# Patient Record
Sex: FEMALE | Race: WHITE | Employment: OTHER | URBAN - NONMETROPOLITAN AREA
[De-identification: names, ages, dates, MRNs, and addresses within clinical notes are randomized per-mention and may not be internally consistent; named-entity substitution may affect disease eponyms.]

---

## 2017-01-04 ENCOUNTER — TELEPHONE (OUTPATIENT)
Dept: CARDIOLOGY | Age: 82
End: 2017-01-04

## 2017-01-19 ENCOUNTER — CARE COORDINATION (OUTPATIENT)
Dept: CARE COORDINATION | Facility: CLINIC | Age: 82
End: 2017-01-19

## 2017-02-02 DIAGNOSIS — F41.1 GENERALIZED ANXIETY DISORDER: ICD-10-CM

## 2017-02-03 RX ORDER — LORAZEPAM 0.5 MG/1
TABLET ORAL
Qty: 90 TABLET | Refills: 0 | Status: SHIPPED | OUTPATIENT
Start: 2017-02-03 | End: 2017-04-25

## 2017-03-10 ENCOUNTER — CARE COORDINATION (OUTPATIENT)
Dept: CARE COORDINATION | Facility: CLINIC | Age: 82
End: 2017-03-10

## 2017-04-06 ENCOUNTER — CARE COORDINATION (OUTPATIENT)
Dept: CARE COORDINATION | Age: 82
End: 2017-04-06

## 2017-04-21 ENCOUNTER — CARE COORDINATION (OUTPATIENT)
Dept: CARE COORDINATION | Age: 82
End: 2017-04-21

## 2017-04-25 DIAGNOSIS — F41.1 GENERALIZED ANXIETY DISORDER: ICD-10-CM

## 2017-04-25 RX ORDER — LORAZEPAM 0.5 MG/1
TABLET ORAL
Qty: 90 TABLET | Refills: 2 | Status: SHIPPED | OUTPATIENT
Start: 2017-04-25 | End: 2017-11-21 | Stop reason: SDUPTHER

## 2017-05-01 ENCOUNTER — OFFICE VISIT (OUTPATIENT)
Dept: CARDIOLOGY | Age: 82
End: 2017-05-01
Payer: MEDICARE

## 2017-05-01 VITALS
SYSTOLIC BLOOD PRESSURE: 120 MMHG | WEIGHT: 127 LBS | DIASTOLIC BLOOD PRESSURE: 60 MMHG | BODY MASS INDEX: 23.98 KG/M2 | HEART RATE: 77 BPM | HEIGHT: 61 IN

## 2017-05-01 DIAGNOSIS — I25.10 CORONARY ARTERY DISEASE INVOLVING NATIVE CORONARY ARTERY OF NATIVE HEART WITHOUT ANGINA PECTORIS: ICD-10-CM

## 2017-05-01 DIAGNOSIS — I35.0 AORTIC VALVE STENOSIS, UNSPECIFIED ETIOLOGY: ICD-10-CM

## 2017-05-01 DIAGNOSIS — I48.0 PAF (PAROXYSMAL ATRIAL FIBRILLATION) (HCC): ICD-10-CM

## 2017-05-01 DIAGNOSIS — I25.5 CARDIOMYOPATHY, ISCHEMIC: ICD-10-CM

## 2017-05-01 DIAGNOSIS — I10 ESSENTIAL HYPERTENSION: Primary | ICD-10-CM

## 2017-05-01 PROCEDURE — 1090F PRES/ABSN URINE INCON ASSESS: CPT | Performed by: INTERNAL MEDICINE

## 2017-05-01 PROCEDURE — G8420 CALC BMI NORM PARAMETERS: HCPCS | Performed by: INTERNAL MEDICINE

## 2017-05-01 PROCEDURE — 4040F PNEUMOC VAC/ADMIN/RCVD: CPT | Performed by: INTERNAL MEDICINE

## 2017-05-01 PROCEDURE — G8598 ASA/ANTIPLAT THER USED: HCPCS | Performed by: INTERNAL MEDICINE

## 2017-05-01 PROCEDURE — 99213 OFFICE O/P EST LOW 20 MIN: CPT | Performed by: INTERNAL MEDICINE

## 2017-05-01 PROCEDURE — 1123F ACP DISCUSS/DSCN MKR DOCD: CPT | Performed by: INTERNAL MEDICINE

## 2017-05-01 PROCEDURE — G8427 DOCREV CUR MEDS BY ELIG CLIN: HCPCS | Performed by: INTERNAL MEDICINE

## 2017-05-01 PROCEDURE — 1036F TOBACCO NON-USER: CPT | Performed by: INTERNAL MEDICINE

## 2017-05-01 PROCEDURE — 93000 ELECTROCARDIOGRAM COMPLETE: CPT | Performed by: INTERNAL MEDICINE

## 2017-05-01 PROCEDURE — G8400 PT W/DXA NO RESULTS DOC: HCPCS | Performed by: INTERNAL MEDICINE

## 2017-05-02 ENCOUNTER — OFFICE VISIT (OUTPATIENT)
Dept: FAMILY MEDICINE CLINIC | Age: 82
End: 2017-05-02
Payer: MEDICARE

## 2017-05-02 VITALS
HEIGHT: 61 IN | SYSTOLIC BLOOD PRESSURE: 136 MMHG | TEMPERATURE: 96.7 F | OXYGEN SATURATION: 98 % | DIASTOLIC BLOOD PRESSURE: 78 MMHG | HEART RATE: 96 BPM | WEIGHT: 131 LBS | RESPIRATION RATE: 16 BRPM | BODY MASS INDEX: 24.73 KG/M2

## 2017-05-02 DIAGNOSIS — R10.11 RUQ PAIN: ICD-10-CM

## 2017-05-02 DIAGNOSIS — E11.69 TYPE 2 DIABETES MELLITUS WITH OTHER SPECIFIED COMPLICATION, WITHOUT LONG-TERM CURRENT USE OF INSULIN (HCC): ICD-10-CM

## 2017-05-02 DIAGNOSIS — Z23 NEED FOR 23-POLYVALENT PNEUMOCOCCAL POLYSACCHARIDE VACCINE: ICD-10-CM

## 2017-05-02 DIAGNOSIS — I10 ESSENTIAL HYPERTENSION: Primary | ICD-10-CM

## 2017-05-02 PROCEDURE — G8427 DOCREV CUR MEDS BY ELIG CLIN: HCPCS | Performed by: FAMILY MEDICINE

## 2017-05-02 PROCEDURE — G0009 ADMIN PNEUMOCOCCAL VACCINE: HCPCS | Performed by: FAMILY MEDICINE

## 2017-05-02 PROCEDURE — 1090F PRES/ABSN URINE INCON ASSESS: CPT | Performed by: FAMILY MEDICINE

## 2017-05-02 PROCEDURE — 90732 PPSV23 VACC 2 YRS+ SUBQ/IM: CPT | Performed by: FAMILY MEDICINE

## 2017-05-02 PROCEDURE — G8400 PT W/DXA NO RESULTS DOC: HCPCS | Performed by: FAMILY MEDICINE

## 2017-05-02 PROCEDURE — 4040F PNEUMOC VAC/ADMIN/RCVD: CPT | Performed by: FAMILY MEDICINE

## 2017-05-02 PROCEDURE — 1036F TOBACCO NON-USER: CPT | Performed by: FAMILY MEDICINE

## 2017-05-02 PROCEDURE — 99214 OFFICE O/P EST MOD 30 MIN: CPT | Performed by: FAMILY MEDICINE

## 2017-05-02 PROCEDURE — 1123F ACP DISCUSS/DSCN MKR DOCD: CPT | Performed by: FAMILY MEDICINE

## 2017-05-02 PROCEDURE — G8420 CALC BMI NORM PARAMETERS: HCPCS | Performed by: FAMILY MEDICINE

## 2017-05-02 PROCEDURE — G8598 ASA/ANTIPLAT THER USED: HCPCS | Performed by: FAMILY MEDICINE

## 2017-05-02 ASSESSMENT — ENCOUNTER SYMPTOMS
CHEST TIGHTNESS: 0
COUGH: 0
SHORTNESS OF BREATH: 0
NAUSEA: 0
SORE THROAT: 0
DIARRHEA: 1
ABDOMINAL PAIN: 1
WHEEZING: 0

## 2017-05-30 ENCOUNTER — HOSPITAL ENCOUNTER (OUTPATIENT)
Age: 82
Setting detail: SPECIMEN
Discharge: HOME | End: 2017-05-30
Payer: MEDICARE

## 2017-05-30 ENCOUNTER — OFFICE VISIT (OUTPATIENT)
Dept: FAMILY MEDICINE CLINIC | Age: 82
End: 2017-05-30
Payer: MEDICARE

## 2017-05-30 VITALS
HEART RATE: 89 BPM | TEMPERATURE: 100.4 F | SYSTOLIC BLOOD PRESSURE: 130 MMHG | BODY MASS INDEX: 24.32 KG/M2 | DIASTOLIC BLOOD PRESSURE: 84 MMHG | HEIGHT: 61 IN | WEIGHT: 128.8 LBS | OXYGEN SATURATION: 96 %

## 2017-05-30 DIAGNOSIS — L03.114 CELLULITIS OF LEFT UPPER EXTREMITY: Primary | ICD-10-CM

## 2017-05-30 DIAGNOSIS — E55.9 VITAMIN D DEFICIENCY: ICD-10-CM

## 2017-05-30 DIAGNOSIS — G89.29 CHRONIC MIDLINE LOW BACK PAIN WITHOUT SCIATICA: ICD-10-CM

## 2017-05-30 DIAGNOSIS — M54.50 CHRONIC MIDLINE LOW BACK PAIN WITHOUT SCIATICA: ICD-10-CM

## 2017-05-30 DIAGNOSIS — R25.2 CRAMPS OF LEFT LOWER EXTREMITY: ICD-10-CM

## 2017-05-30 DIAGNOSIS — R10.11 RIGHT UPPER QUADRANT ABDOMINAL PAIN: ICD-10-CM

## 2017-05-30 PROCEDURE — G8400 PT W/DXA NO RESULTS DOC: HCPCS | Performed by: FAMILY MEDICINE

## 2017-05-30 PROCEDURE — 1123F ACP DISCUSS/DSCN MKR DOCD: CPT | Performed by: FAMILY MEDICINE

## 2017-05-30 PROCEDURE — 4040F PNEUMOC VAC/ADMIN/RCVD: CPT | Performed by: FAMILY MEDICINE

## 2017-05-30 PROCEDURE — 1036F TOBACCO NON-USER: CPT | Performed by: FAMILY MEDICINE

## 2017-05-30 PROCEDURE — 99214 OFFICE O/P EST MOD 30 MIN: CPT | Performed by: FAMILY MEDICINE

## 2017-05-30 PROCEDURE — 1090F PRES/ABSN URINE INCON ASSESS: CPT | Performed by: FAMILY MEDICINE

## 2017-05-30 PROCEDURE — G8420 CALC BMI NORM PARAMETERS: HCPCS | Performed by: FAMILY MEDICINE

## 2017-05-30 PROCEDURE — G8427 DOCREV CUR MEDS BY ELIG CLIN: HCPCS | Performed by: FAMILY MEDICINE

## 2017-05-30 PROCEDURE — G8598 ASA/ANTIPLAT THER USED: HCPCS | Performed by: FAMILY MEDICINE

## 2017-05-30 RX ORDER — ONDANSETRON 4 MG/1
4 TABLET, FILM COATED ORAL EVERY 8 HOURS PRN
Qty: 40 TABLET | Refills: 2 | Status: ON HOLD | OUTPATIENT
Start: 2017-05-30 | End: 2018-11-15 | Stop reason: HOSPADM

## 2017-05-30 RX ORDER — CHOLESTYRAMINE 4 G/9G
1 POWDER, FOR SUSPENSION ORAL 2 TIMES DAILY
Qty: 90 PACKET | Refills: 3 | Status: SHIPPED | OUTPATIENT
Start: 2017-05-30 | End: 2017-07-25

## 2017-05-30 RX ORDER — CEPHALEXIN 500 MG/1
500 CAPSULE ORAL 4 TIMES DAILY
Qty: 28 CAPSULE | Refills: 0 | Status: SHIPPED | OUTPATIENT
Start: 2017-05-30 | End: 2018-02-20 | Stop reason: SDUPTHER

## 2017-05-30 ASSESSMENT — ENCOUNTER SYMPTOMS
ABDOMINAL PAIN: 1
COUGH: 0
DIARRHEA: 1
CONSTIPATION: 0
WHEEZING: 0
SHORTNESS OF BREATH: 0
BACK PAIN: 1
CHEST TIGHTNESS: 0
VOMITING: 1
NAUSEA: 1

## 2017-05-31 LAB — VITAMIN D 25-HYDROXY: 44.8 NG/ML (ref 30–100)

## 2017-06-06 ENCOUNTER — TELEPHONE (OUTPATIENT)
Dept: FAMILY MEDICINE CLINIC | Age: 82
End: 2017-06-06

## 2017-06-06 DIAGNOSIS — R10.11 RIGHT UPPER QUADRANT ABDOMINAL PAIN: Primary | ICD-10-CM

## 2017-06-14 ENCOUNTER — CARE COORDINATION (OUTPATIENT)
Dept: CARE COORDINATION | Age: 82
End: 2017-06-14

## 2017-07-10 ENCOUNTER — CARE COORDINATION (OUTPATIENT)
Dept: CARE COORDINATION | Age: 82
End: 2017-07-10

## 2017-07-25 ENCOUNTER — INITIAL CONSULT (OUTPATIENT)
Dept: SURGERY | Age: 82
End: 2017-07-25
Payer: MEDICARE

## 2017-07-25 VITALS
DIASTOLIC BLOOD PRESSURE: 82 MMHG | HEIGHT: 61 IN | BODY MASS INDEX: 24.17 KG/M2 | SYSTOLIC BLOOD PRESSURE: 136 MMHG | WEIGHT: 128 LBS | HEART RATE: 80 BPM

## 2017-07-25 DIAGNOSIS — I25.5 CARDIOMYOPATHY, ISCHEMIC: ICD-10-CM

## 2017-07-25 DIAGNOSIS — R10.84 GENERALIZED ABDOMINAL PAIN: Primary | ICD-10-CM

## 2017-07-25 PROCEDURE — 1123F ACP DISCUSS/DSCN MKR DOCD: CPT | Performed by: SURGERY

## 2017-07-25 PROCEDURE — G8598 ASA/ANTIPLAT THER USED: HCPCS | Performed by: SURGERY

## 2017-07-25 PROCEDURE — 1090F PRES/ABSN URINE INCON ASSESS: CPT | Performed by: SURGERY

## 2017-07-25 PROCEDURE — G8428 CUR MEDS NOT DOCUMENT: HCPCS | Performed by: SURGERY

## 2017-07-25 PROCEDURE — 4040F PNEUMOC VAC/ADMIN/RCVD: CPT | Performed by: SURGERY

## 2017-07-25 PROCEDURE — G8400 PT W/DXA NO RESULTS DOC: HCPCS | Performed by: SURGERY

## 2017-07-25 PROCEDURE — 1036F TOBACCO NON-USER: CPT | Performed by: SURGERY

## 2017-07-25 PROCEDURE — 99214 OFFICE O/P EST MOD 30 MIN: CPT | Performed by: SURGERY

## 2017-07-25 PROCEDURE — G8420 CALC BMI NORM PARAMETERS: HCPCS | Performed by: SURGERY

## 2017-07-25 RX ORDER — LANOLIN ALCOHOL/MO/W.PET/CERES
3 CREAM (GRAM) TOPICAL NIGHTLY PRN
COMMUNITY
End: 2019-01-28

## 2017-07-25 RX ORDER — RIVAROXABAN 20 MG/1
TABLET, FILM COATED ORAL
Qty: 30 TABLET | Refills: 0 | Status: SHIPPED | OUTPATIENT
Start: 2017-07-25 | End: 2017-08-25 | Stop reason: SDUPTHER

## 2017-07-25 RX ORDER — FAMOTIDINE 20 MG/1
TABLET, FILM COATED ORAL
Qty: 60 TABLET | Refills: 0 | Status: SHIPPED | OUTPATIENT
Start: 2017-07-25 | End: 2017-10-27 | Stop reason: SDUPTHER

## 2017-07-26 ENCOUNTER — TELEPHONE (OUTPATIENT)
Dept: SURGERY | Age: 82
End: 2017-07-26

## 2017-07-26 RX ORDER — SOTALOL HYDROCHLORIDE 80 MG/1
TABLET ORAL
Qty: 90 TABLET | Refills: 3 | Status: SHIPPED | OUTPATIENT
Start: 2017-07-26 | End: 2017-12-18

## 2017-07-26 RX ORDER — POTASSIUM CHLORIDE 750 MG/1
10 TABLET, EXTENDED RELEASE ORAL DAILY
Qty: 90 TABLET | Refills: 3 | Status: SHIPPED | OUTPATIENT
Start: 2017-07-26 | End: 2017-12-18

## 2017-07-26 RX ORDER — FUROSEMIDE 20 MG/1
20 TABLET ORAL DAILY
Qty: 90 TABLET | Refills: 3 | Status: SHIPPED | OUTPATIENT
Start: 2017-07-26 | End: 2018-09-25 | Stop reason: SDUPTHER

## 2017-07-26 RX ORDER — CLOPIDOGREL BISULFATE 75 MG/1
75 TABLET ORAL DAILY
Qty: 90 TABLET | Refills: 3 | Status: SHIPPED | OUTPATIENT
Start: 2017-07-26 | End: 2018-08-15 | Stop reason: SDUPTHER

## 2017-08-14 ENCOUNTER — TELEPHONE (OUTPATIENT)
Dept: SURGERY | Age: 82
End: 2017-08-14

## 2017-08-25 RX ORDER — RIVAROXABAN 20 MG/1
TABLET, FILM COATED ORAL
Qty: 30 TABLET | Refills: 5 | Status: SHIPPED | OUTPATIENT
Start: 2017-08-25 | End: 2018-03-05 | Stop reason: SDUPTHER

## 2017-09-01 ENCOUNTER — CARE COORDINATION (OUTPATIENT)
Dept: CARE COORDINATION | Age: 82
End: 2017-09-01

## 2017-09-05 ENCOUNTER — HOSPITAL ENCOUNTER (OUTPATIENT)
Dept: NON INVASIVE DIAGNOSTICS | Age: 82
Discharge: HOME | End: 2017-09-05
Payer: MEDICARE

## 2017-09-05 ENCOUNTER — TELEPHONE (OUTPATIENT)
Dept: CARDIOLOGY | Age: 82
End: 2017-09-05

## 2017-09-05 PROCEDURE — 93306 TTE W/DOPPLER COMPLETE: CPT

## 2017-09-11 ENCOUNTER — HOSPITAL ENCOUNTER (OUTPATIENT)
Dept: LAB | Age: 82
Discharge: HOME | End: 2017-09-11
Payer: MEDICARE

## 2017-09-11 ENCOUNTER — OFFICE VISIT (OUTPATIENT)
Dept: CARDIOLOGY | Age: 82
End: 2017-09-11
Payer: MEDICARE

## 2017-09-11 VITALS
DIASTOLIC BLOOD PRESSURE: 70 MMHG | WEIGHT: 129.6 LBS | HEIGHT: 61 IN | HEART RATE: 88 BPM | BODY MASS INDEX: 24.47 KG/M2 | SYSTOLIC BLOOD PRESSURE: 114 MMHG

## 2017-09-11 DIAGNOSIS — I25.10 CORONARY ARTERY DISEASE INVOLVING NATIVE CORONARY ARTERY OF NATIVE HEART, ANGINA PRESENCE UNSPECIFIED: Primary | ICD-10-CM

## 2017-09-11 DIAGNOSIS — I25.10 CORONARY ARTERY DISEASE INVOLVING NATIVE CORONARY ARTERY OF NATIVE HEART, ANGINA PRESENCE UNSPECIFIED: ICD-10-CM

## 2017-09-11 DIAGNOSIS — I10 ESSENTIAL HYPERTENSION: ICD-10-CM

## 2017-09-11 DIAGNOSIS — I35.0 AORTIC VALVE STENOSIS, UNSPECIFIED ETIOLOGY: ICD-10-CM

## 2017-09-11 DIAGNOSIS — E78.5 HYPERLIPIDEMIA, UNSPECIFIED HYPERLIPIDEMIA TYPE: ICD-10-CM

## 2017-09-11 LAB
ANION GAP SERPL CALCULATED.3IONS-SCNC: 15 MMOL/L (ref 9–17)
BUN BLDV-MCNC: 26 MG/DL (ref 8–23)
BUN/CREAT BLD: 29 (ref 9–20)
CALCIUM SERPL-MCNC: 10 MG/DL (ref 8.6–10.4)
CHLORIDE BLD-SCNC: 98 MMOL/L (ref 98–107)
CO2: 27 MMOL/L (ref 20–31)
CREAT SERPL-MCNC: 0.91 MG/DL (ref 0.5–0.9)
GFR AFRICAN AMERICAN: >60 ML/MIN
GFR NON-AFRICAN AMERICAN: 59 ML/MIN
GFR SERPL CREATININE-BSD FRML MDRD: ABNORMAL ML/MIN/{1.73_M2}
GFR SERPL CREATININE-BSD FRML MDRD: ABNORMAL ML/MIN/{1.73_M2}
GLUCOSE BLD-MCNC: 185 MG/DL (ref 70–99)
HCT VFR BLD CALC: 45.2 % (ref 36–46)
HEMOGLOBIN: 14.5 G/DL (ref 12–16)
MCH RBC QN AUTO: 28 PG (ref 26–34)
MCHC RBC AUTO-ENTMCNC: 32.1 G/DL (ref 31–37)
MCV RBC AUTO: 87.1 FL (ref 80–100)
PDW BLD-RTO: 15.2 % (ref 11–14.5)
PLATELET # BLD: 209 K/UL (ref 140–450)
PMV BLD AUTO: 8.6 FL (ref 6–12)
POTASSIUM SERPL-SCNC: 4.7 MMOL/L (ref 3.7–5.3)
RBC # BLD: 5.19 M/UL (ref 4–5.2)
SODIUM BLD-SCNC: 140 MMOL/L (ref 135–144)
WBC # BLD: 9.2 K/UL (ref 3.5–11)

## 2017-09-11 PROCEDURE — 1123F ACP DISCUSS/DSCN MKR DOCD: CPT | Performed by: INTERNAL MEDICINE

## 2017-09-11 PROCEDURE — 80048 BASIC METABOLIC PNL TOTAL CA: CPT

## 2017-09-11 PROCEDURE — 99213 OFFICE O/P EST LOW 20 MIN: CPT | Performed by: INTERNAL MEDICINE

## 2017-09-11 PROCEDURE — G8427 DOCREV CUR MEDS BY ELIG CLIN: HCPCS | Performed by: INTERNAL MEDICINE

## 2017-09-11 PROCEDURE — 36415 COLL VENOUS BLD VENIPUNCTURE: CPT

## 2017-09-11 PROCEDURE — 4040F PNEUMOC VAC/ADMIN/RCVD: CPT | Performed by: INTERNAL MEDICINE

## 2017-09-11 PROCEDURE — 93000 ELECTROCARDIOGRAM COMPLETE: CPT | Performed by: INTERNAL MEDICINE

## 2017-09-11 PROCEDURE — G8598 ASA/ANTIPLAT THER USED: HCPCS | Performed by: INTERNAL MEDICINE

## 2017-09-11 PROCEDURE — 1036F TOBACCO NON-USER: CPT | Performed by: INTERNAL MEDICINE

## 2017-09-11 PROCEDURE — 1090F PRES/ABSN URINE INCON ASSESS: CPT | Performed by: INTERNAL MEDICINE

## 2017-09-11 PROCEDURE — G8400 PT W/DXA NO RESULTS DOC: HCPCS | Performed by: INTERNAL MEDICINE

## 2017-09-11 PROCEDURE — 85027 COMPLETE CBC AUTOMATED: CPT

## 2017-09-11 PROCEDURE — G8420 CALC BMI NORM PARAMETERS: HCPCS | Performed by: INTERNAL MEDICINE

## 2017-09-19 ENCOUNTER — HOSPITAL ENCOUNTER (INPATIENT)
Dept: CARDIAC CATH/INVASIVE PROCEDURES | Age: 82
LOS: 1 days | Discharge: HOME HEALTH | DRG: 287 | End: 2017-09-20
Attending: INTERNAL MEDICINE | Admitting: INTERNAL MEDICINE
Payer: MEDICARE

## 2017-09-19 LAB
GLUCOSE BLD-MCNC: 161 MG/DL (ref 65–105)
GLUCOSE BLD-MCNC: 187 MG/DL (ref 65–105)
GLUCOSE BLD-MCNC: 197 MG/DL (ref 65–105)
HCT VFR BLD CALC: 40.1 % (ref 36–46)
HEMOGLOBIN: 12.8 G/DL (ref 12–16)
MCH RBC QN AUTO: 27.5 PG (ref 26–34)
MCHC RBC AUTO-ENTMCNC: 32 G/DL (ref 31–37)
MCV RBC AUTO: 85.9 FL (ref 80–100)
PARTIAL THROMBOPLASTIN TIME: 47.1 SEC (ref 21.3–31.3)
PDW BLD-RTO: 16.1 % (ref 12.5–15.4)
PLATELET # BLD: 199 K/UL (ref 140–450)
PMV BLD AUTO: 8.4 FL (ref 6–12)
RBC # BLD: 4.67 M/UL (ref 4–5.2)
WBC # BLD: 9.4 K/UL (ref 3.5–11)

## 2017-09-19 PROCEDURE — 7100000011 HC PHASE II RECOVERY - ADDTL 15 MIN

## 2017-09-19 PROCEDURE — 6360000002 HC RX W HCPCS

## 2017-09-19 PROCEDURE — B2161ZZ FLUOROSCOPY OF RIGHT AND LEFT HEART USING LOW OSMOLAR CONTRAST: ICD-10-PCS | Performed by: INTERNAL MEDICINE

## 2017-09-19 PROCEDURE — C1725 CATH, TRANSLUMIN NON-LASER: HCPCS

## 2017-09-19 PROCEDURE — 82947 ASSAY GLUCOSE BLOOD QUANT: CPT

## 2017-09-19 PROCEDURE — B2111ZZ FLUOROSCOPY OF MULTIPLE CORONARY ARTERIES USING LOW OSMOLAR CONTRAST: ICD-10-PCS | Performed by: INTERNAL MEDICINE

## 2017-09-19 PROCEDURE — 93460 R&L HRT ART/VENTRICLE ANGIO: CPT

## 2017-09-19 PROCEDURE — 4A023N8 MEASUREMENT OF CARDIAC SAMPLING AND PRESSURE, BILATERAL, PERCUTANEOUS APPROACH: ICD-10-PCS | Performed by: INTERNAL MEDICINE

## 2017-09-19 PROCEDURE — C1751 CATH, INF, PER/CENT/MIDLINE: HCPCS

## 2017-09-19 PROCEDURE — 85027 COMPLETE CBC AUTOMATED: CPT

## 2017-09-19 PROCEDURE — 76937 US GUIDE VASCULAR ACCESS: CPT

## 2017-09-19 PROCEDURE — C1760 CLOSURE DEV, VASC: HCPCS

## 2017-09-19 PROCEDURE — 2580000003 HC RX 258: Performed by: INTERNAL MEDICINE

## 2017-09-19 PROCEDURE — C1894 INTRO/SHEATH, NON-LASER: HCPCS

## 2017-09-19 PROCEDURE — 6360000002 HC RX W HCPCS: Performed by: INTERNAL MEDICINE

## 2017-09-19 PROCEDURE — C1769 GUIDE WIRE: HCPCS

## 2017-09-19 PROCEDURE — 7100000010 HC PHASE II RECOVERY - FIRST 15 MIN

## 2017-09-19 PROCEDURE — 85730 THROMBOPLASTIN TIME PARTIAL: CPT

## 2017-09-19 PROCEDURE — 6370000000 HC RX 637 (ALT 250 FOR IP): Performed by: INTERNAL MEDICINE

## 2017-09-19 PROCEDURE — 99232 SBSQ HOSP IP/OBS MODERATE 35: CPT | Performed by: PHYSICIAN ASSISTANT

## 2017-09-19 PROCEDURE — 36415 COLL VENOUS BLD VENIPUNCTURE: CPT

## 2017-09-19 PROCEDURE — 2060000000 HC ICU INTERMEDIATE R&B

## 2017-09-19 RX ORDER — SODIUM CHLORIDE 0.9 % (FLUSH) 0.9 %
10 SYRINGE (ML) INJECTION PRN
Status: DISCONTINUED | OUTPATIENT
Start: 2017-09-19 | End: 2017-09-20 | Stop reason: HOSPADM

## 2017-09-19 RX ORDER — SODIUM CHLORIDE 9 MG/ML
INJECTION, SOLUTION INTRAVENOUS CONTINUOUS
Status: DISCONTINUED | OUTPATIENT
Start: 2017-09-19 | End: 2017-09-20 | Stop reason: HOSPADM

## 2017-09-19 RX ORDER — FUROSEMIDE 20 MG/1
20 TABLET ORAL DAILY
Status: DISCONTINUED | OUTPATIENT
Start: 2017-09-19 | End: 2017-09-20 | Stop reason: HOSPADM

## 2017-09-19 RX ORDER — FAMOTIDINE 20 MG/1
20 TABLET, FILM COATED ORAL 2 TIMES DAILY
Status: DISCONTINUED | OUTPATIENT
Start: 2017-09-19 | End: 2017-09-20 | Stop reason: HOSPADM

## 2017-09-19 RX ORDER — POTASSIUM CHLORIDE 20 MEQ/1
10 TABLET, EXTENDED RELEASE ORAL DAILY
Status: DISCONTINUED | OUTPATIENT
Start: 2017-09-19 | End: 2017-09-20 | Stop reason: HOSPADM

## 2017-09-19 RX ORDER — SOTALOL HYDROCHLORIDE 80 MG/1
80 TABLET ORAL 2 TIMES DAILY
Status: DISCONTINUED | OUTPATIENT
Start: 2017-09-19 | End: 2017-09-19

## 2017-09-19 RX ORDER — NICOTINE POLACRILEX 4 MG
15 LOZENGE BUCCAL PRN
Status: DISCONTINUED | OUTPATIENT
Start: 2017-09-19 | End: 2017-09-20 | Stop reason: HOSPADM

## 2017-09-19 RX ORDER — SOTALOL HYDROCHLORIDE 80 MG/1
40 TABLET ORAL 2 TIMES DAILY
Status: DISCONTINUED | OUTPATIENT
Start: 2017-09-19 | End: 2017-09-20 | Stop reason: HOSPADM

## 2017-09-19 RX ORDER — ONDANSETRON 4 MG/1
4 TABLET, FILM COATED ORAL EVERY 8 HOURS PRN
Status: DISCONTINUED | OUTPATIENT
Start: 2017-09-19 | End: 2017-09-20 | Stop reason: HOSPADM

## 2017-09-19 RX ORDER — ACETAMINOPHEN 325 MG/1
650 TABLET ORAL EVERY 4 HOURS PRN
Status: DISCONTINUED | OUTPATIENT
Start: 2017-09-19 | End: 2017-09-20 | Stop reason: HOSPADM

## 2017-09-19 RX ORDER — LORAZEPAM 0.5 MG/1
0.5 TABLET ORAL 3 TIMES DAILY PRN
Status: DISCONTINUED | OUTPATIENT
Start: 2017-09-19 | End: 2017-09-20 | Stop reason: HOSPADM

## 2017-09-19 RX ORDER — SODIUM CHLORIDE 0.9 % (FLUSH) 0.9 %
10 SYRINGE (ML) INJECTION EVERY 12 HOURS SCHEDULED
Status: DISCONTINUED | OUTPATIENT
Start: 2017-09-19 | End: 2017-09-20 | Stop reason: HOSPADM

## 2017-09-19 RX ORDER — DEXTROSE MONOHYDRATE 50 MG/ML
100 INJECTION, SOLUTION INTRAVENOUS PRN
Status: DISCONTINUED | OUTPATIENT
Start: 2017-09-19 | End: 2017-09-20 | Stop reason: HOSPADM

## 2017-09-19 RX ORDER — HEPARIN SODIUM 1000 [USP'U]/ML
60 INJECTION, SOLUTION INTRAVENOUS; SUBCUTANEOUS PRN
Status: DISCONTINUED | OUTPATIENT
Start: 2017-09-19 | End: 2017-09-20 | Stop reason: HOSPADM

## 2017-09-19 RX ORDER — UREA 10 %
3 LOTION (ML) TOPICAL NIGHTLY PRN
Status: DISCONTINUED | OUTPATIENT
Start: 2017-09-19 | End: 2017-09-20 | Stop reason: HOSPADM

## 2017-09-19 RX ORDER — ONDANSETRON 2 MG/ML
4 INJECTION INTRAMUSCULAR; INTRAVENOUS EVERY 6 HOURS PRN
Status: DISCONTINUED | OUTPATIENT
Start: 2017-09-19 | End: 2017-09-20 | Stop reason: HOSPADM

## 2017-09-19 RX ORDER — HEPARIN SODIUM 1000 [USP'U]/ML
30 INJECTION, SOLUTION INTRAVENOUS; SUBCUTANEOUS PRN
Status: DISCONTINUED | OUTPATIENT
Start: 2017-09-19 | End: 2017-09-20 | Stop reason: HOSPADM

## 2017-09-19 RX ORDER — DEXTROSE MONOHYDRATE 25 G/50ML
12.5 INJECTION, SOLUTION INTRAVENOUS PRN
Status: DISCONTINUED | OUTPATIENT
Start: 2017-09-19 | End: 2017-09-20 | Stop reason: HOSPADM

## 2017-09-19 RX ORDER — MORPHINE SULFATE 2 MG/ML
1 INJECTION, SOLUTION INTRAMUSCULAR; INTRAVENOUS ONCE
Status: COMPLETED | OUTPATIENT
Start: 2017-09-19 | End: 2017-09-19

## 2017-09-19 RX ORDER — MORPHINE SULFATE 2 MG/ML
1 INJECTION, SOLUTION INTRAMUSCULAR; INTRAVENOUS
Status: COMPLETED | OUTPATIENT
Start: 2017-09-19 | End: 2017-09-19

## 2017-09-19 RX ORDER — LISINOPRIL 2.5 MG/1
2.5 TABLET ORAL DAILY
Status: DISCONTINUED | OUTPATIENT
Start: 2017-09-19 | End: 2017-09-20 | Stop reason: HOSPADM

## 2017-09-19 RX ORDER — HEPARIN SODIUM 10000 [USP'U]/100ML
12 INJECTION, SOLUTION INTRAVENOUS CONTINUOUS
Status: DISCONTINUED | OUTPATIENT
Start: 2017-09-19 | End: 2017-09-20 | Stop reason: HOSPADM

## 2017-09-19 RX ORDER — HEPARIN SODIUM 1000 [USP'U]/ML
60 INJECTION, SOLUTION INTRAVENOUS; SUBCUTANEOUS ONCE
Status: COMPLETED | OUTPATIENT
Start: 2017-09-19 | End: 2017-09-19

## 2017-09-19 RX ADMIN — SODIUM CHLORIDE: 9 INJECTION, SOLUTION INTRAVENOUS at 10:45

## 2017-09-19 RX ADMIN — HEPARIN SODIUM 3400 UNITS: 1000 INJECTION, SOLUTION INTRAVENOUS; SUBCUTANEOUS at 16:55

## 2017-09-19 RX ADMIN — Medication 10 ML: at 20:51

## 2017-09-19 RX ADMIN — MORPHINE SULFATE 1 MG: 2 INJECTION, SOLUTION INTRAMUSCULAR; INTRAVENOUS at 13:17

## 2017-09-19 RX ADMIN — SOTALOL HYDROCHLORIDE 40 MG: 80 TABLET ORAL at 22:03

## 2017-09-19 RX ADMIN — LISINOPRIL 2.5 MG: 2.5 TABLET ORAL at 17:59

## 2017-09-19 RX ADMIN — INSULIN LISPRO 1 UNITS: 100 INJECTION, SOLUTION INTRAVENOUS; SUBCUTANEOUS at 20:51

## 2017-09-19 RX ADMIN — MORPHINE SULFATE 1 MG: 2 INJECTION, SOLUTION INTRAMUSCULAR; INTRAVENOUS at 13:38

## 2017-09-19 RX ADMIN — HEPARIN SODIUM 1700 UNITS: 1000 INJECTION, SOLUTION INTRAVENOUS; SUBCUTANEOUS at 23:51

## 2017-09-19 RX ADMIN — INSULIN LISPRO 2 UNITS: 100 INJECTION, SOLUTION INTRAVENOUS; SUBCUTANEOUS at 17:58

## 2017-09-19 RX ADMIN — HEPARIN SODIUM AND DEXTROSE 12 UNITS/KG/HR: 10000; 5 INJECTION INTRAVENOUS at 16:48

## 2017-09-19 ASSESSMENT — PAIN SCALES - GENERAL
PAINLEVEL_OUTOF10: 4
PAINLEVEL_OUTOF10: 4
PAINLEVEL_OUTOF10: 2

## 2017-09-19 ASSESSMENT — PAIN DESCRIPTION - LOCATION
LOCATION: CHEST

## 2017-09-19 ASSESSMENT — PAIN DESCRIPTION - DESCRIPTORS
DESCRIPTORS: ACHING

## 2017-09-19 ASSESSMENT — PAIN DESCRIPTION - DIRECTION
RADIATING_TOWARDS: BACK

## 2017-09-19 ASSESSMENT — PAIN DESCRIPTION - FREQUENCY
FREQUENCY: INTERMITTENT
FREQUENCY: CONTINUOUS

## 2017-09-19 ASSESSMENT — PAIN DESCRIPTION - PAIN TYPE
TYPE: ACUTE PAIN

## 2017-09-19 ASSESSMENT — PAIN DESCRIPTION - ORIENTATION
ORIENTATION: LEFT

## 2017-09-20 VITALS
HEART RATE: 69 BPM | OXYGEN SATURATION: 98 % | SYSTOLIC BLOOD PRESSURE: 99 MMHG | HEIGHT: 61 IN | TEMPERATURE: 98.3 F | DIASTOLIC BLOOD PRESSURE: 51 MMHG | BODY MASS INDEX: 24.17 KG/M2 | WEIGHT: 128 LBS | RESPIRATION RATE: 18 BRPM

## 2017-09-20 LAB
GLUCOSE BLD-MCNC: 150 MG/DL (ref 65–105)
GLUCOSE BLD-MCNC: 156 MG/DL (ref 65–105)
GLUCOSE BLD-MCNC: 186 MG/DL (ref 65–105)
PARTIAL THROMBOPLASTIN TIME: 77.6 SEC (ref 21.3–31.3)

## 2017-09-20 PROCEDURE — 85730 THROMBOPLASTIN TIME PARTIAL: CPT

## 2017-09-20 PROCEDURE — 82947 ASSAY GLUCOSE BLOOD QUANT: CPT

## 2017-09-20 PROCEDURE — 36415 COLL VENOUS BLD VENIPUNCTURE: CPT

## 2017-09-20 PROCEDURE — 6370000000 HC RX 637 (ALT 250 FOR IP): Performed by: NURSE PRACTITIONER

## 2017-09-20 PROCEDURE — 6370000000 HC RX 637 (ALT 250 FOR IP): Performed by: INTERNAL MEDICINE

## 2017-09-20 RX ORDER — NITROGLYCERIN 0.4 MG/1
0.4 TABLET SUBLINGUAL EVERY 5 MIN PRN
Qty: 25 TABLET | Refills: 3 | Status: SHIPPED | OUTPATIENT
Start: 2017-09-20

## 2017-09-20 RX ORDER — CLOPIDOGREL BISULFATE 75 MG/1
75 TABLET ORAL ONCE
Status: COMPLETED | OUTPATIENT
Start: 2017-09-20 | End: 2017-09-20

## 2017-09-20 RX ADMIN — RIVAROXABAN 20 MG: 20 TABLET, FILM COATED ORAL at 14:55

## 2017-09-20 RX ADMIN — FUROSEMIDE 20 MG: 20 TABLET ORAL at 08:35

## 2017-09-20 RX ADMIN — CLOPIDOGREL 75 MG: 75 TABLET, FILM COATED ORAL at 14:55

## 2017-09-20 RX ADMIN — INSULIN LISPRO 2 UNITS: 100 INJECTION, SOLUTION INTRAVENOUS; SUBCUTANEOUS at 14:34

## 2017-09-20 RX ADMIN — LISINOPRIL 2.5 MG: 2.5 TABLET ORAL at 08:34

## 2017-09-20 RX ADMIN — FAMOTIDINE 20 MG: 20 TABLET, FILM COATED ORAL at 08:34

## 2017-09-20 RX ADMIN — SOTALOL HYDROCHLORIDE 40 MG: 80 TABLET ORAL at 08:34

## 2017-09-20 RX ADMIN — POTASSIUM CHLORIDE 10 MEQ: 1500 TABLET, EXTENDED RELEASE ORAL at 08:34

## 2017-09-20 RX ADMIN — LORAZEPAM 0.5 MG: 0.5 TABLET ORAL at 00:07

## 2017-09-20 RX ADMIN — INSULIN LISPRO 2 UNITS: 100 INJECTION, SOLUTION INTRAVENOUS; SUBCUTANEOUS at 08:37

## 2017-09-22 ENCOUNTER — TELEPHONE (OUTPATIENT)
Dept: PHARMACY | Age: 82
End: 2017-09-22

## 2017-09-25 ENCOUNTER — HOSPITAL ENCOUNTER (OUTPATIENT)
Dept: CARDIAC CATH/INVASIVE PROCEDURES | Age: 82
Discharge: HOME | End: 2017-09-25
Payer: MEDICARE

## 2017-09-25 VITALS
OXYGEN SATURATION: 96 % | SYSTOLIC BLOOD PRESSURE: 108 MMHG | HEART RATE: 66 BPM | WEIGHT: 126 LBS | BODY MASS INDEX: 24.74 KG/M2 | RESPIRATION RATE: 19 BRPM | TEMPERATURE: 97.9 F | HEIGHT: 60 IN | DIASTOLIC BLOOD PRESSURE: 74 MMHG

## 2017-09-25 LAB
GLUCOSE BLD-MCNC: 201 MG/DL (ref 65–105)
LV EF: 38 %
LVEF MODALITY: NORMAL

## 2017-09-25 PROCEDURE — 7100000011 HC PHASE II RECOVERY - ADDTL 15 MIN

## 2017-09-25 PROCEDURE — 93325 DOPPLER ECHO COLOR FLOW MAPG: CPT

## 2017-09-25 PROCEDURE — 82947 ASSAY GLUCOSE BLOOD QUANT: CPT

## 2017-09-25 PROCEDURE — 7100000010 HC PHASE II RECOVERY - FIRST 15 MIN

## 2017-09-25 PROCEDURE — 93312 ECHO TRANSESOPHAGEAL: CPT

## 2017-09-25 RX ORDER — SODIUM CHLORIDE 0.9 % (FLUSH) 0.9 %
10 SYRINGE (ML) INJECTION PRN
Status: DISCONTINUED | OUTPATIENT
Start: 2017-09-25 | End: 2017-09-26 | Stop reason: HOSPADM

## 2017-09-25 RX ORDER — SODIUM CHLORIDE 0.9 % (FLUSH) 0.9 %
10 SYRINGE (ML) INJECTION EVERY 12 HOURS SCHEDULED
Status: DISCONTINUED | OUTPATIENT
Start: 2017-09-25 | End: 2017-09-26 | Stop reason: HOSPADM

## 2017-09-25 RX ORDER — SODIUM CHLORIDE 0.9 % (FLUSH) 0.9 %
10 SYRINGE (ML) INJECTION PRN
Status: CANCELLED | OUTPATIENT
Start: 2017-09-25

## 2017-09-25 RX ORDER — SODIUM CHLORIDE 9 MG/ML
INJECTION, SOLUTION INTRAVENOUS CONTINUOUS
Status: DISCONTINUED | OUTPATIENT
Start: 2017-09-25 | End: 2017-09-26 | Stop reason: HOSPADM

## 2017-09-25 RX ORDER — SODIUM CHLORIDE 0.9 % (FLUSH) 0.9 %
10 SYRINGE (ML) INJECTION EVERY 12 HOURS SCHEDULED
Status: CANCELLED | OUTPATIENT
Start: 2017-09-25

## 2017-09-25 RX ADMIN — SODIUM CHLORIDE: 9 INJECTION, SOLUTION INTRAVENOUS at 10:01

## 2017-09-25 ASSESSMENT — PAIN SCALES - GENERAL: PAINLEVEL_OUTOF10: 0

## 2017-09-27 DIAGNOSIS — R06.02 SOB (SHORTNESS OF BREATH) ON EXERTION: Primary | ICD-10-CM

## 2017-10-03 ENCOUNTER — TELEPHONE (OUTPATIENT)
Dept: CARDIOLOGY | Age: 82
End: 2017-10-03

## 2017-10-03 NOTE — TELEPHONE ENCOUNTER
Pt d/c'd from [facility], received fax to schedule pt for 2 week f/u. Per Dr Leilani Wilson ok to wait until previously scheduled appt on 11/6/17, due to pt having other appts scheduled in the meantime (CTS, testing, etc). Called pt and pt aware, she will contact us in the meantime with any needs.

## 2017-10-12 ENCOUNTER — CARE COORDINATION (OUTPATIENT)
Dept: CARE COORDINATION | Age: 82
End: 2017-10-12

## 2017-10-12 NOTE — CARE COORDINATION
Ambulatory Care Coordination Note  10/12/2017  CM Risk Score: 6  [place] Mortality Risk Score: 26.27    ACC: Molli Gilford, RN    Summary Note: Zarina Fontenot has been following in care coordination for Patient has Type II Diabetes- non insulin, CHF, A-Fib, Hx of cellulitis lt upper extremity, CAD, Hx of breast cancer, Lymphedema arm     Per chart review she follows with cardiology, optometrist.   She had EGD 9/1/2017 at [facility]- Dr. January Horan, Cardiac Cath STV 9/19/2017. She follows with cardiology. Lab Results   Component Value Date    LABA1C 7.2 (H) 04/25/2017    LABA1C 6.7 (H) 10/07/2016    LABA1C 6.6 (H) 08/15/2016     Lab Results   Component Value Date     04/25/2017     10/07/2016     08/15/2016       Unable to reach Patient by phone. Left message requesting return call. 10/18/2017 Spoke with PCP. Lipids can be added to labs 10/29/2017. Order only encounter pended to PCP. Unable to reach Patient by phone- left detailed message that labs due prior to 11/3/2017 with PCP are to be fasting- lipids were added. Requested return call.      Future Appointments  Date Time Provider Destinee Gaffney   10/18/2017 10:00 AM FRENCH PAULSON PFT Liane Neal PFT None   10/18/2017 11:00 AM KIM VASCULAR ULTRASOUND RM 1 FRENCH VASCLAB [facility] [place]   10/24/2017 9:40 AM Diaz Rivera MD SV CT Surg [facility]   10/24/2017 11:00 AM STV CT  STVZ CT SCAN [facility] Radiolog   10/24/2017 11:30 AM STV CT  STVZ CT SCAN [facility]V Radiolog   10/24/2017 12:00 PM STV CT  STVZ CT SCAN STV Radiolog   10/24/2017 1:00 PM STV HEART VALVE CLINIC RM 01 [facility]   11/3/2017 11:00 AM [facility]   11/6/2017 1:00 PM Christiana Babb MD [facility]

## 2017-10-18 ENCOUNTER — HOSPITAL ENCOUNTER (OUTPATIENT)
Dept: PULMONOLOGY | Age: 82
Discharge: HOME | End: 2017-10-18
Payer: MEDICARE

## 2017-10-18 ENCOUNTER — HOSPITAL ENCOUNTER (OUTPATIENT)
Dept: GENERAL RADIOLOGY | Age: 82
Discharge: HOME | End: 2017-10-18
Payer: MEDICARE

## 2017-10-18 ENCOUNTER — HOSPITAL ENCOUNTER (OUTPATIENT)
Dept: INTERVENTIONAL RADIOLOGY/VASCULAR | Age: 82
Discharge: HOME | End: 2017-10-18
Payer: MEDICARE

## 2017-10-18 DIAGNOSIS — R06.02 SHORTNESS OF BREATH: Primary | ICD-10-CM

## 2017-10-18 DIAGNOSIS — R06.00 DYSPNEA, UNSPECIFIED TYPE: ICD-10-CM

## 2017-10-18 DIAGNOSIS — R06.02 SHORTNESS OF BREATH: ICD-10-CM

## 2017-10-18 DIAGNOSIS — E78.5 HYPERLIPIDEMIA, UNSPECIFIED HYPERLIPIDEMIA TYPE: Primary | ICD-10-CM

## 2017-10-18 PROCEDURE — 6360000002 HC RX W HCPCS: Performed by: INTERNAL MEDICINE

## 2017-10-18 PROCEDURE — 94640 AIRWAY INHALATION TREATMENT: CPT

## 2017-10-18 PROCEDURE — 71020 XR CHEST STANDARD TWO VW: CPT

## 2017-10-18 PROCEDURE — 94729 DIFFUSING CAPACITY: CPT

## 2017-10-18 PROCEDURE — 94060 EVALUATION OF WHEEZING: CPT

## 2017-10-18 PROCEDURE — 93880 EXTRACRANIAL BILAT STUDY: CPT

## 2017-10-18 PROCEDURE — 94726 PLETHYSMOGRAPHY LUNG VOLUMES: CPT

## 2017-10-18 RX ORDER — ALBUTEROL SULFATE 2.5 MG/3ML
2.5 SOLUTION RESPIRATORY (INHALATION) ONCE
Status: COMPLETED | OUTPATIENT
Start: 2017-10-18 | End: 2017-10-18

## 2017-10-18 RX ADMIN — ALBUTEROL SULFATE 2.5 MG: 2.5 SOLUTION RESPIRATORY (INHALATION) at 10:34

## 2017-10-24 ENCOUNTER — INITIAL CONSULT (OUTPATIENT)
Dept: CARDIOTHORACIC SURGERY | Age: 82
End: 2017-10-24
Payer: MEDICARE

## 2017-10-24 ENCOUNTER — HOSPITAL ENCOUNTER (OUTPATIENT)
Dept: CARDIOLOGY CLINIC | Age: 82
Discharge: HOME | End: 2017-10-24
Payer: MEDICARE

## 2017-10-24 ENCOUNTER — HOSPITAL ENCOUNTER (OUTPATIENT)
Dept: CT IMAGING | Age: 82
Discharge: HOME | End: 2017-10-24
Payer: MEDICARE

## 2017-10-24 VITALS
OXYGEN SATURATION: 97 % | SYSTOLIC BLOOD PRESSURE: 124 MMHG | WEIGHT: 129 LBS | HEART RATE: 80 BPM | BODY MASS INDEX: 25.32 KG/M2 | TEMPERATURE: 97.4 F | HEIGHT: 60 IN | DIASTOLIC BLOOD PRESSURE: 77 MMHG | RESPIRATION RATE: 18 BRPM

## 2017-10-24 DIAGNOSIS — I35.0 AORTIC VALVE STENOSIS, ETIOLOGY OF CARDIAC VALVE DISEASE UNSPECIFIED: Primary | ICD-10-CM

## 2017-10-24 DIAGNOSIS — I35.0 AORTIC VALVE STENOSIS, ETIOLOGY OF CARDIAC VALVE DISEASE UNSPECIFIED: ICD-10-CM

## 2017-10-24 LAB
GFR NON-AFRICAN AMERICAN: 43 ML/MIN
GFR SERPL CREATININE-BSD FRML MDRD: 52 ML/MIN
GFR SERPL CREATININE-BSD FRML MDRD: ABNORMAL ML/MIN/{1.73_M2}
GLUCOSE BLD-MCNC: 211 MG/DL (ref 74–100)
POC CHLORIDE: 107 MMOL/L (ref 98–107)
POC CREATININE: 1.2 MG/DL (ref 0.51–1.19)
POC IONIZED CALCIUM: 1.2 MMOL/L (ref 1.15–1.33)
POC POTASSIUM: 3.9 MMOL/L (ref 3.5–4.5)
POC SODIUM: 140 MMOL/L (ref 138–146)

## 2017-10-24 PROCEDURE — 1123F ACP DISCUSS/DSCN MKR DOCD: CPT | Performed by: THORACIC SURGERY (CARDIOTHORACIC VASCULAR SURGERY)

## 2017-10-24 PROCEDURE — 82565 ASSAY OF CREATININE: CPT

## 2017-10-24 PROCEDURE — 71275 CT ANGIOGRAPHY CHEST: CPT

## 2017-10-24 PROCEDURE — G8400 PT W/DXA NO RESULTS DOC: HCPCS | Performed by: THORACIC SURGERY (CARDIOTHORACIC VASCULAR SURGERY)

## 2017-10-24 PROCEDURE — 6360000004 HC RX CONTRAST MEDICATION: Performed by: INTERNAL MEDICINE

## 2017-10-24 PROCEDURE — 2500000003 HC RX 250 WO HCPCS

## 2017-10-24 PROCEDURE — 99214 OFFICE O/P EST MOD 30 MIN: CPT | Performed by: THORACIC SURGERY (CARDIOTHORACIC VASCULAR SURGERY)

## 2017-10-24 PROCEDURE — 72191 CT ANGIOGRAPH PELV W/O&W/DYE: CPT

## 2017-10-24 PROCEDURE — 1036F TOBACCO NON-USER: CPT | Performed by: THORACIC SURGERY (CARDIOTHORACIC VASCULAR SURGERY)

## 2017-10-24 PROCEDURE — 82947 ASSAY GLUCOSE BLOOD QUANT: CPT

## 2017-10-24 PROCEDURE — 75572 CT HRT W/3D IMAGE: CPT

## 2017-10-24 PROCEDURE — 99214 OFFICE O/P EST MOD 30 MIN: CPT

## 2017-10-24 PROCEDURE — 84295 ASSAY OF SERUM SODIUM: CPT

## 2017-10-24 PROCEDURE — 82330 ASSAY OF CALCIUM: CPT

## 2017-10-24 PROCEDURE — G8598 ASA/ANTIPLAT THER USED: HCPCS | Performed by: THORACIC SURGERY (CARDIOTHORACIC VASCULAR SURGERY)

## 2017-10-24 PROCEDURE — 4040F PNEUMOC VAC/ADMIN/RCVD: CPT | Performed by: THORACIC SURGERY (CARDIOTHORACIC VASCULAR SURGERY)

## 2017-10-24 PROCEDURE — 82435 ASSAY OF BLOOD CHLORIDE: CPT

## 2017-10-24 PROCEDURE — 84132 ASSAY OF SERUM POTASSIUM: CPT

## 2017-10-24 PROCEDURE — 76937 US GUIDE VASCULAR ACCESS: CPT

## 2017-10-24 PROCEDURE — G8427 DOCREV CUR MEDS BY ELIG CLIN: HCPCS | Performed by: THORACIC SURGERY (CARDIOTHORACIC VASCULAR SURGERY)

## 2017-10-24 PROCEDURE — G8419 CALC BMI OUT NRM PARAM NOF/U: HCPCS | Performed by: THORACIC SURGERY (CARDIOTHORACIC VASCULAR SURGERY)

## 2017-10-24 PROCEDURE — G8484 FLU IMMUNIZE NO ADMIN: HCPCS | Performed by: THORACIC SURGERY (CARDIOTHORACIC VASCULAR SURGERY)

## 2017-10-24 PROCEDURE — 1090F PRES/ABSN URINE INCON ASSESS: CPT | Performed by: THORACIC SURGERY (CARDIOTHORACIC VASCULAR SURGERY)

## 2017-10-24 RX ADMIN — IOPAMIDOL 180 ML: 755 INJECTION, SOLUTION INTRAVENOUS at 12:09

## 2017-10-24 ASSESSMENT — PAIN SCALES - GENERAL: PAINLEVEL_OUTOF10: 0

## 2017-10-24 NOTE — PROGRESS NOTES
19 [facility] consult (second opinion)    Patient's Name/Date of Birth: Christiana Diaz / 1935 (59 y.o.)    Date: October 24, 2017     Chief Complaint:   Chief Complaint   Patient presents with    Consultation     TAVR       HPI: Christiana Diaz is a [de-identified] y.o. With chronic fatigue and SOB and reports marked decrease in her functional status over past few months. Her recent EF 35% with AS EDITH . 40cm2 demonstrates critical Aortic stenosis. Patient presents today for second opinion regarding options of TAVR verse SAVR    Past Medical History:   Diagnosis Date    Allergic rhinitis     With chronic cough.  Anxiety, generalized     Chronic with probable depression. She will take Ativan but refuses antidepressant.  Aortic stenosis     Atrial fibrillation (HCC)     Breast cancer ([facility] [address]     2 occurrences    CAD (coronary artery disease) November 2012    Minimal disease by catheterization, 11/12, with wedge pressure of the right heart. She is taking Lasix for this and being followed by Cardiology.  Diabetic neuropathy (HCC)     Hyperlipidemia     Hypertension     runs low    Lymphedema of arm     Left arm, due to lymph node dissection and mastectomy.  PVC's (premature ventricular contractions)     Chronic. Patient currently undergoing workup for arrhythmia.  S/P cardiac cath 09/06/2016    Type II or unspecified type diabetes mellitus without mention of complication, not stated as uncontrolled     Vitamin D deficiency      Past Surgical History:   Procedure Laterality Date    CARDIAC CATHETERIZATION  November 2012    Showed minimal CAD with increased wedge pressure of the right heart. Patient saw Cardiology and started on Lasix.  CARDIAC CATHETERIZATION  09/19/2017    right and left heart cath   EF 30% WITH PATENT LAD STENT    COLONOSCOPY  08/12/2009    diverticulosis being found.     COLONOSCOPY  9/8/14    grade 2-3 internal hemorrhoids - banded x 2, random biopsies taken to r/o - normal, repeat 5yrs due to hx of polyps- garber    CORONARY ANGIOPLASTY WITH STENT PLACEMENT  09/2016    PTCA / Drug Eluting Stent:, LAD and / or branches    FRACTURE SURGERY  08/10/99    Left elbow ORIF     HYSTERECTOMY  09/09/2002    With bladder repair for benign reasons.  MASTECTOMY Bilateral 1995 and 2000    With lymph node dissections in 1995 and 2000.  OTHER SURGICAL HISTORY Right 6/2015    Index and long trigger finger release     Current Outpatient Prescriptions   Medication Sig Dispense Refill    XARELTO 20 MG TABS tablet TAKE ONE TABLET BY MOUTH DAILY WITH BREAKFAST 30 tablet 5    sotalol (BETAPACE) 80 MG tablet Takes 1/2 tab twice daily 90 tablet 3    KLOR-CON M10 10 MEQ extended release tablet Take 1 tablet by mouth daily 90 tablet 3    clopidogrel (PLAVIX) 75 MG tablet Take 1 tablet by mouth daily 90 tablet 3    furosemide (LASIX) 20 MG tablet Take 1 tablet by mouth daily 90 tablet 3    famotidine (PEPCID) 20 MG tablet TAKE ONE TABLET BY MOUTH EVERY NIGHT AS NEEDED FOR REFLUX 60 tablet 0    metFORMIN (GLUCOPHAGE) 500 MG tablet Take 1 tablet by mouth daily (with breakfast) 90 tablet 3    LORazepam (ATIVAN) 0.5 MG tablet TAKE ONE TABLET BY MOUTH THREE TIMES A DAY AS NEEDED FOR ANXIETY 90 tablet 2    lisinopril (PRINIVIL;ZESTRIL) 2.5 MG tablet TAKE ONE TABLET BY MOUTH DAILY 90 tablet 3    glucose blood VI test strips (FREESTYLE TEST STRIPS) strip 1 each by In Vitro route daily As needed. 100 each 1    b complex vitamins capsule Take 1 capsule by mouth every other day Indications: overy other day       Vitamin D (CHOLECALCIFEROL) 1000 UNITS CAPS capsule Take 1,000 Units by mouth daily.  nitroGLYCERIN (NITROSTAT) 0.4 MG SL tablet Place 1 tablet under the tongue every 5 minutes as needed for Chest pain up to max of 3 total doses.  If no relief after 1 dose, call 911. 25 tablet 3    melatonin 3 MG TABS tablet Take 3 mg by mouth nightly as needed      ondansetron CREATININE, MG in the last 72 hours. Invalid input(s): CA  Accucheck Glucoses: No results for input(s): POCGLU in the last 72 hours. Cardiac Enzymes: No results for input(s): CKTOTAL, CKMB, CKMBINDEX, TROPONINI in the last 72 hours. PT/INR: No results for input(s): PROTIME, INR in the last 72 hours. APTT: No results for input(s): APTT in the last 72 hours.   Liver Profile:  Lab Results   Component Value Date    AST 20 05/30/2017    ALT 19 05/30/2017    BILITOT 0.40 05/30/2017    ALKPHOS 93 05/30/2017     Lab Results   Component Value Date    CHOL 297 04/06/2016    CHOL 220 01/06/2016    HDL 75 04/06/2016    TRIG 276 04/06/2016     TSH:   Lab Results   Component Value Date    TSH 3.82 04/06/2016     UA:   Lab Results   Component Value Date    COLORU NOT REPORTED 08/15/2016    PHUR 6.0 08/15/2016    WBCUA None 08/15/2016    RBCUA 0 TO 4 08/15/2016    MUCUS NOT REPORTED 08/15/2016    TRICHOMONAS NOT REPORTED 08/15/2016    YEAST NOT REPORTED 08/15/2016    BACTERIA TRACE 08/15/2016    SPECGRAV 1.005 08/15/2016    LEUKOCYTESUR NEGATIVE 08/15/2016    UROBILINOGEN Normal 08/15/2016    BILIRUBINUR NEGATIVE 08/15/2016    GLUCOSEU TRACE 08/15/2016    AMORPHOUS NOT REPORTED 08/15/2016                Neck: Supple, no JVD, no carotid bruits  Heart: S1 and S2 loud systolic murmur loudest at apex  Lungs: no rales, wheezes, or rhonchi  Abdomen: positive bowel sounds, soft, non-tender, non-distended, no bruits, no masses  Extremities: warm and dry, no varicosities, no edema  Neurologic: alert and oriented x 3, moves all extremities, grasps strong bilaterally    Assessment:  Patient Active Problem List   Diagnosis    Hyperlipidemia    Hypertension    Diabetic neuropathy (HCC)    Breast cancer (HCC)    Lymphedema of arm    Vitamin D deficiency    Hyperplastic colon polyp    PVC's (premature ventricular contractions)    Allergic rhinitis    Anxiety, generalized    CHF (congestive heart failure) (HCC)    DM (diabetes mellitus), type 2 ([facility] [address]    Coronary artery disease involving native coronary artery of native heart    Overweight (BMI 25.0-29. 9)    BMI 28.0-28.9,adult    Cellulitis of left upper extremity    Persistent atrial fibrillation ([facility] [address]       Plan:  1. Based on chest wall radiation and bilateral mastectomies SAVR would provide higher risk option when compared to TAVR  2. Based on patient size suspect root enlargement would be required  for SAVR , thus increasing operative risk verse TAVR  3.    TAVR for this patient would be a better option with much lower risk than SAVR    Electronically by Ari Red MD  on 10/24/2017 at 10:33 AM

## 2017-10-24 NOTE — PROGRESS NOTES
Pt.  Arrived to [facility] Clinic per wheelchair with  Hector at her side. VS et [employer]. Obtained and assessment completed. Pt. C/o mild dyspnea with exertion and states she gets fatigued easily. Attempted to start IV for CT Scan w/ contrast pt. Is scheduled to have later today. Unable to start IV, PICC Team called to place IV after pt. Has appt. With Dr. Tobin Prieto. Pt. Taken over to CV Surgeon office per wheelchair. Lab results obtained and Creat noted to be 1.2 and GFR 52. Notified BONILLA Meng NP with [facility] of results. Okay to continue with CT with Contrast ordered for today. Give pt. One hour of hydration, IV 0.9 NS at 100cc/hr after CT Scan completed. Check BMP in 3 days to assess kidney function. Order for lab work put in [facility].

## 2017-10-24 NOTE — PROGRESS NOTES
5 METER GAIT SPEED TESTS:     # 1 --11.21 seconds                                                           # 2 - 10.05 seconds                                                            # 3 - 10. 21 seconds

## 2017-10-25 VITALS
BODY MASS INDEX: 25.31 KG/M2 | OXYGEN SATURATION: 98 % | RESPIRATION RATE: 20 BRPM | DIASTOLIC BLOOD PRESSURE: 80 MMHG | SYSTOLIC BLOOD PRESSURE: 122 MMHG | WEIGHT: 129.6 LBS | HEART RATE: 69 BPM

## 2017-10-25 DIAGNOSIS — N28.9 RENAL INSUFFICIENCY: Primary | ICD-10-CM

## 2017-10-25 NOTE — PROGRESS NOTES
Date:  10/25/2017  Time:  9:24 AM    Valve Clinic at [facility]    Office: [phone] Fax: [phone]    Re:  Gladys Davis   Patient : 1935    Vital Signs: /64   Pulse 90   Resp 20   Wt 129 lb 9.6 oz (58.8 kg)   SpO2 98%   BMI 25.31 kg/m²     Recent Labs      10/24/17   1040   CREATININE  1.20*        Respiratory:    Assessment  Charting Type: Admission    Breath Sounds  Right Upper Lobe: Clear  Right Middle Lobe: Clear  Right Lower Lobe: Clear  Left Upper Lobe: Clear  Left Lower Lobe: Clear              Peripheral Vascular  RLE Edema: +1, Non-pitting  LLE Edema: +1, Non-pitting      Complaints: Pt. Gets fatigued easily    Diagnostic Testing:    [x]  R&L Heart Cardiac Catheterization  Date Completed: 2017   [x]  Echo      Date Completed: 2017   [x]  MSCT      Date Completed: 10/24/2017   [x]  PFT      Date Completed: 10/18/2017   [x]  Carotid Ultrasound    Date Completed: 10/18/2017    Functional/Physical Evaluation   [x]  FWWD40   Score: 36  Date Completed: 10/24/2017     [x]  MMSE   Score: 29/30  Date Completed: 10/24/2017      [x]  KRAMER ADL's  Score: 6/6  Date Completed: 10/24/2017    [x]  5 Meter Walk Test Score: Score: 11.21 seconds  Date Completed: 10/24/2017   [x]   Test   Score: 9 kg  Date Completed: 10/24/2017     Comment : Pt. Arrived to [address] [place] [address] per wheelchair with , Yancy Hinkle at her side. Pt. Is here today for Physical/Functional Testing and Evaluation. Wt. And VS obtained and assessment completed. Unable to place IV after one attempt, ordered IV to be placed by PICC Team under ultrasound. # 20 gauge cathelon placed in pt.'s right forearm under ultrasound without difficulty. Specimen drawn for lab work ordered today, then IV helplocked. Pt. Had second CV Surgeon appointment with Dr. Everett Pulido earlier today. Pt. Taken to CT for testing. CT Scan completed and pt. Back in Heart Valve Clinic.  IV fluid initiated at 100cc/hr as per previous order to run for one hour after CT Scan. Pt. Given packaged lunch and orange juice. Discussed with pt. And  required testing for TAVR Evaluation and significance of results. Also talked about next step in TAVR Evaluation is Patient Care Conference with Cardiologist and CV Surgeon to review all test results and consult appointments and decision made if pt. Is TAVR Candidate. Teaching initiated on TAVR Procedure,instructions for pt. Night before procedure,  hospital stay after TAVR Procedure, and follow up after discharge from the hospital. Appropriate questions asked by both pt. And  and answered. Will contact pt. At home for update next week. Pt. Encouraged to call clinic with any questions or concerns as they arise, confirmed that pt. Has contact phone number. Encouraged pt. To increase liquid intake over the next 2 days to assist with the excretion of the contrast dye from the body. Also instructed pt. To get lab work drawn on 10/27/17. Pt. States she will get blood drawn at [facility], Order [facility]. After pt. Received one hour of hydration, IV dc'd and dressing applied over site. Pt. Given written instructions to hold Metformin for 48 hours after receiving contrast dye, can restart on 10/26/17 evening dose. Pt. Verbalized understanding of all above orders. Pt. Taken to front entrance of hospital per wheelchair and assisted to car and discharged to home.       Electronically signed by Florence Estevez RN on 10/25/2017 at 9:24 AM

## 2017-10-26 ENCOUNTER — TELEPHONE (OUTPATIENT)
Dept: PHARMACY | Facility: CLINIC | Age: 82
End: 2017-10-26

## 2017-10-26 DIAGNOSIS — J90 PLEURAL EFFUSION, LEFT: Primary | ICD-10-CM

## 2017-10-27 ENCOUNTER — HOSPITAL ENCOUNTER (OUTPATIENT)
Dept: LAB | Age: 82
Setting detail: SPECIMEN
Discharge: HOME | End: 2017-10-27
Payer: MEDICARE

## 2017-10-27 ENCOUNTER — HOSPITAL ENCOUNTER (OUTPATIENT)
Dept: GENERAL RADIOLOGY | Age: 82
Discharge: HOME | End: 2017-10-27
Payer: MEDICARE

## 2017-10-27 DIAGNOSIS — I10 ESSENTIAL HYPERTENSION: ICD-10-CM

## 2017-10-27 DIAGNOSIS — N28.9 RENAL INSUFFICIENCY: ICD-10-CM

## 2017-10-27 DIAGNOSIS — E11.8 TYPE 2 DIABETES MELLITUS WITH COMPLICATION, WITHOUT LONG-TERM CURRENT USE OF INSULIN (HCC): ICD-10-CM

## 2017-10-27 DIAGNOSIS — E78.5 HYPERLIPIDEMIA, UNSPECIFIED HYPERLIPIDEMIA TYPE: ICD-10-CM

## 2017-10-27 DIAGNOSIS — J90 PLEURAL EFFUSION, LEFT: ICD-10-CM

## 2017-10-27 LAB
ALBUMIN SERPL-MCNC: 4.1 G/DL (ref 3.5–5.2)
ALBUMIN/GLOBULIN RATIO: 1.3 (ref 1–2.5)
ALP BLD-CCNC: 101 U/L (ref 35–104)
ALT SERPL-CCNC: 23 U/L (ref 5–33)
ANION GAP SERPL CALCULATED.3IONS-SCNC: 14 MMOL/L (ref 9–17)
ANION GAP SERPL CALCULATED.3IONS-SCNC: 14 MMOL/L (ref 9–17)
AST SERPL-CCNC: 27 U/L
BILIRUB SERPL-MCNC: 0.42 MG/DL (ref 0.3–1.2)
BUN BLDV-MCNC: 26 MG/DL (ref 8–23)
BUN BLDV-MCNC: 26 MG/DL (ref 8–23)
BUN/CREAT BLD: 30 (ref 9–20)
BUN/CREAT BLD: 30 (ref 9–20)
CALCIUM SERPL-MCNC: 9.5 MG/DL (ref 8.6–10.4)
CALCIUM SERPL-MCNC: 9.5 MG/DL (ref 8.6–10.4)
CHLORIDE BLD-SCNC: 100 MMOL/L (ref 98–107)
CHLORIDE BLD-SCNC: 100 MMOL/L (ref 98–107)
CHOLESTEROL/HDL RATIO: 3.3
CHOLESTEROL: 220 MG/DL
CO2: 26 MMOL/L (ref 20–31)
CO2: 26 MMOL/L (ref 20–31)
CREAT SERPL-MCNC: 0.86 MG/DL (ref 0.5–0.9)
CREAT SERPL-MCNC: 0.86 MG/DL (ref 0.5–0.9)
ESTIMATED AVERAGE GLUCOSE: 180 MG/DL
GFR AFRICAN AMERICAN: >60 ML/MIN
GFR AFRICAN AMERICAN: >60 ML/MIN
GFR NON-AFRICAN AMERICAN: >60 ML/MIN
GFR NON-AFRICAN AMERICAN: >60 ML/MIN
GFR SERPL CREATININE-BSD FRML MDRD: ABNORMAL ML/MIN/{1.73_M2}
GLUCOSE BLD-MCNC: 238 MG/DL (ref 70–99)
GLUCOSE BLD-MCNC: 238 MG/DL (ref 70–99)
HBA1C MFR BLD: 7.9 % (ref 4.8–5.9)
HDLC SERPL-MCNC: 67 MG/DL
LDL CHOLESTEROL: 119 MG/DL (ref 0–130)
POTASSIUM SERPL-SCNC: 4.6 MMOL/L (ref 3.7–5.3)
POTASSIUM SERPL-SCNC: 4.6 MMOL/L (ref 3.7–5.3)
SODIUM BLD-SCNC: 140 MMOL/L (ref 135–144)
SODIUM BLD-SCNC: 140 MMOL/L (ref 135–144)
TOTAL PROTEIN: 7.3 G/DL (ref 6.4–8.3)
TRIGL SERPL-MCNC: 169 MG/DL
VLDLC SERPL CALC-MCNC: ABNORMAL MG/DL (ref 1–30)

## 2017-10-27 PROCEDURE — 80053 COMPREHEN METABOLIC PANEL: CPT

## 2017-10-27 PROCEDURE — 71022 XR CHEST PA LATERAL W BOTH OBLIQUE PROJECTIONS: CPT

## 2017-10-27 PROCEDURE — 83036 HEMOGLOBIN GLYCOSYLATED A1C: CPT

## 2017-10-27 PROCEDURE — 36415 COLL VENOUS BLD VENIPUNCTURE: CPT

## 2017-10-27 PROCEDURE — 80048 BASIC METABOLIC PNL TOTAL CA: CPT

## 2017-10-27 PROCEDURE — 80061 LIPID PANEL: CPT

## 2017-11-02 ENCOUNTER — CARE COORDINATION (OUTPATIENT)
Dept: CARE COORDINATION | Age: 82
End: 2017-11-02

## 2017-11-03 ENCOUNTER — OFFICE VISIT (OUTPATIENT)
Dept: FAMILY MEDICINE CLINIC | Age: 82
End: 2017-11-03
Payer: MEDICARE

## 2017-11-03 VITALS
HEART RATE: 74 BPM | DIASTOLIC BLOOD PRESSURE: 78 MMHG | TEMPERATURE: 97.3 F | SYSTOLIC BLOOD PRESSURE: 118 MMHG | BODY MASS INDEX: 25.8 KG/M2 | WEIGHT: 131.4 LBS | HEIGHT: 60 IN | OXYGEN SATURATION: 97 %

## 2017-11-03 DIAGNOSIS — K31.84 DIABETIC GASTROPARESIS (HCC): ICD-10-CM

## 2017-11-03 DIAGNOSIS — E11.43 DIABETIC GASTROPARESIS (HCC): ICD-10-CM

## 2017-11-03 DIAGNOSIS — I10 ESSENTIAL HYPERTENSION: ICD-10-CM

## 2017-11-03 DIAGNOSIS — E11.69 TYPE 2 DIABETES MELLITUS WITH OTHER SPECIFIED COMPLICATION, WITHOUT LONG-TERM CURRENT USE OF INSULIN (HCC): Primary | ICD-10-CM

## 2017-11-03 PROCEDURE — G8427 DOCREV CUR MEDS BY ELIG CLIN: HCPCS | Performed by: FAMILY MEDICINE

## 2017-11-03 PROCEDURE — 1123F ACP DISCUSS/DSCN MKR DOCD: CPT | Performed by: FAMILY MEDICINE

## 2017-11-03 PROCEDURE — G8598 ASA/ANTIPLAT THER USED: HCPCS | Performed by: FAMILY MEDICINE

## 2017-11-03 PROCEDURE — 4040F PNEUMOC VAC/ADMIN/RCVD: CPT | Performed by: FAMILY MEDICINE

## 2017-11-03 PROCEDURE — G8419 CALC BMI OUT NRM PARAM NOF/U: HCPCS | Performed by: FAMILY MEDICINE

## 2017-11-03 PROCEDURE — 1036F TOBACCO NON-USER: CPT | Performed by: FAMILY MEDICINE

## 2017-11-03 PROCEDURE — G8484 FLU IMMUNIZE NO ADMIN: HCPCS | Performed by: FAMILY MEDICINE

## 2017-11-03 PROCEDURE — 99214 OFFICE O/P EST MOD 30 MIN: CPT | Performed by: FAMILY MEDICINE

## 2017-11-03 PROCEDURE — G8400 PT W/DXA NO RESULTS DOC: HCPCS | Performed by: FAMILY MEDICINE

## 2017-11-03 PROCEDURE — 1090F PRES/ABSN URINE INCON ASSESS: CPT | Performed by: FAMILY MEDICINE

## 2017-11-03 RX ORDER — METOCLOPRAMIDE 5 MG/1
5 TABLET ORAL 3 TIMES DAILY
Qty: 120 TABLET | Refills: 3 | Status: SHIPPED | OUTPATIENT
Start: 2017-11-03 | End: 2017-12-20

## 2017-11-03 RX ORDER — METFORMIN HYDROCHLORIDE 500 MG/1
500 TABLET, EXTENDED RELEASE ORAL
Qty: 90 TABLET | Refills: 3 | Status: SHIPPED | OUTPATIENT
Start: 2017-11-03 | End: 2018-01-29 | Stop reason: SDUPTHER

## 2017-11-03 ASSESSMENT — ENCOUNTER SYMPTOMS
NAUSEA: 1
WHEEZING: 0
SHORTNESS OF BREATH: 0
ABDOMINAL PAIN: 1
CONSTIPATION: 0
DIARRHEA: 1
VOMITING: 1
COUGH: 0
BACK PAIN: 1
CHEST TIGHTNESS: 0

## 2017-11-03 NOTE — PROGRESS NOTES
catheterization, 11/12, with wedge pressure of the right heart. She is taking Lasix for this and being followed by Cardiology.  Diabetic neuropathy (HCC)     Hyperlipidemia     Hypertension     runs low    Lymphedema of arm     Left arm, due to lymph node dissection and mastectomy.  PVC's (premature ventricular contractions)     Chronic. Patient currently undergoing workup for arrhythmia.     S/P cardiac cath 09/06/2016    Type II or unspecified type diabetes mellitus without mention of complication, not stated as uncontrolled     Vitamin D deficiency           Social History   Substance Use Topics    Smoking status: Never Smoker    Smokeless tobacco: Never Used      Comment: Merlyjillian Vikki RRT 10/18/16    Alcohol use No      Current Outpatient Prescriptions   Medication Sig Dispense Refill    metoclopramide (REGLAN) 5 MG tablet Take 1 tablet by mouth 3 times daily 120 tablet 3    metFORMIN (GLUCOPHAGE XR) 500 MG extended release tablet Take 1 tablet by mouth daily (with breakfast) 90 tablet 3    famotidine (PEPCID) 20 MG tablet TAKE ONE TABLET BY MOUTH EVERY EVENING 60 tablet 5    XARELTO 20 MG TABS tablet TAKE ONE TABLET BY MOUTH DAILY WITH BREAKFAST 30 tablet 5    sotalol (BETAPACE) 80 MG tablet Takes 1/2 tab twice daily 90 tablet 3    KLOR-CON M10 10 MEQ extended release tablet Take 1 tablet by mouth daily 90 tablet 3    clopidogrel (PLAVIX) 75 MG tablet Take 1 tablet by mouth daily 90 tablet 3    furosemide (LASIX) 20 MG tablet Take 1 tablet by mouth daily 90 tablet 3    melatonin 3 MG TABS tablet Take 3 mg by mouth nightly as needed      ondansetron (ZOFRAN) 4 MG tablet Take 1 tablet by mouth every 8 hours as needed for Nausea or Vomiting 40 tablet 2    LORazepam (ATIVAN) 0.5 MG tablet TAKE ONE TABLET BY MOUTH THREE TIMES A DAY AS NEEDED FOR ANXIETY 90 tablet 2    lisinopril (PRINIVIL;ZESTRIL) 2.5 MG tablet TAKE ONE TABLET BY MOUTH DAILY 90 tablet 3    glucose blood VI test strips cause of her chronic nausea and \"drawing\" feeling so I will try treating with reglan. HTN: stable; I will continue her current medications. Return in about 6 months (around 5/3/2018) for DM follow up, HTN follow up. Orders Placed This Encounter   Procedures    Hemoglobin A1C     Standing Status:   Future     Standing Expiration Date:   11/3/2018    Basic Metabolic Panel     Standing Status:   Future     Standing Expiration Date:   11/3/2018     DIABETES FOOT EXAM     Orders Placed This Encounter   Medications    metoclopramide (REGLAN) 5 MG tablet     Sig: Take 1 tablet by mouth 3 times daily     Dispense:  120 tablet     Refill:  3    metFORMIN (GLUCOPHAGE XR) 500 MG extended release tablet     Sig: Take 1 tablet by mouth daily (with breakfast)     Dispense:  90 tablet     Refill:  3       Patient given educational materials - see patient instructions. Discussed use, benefit, and side effects of prescribed medications. All patient questions answered. Pt voiced understanding. Reviewed health maintenance. Instructed to continue current medications, diet and exercise. Patient agreed with treatment plan. Follow up as directed.      Electronically signed by Indy Cano MD on 11/3/2017 at 8:32 PM

## 2017-11-03 NOTE — PROGRESS NOTES
Chronic Disease Visit Information    BP Readings from Last 3 Encounters:   10/24/17 124/77   10/24/17 122/80   09/25/17 108/74          Hemoglobin A1C (%)   Date Value   10/27/2017 7.9 (H)   04/25/2017 7.2 (H)   10/07/2016 6.7 (H)     Microalb/Crt. Ratio (mcg/mg creat)   Date Value   10/20/2015 CANNOT BE CALCULATED     LDL Cholesterol (mg/dL)   Date Value   10/27/2017 119     LDL Calculated (mg/dL)   Date Value   01/06/2016 124     HDL (mg/dL)   Date Value   10/27/2017 67     BUN (mg/dL)   Date Value   10/27/2017 26 (H)     CREATININE (mg/dL)   Date Value   10/27/2017 0.86     Glucose (mg/dL)   Date Value   10/27/2017 238 (H)            Have you changed or started any medications since your last visit including any over-the-counter medicines, vitamins, or herbal medicines? no   Are you having any side effects from any of your medications? -  yes - nausea- thinks the blood thinners is making sick to her stomach  Have you stopped taking any of your medications? Is so, why? -  no    Have you seen any other physician or provider since your last visit? Yes - Records Obtained  Have you had any other diagnostic tests since your last visit? Yes - Records Obtained  Have you been seen in the emergency room and/or had an admission to a hospital since we last saw you? No  Have you had your annual diabetic retinal (eye) exam? Yes - Records Obtained  Have you had your routine dental cleaning in the past 6 months? no    Have you activated your RegisterPatient account? If not, what are your barriers?  No:      Patient Care Team:  Lazarus Hernandez MD as PCP - General (Family Medicine)  Lazarus Hernandez MD as PCP - S Attributed Provider  Cyn Rodriguez MD as Consulting Physician (Internal Medicine Cardiovascular Disease)  Eneida Guerin RN as Care Coordinator  [facility] ([facility])  Lois Diallo MD (General Surgery)         Medical History Review  Past Medical, Family, and Social History reviewed and does contribute to the patient presenting condition    Health Maintenance   Topic Date Due    DTaP/Tdap/Td vaccine (1 - Tdap) 01/29/1954    Diabetic foot exam  05/04/2017    Flu vaccine (1) 09/01/2017    Diabetic hemoglobin A1C test  04/27/2018    Diabetic retinal exam  08/01/2018    Lipid screen  10/27/2018    Zostavax vaccine  Addressed    DEXA (modify frequency per FRAX score)  Addressed    Pneumococcal low/med risk  Completed

## 2017-11-08 ENCOUNTER — OFFICE VISIT (OUTPATIENT)
Dept: PULMONOLOGY | Age: 82
End: 2017-11-08
Payer: MEDICARE

## 2017-11-08 ENCOUNTER — HOSPITAL ENCOUNTER (OUTPATIENT)
Dept: GENERAL RADIOLOGY | Age: 82
Discharge: HOME | End: 2017-11-08
Payer: MEDICARE

## 2017-11-08 VITALS
OXYGEN SATURATION: 97 % | DIASTOLIC BLOOD PRESSURE: 64 MMHG | RESPIRATION RATE: 14 BRPM | WEIGHT: 129 LBS | TEMPERATURE: 97.7 F | HEIGHT: 60 IN | BODY MASS INDEX: 25.32 KG/M2 | HEART RATE: 90 BPM | SYSTOLIC BLOOD PRESSURE: 104 MMHG

## 2017-11-08 DIAGNOSIS — I25.10 CORONARY ARTERY DISEASE INVOLVING NATIVE CORONARY ARTERY OF NATIVE HEART WITHOUT ANGINA PECTORIS: ICD-10-CM

## 2017-11-08 DIAGNOSIS — I34.0 SEVERE MITRAL REGURGITATION: ICD-10-CM

## 2017-11-08 DIAGNOSIS — I50.9 PLEURAL EFFUSION DUE TO CONGESTIVE HEART FAILURE (HCC): ICD-10-CM

## 2017-11-08 DIAGNOSIS — J90 PLEURAL EFFUSION ON LEFT: Primary | ICD-10-CM

## 2017-11-08 DIAGNOSIS — I35.0 SEVERE AORTIC STENOSIS: ICD-10-CM

## 2017-11-08 DIAGNOSIS — I48.19 PERSISTENT ATRIAL FIBRILLATION (HCC): ICD-10-CM

## 2017-11-08 DIAGNOSIS — I50.32 CHRONIC DIASTOLIC CONGESTIVE HEART FAILURE (HCC): ICD-10-CM

## 2017-11-08 DIAGNOSIS — J90 PLEURAL EFFUSION ON LEFT: ICD-10-CM

## 2017-11-08 PROCEDURE — G8419 CALC BMI OUT NRM PARAM NOF/U: HCPCS | Performed by: INTERNAL MEDICINE

## 2017-11-08 PROCEDURE — 99205 OFFICE O/P NEW HI 60 MIN: CPT | Performed by: INTERNAL MEDICINE

## 2017-11-08 PROCEDURE — G8400 PT W/DXA NO RESULTS DOC: HCPCS | Performed by: INTERNAL MEDICINE

## 2017-11-08 PROCEDURE — 4040F PNEUMOC VAC/ADMIN/RCVD: CPT | Performed by: INTERNAL MEDICINE

## 2017-11-08 PROCEDURE — G8598 ASA/ANTIPLAT THER USED: HCPCS | Performed by: INTERNAL MEDICINE

## 2017-11-08 PROCEDURE — 1036F TOBACCO NON-USER: CPT | Performed by: INTERNAL MEDICINE

## 2017-11-08 PROCEDURE — G8484 FLU IMMUNIZE NO ADMIN: HCPCS | Performed by: INTERNAL MEDICINE

## 2017-11-08 PROCEDURE — G8427 DOCREV CUR MEDS BY ELIG CLIN: HCPCS | Performed by: INTERNAL MEDICINE

## 2017-11-08 PROCEDURE — 1123F ACP DISCUSS/DSCN MKR DOCD: CPT | Performed by: INTERNAL MEDICINE

## 2017-11-08 PROCEDURE — 1090F PRES/ABSN URINE INCON ASSESS: CPT | Performed by: INTERNAL MEDICINE

## 2017-11-08 PROCEDURE — 71020 XR CHEST STANDARD TWO VW: CPT

## 2017-11-08 NOTE — PROGRESS NOTES
REASON FOR THE CONSULTATION:  Pleural effusion and evaluation for TAVR   HISTORY OF PRESENT ILLNESS:    Cyrus Encarnacion is a [de-identified]y.o. year old female with history of severe aortic stenosis and mitral regurgitation with diastolic heart failure who has been referred to me because of left pleural effusion which was seen on CT of the chest during evaluation for TAVR . Patient is a has atrial fibrillation and is on anticoagulation and has PCI with Plavix and aspirin being used as antiplatelet agents. She does complain of grade 3 dyspnea. Does have chronic orthopnea which is better at this present time as she is feeling compensated from her congestive heart failure. She is denies any cough, any weight loss, any fever, chills or rigors. She has a history of bilateral mastectomy with 33 radiations to left chest in 1996, hands has been in remission. No history of DVT or PE. She is chronic lymphedema of the left arm. .  I reviewed her x-rays and CT scans in 2014, when she returned from [place]. He went into pulmonary edema and was diuresed at that time and 16. Her x-ray shows cardiomegaly but clear CP angles and no pulmonary edema. Thanks again got worse in October. She denies any chest pain, no wheezing. A lifelong nonsmoker. Immunization   Immunization History   Administered Date(s) Administered    Influenza, High Dose 10/17/2013, 12/15/2014, 10/23/2015, 10/14/2016    Pneumococcal 13-valent Conjugate ([id number]) 04/27/2015    Pneumococcal Polysaccharide ([id number]) 05/02/2017        Pneumococcal Vaccine     [x] Up to date    [] Indicated   [] Refused  [] Contraindicated       Influenza Vaccine   [] Up to date    [x] Indicated   [] Refused  [] Contraindicated     PAST MEDICAL HISTORY:       Diagnosis Date    Allergic rhinitis     With chronic cough.  Anxiety, generalized     Chronic with probable depression. She will take Ativan but refuses antidepressant.     Aortic stenosis     Atrial quarry or [employer] exposure. Travel history reveals [place]. There  no history of recreational or IV drug use. There  no hot tube exposure. Pets  no    Occupational history office work     TOBACCO:   reports that she is a non-smoker but has been exposed to tobacco smoke. She has never used smokeless tobacco.  ETOH:   reports that she does not drink alcohol. ALLERGIES:      Allergies   Allergen Reactions    Darvocet A500 [Propoxyphene N-Acetaminophen] Other (See Comments)     weakness    Demerol Hcl [Meperidine] Other (See Comments)     Weakness      Marinol [Dronabinol] Other (See Comments)     weakness    Morphine Sulfate [Morphine] Other (See Comments)     Weakness      Statins [Statins] Other (See Comments)     Cause muscle pain and she would prefer not to take them any longer.  Levaquin [Levofloxacin In D5w] Nausea Only and Anxiety         Home Meds:   Prior to Admission medications    Medication Sig Start Date End Date Taking? Authorizing Provider   metoclopramide (REGLAN) 5 MG tablet Take 1 tablet by mouth 3 times daily 11/3/17  Yes Blas Jhaveri MD   metFORMIN (GLUCOPHAGE XR) 500 MG extended release tablet Take 1 tablet by mouth daily (with breakfast) 11/3/17  Yes Blas Jhaveri MD   famotidine (PEPCID) 20 MG tablet TAKE ONE TABLET BY MOUTH EVERY EVENING 10/27/17  Yes Blas Jhaveri MD   nitroGLYCERIN (NITROSTAT) 0.4 MG SL tablet Place 1 tablet under the tongue every 5 minutes as needed for Chest pain up to max of 3 total doses.  If no relief after 1 dose, call 911. 9/20/17  Yes Radha Tyson CNP   XARELTO 20 MG TABS tablet TAKE ONE TABLET BY MOUTH DAILY WITH BREAKFAST 8/25/17  Yes Blas Jhaveri MD   sotalol (BETAPACE) 80 MG tablet Takes 1/2 tab twice daily 7/26/17  Yes Slava Padilla MD   KLOR-CON M10 10 MEQ extended release tablet Take 1 tablet by mouth daily 7/26/17  Yes Slava Padilla MD   clopidogrel (PLAVIX) 75 MG tablet Take 1 tablet by mouth daily 7/26/17  Yes left pleural effusion. I reviewed the chest x-ray done today which shows the pleural effusion is slightly better. Even though patient has history of breast cancer, post bilateral mastectomy and radiation to the left chest, but her pleural effusion is more consistent with her underlying congestive heart failure as when she is in pulmonary edema. The pleural effusion reoccurs and when she is compensated. The pleural effusion resolves    At present, there is no need to proceed with a thoracentesis and there is no objection to proceed with TAVR . This was discussed in detail with the patient and her . I showed them all the x-rays and CT scans. Requested Prescriptions      No prescriptions requested or ordered in this encounter       There are no discontinued medications. Perla Morgan received counseling on the following healthy behaviors: nutrition, exercise and medication adherence    Patient given educational materials : see patient instruction       Discussed use, benefit, and side effects of prescribed medications. Barriers to medication compliance addressed. All patient questions answered. Pt voiced understanding. I hope this updates you on my evaluation and clinical thinking. Thank you for allowing me to participate in his care. Sincerely,      Yue Lang MD       Please note that this chart was generated using voice recognition Dragon dictation software. Although every effort was made to ensure the accuracy of this automated transcription, some errors in transcription may have occurred.

## 2017-11-09 NOTE — PROGRESS NOTES
[facility] [facility]  [address] [place] [address], [facility] [address]   [address]  [place], [address] [place] [address]  Phone: [phone]  Fax: [phone]    Nico Dowd M.D. Lc Beck. Ly Maurice M.D. Dimple Mora M.D. Carley Blevins M.D. Samm Hunter, Nurse Practitioner              [facility]      Briseyda Benítez  1935 11/8/2017      Dear Dr. Muna Ross     Thank you for having me participate in the preoperative evaluation of your patient, Briseyda Benítez. Briseyda Benítez can proceed with TAVR , at present, no thoracentesis is needed. For . Left pleural effusion. If you have any questions, please feel free to call me.      Sincerely,          Adalid Rdz MD

## 2017-11-13 ENCOUNTER — CARE COORDINATION (OUTPATIENT)
Dept: CARE COORDINATION | Age: 82
End: 2017-11-13

## 2017-11-13 NOTE — CARE COORDINATION
Ambulatory Care Coordination Note  11/13/2017  CM Risk Score: 3  [place] Mortality Risk Score: 16.36    ACC: Reid Duncan, RN    Summary Note: Diane Lei has been following in care coordination for Patient has Type II Diabetes- non insulin, CHF, A-Fib, Hx of cellulitis lt upper extremity, CAD, Hx of breast cancer, Lymphedema arm     Per chart review she follows with cardiology, optometrist, and pulmonology. She had EGD 9/1/2017 at [facility]- Dr. Pennie Mendiola, Cardiac Cath STV 9/19/2017. Spoke with Diane Lei. She stated she is to have heart valve replaced (probably in December) . She is to hear from them as to the date. She reported that they would be going through the groin. Stated she may need a pacer. We discussed rehab. She stated she probably won't need it. She stated she has had home health before- [address] She stated she is not going to receive flu vaccine until after she has her valve replaced because she always gets a rash from the vaccine. She reported BS are up and down. She is testing 2-3 times a day. Spoke with spouse. He had trouble with glucometer and company sent a new one. Lab Results   Component Value Date    LABA1C 7.9 (H) 10/27/2017    LABA1C 7.2 (H) 04/25/2017    LABA1C 6.7 (H) 10/07/2016     Lab Results   Component Value Date     10/27/2017     04/25/2017     10/07/2016       She stated her daughter ( in [place]) has had neck surgery and this has been stressful for her. This writer will follow up end of November to see when surgery is scheduled.          Care Coordination Interventions    Program Enrollment:  Rising Risk  Referral from Primary Care Provider:  No  Suggested Interventions and Community Resources  Diabetes Education:  Completed  Zone Management Tools:  Completed         Goals Addressed             Most Recent     Self Monitoring (pt-stated)   On track (11/13/2017)             Self-Monitored Blood Glucose - I will check my blood sugar Fasting blood sugar and Other: and prn  Daily Weights - I will weight myself as directed - Daily and write down weights  I will notify my provider of any increase in weight by 3 or more pounds in 2 days OR 5 or more pounds in a week. None Recently Recorded    Barriers: none  Plan for overcoming my barriers: N/A  Confidence: 9/10  Anticipated Goal Completion Date: 12/30/2017            Prior to Admission medications    Medication Sig Start Date End Date Taking? Authorizing Provider   metoclopramide (REGLAN) 5 MG tablet Take 1 tablet by mouth 3 times daily 11/3/17   Amarilis Millard MD   metFORMIN (GLUCOPHAGE XR) 500 MG extended release tablet Take 1 tablet by mouth daily (with breakfast) 11/3/17   Amarilis Millard MD   famotidine (PEPCID) 20 MG tablet TAKE ONE TABLET BY MOUTH EVERY EVENING 10/27/17   Amarilis Millard MD   nitroGLYCERIN (NITROSTAT) 0.4 MG SL tablet Place 1 tablet under the tongue every 5 minutes as needed for Chest pain up to max of 3 total doses.  If no relief after 1 dose, call 911. 9/20/17   Humberto Márquez, CNP   XARELTO 20 MG TABS tablet TAKE ONE TABLET BY MOUTH DAILY WITH BREAKFAST 8/25/17   Amarilis Millard MD   sotalol (BETAPACE) 80 MG tablet Takes 1/2 tab twice daily 7/26/17   Lakeisha Tomlinson MD   KLOR-CON M10 10 MEQ extended release tablet Take 1 tablet by mouth daily 7/26/17   Lakeisha Tomlinson MD   clopidogrel (PLAVIX) 75 MG tablet Take 1 tablet by mouth daily 7/26/17   Lakeisha Tomlinson MD   furosemide (LASIX) 20 MG tablet Take 1 tablet by mouth daily 7/26/17   Lakeisha Tomlinson MD   melatonin 3 MG TABS tablet Take 3 mg by mouth nightly as needed    Historical Provider, MD   ondansetron (ZOFRAN) 4 MG tablet Take 1 tablet by mouth every 8 hours as needed for Nausea or Vomiting 5/30/17   Amarilis Millard MD   LORazepam (ATIVAN) 0.5 MG tablet TAKE ONE TABLET BY MOUTH THREE TIMES A DAY AS NEEDED FOR ANXIETY 4/25/17   Amarilis Millard MD   lisinopril (PRINIVIL;ZESTRIL) 2.5 MG tablet TAKE ONE TABLET

## 2017-11-21 DIAGNOSIS — F41.1 GENERALIZED ANXIETY DISORDER: ICD-10-CM

## 2017-11-21 RX ORDER — LORAZEPAM 0.5 MG/1
TABLET ORAL
Qty: 90 TABLET | Refills: 1 | Status: SHIPPED | OUTPATIENT
Start: 2017-11-21 | End: 2018-02-20 | Stop reason: SDUPTHER

## 2017-11-30 ENCOUNTER — OFFICE VISIT (OUTPATIENT)
Dept: PRIMARY CARE CLINIC | Age: 82
End: 2017-11-30
Payer: MEDICARE

## 2017-11-30 ENCOUNTER — HOSPITAL ENCOUNTER (OUTPATIENT)
Age: 82
Setting detail: SPECIMEN
Discharge: HOME | End: 2017-11-30
Payer: MEDICARE

## 2017-11-30 VITALS
OXYGEN SATURATION: 98 % | WEIGHT: 128.2 LBS | TEMPERATURE: 98.3 F | BODY MASS INDEX: 25.17 KG/M2 | HEART RATE: 74 BPM | HEIGHT: 60 IN | DIASTOLIC BLOOD PRESSURE: 62 MMHG | SYSTOLIC BLOOD PRESSURE: 110 MMHG

## 2017-11-30 DIAGNOSIS — M54.5 ACUTE LOW BACK PAIN, UNSPECIFIED BACK PAIN LATERALITY, WITH SCIATICA PRESENCE UNSPECIFIED: ICD-10-CM

## 2017-11-30 DIAGNOSIS — M54.5 ACUTE LOW BACK PAIN, UNSPECIFIED BACK PAIN LATERALITY, WITH SCIATICA PRESENCE UNSPECIFIED: Primary | ICD-10-CM

## 2017-11-30 LAB
-: ABNORMAL
AMORPHOUS: ABNORMAL
BACTERIA: ABNORMAL
BILIRUBIN URINE: NEGATIVE
CASTS UA: ABNORMAL /LPF (ref 0–2)
COLOR: ABNORMAL
COMMENT UA: ABNORMAL
CRYSTALS, UA: ABNORMAL /HPF
EPITHELIAL CELLS UA: ABNORMAL /HPF (ref 0–5)
GLUCOSE URINE: ABNORMAL
KETONES, URINE: NEGATIVE
LEUKOCYTE ESTERASE, URINE: NEGATIVE
MUCUS: ABNORMAL
NITRITE, URINE: NEGATIVE
OTHER OBSERVATIONS UA: ABNORMAL
PH UA: 6 (ref 5–6)
PROTEIN UA: ABNORMAL
RBC UA: ABNORMAL /HPF (ref 0–4)
RENAL EPITHELIAL, UA: ABNORMAL /HPF
SPECIFIC GRAVITY UA: 1.02 (ref 1.01–1.02)
TRICHOMONAS: ABNORMAL
TURBIDITY: ABNORMAL
URINE HGB: ABNORMAL
UROBILINOGEN, URINE: NORMAL
WBC UA: ABNORMAL /HPF (ref 0–4)
YEAST: ABNORMAL

## 2017-11-30 PROCEDURE — 81001 URINALYSIS AUTO W/SCOPE: CPT

## 2017-11-30 PROCEDURE — G8427 DOCREV CUR MEDS BY ELIG CLIN: HCPCS | Performed by: PHYSICIAN ASSISTANT

## 2017-11-30 PROCEDURE — 1036F TOBACCO NON-USER: CPT | Performed by: PHYSICIAN ASSISTANT

## 2017-11-30 PROCEDURE — 1090F PRES/ABSN URINE INCON ASSESS: CPT | Performed by: PHYSICIAN ASSISTANT

## 2017-11-30 PROCEDURE — G8484 FLU IMMUNIZE NO ADMIN: HCPCS | Performed by: PHYSICIAN ASSISTANT

## 2017-11-30 PROCEDURE — G8400 PT W/DXA NO RESULTS DOC: HCPCS | Performed by: PHYSICIAN ASSISTANT

## 2017-11-30 PROCEDURE — 1123F ACP DISCUSS/DSCN MKR DOCD: CPT | Performed by: PHYSICIAN ASSISTANT

## 2017-11-30 PROCEDURE — G8598 ASA/ANTIPLAT THER USED: HCPCS | Performed by: PHYSICIAN ASSISTANT

## 2017-11-30 PROCEDURE — G8419 CALC BMI OUT NRM PARAM NOF/U: HCPCS | Performed by: PHYSICIAN ASSISTANT

## 2017-11-30 PROCEDURE — 4040F PNEUMOC VAC/ADMIN/RCVD: CPT | Performed by: PHYSICIAN ASSISTANT

## 2017-11-30 PROCEDURE — 99213 OFFICE O/P EST LOW 20 MIN: CPT | Performed by: PHYSICIAN ASSISTANT

## 2017-11-30 ASSESSMENT — ENCOUNTER SYMPTOMS
BOWEL INCONTINENCE: 0
RESPIRATORY NEGATIVE: 1
BACK PAIN: 1

## 2017-11-30 NOTE — PROGRESS NOTES
Subjective:      Patient ID: Francesca Mancilla is a [de-identified] y.o. female. Back Pain   This is a new problem. The current episode started yesterday. The pain is present in the lumbar spine. Exacerbated by: walking. Pertinent negatives include no bladder incontinence, bowel incontinence or leg pain. She has tried heat (rice bag) for the symptoms. The treatment provided moderate relief. Review of Systems   HENT: Negative. Respiratory: Negative. Cardiovascular: Negative. Gastrointestinal: Negative for bowel incontinence. Genitourinary: Negative for bladder incontinence. Musculoskeletal: Positive for back pain. Objective:   Physical Exam   Constitutional: She is oriented to person, place, and time. She appears well-developed. HENT:   Head: Normocephalic. Cardiovascular: Normal rate and regular rhythm. Pulmonary/Chest: Effort normal and breath sounds normal.   Musculoskeletal:        Lumbar back: She exhibits tenderness and spasm. Back:    Neurological: She is alert and oriented to person, place, and time. Skin: Skin is warm and dry. Hospital Outpatient Visit on 11/30/2017   Component Date Value Ref Range Status    Color, UA 11/30/2017 NOT REPORTED  YEL Final    Turbidity UA 11/30/2017 NOT REPORTED  CLEAR Final    Glucose, Ur 11/30/2017 1+* NEG Final    Bilirubin Urine 11/30/2017 NEGATIVE  NEG Final    Ketones, Urine 11/30/2017 NEGATIVE  NEG Final    Specific Gravity, UA 11/30/2017 1.025  1.010 - 1.025 Final    Urine Hgb 11/30/2017 2+* NEG Final    pH, UA 11/30/2017 6.0  5.0 - 6.0 Final    Protein, UA 11/30/2017 1+* NEG Final    Urobilinogen, Urine 11/30/2017 Normal  NORM Final    Nitrite, Urine 11/30/2017 NEGATIVE  NEG Final    Leukocyte Esterase, Urine 11/30/2017 NEGATIVE  NEG Final    Comment: Performed at [facility] [address] [address] [phone]      Urinalysis Comments 11/30/2017 NOT REPORTED

## 2017-12-04 ENCOUNTER — PRE-PROCEDURE TELEPHONE (OUTPATIENT)
Dept: CARDIOLOGY CLINIC | Age: 82
End: 2017-12-04

## 2017-12-04 NOTE — TELEPHONE ENCOUNTER
Spoke with patient regarding instructions for upcoming TAVR Procedure scheduled for 12/07/17. Reviewed use of surgical wipes the night prior to procedure. Pt. To follow directions accompanying package. Instructed to have nothing to eat or drink after 1159 pm on 12/06/17, except specific medications in the morning on 12/07/17 with a sip of water. Pt. Instructed which medications to take day of procedure. Pt. Also instructed to hold Xarelto 20 mg until after the procedure. Instructed on arrival time for procedure on 12/07/17, 0600 at main entrance of [address] [facility] [place]. Pt. Verbalized understanding on all above instructions.

## 2017-12-07 ENCOUNTER — ANESTHESIA (OUTPATIENT)
Dept: CARDIAC CATH/INVASIVE PROCEDURES | Age: 82
End: 2017-12-07

## 2017-12-07 ENCOUNTER — HOSPITAL ENCOUNTER (INPATIENT)
Dept: CARDIAC CATH/INVASIVE PROCEDURES | Age: 82
LOS: 2 days | Discharge: HOME | DRG: 267 | End: 2017-12-09
Attending: INTERNAL MEDICINE | Admitting: INTERNAL MEDICINE
Payer: MEDICARE

## 2017-12-07 ENCOUNTER — ANESTHESIA EVENT (OUTPATIENT)
Dept: CARDIAC CATH/INVASIVE PROCEDURES | Age: 82
End: 2017-12-07

## 2017-12-07 VITALS — TEMPERATURE: 97.1 F | SYSTOLIC BLOOD PRESSURE: 118 MMHG | DIASTOLIC BLOOD PRESSURE: 55 MMHG | OXYGEN SATURATION: 99 %

## 2017-12-07 DIAGNOSIS — Z95.2 S/P TAVR (TRANSCATHETER AORTIC VALVE REPLACEMENT): ICD-10-CM

## 2017-12-07 LAB
% CKMB: 7.1 % (ref 0–3)
-: NORMAL
ACTIVATED CLOTTING TIME: 208 SEC (ref 79–149)
ACTIVATED CLOTTING TIME: 278 SEC (ref 79–149)
ALBUMIN SERPL-MCNC: 4.2 G/DL (ref 3.5–5.2)
AMORPHOUS: NORMAL
ANION GAP SERPL CALCULATED.3IONS-SCNC: 16 MMOL/L (ref 9–17)
ANION GAP SERPL CALCULATED.3IONS-SCNC: 17 MMOL/L (ref 9–17)
BACTERIA: NORMAL
BILIRUB SERPL-MCNC: 0.58 MG/DL (ref 0.3–1.2)
BILIRUBIN URINE: NEGATIVE
BNP INTERPRETATION: ABNORMAL
BUN BLDV-MCNC: 27 MG/DL (ref 8–23)
BUN/CREAT BLD: ABNORMAL (ref 9–20)
CALCIUM SERPL-MCNC: 8.9 MG/DL (ref 8.6–10.4)
CASTS UA: NORMAL /LPF (ref 0–8)
CHLORIDE BLD-SCNC: 100 MMOL/L (ref 98–107)
CHLORIDE BLD-SCNC: 101 MMOL/L (ref 98–107)
CK MB: 3.5 NG/ML
CKMB INTERPRETATION: ABNORMAL
CO2: 22 MMOL/L (ref 20–31)
CO2: 24 MMOL/L (ref 20–31)
COLOR: YELLOW
COMMENT UA: ABNORMAL
CREAT SERPL-MCNC: 1 MG/DL (ref 0.5–0.9)
CRYSTALS, UA: NORMAL /HPF
EKG ATRIAL RATE: 76 BPM
EKG P AXIS: 59 DEGREES
EKG P-R INTERVAL: 198 MS
EKG Q-T INTERVAL: 462 MS
EKG QRS DURATION: 134 MS
EKG QTC CALCULATION (BAZETT): 519 MS
EKG R AXIS: -20 DEGREES
EKG T AXIS: 115 DEGREES
EKG VENTRICULAR RATE: 76 BPM
EPITHELIAL CELLS UA: NORMAL /HPF (ref 0–5)
GFR AFRICAN AMERICAN: >60 ML/MIN
GFR NON-AFRICAN AMERICAN: 53 ML/MIN
GFR SERPL CREATININE-BSD FRML MDRD: ABNORMAL ML/MIN/{1.73_M2}
GFR SERPL CREATININE-BSD FRML MDRD: ABNORMAL ML/MIN/{1.73_M2}
GLUCOSE BLD-MCNC: 138 MG/DL (ref 65–105)
GLUCOSE BLD-MCNC: 146 MG/DL (ref 65–105)
GLUCOSE BLD-MCNC: 211 MG/DL (ref 70–99)
GLUCOSE URINE: ABNORMAL
HCT VFR BLD CALC: 42.4 % (ref 36.3–47.1)
HEMOGLOBIN: 13.4 G/DL (ref 11.9–15.1)
INR BLD: 1
KETONES, URINE: NEGATIVE
LEUKOCYTE ESTERASE, URINE: NEGATIVE
MAGNESIUM: 2.2 MG/DL (ref 1.6–2.6)
MCH RBC QN AUTO: 28.3 PG (ref 25.2–33.5)
MCHC RBC AUTO-ENTMCNC: 31.6 G/DL (ref 28.4–34.8)
MCV RBC AUTO: 89.6 FL (ref 82.6–102.9)
MUCUS: NORMAL
NITRITE, URINE: NEGATIVE
OTHER OBSERVATIONS UA: NORMAL
PARTIAL THROMBOPLASTIN TIME: 21.9 SEC (ref 21.3–31.3)
PDW BLD-RTO: 13.8 % (ref 11.8–14.4)
PH UA: 5.5 (ref 5–8)
PLATELET # BLD: 200 K/UL (ref 138–453)
PMV BLD AUTO: 10.1 FL (ref 8.1–13.5)
POTASSIUM SERPL-SCNC: 3.6 MMOL/L (ref 3.7–5.3)
POTASSIUM SERPL-SCNC: 3.9 MMOL/L (ref 3.7–5.3)
PRO-BNP: 6136 PG/ML
PROTEIN UA: ABNORMAL
PROTHROMBIN TIME: 10.6 SEC (ref 9.4–12.6)
RBC # BLD: 4.73 M/UL (ref 3.95–5.11)
RBC UA: NORMAL /HPF (ref 0–4)
RENAL EPITHELIAL, UA: NORMAL /HPF
SODIUM BLD-SCNC: 139 MMOL/L (ref 135–144)
SODIUM BLD-SCNC: 141 MMOL/L (ref 135–144)
SPECIFIC GRAVITY UA: 1.07 (ref 1–1.03)
TOTAL CK: 49 U/L (ref 26–192)
TRICHOMONAS: NORMAL
TROPONIN INTERP: ABNORMAL
TROPONIN INTERP: ABNORMAL
TROPONIN T: 0.19 NG/ML
TROPONIN T: 0.21 NG/ML
TURBIDITY: CLEAR
URINE HGB: ABNORMAL
UROBILINOGEN, URINE: NORMAL
WBC # BLD: 9.7 K/UL (ref 3.5–11.3)
WBC UA: NORMAL /HPF (ref 0–5)
YEAST: NORMAL

## 2017-12-07 PROCEDURE — 85610 PROTHROMBIN TIME: CPT

## 2017-12-07 PROCEDURE — 6360000002 HC RX W HCPCS: Performed by: INTERNAL MEDICINE

## 2017-12-07 PROCEDURE — 2780000006 HC MISC HEART VALVE

## 2017-12-07 PROCEDURE — 82550 ASSAY OF CK (CPK): CPT

## 2017-12-07 PROCEDURE — 86900 BLOOD TYPING SEROLOGIC ABO: CPT

## 2017-12-07 PROCEDURE — 6360000002 HC RX W HCPCS: Performed by: NURSE ANESTHETIST, CERTIFIED REGISTERED

## 2017-12-07 PROCEDURE — 86920 COMPATIBILITY TEST SPIN: CPT

## 2017-12-07 PROCEDURE — 82040 ASSAY OF SERUM ALBUMIN: CPT

## 2017-12-07 PROCEDURE — 7100000010 HC PHASE II RECOVERY - FIRST 15 MIN

## 2017-12-07 PROCEDURE — 83880 ASSAY OF NATRIURETIC PEPTIDE: CPT

## 2017-12-07 PROCEDURE — 81001 URINALYSIS AUTO W/SCOPE: CPT

## 2017-12-07 PROCEDURE — 2580000003 HC RX 258: Performed by: INTERNAL MEDICINE

## 2017-12-07 PROCEDURE — 85730 THROMBOPLASTIN TIME PARTIAL: CPT

## 2017-12-07 PROCEDURE — 6360000002 HC RX W HCPCS

## 2017-12-07 PROCEDURE — 76937 US GUIDE VASCULAR ACCESS: CPT

## 2017-12-07 PROCEDURE — 7100000011 HC PHASE II RECOVERY - ADDTL 15 MIN

## 2017-12-07 PROCEDURE — 82247 BILIRUBIN TOTAL: CPT

## 2017-12-07 PROCEDURE — 6370000000 HC RX 637 (ALT 250 FOR IP): Performed by: INTERNAL MEDICINE

## 2017-12-07 PROCEDURE — C1894 INTRO/SHEATH, NON-LASER: HCPCS

## 2017-12-07 PROCEDURE — 82947 ASSAY GLUCOSE BLOOD QUANT: CPT

## 2017-12-07 PROCEDURE — 2580000003 HC RX 258: Performed by: NURSE ANESTHETIST, CERTIFIED REGISTERED

## 2017-12-07 PROCEDURE — 87086 URINE CULTURE/COLONY COUNT: CPT

## 2017-12-07 PROCEDURE — 86901 BLOOD TYPING SEROLOGIC RH(D): CPT

## 2017-12-07 PROCEDURE — G0269 OCCLUSIVE DEVICE IN VEIN ART: HCPCS

## 2017-12-07 PROCEDURE — 85347 COAGULATION TIME ACTIVATED: CPT

## 2017-12-07 PROCEDURE — 82553 CREATINE MB FRACTION: CPT

## 2017-12-07 PROCEDURE — 2720000010 HC SURG SUPPLY STERILE

## 2017-12-07 PROCEDURE — 33361 REPLACE AORTIC VALVE PERQ: CPT | Performed by: INTERNAL MEDICINE

## 2017-12-07 PROCEDURE — 85027 COMPLETE CBC AUTOMATED: CPT

## 2017-12-07 PROCEDURE — 02RF3JZ REPLACEMENT OF AORTIC VALVE WITH SYNTHETIC SUBSTITUTE, PERCUTANEOUS APPROACH: ICD-10-PCS | Performed by: INTERNAL MEDICINE

## 2017-12-07 PROCEDURE — 84484 ASSAY OF TROPONIN QUANT: CPT

## 2017-12-07 PROCEDURE — 86850 RBC ANTIBODY SCREEN: CPT

## 2017-12-07 PROCEDURE — 36415 COLL VENOUS BLD VENIPUNCTURE: CPT

## 2017-12-07 PROCEDURE — C1760 CLOSURE DEV, VASC: HCPCS

## 2017-12-07 PROCEDURE — 3700000001 HC ADD 15 MINUTES (ANESTHESIA)

## 2017-12-07 PROCEDURE — C1756 CATH, PACING, TRANSESOPH: HCPCS

## 2017-12-07 PROCEDURE — C1725 CATH, TRANSLUMIN NON-LASER: HCPCS

## 2017-12-07 PROCEDURE — 93005 ELECTROCARDIOGRAM TRACING: CPT

## 2017-12-07 PROCEDURE — 80051 ELECTROLYTE PANEL: CPT

## 2017-12-07 PROCEDURE — 36245 INS CATH ABD/L-EXT ART 1ST: CPT

## 2017-12-07 PROCEDURE — 75710 ARTERY X-RAYS ARM/LEG: CPT

## 2017-12-07 PROCEDURE — 99999 PR OFFICE/OUTPT VISIT,PROCEDURE ONLY: CPT | Performed by: THORACIC SURGERY (CARDIOTHORACIC VASCULAR SURGERY)

## 2017-12-07 PROCEDURE — 2000000000 HC ICU R&B

## 2017-12-07 PROCEDURE — 83735 ASSAY OF MAGNESIUM: CPT

## 2017-12-07 PROCEDURE — 2500000003 HC RX 250 WO HCPCS: Performed by: NURSE ANESTHETIST, CERTIFIED REGISTERED

## 2017-12-07 PROCEDURE — 3700000000 HC ANESTHESIA ATTENDED CARE

## 2017-12-07 PROCEDURE — 80048 BASIC METABOLIC PNL TOTAL CA: CPT

## 2017-12-07 PROCEDURE — C1769 GUIDE WIRE: HCPCS

## 2017-12-07 RX ORDER — VANCOMYCIN HYDROCHLORIDE 1 G/200ML
1000 INJECTION, SOLUTION INTRAVENOUS EVERY 24 HOURS
Status: DISCONTINUED | OUTPATIENT
Start: 2017-12-08 | End: 2017-12-08

## 2017-12-07 RX ORDER — SOTALOL HYDROCHLORIDE 80 MG/1
80 TABLET ORAL DAILY
Status: DISCONTINUED | OUTPATIENT
Start: 2017-12-07 | End: 2017-12-08

## 2017-12-07 RX ORDER — SODIUM CHLORIDE, SODIUM LACTATE, POTASSIUM CHLORIDE, CALCIUM CHLORIDE 600; 310; 30; 20 MG/100ML; MG/100ML; MG/100ML; MG/100ML
INJECTION, SOLUTION INTRAVENOUS CONTINUOUS PRN
Status: DISCONTINUED | OUTPATIENT
Start: 2017-12-07 | End: 2017-12-07 | Stop reason: SDUPTHER

## 2017-12-07 RX ORDER — FENTANYL CITRATE 50 UG/ML
1 INJECTION, SOLUTION INTRAMUSCULAR; INTRAVENOUS
Status: DISCONTINUED | OUTPATIENT
Start: 2017-12-07 | End: 2017-12-09 | Stop reason: HOSPADM

## 2017-12-07 RX ORDER — SODIUM CHLORIDE 0.9 % (FLUSH) 0.9 %
10 SYRINGE (ML) INJECTION EVERY 12 HOURS SCHEDULED
Status: DISCONTINUED | OUTPATIENT
Start: 2017-12-07 | End: 2017-12-09 | Stop reason: HOSPADM

## 2017-12-07 RX ORDER — NITROGLYCERIN 0.4 MG/1
0.4 TABLET SUBLINGUAL EVERY 5 MIN PRN
Status: DISCONTINUED | OUTPATIENT
Start: 2017-12-07 | End: 2017-12-09 | Stop reason: HOSPADM

## 2017-12-07 RX ORDER — SODIUM CHLORIDE 9 MG/ML
INJECTION, SOLUTION INTRAVENOUS ONCE
Status: COMPLETED | OUTPATIENT
Start: 2017-12-07 | End: 2017-12-07

## 2017-12-07 RX ORDER — FENTANYL CITRATE 50 UG/ML
INJECTION, SOLUTION INTRAMUSCULAR; INTRAVENOUS PRN
Status: DISCONTINUED | OUTPATIENT
Start: 2017-12-07 | End: 2017-12-07 | Stop reason: SDUPTHER

## 2017-12-07 RX ORDER — SODIUM CHLORIDE 0.9 % (FLUSH) 0.9 %
10 SYRINGE (ML) INJECTION PRN
Status: DISCONTINUED | OUTPATIENT
Start: 2017-12-07 | End: 2017-12-09 | Stop reason: HOSPADM

## 2017-12-07 RX ORDER — SODIUM CHLORIDE 9 MG/ML
INJECTION, SOLUTION INTRAVENOUS CONTINUOUS PRN
Status: DISCONTINUED | OUTPATIENT
Start: 2017-12-07 | End: 2017-12-07 | Stop reason: SDUPTHER

## 2017-12-07 RX ORDER — FAMOTIDINE 20 MG/1
20 TABLET, FILM COATED ORAL EVERY EVENING
Status: DISCONTINUED | OUTPATIENT
Start: 2017-12-07 | End: 2017-12-09 | Stop reason: HOSPADM

## 2017-12-07 RX ORDER — LIDOCAINE HYDROCHLORIDE 10 MG/ML
INJECTION, SOLUTION EPIDURAL; INFILTRATION; INTRACAUDAL; PERINEURAL PRN
Status: DISCONTINUED | OUTPATIENT
Start: 2017-12-07 | End: 2017-12-07 | Stop reason: SDUPTHER

## 2017-12-07 RX ORDER — ONDANSETRON 2 MG/ML
4 INJECTION INTRAMUSCULAR; INTRAVENOUS EVERY 6 HOURS PRN
Status: DISCONTINUED | OUTPATIENT
Start: 2017-12-07 | End: 2017-12-09 | Stop reason: HOSPADM

## 2017-12-07 RX ORDER — PROPOFOL 10 MG/ML
INJECTION, EMULSION INTRAVENOUS CONTINUOUS PRN
Status: DISCONTINUED | OUTPATIENT
Start: 2017-12-07 | End: 2017-12-07 | Stop reason: SDUPTHER

## 2017-12-07 RX ORDER — ASPIRIN 81 MG/1
81 TABLET ORAL DAILY
Status: DISCONTINUED | OUTPATIENT
Start: 2017-12-08 | End: 2017-12-09 | Stop reason: HOSPADM

## 2017-12-07 RX ORDER — ACETAMINOPHEN 325 MG/1
650 TABLET ORAL EVERY 4 HOURS PRN
Status: DISCONTINUED | OUTPATIENT
Start: 2017-12-07 | End: 2017-12-09 | Stop reason: HOSPADM

## 2017-12-07 RX ORDER — HEPARIN SODIUM 1000 [USP'U]/ML
INJECTION, SOLUTION INTRAVENOUS; SUBCUTANEOUS PRN
Status: DISCONTINUED | OUTPATIENT
Start: 2017-12-07 | End: 2017-12-07 | Stop reason: SDUPTHER

## 2017-12-07 RX ORDER — CLOPIDOGREL BISULFATE 75 MG/1
75 TABLET ORAL DAILY
Status: DISCONTINUED | OUTPATIENT
Start: 2017-12-07 | End: 2017-12-09 | Stop reason: HOSPADM

## 2017-12-07 RX ORDER — ONDANSETRON 4 MG/1
4 TABLET, FILM COATED ORAL EVERY 8 HOURS PRN
Status: DISCONTINUED | OUTPATIENT
Start: 2017-12-07 | End: 2017-12-09 | Stop reason: HOSPADM

## 2017-12-07 RX ORDER — SODIUM CHLORIDE 9 MG/ML
INJECTION, SOLUTION INTRAVENOUS CONTINUOUS
Status: DISCONTINUED | OUTPATIENT
Start: 2017-12-07 | End: 2017-12-08

## 2017-12-07 RX ORDER — VANCOMYCIN HYDROCHLORIDE 1 G/200ML
1000 INJECTION, SOLUTION INTRAVENOUS EVERY 12 HOURS
Status: DISCONTINUED | OUTPATIENT
Start: 2017-12-07 | End: 2017-12-07

## 2017-12-07 RX ORDER — POTASSIUM CHLORIDE 20 MEQ/1
10 TABLET, EXTENDED RELEASE ORAL DAILY
Status: DISCONTINUED | OUTPATIENT
Start: 2017-12-07 | End: 2017-12-09 | Stop reason: HOSPADM

## 2017-12-07 RX ORDER — LISINOPRIL 2.5 MG/1
2.5 TABLET ORAL DAILY
Status: DISCONTINUED | OUTPATIENT
Start: 2017-12-07 | End: 2017-12-09 | Stop reason: HOSPADM

## 2017-12-07 RX ORDER — PROTAMINE SULFATE 10 MG/ML
INJECTION, SOLUTION INTRAVENOUS PRN
Status: DISCONTINUED | OUTPATIENT
Start: 2017-12-07 | End: 2017-12-07 | Stop reason: SDUPTHER

## 2017-12-07 RX ORDER — PROPOFOL 10 MG/ML
INJECTION, EMULSION INTRAVENOUS PRN
Status: DISCONTINUED | OUTPATIENT
Start: 2017-12-07 | End: 2017-12-07 | Stop reason: SDUPTHER

## 2017-12-07 RX ORDER — FUROSEMIDE 20 MG/1
20 TABLET ORAL DAILY
Status: DISCONTINUED | OUTPATIENT
Start: 2017-12-07 | End: 2017-12-09 | Stop reason: HOSPADM

## 2017-12-07 RX ORDER — METOCLOPRAMIDE 5 MG/1
5 TABLET ORAL 3 TIMES DAILY
Status: DISCONTINUED | OUTPATIENT
Start: 2017-12-07 | End: 2017-12-09 | Stop reason: HOSPADM

## 2017-12-07 RX ORDER — LORAZEPAM 0.5 MG/1
0.5 TABLET ORAL EVERY 4 HOURS PRN
Status: DISCONTINUED | OUTPATIENT
Start: 2017-12-07 | End: 2017-12-09 | Stop reason: HOSPADM

## 2017-12-07 RX ADMIN — SODIUM CHLORIDE, POTASSIUM CHLORIDE, SODIUM LACTATE AND CALCIUM CHLORIDE: 600; 310; 30; 20 INJECTION, SOLUTION INTRAVENOUS at 11:07

## 2017-12-07 RX ADMIN — VANCOMYCIN HYDROCHLORIDE 1 G: 1 INJECTION, SOLUTION INTRAVENOUS at 11:12

## 2017-12-07 RX ADMIN — PROPOFOL 10 MG: 10 INJECTION, EMULSION INTRAVENOUS at 12:09

## 2017-12-07 RX ADMIN — PHENYLEPHRINE HYDROCHLORIDE 100 MCG: 10 INJECTION INTRAVENOUS at 11:57

## 2017-12-07 RX ADMIN — FAMOTIDINE 20 MG: 20 TABLET, FILM COATED ORAL at 17:56

## 2017-12-07 RX ADMIN — PROPOFOL 20 MG: 10 INJECTION, EMULSION INTRAVENOUS at 10:55

## 2017-12-07 RX ADMIN — PHENYLEPHRINE HYDROCHLORIDE 100 MCG: 10 INJECTION INTRAVENOUS at 11:51

## 2017-12-07 RX ADMIN — ACETAMINOPHEN 650 MG: 325 TABLET ORAL at 14:15

## 2017-12-07 RX ADMIN — SODIUM CHLORIDE: 900 INJECTION INTRAVENOUS at 09:57

## 2017-12-07 RX ADMIN — ONDANSETRON 4 MG: 2 INJECTION, SOLUTION INTRAMUSCULAR; INTRAVENOUS at 09:39

## 2017-12-07 RX ADMIN — SODIUM CHLORIDE, POTASSIUM CHLORIDE, SODIUM LACTATE AND CALCIUM CHLORIDE: 600; 310; 30; 20 INJECTION, SOLUTION INTRAVENOUS at 12:13

## 2017-12-07 RX ADMIN — PROPOFOL 20 MG: 10 INJECTION, EMULSION INTRAVENOUS at 11:59

## 2017-12-07 RX ADMIN — PHENYLEPHRINE HYDROCHLORIDE 100 MCG: 10 INJECTION INTRAVENOUS at 11:55

## 2017-12-07 RX ADMIN — FUROSEMIDE 20 MG: 20 TABLET ORAL at 14:33

## 2017-12-07 RX ADMIN — Medication 10 ML: at 21:30

## 2017-12-07 RX ADMIN — CLOPIDOGREL 75 MG: 75 TABLET, FILM COATED ORAL at 21:31

## 2017-12-07 RX ADMIN — METOCLOPRAMIDE 5 MG: 5 TABLET ORAL at 21:31

## 2017-12-07 RX ADMIN — PROPOFOL 75 MCG/KG/MIN: 10 INJECTION, EMULSION INTRAVENOUS at 10:52

## 2017-12-07 RX ADMIN — PROTAMINE SULFATE 10 MG: 10 INJECTION, SOLUTION INTRAVENOUS at 12:23

## 2017-12-07 RX ADMIN — SODIUM CHLORIDE 75 ML/HR: 9 INJECTION, SOLUTION INTRAVENOUS at 14:34

## 2017-12-07 RX ADMIN — PROPOFOL 20 MG: 10 INJECTION, EMULSION INTRAVENOUS at 11:47

## 2017-12-07 RX ADMIN — POTASSIUM CHLORIDE 10 MEQ: 1500 TABLET, EXTENDED RELEASE ORAL at 14:33

## 2017-12-07 RX ADMIN — FENTANYL CITRATE 25 MCG: 50 INJECTION INTRAMUSCULAR; INTRAVENOUS at 11:46

## 2017-12-07 RX ADMIN — PHENYLEPHRINE HYDROCHLORIDE 100 MCG: 10 INJECTION INTRAVENOUS at 11:49

## 2017-12-07 RX ADMIN — METOCLOPRAMIDE 5 MG: 5 TABLET ORAL at 14:33

## 2017-12-07 RX ADMIN — PROTAMINE SULFATE 20 MG: 10 INJECTION, SOLUTION INTRAVENOUS at 12:08

## 2017-12-07 RX ADMIN — LIDOCAINE HYDROCHLORIDE 50 MG: 10 INJECTION, SOLUTION EPIDURAL; INFILTRATION; INTRACAUDAL; PERINEURAL at 10:52

## 2017-12-07 RX ADMIN — PROPOFOL 10 MG: 10 INJECTION, EMULSION INTRAVENOUS at 11:05

## 2017-12-07 RX ADMIN — SODIUM CHLORIDE: 9 INJECTION, SOLUTION INTRAVENOUS at 07:39

## 2017-12-07 RX ADMIN — HEPARIN SODIUM 10000 UNITS: 1000 INJECTION, SOLUTION INTRAVENOUS; SUBCUTANEOUS at 11:36

## 2017-12-07 ASSESSMENT — PULMONARY FUNCTION TESTS
PIF_VALUE: 0

## 2017-12-07 ASSESSMENT — PAIN SCALES - GENERAL
PAINLEVEL_OUTOF10: 0
PAINLEVEL_OUTOF10: 3

## 2017-12-07 NOTE — PROGRESS NOTES
Smoking Cessation - topics covered   []  Health Risks  []  Benefits of Quitting   []  Smoking Cessation  [x]  Patient has no history of tobacco use  []  Patient is former smoker. [x]  No need for tobacco cessation education. []  Booklet given  []  Patient verbalizes understanding. []  Patient denies need for tobacco cessation education.   Carson Calderon  12:59 PM

## 2017-12-07 NOTE — PLAN OF CARE
Problem: Cardiac Output - Decreased:  Goal: Cardiac output within specified parameters  Cardiac output within specified parameters   Outcome: Ongoing  Monitor vitals    Problem: Infection - Surgical Site:  Goal: Will show no infection signs and symptoms  Will show no infection signs and symptoms   Outcome: Ongoing  Monitor for elevated temperature and report. Monitor for s/s of infected punture sites    Problem: Pain - Acute:  Goal: Pain level will decrease  Pain level will decrease   Outcome: Ongoing  Assess pain with hourly rounding using scale of 0-10. Medicate pt per orders. Reassess pain to see if pt's pain is at a tolerable level. Use non medication to help relieve pain, ie repositioning, rest, distraction, ice    Problem: Venous Thromboembolism:  Goal: Will show no signs or symptoms of venous thromboembolism  Will show no signs or symptoms of venous thromboembolism   Outcome: Ongoing  Apply epc's.  Start anticoagulation when ok with md's

## 2017-12-07 NOTE — H&P
TAVR H&P      Today's Date: 12/7/2017  Patient Name: Leticia Herrera  Date of admission: 12/7/2017  6:25 AM  Patient's age: [de-identified] y.o., 1935  Admission Dx: No admission diagnoses are documented for this encounter. CHIEF COMPLAINT:  Severe aortic stenosis    History Obtained From:  patient, electronic medical record    HISTORY OF PRESENT ILLNESS:      The patient is a [de-identified] y.o.  female who presented to the hospital for TAVR. She has a history of severe aortic stenosis and has been having dyspnea, orthopnea. She has a history of left pleural effusion, which was confirmed by CT chest. Pulmonary evaluated patient for this and cleared her to proceed. Currently, she denies any complaints including chest pain, dizziness, headache, shortness of breath, orthopnea. Past Medical History:   has a past medical history of Allergic rhinitis; Anxiety, generalized; Aortic stenosis; Atrial fibrillation ([address] Breast cancer ([address] CAD (coronary artery disease); Diabetic neuropathy ([address] Hyperlipidemia; Hypertension; Lymphedema of arm; PVC's (premature ventricular contractions); S/P cardiac cath; Type II or unspecified type diabetes mellitus without mention of complication, not stated as uncontrolled; and Vitamin D deficiency. Past Surgical History:   has a past surgical history that includes Mastectomy (Bilateral, 1995 and 2000); Hysterectomy (09/09/2002); fracture surgery (08/10/99); Cardiac catheterization (November 2012); Colonoscopy (08/12/2009); Colonoscopy (9/8/14); other surgical history (Right, 6/2015); Coronary angioplasty with stent (09/2016); and Cardiac catheterization (09/19/2017). Home Medications:    Prior to Admission medications    Medication Sig Start Date End Date Taking?  Authorizing Provider   LORazepam (ATIVAN) 0.5 MG tablet TAKE ONE TABLET BY MOUTH THREE TIMES A DAY AS NEEDED FOR ANXIETY 11/21/17  Yes Kole Sher MD   famotidine (PEPCID) 20 MG tablet TAKE ONE TABLET BY MOUTH EVERY EVENING 10/27/17  Yes Shasha Adam MD   sotalol (BETAPACE) 80 MG tablet Takes 1/2 tab twice daily 7/26/17  Yes Nico Mayer MD   KLOR-CON M10 10 MEQ extended release tablet Take 1 tablet by mouth daily 7/26/17  Yes Nico Mayer MD   clopidogrel (PLAVIX) 75 MG tablet Take 1 tablet by mouth daily 7/26/17  Yes Nico Mayer MD   furosemide (LASIX) 20 MG tablet Take 1 tablet by mouth daily 7/26/17  Yes Nico Mayer MD   melatonin 3 MG TABS tablet Take 3 mg by mouth nightly as needed   Yes Historical Provider, MD   lisinopril (PRINIVIL;ZESTRIL) 2.5 MG tablet TAKE ONE TABLET BY MOUTH DAILY 2/3/17  Yes Shasha Adam MD   b complex vitamins capsule Take 1 capsule by mouth every other day Indications: overy other day    Yes Historical Provider, MD   Vitamin D (CHOLECALCIFEROL) 1000 UNITS CAPS capsule Take 1,000 Units by mouth daily. Yes Historical Provider, MD   metoclopramide (REGLAN) 5 MG tablet Take 1 tablet by mouth 3 times daily 11/3/17   Shasha Adam MD   metFORMIN (GLUCOPHAGE XR) 500 MG extended release tablet Take 1 tablet by mouth daily (with breakfast) 11/3/17   Shasha Adam MD   nitroGLYCERIN (NITROSTAT) 0.4 MG SL tablet Place 1 tablet under the tongue every 5 minutes as needed for Chest pain up to max of 3 total doses. If no relief after 1 dose, call 911. 9/20/17   Lara Chua CNP   XARELTO 20 MG TABS tablet TAKE ONE TABLET BY MOUTH DAILY WITH BREAKFAST 8/25/17   Shasha Adam MD   ondansetron [facility]) 4 MG tablet Take 1 tablet by mouth every 8 hours as needed for Nausea or Vomiting 5/30/17   Shasha Adam MD   glucose blood VI test strips (FREESTYLE TEST STRIPS) strip 1 each by In Vitro route daily As needed.  12/13/16   Shasha Adam MD      Current Facility-Administered Medications: vancomycin (VANCOCIN) 1000 mg in dextrose 5% 200 mL IVPB, 1,000 mg, Intravenous, Q12H  ondansetron (ZOFRAN) injection 4 mg, 4 mg, Intravenous, Q6H PRN    Allergies:  Darvocet a500 [propoxyphene n-acetaminophen]; Demerol hcl [meperidine]; Marinol [dronabinol]; Morphine sulfate [morphine]; Statins [statins]; and Levaquin [levofloxacin in d5w]    Social History:   reports that she is a non-smoker but has been exposed to tobacco smoke. She has never used smokeless tobacco. She reports that she does not drink alcohol or use drugs. Family History: family history includes Diabetes in her mother; Glaucoma in her mother; Stroke (age of onset: 64) in her father. No h/o sudden cardiac death. No for premature CAD    REVIEW OF SYSTEMS:    · Constitutional: there has been no unanticipated weight loss. There's been No change in energy level, No change in activity level. · Eyes: No visual changes or diplopia. No scleral icterus. · ENT: No Headaches, hearing loss or vertigo. No mouth sores or sore throat. · Cardiovascular: Severe aortic stenosis, diastolic CHF, A. fib  · Respiratory: No previous pulmonary problems  · Gastrointestinal: No abdominal pain, appetite loss, blood in stools. No change in bowel or bladder habits. · Genitourinary: No dysuria, trouble voiding, or hematuria. · Musculoskeletal:  No gait disturbance, No weakness or joint complaints. · Integumentary: No rash or pruritis. · Neurological: No headache, diplopia, change in muscle strength, numbness or tingling. No change in gait, balance, coordination, mood, affect, memory, mentation, behavior. · Psychiatric: No anxiety, or depression. · Endocrine: No temperature intolerance. No excessive thirst, fluid intake, or urination. No tremor. · Hematologic/Lymphatic: No abnormal bruising or bleeding, blood clots or swollen lymph nodes. · Allergic/Immunologic: No nasal congestion or hives.       PHYSICAL EXAM:      BP (!) 141/72   Pulse 85   Temp 98.8 °F (37.1 °C) (Oral)   Resp 18   Ht 5' (1.524 m)   Wt 128 lb (58.1 kg)   SpO2 96%   BMI 25.00 kg/m²    Constitutional and General Appearance: alert, cooperative, no distress and to to severe mitral regurgitation (vena contracta measuring 0.51cm.)      Cardiac Angiography:.  Date: 9/19/2017  Findings:  LMCA: Normal 0% stenosis. LAD: has patent mid LAD stent. The D1 is normal.    LCx: has mid 10% stenosis. The OM1 is normal.  The OM2 is normal.    RCA: is a small, non-dominant vessel and is normal.     Coronary Tree      Dominance: Left      CTA Scan:  Date: 10/24/2017  Findings:  1. Atrial septal aneurysm manifested by bulging of the fossa ovalis membrane   by approximately 10 mm toward the right.  This can be seen in isolation or   associated with anomaly such as patent foramen ovale or atrial septal defects. 2. Short-segment dissection involving the right common iliac artery, probably   attributable to prior instrumentation. 3. Retroesophageal aberrant right subclavian artery noted. 4. Left ventricular and left atrial dilation as well as left ventricular   hypertrophy.  Left anterior descending coronary artery calcification. 5. Moderate left-sided pleural effusion. 6. No specific, acute pathology in the abdomen or pelvis.  Moderate   degenerative change in the lumbar spine. 7. Precise, orthogonal measurements of the subclavian vessels not available   until 10/31 according to the radiology department.  Narrowest caliber of the   left subclavian by nonorthogonal measurement is approximately 4 mm, and   narrowest caliber of the right is approximately 4.5 mm. PFT:  Date: 10/19/2017   Findings: The flow volume loop is restrictive. Normal upper airway function. FEV1 is 64% predicted. FVC is 57% predicted. FEV1/FVC ratio is 74. [id number] is 38% predicted. Lung volumes are decreased, 62% predicted  TLC. Diffusion capacity is 74% predicted. Airway resistance is  increased. RV is 96% predicted. ERV is reduced.     FINAL IMPRESSION:  The study shows a combined obstructive and  restrictive defect. 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    Clinical correlation is advised. STS Score:     Procedure: AV Replacement   Risk of Mortality: 7.765%   Morbidity or Mortality: 30.073%   Long Length of Stay: 17.522%   Short Length of Stay: 12.31%   Permanent Stroke: 2.707%   Prolonged Ventilation: 22.248%   DSW Infection: 0.397%   Renal Failure: 7.62%   Reoperation: 12.907%         Labs:     CBC:   Recent Labs      12/07/17 0738   WBC  9.7   HGB  13.4   HCT  42.4   PLT  200     BMP:   Recent Labs      12/07/17 0738   NA  141   K  3.6*   CO2  24   BUN  27*   CREATININE  1.00*   LABGLOM  53*   GLUCOSE  211*     BNP: No results for input(s): BNP in the last 72 hours. PT/INR:   Recent Labs      12/07/17 0738   PROTIME  10.6   INR  1.0     APTT:  Recent Labs      12/07/17 0738   APTT  21.9     CARDIAC ENZYMES:  Recent Labs      12/07/17 0738   CKTOTAL  49   CKMB  3.5     FASTING LIPID PANEL:  Lab Results   Component Value Date    HDL 67 10/27/2017    LDLCALC 124 01/06/2016    TRIG 169 10/27/2017     LIVER PROFILE:  Recent Labs      12/07/17 0738   LABALBU  4.2       IMPRESSION:    1. Severe aortic stenosis  2. Hypertension  3. Diabetic Neuropathy  4. CKD-3  5. Chronic diastolic CHF    Patient Active Problem List   Diagnosis    Hyperlipidemia    Hypertension    [facility] [address]    Breast cancer ([facility]    Lymphedema of arm    Vitamin D deficiency    Hyperplastic colon polyp    PVC's (premature ventricular contractions)    Allergic rhinitis    Anxiety, generalized    CHF (congestive heart failure) ([facility]    DM (diabetes mellitus), type 2 ([facility]    Coronary artery disease involving native coronary artery of native heart    Overweight (BMI 25.0-29. 9)    BMI 28.0-28.9,adult    Cellulitis of left upper extremity    Persistent atrial fibrillation (HCC)       RECOMMENDATIONS:  1. Proceed with TAVR  2. Post-procedure orders to follow      Discussed with patient, family, spouse and Nurse.     Electronically signed on 12/07/17 at 9:35 AM by:    Artemio Avalos MD Fellow, [address]    I have reviewed the case with resident / fellow  I have examined the patient personally  Agree with treatment plan, correction innotes was made as appropriate, and discussed final arrangement based on  my evaluation and exam  Risk and benefit of procedure explained     Procedure was performed by me, with all aspect of the procedure being done using standard protocol.     Barbara Hidalgo MD  [facility]

## 2017-12-07 NOTE — OP NOTE
[facility]  TAVR Procedure Note                 Procedure Date:   12/7/2017  Patient name:  Margaret Kingsley  Date of admission:  12/7/2017  6:25 AM  MRN:   [id number]  YOB: 1935    Reason for Admission:  Critical aortic stenosis, symptomatic      PREOPERATIVE DIAGNOSIS:    1. Severe aortic stenosis. 2. Advance Age. 3. Frailty. 4. Many co-morbid factors      POSTOPERATIVE DIAGNOSIS:    1. Same      PROCEDURE PERFORMED:       1. Transcatheter aortic valve replacement using a 26 mm CoreValve via  left Femoral approach. Serial # [id number]  2. Bilateral common femoral artery access  3. Selective angiography left femoral and iliac arteries  4. Multiple aortic root injections  5. Temporary venous pacemaker placement via right femoral vein       Operators:    Interventional Cardiologist:  Tariq Wiley M.D.  CV Surgeon:     DIAZ Valencia M.D. Anesthesia: MAC Sedation. CHIEF COMPLAINT:  SOB     History Obtained From:  Patient and records    HISTORY OF PRESENT ILLNESS:      The patient Margaret Kingsley is a [de-identified]years old who had been thoroughly evaluated by the TAVR team. Known with severe symptomatic aortic stenosis and was deemed to be high risk by 2 surgeons. Patient was referred for transcatheter aortic valve replacement. Past Medical History:   has a past medical history of Allergic rhinitis; Anxiety, generalized; Aortic stenosis; Atrial fibrillation ([address] Breast cancer ([address] CAD (coronary artery disease); Diabetic neuropathy ([address] Hyperlipidemia; Hypertension; Lymphedema of arm; PVC's (premature ventricular contractions); S/P cardiac cath; Type II or unspecified type diabetes mellitus without mention of complication, not stated as uncontrolled; and Vitamin D deficiency. Past Surgical History:   has a past surgical history that includes Mastectomy (Bilateral, 1995 and 2000); Hysterectomy (09/09/2002); fracture surgery (08/10/99);  Cardiac catheterization (November nursing.     Electronically signed by Tariq Wiley MD on 12/7/2017 at 12:12 PM.  [facility]

## 2017-12-07 NOTE — PROGRESS NOTES
A fib with RVR noted on monitor. Heart rate while in a fib   Pt went back into sinus rhythm. Pt denies any c/o. BP stable.

## 2017-12-07 NOTE — ANESTHESIA PRE PROCEDURE
Department of Anesthesiology  Preprocedure Note       Name:  Bonifacio Araujo   Age:  [de-identified] y.o.  :  1935                                          MRN:  [id number]         Date:  2017      Surgeon:Dr. Jordan dEwards  Procedure:  TAVR    Medications prior to admission:   Prior to Admission medications    Medication Sig Start Date End Date Taking? Authorizing Provider   LORazepam (ATIVAN) 0.5 MG tablet TAKE ONE TABLET BY MOUTH THREE TIMES A DAY AS NEEDED FOR ANXIETY 17   Tl Wyatt MD   metoclopramide (REGLAN) 5 MG tablet Take 1 tablet by mouth 3 times daily 11/3/17   Tl Wyatt MD   metFORMIN (GLUCOPHAGE XR) 500 MG extended release tablet Take 1 tablet by mouth daily (with breakfast) 11/3/17   Tl Wyatt MD   famotidine (PEPCID) 20 MG tablet TAKE ONE TABLET BY MOUTH EVERY EVENING 10/27/17   Tl Wyatt MD   nitroGLYCERIN (NITROSTAT) 0.4 MG SL tablet Place 1 tablet under the tongue every 5 minutes as needed for Chest pain up to max of 3 total doses.  If no relief after 1 dose, call 911. 17   Amarilis Nash CNP   XARELTO 20 MG TABS tablet TAKE ONE TABLET BY MOUTH DAILY WITH BREAKFAST 17   Tl Wyatt MD   sotalol (BETAPACE) 80 MG tablet Takes 1/2 tab twice daily 17   Kayce Song MD   KLOR-CON M10 10 MEQ extended release tablet Take 1 tablet by mouth daily 17   Kayce Song MD   clopidogrel (PLAVIX) 75 MG tablet Take 1 tablet by mouth daily 17   Kayce Song MD   furosemide (LASIX) 20 MG tablet Take 1 tablet by mouth daily 17   Kayce Song MD   melatonin 3 MG TABS tablet Take 3 mg by mouth nightly as needed    Historical Provider, MD   ondansetron (ZOFRAN) 4 MG tablet Take 1 tablet by mouth every 8 hours as needed for Nausea or Vomiting 17   Tl Wyatt MD   lisinopril (PRINIVIL;ZESTRIL) 2.5 MG tablet TAKE ONE TABLET BY MOUTH DAILY 2/3/17   Tl Wyatt MD   glucose blood VI test strips (FREESTYLE TEST STRIPS) strip 1 each by In Vitro route daily As needed. 12/13/16   Jagjit Molina MD   b complex vitamins capsule Take 1 capsule by mouth every other day Indications: overy other day     Historical Provider, MD   Vitamin D (CHOLECALCIFEROL) 1000 UNITS CAPS capsule Take 1,000 Units by mouth daily. Historical Provider, MD       Current medications:    Current Outpatient Prescriptions   Medication Sig Dispense Refill    LORazepam (ATIVAN) 0.5 MG tablet TAKE ONE TABLET BY MOUTH THREE TIMES A DAY AS NEEDED FOR ANXIETY 90 tablet 1    metoclopramide (REGLAN) 5 MG tablet Take 1 tablet by mouth 3 times daily 120 tablet 3    metFORMIN (GLUCOPHAGE XR) 500 MG extended release tablet Take 1 tablet by mouth daily (with breakfast) 90 tablet 3    famotidine (PEPCID) 20 MG tablet TAKE ONE TABLET BY MOUTH EVERY EVENING 60 tablet 5    nitroGLYCERIN (NITROSTAT) 0.4 MG SL tablet Place 1 tablet under the tongue every 5 minutes as needed for Chest pain up to max of 3 total doses. If no relief after 1 dose, call 911. 25 tablet 3    XARELTO 20 MG TABS tablet TAKE ONE TABLET BY MOUTH DAILY WITH BREAKFAST 30 tablet 5    sotalol (BETAPACE) 80 MG tablet Takes 1/2 tab twice daily 90 tablet 3    KLOR-CON M10 10 MEQ extended release tablet Take 1 tablet by mouth daily 90 tablet 3    clopidogrel (PLAVIX) 75 MG tablet Take 1 tablet by mouth daily 90 tablet 3    furosemide (LASIX) 20 MG tablet Take 1 tablet by mouth daily 90 tablet 3    melatonin 3 MG TABS tablet Take 3 mg by mouth nightly as needed      ondansetron (ZOFRAN) 4 MG tablet Take 1 tablet by mouth every 8 hours as needed for Nausea or Vomiting 40 tablet 2    lisinopril (PRINIVIL;ZESTRIL) 2.5 MG tablet TAKE ONE TABLET BY MOUTH DAILY 90 tablet 3    glucose blood VI test strips (FREESTYLE TEST STRIPS) strip 1 each by In Vitro route daily As needed.  100 each 1    b complex vitamins capsule Take 1 capsule by mouth every other day Indications: overy other day       Vitamin D (CHOLECALCIFEROL) 1000 catheterization, 11/12, with wedge pressure of the right heart. She is taking Lasix for this and being followed by Cardiology.  Diabetic neuropathy (HCC)     Hyperlipidemia     Hypertension     runs low    Lymphedema of arm     Left arm, due to lymph node dissection and mastectomy.  PVC's (premature ventricular contractions)     Chronic. Patient currently undergoing workup for arrhythmia.  S/P cardiac cath 09/06/2016    Type II or unspecified type diabetes mellitus without mention of complication, not stated as uncontrolled     Vitamin D deficiency        Past Surgical History:        Procedure Laterality Date    CARDIAC CATHETERIZATION  November 2012    Showed minimal CAD with increased wedge pressure of the right heart. Patient saw Cardiology and started on Lasix.  CARDIAC CATHETERIZATION  09/19/2017    right and left heart cath   EF 30% WITH PATENT LAD STENT    COLONOSCOPY  08/12/2009    diverticulosis being found.  COLONOSCOPY  9/8/14    grade 2-3 internal hemorrhoids - banded x 2, random biopsies taken to r/o - normal, repeat 5yrs due to hx of polyps- garber    CORONARY ANGIOPLASTY WITH STENT PLACEMENT  09/2016    PTCA / Drug Eluting Stent:, LAD and / or branches    FRACTURE SURGERY  08/10/99    Left elbow ORIF     HYSTERECTOMY  09/09/2002    With bladder repair for benign reasons.  MASTECTOMY Bilateral 1995 and 2000    With lymph node dissections in 1995 and 2000.  OTHER SURGICAL HISTORY Right 6/2015    Index and long trigger finger release       Social History:    Social History   Substance Use Topics    Smoking status: Passive Smoke Exposure - Never Smoker    Smokeless tobacco: Never Used      Comment: Jeffrey Lezama RRT 10/18/16    Alcohol use No                                Counseling given: Not Answered      Vital Signs (Current): There were no vitals filed for this visit.                                            BP Readings from Last 3 Encounters:   11/30/17 110/62   11/08/17 reduced systolic function. Estimated LVEF 35-40%  3. Heavily calcified aortic valve with severe aortic stenosis   with EDITH of 0.4 cm2 by planimetry  4. Moderate mitral regurgitation      Cardiac cath 9/19/2017  Elevated left sided filling pressure.   Low cardiac output   Moderately elevated pulmonary artery pressure.   Critical aortic stenosis   Patent LAD stent       Anesthesia Evaluation  Patient summary reviewed and Nursing notes reviewed   history of anesthetic complications: PONV. Airway: Mallampati: III  TM distance: >3 FB   Neck ROM: full  Mouth opening: > = 3 FB Dental:    (+) upper dentures, lower dentures and edentulous      Pulmonary:Negative Pulmonary ROS and normal exam                               Cardiovascular:    (+) hypertension:, valvular problems/murmurs: AS, CAD:, dysrhythmias: atrial fibrillation, CHF:,                   Neuro/Psych:   (+) psychiatric history:            GI/Hepatic/Renal: Neg GI/Hepatic/Renal ROS            Endo/Other:    (+) Type II DM, , .                 Abdominal:           Vascular:                                      Anesthesia Plan      general and MAC     ASA 4     (ASA 4 for cardiomyopathy)  Induction: intravenous. arterial line  MIPS: Postoperative opioids intended. Anesthetic plan and risks discussed with patient. Plan discussed with CRNA.               Isaac Sepulveda MD   12/7/2017

## 2017-12-08 DIAGNOSIS — Z95.2 PRESENCE OF PROSTHETIC HEART VALVE: Primary | ICD-10-CM

## 2017-12-08 PROBLEM — R11.0 CHRONIC NAUSEA: Status: ACTIVE | Noted: 2017-12-08

## 2017-12-08 LAB
ABO/RH: NORMAL
ALBUMIN SERPL-MCNC: 3.4 G/DL (ref 3.5–5.2)
ALBUMIN/GLOBULIN RATIO: 1.3 (ref 1–2.5)
ALP BLD-CCNC: 69 U/L (ref 35–104)
ALT SERPL-CCNC: 13 U/L (ref 5–33)
ANION GAP SERPL CALCULATED.3IONS-SCNC: 15 MMOL/L (ref 9–17)
ANTIBODY SCREEN: NEGATIVE
ARM BAND NUMBER: NORMAL
AST SERPL-CCNC: 23 U/L
BILIRUB SERPL-MCNC: 0.62 MG/DL (ref 0.3–1.2)
BLD PROD TYP BPU: NORMAL
BLD PROD TYP BPU: NORMAL
BUN BLDV-MCNC: 17 MG/DL (ref 8–23)
BUN/CREAT BLD: ABNORMAL (ref 9–20)
CALCIUM SERPL-MCNC: 7.8 MG/DL (ref 8.6–10.4)
CHLORIDE BLD-SCNC: 105 MMOL/L (ref 98–107)
CO2: 23 MMOL/L (ref 20–31)
CREAT SERPL-MCNC: 0.77 MG/DL (ref 0.5–0.9)
CROSSMATCH RESULT: NORMAL
CROSSMATCH RESULT: NORMAL
CULTURE: NO GROWTH
CULTURE: NO GROWTH
CULTURE: NORMAL
CULTURE: NORMAL
DISPENSE STATUS BLOOD BANK: NORMAL
DISPENSE STATUS BLOOD BANK: NORMAL
EKG ATRIAL RATE: 77 BPM
EKG P AXIS: -6 DEGREES
EKG P-R INTERVAL: 200 MS
EKG Q-T INTERVAL: 456 MS
EKG QRS DURATION: 136 MS
EKG QTC CALCULATION (BAZETT): 516 MS
EKG R AXIS: 90 DEGREES
EKG T AXIS: -70 DEGREES
EKG VENTRICULAR RATE: 77 BPM
EXPIRATION DATE: NORMAL
GFR AFRICAN AMERICAN: >60 ML/MIN
GFR NON-AFRICAN AMERICAN: >60 ML/MIN
GFR SERPL CREATININE-BSD FRML MDRD: ABNORMAL ML/MIN/{1.73_M2}
GFR SERPL CREATININE-BSD FRML MDRD: ABNORMAL ML/MIN/{1.73_M2}
GLUCOSE BLD-MCNC: 138 MG/DL (ref 65–105)
GLUCOSE BLD-MCNC: 172 MG/DL (ref 65–105)
GLUCOSE BLD-MCNC: 179 MG/DL (ref 65–105)
GLUCOSE BLD-MCNC: 193 MG/DL (ref 70–99)
GLUCOSE BLD-MCNC: 203 MG/DL (ref 65–105)
GLUCOSE BLD-MCNC: 265 MG/DL (ref 65–105)
GLUCOSE BLD-MCNC: 272 MG/DL (ref 65–105)
GLUCOSE BLD-MCNC: 291 MG/DL (ref 65–105)
GLUCOSE BLD-MCNC: 334 MG/DL (ref 65–105)
HCT VFR BLD CALC: 39 % (ref 36.3–47.1)
HEMOGLOBIN: 11.8 G/DL (ref 11.9–15.1)
Lab: NORMAL
Lab: NORMAL
MCH RBC QN AUTO: 28.4 PG (ref 25.2–33.5)
MCHC RBC AUTO-ENTMCNC: 30.3 G/DL (ref 28.4–34.8)
MCV RBC AUTO: 94 FL (ref 82.6–102.9)
PDW BLD-RTO: 13.9 % (ref 11.8–14.4)
PLATELET # BLD: 147 K/UL (ref 138–453)
PMV BLD AUTO: 10.4 FL (ref 8.1–13.5)
POTASSIUM SERPL-SCNC: 3.7 MMOL/L (ref 3.7–5.3)
RBC # BLD: 4.15 M/UL (ref 3.95–5.11)
SODIUM BLD-SCNC: 143 MMOL/L (ref 135–144)
SPECIMEN DESCRIPTION: NORMAL
SPECIMEN DESCRIPTION: NORMAL
STATUS: NORMAL
STATUS: NORMAL
TOTAL PROTEIN: 6 G/DL (ref 6.4–8.3)
TRANSFUSION STATUS: NORMAL
TRANSFUSION STATUS: NORMAL
UNIT DIVISION: 0
UNIT DIVISION: 0
UNIT NUMBER: NORMAL
UNIT NUMBER: NORMAL
WBC # BLD: 8.1 K/UL (ref 3.5–11.3)

## 2017-12-08 PROCEDURE — 94762 N-INVAS EAR/PLS OXIMTRY CONT: CPT

## 2017-12-08 PROCEDURE — 36415 COLL VENOUS BLD VENIPUNCTURE: CPT

## 2017-12-08 PROCEDURE — 6370000000 HC RX 637 (ALT 250 FOR IP): Performed by: INTERNAL MEDICINE

## 2017-12-08 PROCEDURE — 99221 1ST HOSP IP/OBS SF/LOW 40: CPT | Performed by: FAMILY MEDICINE

## 2017-12-08 PROCEDURE — 80053 COMPREHEN METABOLIC PANEL: CPT

## 2017-12-08 PROCEDURE — 82947 ASSAY GLUCOSE BLOOD QUANT: CPT

## 2017-12-08 PROCEDURE — 85027 COMPLETE CBC AUTOMATED: CPT

## 2017-12-08 PROCEDURE — 6360000002 HC RX W HCPCS: Performed by: INTERNAL MEDICINE

## 2017-12-08 PROCEDURE — 2580000003 HC RX 258: Performed by: INTERNAL MEDICINE

## 2017-12-08 PROCEDURE — 6370000000 HC RX 637 (ALT 250 FOR IP): Performed by: FAMILY MEDICINE

## 2017-12-08 PROCEDURE — 99999 PR OFFICE/OUTPT VISIT,PROCEDURE ONLY: CPT | Performed by: NURSE PRACTITIONER

## 2017-12-08 PROCEDURE — 93005 ELECTROCARDIOGRAM TRACING: CPT

## 2017-12-08 PROCEDURE — 2060000000 HC ICU INTERMEDIATE R&B

## 2017-12-08 RX ORDER — SOTALOL HYDROCHLORIDE 80 MG/1
40 TABLET ORAL 2 TIMES DAILY
Status: DISCONTINUED | OUTPATIENT
Start: 2017-12-08 | End: 2017-12-09 | Stop reason: HOSPADM

## 2017-12-08 RX ORDER — VANCOMYCIN HYDROCHLORIDE 1 G/200ML
1000 INJECTION, SOLUTION INTRAVENOUS EVERY 24 HOURS
Status: COMPLETED | OUTPATIENT
Start: 2017-12-08 | End: 2017-12-08

## 2017-12-08 RX ORDER — DEXTROSE MONOHYDRATE 25 G/50ML
12.5 INJECTION, SOLUTION INTRAVENOUS PRN
Status: DISCONTINUED | OUTPATIENT
Start: 2017-12-08 | End: 2017-12-09 | Stop reason: HOSPADM

## 2017-12-08 RX ORDER — LISINOPRIL 2.5 MG/1
2.5 TABLET ORAL NIGHTLY
Status: DISCONTINUED | OUTPATIENT
Start: 2017-12-08 | End: 2017-12-09 | Stop reason: HOSPADM

## 2017-12-08 RX ORDER — DEXTROSE MONOHYDRATE 50 MG/ML
100 INJECTION, SOLUTION INTRAVENOUS PRN
Status: DISCONTINUED | OUTPATIENT
Start: 2017-12-08 | End: 2017-12-09 | Stop reason: HOSPADM

## 2017-12-08 RX ORDER — INSULIN GLARGINE 100 [IU]/ML
15 INJECTION, SOLUTION SUBCUTANEOUS NIGHTLY
Status: DISCONTINUED | OUTPATIENT
Start: 2017-12-08 | End: 2017-12-09 | Stop reason: HOSPADM

## 2017-12-08 RX ORDER — NICOTINE POLACRILEX 4 MG
15 LOZENGE BUCCAL PRN
Status: DISCONTINUED | OUTPATIENT
Start: 2017-12-08 | End: 2017-12-09 | Stop reason: HOSPADM

## 2017-12-08 RX ADMIN — INSULIN LISPRO 1 UNITS: 100 INJECTION, SOLUTION INTRAVENOUS; SUBCUTANEOUS at 20:58

## 2017-12-08 RX ADMIN — METOCLOPRAMIDE 5 MG: 5 TABLET ORAL at 21:00

## 2017-12-08 RX ADMIN — VANCOMYCIN HYDROCHLORIDE 1000 MG: 1 INJECTION, SOLUTION INTRAVENOUS at 11:17

## 2017-12-08 RX ADMIN — POTASSIUM CHLORIDE 10 MEQ: 1500 TABLET, EXTENDED RELEASE ORAL at 08:36

## 2017-12-08 RX ADMIN — LISINOPRIL 2.5 MG: 2.5 TABLET ORAL at 21:00

## 2017-12-08 RX ADMIN — Medication 10 ML: at 21:00

## 2017-12-08 RX ADMIN — INSULIN LISPRO 9 UNITS: 100 INJECTION, SOLUTION INTRAVENOUS; SUBCUTANEOUS at 12:06

## 2017-12-08 RX ADMIN — FUROSEMIDE 20 MG: 20 TABLET ORAL at 08:35

## 2017-12-08 RX ADMIN — ASPIRIN 81 MG: 81 TABLET, COATED ORAL at 08:34

## 2017-12-08 RX ADMIN — RIVAROXABAN 20 MG: 20 TABLET, FILM COATED ORAL at 17:22

## 2017-12-08 RX ADMIN — SOTALOL HYDROCHLORIDE 40 MG: 80 TABLET ORAL at 11:17

## 2017-12-08 RX ADMIN — Medication 10 ML: at 08:33

## 2017-12-08 RX ADMIN — SODIUM CHLORIDE: 9 INJECTION, SOLUTION INTRAVENOUS at 01:58

## 2017-12-08 RX ADMIN — FAMOTIDINE 20 MG: 20 TABLET, FILM COATED ORAL at 17:22

## 2017-12-08 RX ADMIN — SOTALOL HYDROCHLORIDE 40 MG: 80 TABLET ORAL at 22:15

## 2017-12-08 RX ADMIN — METOCLOPRAMIDE 5 MG: 5 TABLET ORAL at 08:34

## 2017-12-08 RX ADMIN — CLOPIDOGREL 75 MG: 75 TABLET, FILM COATED ORAL at 08:34

## 2017-12-08 RX ADMIN — METOCLOPRAMIDE 5 MG: 5 TABLET ORAL at 13:48

## 2017-12-08 RX ADMIN — INSULIN GLARGINE 15 UNITS: 100 INJECTION, SOLUTION SUBCUTANEOUS at 20:59

## 2017-12-08 RX ADMIN — SODIUM CHLORIDE 75 ML/HR: 9 INJECTION, SOLUTION INTRAVENOUS at 08:30

## 2017-12-08 ASSESSMENT — ENCOUNTER SYMPTOMS
SINUS PRESSURE: 0
DIARRHEA: 0
WHEEZING: 0
COUGH: 0
VOICE CHANGE: 0
SHORTNESS OF BREATH: 0
BLOOD IN STOOL: 0
ABDOMINAL PAIN: 0
NAUSEA: 1
SORE THROAT: 0
CONSTIPATION: 0
VOMITING: 0

## 2017-12-08 ASSESSMENT — PAIN SCALES - GENERAL: PAINLEVEL_OUTOF10: 0

## 2017-12-08 NOTE — PROGRESS NOTES
[facility]   Progress Note                   Date:   12/8/2017  Patient name: Selina Miles  Date of admission:  12/7/2017 12:45 PM  MRN:   [id number]  YOB: 1935  PCP: Germain Frost MD    Reason for Admission:  TAVR     Subjective: There were no acute events overnight, remained hemodynamically stable, denies chest pain, dyspnea, orthopnea or palpitations. She is in sinus rhythm with occasional VPCs overnight. Denies groin pain or swelling. Medications:   Scheduled Meds:   sotalol  40 mg Oral BID    lisinopril  2.5 mg Oral Nightly    vancomycin  1,000 mg Intravenous Q24H    clopidogrel  75 mg Oral Daily    famotidine  20 mg Oral QPM    furosemide  20 mg Oral Daily    potassium chloride  10 mEq Oral Daily    lisinopril  2.5 mg Oral Daily    metoclopramide  5 mg Oral TID    rivaroxaban  20 mg Oral Daily    sodium chloride flush  10 mL Intravenous 2 times per day    aspirin  81 mg Oral Daily       Continuous Infusions:   sodium chloride 75 mL/hr (12/08/17 0830)       CBC:   Recent Labs      12/07/17 0738 12/08/17 0540   WBC  9.7  8.1   HGB  13.4  11.8*   PLT  200  147     BMP:    Recent Labs      12/07/17 0738  12/07/17   1821  12/08/17 0540   NA  141  139  143   K  3.6*  3.9  3.7   CL  100  101  105   CO2  24  22  23   BUN  27*   --   17   CREATININE  1.00*   --   0.77   GLUCOSE  211*   --   193*     Hepatic:   Recent Labs      12/07/17 0738  12/08/17   0540   AST   --   23   ALT   --   13   BILITOT  0.58  0.62   ALKPHOS   --   69     INR:   Recent Labs      12/07/17 0738   INR  1.0       Objective:   Vitals: BP (!) 115/48   Pulse 72   Temp 98.2 °F (36.8 °C) (Oral)   Resp 14   Ht 5' (1.524 m)   Wt 129 lb 3 oz (58.6 kg)   SpO2 97%   BMI 25.23 kg/m²     General appearance: awake, alert, in no apparent respiratory distress. On 2 l nasal cannula.    HEENT: Head: Normocephalic, no lesions, without obvious abnormality  Neck: no JVD  Lungs: clear to auscultation bilaterally, no basilar rales, no wheezing   Heart: regular rate and rhythm, S1, S2 normal, no murmur  Abdomen: soft, non-tender; bowel sounds normal  Extremities: No LE edema  Neurologic: Mental status: Alert, oriented. Motor and sensory not done. CARDIAC TESTING:    ECHO:  9/5/2017  1.  Normal LV size with mild LVH.  The patient has ejection fraction of 25% up to 35% in some views.  She does have some wall motion abnormalities. 2.  Grade 2 diastolic dysfunction with elevated filling pressures. 3.  Global hypokinesis. 4.  Left atrial enlargement. 5.  Moderate mitral regurgitation. 6.  Moderate aortic stenosis with mean gradient of 25.  7.  Trace tricuspid regurgitation.        ESSENCE:  Date: 9/25/2017  The study was performed by the attending, the fellow, and the sonographer. The study was performed under conscious sedation. Normal left ventricular chamber dimension and function. Aortic valve is trileaflet, heavily calcified with restriction of motion. Aortic valve area by planimetry was 0.415cm2, suggesting severe aortic stenosis. No significant aortic insufficiency noted. Mitral valve is normal in structure. Moderate to to severe mitral regurgitation (vena contracta measuring 0.51cm.)        Cardiac Angiography:.  Date: 9/19/2017  LMCA: Normal 0% stenosis. LAD: has patent mid LAD stent. The D1 is normal.  LCx: has mid 10% stenosis. RCA: is a small, non-dominant vessel and is normal.      Assessment / Acute Cardiac Problems:   Severe aortic stenosis s/p TAVR with 26 mm CoreValve via  left Femoral approach POD # 1   DM (diabetes mellitus), type 2  Hyperlipidemia  Hypertension  Moderate to severe MR   Paroxysmal atrial fibrillation, currently in sinus rhythm   Diabetic neuropathy   Hx of Breast cancer s/p bilateral mastectomy   Lymphedema of left arm, chronic   PVC's (premature ventricular contractions)      Plan of Treatment:   1. D/c hernandez  2. OOB and ambulate   3.  Wean off O2

## 2017-12-08 NOTE — CARE COORDINATION
[address] Transitions Interview   2017    Patient: Day Baumann Patient : 1935   MRN: [id number]    Reason for Admission: TAVR    RARS: [facility] Risk Score: 23.5, CM 3     Spoke with: patient &  @ bedside. Current discharge plan is to return home with . Patient's daughter from [place] will be staying with her initially as well. Denies the need for home care. Receptive to Care Transition - CTC contact info given to patient. Readmission Risk  Patient Active Problem List   Diagnosis    Hyperlipidemia    Hypertension    Diabetic neuropathy [facility]    Breast cancer ([facility]    Lymphedema of arm    Vitamin D deficiency    Hyperplastic colon polyp    PVC's (premature ventricular contractions)    Allergic rhinitis    Anxiety, generalized    [facility]F [facility]congestive heart failure) ([facility]    DM (diabetes mellitus), type 2 ([facility]    Coronary artery disease involving native coronary artery of native heart    Overweight (BMI 25.0-29. 9)    BMI 28.0-28.9,adult    Cellulitis of left upper extremity    Persistent atrial fibrillation (HCC)    S/P TAVR (transcatheter aortic valve replacement) -17-Dr. vernon     Inpatient Assessment  Care Transitions Summary    Care Transitions Inpatient Review  Medication Review  Do you have all of your prescriptions and are they filled?:  Yes   Barriers to Medication Adherence:  None  Are you able to afford your medications?:  Yes  How often do you have difficulty taking your medications?:  I always take them as prescribed. Housing Review  Who do you live with?:  Partner/Spouse/SO  Are you an active caregiver in your home?:  No  Social Support  Durable Medical Equipment  Functional Review  Ability to seek help/take action for Emergent/Urgent situations i.e. fire, crime, inclement weather or health crisis. :  Independent  Ability handle personal hygiene needs (bathing/dressing/grooming):   Independent  Ability to manage medications:

## 2017-12-08 NOTE — CONSULTS
Consultation Note       Admit Date: 12/7/2017  Bed/Room No.  1006/1006-01  Admitting Physician : Glen Peralta MD  Code Status :Full Code  Hospital Day:  LOS: 1 day   Patient is currently admitted for  treatment of  S/P TAVR (transcatheter aortic valve replacement)  Reason for Consult:  Medical Management   Requesting Physician: MD Mariela Saldaña MD    HISTORY OF PRESENT ILLNESS:   The patient is a [de-identified] y.o. female who is admitted for  Severe aortic stenosis. She had TAVR on 12/7/17. Patient has h/o Type 2 DM on oral metformin at home. She checks her blood sugar 123 times per day and has been controlled. Patient also has history of essential HTN , hyperlipidemia, she had CAD and had drug-eluting  stent placed on sep 2016. Patient had bilateral breast ca s/p mastectomy with subsequent lymphedema left upper extremity. Patient complains of persistent nausea for last 9-10 months. She had detailed workup for nausea and did not respond to empiric PPI. Evaluation with ultrasound gallbladder and HIDA scan was negative. She also underwent upper GI and did not show any abnormality. Nausea is intermittent , worse with eating. Patient is otherwise comfortable and does not have any other symptom. Past Medical History:        Diagnosis Date    Allergic rhinitis     With chronic cough.  Anxiety, generalized     Chronic with probable depression. She will take Ativan but refuses antidepressant.  Aortic stenosis     Atrial fibrillation (HCC)     Breast cancer ([address]     2 occurrences    CAD (coronary artery disease) November 2012    Minimal disease by catheterization, 11/12, with wedge pressure of the right heart. She is taking Lasix for this and being followed by Cardiology.  Diabetic neuropathy (HCC)     Hyperlipidemia     Hypertension     runs low    Lymphedema of arm     Left arm, due to lymph node dissection and mastectomy.     PVC's (premature ventricular contractions) Chronic. Patient currently undergoing workup for arrhythmia.  S/P cardiac cath 09/06/2016    Type II or unspecified type diabetes mellitus without mention of complication, not stated as uncontrolled     Vitamin D deficiency        Past Surgical History:        Procedure Laterality Date    CARDIAC CATHETERIZATION  November 2012    Showed minimal CAD with increased wedge pressure of the right heart. Patient saw Cardiology and started on Lasix.  CARDIAC CATHETERIZATION  09/19/2017    right and left heart cath   EF 30% WITH PATENT LAD STENT    COLONOSCOPY  08/12/2009    diverticulosis being found.  COLONOSCOPY  9/8/14    grade 2-3 internal hemorrhoids - banded x 2, random biopsies taken to r/o - normal, repeat 5yrs due to hx of polyps- garber    CORONARY ANGIOPLASTY WITH STENT PLACEMENT  09/2016    PTCA / Drug Eluting Stent:, LAD and / or branches    FRACTURE SURGERY  08/10/99    Left elbow ORIF     HYSTERECTOMY  09/09/2002    With bladder repair for benign reasons.  MASTECTOMY Bilateral 1995 and 2000    With lymph node dissections in 1995 and 2000.  OTHER SURGICAL HISTORY Right 6/2015    Index and long trigger finger release       Medications Prior to Admission:    Prior to Admission medications    Medication Sig Start Date End Date Taking?  Authorizing Provider   LORazepam (ATIVAN) 0.5 MG tablet TAKE ONE TABLET BY MOUTH THREE TIMES A DAY AS NEEDED FOR ANXIETY 11/21/17  Yes Earl Carson MD   famotidine (PEPCID) 20 MG tablet TAKE ONE TABLET BY MOUTH EVERY EVENING 10/27/17  Yes Earl Carson MD   sotalol (BETAPACE) 80 MG tablet Takes 1/2 tab twice daily 7/26/17  Yes Dalton Burroughs MD   KLOR-CON M10 10 MEQ extended release tablet Take 1 tablet by mouth daily 7/26/17  Yes Dalton Burroughs MD   clopidogrel (PLAVIX) 75 MG tablet Take 1 tablet by mouth daily 7/26/17  Yes Dalton Burroughs MD   furosemide (LASIX) 20 MG tablet Take 1 tablet by mouth daily 7/26/17  Yes Dalton Burroughs MD   melatonin 3 MG patient and Staff  RN.      Ritesh Prasad MD  12/8/2017  Copy of note to Deonna Mireles MD and  Germain Frost MD    (Please note that portions of this note were completed with a voice recognition program. Efforts were made to edit the dictations but occasionally words are mis-transcribed.)

## 2017-12-08 NOTE — FLOWSHEET NOTE
Receptive; Acceptance; Coping; Hopeful;Encouraged;Comfort;Engaged in conversation;Expressed gratitude   Spiritual/Jew   Type Spiritual support

## 2017-12-09 VITALS
DIASTOLIC BLOOD PRESSURE: 41 MMHG | HEART RATE: 73 BPM | OXYGEN SATURATION: 97 % | BODY MASS INDEX: 25.49 KG/M2 | WEIGHT: 129.85 LBS | TEMPERATURE: 98.5 F | HEIGHT: 60 IN | SYSTOLIC BLOOD PRESSURE: 106 MMHG | RESPIRATION RATE: 16 BRPM

## 2017-12-09 LAB
ABSOLUTE EOS #: 0.1 K/UL (ref 0–0.44)
ABSOLUTE IMMATURE GRANULOCYTE: 0.15 K/UL (ref 0–0.3)
ABSOLUTE LYMPH #: 1.63 K/UL (ref 1.1–3.7)
ABSOLUTE MONO #: 1.14 K/UL (ref 0.1–1.2)
ANION GAP SERPL CALCULATED.3IONS-SCNC: 14 MMOL/L (ref 9–17)
BASOPHILS # BLD: 0 % (ref 0–2)
BASOPHILS ABSOLUTE: 0.04 K/UL (ref 0–0.2)
BUN BLDV-MCNC: 15 MG/DL (ref 8–23)
BUN/CREAT BLD: ABNORMAL (ref 9–20)
CALCIUM SERPL-MCNC: 8.2 MG/DL (ref 8.6–10.4)
CHLORIDE BLD-SCNC: 104 MMOL/L (ref 98–107)
CO2: 24 MMOL/L (ref 20–31)
CREAT SERPL-MCNC: 0.73 MG/DL (ref 0.5–0.9)
DIFFERENTIAL TYPE: ABNORMAL
EOSINOPHILS RELATIVE PERCENT: 1 % (ref 1–4)
GFR AFRICAN AMERICAN: >60 ML/MIN
GFR NON-AFRICAN AMERICAN: >60 ML/MIN
GFR SERPL CREATININE-BSD FRML MDRD: ABNORMAL ML/MIN/{1.73_M2}
GFR SERPL CREATININE-BSD FRML MDRD: ABNORMAL ML/MIN/{1.73_M2}
GLUCOSE BLD-MCNC: 166 MG/DL (ref 65–105)
GLUCOSE BLD-MCNC: 171 MG/DL (ref 65–105)
GLUCOSE BLD-MCNC: 252 MG/DL (ref 70–99)
HCT VFR BLD CALC: 37.9 % (ref 36.3–47.1)
HEMOGLOBIN: 12.6 G/DL (ref 11.9–15.1)
IMMATURE GRANULOCYTES: 2 %
LV EF: 20 %
LVEF MODALITY: NORMAL
LYMPHOCYTES # BLD: 16 % (ref 24–43)
MAGNESIUM: 2 MG/DL (ref 1.6–2.6)
MCH RBC QN AUTO: 29.2 PG (ref 25.2–33.5)
MCHC RBC AUTO-ENTMCNC: 33.2 G/DL (ref 28.4–34.8)
MCV RBC AUTO: 87.7 FL (ref 82.6–102.9)
MONOCYTES # BLD: 11 % (ref 3–12)
PDW BLD-RTO: 13.9 % (ref 11.8–14.4)
PLATELET # BLD: 162 K/UL (ref 138–453)
PLATELET ESTIMATE: ABNORMAL
PMV BLD AUTO: 10.2 FL (ref 8.1–13.5)
POTASSIUM SERPL-SCNC: 3.9 MMOL/L (ref 3.7–5.3)
RBC # BLD: 4.32 M/UL (ref 3.95–5.11)
RBC # BLD: ABNORMAL 10*6/UL
SEG NEUTROPHILS: 70 % (ref 36–65)
SEGMENTED NEUTROPHILS ABSOLUTE COUNT: 7.18 K/UL (ref 1.5–8.1)
SODIUM BLD-SCNC: 142 MMOL/L (ref 135–144)
WBC # BLD: 10.2 K/UL (ref 3.5–11.3)
WBC # BLD: ABNORMAL 10*3/UL

## 2017-12-09 PROCEDURE — 6370000000 HC RX 637 (ALT 250 FOR IP): Performed by: INTERNAL MEDICINE

## 2017-12-09 PROCEDURE — 83735 ASSAY OF MAGNESIUM: CPT

## 2017-12-09 PROCEDURE — 6370000000 HC RX 637 (ALT 250 FOR IP): Performed by: FAMILY MEDICINE

## 2017-12-09 PROCEDURE — 6360000002 HC RX W HCPCS: Performed by: INTERNAL MEDICINE

## 2017-12-09 PROCEDURE — 82947 ASSAY GLUCOSE BLOOD QUANT: CPT

## 2017-12-09 PROCEDURE — 2580000003 HC RX 258: Performed by: INTERNAL MEDICINE

## 2017-12-09 PROCEDURE — 93306 TTE W/DOPPLER COMPLETE: CPT

## 2017-12-09 PROCEDURE — 85025 COMPLETE CBC W/AUTO DIFF WBC: CPT

## 2017-12-09 PROCEDURE — 80048 BASIC METABOLIC PNL TOTAL CA: CPT

## 2017-12-09 PROCEDURE — 36415 COLL VENOUS BLD VENIPUNCTURE: CPT

## 2017-12-09 RX ORDER — ASPIRIN 81 MG/1
81 TABLET ORAL DAILY
Qty: 30 TABLET | Refills: 3 | Status: SHIPPED | OUTPATIENT
Start: 2017-12-10 | End: 2017-12-18

## 2017-12-09 RX ADMIN — LISINOPRIL 2.5 MG: 2.5 TABLET ORAL at 08:41

## 2017-12-09 RX ADMIN — ONDANSETRON HYDROCHLORIDE 4 MG: 4 TABLET, FILM COATED ORAL at 08:43

## 2017-12-09 RX ADMIN — ASPIRIN 81 MG: 81 TABLET, COATED ORAL at 08:44

## 2017-12-09 RX ADMIN — METOCLOPRAMIDE 5 MG: 5 TABLET ORAL at 12:14

## 2017-12-09 RX ADMIN — CLOPIDOGREL 75 MG: 75 TABLET, FILM COATED ORAL at 08:43

## 2017-12-09 RX ADMIN — SOTALOL HYDROCHLORIDE 40 MG: 80 TABLET ORAL at 08:42

## 2017-12-09 RX ADMIN — INSULIN LISPRO 1 UNITS: 100 INJECTION, SOLUTION INTRAVENOUS; SUBCUTANEOUS at 12:14

## 2017-12-09 RX ADMIN — FUROSEMIDE 20 MG: 20 TABLET ORAL at 08:41

## 2017-12-09 RX ADMIN — Medication 10 ML: at 08:51

## 2017-12-09 RX ADMIN — POTASSIUM CHLORIDE 10 MEQ: 1500 TABLET, EXTENDED RELEASE ORAL at 12:13

## 2017-12-09 RX ADMIN — METOCLOPRAMIDE 5 MG: 5 TABLET ORAL at 08:41

## 2017-12-09 RX ADMIN — INSULIN LISPRO 1 UNITS: 100 INJECTION, SOLUTION INTRAVENOUS; SUBCUTANEOUS at 08:44

## 2017-12-09 NOTE — DISCHARGE SUMMARY
[facility]  Discharge Note                 Name:  Selina Miles  YOB: 1935  Social Security Number:  [id number]  Medical Record Number:  [id number]    Date of Admission:  12/7/2017  Date of Discharge:  12/9/2017    Admitting physician: Deonna Mireles MD    Discharge Attending: Dr. Jayshree Adames   Primary Care Physician: Germain Frost MD  Consultants: None  Discharge to [address] [place] [address] LIST:  Patient Active Problem List   Diagnosis    Hyperlipidemia    Hypertension    Diabetic neuropathy ([facility]    Breast cancer ([facility]    Lymphedema of arm    Vitamin D deficiency    Hyperplastic colon polyp    PVC's (premature ventricular contractions)    Allergic rhinitis    Anxiety, generalized    [facility]    DM (diabetes mellitus), type 2 ([facility]    Coronary artery disease involving native coronary artery of native heart    Persistent atrial fibrillation ([facility]    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 Bandar Moran    Chronic nausea    Moderate mitral regurgitation         Procedures:  TAVR    HOSPITAL COURSE :   The patient was admitted for: TAVR  Medications changes recommendation: As per discharge list   Follow Up Plan: 4 weeks as outpatient       Discharge exam:   Vitals:    12/09/17 0703   BP: 115/64   Pulse: 82   Resp: 16   Temp: 98.7 °F (37.1 °C)   SpO2: 94%       Patient is feeling much better, denies any chest pain,k dyspnea, orthopnea or palpitations. Neuro: normal  Chest: Clear to ausculation. No wheezing. Cardiac: Cor RRR  Abdomen/groin: soft, non-tender, without masses or organomegaly  Lower extremity edema: none     Follow up with primary care provider 1 week  Follow up with cardiology 4 weeks  Follow up with other consultant physicians at their directions.     Discharge Medications:   Ivory Michelle   Home Medication Instructions [id number]    Printed on:12/09/17 1027   Medication

## 2017-12-09 NOTE — PROGRESS NOTES
[facility]  Progress Note    Surgeon: Dr. Solis Signs  Procedure: TAVR  POD#: 2  EF:       Subjective:  Ms. Claudio Hubbard feels well today with no acute complaints. Pain is controlled. She is up to chair and feeling much better today. Physical Exam  Vitals:  Vitals:    12/09/17 0703   BP: 115/64   Pulse: 82   Resp: 16   Temp: 98.7 °F (37.1 °C)   SpO2: 94%     I/O last 3 completed shifts: In: 1800 [P.O.:1800]  Out: 7706 [Urine:1302; Stool:1]  No intake/output data recorded. General: Alert and Oriented x3. Up in chair. No apparent distress. HEENT:  Normocephalic and atraumatic. PERRL. EOMI. Lips and oral mucosa moist and without lesions. Neck:  Supple. Trachea midline. Chest:  No abnormality. Equal and symmetric expansion with respiration. Lungs:  CTAB  Cardiac:   rate and rhythm without murmurs, rubs or gallops. Abdomen:  Soft, non-tender, non-distended  Extremities:  Chronic edema to LUE  Musculoskeletal: grossly normal  Neurologic:  Cranial nerves are grossly intact. Non-focal sensory deficits on exam.  Psychiatric: Mood and affect are appropriate.         Current Medications:    Current Facility-Administered Medications:     sotalol (BETAPACE) tablet 40 mg, 40 mg, Oral, BID, Mi Andino MD, 40 mg at 12/09/17 0842    lisinopril (PRINIVIL;ZESTRIL) tablet 2.5 mg, 2.5 mg, Oral, Nightly, Mi Andino MD, 2.5 mg at 12/08/17 2100    glucose (GLUTOSE) 40 % oral gel 15 g, 15 g, Oral, PRN, Zach Mcrae MD    dextrose 50 % solution 12.5 g, 12.5 g, Intravenous, PRN, Zach Mcrae MD    glucagon (rDNA) injection 1 mg, 1 mg, Intramuscular, PRN, Zach Mcrae MD    dextrose 5 % solution, 100 mL/hr, Intravenous, PRN, Zach Mcrae MD    insulin glargine (LANTUS) injection vial 15 Units, 15 Units, Subcutaneous, Nightly, Jem Bass MD, 15 Units at 12/08/17 2059    insulin lispro (HUMALOG) injection vial 0-6 Units, 0-6 Units, Subcutaneous, TID WC, 12/07/17   0738  12/08/17   0540  12/09/17   0900   WBC  9.7  8.1  10.2   HGB  13.4  11.8*  12.6   HCT  42.4  39.0  37.9   MCV  89.6  94.0  87.7   PLT  200  147  162     BMP:   Recent Labs      12/07/17   0738  12/07/17   1821  12/08/17   0540  12/09/17   0900   NA  141  139  143  142   K  3.6*  3.9  3.7  3.9   CL  100  101  105  104   CO2  24  22  23  24   BUN  27*   --   17  15   CREATININE  1.00*   --   0.77  0.73   MG   --   2.2   --   2.0     Accucheck Glucoses:   Recent Labs      12/08/17   1346  12/08/17   1502  12/08/17   1724  12/08/17   2022  12/09/17   0705   POCGLU  272*  179*  138*  203*  166*     Cardiac Enzymes:   Recent Labs      12/07/17   0738   CKTOTAL  49   CKMB  3.5     PTT/PT/INR:   Recent Labs      12/07/17   0738   PROTIME  10.6   INR  1.0     Recent Labs      12/07/17   0738   APTT  21.9         Assessment & Plan:   1. S/p TAVR, POD #2  - recovering well    Cardiothoracic surgery to sign off, please call with any questions or concerns       The above recommendations including medications and orders were discussed and agreed upon with Dr. Xiomara Chambers the attending on service for the cardiothoracic surgery group today.      Electronically signed by [employer] on 12/9/2017 at 9:57 AM

## 2017-12-10 ENCOUNTER — CARE COORDINATION (OUTPATIENT)
Dept: CASE MANAGEMENT | Age: 82
End: 2017-12-10

## 2017-12-10 NOTE — CARE COORDINATION
[facility] Transitions Initial Follow Up Call    Call within 2 business days of discharge: Yes    Patient: Cyrus Encarnacion Patient : 1935   MRN: [id number]  Reason for Admission: TAVR  Discharge Date: 17 RARS: [facility] Risk Score: 23.5     Spoke with: [id number] Lula Elver.: Amando Servin    Non-face-to-face services provided:  Scheduled appointment with Specialist-17 With Dr. Leilani Wilson @ 1:15  Obtained and reviewed discharge summary and/or continuity of care documents     Patient feeling well today. She states she feels tired and expressed that this was normal after TAVR. She denies pain, chest pains, cough or shortness of breath. Groin has dressing in place, she reports no drainage and expressed she could remove and keep open to air. Daughter is staying with her from [place], patient states they had to take daughter to ED yesterday d/t n/v and was told she had the flu. Discussed with patient steps to try to prevent mechelle the flu ie: hand hygiene. Denies needs or concerns. Needs to schedule f/u with PCP. Follows up with cardiology on .      Inpatient Assessment  Care Transitions 24 Hour Call    Schedule Follow Up Appointment with PCP:  Completed  Do you have any ongoing symptoms?:  No  Do you have a copy of your discharge instructions?:  Yes  Do you have all of your prescriptions and are they filled?:  Yes  Have you been contacted by a [id number] [employer] Pharmacist?:  No  Have you scheduled your follow up appointment?:  Yes (Comment: with cardiology, needs to schedule with PCP)  How are you going to get to your appointment?:  Car - family or friend to transport  Were you discharged with any Home Care or Post Acute Services:  No  Do you have support at home?:  Partner/Spouse/SO  Do you feel like you have everything you need to keep you well at home?:  Yes  Are you an active caregiver in your home?:  No  Care Transitions Interventions         Follow Up  Future Appointments  Date Time

## 2017-12-12 ENCOUNTER — TELEPHONE (OUTPATIENT)
Dept: FAMILY MEDICINE CLINIC | Age: 82
End: 2017-12-12

## 2017-12-12 NOTE — TELEPHONE ENCOUNTER
NEEDED FOR ANXIETY 90 tablet 1    metoclopramide (REGLAN) 5 MG tablet Take 1 tablet by mouth 3 times daily 120 tablet 3    metFORMIN (GLUCOPHAGE XR) 500 MG extended release tablet Take 1 tablet by mouth daily (with breakfast) 90 tablet 3    famotidine (PEPCID) 20 MG tablet TAKE ONE TABLET BY MOUTH EVERY EVENING 60 tablet 5    nitroGLYCERIN (NITROSTAT) 0.4 MG SL tablet Place 1 tablet under the tongue every 5 minutes as needed for Chest pain up to max of 3 total doses. If no relief after 1 dose, call 911. 25 tablet 3    XARELTO 20 MG TABS tablet TAKE ONE TABLET BY MOUTH DAILY WITH BREAKFAST 30 tablet 5    sotalol (BETAPACE) 80 MG tablet Takes 1/2 tab twice daily 90 tablet 3    KLOR-CON M10 10 MEQ extended release tablet Take 1 tablet by mouth daily 90 tablet 3    clopidogrel (PLAVIX) 75 MG tablet Take 1 tablet by mouth daily 90 tablet 3    furosemide (LASIX) 20 MG tablet Take 1 tablet by mouth daily 90 tablet 3    melatonin 3 MG TABS tablet Take 3 mg by mouth nightly as needed      ondansetron (ZOFRAN) 4 MG tablet Take 1 tablet by mouth every 8 hours as needed for Nausea or Vomiting 40 tablet 2    lisinopril (PRINIVIL;ZESTRIL) 2.5 MG tablet TAKE ONE TABLET BY MOUTH DAILY 90 tablet 3    glucose blood VI test strips (FREESTYLE TEST STRIPS) strip 1 each by In Vitro route daily As needed. 100 each 1    b complex vitamins capsule Take 1 capsule by mouth every other day Indications: overy other day       Vitamin D (CHOLECALCIFEROL) 1000 UNITS CAPS capsule Take 1,000 Units by mouth daily. No current facility-administered medications for this visit. Current Medications (record all meds as 'taken' or 'not taken' in home med activity)  No outpatient prescriptions have been marked as taking for the 12/12/17 encounter (Telephone) with Mariela Turcios MD.         Are there any questions about how the patient should take care of herself? No   Does the patient understand her diet regimen?   yes   Does the patient understand his or her activity level? yes   Does the patient understand how to care for her wound? yes   Does the patient understand how to monitor her weight? N/A   Does the patient understand what to do if she becomes short of breath? yes   Does the patient understand what to do if she has increased edema? yes    Does the patient understand how to monitor Blood sugar? N/A    Is the patient having any trouble with ADL's? No        Home care services initiated: no     Services provided: none- daughter came home from [place] to take care of her      Does that patient have equipment needs? No    [facility] Follow-Up  - Does patient have an appointment with her PCP? yes  - Does patient have an appointment with her specialist physicians?  Yes        Follow up appointment date: (7 days for more severe illness, 14 days for others)  Future Appointments  Date Time Provider Destinee Gaffney   12/18/2017 1:15 PM MD PANTERA Allison DPP   12/20/2017 10:20 AM Alex Cooks, MD [facility] DP   1/15/2018 10:00 AM SCHEDULE, [facility]ARDIOLOG[facility] ECHO [place]   1/22/2018 9:15 AM MD PANTERA Allison DPP   4/26/2018 9:15 AM SCHEDULE, Sam Grandemar [facility] LAB [facility] LAB [place]   5/2/2018 1:00 PM [place] [facility]   5/3/2018 11:00 AM [facility]       Other Interventions or Actions taken as result of  Assessment  -         Jaylon Huffman LPN

## 2017-12-15 ENCOUNTER — CARE COORDINATION (OUTPATIENT)
Dept: CASE MANAGEMENT | Age: 82
End: 2017-12-15

## 2017-12-18 ENCOUNTER — OFFICE VISIT (OUTPATIENT)
Dept: CARDIOLOGY | Age: 82
End: 2017-12-18
Payer: MEDICARE

## 2017-12-18 VITALS
HEIGHT: 60 IN | WEIGHT: 128.2 LBS | HEART RATE: 80 BPM | BODY MASS INDEX: 25.17 KG/M2 | DIASTOLIC BLOOD PRESSURE: 60 MMHG | SYSTOLIC BLOOD PRESSURE: 136 MMHG

## 2017-12-18 DIAGNOSIS — E78.5 HYPERLIPIDEMIA, UNSPECIFIED HYPERLIPIDEMIA TYPE: ICD-10-CM

## 2017-12-18 DIAGNOSIS — I49.3 PVC'S (PREMATURE VENTRICULAR CONTRACTIONS): ICD-10-CM

## 2017-12-18 DIAGNOSIS — I10 ESSENTIAL HYPERTENSION: ICD-10-CM

## 2017-12-18 DIAGNOSIS — E11.69 TYPE 2 DIABETES MELLITUS WITH OTHER SPECIFIED COMPLICATION, WITHOUT LONG-TERM CURRENT USE OF INSULIN (HCC): ICD-10-CM

## 2017-12-18 DIAGNOSIS — I48.19 PERSISTENT ATRIAL FIBRILLATION (HCC): ICD-10-CM

## 2017-12-18 DIAGNOSIS — I34.0 MODERATE MITRAL REGURGITATION: ICD-10-CM

## 2017-12-18 DIAGNOSIS — Z95.2 S/P TAVR (TRANSCATHETER AORTIC VALVE REPLACEMENT): Primary | ICD-10-CM

## 2017-12-18 DIAGNOSIS — I50.22 CHRONIC SYSTOLIC CONGESTIVE HEART FAILURE (HCC): ICD-10-CM

## 2017-12-18 DIAGNOSIS — I89.0 LYMPHEDEMA OF ARM: ICD-10-CM

## 2017-12-18 PROCEDURE — G8484 FLU IMMUNIZE NO ADMIN: HCPCS | Performed by: INTERNAL MEDICINE

## 2017-12-18 PROCEDURE — G8400 PT W/DXA NO RESULTS DOC: HCPCS | Performed by: INTERNAL MEDICINE

## 2017-12-18 PROCEDURE — 1123F ACP DISCUSS/DSCN MKR DOCD: CPT | Performed by: INTERNAL MEDICINE

## 2017-12-18 PROCEDURE — G8598 ASA/ANTIPLAT THER USED: HCPCS | Performed by: INTERNAL MEDICINE

## 2017-12-18 PROCEDURE — 1090F PRES/ABSN URINE INCON ASSESS: CPT | Performed by: INTERNAL MEDICINE

## 2017-12-18 PROCEDURE — 4040F PNEUMOC VAC/ADMIN/RCVD: CPT | Performed by: INTERNAL MEDICINE

## 2017-12-18 PROCEDURE — G8427 DOCREV CUR MEDS BY ELIG CLIN: HCPCS | Performed by: INTERNAL MEDICINE

## 2017-12-18 PROCEDURE — 1036F TOBACCO NON-USER: CPT | Performed by: INTERNAL MEDICINE

## 2017-12-18 PROCEDURE — 99213 OFFICE O/P EST LOW 20 MIN: CPT | Performed by: INTERNAL MEDICINE

## 2017-12-18 PROCEDURE — G8419 CALC BMI OUT NRM PARAM NOF/U: HCPCS | Performed by: INTERNAL MEDICINE

## 2017-12-18 PROCEDURE — 1111F DSCHRG MED/CURRENT MED MERGE: CPT | Performed by: INTERNAL MEDICINE

## 2017-12-18 RX ORDER — METOPROLOL SUCCINATE 100 MG/1
100 TABLET, EXTENDED RELEASE ORAL DAILY
Qty: 90 TABLET | Refills: 3 | Status: SHIPPED | OUTPATIENT
Start: 2017-12-18 | End: 2018-12-01 | Stop reason: SDUPTHER

## 2017-12-18 RX ORDER — SPIRONOLACTONE 25 MG/1
25 TABLET ORAL DAILY
Qty: 90 TABLET | Refills: 3 | Status: SHIPPED | OUTPATIENT
Start: 2017-12-18 | End: 2018-12-01 | Stop reason: SDUPTHER

## 2017-12-18 NOTE — PROGRESS NOTES
Cardiology Consultation  [facility]    Patient is here for follow up:    HPI and Chief Complaint:  [place]  Is here for follow up. S/p TAVR for severe AS. Noted to have significant LV dysfunction on Echo prior to D/C. Sunithaenly no cp, no sob, no le edema, no orthopnea,no pnd. Has hemorrhoids and had 2-3 episodes of bleeding while constipated, currently not constipated. Past Medical:  Past Medical History:   Diagnosis Date    Allergic rhinitis     With chronic cough.  Anxiety, generalized     Chronic with probable depression. She will take Ativan but refuses antidepressant.  Aortic stenosis     Atrial fibrillation (HCC)     Breast cancer ([facility]     2 occurrences    CAD (coronary artery disease) November 2012    Minimal disease by catheterization, 11/12, with wedge pressure of the right heart. She is taking Lasix for this and being followed by Cardiology.  Diabetic neuropathy (HCC)     Hyperlipidemia     Hypertension     runs low    Lymphedema of arm     Left arm, due to lymph node dissection and mastectomy.  PVC's (premature ventricular contractions)     Chronic. Patient currently undergoing workup for arrhythmia.  S/P cardiac cath 09/06/2016    Type II or unspecified type diabetes mellitus without mention of complication, not stated as uncontrolled     Vitamin D deficiency        Past Surgical:  Past Surgical History:   Procedure Laterality Date    CARDIAC CATHETERIZATION  November 2012    Showed minimal CAD with increased wedge pressure of the right heart. Patient saw Cardiology and started on Lasix.  CARDIAC CATHETERIZATION  09/19/2017    right and left heart cath   EF 30% WITH PATENT LAD STENT    COLONOSCOPY  08/12/2009    diverticulosis being found.     COLONOSCOPY  9/8/14    grade 2-3 internal hemorrhoids - banded x 2, random biopsies taken to r/o - normal, repeat 5yrs due to hx of polyps- garber    CORONARY ANGIOPLASTY WITH STENT PLACEMENT  09/2016 cooperative with exam  HEENT: Head: Normocephalic, no lesions, without obvious abnormality. Neck: no carotid bruit, no JVD  Lungs: clear to auscultation bilaterally  Heart: regular rate and rhythm, S1, S2 normal, 2/6 sys Murmur RUSB. Abdomen: soft, non-tender; bowel sounds normal; no masses,  no organomegaly  Extremities: extremities normal, atraumatic, no cyanosis or edema  Neurologic: Mental status: Alert, oriented, thought content appropriate    Labs:  Lab Results   Component Value Date    CHOL 220 (H) 10/27/2017    CHOL 297 (H) 04/06/2016    CHOL 220 01/06/2016     Lab Results   Component Value Date    TRIG 169 (H) 10/27/2017    TRIG 276 (H) 04/06/2016    TRIG 133 01/06/2016     Lab Results   Component Value Date    HDL 67 10/27/2017    HDL 75 04/06/2016    HDL 55 01/06/2016     Lab Results   Component Value Date    LDLCHOLESTEROL 119 10/27/2017    LDLCHOLESTEROL 167 (H) 04/06/2016    LDLCHOLESTEROL 170 (H) 10/20/2015    LDLCALC 124 01/06/2016     Lab Results   Component Value Date    VLDL NOT REPORTED 10/27/2017    VLDL 55 (H) 04/06/2016    VLDL 35 (H) 10/20/2015     Lab Results   Component Value Date    CHOLHDLRATIO 3.3 10/27/2017    CHOLHDLRATIO 4.0 04/06/2016    CHOLHDLRATIO 3.4 10/20/2015       Lab Results   Component Value Date     12/09/2017    K 3.9 12/09/2017     12/09/2017    CO2 24 12/09/2017    BUN 15 12/09/2017    CREATININE 0.73 12/09/2017    GLUCOSE 252 (H) 12/09/2017    CALCIUM 8.2 (L) 12/09/2017    PROT 6.0 (L) 12/08/2017    LABALBU 3.4 (L) 12/08/2017    BILITOT 0.62 12/08/2017    ALKPHOS 69 12/08/2017    AST 23 12/08/2017    ALT 13 12/08/2017    LABGLOM >60 12/09/2017    GFRAA >60 12/09/2017       EKG: Normal Sinus Rhythm with no ischemic changes.   Results for orders placed or performed during the hospital encounter of 12/07/17   EKG 12 Lead   Result Value Ref Range    Ventricular Rate 77 BPM    Atrial Rate 77 BPM    P-R Interval 200 ms    QRS Duration 136 ms    Q-T Interval 456

## 2017-12-20 ENCOUNTER — CARE COORDINATION (OUTPATIENT)
Dept: CARE COORDINATION | Age: 82
End: 2017-12-20

## 2017-12-20 ENCOUNTER — TELEPHONE (OUTPATIENT)
Dept: FAMILY MEDICINE CLINIC | Age: 82
End: 2017-12-20

## 2017-12-20 ENCOUNTER — OFFICE VISIT (OUTPATIENT)
Dept: FAMILY MEDICINE CLINIC | Age: 82
End: 2017-12-20
Payer: MEDICARE

## 2017-12-20 VITALS
HEART RATE: 79 BPM | BODY MASS INDEX: 24.81 KG/M2 | SYSTOLIC BLOOD PRESSURE: 92 MMHG | OXYGEN SATURATION: 98 % | WEIGHT: 126.4 LBS | TEMPERATURE: 98 F | HEIGHT: 60 IN | DIASTOLIC BLOOD PRESSURE: 62 MMHG

## 2017-12-20 DIAGNOSIS — I50.22 CHRONIC SYSTOLIC CONGESTIVE HEART FAILURE (HCC): Primary | ICD-10-CM

## 2017-12-20 DIAGNOSIS — Z95.2 S/P TAVR (TRANSCATHETER AORTIC VALVE REPLACEMENT): Primary | ICD-10-CM

## 2017-12-20 DIAGNOSIS — R11.0 CHRONIC NAUSEA: ICD-10-CM

## 2017-12-20 PROCEDURE — 1111F DSCHRG MED/CURRENT MED MERGE: CPT | Performed by: FAMILY MEDICINE

## 2017-12-20 PROCEDURE — 99495 TRANSJ CARE MGMT MOD F2F 14D: CPT | Performed by: FAMILY MEDICINE

## 2017-12-20 ASSESSMENT — ENCOUNTER SYMPTOMS
CHEST TIGHTNESS: 0
WHEEZING: 0
CONSTIPATION: 0
COLOR CHANGE: 0
BLOOD IN STOOL: 0
COUGH: 0
ABDOMINAL PAIN: 0
SHORTNESS OF BREATH: 1
NAUSEA: 0

## 2017-12-20 NOTE — PROGRESS NOTES
Post-Discharge Transitional Care Management Services      [address]   YOB: 1935    Date of Office Visit:  12/20/2017  Date of Hospital Admission: 12/7/17  Date of Hospital Discharge: 12/9/17  [facility] Risk Score [risk of hospital readmission >=10  medium risk (chance of readmission ~ 12%) >14  high risk (chance of readmission ~18%)]:Risk Score: 23.5    Care management risk score Rising risk (score 2-5) and Complex Care (Scores >=6): 7       Patient Active Problem List   Diagnosis    Hyperlipidemia    Hypertension    [facility]    [facility]    Lymphedema of arm    Vitamin D deficiency    Hyperplastic colon polyp    PVC's (premature ventricular contractions)    Allergic rhinitis    Anxiety, generalized    CHF (congestive heart failure) ([facility]    DM (diabetes mellitus), type 2 ([facility]    Coronary artery disease involving native coronary artery of native heart    Persistent atrial fibrillation ([facility]    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 Kayode Cordero    Chronic nausea    Moderate mitral regurgitation       Allergies   Allergen Reactions    Darvocet A500 [Propoxyphene N-Acetaminophen] Other (See Comments)     weakness    Demerol Hcl [Meperidine] Other (See Comments)     Weakness      Marinol [Dronabinol] Other (See Comments)     weakness    Morphine Sulfate [Morphine] Other (See Comments)     Weakness      Statins [Statins] Other (See Comments)     Cause muscle pain and she would prefer not to take them any longer.     Levaquin [Levofloxacin In D5w] Nausea Only and Anxiety       Medications listed as ordered at the time of discharge from hospital   [place] [address] Medication Instructions ALIZA:    Printed on:12/20/17 1123   Medication Information                      b complex vitamins capsule  Take 1 capsule by mouth every other day Indications: overy other day              clopidogrel (PLAVIX) 75 MG tablet  Take 1 tablet by mouth daily (with breakfast) 90 tablet 3    famotidine (PEPCID) 20 MG tablet TAKE ONE TABLET BY MOUTH EVERY EVENING 60 tablet 5    XARELTO 20 MG TABS tablet TAKE ONE TABLET BY MOUTH DAILY WITH BREAKFAST 30 tablet 5    clopidogrel (PLAVIX) 75 MG tablet Take 1 tablet by mouth daily 90 tablet 3    furosemide (LASIX) 20 MG tablet Take 1 tablet by mouth daily 90 tablet 3    ondansetron (ZOFRAN) 4 MG tablet Take 1 tablet by mouth every 8 hours as needed for Nausea or Vomiting 40 tablet 2    lisinopril (PRINIVIL;ZESTRIL) 2.5 MG tablet TAKE ONE TABLET BY MOUTH DAILY 90 tablet 3    glucose blood VI test strips (FREESTYLE TEST STRIPS) strip 1 each by In Vitro route daily As needed. 100 each 1    b complex vitamins capsule Take 1 capsule by mouth every other day Indications: overy other day       Vitamin D (CHOLECALCIFEROL) 1000 UNITS CAPS capsule Take 1,000 Units by mouth daily. Medications patient taking as of now reconciled against medications ordered at time of hospital discharge    Vitals:    12/20/17 1020   BP: 92/62   Pulse: 79   Temp: 98 °F (36.7 °C)   TempSrc: Tympanic   SpO2: 98%   Weight: 126 lb 6.4 oz (57.3 kg)   Height: 5' (1.524 m)     Body mass index is 24.69 kg/m². Wt Readings from Last 3 Encounters:   12/20/17 126 lb 6.4 oz (57.3 kg)   12/18/17 128 lb 3.2 oz (58.2 kg)   12/09/17 129 lb 13.6 oz (58.9 kg)     BP Readings from Last 3 Encounters:   12/20/17 92/62   12/18/17 136/60   12/09/17 (!) 106/41        Inpatient course: Discharge summary reviewed- see chart. Chief Complaint   Patient presents with    Follow-Up from [facility] 12-9-17 dx TAVR seen cardiology Monday- was started on Metoprolol and was concerned about her low bp- she forgot to ask the cardiologist       HPI   Here today for a hospital follow up. She was in the hospital for a TAVR. She was discharged on 12/9/17 and she has been doing well since she has been home.  She saw cardiology on 12/18 and they were happy with her progress. She is a little discouraged because her heart function has decreased since the procedure. She was constipated shortly after her surgery. When she finally had a BM there was a lot of blood in the stool. She had one other bowel movement with some blood in it but since she has stopped the aspirin that has resolved. She does have a history of hemorrhoids. She is taking an ativan with her meals now. She has discovered that it seems to help with her nausea and the \"drawing feeling\". She is not taking the reglan and she is very happy with how she is doing. Review of Systems   Constitutional: Positive for fatigue. Negative for activity change, appetite change, chills and fever. Eyes: Negative for visual disturbance. Respiratory: Positive for shortness of breath. Negative for cough, chest tightness and wheezing. Cardiovascular: Negative for chest pain, palpitations and leg swelling. Gastrointestinal: Negative for abdominal pain, blood in stool (resolved when she stopped the aspirin.), constipation (resolved) and nausea. Genitourinary: Negative for difficulty urinating. Skin: Negative for color change and rash. Neurological: Negative for dizziness, syncope, weakness and light-headedness. Psychiatric/Behavioral: Negative for decreased concentration, dysphoric mood and sleep disturbance. The patient is not nervous/anxious (improving since she is taking her ativan TID.). Non face to face  following discharge, date last encounter closed (first attempt may have been earlier): 12/12/2017 10:44 AM 12/12/2017 10:44 AM    Call initiated 2 business days of discharge: Yes     Interval history/Current status: improving      Physical Exam   Constitutional: She is oriented to person, place, and time. She appears well-developed and well-nourished. No distress. Eyes: Conjunctivae and EOM are normal. Pupils are equal, round, and reactive to light. Neck: Normal range of motion.

## 2017-12-20 NOTE — CARE COORDINATION
Ace Forbes DO   LORazepam (ATIVAN) 0.5 MG tablet TAKE ONE TABLET BY MOUTH THREE TIMES A DAY AS NEEDED FOR ANXIETY 11/21/17   Mariela Turcios MD   metFORMIN (GLUCOPHAGE XR) 500 MG extended release tablet Take 1 tablet by mouth daily (with breakfast) 11/3/17   Mariela Turcios MD   famotidine (PEPCID) 20 MG tablet TAKE ONE TABLET BY MOUTH EVERY EVENING 10/27/17   Mariela Turcios MD   nitroGLYCERIN (NITROSTAT) 0.4 MG SL tablet Place 1 tablet under the tongue every 5 minutes as needed for Chest pain up to max of 3 total doses. If no relief after 1 dose, call 911. 9/20/17   Cat Short CNP   XARELTO 20 MG TABS tablet TAKE ONE TABLET BY MOUTH DAILY WITH BREAKFAST 8/25/17   Mariela Turcios MD   clopidogrel (PLAVIX) 75 MG tablet Take 1 tablet by mouth daily 7/26/17   Sophia Gray MD   furosemide (LASIX) 20 MG tablet Take 1 tablet by mouth daily 7/26/17   Sophia Gray MD   melatonin 3 MG TABS tablet Take 3 mg by mouth nightly as needed    Historical Provider, MD   ondansetron (ZOFRAN) 4 MG tablet Take 1 tablet by mouth every 8 hours as needed for Nausea or Vomiting 5/30/17   Mariela Turcios MD   lisinopril (PRINIVIL;ZESTRIL) 2.5 MG tablet TAKE ONE TABLET BY MOUTH DAILY 2/3/17   Mariela Turcios MD   glucose blood VI test strips (FREESTYLE TEST STRIPS) strip 1 each by In Vitro route daily As needed. 12/13/16   Mariela Turcios MD   b complex vitamins capsule Take 1 capsule by mouth every other day Indications: overy other day     Historical Provider, MD   Vitamin D (CHOLECALCIFEROL) 1000 UNITS CAPS capsule Take 1,000 Units by mouth daily.     Historical Provider, MD       Future Appointments  Date Time Provider Destinee Gaffney   1/15/2018 10:00 AM SCHEDULE, ECHO 238 [facility] ECHO [place]   1/22/2018 9:15 AM MD Anmol Horner [facility]   4/26/2018 9:15 AM SCHEDULE, Sam Grandemar 112 LAB 8049 [facility] LAB [place]   5/2/2018 1:00 PM Ursula Azevedo MD [facility]   5/3/2018 11:00 AM [facility]

## 2017-12-21 ENCOUNTER — CARE COORDINATION (OUTPATIENT)
Dept: CASE MANAGEMENT | Age: 82
End: 2017-12-21

## 2017-12-21 NOTE — CARE COORDINATION
Kaushik 45 Transitions Follow Up Call    2017    Patient: Selina Miles  Patient : 1935   MRN: [id number]  Reason for Admission: There are no discharge diagnoses documented for the most recent discharge. Discharge Date: 17 RARS: Risk Score: 23.5       Spoke with: Jazlyn Alexander    Patient states she is feeling well today. She denies any chest pains, shortness of breath, cough. She feels a little fatigued but otherwise is doing well. She was seen by cardiology on Monday and was told that her heart was a little weaker since surgery so medication changes made, ASA stopped and Metoprolol and Aldactone added. She is to have a repeat echo on 1/15. She was seen by her PCP yesterday, no new changes. Denies any needs or concerns. Denies any further bleeding with bm. Care Transitions Subsequent and Final Call    Subsequent and Final Calls  Care Transitions Interventions  Other Interventions:             Follow Up  Future Appointments  Date Time Provider Destinee Gaffney   1/15/2018 10:00 AM SCHEDULE, ECHO 238 [facility] ECHO [place]   2018 9:15 AM MD Kathryn Hart [facility]   2018 9:15 AM SCHEDULE, Sam Grandemar 112 LAB 8049 [facility] LAB [place]   2018 1:00 PM [facility]   5/3/2018 11:00 AM Germain Frost MD [facility]       Edy Hurley RN

## 2017-12-27 ENCOUNTER — CARE COORDINATION (OUTPATIENT)
Dept: CASE MANAGEMENT | Age: 82
End: 2017-12-27

## 2017-12-27 NOTE — CARE COORDINATION
Paresh 45 Transitions Final Call    2017    Patient: Ranjana Cain  Patient : 1935   MRN: [id number]  Reason for Admission: S/P TAVR  Discharge Date: 17 RARS: Risk Score: 23.5       Spoke with: Joseph Megha    Patient feeling well today. She denies having any issues with chest pains, shortness of breath, swelling or other cardiac issues. She weighs her self daily and is averaging around 124 lbs. She has had her f/u appointments and is not having any issues. Repeat echo to be done 1/15, she was reminded of appointment and to f/u with cardiology on . Denies any ongoing needs. Patient follows with [facility], Samuel Tafoya. Will route note to [facility] that care transitions is complete. Care Transitions Subsequent and Final Call    Subsequent and Final Calls  Do you have any ongoing symptoms?:  No  Have your medications changed?:  No  Do you have any questions related to your medications?:  No  Do you currently have any active services?:  No  Do you have any needs or concerns that I can assist you with?:  No  Identified Barriers:  Lack of Education, Lack of Support  Care Transitions Interventions  Other Interventions:             Follow Up  Future Appointments  Date Time Provider Destinee Gaffney   1/15/2018 10:00 AM SCHEDULE, ECHO 238 [facility] ECHO [place]   2018 9:15 AM MD Sue Carrion [facility]   2018 9:15 AM SCHEDULE, Sam Katz [address] 112 LAB [facility] LAB [place]   2018 1:00 PM MD ADAMS RodriguezM [facility]   5/3/2018 11:00 AM MD COSME GonzalesAM [facility]       Ruperto Munoz, NOELLE

## 2017-12-28 ENCOUNTER — CARE COORDINATION (OUTPATIENT)
Dept: CARE COORDINATION | Age: 82
End: 2017-12-28

## 2017-12-28 NOTE — CARE COORDINATION
release tablet Take 1 tablet by mouth daily 12/18/17   Ace Forbes DO   spironolactone (ALDACTONE) 25 MG tablet Take 1 tablet by mouth daily 12/18/17   Ace Forbes DO   LORazepam (ATIVAN) 0.5 MG tablet TAKE ONE TABLET BY MOUTH THREE TIMES A DAY AS NEEDED FOR ANXIETY 11/21/17   Kadie Meier MD   metFORMIN (GLUCOPHAGE XR) 500 MG extended release tablet Take 1 tablet by mouth daily (with breakfast) 11/3/17   Kadie Meier MD   famotidine (PEPCID) 20 MG tablet TAKE ONE TABLET BY MOUTH EVERY EVENING 10/27/17   Kadie Meier MD   nitroGLYCERIN (NITROSTAT) 0.4 MG SL tablet Place 1 tablet under the tongue every 5 minutes as needed for Chest pain up to max of 3 total doses. If no relief after 1 dose, call 911. 9/20/17   Bryant Araya CNP   XARELTO 20 MG TABS tablet TAKE ONE TABLET BY MOUTH DAILY WITH BREAKFAST 8/25/17   Kadie Meier MD   clopidogrel (PLAVIX) 75 MG tablet Take 1 tablet by mouth daily 7/26/17   Erick Lewis MD   furosemide (LASIX) 20 MG tablet Take 1 tablet by mouth daily 7/26/17   Erick Lewis MD   melatonin 3 MG TABS tablet Take 3 mg by mouth nightly as needed    Historical Provider, MD   ondansetron (ZOFRAN) 4 MG tablet Take 1 tablet by mouth every 8 hours as needed for Nausea or Vomiting 5/30/17   Kadie Meier MD   lisinopril (PRINIVIL;ZESTRIL) 2.5 MG tablet TAKE ONE TABLET BY MOUTH DAILY 2/3/17   Kadie Meier MD   glucose blood VI test strips (FREESTYLE TEST STRIPS) strip 1 each by In Vitro route daily As needed. 12/13/16   Kadie Meier MD   b complex vitamins capsule Take 1 capsule by mouth every other day Indications: overy other day     Historical Provider, MD   Vitamin D (CHOLECALCIFEROL) 1000 UNITS CAPS capsule Take 1,000 Units by mouth daily.     Historical Provider, MD       Future Appointments  Date Time Provider Destinee Gaffney   1/15/2018 10:00 AM SCHEDULE, ECHO 238 [facility] ECHO [place]   1/22/2018 9:15 AM Amirah Herron MD [facility]   4/26/2018 9:15

## 2018-01-15 ENCOUNTER — HOSPITAL ENCOUNTER (OUTPATIENT)
Dept: NON INVASIVE DIAGNOSTICS | Age: 83
Discharge: HOME | End: 2018-01-15
Payer: MEDICARE

## 2018-01-15 DIAGNOSIS — Z95.2 PRESENCE OF PROSTHETIC HEART VALVE: ICD-10-CM

## 2018-01-15 DIAGNOSIS — E11.69 TYPE 2 DIABETES MELLITUS WITH OTHER SPECIFIED COMPLICATION, WITHOUT LONG-TERM CURRENT USE OF INSULIN (HCC): ICD-10-CM

## 2018-01-15 PROCEDURE — 93306 TTE W/DOPPLER COMPLETE: CPT

## 2018-01-15 RX ORDER — BLOOD SUGAR DIAGNOSTIC
STRIP MISCELLANEOUS
Qty: 100 STRIP | Refills: 3 | Status: SHIPPED | OUTPATIENT
Start: 2018-01-15 | End: 2018-11-05 | Stop reason: SDUPTHER

## 2018-01-16 ENCOUNTER — TELEPHONE (OUTPATIENT)
Dept: FAMILY MEDICINE CLINIC | Age: 83
End: 2018-01-16

## 2018-01-16 NOTE — PROCEDURES
Ethan [address]                   [address] [place] [id number]                                  ECHOCARDIOGRAM    PATIENT NAME: Nathan Parker                 :        1935  MED REC NO:   [id number]                             ROOM:  ACCOUNT NO:   [de-identified]                           ADMIT DATE: 01/15/2018  PROVIDER:     Gustavo Pack    DATE OF PROCEDURE:  01/15/2018    ORDERING PROVIDER:  Priyanka Stoll MD    PRIMARY CARE PROVIDER:   John Pride MD    CLINICAL DIAGNOSIS:  TAVR     M-MODE/SECTOR MEASUREMENTS:   (*Measurement made in subxiphoid view)    1. LVEDD (3.7-5.6cm<3.2cm/m2)     4.6  2. LVESD (2.2-4.0cm)    4.1  3. MAS (24-42%)  4. MESS (<9cm)  5. LVIVS thickness (.6-1.2cm)     1/3  6. LVPW thickness (.5-1.0cm) 1.2  7. Estimated mitral valve are by Planimetry  8. Blood pressure  9. LA (1.9-4.0cm<2.2cm/m2)   4.6  10.  RVIDD (.7-2.6cm<1.4cm/m2)  11.  RVW (.3-.9cm)  12.  Pulmonary artery  13. LVOT diameter  14. Ao Root (2.0-3.7<2.2cm/m2)    3.1  15. Heart rate  16. LV Mass - Diastolic (912ME)  17. LV Mass - Systolic (069AE)  18. Estimated Ejection Fraction (Graham's) 40%-45%  19. IVC Collapse   yes  20. Diameter (0.9-1.5 cm)  21. LA Volume Index  22. LA Volume  23. RVSP 30    SUMMARY:  1. Left ventricular dimensions are within normal diameter, LV ejection  fraction 40 to 45% range. 2.  Grade 1 diastolic dysfunction. 3.  Mild to moderate left ventricular hypertrophy. 4.  Left atrial enlargement noted. 5.  Normal right ventricular size and systolic function. 6.  Mitral annular calcification with mild mitral regurgitation. 7.  Status post TAVR, mean gradient of 2 mmHg. No aortic valve  regurgitation noted. 8.  Trivial tricuspid regurgitation, right ventricular systolic pressure 30  mmHg. 9.  No pericardial effusion.         HUBERT CHAVEZ    D: 01/15/2018 14:27:47       T: 01/15/2018 15:32:54     [email or web address]  Job#: [id number]

## 2018-01-22 ENCOUNTER — OFFICE VISIT (OUTPATIENT)
Dept: CARDIOLOGY | Age: 83
End: 2018-01-22
Payer: MEDICARE

## 2018-01-22 VITALS
DIASTOLIC BLOOD PRESSURE: 70 MMHG | HEIGHT: 60 IN | SYSTOLIC BLOOD PRESSURE: 130 MMHG | BODY MASS INDEX: 25.01 KG/M2 | HEART RATE: 80 BPM | WEIGHT: 127.4 LBS

## 2018-01-22 DIAGNOSIS — I10 ESSENTIAL HYPERTENSION: ICD-10-CM

## 2018-01-22 DIAGNOSIS — I25.10 CORONARY ARTERY DISEASE INVOLVING NATIVE CORONARY ARTERY OF NATIVE HEART WITHOUT ANGINA PECTORIS: ICD-10-CM

## 2018-01-22 DIAGNOSIS — I48.0 PAROXYSMAL ATRIAL FIBRILLATION (HCC): Primary | ICD-10-CM

## 2018-01-22 DIAGNOSIS — Z95.2 S/P TAVR (TRANSCATHETER AORTIC VALVE REPLACEMENT): ICD-10-CM

## 2018-01-22 PROCEDURE — 99213 OFFICE O/P EST LOW 20 MIN: CPT | Performed by: INTERNAL MEDICINE

## 2018-01-22 PROCEDURE — 93000 ELECTROCARDIOGRAM COMPLETE: CPT | Performed by: INTERNAL MEDICINE

## 2018-01-22 NOTE — PROGRESS NOTES
[address]  is here for follow up. She was having hemorrhoidal bleeding which stopped after discontinuation of ASA. She is on plavix and xarelto. She denies any chest pain and dyspnea. She reports more energy. Past Medical History:   Diagnosis Date    Allergic rhinitis     With chronic cough.  Anxiety, generalized     Chronic with probable depression. She will take Ativan but refuses antidepressant.  Aortic stenosis     Atrial fibrillation (HCC)     Breast cancer ([address]     2 occurrences    CAD (coronary artery disease) November 2012    Minimal disease by catheterization, 11/12, with wedge pressure of the right heart. She is taking Lasix for this and being followed by Cardiology.  Diabetic neuropathy (HCC)     Hyperlipidemia     Hypertension     runs low    Lymphedema of arm     Left arm, due to lymph node dissection and mastectomy.  PVC's (premature ventricular contractions)     Chronic. Patient currently undergoing workup for arrhythmia.  S/P cardiac cath 09/06/2016    Type II or unspecified type diabetes mellitus without mention of complication, not stated as uncontrolled     Vitamin D deficiency        Past Surgical History:   Procedure Laterality Date    CARDIAC CATHETERIZATION  November 2012    Showed minimal CAD with increased wedge pressure of the right heart. Patient saw Cardiology and started on Lasix.  CARDIAC CATHETERIZATION  09/19/2017    right and left heart cath   EF 30% WITH PATENT LAD STENT    COLONOSCOPY  08/12/2009    diverticulosis being found.  COLONOSCOPY  9/8/14    grade 2-3 internal hemorrhoids - banded x 2, random biopsies taken to r/o - normal, repeat 5yrs due to hx of polyps- garber    CORONARY ANGIOPLASTY WITH STENT PLACEMENT  09/2016    PTCA / Drug Eluting Stent:, LAD and / or branches    FRACTURE SURGERY  08/10/99    Left elbow ORIF     HYSTERECTOMY  09/09/2002    With bladder repair for benign reasons.    

## 2018-01-25 ENCOUNTER — CARE COORDINATION (OUTPATIENT)
Dept: CARE COORDINATION | Age: 83
End: 2018-01-25

## 2018-01-25 NOTE — LETTER
[address] [facility]  [address], [address]    Celestine Olivas RN        January 25, 2018    [address] [place] [address]      Dear Earl Prieto: This is Ozzie Bradley, the care coordinator at the clinic. I have been unable to reach you by phone. I hope you are doing well. Please call me at [phone]. Be well.      Sincerely,        Celestine Olivas RN

## 2018-01-29 ENCOUNTER — OFFICE VISIT (OUTPATIENT)
Dept: FAMILY MEDICINE CLINIC | Age: 83
End: 2018-01-29
Payer: MEDICARE

## 2018-01-29 ENCOUNTER — HOSPITAL ENCOUNTER (OUTPATIENT)
Dept: GENERAL RADIOLOGY | Age: 83
Discharge: HOME | End: 2018-01-29
Payer: MEDICARE

## 2018-01-29 VITALS
OXYGEN SATURATION: 98 % | SYSTOLIC BLOOD PRESSURE: 96 MMHG | DIASTOLIC BLOOD PRESSURE: 62 MMHG | BODY MASS INDEX: 25.17 KG/M2 | WEIGHT: 128.2 LBS | HEART RATE: 74 BPM | HEIGHT: 60 IN | TEMPERATURE: 98.8 F
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
## 2018-01-29 DIAGNOSIS — S63.502A WRIST SPRAIN, LEFT, INITIAL ENCOUNTER: Primary | ICD-10-CM

## 2018-01-29 DIAGNOSIS — J01.90 ACUTE BACTERIAL SINUSITIS: ICD-10-CM

## 2018-01-29 DIAGNOSIS — M25.532 ACUTE PAIN OF LEFT WRIST: ICD-10-CM

## 2018-01-29 DIAGNOSIS — B96.89 ACUTE BACTERIAL SINUSITIS: ICD-10-CM

## 2018-01-29 PROCEDURE — 4040F PNEUMOC VAC/ADMIN/RCVD: CPT | Performed by: FAMILY MEDICINE

## 2018-01-29 PROCEDURE — G8484 FLU IMMUNIZE NO ADMIN: HCPCS | Performed by: FAMILY MEDICINE

## 2018-01-29 PROCEDURE — G8598 ASA/ANTIPLAT THER USED: HCPCS | Performed by: FAMILY MEDICINE

## 2018-01-29 PROCEDURE — 1036F TOBACCO NON-USER: CPT | Performed by: FAMILY MEDICINE

## 2018-01-29 PROCEDURE — 1123F ACP DISCUSS/DSCN MKR DOCD: CPT | Performed by: FAMILY MEDICINE

## 2018-01-29 PROCEDURE — G8427 DOCREV CUR MEDS BY ELIG CLIN: HCPCS | Performed by: FAMILY MEDICINE

## 2018-01-29 PROCEDURE — G8419 CALC BMI OUT NRM PARAM NOF/U: HCPCS | Performed by: FAMILY MEDICINE

## 2018-01-29 PROCEDURE — 99214 OFFICE O/P EST MOD 30 MIN: CPT | Performed by: FAMILY MEDICINE

## 2018-01-29 PROCEDURE — 73110 X-RAY EXAM OF WRIST: CPT

## 2018-01-29 PROCEDURE — G8400 PT W/DXA NO RESULTS DOC: HCPCS | Performed by: FAMILY MEDICINE

## 2018-01-29 PROCEDURE — 1090F PRES/ABSN URINE INCON ASSESS: CPT | Performed by: FAMILY MEDICINE

## 2018-01-29 RX ORDER — METFORMIN HYDROCHLORIDE 500 MG/1
500 TABLET, EXTENDED RELEASE ORAL 2 TIMES DAILY
Qty: 180 TABLET | Refills: 3 | Status: SHIPPED | OUTPATIENT
Start: 2018-01-29 | End: 2018-07-29 | Stop reason: SINTOL

## 2018-01-29 RX ORDER — AMOXICILLIN 500 MG/1
500 CAPSULE ORAL 2 TIMES DAILY
Qty: 20 CAPSULE | Refills: 0 | Status: SHIPPED | OUTPATIENT
Start: 2018-01-29 | End: 2018-03-29 | Stop reason: ALTCHOICE

## 2018-01-29 ASSESSMENT — ENCOUNTER SYMPTOMS
SHORTNESS OF BREATH: 0
WHEEZING: 0
CHEST TIGHTNESS: 0
NAUSEA: 1
DIARRHEA: 0
TROUBLE SWALLOWING: 0
SORE THROAT: 0
CHOKING: 0
SINUS PRESSURE: 0
CONSTIPATION: 0
RHINORRHEA: 1
COUGH: 1

## 2018-01-29 NOTE — PROGRESS NOTES
day.     Past Medical History:   Diagnosis Date    Allergic rhinitis     With chronic cough.  Anxiety, generalized     Chronic with probable depression. She will take Ativan but refuses antidepressant.  Aortic stenosis     Atrial fibrillation (HCC)     Breast cancer ([address]     2 occurrences    CAD (coronary artery disease) November 2012    Minimal disease by catheterization, 11/12, with wedge pressure of the right heart. She is taking Lasix for this and being followed by Cardiology.  Diabetic neuropathy (HCC)     Hyperlipidemia     Hypertension     runs low    Lymphedema of arm     Left arm, due to lymph node dissection and mastectomy.  PVC's (premature ventricular contractions)     Chronic. Patient currently undergoing workup for arrhythmia.     S/P cardiac cath 09/06/2016    Type II or unspecified type diabetes mellitus without mention of complication, not stated as uncontrolled     Vitamin D deficiency           Social History   Substance Use Topics    Smoking status: Passive Smoke Exposure - Never Smoker    Smokeless tobacco: Never Used      Comment: Octavio Cai RRT 10/18/16    Alcohol use No      Current Outpatient Prescriptions   Medication Sig Dispense Refill    metFORMIN (GLUCOPHAGE XR) 500 MG extended release tablet Take 1 tablet by mouth 2 times daily 180 tablet 3    amoxicillin (AMOXIL) 500 MG capsule Take 1 capsule by mouth 2 times daily 20 capsule 0    metoprolol succinate (TOPROL XL) 100 MG extended release tablet Take 1 tablet by mouth daily 90 tablet 3    spironolactone (ALDACTONE) 25 MG tablet Take 1 tablet by mouth daily 90 tablet 3    LORazepam (ATIVAN) 0.5 MG tablet TAKE ONE TABLET BY MOUTH THREE TIMES A DAY AS NEEDED FOR ANXIETY 90 tablet 1    famotidine (PEPCID) 20 MG tablet TAKE ONE TABLET BY MOUTH EVERY EVENING 60 tablet 5    XARELTO 20 MG TABS tablet TAKE ONE TABLET BY MOUTH DAILY WITH BREAKFAST 30 tablet 5    clopidogrel (PLAVIX) 75 MG tablet Take 1 tablet by pain, palpitations and leg swelling. Gastrointestinal: Positive for nausea. Negative for constipation and diarrhea. Musculoskeletal: Positive for arthralgias (left wrist). Skin: Negative for rash. Allergic/Immunologic: Negative for environmental allergies. Neurological: Positive for headaches (just a tiny bit). Negative for tingling and numbness. Objective:     Physical Exam   Constitutional: She is oriented to person, place, and time. She appears well-developed and well-nourished. No distress. HENT:   Right Ear: Tympanic membrane, external ear and ear canal normal.   Left Ear: Tympanic membrane, external ear and ear canal normal.   Nose: Mucosal edema present. Right sinus exhibits no maxillary sinus tenderness and no frontal sinus tenderness. Left sinus exhibits no maxillary sinus tenderness and no frontal sinus tenderness. Mouth/Throat: Oropharynx is clear and moist. No oropharyngeal exudate. Eyes: Conjunctivae and EOM are normal. Pupils are equal, round, and reactive to light. Neck: Normal range of motion. Neck supple. Cardiovascular: Normal rate, regular rhythm, normal heart sounds and intact distal pulses. No murmur heard. Pulmonary/Chest: Effort normal and breath sounds normal. No respiratory distress. She has no wheezes. She has no rales. Musculoskeletal:        Left wrist: She exhibits decreased range of motion, tenderness and bony tenderness. She exhibits no swelling, no effusion, no crepitus and no deformity. Lymphadenopathy:     She has no cervical adenopathy. Neurological: She is alert and oriented to person, place, and time. Skin: Skin is warm and dry. No rash noted. Psychiatric: She has a normal mood and affect. Her behavior is normal. Judgment normal.   Nursing note and vitals reviewed. BP 96/62   Pulse 74   Temp 98.8 °F (37.1 °C) (Tympanic)   Ht 5' (1.524 m)   Wt 128 lb 3.2 oz (58.2 kg)   SpO2 98%   BMI 25.04 kg/m²     Assessment:      1.  Wrist sprain, left, initial encounter     2. Fall at home, initial encounter     3. Acute pain of left wrist  XR WRIST LEFT (MIN 3 VIEWS)   4. Acute bacterial sinusitis               Plan:       Wrist sprain: new; I was concerned about a fracture based on the way she fell but her xray was normal so she was advised to use tylenol, ice and do her range of motion exercises. She was offered a wrist brace but she did not feel like it helped with her discomfort. Xr Wrist Left (min 3 Views)    Result Date: 1/29/2018  EXAMINATION: THREE VIEWS OF THE LEFT WRIST 1/29/2018 10:27 am COMPARISON: Left hand radiographs April 24, 2014 HISTORY: ORDERING SYSTEM PROVIDED HISTORY: Acute pain of left wrist TECHNOLOGIST PROVIDED HISTORY: Reason for exam:->wrist pain after a fall Ordering Physician Provided Reason for Exam: C/o medial wrist pain after fall 1 day ago Acuity: Acute Type of Exam: Initial FINDINGS: Diffuse soft tissue swelling and subcutaneous edema are seen. No acute fracture. Moderate narrowing of the 1st carpometacarpal joint with subchondral sclerosis and osteophytes. Degenerative changes without acute fracture. Sinusitis: New; I will treat with amoxicillin. she was also advised to use flonase, nasal saline and mucinex for her congestion. Return if symptoms worsen or fail to improve.     Orders Placed This Encounter   Procedures    XR WRIST LEFT (MIN 3 VIEWS)     Standing Status:   Future     Number of Occurrences:   1     Standing Expiration Date:   1/29/2019     Order Specific Question:   Reason for exam:     Answer:   wrist pain after a fall     Orders Placed This Encounter   Medications    metFORMIN (GLUCOPHAGE XR) 500 MG extended release tablet     Sig: Take 1 tablet by mouth 2 times daily     Dispense:  180 tablet     Refill:  3    amoxicillin (AMOXIL) 500 MG capsule     Sig: Take 1 capsule by mouth 2 times daily     Dispense:  20 capsule     Refill:  0       Patient given educational materials - see patient

## 2018-02-05 RX ORDER — LISINOPRIL 2.5 MG/1
TABLET ORAL
Qty: 90 TABLET | Refills: 1 | Status: SHIPPED | OUTPATIENT
Start: 2018-02-05 | End: 2018-05-03 | Stop reason: SDUPTHER

## 2018-02-05 RX ORDER — CEPHALEXIN 500 MG/1
CAPSULE ORAL
Qty: 28 CAPSULE | Refills: 0 | OUTPATIENT
Start: 2018-02-05

## 2018-02-09 ENCOUNTER — CARE COORDINATION (OUTPATIENT)
Dept: CARE COORDINATION | Age: 83
End: 2018-02-09

## 2018-02-09 NOTE — CARE COORDINATION
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 She follows with cardiology, pulmonology, and optometrist.    Per chart review:    Lab Results   Component Value Date    LABA1C 7.9 (H) 10/27/2017    LABA1C 7.2 (H) 04/25/2017    LABA1C 6.7 (H) 10/07/2016     Lab Results   Component Value Date     10/27/2017     04/25/2017     10/07/2016       Called and spoke with Vivi. She was on the other phone with son. This writer will return call. Spoke with Vivi. She reported that she had taken her shoes off and then when got up stumbled over them and fell. She saw PCP and wrist was not broken. She stated that her weight is stable- 126#. No swelling feet and ankles, no change in cough. She reported BS fasting running around 127. She tested today ac supper- 125. She is ambulating without assistance. She is walking in house 15 minutes daily in home. Educated on sign, symptoms of flu, treatment- early intervention, prevention-to avoid crowds, hand hygiene, vaccination- Flu- declined, reviewed Clinic and Urgent Care Hours. Instructed to call ACC or office if symptoms arise. Vivi stated she does not have BS readings available at this time. She stated she will call or drop readings off in the mail. Continue education, support, and assessment. Diabetes Assessment    Medic Alert ID:  No  Meal Planning:  Avoidance of concentrated sweets   How often do you test your blood sugar?:  Daily (Comment: could test 3-4 times a day)   Do you have barriers with adherence to non-pharmacologic self-management interventions?  (Nutrition/Exercise/Self-Monitoring):  Yes   Have you ever had to go to capsule by mouth 2 times daily 1/29/18   Anna Huff MD   FREESTYLE TEST STRIPS strip TEST ONCE DAILY AS NEEDED 1/15/18   Anna Huff MD   Handicap Placard MISC by Does not apply route Exp 1/1/2023 12/20/17   Anna Huff MD   metoprolol succinate (TOPROL XL) 100 MG extended release tablet Take 1 tablet by mouth daily 12/18/17   Ace Forbes,    spironolactone (ALDACTONE) 25 MG tablet Take 1 tablet by mouth daily 12/18/17   Ace Fobres DO   LORazepam (ATIVAN) 0.5 MG tablet TAKE ONE TABLET BY MOUTH THREE TIMES A DAY AS NEEDED FOR ANXIETY 11/21/17   Anna Huff MD   famotidine (PEPCID) 20 MG tablet TAKE ONE TABLET BY MOUTH EVERY EVENING 10/27/17   Anna Huff MD   nitroGLYCERIN (NITROSTAT) 0.4 MG SL tablet Place 1 tablet under the tongue every 5 minutes as needed for Chest pain up to max of 3 total doses. If no relief after 1 dose, call 911. 9/20/17   Brittney Alexander CNP   XARELTO 20 MG TABS tablet TAKE ONE TABLET BY MOUTH DAILY WITH BREAKFAST 8/25/17   Anna Huff MD   clopidogrel (PLAVIX) 75 MG tablet Take 1 tablet by mouth daily 7/26/17   Staurt Cruz MD   furosemide (LASIX) 20 MG tablet Take 1 tablet by mouth daily 7/26/17   Stuart Cruz MD   melatonin 3 MG TABS tablet Take 3 mg by mouth nightly as needed    Historical Provider, MD   ondansetron (ZOFRAN) 4 MG tablet Take 1 tablet by mouth every 8 hours as needed for Nausea or Vomiting 5/30/17   Anna Huff MD   b complex vitamins capsule Take 1 capsule by mouth every other day Indications: overy other day     Historical Provider, MD   Vitamin D (CHOLECALCIFEROL) 1000 UNITS CAPS capsule Take 1,000 Units by mouth daily.     Historical Provider, MD       Future Appointments  Date Time Provider Destinee Gaffney   4/26/2018 9:15 AM SCHEDULE, Sam [address] [address] LAB [facility] [place]   5/2/2018 1:00 PM MD ALANNA Bains [facility]   5/3/2018 11:00 AM MD ROME Vuong [facility]   7/23/2018 11:15 AM Gideon Aquino MD [facility]

## 2018-02-20 DIAGNOSIS — F41.1 GENERALIZED ANXIETY DISORDER: ICD-10-CM

## 2018-02-20 RX ORDER — CEPHALEXIN 500 MG/1
500 CAPSULE ORAL 4 TIMES DAILY
Qty: 28 CAPSULE | Refills: 3 | Status: SHIPPED | OUTPATIENT
Start: 2018-02-20 | End: 2018-09-14 | Stop reason: SDUPTHER

## 2018-02-20 RX ORDER — LORAZEPAM 0.5 MG/1
TABLET ORAL
Qty: 90 TABLET | Refills: 2 | Status: SHIPPED | OUTPATIENT
Start: 2018-02-20 | End: 2018-05-03 | Stop reason: SDUPTHER

## 2018-03-05 RX ORDER — RIVAROXABAN 20 MG/1
TABLET, FILM COATED ORAL
Qty: 30 TABLET | Refills: 4 | Status: SHIPPED | OUTPATIENT
Start: 2018-03-05 | End: 2018-08-07 | Stop reason: SDUPTHER

## 2018-03-09 ENCOUNTER — CARE COORDINATION (OUTPATIENT)
Dept: CARE COORDINATION | Age: 83
End: 2018-03-09

## 2018-03-09 NOTE — LETTER
[address] [facility]  [address] [facility] [address], [address]    Celestine Olivas RN        March 12, 2018    [address] [place]  [address]      Dear Earl Prieto:    I have been unable to reach you by phone. I did receive your BS log 2/12-19/2018 by mail. Please call me at [phone]. Be well.      Sincerely,        Celestine Olivas RN

## 2018-03-29 ENCOUNTER — CARE COORDINATION (OUTPATIENT)
Dept: CARE COORDINATION | Age: 83
End: 2018-03-29

## 2018-03-29 NOTE — CARE COORDINATION
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      She follows with cardiology, pulmonology, and optometrist.    Lab Results   Component Value Date    LABA1C 7.9 (H) 10/27/2017    LABA1C 7.2 (H) 04/25/2017    LABA1C 6.7 (H) 10/07/2016     Lab Results   Component Value Date     10/27/2017     04/25/2017     10/07/2016       Spoke with Liz Lopez. She reported she her fasting BS running 118-138. Denied hypoglycemia. She stated she received a new glucometer and cannot use it- ERROR 4 code appears. She received it through the mail. This writer offered to assist with meter. Liz Lopez stated she will call next week and schedule a time to come in with meter. She has her old meter which she is using. She denied any increase in cough, swelling, or SOB. She is using Boost to supplement her diet. Plan of Care : This writer will await call from Patient next week to schedule for appointment for assistance with glucometer. Continue education, support, and assessments. General Assessment    Do you have any symptoms that are causing concern?:  No         Diabetes Assessment    Medic Alert ID:  No  Meal Planning:  Avoidance of concentrated sweets   How often do you test your blood sugar?:  Daily (Comment: could test 3-4 times a day)   Do you have barriers with adherence to non-pharmacologic self-management interventions?  (Nutrition/Exercise/Self-Monitoring):  Yes   Have you ever had to go to the ED for symptoms of low blood sugar?:  No       No patient-reported symptoms       and   Congestive Heart Failure Assessment    Are you currently restricting fluids?:  No Restriction  Do you understand a low

## 2018-04-18 ENCOUNTER — CARE COORDINATION (OUTPATIENT)
Dept: CARE COORDINATION | Age: 83
End: 2018-04-18

## 2018-04-20 ENCOUNTER — CARE COORDINATION (OUTPATIENT)
Dept: CARE COORDINATION | Age: 83
End: 2018-04-20

## 2018-04-23 ENCOUNTER — CARE COORDINATION (OUTPATIENT)
Dept: CARE COORDINATION | Age: 83
End: 2018-04-23

## 2018-04-23 ENCOUNTER — HOSPITAL ENCOUNTER (OUTPATIENT)
Dept: LAB | Age: 83
Setting detail: SPECIMEN
Discharge: HOME | End: 2018-04-23
Payer: MEDICARE

## 2018-04-23 DIAGNOSIS — E11.69 TYPE 2 DIABETES MELLITUS WITH OTHER SPECIFIED COMPLICATION, WITHOUT LONG-TERM CURRENT USE OF INSULIN (HCC): ICD-10-CM

## 2018-04-23 LAB
ANION GAP SERPL CALCULATED.3IONS-SCNC: 14 MMOL/L (ref 9–17)
BUN BLDV-MCNC: 32 MG/DL (ref 8–23)
BUN/CREAT BLD: 29 (ref 9–20)
CALCIUM SERPL-MCNC: 10 MG/DL (ref 8.6–10.4)
CHLORIDE BLD-SCNC: 96 MMOL/L (ref 98–107)
CO2: 27 MMOL/L (ref 20–31)
CREAT SERPL-MCNC: 1.12 MG/DL (ref 0.5–0.9)
ESTIMATED AVERAGE GLUCOSE: 151 MG/DL
GFR AFRICAN AMERICAN: 57 ML/MIN
GFR NON-AFRICAN AMERICAN: 47 ML/MIN
GFR SERPL CREATININE-BSD FRML MDRD: ABNORMAL ML/MIN/{1.73_M2}
GFR SERPL CREATININE-BSD FRML MDRD: ABNORMAL ML/MIN/{1.73_M2}
GLUCOSE BLD-MCNC: 162 MG/DL (ref 70–99)
HBA1C MFR BLD: 6.9 % (ref 4.8–5.9)
POTASSIUM SERPL-SCNC: 4.1 MMOL/L (ref 3.7–5.3)
SODIUM BLD-SCNC: 137 MMOL/L (ref 135–144)

## 2018-04-23 PROCEDURE — 83036 HEMOGLOBIN GLYCOSYLATED A1C: CPT

## 2018-04-23 PROCEDURE — 36415 COLL VENOUS BLD VENIPUNCTURE: CPT

## 2018-04-23 PROCEDURE — 80048 BASIC METABOLIC PNL TOTAL CA: CPT

## 2018-05-01 DIAGNOSIS — J90 PLEURAL EFFUSION, LEFT: Primary | ICD-10-CM

## 2018-05-02 ENCOUNTER — HOSPITAL ENCOUNTER (OUTPATIENT)
Dept: GENERAL RADIOLOGY | Age: 83
Discharge: HOME | End: 2018-05-04
Payer: MEDICARE

## 2018-05-02 ENCOUNTER — OFFICE VISIT (OUTPATIENT)
Dept: PULMONOLOGY | Age: 83
End: 2018-05-02
Payer: MEDICARE

## 2018-05-02 VITALS
SYSTOLIC BLOOD PRESSURE: 120 MMHG | RESPIRATION RATE: 16 BRPM | OXYGEN SATURATION: 96 % | WEIGHT: 131.8 LBS | BODY MASS INDEX: 25.87 KG/M2 | DIASTOLIC BLOOD PRESSURE: 64 MMHG | HEART RATE: 85 BPM | HEIGHT: 60 IN

## 2018-05-02 DIAGNOSIS — Z95.2 S/P TAVR (TRANSCATHETER AORTIC VALVE REPLACEMENT): ICD-10-CM

## 2018-05-02 DIAGNOSIS — I48.19 PERSISTENT ATRIAL FIBRILLATION (HCC): ICD-10-CM

## 2018-05-02 DIAGNOSIS — I50.9 PLEURAL EFFUSION DUE TO CONGESTIVE HEART FAILURE (HCC): Primary | ICD-10-CM

## 2018-05-02 DIAGNOSIS — I50.9 PLEURAL EFFUSION DUE TO CONGESTIVE HEART FAILURE (HCC): ICD-10-CM

## 2018-05-02 PROCEDURE — 1036F TOBACCO NON-USER: CPT | Performed by: INTERNAL MEDICINE

## 2018-05-02 PROCEDURE — G8419 CALC BMI OUT NRM PARAM NOF/U: HCPCS | Performed by: INTERNAL MEDICINE

## 2018-05-02 PROCEDURE — 1123F ACP DISCUSS/DSCN MKR DOCD: CPT | Performed by: INTERNAL MEDICINE

## 2018-05-02 PROCEDURE — 4040F PNEUMOC VAC/ADMIN/RCVD: CPT | Performed by: INTERNAL MEDICINE

## 2018-05-02 PROCEDURE — G8427 DOCREV CUR MEDS BY ELIG CLIN: HCPCS | Performed by: INTERNAL MEDICINE

## 2018-05-02 PROCEDURE — G8598 ASA/ANTIPLAT THER USED: HCPCS | Performed by: INTERNAL MEDICINE

## 2018-05-02 PROCEDURE — 99214 OFFICE O/P EST MOD 30 MIN: CPT | Performed by: INTERNAL MEDICINE

## 2018-05-02 PROCEDURE — 1090F PRES/ABSN URINE INCON ASSESS: CPT | Performed by: INTERNAL MEDICINE

## 2018-05-02 PROCEDURE — 71046 X-RAY EXAM CHEST 2 VIEWS: CPT

## 2018-05-02 PROCEDURE — G8400 PT W/DXA NO RESULTS DOC: HCPCS | Performed by: INTERNAL MEDICINE

## 2018-05-03 ENCOUNTER — OFFICE VISIT (OUTPATIENT)
Dept: FAMILY MEDICINE CLINIC | Age: 83
End: 2018-05-03
Payer: MEDICARE

## 2018-05-03 VITALS
DIASTOLIC BLOOD PRESSURE: 62 MMHG | TEMPERATURE: 98.4 F | OXYGEN SATURATION: 98 % | HEIGHT: 60 IN | BODY MASS INDEX: 25.91 KG/M2 | SYSTOLIC BLOOD PRESSURE: 112 MMHG | WEIGHT: 132 LBS | HEART RATE: 82 BPM

## 2018-05-03 DIAGNOSIS — R25.2 LEG CRAMPS: ICD-10-CM

## 2018-05-03 DIAGNOSIS — R79.89 ELEVATED SERUM CREATININE: ICD-10-CM

## 2018-05-03 DIAGNOSIS — E11.69 TYPE 2 DIABETES MELLITUS WITH OTHER SPECIFIED COMPLICATION, WITHOUT LONG-TERM CURRENT USE OF INSULIN (HCC): ICD-10-CM

## 2018-05-03 DIAGNOSIS — Z91.81 AT HIGH RISK FOR FALLS: ICD-10-CM

## 2018-05-03 DIAGNOSIS — I10 ESSENTIAL HYPERTENSION: ICD-10-CM

## 2018-05-03 DIAGNOSIS — J34.89 SINUS DRAINAGE: Primary | ICD-10-CM

## 2018-05-03 DIAGNOSIS — F41.1 GENERALIZED ANXIETY DISORDER: ICD-10-CM

## 2018-05-03 PROCEDURE — 1036F TOBACCO NON-USER: CPT | Performed by: FAMILY MEDICINE

## 2018-05-03 PROCEDURE — G8598 ASA/ANTIPLAT THER USED: HCPCS | Performed by: FAMILY MEDICINE

## 2018-05-03 PROCEDURE — G8427 DOCREV CUR MEDS BY ELIG CLIN: HCPCS | Performed by: FAMILY MEDICINE

## 2018-05-03 PROCEDURE — 4040F PNEUMOC VAC/ADMIN/RCVD: CPT | Performed by: FAMILY MEDICINE

## 2018-05-03 PROCEDURE — G8419 CALC BMI OUT NRM PARAM NOF/U: HCPCS | Performed by: FAMILY MEDICINE

## 2018-05-03 PROCEDURE — 1123F ACP DISCUSS/DSCN MKR DOCD: CPT | Performed by: FAMILY MEDICINE

## 2018-05-03 PROCEDURE — 99214 OFFICE O/P EST MOD 30 MIN: CPT | Performed by: FAMILY MEDICINE

## 2018-05-03 PROCEDURE — G8400 PT W/DXA NO RESULTS DOC: HCPCS | Performed by: FAMILY MEDICINE

## 2018-05-03 PROCEDURE — 1090F PRES/ABSN URINE INCON ASSESS: CPT | Performed by: FAMILY MEDICINE

## 2018-05-03 RX ORDER — LISINOPRIL 2.5 MG/1
TABLET ORAL
Qty: 90 TABLET | Refills: 3 | Status: SHIPPED | OUTPATIENT
Start: 2018-05-03 | End: 2019-01-01 | Stop reason: SDUPTHER

## 2018-05-03 RX ORDER — LORAZEPAM 0.5 MG/1
TABLET ORAL
Qty: 90 TABLET | Refills: 2 | Status: SHIPPED | OUTPATIENT
Start: 2018-05-03 | End: 2018-10-09 | Stop reason: SDUPTHER

## 2018-05-03 RX ORDER — FLUTICASONE PROPIONATE 50 MCG
1 SPRAY, SUSPENSION (ML) NASAL 2 TIMES DAILY
Qty: 1 BOTTLE | Refills: 3 | Status: SHIPPED | OUTPATIENT
Start: 2018-05-03 | End: 2018-11-05

## 2018-05-03 ASSESSMENT — ENCOUNTER SYMPTOMS
CONSTIPATION: 0
SINUS PRESSURE: 0
CHOKING: 0
RHINORRHEA: 0
SORE THROAT: 0
SHORTNESS OF BREATH: 0
TROUBLE SWALLOWING: 0
NAUSEA: 1
COUGH: 0
CHEST TIGHTNESS: 0
DIARRHEA: 0
WHEEZING: 0

## 2018-05-22 ENCOUNTER — CARE COORDINATION (OUTPATIENT)
Dept: FAMILY MEDICINE CLINIC | Age: 83
End: 2018-05-22

## 2018-06-29 ENCOUNTER — CARE COORDINATION (OUTPATIENT)
Dept: FAMILY MEDICINE CLINIC | Age: 83
End: 2018-06-29

## 2018-06-29 NOTE — PATIENT INSTRUCTIONS
Patient Education        Advance Care Planning: Care Instructions  Your Care Instructions    It can be hard to live with an illness that cannot be cured. But if your health is getting worse, you may want to make decisions about end-of-life care. Planning for the end of your life does not mean that you are giving up. It is a way to make sure that your wishes are met. Clearly stating your wishes can make it easier for your loved ones. Making plans while you are still able may also ease your mind and make your final days less stressful and more meaningful. Follow-up care is a key part of your treatment and safety. Be sure to make and go to all appointments, and call your doctor if you are having problems. It's also a good idea to know your test results and keep a list of the medicines you take. What can you do to plan for the end of life? · You can bring these issues up with your doctor. You do not need to wait until your doctor starts the conversation. You might start with \"I would not be willing to live with . Mary Paz \" When you complete this sentence it helps your doctor understand your wishes. · Talk openly and honestly with your doctor. This is the best way to understand the decisions you will need to make as your health changes. Know that you can always change your mind. · Ask your doctor about commonly used life-support measures. These include tube feedings, breathing machines, and fluids given through a vein (IV). Understanding these treatments will help you decide whether you want them. · You may choose to have these life-supporting treatments for a limited time. This allows a trial period to see whether they will help you. You may also decide that you want your doctor to take only certain measures to keep you alive. It is important to spell out these conditions so that your doctor and family understand them. · Talk to your doctor about how long you are likely to live.  He or she may be able to give you an papers. Where can you learn more? Go to [email or web address] and sign in to your [employer] account. Enter P184 in the Qello box to learn more about \"Advance Care Planning: Care Instructions. \"     If you do not have an account, please click on the \"Sign Up Now\" link. Current as of: October 6, 2017  Content Version: 11.6  © 3752-1028 [employer]. Care instructions adapted under license by [facility] ([facility]). If you have questions about a medical condition or this instruction, always ask your healthcare professional. [address] any warranty or liability for your use of this information.

## 2018-06-29 NOTE — CARE COORDINATION
Congestive Heart Failure Assessment    Are you currently restricting fluids?:  No Restriction  Do you understand a low sodium diet?:  Yes  Do you understand how to read food labels?:  Yes  How many restaurant meals do you eat per week?:  1-2  Do you salt your food before tasting it?:  No     No patient-reported symptoms      Symptoms:      Symptom course:  stable  Weight trend:  stable  Salt intake watch compared to last visit:  stable         Care Coordination Interventions    Program Enrollment:  Complex Care  Referral from Primary Care Provider:  No  Suggested Interventions and Community Resources  Diabetes Education:  Completed  Zone Management Tools:  Completed         Goals Addressed             Most Recent     Patient Stated (pt-stated)   On track (6/29/2018)             Will log blood sugar and bring long to appt    Barriers: none  Plan for overcoming my barriers: N/A  Confidence: 10/10  Anticipated Goal Completion Date: will bring log at next appt       COMPLETED: Self Monitoring (pt-stated)   On track (5/22/2018)             Self-Monitored Blood Glucose - I will check my blood sugar Fasting blood sugar and Other: and prn  Daily Weights - I will weight myself as directed - Daily and write down weights  I will notify my provider of any increase in weight by 3 or more pounds in 2 days OR 5 or more pounds in a week. None Recently Recorded    Barriers: none  Plan for overcoming my barriers: N/A  Confidence: 9/10  Anticipated Goal Completion Date: 06/1/2018            Prior to Admission medications    Medication Sig Start Date End Date Taking?  Authorizing Provider   lisinopril (PRINIVIL;ZESTRIL) 2.5 MG tablet TAKE 1 TABLET BY MOUTH DAILY 5/3/18   Erin Plata MD   LORazepam (ATIVAN) 0.5 MG tablet TAKE ONE TABLET BY MOUTH THREE TIMES A DAY AS NEEDED FOR ANXIETY. 5/3/18 7/31/18  Erin Plata MD   fluticasone [facility]) 50 MCG/ACT nasal spray 1 spray by Nasal route 2 times daily 5/3/18   Erin Plata MD   XARELTO 20 MG TABS tablet TAKE ONE TABLET BY MOUTH DAILY WITH BREAKFAST 3/5/18   Darien Nicole MD   metFORMIN (GLUCOPHAGE XR) 500 MG extended release tablet Take 1 tablet by mouth 2 times daily 1/29/18   MD VICTOR HUGO Finch TEST STRIPS strip TEST ONCE DAILY AS NEEDED 1/15/18   MD Cintia Finch MISC by Does not apply route Exp 1/1/2023 12/20/17   Darien Nicole MD   metoprolol succinate (TOPROL XL) 100 MG extended release tablet Take 1 tablet by mouth daily 12/18/17   Ace Forbes DO   spironolactone (ALDACTONE) 25 MG tablet Take 1 tablet by mouth daily 12/18/17   Ace Forbes DO   famotidine (PEPCID) 20 MG tablet TAKE ONE TABLET BY MOUTH EVERY EVENING 10/27/17   Darien Nicole MD   nitroGLYCERIN (NITROSTAT) 0.4 MG SL tablet Place 1 tablet under the tongue every 5 minutes as needed for Chest pain up to max of 3 total doses. If no relief after 1 dose, call 911. 9/20/17   DIYA Nolasco - CNP   clopidogrel (PLAVIX) 75 MG tablet Take 1 tablet by mouth daily 7/26/17   Gal Ledesma MD   furosemide (LASIX) 20 MG tablet Take 1 tablet by mouth daily 7/26/17   Gal Ledesma MD   melatonin 3 MG TABS tablet Take 3 mg by mouth nightly as needed    Historical Provider, MD   ondansetron (ZOFRAN) 4 MG tablet Take 1 tablet by mouth every 8 hours as needed for Nausea or Vomiting 5/30/17   Darien Nicole MD   b complex vitamins capsule Take 1 capsule by mouth every other day Indications: overy other day     Historical Provider, MD   Vitamin D (CHOLECALCIFEROL) 1000 UNITS CAPS capsule Take 1,000 Units by mouth daily.     Historical Provider, MD       Future Appointments  Date Time Provider Destinee Gaffney   7/17/2018 2:30 PM Felecia Hardin MD [facility]   7/23/2018 11:15 AM MD PANTERA Garcia [facility]   8/3/2018 11:00 AM SCHEDULE, [place] [address] LAB [address] [facility]note 3[facility] [place]   11/5/2018 11:00 AM MD ROME [facility]   5/1/2019 1:30 PM MD ROB [facility]

## 2018-07-17 ENCOUNTER — INITIAL CONSULT (OUTPATIENT)
Dept: SURGERY | Age: 83
End: 2018-07-17
Payer: MEDICARE

## 2018-07-17 VITALS
DIASTOLIC BLOOD PRESSURE: 68 MMHG | BODY MASS INDEX: 25.72 KG/M2 | HEIGHT: 60 IN | WEIGHT: 131 LBS | HEART RATE: 88 BPM | SYSTOLIC BLOOD PRESSURE: 100 MMHG

## 2018-07-17 DIAGNOSIS — Z86.010 HISTORY OF COLON POLYPS: Primary | ICD-10-CM

## 2018-07-17 DIAGNOSIS — K64.2 GRADE III HEMORRHOIDS: ICD-10-CM

## 2018-07-17 PROCEDURE — 99214 OFFICE O/P EST MOD 30 MIN: CPT | Performed by: SURGERY

## 2018-07-17 PROCEDURE — G8598 ASA/ANTIPLAT THER USED: HCPCS | Performed by: SURGERY

## 2018-07-17 PROCEDURE — 1036F TOBACCO NON-USER: CPT | Performed by: SURGERY

## 2018-07-17 PROCEDURE — G8400 PT W/DXA NO RESULTS DOC: HCPCS | Performed by: SURGERY

## 2018-07-17 PROCEDURE — 1090F PRES/ABSN URINE INCON ASSESS: CPT | Performed by: SURGERY

## 2018-07-17 PROCEDURE — G8419 CALC BMI OUT NRM PARAM NOF/U: HCPCS | Performed by: SURGERY

## 2018-07-17 PROCEDURE — 1101F PT FALLS ASSESS-DOCD LE1/YR: CPT | Performed by: SURGERY

## 2018-07-17 PROCEDURE — 1123F ACP DISCUSS/DSCN MKR DOCD: CPT | Performed by: SURGERY

## 2018-07-17 PROCEDURE — G8427 DOCREV CUR MEDS BY ELIG CLIN: HCPCS | Performed by: SURGERY

## 2018-07-17 PROCEDURE — 4040F PNEUMOC VAC/ADMIN/RCVD: CPT | Performed by: SURGERY

## 2018-07-17 RX ORDER — POLYETHYLENE GLYCOL 3350, SODIUM CHLORIDE, SODIUM BICARBONATE, POTASSIUM CHLORIDE 420; 11.2; 5.72; 1.48 G/4L; G/4L; G/4L; G/4L
POWDER, FOR SOLUTION ORAL
Qty: 1 BOTTLE | Refills: 0 | Status: SHIPPED | OUTPATIENT
Start: 2018-07-17 | End: 2018-11-05 | Stop reason: ALTCHOICE

## 2018-07-17 NOTE — PROGRESS NOTES
Jetty Goldmann is a 82 y.o. female          CC:    . Hemorrhoids (Feels a lot of pressure when she walks. No bleeding or pain.)      HISTORY OF PRESENT ILLNESS:    Patient is an 82-year-old female who complains of hemorrhoids that come out. They get irritated and bleed a little bit. She has to manually reduce them. They will then stay until she strains again.           Review of Systems:    General:  Fever: Negative  Weight Change:Negative  Night Sweats: Negative    Eye:  Blurry Vision:positive for if blood sugar abnormal  Double Vision: Negative    Ent:  Headaches: Positive  Sore throat: Negative    Allergy/Immunology:  Hives: Negative  Latex allergy: Negative    Hematology/Lymphatic:  Bleeding Problems: positive for on Plavix and Xarelto  Blood Clots: Negative  Swollen Lymph Nodes: Negative    Lungs:  Cough: Negative  SOB: Positive  Wheezing:Negative    Cardiovascular:  Chest Pain: Negative  Palpitations:Negative    GI:   Decreased Appetite: Negative  Heartburn: Positive  Dysphagia: Negative  Nausea/Vomiting: Negative  Abdominal Pain: Negative  Change in Bowels:Negative  Constipation: Negative  Diarrhea: Negative  Rectal Bleeding: Negative    :   Dysuria: Negative  Increase Urinary Frequency/Urgency: Negative    Neuro:  Seizures: Negative  Confusion: Negative        PAST MEDICAL HISTORY:        Family History   Problem Relation Age of Onset    Diabetes Mother     Glaucoma Mother     Stroke Father 64     Social History     Social History    Marital status:      Spouse name: N/A    Number of children: N/A    Years of education: N/A     Occupational History    retired /      Social History Main Topics    Smoking status: Passive Smoke Exposure - Never Smoker    Smokeless tobacco: Never Used      Comment: Nael Contreras RRT 10/18/16    Alcohol use No    Drug use: No    Sexual activity: Yes     Partners: Male     Other Topics Concern    Not on file     Social History Narrative  No narrative on file     Past Surgical History:   Procedure Laterality Date    AORTIC VALVE REPLACEMENT  12/07/2017    TAVR    CARDIAC CATHETERIZATION  November 2012    Showed minimal CAD with increased wedge pressure of the right heart. Patient saw Cardiology and started on Lasix.  CARDIAC CATHETERIZATION  09/19/2017    right and left heart cath   EF 30% WITH PATENT LAD STENT    COLONOSCOPY  08/12/2009    diverticulosis being found.  COLONOSCOPY  9/8/14    grade 2-3 internal hemorrhoids - banded x 2, random biopsies taken to r/o - normal, repeat 5yrs due to hx of polyps- garber    CORONARY ANGIOPLASTY WITH STENT PLACEMENT  09/2016    PTCA / Drug Eluting Stent:, LAD and / or branches    FRACTURE SURGERY  08/10/99    Left elbow ORIF     HYSTERECTOMY  09/09/2002    With bladder repair for benign reasons.  MASTECTOMY Bilateral 1995 and 2000    With lymph node dissections in 1995 and 2000.  OTHER SURGICAL HISTORY Right 6/2015    Index and long trigger finger release     Past Medical History:   Diagnosis Date    Allergic rhinitis     With chronic cough.  Anxiety, generalized     Chronic with probable depression. She will take Ativan but refuses antidepressant.  Aortic stenosis     Atrial fibrillation (HCC)     Breast cancer ([facility] [address]     2 occurrences    CAD (coronary artery disease) November 2012    Minimal disease by catheterization, 11/12, with wedge pressure of the right heart. She is taking Lasix for this and being followed by Cardiology.  Diabetic neuropathy (HCC)     Hyperlipidemia     Hypertension     runs low    Lymphedema of arm     Left arm, due to lymph node dissection and mastectomy.  PVC's (premature ventricular contractions)     Chronic. Patient currently undergoing workup for arrhythmia.     S/P cardiac cath 09/06/2016    Type II or unspecified type diabetes mellitus without mention of complication, not stated as uncontrolled     Vitamin D deficiency      Current Outpatient Prescriptions on File Prior to Visit   Medication Sig Dispense Refill    lisinopril (PRINIVIL;ZESTRIL) 2.5 MG tablet TAKE 1 TABLET BY MOUTH DAILY 90 tablet 3    LORazepam (ATIVAN) 0.5 MG tablet TAKE ONE TABLET BY MOUTH THREE TIMES A DAY AS NEEDED FOR ANXIETY. 90 tablet 2    XARELTO 20 MG TABS tablet TAKE ONE TABLET BY MOUTH DAILY WITH BREAKFAST 30 tablet 4    metFORMIN (GLUCOPHAGE XR) 500 MG extended release tablet Take 1 tablet by mouth 2 times daily 180 tablet 3    FREESTYLE TEST STRIPS strip TEST ONCE DAILY AS NEEDED 100 strip 3    Handicap Placard MISC by Does not apply route Exp 1/1/2023 1 each 0    metoprolol succinate (TOPROL XL) 100 MG extended release tablet Take 1 tablet by mouth daily 90 tablet 3    spironolactone (ALDACTONE) 25 MG tablet Take 1 tablet by mouth daily 90 tablet 3    famotidine (PEPCID) 20 MG tablet TAKE ONE TABLET BY MOUTH EVERY EVENING 60 tablet 5    nitroGLYCERIN (NITROSTAT) 0.4 MG SL tablet Place 1 tablet under the tongue every 5 minutes as needed for Chest pain up to max of 3 total doses. If no relief after 1 dose, call 911. 25 tablet 3    clopidogrel (PLAVIX) 75 MG tablet Take 1 tablet by mouth daily 90 tablet 3    furosemide (LASIX) 20 MG tablet Take 1 tablet by mouth daily 90 tablet 3    ondansetron (ZOFRAN) 4 MG tablet Take 1 tablet by mouth every 8 hours as needed for Nausea or Vomiting 40 tablet 2    b complex vitamins capsule Take 1 capsule by mouth every other day Indications: overy other day       Vitamin D (CHOLECALCIFEROL) 1000 UNITS CAPS capsule Take 1,000 Units by mouth daily.  fluticasone (FLONASE) 50 MCG/ACT nasal spray 1 spray by Nasal route 2 times daily 1 Bottle 3    melatonin 3 MG TABS tablet Take 3 mg by mouth nightly as needed       No current facility-administered medications on file prior to visit.       Allergies as of 07/17/2018 - Review Complete 07/17/2018   Allergen Reaction Noted    Darvocet a500 [propoxyphene n-acetaminophen]

## 2018-07-18 ENCOUNTER — TELEPHONE (OUTPATIENT)
Dept: SURGERY | Age: 83
End: 2018-07-18

## 2018-07-23 ENCOUNTER — OFFICE VISIT (OUTPATIENT)
Dept: CARDIOLOGY | Age: 83
End: 2018-07-23
Payer: MEDICARE

## 2018-07-23 ENCOUNTER — HOSPITAL ENCOUNTER (OUTPATIENT)
Dept: LAB | Age: 83
Setting detail: SPECIMEN
Discharge: HOME | End: 2018-07-23
Payer: MEDICARE

## 2018-07-23 VITALS
HEIGHT: 60 IN | DIASTOLIC BLOOD PRESSURE: 57 MMHG | SYSTOLIC BLOOD PRESSURE: 130 MMHG | BODY MASS INDEX: 26.11 KG/M2 | WEIGHT: 133 LBS | HEART RATE: 81 BPM

## 2018-07-23 DIAGNOSIS — Z95.2 S/P TAVR (TRANSCATHETER AORTIC VALVE REPLACEMENT): ICD-10-CM

## 2018-07-23 DIAGNOSIS — R79.89 ELEVATED SERUM CREATININE: ICD-10-CM

## 2018-07-23 DIAGNOSIS — I10 ESSENTIAL HYPERTENSION: Primary | ICD-10-CM

## 2018-07-23 DIAGNOSIS — I25.10 CORONARY ARTERY DISEASE INVOLVING NATIVE CORONARY ARTERY OF NATIVE HEART WITHOUT ANGINA PECTORIS: ICD-10-CM

## 2018-07-23 LAB
ANION GAP SERPL CALCULATED.3IONS-SCNC: 17 MMOL/L (ref 9–17)
BUN BLDV-MCNC: 41 MG/DL (ref 8–23)
BUN/CREAT BLD: 31 (ref 9–20)
CALCIUM SERPL-MCNC: 10.1 MG/DL (ref 8.6–10.4)
CHLORIDE BLD-SCNC: 96 MMOL/L (ref 98–107)
CO2: 22 MMOL/L (ref 20–31)
CREAT SERPL-MCNC: 1.31 MG/DL (ref 0.5–0.9)
GFR AFRICAN AMERICAN: 47 ML/MIN
GFR NON-AFRICAN AMERICAN: 39 ML/MIN
GFR SERPL CREATININE-BSD FRML MDRD: ABNORMAL ML/MIN/{1.73_M2}
GFR SERPL CREATININE-BSD FRML MDRD: ABNORMAL ML/MIN/{1.73_M2}
GLUCOSE BLD-MCNC: 154 MG/DL (ref 70–99)
POTASSIUM SERPL-SCNC: 4.4 MMOL/L (ref 3.7–5.3)
SODIUM BLD-SCNC: 135 MMOL/L (ref 135–144)

## 2018-07-23 PROCEDURE — 36415 COLL VENOUS BLD VENIPUNCTURE: CPT

## 2018-07-23 PROCEDURE — 99213 OFFICE O/P EST LOW 20 MIN: CPT | Performed by: INTERNAL MEDICINE

## 2018-07-23 PROCEDURE — 80048 BASIC METABOLIC PNL TOTAL CA: CPT

## 2018-07-23 PROCEDURE — 93000 ELECTROCARDIOGRAM COMPLETE: CPT | Performed by: INTERNAL MEDICINE

## 2018-07-25 ENCOUNTER — TELEPHONE (OUTPATIENT)
Dept: CARDIOLOGY | Age: 83
End: 2018-07-25

## 2018-07-25 NOTE — TELEPHONE ENCOUNTER
Jackeline received from Dr Angelito Chu. Review signed cardiac clearance form. Surgeon would like patient to be off anti coag 1 month post banding. (plavix and Xarelto) Would like clarification that she can remain off. See scanned clearance on 7/23/18 attached to office visit. Surgery is pending. Dr Anaya See office aware Dr Igor Stockton back in office 8/6/18.

## 2018-07-30 DIAGNOSIS — R79.89 ELEVATED SERUM CREATININE: Primary | ICD-10-CM

## 2018-08-15 RX ORDER — CLOPIDOGREL BISULFATE 75 MG/1
TABLET ORAL
Qty: 90 TABLET | Refills: 2 | Status: ON HOLD | OUTPATIENT
Start: 2018-08-15 | End: 2019-01-01 | Stop reason: HOSPADM

## 2018-08-17 ENCOUNTER — CARE COORDINATION (OUTPATIENT)
Dept: CARE COORDINATION | Age: 83
End: 2018-08-17

## 2018-08-17 NOTE — CARE COORDINATION
Attempted to reach patient for surveillance, patient reports she has another call on the other line and cannot talk, agreed will try back next week

## 2018-08-22 NOTE — TELEPHONE ENCOUNTER
Surgery date pending clarification on Plavix and Xarelto being held one month after procedure.  Please advise

## 2018-08-30 ENCOUNTER — HOSPITAL ENCOUNTER (OUTPATIENT)
Dept: LAB | Age: 83
Setting detail: SPECIMEN
Discharge: HOME | End: 2018-08-30
Payer: MEDICARE

## 2018-08-30 DIAGNOSIS — R79.89 ELEVATED SERUM CREATININE: ICD-10-CM

## 2018-08-30 LAB
ANION GAP SERPL CALCULATED.3IONS-SCNC: 15 MMOL/L (ref 9–17)
BUN BLDV-MCNC: 26 MG/DL (ref 8–23)
BUN/CREAT BLD: 22 (ref 9–20)
CALCIUM SERPL-MCNC: 10 MG/DL (ref 8.6–10.4)
CHLORIDE BLD-SCNC: 95 MMOL/L (ref 98–107)
CO2: 28 MMOL/L (ref 20–31)
CREAT SERPL-MCNC: 1.16 MG/DL (ref 0.5–0.9)
GFR AFRICAN AMERICAN: 54 ML/MIN
GFR NON-AFRICAN AMERICAN: 45 ML/MIN
GFR SERPL CREATININE-BSD FRML MDRD: ABNORMAL ML/MIN/{1.73_M2}
GFR SERPL CREATININE-BSD FRML MDRD: ABNORMAL ML/MIN/{1.73_M2}
GLUCOSE BLD-MCNC: 166 MG/DL (ref 70–99)
POTASSIUM SERPL-SCNC: 4.3 MMOL/L (ref 3.7–5.3)
SODIUM BLD-SCNC: 138 MMOL/L (ref 135–144)

## 2018-08-30 PROCEDURE — 80048 BASIC METABOLIC PNL TOTAL CA: CPT

## 2018-08-30 PROCEDURE — 36415 COLL VENOUS BLD VENIPUNCTURE: CPT

## 2018-09-07 ENCOUNTER — CARE COORDINATION (OUTPATIENT)
Dept: CARE COORDINATION | Age: 83
End: 2018-09-07

## 2018-09-07 NOTE — CARE COORDINATION
transportation:  Needs Assistance  Ability to do shopping:  Needs Assistance  Ability to manage finances: Independent  Is patient able to live independently?:  Yes     Current Housing:  Private Residence        Per the Fall Risk Screening, did the patient have 2 or more falls or 1 fall with injury in the past year?:  No     Frequent urination at night?:  No  Do you use rails/bars?:  No  Do you have a non-slip tub mat?:  Yes     Are you experiencing loss of meaning?:  No  Are you experiencing loss of hope and peace?:  No     Thinking about your patient's physical health needs, are there any symptoms or problems (risk indicators) you are unsure about that require further investigation?:  No identified areas of uncertainly or problems already being investigated   Are the patients physical health problems impacting on their mental well-being?:  No identified areas of concern   Are there any problems with your patients lifestyle behaviors (alcohol, drugs, diet, exercise) that are impacting on physical or mental well-being?:  No identified areas of concern   Do you have any other concerns about your patients mental well-being?  How would you rate their severity and impact on the patient?:  No identified areas of concern   How would you rate their home environment in terms of safety and stability (including domestic violence, insecure housing, neighbor harassment)?:  Consistently safe, supportive, stable, no identified problems   How do daily activities impact on the patient's well-being? (include current or anticipated unemployment, work, caregiving, access to transportation or other):  No identified problems or perceived positive benefits   How would you rate their social network (family, work, friends)?:  Good participation with social networks   How would you rate their financial resources (including ability to afford all required medical care)?:  Financially secure, resources adequate, no identified problems   How wells does the patient now understand their health and well-being (symptoms, signs or risk factors) and what they need to do to manage their health?:  Reasonable to good understanding and already engages in managing health or is willing to undertake better management   How well do you think your patient can engage in healthcare discussions? (Barriers include language, deafness, aphasia, alcohol or drug problems, learning difficulties, concentration):  Clear and open communication, no identified barriers   Do other services need to be involved to help this patient?:  Other care/services not required at this time   Suggested Interventions and Community Resources  Diabetes Education:  Completed    Zone Management Tools:  Completed                Prior to Admission medications    Medication Sig Start Date End Date Taking? Authorizing Provider   clopidogrel (PLAVIX) 75 MG tablet TAKE ONE TABLET BY MOUTH DAILY 8/15/18  Yes Prince Nuñez MD   XARELTO 20 MG TABS tablet TAKE ONE TABLET BY MOUTH DAILY WITH BREAKFAST 8/7/18  Yes Ellyn Murillo MD   lisinopril (PRINIVIL;ZESTRIL) 2.5 MG tablet TAKE 1 TABLET BY MOUTH DAILY 5/3/18  Yes Ellyn Murillo MD   fluticasone Jahaira Door) 50 MCG/ACT nasal spray 1 spray by Nasal route 2 times daily 5/3/18  Yes Ellyn Murillo MD   FREESTYLE TEST STRIPS strip TEST ONCE DAILY AS NEEDED 1/15/18  Yes Ellyn Murillo MD   metoprolol succinate (TOPROL XL) 100 MG extended release tablet Take 1 tablet by mouth daily 12/18/17  Yes Ace Forbes DO   spironolactone (ALDACTONE) 25 MG tablet Take 1 tablet by mouth daily 12/18/17  Yes Ace Forbes DO   famotidine (PEPCID) 20 MG tablet TAKE ONE TABLET BY MOUTH EVERY EVENING 10/27/17  Yes Ellyn Murillo MD   nitroGLYCERIN (NITROSTAT) 0.4 MG SL tablet Place 1 tablet under the tongue every 5 minutes as needed for Chest pain up to max of 3 total doses.  If no relief after 1 dose, call 911. 9/20/17  Yes DIYA Carrillo CNP   furosemide (LASIX) 20 MG tablet Take 1 tablet by mouth daily 7/26/17  Yes Gal Ledesma MD   melatonin 3 MG TABS tablet Take 3 mg by mouth nightly as needed   Yes Historical Provider, MD   ondansetron (ZOFRAN) 4 MG tablet Take 1 tablet by mouth every 8 hours as needed for Nausea or Vomiting 5/30/17  Yes Darien Nicole MD   b complex vitamins capsule Take 1 capsule by mouth every other day Indications: overy other day    Yes Historical Provider, MD   Vitamin D (CHOLECALCIFEROL) 1000 UNITS CAPS capsule Take 1,000 Units by mouth daily.    Yes Historical Provider, MD   polyethylene glycol-electrolytes (NULYTELY) 420 g solution Day prior to colonoscopy, start drinking at 6 pm 8 ounces every 10 minutes 7/17/18   Felecia Hardin MD   bisacodyl (DULCOLAX) 5 MG EC tablet Take 2 tabs at noon day prior to colonoscopy 7/17/18   Felecia Hardin MD   LORazepam (ATIVAN) 0.5 MG tablet TAKE ONE TABLET BY MOUTH THREE TIMES A DAY AS NEEDED FOR ANXIETY. 5/3/18 7/31/18  MD Cintia Finch MISC by Does not apply route Exp 1/1/2023 12/20/17   Darien Nicole MD       Future Appointments  Date Time Provider Destinee Gaffney   11/5/2018 11:00 AM MD ROME Finch [facility]   4/8/2019 11:00 AM MD PANTERA Garcia [facility]   5/1/2019 1:30 PM [facility]

## 2018-09-14 ENCOUNTER — OFFICE VISIT (OUTPATIENT)
Dept: PRIMARY CARE CLINIC | Age: 83
End: 2018-09-14
Payer: MEDICARE

## 2018-09-14 VITALS
HEART RATE: 90 BPM | WEIGHT: 135 LBS | OXYGEN SATURATION: 96 % | TEMPERATURE: 99.3 F | SYSTOLIC BLOOD PRESSURE: 134 MMHG | HEIGHT: 60 IN | BODY MASS INDEX: 26.5 KG/M2 | DIASTOLIC BLOOD PRESSURE: 60 MMHG

## 2018-09-14 DIAGNOSIS — L03.114 CELLULITIS OF LEFT UPPER EXTREMITY: Primary | ICD-10-CM

## 2018-09-14 PROCEDURE — 99213 OFFICE O/P EST LOW 20 MIN: CPT | Performed by: PHYSICIAN ASSISTANT

## 2018-09-14 PROCEDURE — 1101F PT FALLS ASSESS-DOCD LE1/YR: CPT | Performed by: PHYSICIAN ASSISTANT

## 2018-09-14 PROCEDURE — 1090F PRES/ABSN URINE INCON ASSESS: CPT | Performed by: PHYSICIAN ASSISTANT

## 2018-09-14 PROCEDURE — G8419 CALC BMI OUT NRM PARAM NOF/U: HCPCS | Performed by: PHYSICIAN ASSISTANT

## 2018-09-14 PROCEDURE — G8400 PT W/DXA NO RESULTS DOC: HCPCS | Performed by: PHYSICIAN ASSISTANT

## 2018-09-14 PROCEDURE — 4040F PNEUMOC VAC/ADMIN/RCVD: CPT | Performed by: PHYSICIAN ASSISTANT

## 2018-09-14 PROCEDURE — G8427 DOCREV CUR MEDS BY ELIG CLIN: HCPCS | Performed by: PHYSICIAN ASSISTANT

## 2018-09-14 PROCEDURE — 1123F ACP DISCUSS/DSCN MKR DOCD: CPT | Performed by: PHYSICIAN ASSISTANT

## 2018-09-14 PROCEDURE — 1036F TOBACCO NON-USER: CPT | Performed by: PHYSICIAN ASSISTANT

## 2018-09-14 PROCEDURE — 96372 THER/PROPH/DIAG INJ SC/IM: CPT | Performed by: PHYSICIAN ASSISTANT

## 2018-09-14 PROCEDURE — G8598 ASA/ANTIPLAT THER USED: HCPCS | Performed by: PHYSICIAN ASSISTANT

## 2018-09-14 RX ORDER — CEPHALEXIN 500 MG/1
500 CAPSULE ORAL 4 TIMES DAILY
Qty: 28 CAPSULE | Refills: 0 | Status: SHIPPED | OUTPATIENT
Start: 2018-09-14 | End: 2018-09-21

## 2018-09-14 RX ORDER — ACETAMINOPHEN 325 MG/1
650 TABLET ORAL EVERY 6 HOURS PRN
Status: ON HOLD | COMMUNITY
End: 2018-11-15

## 2018-09-14 RX ORDER — CEFTRIAXONE SODIUM 250 MG/1
1000 INJECTION, POWDER, FOR SOLUTION INTRAMUSCULAR; INTRAVENOUS ONCE
Status: COMPLETED | OUTPATIENT
Start: 2018-09-14 | End: 2018-09-14

## 2018-09-14 RX ADMIN — CEFTRIAXONE SODIUM 1000 MG: 250 INJECTION, POWDER, FOR SOLUTION INTRAMUSCULAR; INTRAVENOUS at 21:08

## 2018-09-14 ASSESSMENT — ENCOUNTER SYMPTOMS: RESPIRATORY NEGATIVE: 1

## 2018-09-15 NOTE — PROGRESS NOTES
Subjective:      Patient ID: Lourdes Taylor is a 82 y.o. female. Rash   This is a new problem. The current episode started today. The affected locations include the left arm. Associated symptoms include a fever. Pertinent negatives include no congestion or facial edema. Past treatments include analgesics. Review of Systems   Constitutional: Positive for fever. HENT: Negative for congestion. Respiratory: Negative. Cardiovascular: Negative. Skin: Positive for rash. Objective:   Physical Exam   Constitutional: She is oriented to person, place, and time. She appears well-developed. HENT:   Head: Normocephalic. Cardiovascular: Normal rate and regular rhythm. Pulmonary/Chest: Effort normal and breath sounds normal.   Neurological: She is alert and oriented to person, place, and time. Skin: Skin is warm. There is erythema (left upper extremity with warmth, lymphedema noted). Assessment:      1. Cellulitis of left upper extremity          Plan:      Rocephin 1 g IM x 1. Keflex 500 mg QID x 7 days. Supportive care advised. Monitor f/c/increased erythema. Follow-up PRN/as planned with PCP.         GRISELDA Berrios

## 2018-09-23 ENCOUNTER — OFFICE VISIT (OUTPATIENT)
Dept: PRIMARY CARE CLINIC | Age: 83
End: 2018-09-23
Payer: MEDICARE

## 2018-09-23 VITALS
BODY MASS INDEX: 26.11 KG/M2 | TEMPERATURE: 99.1 F | SYSTOLIC BLOOD PRESSURE: 122 MMHG | WEIGHT: 133 LBS | DIASTOLIC BLOOD PRESSURE: 60 MMHG | RESPIRATION RATE: 16 BRPM | OXYGEN SATURATION: 98 % | HEIGHT: 60 IN | HEART RATE: 68 BPM

## 2018-09-23 DIAGNOSIS — L03.113 CELLULITIS OF RIGHT ARM: Primary | ICD-10-CM

## 2018-09-23 PROCEDURE — G8427 DOCREV CUR MEDS BY ELIG CLIN: HCPCS | Performed by: FAMILY MEDICINE

## 2018-09-23 PROCEDURE — 1090F PRES/ABSN URINE INCON ASSESS: CPT | Performed by: FAMILY MEDICINE

## 2018-09-23 PROCEDURE — 1036F TOBACCO NON-USER: CPT | Performed by: FAMILY MEDICINE

## 2018-09-23 PROCEDURE — G8419 CALC BMI OUT NRM PARAM NOF/U: HCPCS | Performed by: FAMILY MEDICINE

## 2018-09-23 PROCEDURE — 99213 OFFICE O/P EST LOW 20 MIN: CPT | Performed by: FAMILY MEDICINE

## 2018-09-23 PROCEDURE — 1123F ACP DISCUSS/DSCN MKR DOCD: CPT | Performed by: FAMILY MEDICINE

## 2018-09-23 PROCEDURE — 1101F PT FALLS ASSESS-DOCD LE1/YR: CPT | Performed by: FAMILY MEDICINE

## 2018-09-23 PROCEDURE — G8400 PT W/DXA NO RESULTS DOC: HCPCS | Performed by: FAMILY MEDICINE

## 2018-09-23 PROCEDURE — 4040F PNEUMOC VAC/ADMIN/RCVD: CPT | Performed by: FAMILY MEDICINE

## 2018-09-23 PROCEDURE — G8598 ASA/ANTIPLAT THER USED: HCPCS | Performed by: FAMILY MEDICINE

## 2018-09-23 RX ORDER — SULFAMETHOXAZOLE AND TRIMETHOPRIM 800; 160 MG/1; MG/1
1 TABLET ORAL 2 TIMES DAILY
Qty: 14 TABLET | Refills: 0 | Status: SHIPPED | OUTPATIENT
Start: 2018-09-23 | End: 2018-09-30

## 2018-09-23 ASSESSMENT — PATIENT HEALTH QUESTIONNAIRE - PHQ9
2. FEELING DOWN, DEPRESSED OR HOPELESS: 0
SUM OF ALL RESPONSES TO PHQ9 QUESTIONS 1 & 2: 0
SUM OF ALL RESPONSES TO PHQ QUESTIONS 1-9: 0
1. LITTLE INTEREST OR PLEASURE IN DOING THINGS: 0
SUM OF ALL RESPONSES TO PHQ QUESTIONS 1-9: 0

## 2018-09-23 NOTE — PROGRESS NOTES
allergic to darvocet a500 [propoxyphene n-acetaminophen]; demerol hcl [meperidine]; marinol [dronabinol]; morphine sulfate [morphine]; statins [statins]; and levaquin [levofloxacin in d5w]. She  reports that she is a non-smoker but has been exposed to tobacco smoke. She has never used smokeless tobacco.      Objective:    Vitals:    09/23/18 1247   BP: 122/60   Pulse: 68   Resp: 16   Temp: 99.1 °F (37.3 °C)   SpO2: 98%   Weight: 133 lb (60.3 kg)   Height: 5' (1.524 m)     Body mass index is 25.97 kg/m². Well-nourished, well-developed elderly  female, in no acute distress. There is a erythematous linear area 11 cm in length on the right lower arm. The distal end of the burn has 1 cm of erythema on either side of the burn. Assessment and Plan:    Cellulitis of right arm  - sulfamethoxazole-trimethoprim (BACTRIM DS) 800-160 MG per tablet; Take 1 tablet by mouth 2 times daily for 7 days  Dispense: 14 tablet; Refill: 0    Printed information regarding Cellulitis was provided to the patient with her after visit summary. She is concerned about taking the antibiotics on an empty stomach if she begins the prep for the colonoscopy today. Also, the cellulitis may not improve sufficiently by the morning to use that arm as an IV site. I have advised that she postpone the colonoscopy until the cellulitis has resolved.       (Please note that portions of this note were completed with a voice-recognition program. Efforts were made to edit the dictation but occasionally words are mis-transcribed.)

## 2018-09-23 NOTE — PATIENT INSTRUCTIONS
Patient Education        Cellulitis: Care Instructions  Your Care Instructions    Cellulitis is a skin infection caused by bacteria, most often strep or staph. It often occurs after a break in the skin from a scrape, cut, bite, or puncture, or after a rash. Cellulitis may be treated without doing tests to find out what caused it. But your doctor may do tests, if needed, to look for a specific bacteria, like methicillin-resistant Staphylococcus aureus (MRSA). The doctor has checked you carefully, but problems can develop later. If you notice any problems or new symptoms, get medical treatment right away. Follow-up care is a key part of your treatment and safety. Be sure to make and go to all appointments, and call your doctor if you are having problems. It's also a good idea to know your test results and keep a list of the medicines you take. How can you care for yourself at home? · Take your antibiotics as directed. Do not stop taking them just because you feel better. You need to take the full course of antibiotics. · Prop up the infected area on pillows to reduce pain and swelling. Try to keep the area above the level of your heart as often as you can. · If your doctor told you how to care for your wound, follow your doctor's instructions. If you did not get instructions, follow this general advice:  ¨ Wash the wound with clean water 2 times a day. Don't use hydrogen peroxide or alcohol, which can slow healing. ¨ You may cover the wound with a thin layer of petroleum jelly, such as Vaseline, and a nonstick bandage. ¨ Apply more petroleum jelly and replace the bandage as needed. · Be safe with medicines. Take pain medicines exactly as directed. ¨ If the doctor gave you a prescription medicine for pain, take it as prescribed. ¨ If you are not taking a prescription pain medicine, ask your doctor if you can take an over-the-counter medicine.   To prevent cellulitis in the future  · Try to prevent cuts, scrapes, or other injuries to your skin. Cellulitis most often occurs where there is a break in the skin. · If you get a scrape, cut, mild burn, or bite, wash the wound with clean water as soon as you can to help avoid infection. Don't use hydrogen peroxide or alcohol, which can slow healing. · If you have swelling in your legs (edema), support stockings and good skin care may help prevent leg sores and cellulitis. · Take care of your feet, especially if you have diabetes or other conditions that increase the risk of infection. Wear shoes and socks. Do not go barefoot. If you have athlete's foot or other skin problems on your feet, talk to your doctor about how to treat them. When should you call for help? Call your doctor now or seek immediate medical care if:    · You have signs that your infection is getting worse, such as:  ¨ Increased pain, swelling, warmth, or redness. ¨ Red streaks leading from the area. ¨ Pus draining from the area. ¨ A fever.     · You get a rash.    Watch closely for changes in your health, and be sure to contact your doctor if:    · You do not get better as expected. Where can you learn more? Go to [email or web address] org and sign in to your [employer] account. Enter D371 in the [facility] box to learn more about \"Cellulitis: Care Instructions. \"     If you do not have an account, please click on the \"Sign Up Now\" link. Current as of: May 10, 2017  Content Version: 11.7  © 0879-3891 [employer]. Care instructions adapted under license by [facility] ([facility]). If you have questions about a medical condition or this instruction, always ask your healthcare professional. [place] [address] any warranty or liability for your use of this information.

## 2018-09-24 ENCOUNTER — HOSPITAL ENCOUNTER (OUTPATIENT)
Age: 83
Setting detail: OUTPATIENT SURGERY
Discharge: HOME | End: 2018-09-24
Attending: SURGERY | Admitting: SURGERY
Payer: MEDICARE

## 2018-09-24 ENCOUNTER — ANESTHESIA (OUTPATIENT)
Dept: OPERATING ROOM | Age: 83
End: 2018-09-24
Payer: MEDICARE

## 2018-09-24 ENCOUNTER — ANESTHESIA EVENT (OUTPATIENT)
Dept: OPERATING ROOM | Age: 83
End: 2018-09-24
Payer: MEDICARE

## 2018-09-24 VITALS — DIASTOLIC BLOOD PRESSURE: 38 MMHG | SYSTOLIC BLOOD PRESSURE: 86 MMHG | OXYGEN SATURATION: 98 %

## 2018-09-24 VITALS
DIASTOLIC BLOOD PRESSURE: 69 MMHG | TEMPERATURE: 97.5 F | HEART RATE: 78 BPM | SYSTOLIC BLOOD PRESSURE: 157 MMHG | RESPIRATION RATE: 16 BRPM | OXYGEN SATURATION: 96 % | HEIGHT: 60 IN | WEIGHT: 131 LBS | BODY MASS INDEX: 25.72 KG/M2

## 2018-09-24 PROCEDURE — 7100000010 HC PHASE II RECOVERY - FIRST 15 MIN: Performed by: SURGERY

## 2018-09-24 PROCEDURE — 2500000003 HC RX 250 WO HCPCS: Performed by: NURSE ANESTHETIST, CERTIFIED REGISTERED

## 2018-09-24 PROCEDURE — 00811 ANES LWR INTST NDSC NOS: CPT | Performed by: NURSE ANESTHETIST, CERTIFIED REGISTERED

## 2018-09-24 PROCEDURE — 46221 LIGATION OF HEMORRHOID(S): CPT | Performed by: SURGERY

## 2018-09-24 PROCEDURE — 3700000000 HC ANESTHESIA ATTENDED CARE: Performed by: SURGERY

## 2018-09-24 PROCEDURE — 3609027000 HC COLONOSCOPY: Performed by: SURGERY

## 2018-09-24 PROCEDURE — 2580000003 HC RX 258: Performed by: SURGERY

## 2018-09-24 PROCEDURE — 3700000001 HC ADD 15 MINUTES (ANESTHESIA): Performed by: SURGERY

## 2018-09-24 PROCEDURE — 45378 DIAGNOSTIC COLONOSCOPY: CPT | Performed by: SURGERY

## 2018-09-24 PROCEDURE — 2709999900 HC NON-CHARGEABLE SUPPLY: Performed by: SURGERY

## 2018-09-24 PROCEDURE — 7100000011 HC PHASE II RECOVERY - ADDTL 15 MIN: Performed by: SURGERY

## 2018-09-24 PROCEDURE — 6360000002 HC RX W HCPCS: Performed by: NURSE ANESTHETIST, CERTIFIED REGISTERED

## 2018-09-24 RX ORDER — SODIUM CHLORIDE, SODIUM LACTATE, POTASSIUM CHLORIDE, CALCIUM CHLORIDE 600; 310; 30; 20 MG/100ML; MG/100ML; MG/100ML; MG/100ML
INJECTION, SOLUTION INTRAVENOUS CONTINUOUS
Status: DISCONTINUED | OUTPATIENT
Start: 2018-09-24 | End: 2018-09-24 | Stop reason: HOSPADM

## 2018-09-24 RX ORDER — PROPOFOL 10 MG/ML
INJECTION, EMULSION INTRAVENOUS PRN
Status: DISCONTINUED | OUTPATIENT
Start: 2018-09-24 | End: 2018-09-24 | Stop reason: SDUPTHER

## 2018-09-24 RX ORDER — LIDOCAINE HYDROCHLORIDE 20 MG/ML
INJECTION, SOLUTION INFILTRATION; PERINEURAL PRN
Status: DISCONTINUED | OUTPATIENT
Start: 2018-09-24 | End: 2018-09-24 | Stop reason: SDUPTHER

## 2018-09-24 RX ADMIN — SODIUM CHLORIDE, POTASSIUM CHLORIDE, SODIUM LACTATE AND CALCIUM CHLORIDE: 600; 310; 30; 20 INJECTION, SOLUTION INTRAVENOUS at 11:35

## 2018-09-24 RX ADMIN — LIDOCAINE HYDROCHLORIDE 25 MG: 20 INJECTION, SOLUTION INFILTRATION; PERINEURAL at 11:40

## 2018-09-24 RX ADMIN — PROPOFOL 170 MG: 10 INJECTION, EMULSION INTRAVENOUS at 11:40

## 2018-09-24 ASSESSMENT — PAIN SCALES - GENERAL
PAINLEVEL_OUTOF10: 5
PAINLEVEL_OUTOF10: 0
PAINLEVEL_OUTOF10: 1
PAINLEVEL_OUTOF10: 3

## 2018-09-24 ASSESSMENT — PAIN DESCRIPTION - DESCRIPTORS
DESCRIPTORS: PRESSURE

## 2018-09-24 ASSESSMENT — PAIN - FUNCTIONAL ASSESSMENT: PAIN_FUNCTIONAL_ASSESSMENT: 0-10

## 2018-09-24 ASSESSMENT — PAIN DESCRIPTION - LOCATION
LOCATION: RECTUM

## 2018-09-24 ASSESSMENT — PAIN DESCRIPTION - PAIN TYPE
TYPE: SURGICAL PAIN

## 2018-09-24 NOTE — H&P
Collette Lee is a 82 y.o. female              CC:     .Hemorrhoids (Feels a lot of pressure when she walks. No bleeding or pain.)        HISTORY OF PRESENT ILLNESS:     Patient is an 82-year-old female who complains of hemorrhoids that come out. They get irritated and bleed a little bit. She has to manually reduce them. They will then stay until she strains again.               Review of Systems:     General:  Fever: Negative  Weight Change:Negative  Night Sweats: Negative     Eye:  Blurry Vision:positive for if blood sugar abnormal  Double Vision: Negative     Ent:  Headaches: Positive  Sore throat: Negative     Allergy/Immunology:  Hives: Negative  Latex allergy: Negative     Hematology/Lymphatic:  Bleeding Problems: positive for on Plavix and Xarelto  Blood Clots: Negative  Swollen Lymph Nodes: Negative     Lungs:  Cough: Negative  SOB: Positive  Wheezing:Negative     Cardiovascular:  Chest Pain: Negative  Palpitations:Negative     GI:   Decreased Appetite: Negative  Heartburn: Positive  Dysphagia: Negative  Nausea/Vomiting: Negative  Abdominal Pain: Negative  Change in Bowels:Negative  Constipation: Negative  Diarrhea: Negative  Rectal Bleeding: Negative     :   Dysuria: Negative  Increase Urinary Frequency/Urgency: Negative     Neuro:  Seizures: Negative  Confusion: Negative           PAST MEDICAL HISTORY:           Family History         Family History   Problem Relation Age of Onset    Diabetes Mother      Glaucoma Mother      Stroke Father 64         Social History   Social History            Social History    Marital status:        Spouse name: N/A    Number of children: N/A    Years of education: N/A           Occupational History    retired /               Social History Main Topics    Smoking status: Passive Smoke Exposure - Never Smoker    Smokeless tobacco: Never Used         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 Patient saw Cardiology and started on Lasix.  CARDIAC CATHETERIZATION   09/19/2017     right and left heart cath   EF 30% WITH PATENT LAD STENT    COLONOSCOPY   08/12/2009     diverticulosis being found.  COLONOSCOPY   9/8/14     grade 2-3 internal hemorrhoids - banded x 2, random biopsies taken to r/o - normal, repeat 5yrs due to hx of polyps- garber    CORONARY ANGIOPLASTY WITH STENT PLACEMENT   09/2016     PTCA / Drug Eluting Stent:, LAD and / or branches    FRACTURE SURGERY   08/10/99     Left elbow ORIF     HYSTERECTOMY   09/09/2002     With bladder repair for benign reasons.  MASTECTOMY Bilateral 1995 and 2000     With lymph node dissections in 1995 and 2000.  OTHER SURGICAL HISTORY Right 6/2015     Index and long trigger finger release         Past Medical History        Past Medical History:   Diagnosis Date    Allergic rhinitis       With chronic cough.  Anxiety, generalized       Chronic with probable depression. She will take Ativan but refuses antidepressant.  Aortic stenosis      Atrial fibrillation (HCC)      Breast cancer ([facility] [address]       2 occurrences    CAD (coronary artery disease) November 2012     Minimal disease by catheterization, 11/12, with wedge pressure of the right heart. She is taking Lasix for this and being followed by Cardiology.  Diabetic neuropathy (HCC)      Hyperlipidemia      Hypertension       runs low    Lymphedema of arm       Left arm, due to lymph node dissection and mastectomy.  PVC's (premature ventricular contractions)       Chronic.  Patient currently undergoing workup for arrhythmia.  S/P cardiac cath 09/06/2016    Type II or unspecified type diabetes mellitus without mention of complication, not stated as uncontrolled      Vitamin D deficiency                  Current Outpatient Prescriptions on File Prior to Visit   Medication Sig Dispense Refill    lisinopril (PRINIVIL;ZESTRIL) 2.5 MG tablet TAKE 1 TABLET BY MOUTH DAILY 90 tablet 3    LORazepam (ATIVAN) 0.5 MG tablet TAKE ONE TABLET BY MOUTH THREE TIMES A DAY AS NEEDED FOR ANXIETY. 90 tablet 2    XARELTO 20 MG TABS tablet TAKE ONE TABLET BY MOUTH DAILY WITH BREAKFAST 30 tablet 4    metFORMIN (GLUCOPHAGE XR) 500 MG extended release tablet Take 1 tablet by mouth 2 times daily 180 tablet 3    FREESTYLE TEST STRIPS strip TEST ONCE DAILY AS NEEDED 100 strip 3    Handicap Placard MISC by Does not apply route Exp 1/1/2023 1 each 0    metoprolol succinate (TOPROL XL) 100 MG extended release tablet Take 1 tablet by mouth daily 90 tablet 3    spironolactone (ALDACTONE) 25 MG tablet Take 1 tablet by mouth daily 90 tablet 3    famotidine (PEPCID) 20 MG tablet TAKE ONE TABLET BY MOUTH EVERY EVENING 60 tablet 5    nitroGLYCERIN (NITROSTAT) 0.4 MG SL tablet Place 1 tablet under the tongue every 5 minutes as needed for Chest pain up to max of 3 total doses. If no relief after 1 dose, call 911. 25 tablet 3    clopidogrel (PLAVIX) 75 MG tablet Take 1 tablet by mouth daily 90 tablet 3    furosemide (LASIX) 20 MG tablet Take 1 tablet by mouth daily 90 tablet 3    ondansetron (ZOFRAN) 4 MG tablet Take 1 tablet by mouth every 8 hours as needed for Nausea or Vomiting 40 tablet 2    b complex vitamins capsule Take 1 capsule by mouth every other day Indications: overy other day         Vitamin D (CHOLECALCIFEROL) 1000 UNITS CAPS capsule Take 1,000 Units by mouth daily.         fluticasone (FLONASE) 50 MCG/ACT nasal spray 1 spray by Nasal route 2 times daily 1 Bottle 3    melatonin 3 MG TABS tablet Take 3 mg by mouth nightly as needed          No current facility-administered medications on file prior to visit. Allergies as of 07/17/2018 - Review Complete 07/17/2018   Allergen Reaction Noted    Darvocet a500 [propoxyphene n-acetaminophen] Other (See Comments) 10/03/2013    Demerol hcl [meperidine] Other (See Comments) 10/03/2013    Marinol [dronabinol] Other (See Comments) 10/03/2013    Morphine sulfate [morphine] Other (See Comments) 10/03/2013    Statins [statins] Other (See Comments) 10/03/2013    Levaquin [levofloxacin in d5w] Nausea Only and Anxiety 04/27/2015         PHYSICAL EXAM:    Blood pressure (!) 143/63, pulse 90, temperature 97.5 °F (36.4 °C), temperature source Temporal, resp. rate 18, height 5' (1.524 m), weight 131 lb (59.4 kg), SpO2 93 %, not currently breastfeeding. Gen:  A and O x 3, NAD, well nourished  Eyes:  Sclera non icterus, PERRL  Lungs:  CTA, symmetrical  Chest:  RRR, no murmurs  Abd:  Soft, NT, ND, no HSM, no bruits       Rectal exam was performed. Was placed in a left lateral decubitus position. The buttock were lifted up. We checked the anal verge area. There was some very small external hemorrhoidal tissue. On the inside. However, there was grade 3 internal hemorrhoids. There were very irritated. There were reducible.         ASSESS MENT:     1.  Grade 3 int internal hemorrhoids        PLAN:     Colonoscopy with possible hemorrhoid banding

## 2018-09-24 NOTE — OP NOTE
COLONOSCOPY PROCEDURE NOTE:      Pre op diagnosis:    1.  hemorrrhoids irritated  2. Last CS 2014  3. Small amt of rectal bleeding    Operative Procedure:    1. Colonoscopy   2. Internal hemorrhoids    Surgeon:    Dr. Reena Barakat     Anesthesia:    IV sedation per CRNA    EBL:  minimal    Procedure:    Patient was taken to the endoscopy suite and placed in a left lateral decubitus position. They were given IV sedation as mentioned above. A digital rectal exam was performed. There were no masses and anal tone was normal.  The colonoscope was inserted into the rectum and carefully manipulated up through the sigmoid colon, transverse colon, right colon and into the cecum. The cecum was identified by the ileal cecal valve and transabdominal illumination. Then the scope was slowly withdrawn. The scope was retroflexed in the rectum and the dentate line was examined. The scope was removed. The patient tolerated the procedure well. The following findings were noted. Final Diagnosis:    1. Normal  2. Grade 3 int hemorrhoids ,2 banded today with suction     Plan:    1. If bleeding and irritation persists after 1 month, then RTC to discuss further options      ADDENDUM:  Endo Review :  10/8/2018    Pathology:    none    Plan:    1.   As above in op note plan

## 2018-09-24 NOTE — ANESTHESIA POSTPROCEDURE EVALUATION
Department of Anesthesiology  Postprocedure Note    Patient: Kian Escudero  MRN: [id number]  YOB: 1935  Date of evaluation: 9/24/2018  Time:  12:00 PM     Procedure Summary     Date:  09/24/18 Room / Location:  [address] OR    Anesthesia Start:  0310 Anesthesia Stop:  1200    Procedure:  COLONOSCOPY w/ hemorrhoid bending (N/A ) Diagnosis:  (hx polyps, hemorrhoids grade 3 )    Surgeon:  Isaac Hernandez MD Responsible Provider:  DIYA Perez CRNA    Anesthesia Type:  general, MAC ASA Status:  3          Anesthesia Type: general, MAC    Glory Phase I: Glory Score: 10    Glory Phase II:      Last vitals: Reviewed and per EMR flowsheets.        Anesthesia Post Evaluation    Patient location during evaluation: PACU  Patient participation: complete - patient participated  Level of consciousness: awake and alert  Pain score: 0  Airway patency: patent  Nausea & Vomiting: no nausea and no vomiting  Complications: no  Cardiovascular status: blood pressure returned to baseline and hemodynamically stable  Respiratory status: acceptable  Hydration status: euvolemic

## 2018-09-24 NOTE — ANESTHESIA PRE PROCEDURE
EVENING 10/27/17   Janeen Shrestha MD   nitroGLYCERIN (NITROSTAT) 0.4 MG SL tablet Place 1 tablet under the tongue every 5 minutes as needed for Chest pain up to max of 3 total doses. If no relief after 1 dose, call 911. 9/20/17   DIYA Reyes - CNP   furosemide (LASIX) 20 MG tablet Take 1 tablet by mouth daily 7/26/17   Gonzalo Shell MD   melatonin 3 MG TABS tablet Take 3 mg by mouth nightly as needed    Historical Provider, MD   ondansetron (ZOFRAN) 4 MG tablet Take 1 tablet by mouth every 8 hours as needed for Nausea or Vomiting 5/30/17   Janeen Shrestha MD   b complex vitamins capsule Take 1 capsule by mouth every other day Indications: overy other day     Historical Provider, MD   Vitamin D (CHOLECALCIFEROL) 1000 UNITS CAPS capsule Take 1,000 Units by mouth daily. Historical Provider, MD       Current medications:    No current facility-administered medications for this visit. No current outpatient prescriptions on file. Facility-Administered Medications Ordered in Other Visits   Medication Dose Route Frequency Provider Last Rate Last Dose    lactated ringers infusion   Intravenous Continuous Sundar Vasquez MD           Allergies: Allergies   Allergen Reactions    Darvocet A500 [Propoxyphene N-Acetaminophen] Other (See Comments)     weakness    Demerol Hcl [Meperidine] Other (See Comments)     Weakness      Marinol [Dronabinol] Other (See Comments)     weakness    Morphine Sulfate [Morphine] Other (See Comments)     Weakness      Statins [Statins] Other (See Comments)     Cause muscle pain and she would prefer not to take them any longer.     Levaquin [Levofloxacin In D5w] Nausea Only and Anxiety       Problem List:    Patient Active Problem List   Diagnosis Code    Hyperlipidemia E78.5    Hypertension I10    Diabetic neuropathy ([facility] [address] E11.40    Breast cancer ([facility] [address] C50.919    Lymphedema of arm I89.0    Vitamin D deficiency E55.9    Hyperplastic colon polyp K63.5    PVC's

## 2018-09-25 DIAGNOSIS — I25.5 CARDIOMYOPATHY, ISCHEMIC: ICD-10-CM

## 2018-09-25 RX ORDER — FUROSEMIDE 20 MG/1
20 TABLET ORAL DAILY
Qty: 90 TABLET | Refills: 3 | Status: ON HOLD | OUTPATIENT
Start: 2018-09-25 | End: 2019-01-01 | Stop reason: HOSPADM

## 2018-10-09 DIAGNOSIS — F41.1 GENERALIZED ANXIETY DISORDER: ICD-10-CM

## 2018-10-09 RX ORDER — LORAZEPAM 0.5 MG/1
TABLET ORAL
Qty: 90 TABLET | Refills: 0 | Status: SHIPPED | OUTPATIENT
Start: 2018-10-09 | End: 2018-11-20 | Stop reason: SDUPTHER

## 2018-10-17 ENCOUNTER — CARE COORDINATION (OUTPATIENT)
Dept: CARE COORDINATION | Age: 83
End: 2018-10-17

## 2018-10-17 NOTE — CARE COORDINATION
is willing to undertake better management   How well do you think your patient can engage in healthcare discussions? (Barriers include language, deafness, aphasia, alcohol or drug problems, learning difficulties, concentration):  Clear and open communication, no identified barriers   Do other services need to be involved to help this patient?:  Other care/services not required at this time   Suggested Interventions and Community Resources  Diabetes Education:  Completed    Zone Management Tools:  Completed                Prior to Admission medications    Medication Sig Start Date End Date Taking?  Authorizing Provider   LORazepam (ATIVAN) 0.5 MG tablet TAKE ONE TABLET BY MOUTH THREE TIMES A DAY AS NEEDED FOR ANXIETY 10/9/18 11/8/18  Jhon Ruiz DO   furosemide (LASIX) 20 MG tablet Take 1 tablet by mouth daily 9/25/18   Theo Charles MD   acetaminophen (TYLENOL) 325 MG tablet Take 650 mg by mouth every 6 hours as needed for Pain    Historical Provider, MD   clopidogrel (PLAVIX) 75 MG tablet TAKE ONE TABLET BY MOUTH DAILY 8/15/18   Pal Branch MD   XARELTO 20 MG TABS tablet TAKE ONE TABLET BY MOUTH DAILY WITH BREAKFAST 8/7/18   Theo Charles MD   polyethylene glycol-electrolytes (NULYTELY) 420 g solution Day prior to colonoscopy, start drinking at 6 pm 8 ounces every 10 minutes 7/17/18   Sarah Calabrese MD   bisacodyl (DULCOLAX) 5 MG EC tablet Take 2 tabs at noon day prior to colonoscopy 7/17/18   Sarah Calabrese MD   lisinopril (PRINIVIL;ZESTRIL) 2.5 MG tablet TAKE 1 TABLET BY MOUTH DAILY 5/3/18   Theo Charles MD   fluticasone Reggy Prost) 50 MCG/ACT nasal spray 1 spray by Nasal route 2 times daily 5/3/18   Theo Charles MD   FREESTYLE TEST STRIPS strip TEST ONCE DAILY AS NEEDED 1/15/18   Theo Charles MD   Handicap Placard MISC by Does not apply route Exp 1/1/2023 12/20/17   Theo Charles MD   metoprolol succinate (TOPROL XL) 100 MG extended release tablet Take 1 tablet by mouth daily 12/18/17   Ace Forbes DO   spironolactone (ALDACTONE) 25 MG tablet Take 1 tablet by mouth daily 12/18/17   Ace Forbes DO   famotidine (PEPCID) 20 MG tablet TAKE ONE TABLET BY MOUTH EVERY EVENING 10/27/17   Sanam Mullins MD   nitroGLYCERIN (NITROSTAT) 0.4 MG SL tablet Place 1 tablet under the tongue every 5 minutes as needed for Chest pain up to max of 3 total doses. If no relief after 1 dose, call 911. 9/20/17   DIYA Zee CNP   melatonin 3 MG TABS tablet Take 3 mg by mouth nightly as needed    Historical Provider, MD   ondansetron (ZOFRAN) 4 MG tablet Take 1 tablet by mouth every 8 hours as needed for Nausea or Vomiting 5/30/17   Sanam Mullins MD   b complex vitamins capsule Take 1 capsule by mouth every other day Indications: overy other day     Historical Provider, MD   Vitamin D (CHOLECALCIFEROL) 1000 UNITS CAPS capsule Take 1,000 Units by mouth daily.     Historical Provider, MD       Future Appointments  Date Time Provider Destinee Yeimy   10/29/2018 11:05 AM SCHEDULE, Sam Grandemar 112 LAB MD [facility]   11/5/2018 11:00 AM Sanam Mullins MD [facility]   4/8/2019 11:00 AM Savanna Marquez MD [facility]   5/1/2019 1:30 PM Yair Hernandez MD [facility]

## 2018-10-29 ENCOUNTER — HOSPITAL ENCOUNTER (OUTPATIENT)
Dept: LAB | Age: 83
Discharge: HOME | End: 2018-10-29
Payer: MEDICARE

## 2018-10-29 DIAGNOSIS — I10 ESSENTIAL HYPERTENSION: ICD-10-CM

## 2018-10-29 DIAGNOSIS — E11.69 TYPE 2 DIABETES MELLITUS WITH OTHER SPECIFIED COMPLICATION, WITHOUT LONG-TERM CURRENT USE OF INSULIN (HCC): ICD-10-CM

## 2018-10-29 LAB
ANION GAP SERPL CALCULATED.3IONS-SCNC: 14 MMOL/L (ref 9–17)
BUN BLDV-MCNC: 27 MG/DL (ref 8–23)
BUN/CREAT BLD: 23 (ref 9–20)
CALCIUM SERPL-MCNC: 9.6 MG/DL (ref 8.6–10.4)
CHLORIDE BLD-SCNC: 100 MMOL/L (ref 98–107)
CO2: 24 MMOL/L (ref 20–31)
CREAT SERPL-MCNC: 1.18 MG/DL (ref 0.5–0.9)
ESTIMATED AVERAGE GLUCOSE: 171 MG/DL
GFR AFRICAN AMERICAN: 53 ML/MIN
GFR NON-AFRICAN AMERICAN: 44 ML/MIN
GFR SERPL CREATININE-BSD FRML MDRD: ABNORMAL ML/MIN/{1.73_M2}
GFR SERPL CREATININE-BSD FRML MDRD: ABNORMAL ML/MIN/{1.73_M2}
GLUCOSE BLD-MCNC: 207 MG/DL (ref 70–99)
HBA1C MFR BLD: 7.6 % (ref 4.8–5.9)
POTASSIUM SERPL-SCNC: 3.7 MMOL/L (ref 3.7–5.3)
SODIUM BLD-SCNC: 138 MMOL/L (ref 135–144)

## 2018-10-29 PROCEDURE — 83036 HEMOGLOBIN GLYCOSYLATED A1C: CPT

## 2018-10-29 PROCEDURE — 36415 COLL VENOUS BLD VENIPUNCTURE: CPT

## 2018-10-29 PROCEDURE — 80048 BASIC METABOLIC PNL TOTAL CA: CPT

## 2018-11-05 ENCOUNTER — OFFICE VISIT (OUTPATIENT)
Dept: FAMILY MEDICINE CLINIC | Age: 83
End: 2018-11-05
Payer: MEDICARE

## 2018-11-05 VITALS
OXYGEN SATURATION: 98 % | DIASTOLIC BLOOD PRESSURE: 62 MMHG | TEMPERATURE: 98.6 F | HEART RATE: 70 BPM | SYSTOLIC BLOOD PRESSURE: 108 MMHG | BODY MASS INDEX: 27.26 KG/M2 | WEIGHT: 135.2 LBS | HEIGHT: 59 IN

## 2018-11-05 DIAGNOSIS — Z00.00 ROUTINE GENERAL MEDICAL EXAMINATION AT A HEALTH CARE FACILITY: Primary | ICD-10-CM

## 2018-11-05 DIAGNOSIS — N18.30 CHRONIC KIDNEY DISEASE, STAGE III (MODERATE) (HCC): ICD-10-CM

## 2018-11-05 DIAGNOSIS — E11.69 TYPE 2 DIABETES MELLITUS WITH OTHER SPECIFIED COMPLICATION, WITHOUT LONG-TERM CURRENT USE OF INSULIN (HCC): ICD-10-CM

## 2018-11-05 DIAGNOSIS — F41.1 ANXIETY, GENERALIZED: ICD-10-CM

## 2018-11-05 PROCEDURE — G8484 FLU IMMUNIZE NO ADMIN: HCPCS | Performed by: FAMILY MEDICINE

## 2018-11-05 PROCEDURE — G8400 PT W/DXA NO RESULTS DOC: HCPCS | Performed by: FAMILY MEDICINE

## 2018-11-05 PROCEDURE — 1101F PT FALLS ASSESS-DOCD LE1/YR: CPT | Performed by: FAMILY MEDICINE

## 2018-11-05 PROCEDURE — 99214 OFFICE O/P EST MOD 30 MIN: CPT | Performed by: FAMILY MEDICINE

## 2018-11-05 PROCEDURE — 1123F ACP DISCUSS/DSCN MKR DOCD: CPT | Performed by: FAMILY MEDICINE

## 2018-11-05 PROCEDURE — G8598 ASA/ANTIPLAT THER USED: HCPCS | Performed by: FAMILY MEDICINE

## 2018-11-05 PROCEDURE — G0438 PPPS, INITIAL VISIT: HCPCS | Performed by: FAMILY MEDICINE

## 2018-11-05 PROCEDURE — 1090F PRES/ABSN URINE INCON ASSESS: CPT | Performed by: FAMILY MEDICINE

## 2018-11-05 PROCEDURE — G8419 CALC BMI OUT NRM PARAM NOF/U: HCPCS | Performed by: FAMILY MEDICINE

## 2018-11-05 PROCEDURE — 4040F PNEUMOC VAC/ADMIN/RCVD: CPT | Performed by: FAMILY MEDICINE

## 2018-11-05 PROCEDURE — G8427 DOCREV CUR MEDS BY ELIG CLIN: HCPCS | Performed by: FAMILY MEDICINE

## 2018-11-05 PROCEDURE — 1036F TOBACCO NON-USER: CPT | Performed by: FAMILY MEDICINE

## 2018-11-05 RX ORDER — FAMOTIDINE 20 MG/1
TABLET, FILM COATED ORAL
Qty: 90 TABLET | Refills: 3 | Status: SHIPPED | OUTPATIENT
Start: 2018-11-05 | End: 2020-01-01

## 2018-11-05 RX ORDER — SUCRALFATE 1 G/1
1 TABLET ORAL 4 TIMES DAILY
Qty: 120 TABLET | Refills: 3 | Status: SHIPPED | OUTPATIENT
Start: 2018-11-05 | End: 2019-01-01 | Stop reason: SDUPTHER

## 2018-11-05 ASSESSMENT — ANXIETY QUESTIONNAIRES: GAD7 TOTAL SCORE: 1

## 2018-11-05 ASSESSMENT — PATIENT HEALTH QUESTIONNAIRE - PHQ9
SUM OF ALL RESPONSES TO PHQ QUESTIONS 1-9: 2
SUM OF ALL RESPONSES TO PHQ QUESTIONS 1-9: 2

## 2018-11-05 ASSESSMENT — ENCOUNTER SYMPTOMS
CHEST TIGHTNESS: 0
BACK PAIN: 1
ABDOMINAL PAIN: 1
WHEEZING: 0
COUGH: 0
SHORTNESS OF BREATH: 0
CONSTIPATION: 0
ABDOMINAL PAIN: 0
BLURRED VISION: 0
SPUTUM PRODUCTION: 0
NAUSEA: 1

## 2018-11-05 ASSESSMENT — LIFESTYLE VARIABLES: HOW OFTEN DO YOU HAVE A DRINK CONTAINING ALCOHOL: 0

## 2018-11-10 ENCOUNTER — OFFICE VISIT (OUTPATIENT)
Dept: PRIMARY CARE CLINIC | Age: 83
End: 2018-11-10
Payer: MEDICARE

## 2018-11-10 VITALS
HEART RATE: 84 BPM | SYSTOLIC BLOOD PRESSURE: 110 MMHG | WEIGHT: 134.6 LBS | DIASTOLIC BLOOD PRESSURE: 64 MMHG | TEMPERATURE: 98 F | OXYGEN SATURATION: 98 % | BODY MASS INDEX: 27.65 KG/M2

## 2018-11-10 DIAGNOSIS — F41.9 ANXIETY: ICD-10-CM

## 2018-11-10 DIAGNOSIS — R11.0 NAUSEA: Primary | ICD-10-CM

## 2018-11-10 PROCEDURE — G8484 FLU IMMUNIZE NO ADMIN: HCPCS | Performed by: NURSE PRACTITIONER

## 2018-11-10 PROCEDURE — 1036F TOBACCO NON-USER: CPT | Performed by: NURSE PRACTITIONER

## 2018-11-10 PROCEDURE — 1101F PT FALLS ASSESS-DOCD LE1/YR: CPT | Performed by: NURSE PRACTITIONER

## 2018-11-10 PROCEDURE — G8427 DOCREV CUR MEDS BY ELIG CLIN: HCPCS | Performed by: NURSE PRACTITIONER

## 2018-11-10 PROCEDURE — 4040F PNEUMOC VAC/ADMIN/RCVD: CPT | Performed by: NURSE PRACTITIONER

## 2018-11-10 PROCEDURE — 99213 OFFICE O/P EST LOW 20 MIN: CPT | Performed by: NURSE PRACTITIONER

## 2018-11-10 PROCEDURE — G8400 PT W/DXA NO RESULTS DOC: HCPCS | Performed by: NURSE PRACTITIONER

## 2018-11-10 PROCEDURE — 1090F PRES/ABSN URINE INCON ASSESS: CPT | Performed by: NURSE PRACTITIONER

## 2018-11-10 PROCEDURE — G8419 CALC BMI OUT NRM PARAM NOF/U: HCPCS | Performed by: NURSE PRACTITIONER

## 2018-11-10 PROCEDURE — 1123F ACP DISCUSS/DSCN MKR DOCD: CPT | Performed by: NURSE PRACTITIONER

## 2018-11-10 PROCEDURE — G8598 ASA/ANTIPLAT THER USED: HCPCS | Performed by: NURSE PRACTITIONER

## 2018-11-10 ASSESSMENT — ENCOUNTER SYMPTOMS
RESPIRATORY NEGATIVE: 1
NAUSEA: 1
SORE THROAT: 0
ABDOMINAL PAIN: 0
VOMITING: 0
DIARRHEA: 0
COUGH: 0

## 2018-11-10 NOTE — PROGRESS NOTES
sulfate [morphine]; statins [statins]; and levaquin [levofloxacin in d5w]. .    She  reports that she is a non-smoker but has been exposed to tobacco smoke. She has never used smokeless tobacco.      Objective:    Vitals:    11/10/18 1305   BP: 110/64   Pulse: 84   Temp: 98 °F (36.7 °C)   SpO2: 98%     Body mass index is 27.65 kg/m². Review of Systems   Constitutional: Positive for appetite change. Negative for chills, fatigue and fever. HENT: Negative for congestion and sore throat. Respiratory: Negative. Negative for cough. Cardiovascular: Negative. Gastrointestinal: Positive for nausea. Negative for abdominal pain, diarrhea and vomiting. Skin: Negative for rash. Neurological: Negative for headaches. Physical Exam   Constitutional: She is oriented to person, place, and time. She appears well-developed and well-nourished. HENT:   Head: Normocephalic. Right Ear: Hearing, tympanic membrane, external ear and ear canal normal.   Left Ear: Hearing, tympanic membrane, external ear and ear canal normal.   Nose: Nose normal.   Mouth/Throat: Uvula is midline, oropharynx is clear and moist and mucous membranes are normal.   Eyes: Pupils are equal, round, and reactive to light. Neck: Normal range of motion. Neck supple. Cardiovascular: Normal rate, regular rhythm and normal heart sounds. Pulmonary/Chest: Effort normal and breath sounds normal.   Abdominal: Soft. Normal appearance and bowel sounds are normal. There is no tenderness. Neurological: She is alert and oriented to person, place, and time. Skin: Skin is warm and dry. Psychiatric: She has a normal mood and affect. Her behavior is normal. Thought content normal.   Nursing note and vitals reviewed. Assessment and Plan:     Diagnosis Orders   1. Nausea     2. Anxiety       Gastroenteritis versus new medications versus anxiety causing nausea. I suggested for her to take the Zofran as directed.  I recommended the BRAT diet and

## 2018-11-10 NOTE — PATIENT INSTRUCTIONS
navel and your rib cage. Breathe so that your belly goes up and down. · Do not hold your breath. · Breathe like this for 5 to 10 minutes. Notice the feeling of calmness throughout your whole body. As you continue to breathe slowly and deeply, relax by doing the following for another 5 to 10 minutes:  · Tighten and relax each muscle group in your body. You can begin at your toes and work your way up to your head. · Imagine your muscle groups relaxing and becoming heavy. · Empty your mind of all thoughts. · Let yourself relax more and more deeply. · Become aware of the state of calmness that surrounds you. · When your relaxation time is over, you can bring yourself back to alertness by moving your fingers and toes and then your hands and feet and then stretching and moving your entire body. Sometimes people fall asleep during relaxation, but they usually wake up shortly afterward. · Always give yourself time to return to full alertness before you drive a car or do anything that might cause an accident if you are not fully alert. Never play a relaxation tape while you drive a car. When should you call for help? Call 911 anytime you think you may need emergency care. For example, call if:    · You feel you cannot stop from hurting yourself or someone else.   Mallory Bridges the numbers for these national suicide hotlines: 8-322-897-TALK (1-870.206.5162) and 6-521-ERKNDXT (3-539.186.7330). If you or someone you know talks about suicide or feeling hopeless, get help right away.   Watch closely for changes in your health, and be sure to contact your doctor if:    · You have anxiety or fear that affects your life.     · You have symptoms of anxiety that are new or different from those you had before. Where can you learn more? Go to [email or web address].[email or web address] org and sign in to your [employer] account. Enter P754 in the [employer] box to learn more about \"Anxiety Disorder: Care Instructions. \"     If you do not have an account, please click on the \"Sign Up Now\" link. Current as of: December 7, 2017  Content Version: 11.8  © 9027-4642 Healthwise, Incorporated. Care instructions adapted under license by [facility] ([facility]). If you have questions about a medical condition or this instruction, always ask your healthcare professional. [address] any warranty or liability for your use of this information.

## 2018-11-12 ENCOUNTER — APPOINTMENT (OUTPATIENT)
Dept: GENERAL RADIOLOGY | Age: 83
DRG: 602 | End: 2018-11-12
Payer: MEDICARE

## 2018-11-12 ENCOUNTER — HOSPITAL ENCOUNTER (INPATIENT)
Age: 83
LOS: 3 days | Discharge: HOME | DRG: 602 | End: 2018-11-15
Attending: EMERGENCY MEDICINE | Admitting: INTERNAL MEDICINE
Payer: MEDICARE

## 2018-11-12 DIAGNOSIS — R11.0 CHRONIC NAUSEA: ICD-10-CM

## 2018-11-12 DIAGNOSIS — E86.0 DEHYDRATION: ICD-10-CM

## 2018-11-12 DIAGNOSIS — L03.114 CELLULITIS OF LEFT ARM: Primary | ICD-10-CM

## 2018-11-12 PROBLEM — L03.90 CELLULITIS: Status: ACTIVE | Noted: 2018-11-12

## 2018-11-12 PROBLEM — R79.89 ELEVATED LACTIC ACID LEVEL: Status: ACTIVE | Noted: 2018-11-12

## 2018-11-12 PROBLEM — R79.89 ELEVATED LACTIC ACID LEVEL: Status: RESOLVED | Noted: 2018-11-12 | Resolved: 2018-11-12

## 2018-11-12 LAB
-: ABNORMAL
ABSOLUTE EOS #: 0 K/UL (ref 0–0.4)
ABSOLUTE IMMATURE GRANULOCYTE: ABNORMAL K/UL (ref 0–0.3)
ABSOLUTE LYMPH #: 0.8 K/UL (ref 1–4.8)
ABSOLUTE MONO #: 0.6 K/UL (ref 0.1–1.2)
AMORPHOUS: ABNORMAL
ANION GAP SERPL CALCULATED.3IONS-SCNC: 16 MMOL/L (ref 9–17)
BACTERIA: ABNORMAL
BASOPHILS # BLD: 0 % (ref 0–1)
BASOPHILS ABSOLUTE: 0 K/UL (ref 0–0.2)
BILIRUBIN URINE: NEGATIVE
BUN BLDV-MCNC: 22 MG/DL (ref 8–23)
BUN/CREAT BLD: 18 (ref 9–20)
C-PEPTIDE: 8.2 NG/ML (ref 1.1–4.4)
CALCIUM SERPL-MCNC: 9.9 MG/DL (ref 8.6–10.4)
CASTS UA: ABNORMAL /LPF (ref 0–2)
CHLORIDE BLD-SCNC: 94 MMOL/L (ref 98–107)
CO2: 22 MMOL/L (ref 20–31)
COLOR: ABNORMAL
COMMENT UA: ABNORMAL
CREAT SERPL-MCNC: 1.22 MG/DL (ref 0.5–0.9)
CRYSTALS, UA: ABNORMAL /HPF
DIFFERENTIAL TYPE: ABNORMAL
EOSINOPHILS RELATIVE PERCENT: 0 % (ref 1–7)
EPITHELIAL CELLS UA: ABNORMAL /HPF (ref 0–5)
GFR AFRICAN AMERICAN: 51 ML/MIN
GFR NON-AFRICAN AMERICAN: 42 ML/MIN
GFR SERPL CREATININE-BSD FRML MDRD: ABNORMAL ML/MIN/{1.73_M2}
GFR SERPL CREATININE-BSD FRML MDRD: ABNORMAL ML/MIN/{1.73_M2}
GLUCOSE BLD-MCNC: 103 MG/DL (ref 65–105)
GLUCOSE BLD-MCNC: 163 MG/DL (ref 65–105)
GLUCOSE BLD-MCNC: 184 MG/DL (ref 65–105)
GLUCOSE BLD-MCNC: 243 MG/DL (ref 70–99)
GLUCOSE URINE: ABNORMAL
HCT VFR BLD CALC: 42.8 % (ref 36–46)
HEMOGLOBIN: 13.9 G/DL (ref 12–16)
IMMATURE GRANULOCYTES: ABNORMAL %
KETONES, URINE: NEGATIVE
LACTIC ACID: 1.7 MMOL/L (ref 0.5–2.2)
LACTIC ACID: 2.4 MMOL/L (ref 0.5–2.2)
LEUKOCYTE ESTERASE, URINE: ABNORMAL
LYMPHOCYTES # BLD: 9 % (ref 16–46)
MCH RBC QN AUTO: 29.1 PG (ref 26–34)
MCHC RBC AUTO-ENTMCNC: 32.5 G/DL (ref 31–37)
MCV RBC AUTO: 89.7 FL (ref 80–100)
MONOCYTES # BLD: 7 % (ref 4–11)
MUCUS: ABNORMAL
NITRITE, URINE: NEGATIVE
NRBC AUTOMATED: ABNORMAL PER 100 WBC
OTHER OBSERVATIONS UA: ABNORMAL
PDW BLD-RTO: 13.8 % (ref 11–14.5)
PH UA: 6 (ref 5–6)
PLATELET # BLD: 193 K/UL (ref 140–450)
PLATELET ESTIMATE: ABNORMAL
PMV BLD AUTO: 8.2 FL (ref 6–12)
POTASSIUM SERPL-SCNC: 4 MMOL/L (ref 3.7–5.3)
PROTEIN UA: NEGATIVE
RBC # BLD: 4.77 M/UL (ref 4–5.2)
RBC # BLD: ABNORMAL 10*6/UL
RBC UA: ABNORMAL /HPF (ref 0–4)
RENAL EPITHELIAL, UA: ABNORMAL /HPF
SEG NEUTROPHILS: 84 % (ref 43–77)
SEGMENTED NEUTROPHILS ABSOLUTE COUNT: 7.6 K/UL (ref 1.8–7.7)
SODIUM BLD-SCNC: 132 MMOL/L (ref 135–144)
SPECIFIC GRAVITY UA: 1 (ref 1.01–1.02)
TRICHOMONAS: ABNORMAL
TROPONIN INTERP: NORMAL
TROPONIN T: <0.03 NG/ML
TURBIDITY: ABNORMAL
URINE HGB: ABNORMAL
UROBILINOGEN, URINE: NORMAL
WBC # BLD: 9 K/UL (ref 3.5–11)
WBC # BLD: ABNORMAL 10*3/UL
WBC UA: ABNORMAL /HPF (ref 0–4)
YEAST: ABNORMAL

## 2018-11-12 PROCEDURE — 81001 URINALYSIS AUTO W/SCOPE: CPT

## 2018-11-12 PROCEDURE — 82947 ASSAY GLUCOSE BLOOD QUANT: CPT

## 2018-11-12 PROCEDURE — 83605 ASSAY OF LACTIC ACID: CPT

## 2018-11-12 PROCEDURE — 94761 N-INVAS EAR/PLS OXIMETRY MLT: CPT

## 2018-11-12 PROCEDURE — G8988 SELF CARE GOAL STATUS: HCPCS | Performed by: OCCUPATIONAL THERAPIST

## 2018-11-12 PROCEDURE — G8989 SELF CARE D/C STATUS: HCPCS | Performed by: OCCUPATIONAL THERAPIST

## 2018-11-12 PROCEDURE — 84484 ASSAY OF TROPONIN QUANT: CPT

## 2018-11-12 PROCEDURE — G8987 SELF CARE CURRENT STATUS: HCPCS | Performed by: OCCUPATIONAL THERAPIST

## 2018-11-12 PROCEDURE — 71046 X-RAY EXAM CHEST 2 VIEWS: CPT

## 2018-11-12 PROCEDURE — 2580000003 HC RX 258: Performed by: NURSE PRACTITIONER

## 2018-11-12 PROCEDURE — 85025 COMPLETE CBC W/AUTO DIFF WBC: CPT

## 2018-11-12 PROCEDURE — 87040 BLOOD CULTURE FOR BACTERIA: CPT

## 2018-11-12 PROCEDURE — 36415 COLL VENOUS BLD VENIPUNCTURE: CPT

## 2018-11-12 PROCEDURE — 2580000003 HC RX 258: Performed by: EMERGENCY MEDICINE

## 2018-11-12 PROCEDURE — 6370000000 HC RX 637 (ALT 250 FOR IP): Performed by: INTERNAL MEDICINE

## 2018-11-12 PROCEDURE — 97165 OT EVAL LOW COMPLEX 30 MIN: CPT | Performed by: OCCUPATIONAL THERAPIST

## 2018-11-12 PROCEDURE — 99284 EMERGENCY DEPT VISIT MOD MDM: CPT

## 2018-11-12 PROCEDURE — 93005 ELECTROCARDIOGRAM TRACING: CPT

## 2018-11-12 PROCEDURE — 80048 BASIC METABOLIC PNL TOTAL CA: CPT

## 2018-11-12 PROCEDURE — 96365 THER/PROPH/DIAG IV INF INIT: CPT

## 2018-11-12 PROCEDURE — 2580000003 HC RX 258: Performed by: INTERNAL MEDICINE

## 2018-11-12 PROCEDURE — 6370000000 HC RX 637 (ALT 250 FOR IP): Performed by: EMERGENCY MEDICINE

## 2018-11-12 PROCEDURE — 84681 ASSAY OF C-PEPTIDE: CPT

## 2018-11-12 PROCEDURE — 6360000002 HC RX W HCPCS: Performed by: EMERGENCY MEDICINE

## 2018-11-12 PROCEDURE — 6360000002 HC RX W HCPCS: Performed by: INTERNAL MEDICINE

## 2018-11-12 PROCEDURE — 2060000000 HC ICU INTERMEDIATE R&B

## 2018-11-12 PROCEDURE — 96375 TX/PRO/DX INJ NEW DRUG ADDON: CPT

## 2018-11-12 PROCEDURE — 99223 1ST HOSP IP/OBS HIGH 75: CPT | Performed by: INTERNAL MEDICINE

## 2018-11-12 RX ORDER — SUCRALFATE 1 G/1
1 TABLET ORAL 4 TIMES DAILY
Status: DISCONTINUED | OUTPATIENT
Start: 2018-11-12 | End: 2018-11-15 | Stop reason: HOSPADM

## 2018-11-12 RX ORDER — DEXTROSE MONOHYDRATE 25 G/50ML
12.5 INJECTION, SOLUTION INTRAVENOUS PRN
Status: DISCONTINUED | OUTPATIENT
Start: 2018-11-12 | End: 2018-11-15 | Stop reason: HOSPADM

## 2018-11-12 RX ORDER — SODIUM CHLORIDE 0.9 % (FLUSH) 0.9 %
10 SYRINGE (ML) INJECTION PRN
Status: DISCONTINUED | OUTPATIENT
Start: 2018-11-12 | End: 2018-11-15 | Stop reason: HOSPADM

## 2018-11-12 RX ORDER — METOPROLOL SUCCINATE 50 MG/1
100 TABLET, EXTENDED RELEASE ORAL DAILY
Status: DISCONTINUED | OUTPATIENT
Start: 2018-11-12 | End: 2018-11-15 | Stop reason: HOSPADM

## 2018-11-12 RX ORDER — LISINOPRIL 2.5 MG/1
2.5 TABLET ORAL DAILY
Status: DISCONTINUED | OUTPATIENT
Start: 2018-11-12 | End: 2018-11-15 | Stop reason: HOSPADM

## 2018-11-12 RX ORDER — CLOPIDOGREL BISULFATE 75 MG/1
75 TABLET ORAL DAILY
Status: DISCONTINUED | OUTPATIENT
Start: 2018-11-12 | End: 2018-11-15 | Stop reason: HOSPADM

## 2018-11-12 RX ORDER — ACETAMINOPHEN 500 MG
1000 TABLET ORAL ONCE
Status: COMPLETED | OUTPATIENT
Start: 2018-11-12 | End: 2018-11-12

## 2018-11-12 RX ORDER — SPIRONOLACTONE 25 MG/1
25 TABLET ORAL DAILY
Status: DISCONTINUED | OUTPATIENT
Start: 2018-11-12 | End: 2018-11-12

## 2018-11-12 RX ORDER — INSULIN GLARGINE 100 [IU]/ML
5 INJECTION, SOLUTION SUBCUTANEOUS ONCE
Status: COMPLETED | OUTPATIENT
Start: 2018-11-12 | End: 2018-11-12

## 2018-11-12 RX ORDER — VITAMIN B COMPLEX
1 CAPSULE ORAL EVERY OTHER DAY
Status: DISCONTINUED | OUTPATIENT
Start: 2018-11-12 | End: 2018-11-15 | Stop reason: HOSPADM

## 2018-11-12 RX ORDER — PANTOPRAZOLE SODIUM 40 MG/1
40 TABLET, DELAYED RELEASE ORAL
Status: DISCONTINUED | OUTPATIENT
Start: 2018-11-12 | End: 2018-11-15 | Stop reason: HOSPADM

## 2018-11-12 RX ORDER — FAMOTIDINE 20 MG/1
20 TABLET, FILM COATED ORAL EVERY EVENING
Status: DISCONTINUED | OUTPATIENT
Start: 2018-11-12 | End: 2018-11-15 | Stop reason: HOSPADM

## 2018-11-12 RX ORDER — SODIUM CHLORIDE 0.9 % (FLUSH) 0.9 %
10 SYRINGE (ML) INJECTION EVERY 12 HOURS SCHEDULED
Status: DISCONTINUED | OUTPATIENT
Start: 2018-11-12 | End: 2018-11-15 | Stop reason: HOSPADM

## 2018-11-12 RX ORDER — LORAZEPAM 0.5 MG/1
0.5 TABLET ORAL 3 TIMES DAILY PRN
Status: DISCONTINUED | OUTPATIENT
Start: 2018-11-12 | End: 2018-11-15 | Stop reason: HOSPADM

## 2018-11-12 RX ORDER — 0.9 % SODIUM CHLORIDE 0.9 %
1000 INTRAVENOUS SOLUTION INTRAVENOUS ONCE
Status: COMPLETED | OUTPATIENT
Start: 2018-11-12 | End: 2018-11-12

## 2018-11-12 RX ORDER — NICOTINE POLACRILEX 4 MG
15 LOZENGE BUCCAL PRN
Status: DISCONTINUED | OUTPATIENT
Start: 2018-11-12 | End: 2018-11-15 | Stop reason: HOSPADM

## 2018-11-12 RX ORDER — SODIUM CHLORIDE 9 MG/ML
INJECTION, SOLUTION INTRAVENOUS CONTINUOUS
Status: DISCONTINUED | OUTPATIENT
Start: 2018-11-12 | End: 2018-11-13

## 2018-11-12 RX ORDER — DEXTROSE MONOHYDRATE 50 MG/ML
100 INJECTION, SOLUTION INTRAVENOUS PRN
Status: DISCONTINUED | OUTPATIENT
Start: 2018-11-12 | End: 2018-11-15 | Stop reason: HOSPADM

## 2018-11-12 RX ORDER — ONDANSETRON 2 MG/ML
4 INJECTION INTRAMUSCULAR; INTRAVENOUS ONCE
Status: COMPLETED | OUTPATIENT
Start: 2018-11-12 | End: 2018-11-12

## 2018-11-12 RX ORDER — ONDANSETRON 2 MG/ML
8 INJECTION INTRAMUSCULAR; INTRAVENOUS EVERY 4 HOURS PRN
Status: DISCONTINUED | OUTPATIENT
Start: 2018-11-12 | End: 2018-11-15 | Stop reason: HOSPADM

## 2018-11-12 RX ORDER — LANOLIN ALCOHOL/MO/W.PET/CERES
3 CREAM (GRAM) TOPICAL NIGHTLY PRN
Status: DISCONTINUED | OUTPATIENT
Start: 2018-11-12 | End: 2018-11-14

## 2018-11-12 RX ADMIN — SUCRALFATE 1 G: 1 TABLET ORAL at 20:39

## 2018-11-12 RX ADMIN — SODIUM CHLORIDE: 9 INJECTION, SOLUTION INTRAVENOUS at 13:17

## 2018-11-12 RX ADMIN — ACETAMINOPHEN 1000 MG: 500 TABLET ORAL at 10:55

## 2018-11-12 RX ADMIN — ONDANSETRON 4 MG: 2 INJECTION INTRAMUSCULAR; INTRAVENOUS at 10:31

## 2018-11-12 RX ADMIN — VANCOMYCIN HYDROCHLORIDE 1000 MG: 1 INJECTION, POWDER, LYOPHILIZED, FOR SOLUTION INTRAVENOUS at 11:41

## 2018-11-12 RX ADMIN — INSULIN GLARGINE 5 UNITS: 100 INJECTION, SOLUTION SUBCUTANEOUS at 13:15

## 2018-11-12 RX ADMIN — SUCRALFATE 1 G: 1 TABLET ORAL at 16:43

## 2018-11-12 RX ADMIN — SODIUM CHLORIDE 1000 ML: 9 INJECTION, SOLUTION INTRAVENOUS at 19:31

## 2018-11-12 RX ADMIN — SODIUM CHLORIDE: 9 INJECTION, SOLUTION INTRAVENOUS at 22:55

## 2018-11-12 RX ADMIN — INSULIN LISPRO 1 UNITS: 100 INJECTION, SOLUTION INTRAVENOUS; SUBCUTANEOUS at 21:09

## 2018-11-12 RX ADMIN — PANTOPRAZOLE SODIUM 40 MG: 40 TABLET, DELAYED RELEASE ORAL at 15:03

## 2018-11-12 RX ADMIN — PIPERACILLIN SODIUM,TAZOBACTAM SODIUM 3.38 G: 3; .375 INJECTION, POWDER, FOR SOLUTION INTRAVENOUS at 11:04

## 2018-11-12 RX ADMIN — SODIUM CHLORIDE 1000 ML: 9 INJECTION, SOLUTION INTRAVENOUS at 11:00

## 2018-11-12 RX ADMIN — FAMOTIDINE 20 MG: 20 TABLET ORAL at 16:43

## 2018-11-12 RX ADMIN — INSULIN LISPRO 1 UNITS: 100 INJECTION, SOLUTION INTRAVENOUS; SUBCUTANEOUS at 13:14

## 2018-11-12 RX ADMIN — PIPERACILLIN SODIUM,TAZOBACTAM SODIUM 3.38 G: 3; .375 INJECTION, POWDER, FOR SOLUTION INTRAVENOUS at 20:39

## 2018-11-12 RX ADMIN — ONDANSETRON 8 MG: 2 INJECTION INTRAMUSCULAR; INTRAVENOUS at 14:01

## 2018-11-12 RX ADMIN — PIPERACILLIN SODIUM,TAZOBACTAM SODIUM 3.38 G: 3; .375 INJECTION, POWDER, FOR SOLUTION INTRAVENOUS at 15:03

## 2018-11-12 RX ADMIN — SODIUM CHLORIDE 1000 ML: 9 INJECTION, SOLUTION INTRAVENOUS at 12:04

## 2018-11-12 ASSESSMENT — PAIN DESCRIPTION - FREQUENCY: FREQUENCY: INTERMITTENT

## 2018-11-12 ASSESSMENT — PAIN SCALES - GENERAL
PAINLEVEL_OUTOF10: 0
PAINLEVEL_OUTOF10: 4
PAINLEVEL_OUTOF10: 4
PAINLEVEL_OUTOF10: 0
PAINLEVEL_OUTOF10: 4

## 2018-11-12 ASSESSMENT — PAIN DESCRIPTION - LOCATION: LOCATION: ABDOMEN

## 2018-11-12 ASSESSMENT — PAIN DESCRIPTION - ONSET: ONSET: ON-GOING

## 2018-11-12 ASSESSMENT — PAIN DESCRIPTION - PAIN TYPE: TYPE: ACUTE PAIN

## 2018-11-12 NOTE — H&P
and No Localizing Signs/Symptoms        LABS:    CBC with Differential:    Lab Results   Component Value Date    WBC 9.0 11/12/2018    RBC 4.77 11/12/2018    HGB 13.9 11/12/2018    HCT 42.8 11/12/2018     11/12/2018    MCV 89.7 11/12/2018    MCH 29.1 11/12/2018    MCHC 32.5 11/12/2018    RDW 13.8 11/12/2018    LYMPHOPCT 9 11/12/2018    MONOPCT 7 11/12/2018    BASOPCT 0 11/12/2018    MONOSABS 0.60 11/12/2018    LYMPHSABS 0.80 11/12/2018    EOSABS 0.00 11/12/2018    BASOSABS 0.00 11/12/2018    DIFFTYPE NOT REPORTED 11/12/2018     BMP:    Lab Results   Component Value Date     11/12/2018    K 4.0 11/12/2018    CL 94 11/12/2018    CO2 22 11/12/2018    BUN 22 11/12/2018    LABALBU 3.4 12/08/2017    CREATININE 1.22 11/12/2018    CALCIUM 9.9 11/12/2018    GFRAA 51 11/12/2018    LABGLOM 42 11/12/2018    GLUCOSE 243 11/12/2018       ASSESSMENT:      Patient Active Problem List   Diagnosis    Hyperlipidemia    Hypertension    Diabetic neuropathy ([address]    Breast cancer ([address]    Lymphedema of arm    Vitamin D deficiency    Hyperplastic colon polyp    PVC's (premature ventricular contractions)    Allergic rhinitis    Anxiety, generalized    CHF (congestive heart failure) ([address]    DM (diabetes mellitus), type 2 ([address]    Coronary artery disease involving native coronary artery of native heart    Cellulitis of arm, left    Persistent atrial fibrillation (HCC)    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 Guero Hernandez    Chronic nausea    Moderate mitral regurgitation    Leg cramps    Sinus drainage    Dehydration    Elevated lactic acid level    Cellulitis of left arm    Cellulitis     NADEEN BOYKIN dc FP   83  WF  [JOSHUA Mathis]      FULL CODE    XARELTO--PLAVIX---aspirin                                                                                     Anti-infectives:  Vancomycin IV, Zosyn IV                                    Left arm

## 2018-11-12 NOTE — ED PROVIDER NOTES
uncontrolled; and Vitamin D deficiency. SURGICAL HISTORY      has a past surgical history that includes Mastectomy (Bilateral, 1995 and 2000); Hysterectomy (09/09/2002); fracture surgery (08/10/99); Cardiac catheterization (November 2012); Colonoscopy (08/12/2009); Colonoscopy (9/8/14); other surgical history (Right, 6/2015); Coronary angioplasty with stent (09/2016); Cardiac catheterization (09/19/2017); Aortic valve replacement (12/07/2017); and pr colonoscopy flx dx w/collj spec when pfrmd (N/A, 9/24/2018). CURRENT MEDICATIONS       Previous Medications    ACETAMINOPHEN (TYLENOL) 325 MG TABLET    Take 650 mg by mouth every 6 hours as needed for Pain    B COMPLEX VITAMINS CAPSULE    Take 1 capsule by mouth every other day Indications: overy other day     BLOOD GLUCOSE TEST STRIPS (FREESTYLE TEST STRIPS) STRIP    TEST ONCE DAILY AS NEEDED    CLOPIDOGREL (PLAVIX) 75 MG TABLET    TAKE ONE TABLET BY MOUTH DAILY    FAMOTIDINE (PEPCID) 20 MG TABLET    TAKE ONE TABLET BY MOUTH EVERY EVENING    FUROSEMIDE (LASIX) 20 MG TABLET    Take 1 tablet by mouth daily    HANDICAP PLACARD MISC    by Does not apply route Exp 1/1/2023    LISINOPRIL (PRINIVIL;ZESTRIL) 2.5 MG TABLET    TAKE 1 TABLET BY MOUTH DAILY    LORAZEPAM (ATIVAN) 0.5 MG TABLET    TAKE ONE TABLET BY MOUTH THREE TIMES A DAY AS NEEDED FOR ANXIETY    MELATONIN 3 MG TABS TABLET    Take 3 mg by mouth nightly as needed    METOPROLOL SUCCINATE (TOPROL XL) 100 MG EXTENDED RELEASE TABLET    Take 1 tablet by mouth daily    NITROGLYCERIN (NITROSTAT) 0.4 MG SL TABLET    Place 1 tablet under the tongue every 5 minutes as needed for Chest pain up to max of 3 total doses. If no relief after 1 dose, call 911.     ONDANSETRON (ZOFRAN) 4 MG TABLET    Take 1 tablet by mouth every 8 hours as needed for Nausea or Vomiting    SERTRALINE (ZOLOFT) 50 MG TABLET    Take 1 tablet by mouth daily    SPIRONOLACTONE (ALDACTONE) 25 MG TABLET    Take 1 tablet by mouth daily    SUCRALFATE mmol/L    Potassium 4.0 3.7 - 5.3 mmol/L    Chloride 94 (L) 98 - 107 mmol/L    CO2 22 20 - 31 mmol/L    Anion Gap 16 9 - 17 mmol/L    GFR Non-African American 42 (L) >60 mL/min    GFR  51 (L) >60 mL/min    GFR Comment          GFR Staging NOT REPORTED    Urinalysis Reflex to Culture   Result Value Ref Range    Color, UA NOT REPORTED YEL    Turbidity UA NOT REPORTED CLEAR    Glucose, Ur 1+ (A) NEG    Bilirubin Urine NEGATIVE NEG    Ketones, Urine NEGATIVE NEG    Specific Gravity, UA 1.005 (L) 1.010 - 1.025    Urine Hgb TRACE (A) NEG    pH, UA 6.0 5.0 - 6.0    Protein, UA NEGATIVE NEG    Urobilinogen, Urine Normal NORM    Nitrite, Urine NEGATIVE NEG    Leukocyte Esterase, Urine TRACE (A) NEG    Urinalysis Comments NOT REPORTED    Troponin   Result Value Ref Range    Troponin T <0.03 <0.03 ng/mL    Troponin Interp         Microscopic Urinalysis   Result Value Ref Range    -          WBC, UA 0 TO 4 0 - 4 /HPF    RBC, UA 0 TO 4 0 - 4 /HPF    Casts UA NOT REPORTED 0 - 2 /LPF    Crystals UA NOT REPORTED NONE /HPF    Epithelial Cells UA 0 TO 4 0 - 5 /HPF    Renal Epithelial, Urine NOT REPORTED 0 /HPF    Bacteria, UA TRACE (A) NONE    Mucus, UA NOT REPORTED NONE    Trichomonas, UA NOT REPORTED NONE    Amorphous, UA NOT REPORTED NONE    Other Observations UA NOT REPORTED NREQ    Yeast, UA NOT REPORTED NONE       ABNORMAL LABS:  Labs Reviewed   LACTIC ACID - Abnormal; Notable for the following:        Result Value    Lactic Acid 2.4 (*)     All other components within normal limits   CBC WITH AUTO DIFFERENTIAL - Abnormal; Notable for the following:     Seg Neutrophils 84 (*)     Lymphocytes 9 (*)     Eosinophils % 0 (*)     Absolute Lymph # 0.80 (*)     All other components within normal limits   BASIC METABOLIC PANEL - Abnormal; Notable for the following:     Glucose 243 (*)     CREATININE 1.22 (*)     Sodium 132 (*)     Chloride 94 (*)     GFR Non- 42 (*)     GFR  51 (*)

## 2018-11-12 NOTE — PROGRESS NOTES
of Home: House  Home Layout: One level  Home Access: Stairs to enter with rails  Entrance Stairs - Number of Steps: 2  Bathroom Shower/Tub: Walk-in shower  Bathroom Toilet: Standard  Bathroom Equipment: Built-in shower seat, Grab bars in shower  Bathroom Accessibility: Accessible  ADL Assistance: Independent  Homemaking Assistance: Independent  Homemaking Responsibilities: Yes  Meal Prep Responsibility: Primary  Laundry Responsibility: Primary  Cleaning Responsibility: Primary  Shopping Responsibility: Secondary  Active : Yes (occasionally drives)  Mode of Transportation: SUV  Occupation: Retired       Objective   Vision: Within Functional Limits  Hearing: Within functional limits    Orientation  Overall Orientation Status: Within Normal Limits     Standing Balance  Sit to stand: Independent  Stand to sit: Independent  ADL  LE Dressing: Independent  Toileting: Independent        Bed mobility  Supine to Sit: Independent  Sit to Supine: Independent  Transfers  Sit to stand: Independent  Stand to sit: Independent     Cognition  Overall Cognitive Status: WNL    LUE AROM (degrees)  LUE AROM : WFL  Left Hand AROM (degrees)  Left Hand AROM: WNL  RUE AROM (degrees)  RUE AROM : WNL  Right Hand AROM (degrees)  Right Hand AROM: WNL  LUE Strength  Gross LUE Strength: WFL  RUE Strength  Gross RUE Strength: WNL    Assessment   Performance deficits / Impairments: Decreased high-level IADLs  Decision Making: Low Complexity  No Skilled OT: Independent with ADL's  REQUIRES OT FOLLOW UP: No  Safety Devices  Safety Devices in place: Yes  Type of devices: Call light within reach; Left in bed         Plan   Plan  Times per week: acute care OT services not required    G-Code  OT G-codes  Functional Limitation: Self care  Self Care Current Status (): At least 1 percent but less than 20 percent impaired, limited or restricted  Self Care Goal Status ():  At least 1 percent but less than 20 percent impaired, limited or

## 2018-11-13 ENCOUNTER — APPOINTMENT (OUTPATIENT)
Dept: GENERAL RADIOLOGY | Age: 83
DRG: 602 | End: 2018-11-13
Payer: MEDICARE

## 2018-11-13 LAB
-: ABNORMAL
ABSOLUTE EOS #: 0.1 K/UL (ref 0–0.4)
ABSOLUTE IMMATURE GRANULOCYTE: ABNORMAL K/UL (ref 0–0.3)
ABSOLUTE LYMPH #: 1.3 K/UL (ref 1–4.8)
ABSOLUTE MONO #: 0.7 K/UL (ref 0.1–1.2)
AMORPHOUS: ABNORMAL
ANION GAP SERPL CALCULATED.3IONS-SCNC: 11 MMOL/L (ref 9–17)
ANION GAP SERPL CALCULATED.3IONS-SCNC: 15 MMOL/L (ref 9–17)
BACTERIA: ABNORMAL
BASOPHILS # BLD: 1 % (ref 0–1)
BASOPHILS ABSOLUTE: 0 K/UL (ref 0–0.2)
BILIRUBIN URINE: NEGATIVE
BNP INTERPRETATION: ABNORMAL
BUN BLDV-MCNC: 16 MG/DL (ref 8–23)
BUN BLDV-MCNC: 18 MG/DL (ref 8–23)
BUN/CREAT BLD: 14 (ref 9–20)
BUN/CREAT BLD: 16 (ref 9–20)
CALCIUM SERPL-MCNC: 8.3 MG/DL (ref 8.6–10.4)
CALCIUM SERPL-MCNC: 8.5 MG/DL (ref 8.6–10.4)
CASTS UA: ABNORMAL /LPF (ref 0–2)
CHLORIDE BLD-SCNC: 105 MMOL/L (ref 98–107)
CHLORIDE BLD-SCNC: 109 MMOL/L (ref 98–107)
CHOLESTEROL/HDL RATIO: 3
CHOLESTEROL: 155 MG/DL
CO2: 18 MMOL/L (ref 20–31)
CO2: 21 MMOL/L (ref 20–31)
COLOR: ABNORMAL
COMMENT UA: ABNORMAL
CREAT SERPL-MCNC: 1.13 MG/DL (ref 0.5–0.9)
CREAT SERPL-MCNC: 1.14 MG/DL (ref 0.5–0.9)
CRYSTALS, UA: ABNORMAL /HPF
DIFFERENTIAL TYPE: ABNORMAL
EKG ATRIAL RATE: 89 BPM
EKG ATRIAL RATE: 89 BPM
EKG ATRIAL RATE: 94 BPM
EKG P AXIS: 64 DEGREES
EKG P AXIS: 73 DEGREES
EKG P AXIS: 73 DEGREES
EKG P-R INTERVAL: 204 MS
EKG P-R INTERVAL: 226 MS
EKG P-R INTERVAL: 226 MS
EKG Q-T INTERVAL: 406 MS
EKG Q-T INTERVAL: 418 MS
EKG Q-T INTERVAL: 418 MS
EKG QRS DURATION: 138 MS
EKG QRS DURATION: 138 MS
EKG QRS DURATION: 140 MS
EKG QTC CALCULATION (BAZETT): 507 MS
EKG QTC CALCULATION (BAZETT): 508 MS
EKG QTC CALCULATION (BAZETT): 508 MS
EKG R AXIS: -13 DEGREES
EKG R AXIS: -13 DEGREES
EKG R AXIS: -23 DEGREES
EKG T AXIS: 117 DEGREES
EKG T AXIS: 118 DEGREES
EKG T AXIS: 118 DEGREES
EKG VENTRICULAR RATE: 89 BPM
EKG VENTRICULAR RATE: 89 BPM
EKG VENTRICULAR RATE: 94 BPM
EOSINOPHILS RELATIVE PERCENT: 1 % (ref 1–7)
EPITHELIAL CELLS UA: ABNORMAL /HPF (ref 0–5)
GFR AFRICAN AMERICAN: 55 ML/MIN
GFR AFRICAN AMERICAN: 56 ML/MIN
GFR NON-AFRICAN AMERICAN: 46 ML/MIN
GFR NON-AFRICAN AMERICAN: 46 ML/MIN
GFR SERPL CREATININE-BSD FRML MDRD: ABNORMAL ML/MIN/{1.73_M2}
GLUCOSE BLD-MCNC: 119 MG/DL (ref 65–105)
GLUCOSE BLD-MCNC: 125 MG/DL (ref 65–105)
GLUCOSE BLD-MCNC: 140 MG/DL (ref 70–99)
GLUCOSE BLD-MCNC: 218 MG/DL (ref 65–105)
GLUCOSE BLD-MCNC: 222 MG/DL (ref 65–105)
GLUCOSE BLD-MCNC: 257 MG/DL (ref 65–105)
GLUCOSE BLD-MCNC: 290 MG/DL (ref 70–99)
GLUCOSE URINE: ABNORMAL
HCT VFR BLD CALC: 33.7 % (ref 36–46)
HDLC SERPL-MCNC: 52 MG/DL
HEMOGLOBIN: 10.8 G/DL (ref 12–16)
IMMATURE GRANULOCYTES: ABNORMAL %
KETONES, URINE: NEGATIVE
LDL CHOLESTEROL: 84 MG/DL (ref 0–130)
LEUKOCYTE ESTERASE, URINE: NEGATIVE
LYMPHOCYTES # BLD: 24 % (ref 16–46)
MAGNESIUM: 2 MG/DL (ref 1.6–2.6)
MCH RBC QN AUTO: 28.6 PG (ref 26–34)
MCHC RBC AUTO-ENTMCNC: 32.1 G/DL (ref 31–37)
MCV RBC AUTO: 89.1 FL (ref 80–100)
MONOCYTES # BLD: 12 % (ref 4–11)
MUCUS: ABNORMAL
NITRITE, URINE: NEGATIVE
NRBC AUTOMATED: ABNORMAL PER 100 WBC
OTHER OBSERVATIONS UA: ABNORMAL
PDW BLD-RTO: 13.9 % (ref 11–14.5)
PH UA: 6 (ref 5–6)
PLATELET # BLD: 151 K/UL (ref 140–450)
PLATELET ESTIMATE: ABNORMAL
PMV BLD AUTO: 8.2 FL (ref 6–12)
POTASSIUM SERPL-SCNC: 3.6 MMOL/L (ref 3.7–5.3)
POTASSIUM SERPL-SCNC: 4.2 MMOL/L (ref 3.7–5.3)
PRO-BNP: 4286 PG/ML
PROTEIN UA: NEGATIVE
RBC # BLD: 3.78 M/UL (ref 4–5.2)
RBC # BLD: ABNORMAL 10*6/UL
RBC UA: ABNORMAL /HPF (ref 0–4)
RENAL EPITHELIAL, UA: ABNORMAL /HPF
SEG NEUTROPHILS: 62 % (ref 43–77)
SEGMENTED NEUTROPHILS ABSOLUTE COUNT: 3.5 K/UL (ref 1.8–7.7)
SODIUM BLD-SCNC: 138 MMOL/L (ref 135–144)
SODIUM BLD-SCNC: 141 MMOL/L (ref 135–144)
SPECIFIC GRAVITY UA: 1.02 (ref 1.01–1.02)
TRICHOMONAS: ABNORMAL
TRIGL SERPL-MCNC: 93 MG/DL
TROPONIN INTERP: NORMAL
TROPONIN T: <0.03 NG/ML
TURBIDITY: ABNORMAL
URINE HGB: ABNORMAL
UROBILINOGEN, URINE: NORMAL
VLDLC SERPL CALC-MCNC: NORMAL MG/DL (ref 1–30)
WBC # BLD: 5.6 K/UL (ref 3.5–11)
WBC # BLD: ABNORMAL 10*3/UL
WBC UA: ABNORMAL /HPF (ref 0–4)
YEAST: ABNORMAL

## 2018-11-13 PROCEDURE — 6370000000 HC RX 637 (ALT 250 FOR IP): Performed by: INTERNAL MEDICINE

## 2018-11-13 PROCEDURE — 83880 ASSAY OF NATRIURETIC PEPTIDE: CPT

## 2018-11-13 PROCEDURE — 94761 N-INVAS EAR/PLS OXIMETRY MLT: CPT

## 2018-11-13 PROCEDURE — 71045 X-RAY EXAM CHEST 1 VIEW: CPT

## 2018-11-13 PROCEDURE — 6370000000 HC RX 637 (ALT 250 FOR IP): Performed by: NURSE PRACTITIONER

## 2018-11-13 PROCEDURE — 51702 INSERT TEMP BLADDER CATH: CPT

## 2018-11-13 PROCEDURE — 82947 ASSAY GLUCOSE BLOOD QUANT: CPT

## 2018-11-13 PROCEDURE — 6360000002 HC RX W HCPCS: Performed by: NURSE PRACTITIONER

## 2018-11-13 PROCEDURE — 2580000003 HC RX 258: Performed by: INTERNAL MEDICINE

## 2018-11-13 PROCEDURE — 2700000000 HC OXYGEN THERAPY PER DAY

## 2018-11-13 PROCEDURE — 81001 URINALYSIS AUTO W/SCOPE: CPT

## 2018-11-13 PROCEDURE — 6360000002 HC RX W HCPCS: Performed by: INTERNAL MEDICINE

## 2018-11-13 PROCEDURE — 36415 COLL VENOUS BLD VENIPUNCTURE: CPT

## 2018-11-13 PROCEDURE — 85025 COMPLETE CBC W/AUTO DIFF WBC: CPT

## 2018-11-13 PROCEDURE — 99232 SBSQ HOSP IP/OBS MODERATE 35: CPT | Performed by: INTERNAL MEDICINE

## 2018-11-13 PROCEDURE — 84484 ASSAY OF TROPONIN QUANT: CPT

## 2018-11-13 PROCEDURE — 6360000002 HC RX W HCPCS

## 2018-11-13 PROCEDURE — 80048 BASIC METABOLIC PNL TOTAL CA: CPT

## 2018-11-13 PROCEDURE — 80061 LIPID PANEL: CPT

## 2018-11-13 PROCEDURE — 94660 CPAP INITIATION&MGMT: CPT

## 2018-11-13 PROCEDURE — 83735 ASSAY OF MAGNESIUM: CPT

## 2018-11-13 PROCEDURE — 2060000000 HC ICU INTERMEDIATE R&B

## 2018-11-13 PROCEDURE — 93005 ELECTROCARDIOGRAM TRACING: CPT

## 2018-11-13 RX ORDER — ACETAMINOPHEN 325 MG/1
650 TABLET ORAL EVERY 4 HOURS PRN
Status: DISCONTINUED | OUTPATIENT
Start: 2018-11-13 | End: 2018-11-15 | Stop reason: HOSPADM

## 2018-11-13 RX ORDER — POTASSIUM BICARBONATE 25 MEQ/1
25 TABLET, EFFERVESCENT ORAL ONCE
Status: COMPLETED | OUTPATIENT
Start: 2018-11-13 | End: 2018-11-13

## 2018-11-13 RX ORDER — FUROSEMIDE 10 MG/ML
20 INJECTION INTRAMUSCULAR; INTRAVENOUS ONCE
Status: COMPLETED | OUTPATIENT
Start: 2018-11-14 | End: 2018-11-14

## 2018-11-13 RX ORDER — LEVALBUTEROL 1.25 MG/.5ML
1.25 SOLUTION, CONCENTRATE RESPIRATORY (INHALATION) EVERY 4 HOURS PRN
Status: DISCONTINUED | OUTPATIENT
Start: 2018-11-13 | End: 2018-11-15 | Stop reason: HOSPADM

## 2018-11-13 RX ORDER — GLIMEPIRIDE 1 MG/1
1 TABLET ORAL
Status: DISCONTINUED | OUTPATIENT
Start: 2018-11-13 | End: 2018-11-15 | Stop reason: HOSPADM

## 2018-11-13 RX ORDER — FUROSEMIDE 20 MG/1
20 TABLET ORAL DAILY
Status: DISCONTINUED | OUTPATIENT
Start: 2018-11-14 | End: 2018-11-15 | Stop reason: HOSPADM

## 2018-11-13 RX ORDER — FUROSEMIDE 10 MG/ML
20 INJECTION INTRAMUSCULAR; INTRAVENOUS ONCE
Status: COMPLETED | OUTPATIENT
Start: 2018-11-13 | End: 2018-11-13

## 2018-11-13 RX ORDER — SODIUM CHLORIDE FOR INHALATION 0.9 %
3 VIAL, NEBULIZER (ML) INHALATION EVERY 4 HOURS PRN
Status: DISCONTINUED | OUTPATIENT
Start: 2018-11-13 | End: 2018-11-15 | Stop reason: HOSPADM

## 2018-11-13 RX ORDER — SPIRONOLACTONE 25 MG/1
25 TABLET ORAL DAILY
Status: DISCONTINUED | OUTPATIENT
Start: 2018-11-14 | End: 2018-11-15 | Stop reason: HOSPADM

## 2018-11-13 RX ORDER — FUROSEMIDE 10 MG/ML
INJECTION INTRAMUSCULAR; INTRAVENOUS
Status: COMPLETED
Start: 2018-11-13 | End: 2018-11-13

## 2018-11-13 RX ADMIN — SODIUM CHLORIDE: 9 INJECTION, SOLUTION INTRAVENOUS at 06:02

## 2018-11-13 RX ADMIN — ONDANSETRON 8 MG: 2 INJECTION INTRAMUSCULAR; INTRAVENOUS at 14:09

## 2018-11-13 RX ADMIN — INSULIN LISPRO 2 UNITS: 100 INJECTION, SOLUTION INTRAVENOUS; SUBCUTANEOUS at 21:02

## 2018-11-13 RX ADMIN — SUCRALFATE 1 G: 1 TABLET ORAL at 09:02

## 2018-11-13 RX ADMIN — ACETAMINOPHEN 650 MG: 325 TABLET ORAL at 16:11

## 2018-11-13 RX ADMIN — INSULIN LISPRO 2 UNITS: 100 INJECTION, SOLUTION INTRAVENOUS; SUBCUTANEOUS at 17:08

## 2018-11-13 RX ADMIN — FUROSEMIDE 20 MG: 10 INJECTION, SOLUTION INTRAMUSCULAR; INTRAVENOUS at 20:11

## 2018-11-13 RX ADMIN — LISINOPRIL 2.5 MG: 2.5 TABLET ORAL at 08:58

## 2018-11-13 RX ADMIN — SODIUM CHLORIDE: 9 INJECTION, SOLUTION INTRAVENOUS at 14:10

## 2018-11-13 RX ADMIN — SERTRALINE HYDROCHLORIDE 50 MG: 50 TABLET ORAL at 09:03

## 2018-11-13 RX ADMIN — PANTOPRAZOLE SODIUM 40 MG: 40 TABLET, DELAYED RELEASE ORAL at 05:59

## 2018-11-13 RX ADMIN — PROCHLORPERAZINE EDISYLATE 10 MG: 5 INJECTION INTRAMUSCULAR; INTRAVENOUS at 20:04

## 2018-11-13 RX ADMIN — SUCRALFATE 1 G: 1 TABLET ORAL at 12:27

## 2018-11-13 RX ADMIN — CLOPIDOGREL BISULFATE 75 MG: 75 TABLET ORAL at 09:03

## 2018-11-13 RX ADMIN — RIVAROXABAN 20 MG: 10 TABLET, FILM COATED ORAL at 09:02

## 2018-11-13 RX ADMIN — PIPERACILLIN SODIUM,TAZOBACTAM SODIUM 3.38 G: 3; .375 INJECTION, POWDER, FOR SOLUTION INTRAVENOUS at 20:54

## 2018-11-13 RX ADMIN — PIPERACILLIN SODIUM,TAZOBACTAM SODIUM 3.38 G: 3; .375 INJECTION, POWDER, FOR SOLUTION INTRAVENOUS at 12:27

## 2018-11-13 RX ADMIN — SUCRALFATE 1 G: 1 TABLET ORAL at 16:14

## 2018-11-13 RX ADMIN — VITAMIN D, TAB 1000IU (100/BT) 1000 UNITS: 25 TAB at 09:03

## 2018-11-13 RX ADMIN — FUROSEMIDE 20 MG: 10 INJECTION INTRAMUSCULAR; INTRAVENOUS at 20:11

## 2018-11-13 RX ADMIN — LORAZEPAM 0.5 MG: 0.5 TABLET ORAL at 17:04

## 2018-11-13 RX ADMIN — POTASSIUM BICARBONATE 25 MEQ: 25 TABLET, EFFERVESCENT ORAL at 22:02

## 2018-11-13 RX ADMIN — METOPROLOL SUCCINATE 100 MG: 50 TABLET, EXTENDED RELEASE ORAL at 09:02

## 2018-11-13 RX ADMIN — GLIMEPIRIDE 1 MG: 1 TABLET ORAL at 16:15

## 2018-11-13 RX ADMIN — FAMOTIDINE 20 MG: 20 TABLET ORAL at 17:04

## 2018-11-13 RX ADMIN — INSULIN LISPRO 1 UNITS: 100 INJECTION, SOLUTION INTRAVENOUS; SUBCUTANEOUS at 09:03

## 2018-11-13 RX ADMIN — PIPERACILLIN SODIUM,TAZOBACTAM SODIUM 3.38 G: 3; .375 INJECTION, POWDER, FOR SOLUTION INTRAVENOUS at 04:39

## 2018-11-13 RX ADMIN — ONDANSETRON 8 MG: 2 INJECTION INTRAMUSCULAR; INTRAVENOUS at 18:55

## 2018-11-13 RX ADMIN — Medication 10 ML: at 20:04

## 2018-11-13 ASSESSMENT — PAIN DESCRIPTION - DESCRIPTORS: DESCRIPTORS: HEADACHE

## 2018-11-13 ASSESSMENT — PAIN SCALES - GENERAL
PAINLEVEL_OUTOF10: 0
PAINLEVEL_OUTOF10: 3
PAINLEVEL_OUTOF10: 0
PAINLEVEL_OUTOF10: 2

## 2018-11-13 ASSESSMENT — PAIN DESCRIPTION - PAIN TYPE: TYPE: ACUTE PAIN

## 2018-11-13 ASSESSMENT — PAIN DESCRIPTION - LOCATION: LOCATION: HEAD

## 2018-11-13 NOTE — PROGRESS NOTES
chest--normal cardiac catheterization--1999, left               arm--cellulitis--lymphadenitis--2011, left arm               lymphedema--recurrent episodes, acute respiratory              failure--due to CHF and possible pneumonia---2014--              required  CPAP, chest pain--11.30.2014, pneumonia               bilaterally--fever--leukocytosis---11.30.2014, Vitamin D              deficiency, PVCs, allergic rhinitis, hypokalemia, sinus               drainage    PSH:    see above, bilateral mastectomy--LND--1995--2000,               vaginal hysterectomy--anterior repair--SPT--              bladder repair--no malignancy--2002,  left elbow--ORIF--              1999,  colonoscopy---2009--diverticulosis, colonoscopy---              2014--internal hemorrhoids--banded--normal random              biopsies, right index trigger finger release---2015    Allergies:         NKDA   Intolerances:   Marinol--dronabinol, weakness, Darvocet--propoxyphene--weakness                           morphine sulfate, Levaquin--nausea---anxiety, statins--muscle                          pain, Demerol---weakness           Plan:  1. Cellulitis---Vancomycin--Zosyn  2. Lymphedema---OT evaluation  3. DM2--reviewed medications and prior treatment ---> Amaryl 1 mg PO daily  4. Dehydration----IVF--will not restart diuretics until--11.14.2018  5.   See orders    Electronically signed by Akua Stoddard on 11/13/2018 at 11:09 AM    Hospitalist

## 2018-11-13 NOTE — PLAN OF CARE
Problem: SAFETY  Goal: Free from accidental physical injury  Outcome: Ongoing    Goal: Free from intentional harm  Outcome: Ongoing      Problem: DAILY CARE  Goal: Daily care needs are met  Outcome: Ongoing      Problem: PAIN  Goal: Patient's pain/discomfort is manageable  Outcome: Ongoing      Problem: SKIN INTEGRITY  Goal: Skin integrity is maintained or improved  Outcome: Ongoing      Problem: KNOWLEDGE DEFICIT  Goal: Patient/S.O. demonstrates understanding of disease process, treatment plan, medications, and discharge instructions.   Outcome: Ongoing      Problem: DISCHARGE BARRIERS  Goal: Patient's continuum of care needs are met  Outcome: Ongoing      Problem: Fluid Volume - Imbalance:  Goal: Absence of imbalanced fluid volume signs and symptoms  Absence of imbalanced fluid volume signs and symptoms   Outcome: Ongoing      Problem: Falls - Risk of:  Goal: Will remain free from falls  Will remain free from falls   Outcome: Ongoing    Goal: Absence of physical injury  Absence of physical injury   Outcome: Ongoing

## 2018-11-13 NOTE — PROGRESS NOTES
Lymphadenopathy  Chest/Lungs: Clear to Auscultation without Rales, Rhonchi, or Wheezes and Respirations even and unlabored  Cardiac: Regular Rate and Rhythm and Pedal Pulses Palpable Bilaterally  GI/Abdomen: Bowel Sounds Present and Soft, Non-tender, without Guarding or Rebound Tenderness  : Not examined  Extremities/Musculoskeletal: left arm with edema and redness (outlined with skin marker)  Skin: No Cyanosis and Skin warm and dry  Neuro: Alert and Oriented and No Localizing Signs/Symptoms  Psychiatric: Normal mood and affect      Assessment:    Principal Problem:    Cellulitis of left arm  Active Problems:    Dehydration    Cellulitis  Resolved Problems:    Elevated lactic acid level          Plan:  1. Cellulitis--IV antibiotics  2. Dehydration--home diuretics held, IV fluids  3. Hyponatremia--IV NS, as above, recheck in AM  4. Nausea--PPI started yesterday  5. CKD--monitor renal function and fluid balance   6.  See orders    Electronically signed by Esme KEANE, NP-C, FNP-BC on 11/13/2018 at 12:42 AM    Hospitalist

## 2018-11-14 ENCOUNTER — APPOINTMENT (OUTPATIENT)
Dept: GENERAL RADIOLOGY | Age: 83
DRG: 602 | End: 2018-11-14
Payer: MEDICARE

## 2018-11-14 LAB
ABSOLUTE EOS #: 0 K/UL (ref 0–0.4)
ABSOLUTE IMMATURE GRANULOCYTE: ABNORMAL K/UL (ref 0–0.3)
ABSOLUTE LYMPH #: 1.5 K/UL (ref 1–4.8)
ABSOLUTE MONO #: 1 K/UL (ref 0.1–1.2)
ANION GAP SERPL CALCULATED.3IONS-SCNC: 13 MMOL/L (ref 9–17)
BASOPHILS # BLD: 1 % (ref 0–1)
BASOPHILS ABSOLUTE: 0.1 K/UL (ref 0–0.2)
BUN BLDV-MCNC: 15 MG/DL (ref 8–23)
BUN/CREAT BLD: 12 (ref 9–20)
CALCIUM SERPL-MCNC: 9 MG/DL (ref 8.6–10.4)
CHLORIDE BLD-SCNC: 103 MMOL/L (ref 98–107)
CO2: 24 MMOL/L (ref 20–31)
CREAT SERPL-MCNC: 1.23 MG/DL (ref 0.5–0.9)
DIFFERENTIAL TYPE: ABNORMAL
DIRECT EXAM: NORMAL
EOSINOPHILS RELATIVE PERCENT: 0 % (ref 1–7)
GFR AFRICAN AMERICAN: 51 ML/MIN
GFR NON-AFRICAN AMERICAN: 42 ML/MIN
GFR SERPL CREATININE-BSD FRML MDRD: ABNORMAL ML/MIN/{1.73_M2}
GFR SERPL CREATININE-BSD FRML MDRD: ABNORMAL ML/MIN/{1.73_M2}
GLUCOSE BLD-MCNC: 139 MG/DL (ref 65–105)
GLUCOSE BLD-MCNC: 147 MG/DL (ref 65–105)
GLUCOSE BLD-MCNC: 151 MG/DL (ref 65–105)
GLUCOSE BLD-MCNC: 160 MG/DL (ref 70–99)
GLUCOSE BLD-MCNC: 178 MG/DL (ref 65–105)
HCT VFR BLD CALC: 39.7 % (ref 36–46)
HEMOGLOBIN: 12.8 G/DL (ref 12–16)
IMMATURE GRANULOCYTES: ABNORMAL %
LV EF: 38 %
LVEF MODALITY: NORMAL
LYMPHOCYTES # BLD: 12 % (ref 16–46)
Lab: NORMAL
MCH RBC QN AUTO: 28.9 PG (ref 26–34)
MCHC RBC AUTO-ENTMCNC: 32.3 G/DL (ref 31–37)
MCV RBC AUTO: 89.7 FL (ref 80–100)
MONOCYTES # BLD: 8 % (ref 4–11)
NRBC AUTOMATED: ABNORMAL PER 100 WBC
PDW BLD-RTO: 14 % (ref 11–14.5)
PLATELET # BLD: 173 K/UL (ref 140–450)
PLATELET ESTIMATE: ABNORMAL
PMV BLD AUTO: 8.1 FL (ref 6–12)
POTASSIUM SERPL-SCNC: 3.5 MMOL/L (ref 3.7–5.3)
RBC # BLD: 4.42 M/UL (ref 4–5.2)
RBC # BLD: ABNORMAL 10*6/UL
SEG NEUTROPHILS: 79 % (ref 43–77)
SEGMENTED NEUTROPHILS ABSOLUTE COUNT: 9.5 K/UL (ref 1.8–7.7)
SODIUM BLD-SCNC: 140 MMOL/L (ref 135–144)
SPECIMEN DESCRIPTION: NORMAL
STATUS: NORMAL
TROPONIN INTERP: ABNORMAL
TROPONIN T: 0.03 NG/ML
TROPONIN T: 0.04 NG/ML
TROPONIN T: 0.04 NG/ML
WBC # BLD: 12.1 K/UL (ref 3.5–11)
WBC # BLD: ABNORMAL 10*3/UL

## 2018-11-14 PROCEDURE — 99222 1ST HOSP IP/OBS MODERATE 55: CPT | Performed by: INTERNAL MEDICINE

## 2018-11-14 PROCEDURE — 93306 TTE W/DOPPLER COMPLETE: CPT

## 2018-11-14 PROCEDURE — 6360000002 HC RX W HCPCS: Performed by: INTERNAL MEDICINE

## 2018-11-14 PROCEDURE — 99232 SBSQ HOSP IP/OBS MODERATE 35: CPT | Performed by: INTERNAL MEDICINE

## 2018-11-14 PROCEDURE — 94761 N-INVAS EAR/PLS OXIMETRY MLT: CPT

## 2018-11-14 PROCEDURE — 6370000000 HC RX 637 (ALT 250 FOR IP): Performed by: INTERNAL MEDICINE

## 2018-11-14 PROCEDURE — 36415 COLL VENOUS BLD VENIPUNCTURE: CPT

## 2018-11-14 PROCEDURE — 2700000000 HC OXYGEN THERAPY PER DAY

## 2018-11-14 PROCEDURE — 6360000002 HC RX W HCPCS: Performed by: NURSE PRACTITIONER

## 2018-11-14 PROCEDURE — 6370000000 HC RX 637 (ALT 250 FOR IP): Performed by: NURSE PRACTITIONER

## 2018-11-14 PROCEDURE — 2060000000 HC ICU INTERMEDIATE R&B

## 2018-11-14 PROCEDURE — 85025 COMPLETE CBC W/AUTO DIFF WBC: CPT

## 2018-11-14 PROCEDURE — 71045 X-RAY EXAM CHEST 1 VIEW: CPT

## 2018-11-14 PROCEDURE — 87493 C DIFF AMPLIFIED PROBE: CPT

## 2018-11-14 PROCEDURE — 93005 ELECTROCARDIOGRAM TRACING: CPT

## 2018-11-14 PROCEDURE — 84484 ASSAY OF TROPONIN QUANT: CPT

## 2018-11-14 PROCEDURE — 82947 ASSAY GLUCOSE BLOOD QUANT: CPT

## 2018-11-14 PROCEDURE — 80048 BASIC METABOLIC PNL TOTAL CA: CPT

## 2018-11-14 PROCEDURE — 2580000003 HC RX 258: Performed by: INTERNAL MEDICINE

## 2018-11-14 RX ORDER — SACCHAROMYCES BOULARDII 250 MG
250 CAPSULE ORAL 2 TIMES DAILY
Status: DISCONTINUED | OUTPATIENT
Start: 2018-11-14 | End: 2018-11-15 | Stop reason: HOSPADM

## 2018-11-14 RX ORDER — CEPHALEXIN 250 MG/1
500 CAPSULE ORAL EVERY 6 HOURS SCHEDULED
Status: DISCONTINUED | OUTPATIENT
Start: 2018-11-14 | End: 2018-11-15 | Stop reason: HOSPADM

## 2018-11-14 RX ORDER — FUROSEMIDE 10 MG/ML
20 INJECTION INTRAMUSCULAR; INTRAVENOUS ONCE
Status: COMPLETED | OUTPATIENT
Start: 2018-11-14 | End: 2018-11-14

## 2018-11-14 RX ORDER — LACTOBACILLUS RHAMNOSUS GG 10B CELL
1 CAPSULE ORAL 3 TIMES DAILY
Status: DISCONTINUED | OUTPATIENT
Start: 2018-11-14 | End: 2018-11-15 | Stop reason: HOSPADM

## 2018-11-14 RX ORDER — FUROSEMIDE 10 MG/ML
INJECTION INTRAMUSCULAR; INTRAVENOUS
Status: DISPENSED
Start: 2018-11-14 | End: 2018-11-14

## 2018-11-14 RX ORDER — POTASSIUM CHLORIDE 20MEQ/15ML
40 LIQUID (ML) ORAL ONCE
Status: COMPLETED | OUTPATIENT
Start: 2018-11-14 | End: 2018-11-14

## 2018-11-14 RX ADMIN — SUCRALFATE 1 G: 1 TABLET ORAL at 12:34

## 2018-11-14 RX ADMIN — FUROSEMIDE 20 MG: 20 TABLET ORAL at 08:34

## 2018-11-14 RX ADMIN — SUCRALFATE 1 G: 1 TABLET ORAL at 17:38

## 2018-11-14 RX ADMIN — FUROSEMIDE 20 MG: 10 INJECTION, SOLUTION INTRAMUSCULAR; INTRAVENOUS at 10:05

## 2018-11-14 RX ADMIN — VANCOMYCIN HYDROCHLORIDE 1000 MG: 1 INJECTION, POWDER, LYOPHILIZED, FOR SOLUTION INTRAVENOUS at 00:42

## 2018-11-14 RX ADMIN — SPIRONOLACTONE 25 MG: 25 TABLET, FILM COATED ORAL at 08:35

## 2018-11-14 RX ADMIN — INSULIN LISPRO 1 UNITS: 100 INJECTION, SOLUTION INTRAVENOUS; SUBCUTANEOUS at 21:44

## 2018-11-14 RX ADMIN — Medication 10 ML: at 14:24

## 2018-11-14 RX ADMIN — SERTRALINE HYDROCHLORIDE 50 MG: 50 TABLET ORAL at 08:34

## 2018-11-14 RX ADMIN — ONDANSETRON 8 MG: 2 INJECTION INTRAMUSCULAR; INTRAVENOUS at 02:08

## 2018-11-14 RX ADMIN — INSULIN LISPRO 1 UNITS: 100 INJECTION, SOLUTION INTRAVENOUS; SUBCUTANEOUS at 17:38

## 2018-11-14 RX ADMIN — INSULIN LISPRO 1 UNITS: 100 INJECTION, SOLUTION INTRAVENOUS; SUBCUTANEOUS at 12:33

## 2018-11-14 RX ADMIN — SUCRALFATE 1 G: 1 TABLET ORAL at 08:34

## 2018-11-14 RX ADMIN — VITAMIN D, TAB 1000IU (100/BT) 1000 UNITS: 25 TAB at 08:33

## 2018-11-14 RX ADMIN — METOPROLOL SUCCINATE 100 MG: 50 TABLET, EXTENDED RELEASE ORAL at 08:39

## 2018-11-14 RX ADMIN — LISINOPRIL 2.5 MG: 2.5 TABLET ORAL at 08:35

## 2018-11-14 RX ADMIN — Medication 10 ML: at 10:07

## 2018-11-14 RX ADMIN — POTASSIUM CHLORIDE 40 MEQ: 40 SOLUTION ORAL at 10:05

## 2018-11-14 RX ADMIN — Medication 250 MG: at 21:43

## 2018-11-14 RX ADMIN — PIPERACILLIN SODIUM,TAZOBACTAM SODIUM 3.38 G: 3; .375 INJECTION, POWDER, FOR SOLUTION INTRAVENOUS at 04:36

## 2018-11-14 RX ADMIN — Medication 1 CAPSULE: at 10:05

## 2018-11-14 RX ADMIN — CEPHALEXIN 500 MG: 250 CAPSULE ORAL at 12:33

## 2018-11-14 RX ADMIN — PROCHLORPERAZINE EDISYLATE 10 MG: 5 INJECTION INTRAMUSCULAR; INTRAVENOUS at 14:19

## 2018-11-14 RX ADMIN — Medication 1 CAPSULE: at 13:50

## 2018-11-14 RX ADMIN — CEPHALEXIN 500 MG: 250 CAPSULE ORAL at 17:38

## 2018-11-14 RX ADMIN — PANTOPRAZOLE SODIUM 40 MG: 40 TABLET, DELAYED RELEASE ORAL at 05:33

## 2018-11-14 RX ADMIN — CLOPIDOGREL BISULFATE 75 MG: 75 TABLET ORAL at 08:39

## 2018-11-14 RX ADMIN — Medication 250 MG: at 10:06

## 2018-11-14 RX ADMIN — Medication 10 ML: at 07:56

## 2018-11-14 RX ADMIN — Medication 10 ML: at 04:36

## 2018-11-14 RX ADMIN — FAMOTIDINE 20 MG: 20 TABLET ORAL at 17:38

## 2018-11-14 RX ADMIN — LORAZEPAM 0.5 MG: 0.5 TABLET ORAL at 07:56

## 2018-11-14 RX ADMIN — SUCRALFATE 1 G: 1 TABLET ORAL at 21:43

## 2018-11-14 RX ADMIN — PROCHLORPERAZINE EDISYLATE 10 MG: 5 INJECTION INTRAMUSCULAR; INTRAVENOUS at 04:35

## 2018-11-14 RX ADMIN — RIVAROXABAN 20 MG: 10 TABLET, FILM COATED ORAL at 08:35

## 2018-11-14 RX ADMIN — Medication 1 CAPSULE: at 21:43

## 2018-11-14 RX ADMIN — FUROSEMIDE 20 MG: 10 INJECTION, SOLUTION INTRAMUSCULAR; INTRAVENOUS at 02:06

## 2018-11-14 RX ADMIN — Medication 10 ML: at 21:00

## 2018-11-14 RX ADMIN — GLIMEPIRIDE 1 MG: 1 TABLET ORAL at 08:33

## 2018-11-14 RX ADMIN — ONDANSETRON 8 MG: 2 INJECTION INTRAMUSCULAR; INTRAVENOUS at 07:56

## 2018-11-14 RX ADMIN — LORAZEPAM 0.5 MG: 0.5 TABLET ORAL at 21:43

## 2018-11-14 ASSESSMENT — PAIN SCALES - GENERAL
PAINLEVEL_OUTOF10: 0

## 2018-11-14 NOTE — PROGRESS NOTES
Hospitalist Progress Note    Patient:  Gabo Enrique     YOB: 1935    MRN: [id number]   Admit date: 11/12/2018     Acct: [de-identified]     PCP: Tali Helm MD    CC--Interval History: Rounded on patient around 1940--patient was in acute respiratory distress with hypoxia (oxygen saturation 82% on 2L NC, RR 24-26, 's-120's, with use of accessory muscles)--with c/o frequent non-productive cough, denies chest pain/pressure or dyspnea--stat chest xray was ordered and reviewed, showing CHF changes. IV fluids were discontinued, IV lasix ordered, RT to bedside to evaluate, labs ordered and reviewed--patient was placed on venti mask and then onto bipap. After an hour on bipap, patient is breathing much better and is more comfortable--oxygen saturation stable 92-94%.          CHF exacerbation (likely acute on chronic systolic CHF) vs. flash pulmonary edema      Hypokalemia      Left arm cellulitis--IV Vanco and Zosyn--much improved      Hyponatremia--resolved      Nausea--persists          All other ROS negative except noted in HPI    Diet:  DIET CARDIAC; Carb Control: 4 carb choices (60 gms)/meal; Daily Fluid Restriction: 1800 ml    Medications:  Scheduled Meds:   [START ON 11/14/2018] spironolactone  25 mg Oral Daily    [START ON 11/14/2018] furosemide  20 mg Oral Daily    glimepiride  1 mg Oral Daily with breakfast    [START ON 11/14/2018] furosemide  20 mg Intravenous Once    potassium bicarbonate  25 mEq Oral Once    piperacillin-tazobactam  3.375 g Intravenous Q8H    sodium chloride flush  10 mL Intravenous 2 times per day    insulin lispro  0-6 Units Subcutaneous TID     insulin lispro  0-3 Units Subcutaneous Nightly    vancomycin (VANCOCIN) intermittent dosing (placeholder)   Other RX Placeholder    [START ON 11/14/2018] vancomycin  1,000 mg Intravenous Q36H    b complex vitamins  1 capsule Oral Every Other Day    clopidogrel  75 mg Oral Daily    famotidine  20 mg Oral QPM   

## 2018-11-14 NOTE — PROGRESS NOTES
Pharmacy Vancomycin Consult    Vancomycin Day: 3  Current Dosin mg q 36h    Temp max:  98.2    Recent Labs      18   2050  18   0755   CREATININE  1.13*  1.23*     CrCl cannot be calculated (Unknown ideal weight. ). Recent Labs      18   0612  18   0755   WBC  5.6  12.1*       Culture Date       Source                   Results  18               Blood                     NGTD    Level ordered for: 11/15/18    Assessment/Plan:  SCr up a bit today. Afebrile. U/O is good. Will continue same dose as level is due tomorrow.   Lucian Hawk  2018  10:35 AM

## 2018-11-14 NOTE — PROGRESS NOTES
glucose, dextrose, glucagon (rDNA), dextrose    Objective:  Labs:  CBC with Differential:    Lab Results   Component Value Date    WBC 12.1 11/14/2018    RBC 4.42 11/14/2018    HGB 12.8 11/14/2018    HCT 39.7 11/14/2018     11/14/2018    MCV 89.7 11/14/2018    MCH 28.9 11/14/2018    MCHC 32.3 11/14/2018    RDW 14.0 11/14/2018    LYMPHOPCT 12 11/14/2018    MONOPCT 8 11/14/2018    BASOPCT 1 11/14/2018    MONOSABS 1.00 11/14/2018    LYMPHSABS 1.50 11/14/2018    EOSABS 0.00 11/14/2018    BASOSABS 0.10 11/14/2018    DIFFTYPE NOT REPORTED 11/14/2018     BMP:    Lab Results   Component Value Date     11/14/2018    K 3.5 11/14/2018     11/14/2018    CO2 24 11/14/2018    BUN 15 11/14/2018    LABALBU 3.4 12/08/2017    CREATININE 1.23 11/14/2018    CALCIUM 9.0 11/14/2018    GFRAA 51 11/14/2018    LABGLOM 42 11/14/2018    GLUCOSE 160 11/14/2018           Physical Exam:  Vitals: /60   Pulse 87   Temp 98.2 °F (36.8 °C) (Tympanic)   Resp 16   Ht 4' 8\" (1.422 m)   Wt 142 lb 1.6 oz (64.5 kg)   SpO2 94%   BMI 31.86 kg/m²   24 hour intake/output:  Intake/Output Summary (Last 24 hours) at 11/14/18 1233  Last data filed at 11/14/18 1219   Gross per 24 hour   Intake             2270 ml   Output             4000 ml   Net            -1730 ml     Last 3 weights: Wt Readings from Last 3 Encounters:   11/14/18 142 lb 1.6 oz (64.5 kg)   11/10/18 134 lb 9.6 oz (61.1 kg)   11/05/18 135 lb 3.2 oz (61.3 kg)     HEENT: Normocephalic and Atraumatic  Neck: Supple, No Masses, Tenderness, Nodularity and No Lymphadenopathy  Chest/Lungs: Clear to Auscultation without Rales, Rhonchi, or Wheezes  Cardiac: Regular Rate and Rhythm  GI/Abdomen:  Bowel Sounds Present, Soft, Non-tender, without Guarding or Rebound Tenderness, No Masses and No Tenderness--except very mild left-sided tenderness to deep palpation  : hernandez---clear urine   EXT/Skin: No Edema, No Cyanosis and No Clubbing  Neuro: Alert and Oriented and No Localizing Signs/Symptoms      Assessment:    Principal Problem:    Cellulitis of left arm  Active Problems:    Chronic nausea    Dehydration    Cellulitis  Resolved Problems:    Elevated lactic acid level    NADEEN BOYKIN [facility]   81    JOSHUA Chung]      FULL CODE    XARELTO--PLAVIX---aspirin  TAVR--12.7.2017                                                                                   Anti-infectives:  Vancomycin IV, Zosyn IV                                    Left arm cellulitis----1.12.2018  Left arm lymphadenitis--recurrent--acute--on--                 chronic lymphedema  Elevated lactic acid level---renal ----11.12.2018  Dehydration----11.12.2018   Sepsis ruled out---11.12.2018  Acute nausea--on--chronic nausea---11.12.2018--gastroparesis? Acute respiratory failure--hypoxia---11.13.2018---due to CHF  CHF--acute-on-chronic combined systolic-diastolic--flash pulmonary edema-----11.13.2018          CXR---11.12.2018---no acute---[-]         2D ECHO---1.5.2016--moderate LVH [id number]                   anteroseptal region--hypokinetic--mild-moderate                  LV dysfunction--NRVSF--mild increased RA size--                  thickened MV leaflets--HAI--moderate aortic                  stenosis---RVSP 34 mm Hg--Grade 1 DD---                  LVEF ~ 40-45%          MI ruled out--12. 1.2014           spiral CTA--11.30.2014--no PE     ASCVD           Cardiac catheterization---9.19.2017--patent LAD stent--LVEF ~ 30%           Cardiac catheterization---9. 6.2016--normal LM--                           95% mid-LAD--normal D1--ERON LAD stent--                           moderate LV systolic dysfunction--                           LVEF ~ 30-35%   CHF---chronic systolic  Aortic stenosis---moderate----III/VI MECHE radiation to carotids          TAVR---12.7.2017         DUS--CV---4.2016---no hemodynamically significant stenosis  MR---mild   Intermittent atrial fibrillation  LBBB

## 2018-11-15 ENCOUNTER — APPOINTMENT (OUTPATIENT)
Dept: GENERAL RADIOLOGY | Age: 83
DRG: 602 | End: 2018-11-15
Payer: MEDICARE

## 2018-11-15 VITALS
BODY MASS INDEX: 30.03 KG/M2 | HEART RATE: 93 BPM | HEIGHT: 56 IN | OXYGEN SATURATION: 92 % | TEMPERATURE: 98.1 F | SYSTOLIC BLOOD PRESSURE: 112 MMHG | WEIGHT: 133.5 LBS | RESPIRATION RATE: 16 BRPM | DIASTOLIC BLOOD PRESSURE: 54 MMHG

## 2018-11-15 LAB
ABSOLUTE EOS #: 0.1 K/UL (ref 0–0.4)
ABSOLUTE IMMATURE GRANULOCYTE: NORMAL K/UL (ref 0–0.3)
ABSOLUTE LYMPH #: 1.7 K/UL (ref 1–4.8)
ABSOLUTE MONO #: 0.8 K/UL (ref 0.1–1.2)
ANION GAP SERPL CALCULATED.3IONS-SCNC: 14 MMOL/L (ref 9–17)
BASOPHILS # BLD: 0 % (ref 0–1)
BASOPHILS ABSOLUTE: 0 K/UL (ref 0–0.2)
BUN BLDV-MCNC: 11 MG/DL (ref 8–23)
BUN/CREAT BLD: 10 (ref 9–20)
CALCIUM SERPL-MCNC: 9 MG/DL (ref 8.6–10.4)
CHLORIDE BLD-SCNC: 101 MMOL/L (ref 98–107)
CO2: 24 MMOL/L (ref 20–31)
CREAT SERPL-MCNC: 1.12 MG/DL (ref 0.5–0.9)
DIFFERENTIAL TYPE: NORMAL
EOSINOPHILS RELATIVE PERCENT: 1 % (ref 1–7)
GFR AFRICAN AMERICAN: 57 ML/MIN
GFR NON-AFRICAN AMERICAN: 47 ML/MIN
GFR SERPL CREATININE-BSD FRML MDRD: ABNORMAL ML/MIN/{1.73_M2}
GFR SERPL CREATININE-BSD FRML MDRD: ABNORMAL ML/MIN/{1.73_M2}
GLUCOSE BLD-MCNC: 115 MG/DL (ref 70–99)
GLUCOSE BLD-MCNC: 182 MG/DL (ref 65–105)
HCT VFR BLD CALC: 37.6 % (ref 36–46)
HEMOGLOBIN: 12.1 G/DL (ref 12–16)
IMMATURE GRANULOCYTES: NORMAL %
LYMPHOCYTES # BLD: 19 % (ref 16–46)
MCH RBC QN AUTO: 29.2 PG (ref 26–34)
MCHC RBC AUTO-ENTMCNC: 32.3 G/DL (ref 31–37)
MCV RBC AUTO: 90.2 FL (ref 80–100)
MONOCYTES # BLD: 10 % (ref 4–11)
NRBC AUTOMATED: NORMAL PER 100 WBC
PDW BLD-RTO: 13.9 % (ref 11–14.5)
PLATELET # BLD: 173 K/UL (ref 140–450)
PLATELET ESTIMATE: NORMAL
PMV BLD AUTO: 8.2 FL (ref 6–12)
POTASSIUM SERPL-SCNC: 3.6 MMOL/L (ref 3.7–5.3)
RBC # BLD: 4.16 M/UL (ref 4–5.2)
RBC # BLD: NORMAL 10*6/UL
SEG NEUTROPHILS: 70 % (ref 43–77)
SEGMENTED NEUTROPHILS ABSOLUTE COUNT: 6.1 K/UL (ref 1.8–7.7)
SODIUM BLD-SCNC: 139 MMOL/L (ref 135–144)
WBC # BLD: 8.7 K/UL (ref 3.5–11)
WBC # BLD: NORMAL 10*3/UL

## 2018-11-15 PROCEDURE — 85025 COMPLETE CBC W/AUTO DIFF WBC: CPT

## 2018-11-15 PROCEDURE — 71045 X-RAY EXAM CHEST 1 VIEW: CPT

## 2018-11-15 PROCEDURE — 2580000003 HC RX 258: Performed by: INTERNAL MEDICINE

## 2018-11-15 PROCEDURE — 82947 ASSAY GLUCOSE BLOOD QUANT: CPT

## 2018-11-15 PROCEDURE — 6370000000 HC RX 637 (ALT 250 FOR IP): Performed by: NURSE PRACTITIONER

## 2018-11-15 PROCEDURE — 6360000002 HC RX W HCPCS: Performed by: NURSE PRACTITIONER

## 2018-11-15 PROCEDURE — 99238 HOSP IP/OBS DSCHRG MGMT 30/<: CPT | Performed by: INTERNAL MEDICINE

## 2018-11-15 PROCEDURE — 36415 COLL VENOUS BLD VENIPUNCTURE: CPT

## 2018-11-15 PROCEDURE — 94760 N-INVAS EAR/PLS OXIMETRY 1: CPT

## 2018-11-15 PROCEDURE — 80048 BASIC METABOLIC PNL TOTAL CA: CPT

## 2018-11-15 PROCEDURE — 6370000000 HC RX 637 (ALT 250 FOR IP): Performed by: INTERNAL MEDICINE

## 2018-11-15 RX ORDER — GLIMEPIRIDE 1 MG/1
1 TABLET ORAL
Qty: 30 TABLET | Refills: 0 | Status: SHIPPED | OUTPATIENT
Start: 2018-11-16 | End: 2018-12-07 | Stop reason: SDUPTHER

## 2018-11-15 RX ORDER — CEPHALEXIN 500 MG/1
500 CAPSULE ORAL 4 TIMES DAILY
Qty: 20 CAPSULE | Refills: 0 | Status: SHIPPED | OUTPATIENT
Start: 2018-11-15 | End: 2018-11-15

## 2018-11-15 RX ORDER — SACCHAROMYCES BOULARDII 250 MG
250 CAPSULE ORAL 2 TIMES DAILY
Qty: 60 CAPSULE | Refills: 0 | COMMUNITY
Start: 2018-11-15 | End: 2018-12-15

## 2018-11-15 RX ORDER — LACTOBACILLUS RHAMNOSUS GG 10B CELL
1 CAPSULE ORAL 3 TIMES DAILY
Qty: 90 CAPSULE | Refills: 0 | COMMUNITY
Start: 2018-11-15 | End: 2019-01-01

## 2018-11-15 RX ORDER — CEPHALEXIN 500 MG/1
500 CAPSULE ORAL 4 TIMES DAILY
Qty: 20 CAPSULE | Refills: 0 | Status: SHIPPED | OUTPATIENT
Start: 2018-11-15 | End: 2018-11-20

## 2018-11-15 RX ORDER — OMEPRAZOLE 40 MG/1
40 CAPSULE, DELAYED RELEASE ORAL DAILY
Qty: 30 CAPSULE | Refills: 3 | COMMUNITY
Start: 2018-11-15 | End: 2019-01-01

## 2018-11-15 RX ORDER — PROCHLORPERAZINE MALEATE 10 MG
10 TABLET ORAL EVERY 6 HOURS PRN
Qty: 20 TABLET | Refills: 0 | Status: SHIPPED | OUTPATIENT
Start: 2018-11-15 | End: 2018-11-15

## 2018-11-15 RX ORDER — ACETAMINOPHEN 325 MG/1
650 TABLET ORAL EVERY 4 HOURS PRN
Qty: 120 TABLET | Refills: 0 | COMMUNITY
Start: 2018-11-15

## 2018-11-15 RX ORDER — PROCHLORPERAZINE MALEATE 10 MG
10 TABLET ORAL EVERY 6 HOURS PRN
Qty: 20 TABLET | Refills: 0 | Status: ON HOLD | OUTPATIENT
Start: 2018-11-15 | End: 2019-01-01 | Stop reason: HOSPADM

## 2018-11-15 RX ORDER — GLIMEPIRIDE 1 MG/1
1 TABLET ORAL
Qty: 30 TABLET | Refills: 0 | Status: SHIPPED | OUTPATIENT
Start: 2018-11-16 | End: 2018-11-15

## 2018-11-15 RX ADMIN — GLIMEPIRIDE 1 MG: 1 TABLET ORAL at 10:47

## 2018-11-15 RX ADMIN — PANTOPRAZOLE SODIUM 40 MG: 40 TABLET, DELAYED RELEASE ORAL at 06:41

## 2018-11-15 RX ADMIN — CEPHALEXIN 500 MG: 250 CAPSULE ORAL at 12:11

## 2018-11-15 RX ADMIN — SPIRONOLACTONE 25 MG: 25 TABLET, FILM COATED ORAL at 10:47

## 2018-11-15 RX ADMIN — CEPHALEXIN 500 MG: 250 CAPSULE ORAL at 06:41

## 2018-11-15 RX ADMIN — VITAMIN D, TAB 1000IU (100/BT) 1000 UNITS: 25 TAB at 10:47

## 2018-11-15 RX ADMIN — CEPHALEXIN 500 MG: 250 CAPSULE ORAL at 00:46

## 2018-11-15 RX ADMIN — LISINOPRIL 2.5 MG: 2.5 TABLET ORAL at 10:44

## 2018-11-15 RX ADMIN — SUCRALFATE 1 G: 1 TABLET ORAL at 10:47

## 2018-11-15 RX ADMIN — PROCHLORPERAZINE EDISYLATE 10 MG: 5 INJECTION INTRAMUSCULAR; INTRAVENOUS at 08:22

## 2018-11-15 RX ADMIN — Medication 10 ML: at 08:22

## 2018-11-15 RX ADMIN — FUROSEMIDE 20 MG: 20 TABLET ORAL at 10:48

## 2018-11-15 RX ADMIN — LORAZEPAM 0.5 MG: 0.5 TABLET ORAL at 08:22

## 2018-11-15 RX ADMIN — METOPROLOL SUCCINATE 100 MG: 50 TABLET, EXTENDED RELEASE ORAL at 10:47

## 2018-11-15 RX ADMIN — Medication 1 CAPSULE: at 10:43

## 2018-11-15 RX ADMIN — RIVAROXABAN 20 MG: 10 TABLET, FILM COATED ORAL at 10:43

## 2018-11-15 RX ADMIN — INSULIN LISPRO 1 UNITS: 100 INJECTION, SOLUTION INTRAVENOUS; SUBCUTANEOUS at 12:11

## 2018-11-15 RX ADMIN — CLOPIDOGREL BISULFATE 75 MG: 75 TABLET ORAL at 10:48

## 2018-11-15 RX ADMIN — Medication 250 MG: at 10:47

## 2018-11-15 ASSESSMENT — PAIN SCALES - GENERAL
PAINLEVEL_OUTOF10: 0

## 2018-11-15 NOTE — PROGRESS NOTES
CTA--11.30.2014--no PE     ASCVD           Cardiac catheterization---9.19.2017--patent LAD stent--LVEF ~ 30%           Cardiac catheterization---9. 6.2016--normal LM--                           95% mid-LAD--normal D1--ERON LAD stent--                           moderate LV systolic dysfunction--                           LVEF ~ 30-35%   CHF---chronic systolic  Aortic stenosis---moderate----III/VI MECHE radiation to carotids          TAVR---12.7.2017         DUS--CV---4.2016---no hemodynamically significant stenosis  MR---mild   Intermittent atrial fibrillation  LBBB        EKG---11.13.2018--SR--89--1st degree AVB--LBBB--[old]        EKG---11.12.2018--SR--94--frequent PVCs---LBBB---[old]  Diabetes Mellitus Type 2         Diabetic neuropathy   Hypertension  Hyperlipidemia   CKD--Stage 3   Bilateral breast carcinoma--sp MRM--LND x 2        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                 Left radical mastectomy--1995                 Right mastectomy--LND--2000   Anxiety   Hyponatremia   PMH:   left elbow fracture, uterine prolapse--cystocele,               chest--normal cardiac catheterization--1999, left               arm--cellulitis--lymphadenitis--2011, left arm               lymphedema--recurrent episodes, acute respiratory              failure--due to CHF and possible pneumonia---2014--              required  CPAP, chest pain--11.30.2014, pneumonia               bilaterally--fever--leukocytosis---11.30.2014, Vitamin D              deficiency, PVCs, allergic rhinitis, hypokalemia, sinus               drainage, mild esophagitis---2017    PSH:    see above, bilateral mastectomy--LND--1995--2000,               vaginal hysterectomy--anterior repair--SPT--              bladder repair--no malignancy--2002,  left elbow--ORIF--              1999,  colonoscopy---2009--diverticulosis, colonoscopy---               2014--internal hemorrhoids--banded--normal random

## 2018-11-15 NOTE — PLAN OF CARE
Problem: SAFETY  Goal: Free from accidental physical injury  Outcome: Ongoing    Goal: Free from intentional harm  Outcome: Ongoing      Problem: DAILY CARE  Goal: Daily care needs are met  Outcome: Ongoing      Problem: PAIN  Goal: Patient's pain/discomfort is manageable  Outcome: Ongoing      Problem: SKIN INTEGRITY  Goal: Skin integrity is maintained or improved  Outcome: Ongoing      Problem: KNOWLEDGE DEFICIT  Goal: Patient/S.O. demonstrates understanding of disease process, treatment plan, medications, and discharge instructions. Outcome: Ongoing      Problem: DISCHARGE BARRIERS  Goal: Patient's continuum of care needs are met  Outcome: Ongoing      Problem: Fluid Volume - Imbalance:  Goal: Absence of imbalanced fluid volume signs and symptoms  Absence of imbalanced fluid volume signs and symptoms   Outcome: Ongoing      Problem: Falls - Risk of:  Goal: Will remain free from falls  Will remain free from falls   Outcome: Ongoing    Goal: Absence of physical injury  Absence of physical injury   Outcome: Ongoing      Problem:  Body Temperature - Imbalanced:  Goal: Ability to maintain a body temperature in the normal range will improve  Ability to maintain a body temperature in the normal range will improve   Outcome: Ongoing      Problem: OXYGENATION/RESPIRATORY FUNCTION  Goal: Patient will maintain patent airway  Outcome: Ongoing    Goal: Patient will achieve/maintain normal respiratory rate/effort  Respiratory rate and effort will be within normal limits for the patient   Outcome: Ongoing      Problem: HEMODYNAMIC STATUS  Goal: Patient has stable vital signs and fluid balance  Outcome: Ongoing      Problem: FLUID AND ELECTROLYTE IMBALANCE  Goal: Fluid and electrolyte balance are achieved/maintained  Outcome: Ongoing      Problem: ACTIVITY INTOLERANCE/IMPAIRED MOBILITY  Goal: Mobility/activity is maintained at optimum level for patient  Outcome: Ongoing

## 2018-11-16 LAB
EKG ATRIAL RATE: 87 BPM
EKG P AXIS: 80 DEGREES
EKG P-R INTERVAL: 216 MS
EKG Q-T INTERVAL: 442 MS
EKG QRS DURATION: 134 MS
EKG QTC CALCULATION (BAZETT): 531 MS
EKG R AXIS: -13 DEGREES
EKG T AXIS: 121 DEGREES
EKG VENTRICULAR RATE: 87 BPM

## 2018-11-16 NOTE — DISCHARGE SUMMARY
09/19/2017,  patent LAD stent, LVEF approximately 30%. Cardiac catheterization,  09/06/2016, normal LM, 95% mid LAD, normal D1, ERON LAD stent. 10.  Moderate LV systolic dysfunction, LVEF 30% to 35%. 11.  Aortic stenosis, moderate, grade 3/6 systolic ejection murmur  radiation to carotids, TAVR 12/07/2017. 12. Doppler ultrasound carotid vertebral 2016, no hemodynamically  significant stenosis. 13.  Intermittent atrial fibrillation. 14.  Left bundle-branch block. 15.  EKG, 11/13/2018, sinus rhythm, rate 89, first-degree AV block, left  bundle-branch block old. EKG, 11/12/2018, sinus rhythm, rate 94,  frequent PVCs, left bundle-branch block old. 16.  Diabetes mellitus, type 2 with diabetic peripheral neuropathy and  likely diabetic gastroparesis. 17.  Hypertension. 18.  Hyperlipidemia. 19.  Chronic kidney disease, stage III. 20.  Anxiety. 21.  Hyponatremia, corrected. The patient has had bilateral breast carcinoma status post modified  radical mastectomy, lymph node dissection twice with a medullary  infiltrating lobular breast carcinoma left and right, former 1995,  latter 2002, has left radical mastectomy in 1995 and right mastectomy  with lymph node dissection in 2000, hence the underlying lymphedema left arm. Other medical problems set forth in the progress note of 11/15/2018,  incorporated for reference herein. HISTORY OF PRESENT ILLNESS AND HOSPITAL COURSE:  The patient is an  74-year-old white female, patient of [facility]. The  patient presented with complaints of some nausea. The patient was found  to have left arm cellulitis, was treated with vancomycin and Zosyn for  the same and subsequently converted to oral antibiotics at the time of  discharge. This turned out to be more of secondary issue for this  patient.   The patient having acute nausea on chronic nausea, seen by  General Surgery, at this time the patient is to ultimately undergo an  outpatient gastric orders are specifically discontinued. The patient has been advised of the financial relationship and ownership  interests between [address] [place] [address] physicians and  employees of [facility] [place] operated [facility] and [address] [place] [address].         Debo Valverde    D: 11/15/2018 17:27:28       T: 11/16/2018 9:35:11     [id number]  Job#: [id number]     Doc#: [id number]    CC:

## 2018-11-18 LAB
CULTURE: NORMAL
Lab: NORMAL
SPECIMEN DESCRIPTION: NORMAL
STATUS: NORMAL

## 2018-11-20 ENCOUNTER — OFFICE VISIT (OUTPATIENT)
Dept: FAMILY MEDICINE CLINIC | Age: 83
End: 2018-11-20
Payer: MEDICARE

## 2018-11-20 VITALS
OXYGEN SATURATION: 96 % | BODY MASS INDEX: 30.37 KG/M2 | TEMPERATURE: 98.7 F | HEART RATE: 71 BPM | HEIGHT: 56 IN | WEIGHT: 135 LBS | DIASTOLIC BLOOD PRESSURE: 64 MMHG | SYSTOLIC BLOOD PRESSURE: 110 MMHG

## 2018-11-20 DIAGNOSIS — F41.1 GENERALIZED ANXIETY DISORDER: ICD-10-CM

## 2018-11-20 DIAGNOSIS — L03.114 CELLULITIS OF LEFT ARM: Primary | ICD-10-CM

## 2018-11-20 PROCEDURE — G8400 PT W/DXA NO RESULTS DOC: HCPCS | Performed by: FAMILY MEDICINE

## 2018-11-20 PROCEDURE — 1101F PT FALLS ASSESS-DOCD LE1/YR: CPT | Performed by: FAMILY MEDICINE

## 2018-11-20 PROCEDURE — G8484 FLU IMMUNIZE NO ADMIN: HCPCS | Performed by: FAMILY MEDICINE

## 2018-11-20 PROCEDURE — 4040F PNEUMOC VAC/ADMIN/RCVD: CPT | Performed by: FAMILY MEDICINE

## 2018-11-20 PROCEDURE — G8598 ASA/ANTIPLAT THER USED: HCPCS | Performed by: FAMILY MEDICINE

## 2018-11-20 PROCEDURE — 99214 OFFICE O/P EST MOD 30 MIN: CPT | Performed by: FAMILY MEDICINE

## 2018-11-20 PROCEDURE — 1111F DSCHRG MED/CURRENT MED MERGE: CPT | Performed by: FAMILY MEDICINE

## 2018-11-20 PROCEDURE — 1123F ACP DISCUSS/DSCN MKR DOCD: CPT | Performed by: FAMILY MEDICINE

## 2018-11-20 PROCEDURE — 1036F TOBACCO NON-USER: CPT | Performed by: FAMILY MEDICINE

## 2018-11-20 PROCEDURE — G8427 DOCREV CUR MEDS BY ELIG CLIN: HCPCS | Performed by: FAMILY MEDICINE

## 2018-11-20 PROCEDURE — G8417 CALC BMI ABV UP PARAM F/U: HCPCS | Performed by: FAMILY MEDICINE

## 2018-11-20 PROCEDURE — 1090F PRES/ABSN URINE INCON ASSESS: CPT | Performed by: FAMILY MEDICINE

## 2018-11-20 RX ORDER — LORAZEPAM 0.5 MG/1
0.5 TABLET ORAL EVERY 8 HOURS PRN
Qty: 90 TABLET | Refills: 2 | Status: ON HOLD | OUTPATIENT
Start: 2018-11-20 | End: 2019-01-01 | Stop reason: SDUPTHER

## 2018-11-20 ASSESSMENT — ENCOUNTER SYMPTOMS
DIARRHEA: 0
ABDOMINAL PAIN: 1
CHEST TIGHTNESS: 0
CONSTIPATION: 0
WHEEZING: 0
SHORTNESS OF BREATH: 0
COUGH: 0
NAUSEA: 1
COLOR CHANGE: 0

## 2018-11-20 NOTE — PROGRESS NOTES
Post-Discharge Transitional Care Management Services or Hospital Follow Up      Ney Fortune   YOB: 1935    Date of Office Visit:  11/20/2018  Date of Hospital Admission: 11/12/18  Date of Hospital Discharge: 11/15/18  Readmission Risk Score(high >=14%. Medium >=10%):Readmission Risk Score: 17    Care management risk score Rising risk (score 2-5) and Complex Care (Scores >=6): 3     Non face to face  following discharge, date last encounter closed (first attempt may have been earlier): *No documented post hospital discharge outreach found in the last 14 days     Call initiated 2 business days of discharge: *No response recorded in the last 14 days     Patient Active Problem List   Diagnosis    Hyperlipidemia    Hypertension    Diabetic neuropathy [facility] [address]    [facility]    Lymphedema of arm    Vitamin D deficiency    Hyperplastic colon polyp    PVC's (premature ventricular contractions)    Allergic rhinitis    Anxiety, generalized    CHF (congestive heart failure) ([facility] [address]    DM (diabetes mellitus), type 2 ([facility] [address]    Coronary artery disease involving native coronary artery of native heart    Cellulitis of arm, left    Persistent atrial fibrillation (HCC)    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 Gonsalo Mahoney    Chronic nausea    Moderate mitral regurgitation    Leg cramps    Sinus drainage    Dehydration    Cellulitis of left arm    Cellulitis       Allergies   Allergen Reactions    Darvocet A500 [Propoxyphene N-Acetaminophen] Other (See Comments)     weakness    Demerol Hcl [Meperidine] Other (See Comments)     Weakness      Marinol [Dronabinol] Other (See Comments)     weakness    Morphine Sulfate [Morphine] Other (See Comments)     Weakness      Statins [Statins] Other (See Comments)     Cause muscle pain and she would prefer not to take them any longer.     Levaquin [Levofloxacin In D5w] Nausea Only and Anxiety       Medications listed as ordered at the time of discharge from hospital   [place], [address] Medication Instructions ALIZA:    Printed on:11/20/18 8909   Medication Information                      acetaminophen (TYLENOL) 325 MG tablet  Take 2 tablets by mouth every 4 hours as needed for Pain             b complex vitamins capsule  Take 1 capsule by mouth every other day Indications: overy other day              blood glucose test strips (FREESTYLE TEST STRIPS) strip  TEST ONCE DAILY AS NEEDED             cephALEXin (KEFLEX) 500 MG capsule  Take 1 capsule by mouth 4 times daily for 5 days             clopidogrel (PLAVIX) 75 MG tablet  TAKE ONE TABLET BY MOUTH DAILY             famotidine (PEPCID) 20 MG tablet  TAKE ONE TABLET BY MOUTH EVERY EVENING             furosemide (LASIX) 20 MG tablet  Take 1 tablet by mouth daily             glimepiride (AMARYL) 1 MG tablet  Take 1 tablet by mouth daily (with breakfast)             Handicap Placard MISC  by Does not apply route Exp 1/1/2023             lactobacillus (CULTURELLE) CAPS capsule  Take 1 capsule by mouth 3 times daily             lisinopril (PRINIVIL;ZESTRIL) 2.5 MG tablet  TAKE 1 TABLET BY MOUTH DAILY             LORazepam (ATIVAN) 0.5 MG tablet  Take 1 tablet by mouth every 8 hours as needed for Anxiety for up to 30 days. .             melatonin 3 MG TABS tablet  Take 3 mg by mouth nightly as needed             metoprolol succinate (TOPROL XL) 100 MG extended release tablet  Take 1 tablet by mouth daily             nitroGLYCERIN (NITROSTAT) 0.4 MG SL tablet  Place 1 tablet under the tongue every 5 minutes as needed for Chest pain up to max of 3 total doses. If no relief after 1 dose, call 911.              omeprazole (PRILOSEC) 40 MG delayed release capsule  Take 1 capsule by mouth daily             prochlorperazine (COMPAZINE) 10 MG tablet  Take 1 tablet by mouth every 6 hours as needed (nausea)             saccharomyces boulardii (FLORASTOR) 250 MG capsule  Take 1 capsule by help with her sob, nausea, and general nervousness. She is having a gastric emptying study tomorrow to try to figure out why she is so nauseated. - sertraline (ZOLOFT) 50 MG tablet; Take 1 tablet by mouth daily  Dispense: 30 tablet; Refill: 1  - OK DISCHARGE MEDS RECONCILED W/ CURRENT OUTPATIENT MED LIST  - LORazepam (ATIVAN) 0.5 MG tablet; Take 1 tablet by mouth every 8 hours as needed for Anxiety for up to 30 days. .  Dispense: 90 tablet;  Refill: 2        Medical Decision Making: moderate complexity      Kaley Leone MD  11/20/2018  5:49 PM

## 2018-11-21 ENCOUNTER — HOSPITAL ENCOUNTER (OUTPATIENT)
Dept: NUCLEAR MEDICINE | Age: 83
Discharge: HOME | End: 2018-11-23
Payer: MEDICARE

## 2018-11-21 DIAGNOSIS — R11.0 CHRONIC NAUSEA: ICD-10-CM

## 2018-11-21 PROCEDURE — A9541 TC99M SULFUR COLLOID: HCPCS | Performed by: INTERNAL MEDICINE

## 2018-11-21 PROCEDURE — 78264 GASTRIC EMPTYING IMG STUDY: CPT

## 2018-11-21 PROCEDURE — 3430000000 HC RX DIAGNOSTIC RADIOPHARMACEUTICAL: Performed by: INTERNAL MEDICINE

## 2018-11-21 RX ADMIN — Medication 1 MILLICURIE: at 08:39

## 2018-11-23 ENCOUNTER — CARE COORDINATION (OUTPATIENT)
Dept: CARE COORDINATION | Age: 83
End: 2018-11-23

## 2018-11-27 ENCOUNTER — OFFICE VISIT (OUTPATIENT)
Dept: SURGERY | Age: 83
End: 2018-11-27
Payer: MEDICARE

## 2018-11-27 VITALS
TEMPERATURE: 98 F | WEIGHT: 136 LBS | BODY MASS INDEX: 30.59 KG/M2 | DIASTOLIC BLOOD PRESSURE: 86 MMHG | HEART RATE: 64 BPM | HEIGHT: 56 IN | SYSTOLIC BLOOD PRESSURE: 138 MMHG

## 2018-11-27 DIAGNOSIS — Z87.898 HISTORY OF NAUSEA: Primary | ICD-10-CM

## 2018-11-27 PROCEDURE — 1123F ACP DISCUSS/DSCN MKR DOCD: CPT | Performed by: SURGERY

## 2018-11-27 PROCEDURE — 99213 OFFICE O/P EST LOW 20 MIN: CPT | Performed by: SURGERY

## 2018-11-27 PROCEDURE — G8400 PT W/DXA NO RESULTS DOC: HCPCS | Performed by: SURGERY

## 2018-11-27 PROCEDURE — G8427 DOCREV CUR MEDS BY ELIG CLIN: HCPCS | Performed by: SURGERY

## 2018-11-27 PROCEDURE — 1111F DSCHRG MED/CURRENT MED MERGE: CPT | Performed by: SURGERY

## 2018-11-27 PROCEDURE — G8598 ASA/ANTIPLAT THER USED: HCPCS | Performed by: SURGERY

## 2018-11-27 PROCEDURE — G8417 CALC BMI ABV UP PARAM F/U: HCPCS | Performed by: SURGERY

## 2018-11-27 PROCEDURE — 1036F TOBACCO NON-USER: CPT | Performed by: SURGERY

## 2018-11-27 PROCEDURE — 4040F PNEUMOC VAC/ADMIN/RCVD: CPT | Performed by: SURGERY

## 2018-11-27 PROCEDURE — 1090F PRES/ABSN URINE INCON ASSESS: CPT | Performed by: SURGERY

## 2018-11-27 PROCEDURE — 1101F PT FALLS ASSESS-DOCD LE1/YR: CPT | Performed by: SURGERY

## 2018-11-27 PROCEDURE — G8484 FLU IMMUNIZE NO ADMIN: HCPCS | Performed by: SURGERY

## 2018-12-03 RX ORDER — SPIRONOLACTONE 25 MG/1
TABLET ORAL
Qty: 90 TABLET | Refills: 2 | Status: SHIPPED | OUTPATIENT
Start: 2018-12-03 | End: 2019-01-01 | Stop reason: ALTCHOICE

## 2018-12-03 RX ORDER — METOPROLOL SUCCINATE 100 MG/1
TABLET, EXTENDED RELEASE ORAL
Qty: 90 TABLET | Refills: 2 | Status: ON HOLD | OUTPATIENT
Start: 2018-12-03 | End: 2019-03-26 | Stop reason: SDUPTHER

## 2018-12-07 ENCOUNTER — TELEPHONE (OUTPATIENT)
Dept: FAMILY MEDICINE CLINIC | Age: 83
End: 2018-12-07

## 2018-12-07 RX ORDER — GLIMEPIRIDE 1 MG/1
1 TABLET ORAL
Qty: 30 TABLET | Refills: 0 | Status: SHIPPED | OUTPATIENT
Start: 2018-12-07 | End: 2019-01-07 | Stop reason: SDUPTHER

## 2018-12-12 ENCOUNTER — CARE COORDINATION (OUTPATIENT)
Dept: CARE COORDINATION | Age: 83
End: 2018-12-12

## 2018-12-12 PROBLEM — E86.0 DEHYDRATION: Status: RESOLVED | Noted: 2018-11-12 | Resolved: 2018-12-12

## 2019-01-01 ENCOUNTER — HOSPITAL ENCOUNTER (OUTPATIENT)
Dept: NUCLEAR MEDICINE | Age: 84
Discharge: HOME | End: 2019-11-15
Payer: COMMERCIAL

## 2019-01-01 ENCOUNTER — CARE COORDINATION (OUTPATIENT)
Dept: CASE MANAGEMENT | Age: 84
End: 2019-01-01

## 2019-01-01 ENCOUNTER — TELEPHONE (OUTPATIENT)
Dept: PRIMARY CARE CLINIC | Age: 84
End: 2019-01-01
Payer: MEDICARE

## 2019-01-01 ENCOUNTER — ANTI-COAG VISIT (OUTPATIENT)
Dept: INTERNAL MEDICINE | Age: 84
End: 2019-01-01
Payer: MEDICARE

## 2019-01-01 ENCOUNTER — CARE COORDINATION (OUTPATIENT)
Dept: CARE COORDINATION | Age: 84
End: 2019-01-01

## 2019-01-01 ENCOUNTER — APPOINTMENT (OUTPATIENT)
Dept: CT IMAGING | Age: 84
DRG: 260 | End: 2019-01-01
Attending: INTERNAL MEDICINE
Payer: MEDICARE

## 2019-01-01 ENCOUNTER — OFFICE VISIT (OUTPATIENT)
Dept: DERMATOLOGY | Age: 84
End: 2019-01-01
Payer: MEDICARE

## 2019-01-01 ENCOUNTER — OFFICE VISIT (OUTPATIENT)
Dept: CARDIOLOGY | Age: 84
End: 2019-01-01
Payer: MEDICARE

## 2019-01-01 ENCOUNTER — OFFICE VISIT (OUTPATIENT)
Dept: PRIMARY CARE CLINIC | Age: 84
End: 2019-01-01
Payer: MEDICARE

## 2019-01-01 ENCOUNTER — OFFICE VISIT (OUTPATIENT)
Dept: FAMILY MEDICINE CLINIC | Age: 84
End: 2019-01-01
Payer: MEDICARE

## 2019-01-01 ENCOUNTER — HOSPITAL ENCOUNTER (OUTPATIENT)
Dept: NON INVASIVE DIAGNOSTICS | Age: 84
Discharge: HOME | End: 2019-06-18
Payer: MEDICARE

## 2019-01-01 ENCOUNTER — TELEPHONE (OUTPATIENT)
Dept: INTERNAL MEDICINE | Age: 84
End: 2019-01-01

## 2019-01-01 ENCOUNTER — APPOINTMENT (OUTPATIENT)
Dept: GENERAL RADIOLOGY | Age: 84
End: 2019-01-01
Attending: INTERNAL MEDICINE
Payer: MEDICARE

## 2019-01-01 ENCOUNTER — APPOINTMENT (OUTPATIENT)
Dept: CT IMAGING | Age: 84
DRG: 291 | End: 2019-01-01
Payer: MEDICARE

## 2019-01-01 ENCOUNTER — TELEPHONE (OUTPATIENT)
Dept: FAMILY MEDICINE CLINIC | Age: 84
End: 2019-01-01

## 2019-01-01 ENCOUNTER — APPOINTMENT (OUTPATIENT)
Dept: GENERAL RADIOLOGY | Age: 84
DRG: 291 | End: 2019-01-01
Payer: MEDICARE

## 2019-01-01 ENCOUNTER — APPOINTMENT (OUTPATIENT)
Dept: CARDIAC CATH/INVASIVE PROCEDURES | Age: 84
DRG: 309 | End: 2019-01-01
Attending: INTERNAL MEDICINE
Payer: MEDICARE

## 2019-01-01 ENCOUNTER — APPOINTMENT (OUTPATIENT)
Dept: GENERAL RADIOLOGY | Age: 84
DRG: 260 | End: 2019-01-01
Attending: INTERNAL MEDICINE
Payer: MEDICARE

## 2019-01-01 ENCOUNTER — OFFICE VISIT (OUTPATIENT)
Dept: INFECTIOUS DISEASES | Age: 84
End: 2019-01-01
Payer: MEDICARE

## 2019-01-01 ENCOUNTER — HOSPITAL ENCOUNTER (OUTPATIENT)
Dept: LAB | Age: 84
Discharge: HOME | End: 2019-12-02
Payer: MEDICARE

## 2019-01-01 ENCOUNTER — OUTSIDE SERVICES (OUTPATIENT)
Dept: INTERNAL MEDICINE | Age: 84
End: 2019-01-01
Payer: MEDICARE

## 2019-01-01 ENCOUNTER — NURSE ONLY (OUTPATIENT)
Dept: CARDIOLOGY | Age: 84
End: 2019-01-01

## 2019-01-01 ENCOUNTER — HOSPITAL ENCOUNTER (INPATIENT)
Age: 84
LOS: 5 days | Discharge: HOME | DRG: 309 | End: 2019-06-13
Attending: INTERNAL MEDICINE | Admitting: INTERNAL MEDICINE
Payer: MEDICARE

## 2019-01-01 ENCOUNTER — ANTI-COAG VISIT (OUTPATIENT)
Dept: INTERNAL MEDICINE | Age: 84
End: 2019-01-01

## 2019-01-01 ENCOUNTER — HOSPITAL ENCOUNTER (INPATIENT)
Age: 84
LOS: 8 days | Discharge: NURSING, REHAB OR LONG-TERM CARE | DRG: 260 | End: 2019-10-17
Attending: INTERNAL MEDICINE | Admitting: INTERNAL MEDICINE
Payer: MEDICARE

## 2019-01-01 ENCOUNTER — HOSPITAL ENCOUNTER (INPATIENT)
Age: 84
LOS: 2 days | Discharge: HOME | DRG: 291 | End: 2019-08-20
Attending: EMERGENCY MEDICINE | Admitting: INTERNAL MEDICINE
Payer: MEDICARE

## 2019-01-01 ENCOUNTER — HOSPITAL ENCOUNTER (EMERGENCY)
Age: 84
Discharge: ANOTHER HOSPITAL | End: 2019-06-08
Attending: EMERGENCY MEDICINE
Payer: MEDICARE

## 2019-01-01 ENCOUNTER — HOSPITAL ENCOUNTER (OUTPATIENT)
Dept: GENERAL RADIOLOGY | Age: 84
Discharge: HOME | DRG: 291 | End: 2019-08-17
Payer: MEDICARE

## 2019-01-01 ENCOUNTER — TELEPHONE (OUTPATIENT)
Dept: DERMATOLOGY | Age: 84
End: 2019-01-01

## 2019-01-01 ENCOUNTER — HOSPITAL ENCOUNTER (OUTPATIENT)
Age: 84
Setting detail: SPECIMEN
Discharge: HOME | DRG: 315 | End: 2019-10-08
Payer: MEDICARE

## 2019-01-01 ENCOUNTER — TELEPHONE (OUTPATIENT)
Dept: PHARMACY | Facility: CLINIC | Age: 84
End: 2019-01-01

## 2019-01-01 ENCOUNTER — HOSPITAL ENCOUNTER (INPATIENT)
Age: 84
LOS: 1 days | Discharge: ANOTHER HOSPITAL | DRG: 315 | End: 2019-10-09
Attending: EMERGENCY MEDICINE | Admitting: FAMILY MEDICINE
Payer: MEDICARE

## 2019-01-01 ENCOUNTER — HOSPITAL ENCOUNTER (OUTPATIENT)
Age: 84
Setting detail: SPECIMEN
Discharge: HOME | End: 2019-12-30
Payer: MEDICARE

## 2019-01-01 ENCOUNTER — TELEPHONE (OUTPATIENT)
Dept: CARDIOLOGY | Age: 84
End: 2019-01-01

## 2019-01-01 ENCOUNTER — APPOINTMENT (OUTPATIENT)
Dept: GENERAL RADIOLOGY | Age: 84
DRG: 315 | End: 2019-01-01
Payer: MEDICARE

## 2019-01-01 ENCOUNTER — HOSPITAL ENCOUNTER (OUTPATIENT)
Dept: LAB | Age: 84
Discharge: HOME | End: 2019-04-29
Payer: MEDICARE

## 2019-01-01 ENCOUNTER — HOSPITAL ENCOUNTER (OUTPATIENT)
Dept: NUCLEAR MEDICINE | Age: 84
Discharge: HOME | End: 2019-11-13
Payer: MEDICARE

## 2019-01-01 ENCOUNTER — OFFICE VISIT (OUTPATIENT)
Dept: PULMONOLOGY | Age: 84
End: 2019-01-01
Payer: MEDICARE

## 2019-01-01 ENCOUNTER — APPOINTMENT (OUTPATIENT)
Dept: GENERAL RADIOLOGY | Age: 84
End: 2019-01-01
Payer: MEDICARE

## 2019-01-01 ENCOUNTER — HOSPITAL ENCOUNTER (OUTPATIENT)
Age: 84
Setting detail: SPECIMEN
Discharge: HOME | End: 2019-07-26
Payer: MEDICARE

## 2019-01-01 ENCOUNTER — APPOINTMENT (OUTPATIENT)
Dept: CARDIAC CATH/INVASIVE PROCEDURES | Age: 84
DRG: 260 | End: 2019-01-01
Attending: INTERNAL MEDICINE
Payer: MEDICARE

## 2019-01-01 ENCOUNTER — HOSPITAL ENCOUNTER (OUTPATIENT)
Dept: CARDIAC CATH/INVASIVE PROCEDURES | Age: 84
Discharge: HOME | End: 2019-09-24
Attending: INTERNAL MEDICINE | Admitting: INTERNAL MEDICINE
Payer: MEDICARE

## 2019-01-01 VITALS
RESPIRATION RATE: 20 BRPM | WEIGHT: 135 LBS | HEART RATE: 83 BPM | BODY MASS INDEX: 26.5 KG/M2 | OXYGEN SATURATION: 98 % | SYSTOLIC BLOOD PRESSURE: 136 MMHG | HEIGHT: 60 IN | DIASTOLIC BLOOD PRESSURE: 65 MMHG | TEMPERATURE: 99 F

## 2019-01-01 VITALS
SYSTOLIC BLOOD PRESSURE: 126 MMHG | BODY MASS INDEX: 27.58 KG/M2 | OXYGEN SATURATION: 98 % | RESPIRATION RATE: 20 BRPM | DIASTOLIC BLOOD PRESSURE: 57 MMHG | TEMPERATURE: 98.3 F | WEIGHT: 141.2 LBS | HEART RATE: 60 BPM

## 2019-01-01 VITALS
BODY MASS INDEX: 28.43 KG/M2 | SYSTOLIC BLOOD PRESSURE: 132 MMHG | HEART RATE: 62 BPM | HEIGHT: 59 IN | WEIGHT: 141 LBS | OXYGEN SATURATION: 96 % | DIASTOLIC BLOOD PRESSURE: 64 MMHG

## 2019-01-01 VITALS
OXYGEN SATURATION: 99 % | RESPIRATION RATE: 18 BRPM | HEIGHT: 60 IN | WEIGHT: 124.7 LBS | HEART RATE: 86 BPM | BODY MASS INDEX: 24.48 KG/M2 | TEMPERATURE: 97.6 F | DIASTOLIC BLOOD PRESSURE: 44 MMHG | SYSTOLIC BLOOD PRESSURE: 123 MMHG

## 2019-01-01 VITALS
TEMPERATURE: 97.9 F | DIASTOLIC BLOOD PRESSURE: 70 MMHG | OXYGEN SATURATION: 98 % | HEART RATE: 62 BPM | BODY MASS INDEX: 27.64 KG/M2 | HEIGHT: 60 IN | WEIGHT: 140.8 LBS | SYSTOLIC BLOOD PRESSURE: 130 MMHG

## 2019-01-01 VITALS
SYSTOLIC BLOOD PRESSURE: 122 MMHG | HEART RATE: 75 BPM | DIASTOLIC BLOOD PRESSURE: 78 MMHG | OXYGEN SATURATION: 97 % | WEIGHT: 139.2 LBS | BODY MASS INDEX: 27.33 KG/M2 | HEIGHT: 60 IN

## 2019-01-01 VITALS
SYSTOLIC BLOOD PRESSURE: 126 MMHG | BODY MASS INDEX: 27.52 KG/M2 | HEIGHT: 60 IN | DIASTOLIC BLOOD PRESSURE: 68 MMHG | OXYGEN SATURATION: 97 % | HEART RATE: 72 BPM | WEIGHT: 140.2 LBS

## 2019-01-01 VITALS
HEART RATE: 68 BPM | WEIGHT: 138 LBS | HEIGHT: 60 IN | BODY MASS INDEX: 27.09 KG/M2 | SYSTOLIC BLOOD PRESSURE: 122 MMHG | DIASTOLIC BLOOD PRESSURE: 62 MMHG

## 2019-01-01 VITALS
OXYGEN SATURATION: 97 % | SYSTOLIC BLOOD PRESSURE: 102 MMHG | WEIGHT: 128.6 LBS | DIASTOLIC BLOOD PRESSURE: 62 MMHG | BODY MASS INDEX: 25.25 KG/M2 | HEIGHT: 60 IN | HEART RATE: 67 BPM

## 2019-01-01 VITALS
SYSTOLIC BLOOD PRESSURE: 150 MMHG | WEIGHT: 131.6 LBS | BODY MASS INDEX: 25.84 KG/M2 | DIASTOLIC BLOOD PRESSURE: 75 MMHG | HEIGHT: 60 IN | HEART RATE: 67 BPM

## 2019-01-01 VITALS
HEIGHT: 60 IN | OXYGEN SATURATION: 95 % | WEIGHT: 137 LBS | DIASTOLIC BLOOD PRESSURE: 70 MMHG | SYSTOLIC BLOOD PRESSURE: 110 MMHG | HEART RATE: 65 BPM | BODY MASS INDEX: 26.9 KG/M2

## 2019-01-01 VITALS
DIASTOLIC BLOOD PRESSURE: 46 MMHG | RESPIRATION RATE: 18 BRPM | HEIGHT: 60 IN | SYSTOLIC BLOOD PRESSURE: 123 MMHG | BODY MASS INDEX: 27.58 KG/M2 | WEIGHT: 140.5 LBS | OXYGEN SATURATION: 94 % | TEMPERATURE: 98.6 F | HEART RATE: 68 BPM

## 2019-01-01 VITALS
RESPIRATION RATE: 20 BRPM | DIASTOLIC BLOOD PRESSURE: 93 MMHG | OXYGEN SATURATION: 98 % | BODY MASS INDEX: 25.76 KG/M2 | HEART RATE: 61 BPM | HEIGHT: 60 IN | SYSTOLIC BLOOD PRESSURE: 128 MMHG | TEMPERATURE: 97.9 F | WEIGHT: 131.2 LBS

## 2019-01-01 VITALS — SYSTOLIC BLOOD PRESSURE: 136 MMHG | DIASTOLIC BLOOD PRESSURE: 78 MMHG | OXYGEN SATURATION: 97 % | HEART RATE: 71 BPM

## 2019-01-01 VITALS
BODY MASS INDEX: 27.9 KG/M2 | HEART RATE: 87 BPM | HEIGHT: 60 IN | TEMPERATURE: 99.9 F | DIASTOLIC BLOOD PRESSURE: 50 MMHG | OXYGEN SATURATION: 97 % | SYSTOLIC BLOOD PRESSURE: 100 MMHG | RESPIRATION RATE: 18 BRPM | WEIGHT: 142.1 LBS

## 2019-01-01 VITALS
TEMPERATURE: 101.2 F | HEART RATE: 73 BPM | SYSTOLIC BLOOD PRESSURE: 110 MMHG | OXYGEN SATURATION: 93 % | WEIGHT: 136 LBS | DIASTOLIC BLOOD PRESSURE: 80 MMHG | BODY MASS INDEX: 26.56 KG/M2

## 2019-01-01 VITALS
BODY MASS INDEX: 26.31 KG/M2 | WEIGHT: 134 LBS | HEART RATE: 59 BPM | SYSTOLIC BLOOD PRESSURE: 116 MMHG | HEIGHT: 60 IN | DIASTOLIC BLOOD PRESSURE: 60 MMHG

## 2019-01-01 VITALS
HEART RATE: 70 BPM | BODY MASS INDEX: 25.91 KG/M2 | DIASTOLIC BLOOD PRESSURE: 73 MMHG | SYSTOLIC BLOOD PRESSURE: 115 MMHG | WEIGHT: 132 LBS | HEIGHT: 60 IN

## 2019-01-01 VITALS
DIASTOLIC BLOOD PRESSURE: 80 MMHG | SYSTOLIC BLOOD PRESSURE: 128 MMHG | WEIGHT: 141 LBS | BODY MASS INDEX: 27.68 KG/M2 | HEIGHT: 60 IN | HEART RATE: 64 BPM

## 2019-01-01 VITALS
WEIGHT: 131 LBS | DIASTOLIC BLOOD PRESSURE: 50 MMHG | SYSTOLIC BLOOD PRESSURE: 118 MMHG | BODY MASS INDEX: 25.72 KG/M2 | RESPIRATION RATE: 16 BRPM | HEART RATE: 62 BPM | HEIGHT: 60 IN

## 2019-01-01 DIAGNOSIS — E78.5 HYPERLIPIDEMIA, UNSPECIFIED HYPERLIPIDEMIA TYPE: ICD-10-CM

## 2019-01-01 DIAGNOSIS — Z51.81 MONITORING FOR LONG-TERM ANTICOAGULANT USE: ICD-10-CM

## 2019-01-01 DIAGNOSIS — N30.01 ACUTE CYSTITIS WITH HEMATURIA: ICD-10-CM

## 2019-01-01 DIAGNOSIS — Z95.810 S/P ICD (INTERNAL CARDIAC DEFIBRILLATOR) PROCEDURE: Primary | ICD-10-CM

## 2019-01-01 DIAGNOSIS — B95.61 MSSA BACTEREMIA: Primary | ICD-10-CM

## 2019-01-01 DIAGNOSIS — I10 ESSENTIAL HYPERTENSION: ICD-10-CM

## 2019-01-01 DIAGNOSIS — I48.19 PERSISTENT ATRIAL FIBRILLATION (HCC): Primary | ICD-10-CM

## 2019-01-01 DIAGNOSIS — Z95.2 S/P TAVR (TRANSCATHETER AORTIC VALVE REPLACEMENT): ICD-10-CM

## 2019-01-01 DIAGNOSIS — M54.50 LOW BACK PAIN, UNSPECIFIED BACK PAIN LATERALITY, UNSPECIFIED CHRONICITY, UNSPECIFIED WHETHER SCIATICA PRESENT: Primary | ICD-10-CM

## 2019-01-01 DIAGNOSIS — F41.1 GENERALIZED ANXIETY DISORDER: ICD-10-CM

## 2019-01-01 DIAGNOSIS — R11.0 NAUSEA: ICD-10-CM

## 2019-01-01 DIAGNOSIS — I50.42 CHRONIC COMBINED SYSTOLIC AND DIASTOLIC HEART FAILURE (HCC): ICD-10-CM

## 2019-01-01 DIAGNOSIS — I50.22 CHRONIC SYSTOLIC CONGESTIVE HEART FAILURE (HCC): ICD-10-CM

## 2019-01-01 DIAGNOSIS — Z95.0 PACEMAKER: ICD-10-CM

## 2019-01-01 DIAGNOSIS — M54.41 ACUTE RIGHT-SIDED LOW BACK PAIN WITH RIGHT-SIDED SCIATICA: Primary | ICD-10-CM

## 2019-01-01 DIAGNOSIS — I25.10 CORONARY ARTERY DISEASE INVOLVING NATIVE CORONARY ARTERY OF NATIVE HEART WITHOUT ANGINA PECTORIS: ICD-10-CM

## 2019-01-01 DIAGNOSIS — M54.50 LOW BACK PAIN, UNSPECIFIED BACK PAIN LATERALITY, UNSPECIFIED CHRONICITY, UNSPECIFIED WHETHER SCIATICA PRESENT: ICD-10-CM

## 2019-01-01 DIAGNOSIS — I42.2 CARDIOMYOPATHIC MITOCHONDRIAL DNA DEPLETION SYNDROME TYPE 10 (HCC): ICD-10-CM

## 2019-01-01 DIAGNOSIS — R78.81 MSSA BACTEREMIA: Primary | ICD-10-CM

## 2019-01-01 DIAGNOSIS — E78.2 MIXED HYPERLIPIDEMIA: ICD-10-CM

## 2019-01-01 DIAGNOSIS — Z95.2 S/P TAVR (TRANSCATHETER AORTIC VALVE REPLACEMENT): Primary | ICD-10-CM

## 2019-01-01 DIAGNOSIS — E11.69 TYPE 2 DIABETES MELLITUS WITH OTHER SPECIFIED COMPLICATION, WITHOUT LONG-TERM CURRENT USE OF INSULIN (HCC): ICD-10-CM

## 2019-01-01 DIAGNOSIS — I50.20 SYSTOLIC CONGESTIVE HEART FAILURE, UNSPECIFIED HF CHRONICITY (HCC): Primary | ICD-10-CM

## 2019-01-01 DIAGNOSIS — R09.82 POST-NASAL DRIP: ICD-10-CM

## 2019-01-01 DIAGNOSIS — E66.3 OVERWEIGHT: ICD-10-CM

## 2019-01-01 DIAGNOSIS — Z79.01 MONITORING FOR LONG-TERM ANTICOAGULANT USE: ICD-10-CM

## 2019-01-01 DIAGNOSIS — I48.19 PERSISTENT ATRIAL FIBRILLATION (HCC): ICD-10-CM

## 2019-01-01 DIAGNOSIS — I48.91 ATRIAL FIBRILLATION, UNSPECIFIED TYPE (HCC): Primary | ICD-10-CM

## 2019-01-01 DIAGNOSIS — L30.9 DERMATITIS: Primary | ICD-10-CM

## 2019-01-01 DIAGNOSIS — L82.1 SEBORRHEIC KERATOSIS: ICD-10-CM

## 2019-01-01 DIAGNOSIS — D48.5 NEOPLASM OF UNCERTAIN BEHAVIOR OF SKIN: Primary | ICD-10-CM

## 2019-01-01 DIAGNOSIS — I42.8 OTHER CARDIOMYOPATHY (HCC): ICD-10-CM

## 2019-01-01 DIAGNOSIS — I10 ESSENTIAL HYPERTENSION: Primary | ICD-10-CM

## 2019-01-01 DIAGNOSIS — L57.0 ACTINIC KERATOSIS: ICD-10-CM

## 2019-01-01 DIAGNOSIS — I42.8 OTHER CARDIOMYOPATHY (HCC): Primary | ICD-10-CM

## 2019-01-01 DIAGNOSIS — R74.02 ELEVATED SERUM LACTATE DEHYDROGENASE: ICD-10-CM

## 2019-01-01 DIAGNOSIS — Z79.4 TYPE 2 DIABETES MELLITUS WITH OTHER SPECIFIED COMPLICATION, WITH LONG-TERM CURRENT USE OF INSULIN (HCC): ICD-10-CM

## 2019-01-01 DIAGNOSIS — E78.00 PURE HYPERCHOLESTEROLEMIA: ICD-10-CM

## 2019-01-01 DIAGNOSIS — Q12.0 CARDIOMYOPATHIC MITOCHONDRIAL DNA DEPLETION SYNDROME TYPE 10 (HCC): ICD-10-CM

## 2019-01-01 DIAGNOSIS — N18.30 CHRONIC KIDNEY DISEASE, STAGE 3 (HCC): ICD-10-CM

## 2019-01-01 DIAGNOSIS — E11.22 TYPE 2 DIABETES MELLITUS WITH CHRONIC KIDNEY DISEASE, WITHOUT LONG-TERM CURRENT USE OF INSULIN, UNSPECIFIED CKD STAGE (HCC): Primary | ICD-10-CM

## 2019-01-01 DIAGNOSIS — E86.0 DEHYDRATION: Primary | ICD-10-CM

## 2019-01-01 DIAGNOSIS — E11.00 TYPE 2 DIABETES MELLITUS WITH HYPEROSMOLARITY WITHOUT COMA, WITHOUT LONG-TERM CURRENT USE OF INSULIN (HCC): ICD-10-CM

## 2019-01-01 DIAGNOSIS — Q87.89 CARDIOMYOPATHIC MITOCHONDRIAL DNA DEPLETION SYNDROME TYPE 10 (HCC): ICD-10-CM

## 2019-01-01 DIAGNOSIS — J30.1 NON-SEASONAL ALLERGIC RHINITIS DUE TO POLLEN: ICD-10-CM

## 2019-01-01 DIAGNOSIS — R53.1 GENERAL WEAKNESS: ICD-10-CM

## 2019-01-01 DIAGNOSIS — I25.5 ISCHEMIC CARDIOMYOPATHY: ICD-10-CM

## 2019-01-01 DIAGNOSIS — N18.30 CHRONIC KIDNEY DISEASE, STAGE III (MODERATE) (HCC): ICD-10-CM

## 2019-01-01 DIAGNOSIS — I48.91 ATRIAL FIBRILLATION WITH RVR (HCC): Primary | ICD-10-CM

## 2019-01-01 DIAGNOSIS — N30.00 ACUTE CYSTITIS WITHOUT HEMATURIA: ICD-10-CM

## 2019-01-01 DIAGNOSIS — A41.01 MSSA (METHICILLIN SUSCEPTIBLE STAPHYLOCOCCUS AUREUS) SEPTICEMIA (HCC): Primary | ICD-10-CM

## 2019-01-01 DIAGNOSIS — R00.2 PALPITATIONS: ICD-10-CM

## 2019-01-01 DIAGNOSIS — T82.7XXD: ICD-10-CM

## 2019-01-01 DIAGNOSIS — E78.5 DYSLIPIDEMIA: ICD-10-CM

## 2019-01-01 DIAGNOSIS — I13.0: ICD-10-CM

## 2019-01-01 DIAGNOSIS — Z95.810 S/P ICD (INTERNAL CARDIAC DEFIBRILLATOR) PROCEDURE: ICD-10-CM

## 2019-01-01 DIAGNOSIS — I48.0 PAF (PAROXYSMAL ATRIAL FIBRILLATION) (HCC): ICD-10-CM

## 2019-01-01 DIAGNOSIS — F41.1 ANXIETY, GENERALIZED: ICD-10-CM

## 2019-01-01 DIAGNOSIS — T82.7XXD PACEMAKER INFECTION, SUBSEQUENT ENCOUNTER: ICD-10-CM

## 2019-01-01 DIAGNOSIS — Z71.89 ENCOUNTER FOR MEDICATION REVIEW AND COUNSELING: Primary | ICD-10-CM

## 2019-01-01 DIAGNOSIS — R53.1 WEAKNESS: ICD-10-CM

## 2019-01-01 DIAGNOSIS — G71.3 CARDIOMYOPATHIC MITOCHONDRIAL DNA DEPLETION SYNDROME TYPE 10 (HCC): ICD-10-CM

## 2019-01-01 DIAGNOSIS — Z51.81 ENCOUNTER FOR THERAPEUTIC DRUG MONITORING: ICD-10-CM

## 2019-01-01 DIAGNOSIS — I48.91 ATRIAL FIBRILLATION, UNSPECIFIED TYPE (HCC): ICD-10-CM

## 2019-01-01 DIAGNOSIS — I50.22 CHRONIC SYSTOLIC CONGESTIVE HEART FAILURE (HCC): Primary | ICD-10-CM

## 2019-01-01 DIAGNOSIS — J15.211: ICD-10-CM

## 2019-01-01 DIAGNOSIS — M54.50 ACUTE MIDLINE LOW BACK PAIN WITHOUT SCIATICA: ICD-10-CM

## 2019-01-01 DIAGNOSIS — E11.42 DIABETIC POLYNEUROPATHY ASSOCIATED WITH TYPE 2 DIABETES MELLITUS (HCC): ICD-10-CM

## 2019-01-01 DIAGNOSIS — R50.9 FEVER AND CHILLS: Primary | ICD-10-CM

## 2019-01-01 DIAGNOSIS — E11.69 TYPE 2 DIABETES MELLITUS WITH OTHER SPECIFIED COMPLICATION, WITH LONG-TERM CURRENT USE OF INSULIN (HCC): ICD-10-CM

## 2019-01-01 DIAGNOSIS — I50.23 ACUTE ON CHRONIC SYSTOLIC CONGESTIVE HEART FAILURE (HCC): Primary | ICD-10-CM

## 2019-01-01 DIAGNOSIS — R79.89 CREATININE ELEVATION: ICD-10-CM

## 2019-01-01 DIAGNOSIS — R50.9 FEVER AND CHILLS: ICD-10-CM

## 2019-01-01 DIAGNOSIS — R11.0 CHRONIC NAUSEA: ICD-10-CM

## 2019-01-01 DIAGNOSIS — L30.9 DERMATITIS: ICD-10-CM

## 2019-01-01 DIAGNOSIS — R79.89 CREATININE ELEVATION: Primary | ICD-10-CM

## 2019-01-01 DIAGNOSIS — I50.22 CHRONIC SYSTOLIC (CONGESTIVE) HEART FAILURE (HCC): ICD-10-CM

## 2019-01-01 DIAGNOSIS — I50.22 CHRONIC SYSTOLIC HEART FAILURE (HCC): ICD-10-CM

## 2019-01-01 DIAGNOSIS — I48.91 ATRIAL FIBRILLATION WITH RAPID VENTRICULAR RESPONSE (HCC): Primary | ICD-10-CM

## 2019-01-01 DIAGNOSIS — M54.41 ACUTE RIGHT-SIDED LOW BACK PAIN WITH RIGHT-SIDED SCIATICA: ICD-10-CM

## 2019-01-01 LAB
-: ABNORMAL
-: ABNORMAL
-: NORMAL
ABO/RH: NORMAL
ABSOLUTE EOS #: 0 K/UL (ref 0–0.4)
ABSOLUTE EOS #: 0 K/UL (ref 0–0.4)
ABSOLUTE EOS #: 0.1 K/UL (ref 0–0.4)
ABSOLUTE EOS #: 0.1 K/UL (ref 0–0.4)
ABSOLUTE IMMATURE GRANULOCYTE: ABNORMAL K/UL (ref 0–0.3)
ABSOLUTE LYMPH #: 0.8 K/UL (ref 1–4.8)
ABSOLUTE LYMPH #: 1.5 K/UL (ref 1–4.8)
ABSOLUTE LYMPH #: 1.5 K/UL (ref 1–4.8)
ABSOLUTE LYMPH #: 2.7 K/UL (ref 1–4.8)
ABSOLUTE MONO #: 0.7 K/UL (ref 0.1–1.2)
ABSOLUTE MONO #: 0.8 K/UL (ref 0.1–1.2)
ABSOLUTE MONO #: 0.9 K/UL (ref 0.1–1.2)
ABSOLUTE MONO #: 0.9 K/UL (ref 0.1–1.2)
ALBUMIN SERPL-MCNC: 2.7 G/DL (ref 3.5–5.2)
ALBUMIN SERPL-MCNC: 2.9 G/DL (ref 3.5–5.2)
ALBUMIN SERPL-MCNC: 3 G/DL (ref 3.5–5.2)
ALBUMIN SERPL-MCNC: 3.1 G/DL (ref 3.5–5.2)
ALBUMIN SERPL-MCNC: 3.2 G/DL (ref 3.5–5.2)
ALBUMIN SERPL-MCNC: 3.2 G/DL (ref 3.5–5.2)
ALBUMIN SERPL-MCNC: 3.3 G/DL (ref 3.5–5.2)
ALBUMIN SERPL-MCNC: 3.8 G/DL (ref 3.5–5.2)
ALBUMIN SERPL-MCNC: 3.8 G/DL (ref 3.5–5.2)
ALBUMIN SERPL-MCNC: 4 G/DL (ref 3.5–5.2)
ALBUMIN/GLOBULIN RATIO: 0.8 (ref 1–2.5)
ALBUMIN/GLOBULIN RATIO: 0.8 (ref 1–2.5)
ALBUMIN/GLOBULIN RATIO: 0.9 (ref 1–2.5)
ALBUMIN/GLOBULIN RATIO: 1 (ref 1–2.5)
ALBUMIN/GLOBULIN RATIO: 1.3 (ref 1–2.5)
ALBUMIN/GLOBULIN RATIO: 1.3 (ref 1–2.5)
ALBUMIN/GLOBULIN RATIO: 1.4 (ref 1–2.5)
ALLEN TEST: ABNORMAL
ALP BLD-CCNC: 101 U/L (ref 35–104)
ALP BLD-CCNC: 106 U/L (ref 35–104)
ALP BLD-CCNC: 110 U/L (ref 35–104)
ALP BLD-CCNC: 115 U/L (ref 35–104)
ALP BLD-CCNC: 129 U/L (ref 35–104)
ALP BLD-CCNC: 158 U/L (ref 35–104)
ALP BLD-CCNC: 72 U/L (ref 35–104)
ALP BLD-CCNC: 75 U/L (ref 35–104)
ALP BLD-CCNC: 95 U/L (ref 35–104)
ALP BLD-CCNC: 99 U/L (ref 35–104)
ALT SERPL-CCNC: 102 U/L (ref 5–33)
ALT SERPL-CCNC: 158 U/L (ref 5–33)
ALT SERPL-CCNC: 20 U/L (ref 5–33)
ALT SERPL-CCNC: 26 U/L (ref 5–33)
ALT SERPL-CCNC: 289 U/L (ref 5–33)
ALT SERPL-CCNC: 29 U/L (ref 5–33)
ALT SERPL-CCNC: 30 U/L (ref 5–33)
ALT SERPL-CCNC: 35 U/L (ref 5–33)
ALT SERPL-CCNC: 395 U/L (ref 5–33)
ALT SERPL-CCNC: 45 U/L (ref 5–33)
AMORPHOUS: ABNORMAL
AMORPHOUS: ABNORMAL
AMORPHOUS: NORMAL
AMYLASE: 44 U/L (ref 28–100)
AMYLASE: 48 U/L (ref 28–100)
ANION GAP SERPL CALCULATED.3IONS-SCNC: 11 MMOL/L (ref 9–17)
ANION GAP SERPL CALCULATED.3IONS-SCNC: 11 MMOL/L (ref 9–17)
ANION GAP SERPL CALCULATED.3IONS-SCNC: 12 MMOL/L (ref 9–17)
ANION GAP SERPL CALCULATED.3IONS-SCNC: 13 MMOL/L (ref 9–17)
ANION GAP SERPL CALCULATED.3IONS-SCNC: 14 MMOL/L (ref 9–17)
ANION GAP SERPL CALCULATED.3IONS-SCNC: 15 MMOL/L (ref 9–17)
ANION GAP SERPL CALCULATED.3IONS-SCNC: 16 MMOL/L (ref 9–17)
ANION GAP SERPL CALCULATED.3IONS-SCNC: 17 MMOL/L (ref 9–17)
ANION GAP SERPL CALCULATED.3IONS-SCNC: 17 MMOL/L (ref 9–17)
ANION GAP SERPL CALCULATED.3IONS-SCNC: 19 MMOL/L (ref 9–17)
ANION GAP: 16 MMOL/L (ref 7–16)
ANTIBODY SCREEN: NEGATIVE
ARM BAND NUMBER: NORMAL
AST SERPL-CCNC: 105 U/L
AST SERPL-CCNC: 222 U/L
AST SERPL-CCNC: 26 U/L
AST SERPL-CCNC: 33 U/L
AST SERPL-CCNC: 39 U/L
AST SERPL-CCNC: 40 U/L
AST SERPL-CCNC: 415 U/L
AST SERPL-CCNC: 46 U/L
AST SERPL-CCNC: 76 U/L
AST SERPL-CCNC: 93 U/L
BACTERIA: ABNORMAL
BACTERIA: ABNORMAL
BACTERIA: NORMAL
BASOPHILS # BLD: 0 % (ref 0–1)
BASOPHILS # BLD: 0 % (ref 0–1)
BASOPHILS # BLD: 1 % (ref 0–1)
BASOPHILS # BLD: 1 % (ref 0–1)
BASOPHILS ABSOLUTE: 0 K/UL (ref 0–0.2)
BASOPHILS ABSOLUTE: 0.1 K/UL (ref 0–0.2)
BILIRUB SERPL-MCNC: 0.33 MG/DL (ref 0.3–1.2)
BILIRUB SERPL-MCNC: 0.33 MG/DL (ref 0.3–1.2)
BILIRUB SERPL-MCNC: 0.37 MG/DL (ref 0.3–1.2)
BILIRUB SERPL-MCNC: 0.45 MG/DL (ref 0.3–1.2)
BILIRUB SERPL-MCNC: 0.46 MG/DL (ref 0.3–1.2)
BILIRUB SERPL-MCNC: 0.57 MG/DL (ref 0.3–1.2)
BILIRUB SERPL-MCNC: 0.83 MG/DL (ref 0.3–1.2)
BILIRUB SERPL-MCNC: 1 MG/DL (ref 0.3–1.2)
BILIRUB SERPL-MCNC: 1.1 MG/DL (ref 0.3–1.2)
BILIRUB SERPL-MCNC: 1.14 MG/DL (ref 0.3–1.2)
BILIRUBIN DIRECT: 0.18 MG/DL
BILIRUBIN DIRECT: 0.2 MG/DL
BILIRUBIN DIRECT: 0.2 MG/DL
BILIRUBIN DIRECT: 0.57 MG/DL
BILIRUBIN DIRECT: 0.61 MG/DL
BILIRUBIN DIRECT: 0.69 MG/DL
BILIRUBIN DIRECT: 0.73 MG/DL
BILIRUBIN URINE: NEGATIVE
BILIRUBIN, INDIRECT: 0.25 MG/DL (ref 0–1)
BILIRUBIN, INDIRECT: 0.26 MG/DL (ref 0–1)
BILIRUBIN, INDIRECT: 0.28 MG/DL (ref 0–1)
BILIRUBIN, INDIRECT: 0.37 MG/DL (ref 0–1)
BILIRUBIN, INDIRECT: 0.39 MG/DL (ref 0–1)
BILIRUBIN, INDIRECT: 0.41 MG/DL (ref 0–1)
BILIRUBIN, INDIRECT: 0.41 MG/DL (ref 0–1)
BLOOD BANK SPECIMEN: NORMAL
BNP INTERPRETATION: ABNORMAL
BUN BLDV-MCNC: 17 MG/DL (ref 8–23)
BUN BLDV-MCNC: 17 MG/DL (ref 8–23)
BUN BLDV-MCNC: 19 MG/DL (ref 8–23)
BUN BLDV-MCNC: 19 MG/DL (ref 8–23)
BUN BLDV-MCNC: 20 MG/DL (ref 8–23)
BUN BLDV-MCNC: 22 MG/DL (ref 8–23)
BUN BLDV-MCNC: 22 MG/DL (ref 8–23)
BUN BLDV-MCNC: 24 MG/DL (ref 8–23)
BUN BLDV-MCNC: 27 MG/DL (ref 8–23)
BUN BLDV-MCNC: 27 MG/DL (ref 8–23)
BUN BLDV-MCNC: 28 MG/DL (ref 8–23)
BUN BLDV-MCNC: 28 MG/DL (ref 8–23)
BUN BLDV-MCNC: 29 MG/DL (ref 8–23)
BUN BLDV-MCNC: 29 MG/DL (ref 8–23)
BUN BLDV-MCNC: 31 MG/DL (ref 8–23)
BUN BLDV-MCNC: 31 MG/DL (ref 8–23)
BUN BLDV-MCNC: 32 MG/DL (ref 8–23)
BUN BLDV-MCNC: 33 MG/DL (ref 8–23)
BUN BLDV-MCNC: 33 MG/DL (ref 8–23)
BUN BLDV-MCNC: 38 MG/DL (ref 8–23)
BUN BLDV-MCNC: 41 MG/DL (ref 8–23)
BUN BLDV-MCNC: 49 MG/DL (ref 8–23)
BUN/CREAT BLD: 18 (ref 9–20)
BUN/CREAT BLD: 20 (ref 9–20)
BUN/CREAT BLD: 20 (ref 9–20)
BUN/CREAT BLD: 22 (ref 9–20)
BUN/CREAT BLD: 23 (ref 9–20)
BUN/CREAT BLD: 23 (ref 9–20)
BUN/CREAT BLD: 25 (ref 9–20)
BUN/CREAT BLD: 32 (ref 9–20)
BUN/CREAT BLD: ABNORMAL (ref 9–20)
CALCIUM IONIZED: 1.15 MMOL/L (ref 1.13–1.33)
CALCIUM IONIZED: 1.18 MMOL/L (ref 1.13–1.33)
CALCIUM SERPL-MCNC: 10.1 MG/DL (ref 8.6–10.4)
CALCIUM SERPL-MCNC: 7.8 MG/DL (ref 8.6–10.4)
CALCIUM SERPL-MCNC: 7.9 MG/DL (ref 8.6–10.4)
CALCIUM SERPL-MCNC: 7.9 MG/DL (ref 8.6–10.4)
CALCIUM SERPL-MCNC: 8.3 MG/DL (ref 8.6–10.4)
CALCIUM SERPL-MCNC: 8.4 MG/DL (ref 8.6–10.4)
CALCIUM SERPL-MCNC: 8.4 MG/DL (ref 8.6–10.4)
CALCIUM SERPL-MCNC: 8.6 MG/DL (ref 8.6–10.4)
CALCIUM SERPL-MCNC: 8.6 MG/DL (ref 8.6–10.4)
CALCIUM SERPL-MCNC: 8.7 MG/DL (ref 8.6–10.4)
CALCIUM SERPL-MCNC: 8.7 MG/DL (ref 8.6–10.4)
CALCIUM SERPL-MCNC: 8.8 MG/DL (ref 8.6–10.4)
CALCIUM SERPL-MCNC: 8.9 MG/DL (ref 8.6–10.4)
CALCIUM SERPL-MCNC: 9 MG/DL (ref 8.6–10.4)
CALCIUM SERPL-MCNC: 9 MG/DL (ref 8.6–10.4)
CALCIUM SERPL-MCNC: 9.2 MG/DL (ref 8.6–10.4)
CALCIUM SERPL-MCNC: 9.6 MG/DL (ref 8.6–10.4)
CALCIUM SERPL-MCNC: 9.7 MG/DL (ref 8.6–10.4)
CASTS UA: ABNORMAL /LPF (ref 0–2)
CASTS UA: NORMAL /LPF (ref 0–2)
CHLORIDE BLD-SCNC: 101 MMOL/L (ref 98–107)
CHLORIDE BLD-SCNC: 102 MMOL/L (ref 98–107)
CHLORIDE BLD-SCNC: 102 MMOL/L (ref 98–107)
CHLORIDE BLD-SCNC: 103 MMOL/L (ref 98–107)
CHLORIDE BLD-SCNC: 104 MMOL/L (ref 98–107)
CHLORIDE BLD-SCNC: 105 MMOL/L (ref 98–107)
CHLORIDE BLD-SCNC: 105 MMOL/L (ref 98–107)
CHLORIDE BLD-SCNC: 106 MMOL/L (ref 98–107)
CHLORIDE BLD-SCNC: 107 MMOL/L (ref 98–107)
CHLORIDE BLD-SCNC: 112 MMOL/L (ref 98–107)
CHLORIDE BLD-SCNC: 93 MMOL/L (ref 98–107)
CHLORIDE BLD-SCNC: 94 MMOL/L (ref 98–107)
CHLORIDE BLD-SCNC: 94 MMOL/L (ref 98–107)
CHLORIDE BLD-SCNC: 95 MMOL/L (ref 98–107)
CHLORIDE BLD-SCNC: 97 MMOL/L (ref 98–107)
CHLORIDE BLD-SCNC: 98 MMOL/L (ref 98–107)
CHOLESTEROL/HDL RATIO: 2.5
CHOLESTEROL/HDL RATIO: 3
CHOLESTEROL: 147 MG/DL
CHOLESTEROL: 197 MG/DL
CO2: 14 MMOL/L (ref 20–31)
CO2: 17 MMOL/L (ref 20–31)
CO2: 19 MMOL/L (ref 20–31)
CO2: 20 MMOL/L (ref 20–31)
CO2: 21 MMOL/L (ref 20–31)
CO2: 22 MMOL/L (ref 20–31)
CO2: 23 MMOL/L (ref 20–31)
CO2: 24 MMOL/L (ref 20–31)
CO2: 25 MMOL/L (ref 20–31)
CO2: 26 MMOL/L (ref 20–31)
CO2: 27 MMOL/L (ref 20–31)
CO2: 28 MMOL/L (ref 20–31)
CO2: 28 MMOL/L (ref 20–31)
CO2: 30 MMOL/L (ref 20–31)
CO2: 31 MMOL/L (ref 20–31)
COLOR: ABNORMAL
COMMENT UA: ABNORMAL
CREAT SERPL-MCNC: 1.08 MG/DL (ref 0.5–0.9)
CREAT SERPL-MCNC: 1.12 MG/DL (ref 0.5–0.9)
CREAT SERPL-MCNC: 1.13 MG/DL (ref 0.5–0.9)
CREAT SERPL-MCNC: 1.14 MG/DL (ref 0.5–0.9)
CREAT SERPL-MCNC: 1.17 MG/DL (ref 0.5–0.9)
CREAT SERPL-MCNC: 1.17 MG/DL (ref 0.5–0.9)
CREAT SERPL-MCNC: 1.25 MG/DL (ref 0.5–0.9)
CREAT SERPL-MCNC: 1.27 MG/DL (ref 0.5–0.9)
CREAT SERPL-MCNC: 1.28 MG/DL (ref 0.5–0.9)
CREAT SERPL-MCNC: 1.32 MG/DL (ref 0.5–0.9)
CREAT SERPL-MCNC: 1.35 MG/DL (ref 0.5–0.9)
CREAT SERPL-MCNC: 1.37 MG/DL (ref 0.5–0.9)
CREAT SERPL-MCNC: 1.37 MG/DL (ref 0.5–0.9)
CREAT SERPL-MCNC: 1.41 MG/DL (ref 0.5–0.9)
CREAT SERPL-MCNC: 1.43 MG/DL (ref 0.5–0.9)
CREAT SERPL-MCNC: 1.46 MG/DL (ref 0.5–0.9)
CREAT SERPL-MCNC: 1.51 MG/DL (ref 0.5–0.9)
CREAT SERPL-MCNC: 1.55 MG/DL (ref 0.5–0.9)
CREAT SERPL-MCNC: 1.62 MG/DL (ref 0.5–0.9)
CREAT SERPL-MCNC: 1.64 MG/DL (ref 0.5–0.9)
CREAT SERPL-MCNC: 1.87 MG/DL (ref 0.5–0.9)
CREAT SERPL-MCNC: 2.22 MG/DL (ref 0.5–0.9)
CRYSTALS, UA: ABNORMAL /HPF
CRYSTALS, UA: ABNORMAL /HPF
CRYSTALS, UA: NORMAL /HPF
CULTURE: ABNORMAL
CULTURE: NORMAL
D DIMER: 235 NG/ML
DERMATOLOGY PATHOLOGY REPORT: NORMAL
DIFFERENTIAL TYPE: ABNORMAL
DIRECT EXAM: ABNORMAL
DIRECT EXAM: ABNORMAL
DIRECT EXAM: NORMAL
DIRECT EXAM: NORMAL
EKG ATRIAL RATE: 107 BPM
EKG ATRIAL RATE: 153 BPM
EKG ATRIAL RATE: 159 BPM
EKG ATRIAL RATE: 59 BPM
EKG ATRIAL RATE: 62 BPM
EKG ATRIAL RATE: 70 BPM
EKG ATRIAL RATE: 74 BPM
EKG ATRIAL RATE: 78 BPM
EKG ATRIAL RATE: 81 BPM
EKG ATRIAL RATE: 83 BPM
EKG ATRIAL RATE: 88 BPM
EKG P AXIS: 56 DEGREES
EKG P AXIS: 56 DEGREES
EKG P AXIS: 59 DEGREES
EKG P AXIS: 59 DEGREES
EKG P AXIS: 65 DEGREES
EKG P AXIS: 66 DEGREES
EKG P AXIS: 68 DEGREES
EKG P AXIS: 77 DEGREES
EKG P-R INTERVAL: 194 MS
EKG P-R INTERVAL: 198 MS
EKG P-R INTERVAL: 214 MS
EKG P-R INTERVAL: 224 MS
EKG P-R INTERVAL: 238 MS
EKG P-R INTERVAL: 244 MS
EKG P-R INTERVAL: 246 MS
EKG P-R INTERVAL: 246 MS
EKG Q-T INTERVAL: 318 MS
EKG Q-T INTERVAL: 374 MS
EKG Q-T INTERVAL: 418 MS
EKG Q-T INTERVAL: 440 MS
EKG Q-T INTERVAL: 444 MS
EKG Q-T INTERVAL: 462 MS
EKG Q-T INTERVAL: 470 MS
EKG Q-T INTERVAL: 472 MS
EKG Q-T INTERVAL: 480 MS
EKG Q-T INTERVAL: 492 MS
EKG Q-T INTERVAL: 524 MS
EKG QRS DURATION: 126 MS
EKG QRS DURATION: 132 MS
EKG QRS DURATION: 140 MS
EKG QRS DURATION: 154 MS
EKG QRS DURATION: 156 MS
EKG QRS DURATION: 160 MS
EKG QRS DURATION: 162 MS
EKG QRS DURATION: 162 MS
EKG QRS DURATION: 168 MS
EKG QTC CALCULATION (BAZETT): 487 MS
EKG QTC CALCULATION (BAZETT): 489 MS
EKG QTC CALCULATION (BAZETT): 517 MS
EKG QTC CALCULATION (BAZETT): 518 MS
EKG QTC CALCULATION (BAZETT): 521 MS
EKG QTC CALCULATION (BAZETT): 531 MS
EKG QTC CALCULATION (BAZETT): 536 MS
EKG QTC CALCULATION (BAZETT): 537 MS
EKG QTC CALCULATION (BAZETT): 538 MS
EKG QTC CALCULATION (BAZETT): 558 MS
EKG QTC CALCULATION (BAZETT): 575 MS
EKG R AXIS: -11 DEGREES
EKG R AXIS: -11 DEGREES
EKG R AXIS: -18 DEGREES
EKG R AXIS: -24 DEGREES
EKG R AXIS: -24 DEGREES
EKG R AXIS: -28 DEGREES
EKG R AXIS: -30 DEGREES
EKG R AXIS: -33 DEGREES
EKG R AXIS: -41 DEGREES
EKG R AXIS: -49 DEGREES
EKG R AXIS: -5 DEGREES
EKG T AXIS: 104 DEGREES
EKG T AXIS: 113 DEGREES
EKG T AXIS: 116 DEGREES
EKG T AXIS: 125 DEGREES
EKG T AXIS: 132 DEGREES
EKG T AXIS: 133 DEGREES
EKG T AXIS: 135 DEGREES
EKG T AXIS: 152 DEGREES
EKG T AXIS: 158 DEGREES
EKG T AXIS: 76 DEGREES
EKG T AXIS: 93 DEGREES
EKG VENTRICULAR RATE: 107 BPM
EKG VENTRICULAR RATE: 142 BPM
EKG VENTRICULAR RATE: 142 BPM
EKG VENTRICULAR RATE: 59 BPM
EKG VENTRICULAR RATE: 62 BPM
EKG VENTRICULAR RATE: 70 BPM
EKG VENTRICULAR RATE: 74 BPM
EKG VENTRICULAR RATE: 78 BPM
EKG VENTRICULAR RATE: 81 BPM
EKG VENTRICULAR RATE: 83 BPM
EKG VENTRICULAR RATE: 88 BPM
EOSINOPHILS RELATIVE PERCENT: 0 % (ref 1–7)
EOSINOPHILS RELATIVE PERCENT: 0 % (ref 1–7)
EOSINOPHILS RELATIVE PERCENT: 1 % (ref 1–7)
EOSINOPHILS RELATIVE PERCENT: 1 % (ref 1–7)
EPITHELIAL CELLS UA: ABNORMAL /HPF (ref 0–5)
EPITHELIAL CELLS UA: ABNORMAL /HPF (ref 0–5)
EPITHELIAL CELLS UA: NORMAL /HPF (ref 0–5)
ESTIMATED AVERAGE GLUCOSE: 148 MG/DL
ESTIMATED AVERAGE GLUCOSE: 174 MG/DL
ESTIMATED AVERAGE GLUCOSE: 226 MG/DL
EXPIRATION DATE: NORMAL
FIO2: 6
GFR AFRICAN AMERICAN: 26 ML/MIN
GFR AFRICAN AMERICAN: 31 ML/MIN
GFR AFRICAN AMERICAN: 36 ML/MIN
GFR AFRICAN AMERICAN: 37 ML/MIN
GFR AFRICAN AMERICAN: 39 ML/MIN
GFR AFRICAN AMERICAN: 40 ML/MIN
GFR AFRICAN AMERICAN: 42 ML/MIN
GFR AFRICAN AMERICAN: 43 ML/MIN
GFR AFRICAN AMERICAN: 43 ML/MIN
GFR AFRICAN AMERICAN: 45 ML/MIN
GFR AFRICAN AMERICAN: 45 ML/MIN
GFR AFRICAN AMERICAN: 46 ML/MIN
GFR AFRICAN AMERICAN: 47 ML/MIN
GFR AFRICAN AMERICAN: 48 ML/MIN
GFR AFRICAN AMERICAN: 49 ML/MIN
GFR AFRICAN AMERICAN: 50 ML/MIN
GFR AFRICAN AMERICAN: 54 ML/MIN
GFR AFRICAN AMERICAN: 54 ML/MIN
GFR AFRICAN AMERICAN: 55 ML/MIN
GFR AFRICAN AMERICAN: 56 ML/MIN
GFR AFRICAN AMERICAN: 56 ML/MIN
GFR AFRICAN AMERICAN: 59 ML/MIN
GFR NON-AFRICAN AMERICAN: 21 ML/MIN
GFR NON-AFRICAN AMERICAN: 26 ML/MIN
GFR NON-AFRICAN AMERICAN: 30 ML/MIN
GFR NON-AFRICAN AMERICAN: 30 ML/MIN
GFR NON-AFRICAN AMERICAN: 32 ML/MIN
GFR NON-AFRICAN AMERICAN: 33 ML/MIN
GFR NON-AFRICAN AMERICAN: 33 ML/MIN
GFR NON-AFRICAN AMERICAN: 34 ML/MIN
GFR NON-AFRICAN AMERICAN: 35 ML/MIN
GFR NON-AFRICAN AMERICAN: 35 ML/MIN
GFR NON-AFRICAN AMERICAN: 36 ML/MIN
GFR NON-AFRICAN AMERICAN: 37 ML/MIN
GFR NON-AFRICAN AMERICAN: 37 ML/MIN
GFR NON-AFRICAN AMERICAN: 38 ML/MIN
GFR NON-AFRICAN AMERICAN: 39 ML/MIN
GFR NON-AFRICAN AMERICAN: 40 ML/MIN
GFR NON-AFRICAN AMERICAN: 40 ML/MIN
GFR NON-AFRICAN AMERICAN: 41 ML/MIN
GFR NON-AFRICAN AMERICAN: 44 ML/MIN
GFR NON-AFRICAN AMERICAN: 44 ML/MIN
GFR NON-AFRICAN AMERICAN: 46 ML/MIN
GFR NON-AFRICAN AMERICAN: 46 ML/MIN
GFR NON-AFRICAN AMERICAN: 47 ML/MIN
GFR NON-AFRICAN AMERICAN: 49 ML/MIN
GFR SERPL CREATININE-BSD FRML MDRD: 40 ML/MIN
GFR SERPL CREATININE-BSD FRML MDRD: 43 ML/MIN
GFR SERPL CREATININE-BSD FRML MDRD: ABNORMAL ML/MIN/{1.73_M2}
GLOBULIN: 2.8 G/DL (ref 1.5–3.8)
GLOBULIN: 3.1 G/DL (ref 1.5–3.8)
GLOBULIN: ABNORMAL G/DL (ref 1.5–3.8)
GLUCOSE BLD-MCNC: 100 MG/DL (ref 65–105)
GLUCOSE BLD-MCNC: 102 MG/DL (ref 70–99)
GLUCOSE BLD-MCNC: 119 MG/DL (ref 65–105)
GLUCOSE BLD-MCNC: 121 MG/DL (ref 70–99)
GLUCOSE BLD-MCNC: 123 MG/DL (ref 65–105)
GLUCOSE BLD-MCNC: 126 MG/DL (ref 65–105)
GLUCOSE BLD-MCNC: 127 MG/DL (ref 70–99)
GLUCOSE BLD-MCNC: 129 MG/DL (ref 65–105)
GLUCOSE BLD-MCNC: 135 MG/DL (ref 65–105)
GLUCOSE BLD-MCNC: 137 MG/DL (ref 70–99)
GLUCOSE BLD-MCNC: 138 MG/DL (ref 65–105)
GLUCOSE BLD-MCNC: 140 MG/DL (ref 65–105)
GLUCOSE BLD-MCNC: 144 MG/DL (ref 65–105)
GLUCOSE BLD-MCNC: 144 MG/DL (ref 65–105)
GLUCOSE BLD-MCNC: 145 MG/DL (ref 65–105)
GLUCOSE BLD-MCNC: 147 MG/DL (ref 65–105)
GLUCOSE BLD-MCNC: 148 MG/DL (ref 65–105)
GLUCOSE BLD-MCNC: 157 MG/DL (ref 65–105)
GLUCOSE BLD-MCNC: 158 MG/DL (ref 65–105)
GLUCOSE BLD-MCNC: 163 MG/DL (ref 65–105)
GLUCOSE BLD-MCNC: 163 MG/DL (ref 65–105)
GLUCOSE BLD-MCNC: 164 MG/DL (ref 65–105)
GLUCOSE BLD-MCNC: 165 MG/DL (ref 65–105)
GLUCOSE BLD-MCNC: 168 MG/DL (ref 65–105)
GLUCOSE BLD-MCNC: 168 MG/DL (ref 70–99)
GLUCOSE BLD-MCNC: 179 MG/DL (ref 65–105)
GLUCOSE BLD-MCNC: 180 MG/DL (ref 65–105)
GLUCOSE BLD-MCNC: 181 MG/DL (ref 65–105)
GLUCOSE BLD-MCNC: 181 MG/DL (ref 70–99)
GLUCOSE BLD-MCNC: 184 MG/DL (ref 65–105)
GLUCOSE BLD-MCNC: 184 MG/DL (ref 65–105)
GLUCOSE BLD-MCNC: 185 MG/DL (ref 65–105)
GLUCOSE BLD-MCNC: 185 MG/DL (ref 65–105)
GLUCOSE BLD-MCNC: 185 MG/DL (ref 70–99)
GLUCOSE BLD-MCNC: 186 MG/DL (ref 65–105)
GLUCOSE BLD-MCNC: 188 MG/DL (ref 65–105)
GLUCOSE BLD-MCNC: 189 MG/DL (ref 65–105)
GLUCOSE BLD-MCNC: 190 MG/DL (ref 65–105)
GLUCOSE BLD-MCNC: 190 MG/DL (ref 65–105)
GLUCOSE BLD-MCNC: 190 MG/DL (ref 70–99)
GLUCOSE BLD-MCNC: 195 MG/DL (ref 65–105)
GLUCOSE BLD-MCNC: 195 MG/DL (ref 65–105)
GLUCOSE BLD-MCNC: 196 MG/DL (ref 65–105)
GLUCOSE BLD-MCNC: 198 MG/DL (ref 65–105)
GLUCOSE BLD-MCNC: 201 MG/DL (ref 74–100)
GLUCOSE BLD-MCNC: 202 MG/DL (ref 65–105)
GLUCOSE BLD-MCNC: 203 MG/DL (ref 70–99)
GLUCOSE BLD-MCNC: 204 MG/DL (ref 65–105)
GLUCOSE BLD-MCNC: 204 MG/DL (ref 65–105)
GLUCOSE BLD-MCNC: 205 MG/DL (ref 65–105)
GLUCOSE BLD-MCNC: 207 MG/DL (ref 65–105)
GLUCOSE BLD-MCNC: 209 MG/DL (ref 65–105)
GLUCOSE BLD-MCNC: 213 MG/DL (ref 70–99)
GLUCOSE BLD-MCNC: 217 MG/DL (ref 65–105)
GLUCOSE BLD-MCNC: 223 MG/DL (ref 65–105)
GLUCOSE BLD-MCNC: 224 MG/DL (ref 65–105)
GLUCOSE BLD-MCNC: 226 MG/DL (ref 70–99)
GLUCOSE BLD-MCNC: 228 MG/DL (ref 65–105)
GLUCOSE BLD-MCNC: 235 MG/DL (ref 65–105)
GLUCOSE BLD-MCNC: 239 MG/DL (ref 65–105)
GLUCOSE BLD-MCNC: 243 MG/DL (ref 65–105)
GLUCOSE BLD-MCNC: 244 MG/DL (ref 65–105)
GLUCOSE BLD-MCNC: 246 MG/DL (ref 65–105)
GLUCOSE BLD-MCNC: 253 MG/DL (ref 70–99)
GLUCOSE BLD-MCNC: 254 MG/DL (ref 65–105)
GLUCOSE BLD-MCNC: 254 MG/DL (ref 65–105)
GLUCOSE BLD-MCNC: 263 MG/DL (ref 70–99)
GLUCOSE BLD-MCNC: 264 MG/DL (ref 70–99)
GLUCOSE BLD-MCNC: 268 MG/DL (ref 65–105)
GLUCOSE BLD-MCNC: 276 MG/DL (ref 65–105)
GLUCOSE BLD-MCNC: 283 MG/DL (ref 70–99)
GLUCOSE BLD-MCNC: 299 MG/DL (ref 70–99)
GLUCOSE BLD-MCNC: 313 MG/DL (ref 65–105)
GLUCOSE BLD-MCNC: 357 MG/DL (ref 70–99)
GLUCOSE BLD-MCNC: 365 MG/DL (ref 65–105)
GLUCOSE BLD-MCNC: 388 MG/DL (ref 74–100)
GLUCOSE BLD-MCNC: 75 MG/DL (ref 65–105)
GLUCOSE BLD-MCNC: 77 MG/DL (ref 65–105)
GLUCOSE BLD-MCNC: 82 MG/DL (ref 65–105)
GLUCOSE BLD-MCNC: 94 MG/DL (ref 70–99)
GLUCOSE BLD-MCNC: 97 MG/DL (ref 70–99)
GLUCOSE URINE: ABNORMAL
GLUCOSE URINE: ABNORMAL
GLUCOSE URINE: NEGATIVE
HBA1C MFR BLD: 6.8 % (ref 4.8–5.9)
HBA1C MFR BLD: 7.7 % (ref 4–6)
HBA1C MFR BLD: 9.5 % (ref 4.8–5.9)
HCT VFR BLD CALC: 28.6 % (ref 36.3–47.1)
HCT VFR BLD CALC: 29.7 % (ref 36–46)
HCT VFR BLD CALC: 30.4 % (ref 36–46)
HCT VFR BLD CALC: 31 % (ref 36.3–47.1)
HCT VFR BLD CALC: 32.2 % (ref 36–46)
HCT VFR BLD CALC: 34.6 % (ref 36–46)
HCT VFR BLD CALC: 34.8 % (ref 36–46)
HCT VFR BLD CALC: 35 % (ref 36.3–47.1)
HCT VFR BLD CALC: 35.3 % (ref 36–46)
HCT VFR BLD CALC: 35.6 % (ref 36.3–47.1)
HCT VFR BLD CALC: 37 % (ref 36.3–47.1)
HCT VFR BLD CALC: 37.8 % (ref 36.3–47.1)
HCT VFR BLD CALC: 39.9 % (ref 36.3–47.1)
HCT VFR BLD CALC: 43.3 % (ref 36–46)
HCT VFR BLD CALC: 44.8 % (ref 36.3–47.1)
HDLC SERPL-MCNC: 49 MG/DL
HDLC SERPL-MCNC: 80 MG/DL
HEMOGLOBIN: 10.4 G/DL (ref 12–16)
HEMOGLOBIN: 10.5 G/DL (ref 11.9–15.1)
HEMOGLOBIN: 10.5 G/DL (ref 11.9–15.1)
HEMOGLOBIN: 10.7 G/DL (ref 11.9–15.1)
HEMOGLOBIN: 10.9 G/DL (ref 12–16)
HEMOGLOBIN: 11.2 G/DL (ref 12–16)
HEMOGLOBIN: 11.2 G/DL (ref 12–16)
HEMOGLOBIN: 11.4 G/DL (ref 11.9–15.1)
HEMOGLOBIN: 12.1 G/DL (ref 11.9–15.1)
HEMOGLOBIN: 13.5 G/DL (ref 11.9–15.1)
HEMOGLOBIN: 14.4 G/DL (ref 12–16)
HEMOGLOBIN: 8.5 G/DL (ref 11.9–15.1)
HEMOGLOBIN: 9.4 G/DL (ref 11.9–15.1)
HEMOGLOBIN: 9.4 G/DL (ref 12–16)
HEMOGLOBIN: 9.6 G/DL (ref 12–16)
IMMATURE GRANULOCYTES: ABNORMAL %
INFLUENZA A ANTIBODY: NORMAL
INFLUENZA B ANTIBODY: NORMAL
INR BLD: 1
INR BLD: 1.1
INR BLD: 1.1
INR BLD: 1.2
INR BLD: 1.3
INR BLD: 1.3
INR BLD: 1.6
INR BLD: 1.7
INR BLD: 1.9
INR BLD: 1.9
INR BLD: 2.9
INR BLD: 2.9
INR BLD: 3.5
INR BLD: 8
INTERVENTION: NORMAL
KETONES, URINE: NEGATIVE
LACTIC ACID, SEPSIS WHOLE BLOOD: 0.9 MMOL/L (ref 0.5–1.9)
LACTIC ACID, SEPSIS WHOLE BLOOD: NORMAL MMOL/L (ref 0.5–1.9)
LACTIC ACID, SEPSIS WHOLE BLOOD: NORMAL MMOL/L (ref 0.5–1.9)
LACTIC ACID, SEPSIS: 1.6 MMOL/L (ref 0.5–1.9)
LACTIC ACID, SEPSIS: 1.6 MMOL/L (ref 0.5–1.9)
LACTIC ACID, SEPSIS: NORMAL MMOL/L (ref 0.5–1.9)
LACTIC ACID: 2.2 MMOL/L (ref 0.5–2.2)
LACTIC ACID: 2.8 MMOL/L (ref 0.5–2.2)
LDL CHOLESTEROL: 77 MG/DL (ref 0–130)
LDL CHOLESTEROL: 90 MG/DL (ref 0–130)
LEUKOCYTE ESTERASE, URINE: ABNORMAL
LEUKOCYTE ESTERASE, URINE: ABNORMAL
LEUKOCYTE ESTERASE, URINE: NEGATIVE
LIPASE: 35 U/L (ref 13–60)
LIPASE: 45 U/L (ref 13–60)
LIPASE: 45 U/L (ref 13–60)
LV EF: 25 %
LV EF: 25 %
LV EF: 30 %
LV EF: 30 %
LV EF: 48 %
LVEF MODALITY: NORMAL
LYMPHOCYTES # BLD: 18 % (ref 16–46)
LYMPHOCYTES # BLD: 21 % (ref 16–46)
LYMPHOCYTES # BLD: 25 % (ref 16–46)
LYMPHOCYTES # BLD: 6 % (ref 16–46)
Lab: ABNORMAL
Lab: NORMAL
MAGNESIUM: 1.9 MG/DL (ref 1.6–2.6)
MAGNESIUM: 2 MG/DL (ref 1.6–2.6)
MAGNESIUM: 2.1 MG/DL (ref 1.6–2.6)
MAGNESIUM: 2.2 MG/DL (ref 1.6–2.6)
MAGNESIUM: 2.2 MG/DL (ref 1.6–2.6)
MAGNESIUM: 2.4 MG/DL (ref 1.6–2.6)
MAGNESIUM: 2.5 MG/DL (ref 1.6–2.6)
MCH RBC QN AUTO: 25.1 PG (ref 25.2–33.5)
MCH RBC QN AUTO: 25.4 PG (ref 25.2–33.5)
MCH RBC QN AUTO: 25.5 PG (ref 25.2–33.5)
MCH RBC QN AUTO: 25.6 PG (ref 25.2–33.5)
MCH RBC QN AUTO: 25.6 PG (ref 25.2–33.5)
MCH RBC QN AUTO: 25.7 PG (ref 26–34)
MCH RBC QN AUTO: 25.8 PG (ref 26–34)
MCH RBC QN AUTO: 25.9 PG (ref 25.2–33.5)
MCH RBC QN AUTO: 26.9 PG (ref 26–34)
MCH RBC QN AUTO: 27.3 PG (ref 25.2–33.5)
MCH RBC QN AUTO: 27.4 PG (ref 26–34)
MCH RBC QN AUTO: 27.6 PG (ref 26–34)
MCH RBC QN AUTO: 28.7 PG (ref 26–34)
MCHC RBC AUTO-ENTMCNC: 28.9 G/DL (ref 28.4–34.8)
MCHC RBC AUTO-ENTMCNC: 29.5 G/DL (ref 28.4–34.8)
MCHC RBC AUTO-ENTMCNC: 29.7 G/DL (ref 28.4–34.8)
MCHC RBC AUTO-ENTMCNC: 30 G/DL (ref 28.4–34.8)
MCHC RBC AUTO-ENTMCNC: 30.1 G/DL (ref 28.4–34.8)
MCHC RBC AUTO-ENTMCNC: 30.2 G/DL (ref 28.4–34.8)
MCHC RBC AUTO-ENTMCNC: 30.3 G/DL (ref 28.4–34.8)
MCHC RBC AUTO-ENTMCNC: 31.5 G/DL (ref 31–37)
MCHC RBC AUTO-ENTMCNC: 31.6 G/DL (ref 31–37)
MCHC RBC AUTO-ENTMCNC: 31.9 G/DL (ref 31–37)
MCHC RBC AUTO-ENTMCNC: 32.1 G/DL (ref 31–37)
MCHC RBC AUTO-ENTMCNC: 32.4 G/DL (ref 31–37)
MCHC RBC AUTO-ENTMCNC: 33.3 G/DL (ref 31–37)
MCV RBC AUTO: 80.2 FL (ref 80–100)
MCV RBC AUTO: 81.5 FL (ref 80–100)
MCV RBC AUTO: 83.1 FL (ref 82.6–102.9)
MCV RBC AUTO: 84.4 FL (ref 80–100)
MCV RBC AUTO: 84.4 FL (ref 82.6–102.9)
MCV RBC AUTO: 85.2 FL (ref 82.6–102.9)
MCV RBC AUTO: 85.5 FL (ref 80–100)
MCV RBC AUTO: 86.3 FL (ref 80–100)
MCV RBC AUTO: 86.4 FL (ref 80–100)
MCV RBC AUTO: 86.8 FL (ref 82.6–102.9)
MCV RBC AUTO: 87.2 FL (ref 82.6–102.9)
MCV RBC AUTO: 87.9 FL (ref 82.6–102.9)
MCV RBC AUTO: 90.7 FL (ref 82.6–102.9)
MODE: ABNORMAL
MONOCYTES # BLD: 10 % (ref 4–11)
MONOCYTES # BLD: 10 % (ref 4–11)
MONOCYTES # BLD: 6 % (ref 4–11)
MONOCYTES # BLD: 9 % (ref 4–11)
MRSA, DNA, NASAL: NORMAL
MUCUS: ABNORMAL
MUCUS: ABNORMAL
MUCUS: NORMAL
MYOGLOBIN: 103 NG/ML (ref 25–58)
MYOGLOBIN: 86 NG/ML (ref 25–58)
NEGATIVE BASE EXCESS, ART: 12 (ref 0–2)
NITRITE, URINE: NEGATIVE
NRBC AUTOMATED: 0 PER 100 WBC
NRBC AUTOMATED: 0.2 PER 100 WBC
NRBC AUTOMATED: ABNORMAL PER 100 WBC
O2 DEVICE/FLOW/%: ABNORMAL
OTHER OBSERVATIONS UA: ABNORMAL
OTHER OBSERVATIONS UA: ABNORMAL
OTHER OBSERVATIONS UA: NORMAL
PARTIAL THROMBOPLASTIN TIME: 21.6 SEC (ref 20.5–30.5)
PARTIAL THROMBOPLASTIN TIME: 23.1 SEC (ref 20.5–30.5)
PARTIAL THROMBOPLASTIN TIME: 26.1 SEC (ref 20.5–30.5)
PARTIAL THROMBOPLASTIN TIME: 29.9 SEC (ref 27–35)
PARTIAL THROMBOPLASTIN TIME: 33 SEC (ref 20.5–30.5)
PARTIAL THROMBOPLASTIN TIME: 34.6 SEC (ref 20.5–30.5)
PARTIAL THROMBOPLASTIN TIME: 39.4 SEC (ref 20.5–30.5)
PARTIAL THROMBOPLASTIN TIME: 40.5 SEC (ref 20.5–30.5)
PARTIAL THROMBOPLASTIN TIME: 51.5 SEC (ref 20.5–30.5)
PARTIAL THROMBOPLASTIN TIME: 52.6 SEC (ref 20.5–30.5)
PARTIAL THROMBOPLASTIN TIME: 62.2 SEC (ref 20.5–30.5)
PARTIAL THROMBOPLASTIN TIME: 71.7 SEC (ref 20.5–30.5)
PARTIAL THROMBOPLASTIN TIME: 77.3 SEC (ref 20.5–30.5)
PARTIAL THROMBOPLASTIN TIME: >120 SEC (ref 20.5–30.5)
PATIENT TEMP: ABNORMAL
PDW BLD-RTO: 14.6 % (ref 11.8–14.4)
PDW BLD-RTO: 15.1 % (ref 11–14.5)
PDW BLD-RTO: 16 % (ref 11.8–14.4)
PDW BLD-RTO: 16.9 % (ref 11–14.5)
PDW BLD-RTO: 17 % (ref 11.8–14.4)
PDW BLD-RTO: 17 % (ref 11–14.5)
PDW BLD-RTO: 17 % (ref 11–14.5)
PDW BLD-RTO: 17.1 % (ref 11.8–14.4)
PDW BLD-RTO: 17.1 % (ref 11.8–14.4)
PDW BLD-RTO: 17.2 % (ref 11.8–14.4)
PDW BLD-RTO: 17.4 % (ref 11.8–14.4)
PDW BLD-RTO: 17.7 % (ref 11–14.5)
PDW BLD-RTO: 17.8 % (ref 11–14.5)
PH UA: 5.5 (ref 5–6)
PH UA: 5.5 (ref 5–6)
PH UA: 6.5 (ref 5–6)
PHOSPHORUS: 2.2 MG/DL (ref 2.6–4.5)
PHOSPHORUS: 4.2 MG/DL (ref 2.6–4.5)
PLATELET # BLD: 149 K/UL (ref 140–450)
PLATELET # BLD: 158 K/UL (ref 138–453)
PLATELET # BLD: 167 K/UL (ref 138–453)
PLATELET # BLD: 182 K/UL (ref 138–453)
PLATELET # BLD: 195 K/UL (ref 140–450)
PLATELET # BLD: 197 K/UL (ref 140–450)
PLATELET # BLD: 215 K/UL (ref 138–453)
PLATELET # BLD: 215 K/UL (ref 140–450)
PLATELET # BLD: 220 K/UL (ref 140–450)
PLATELET # BLD: 222 K/UL (ref 138–453)
PLATELET # BLD: 228 K/UL (ref 140–450)
PLATELET # BLD: 275 K/UL (ref 138–453)
PLATELET # BLD: 329 K/UL (ref 138–453)
PLATELET # BLD: 332 K/UL (ref 138–453)
PLATELET # BLD: 363 K/UL (ref 138–453)
PLATELET # BLD: 386 K/UL (ref 138–453)
PLATELET ESTIMATE: ABNORMAL
PMV BLD AUTO: 10.3 FL (ref 8.1–13.5)
PMV BLD AUTO: 10.3 FL (ref 8.1–13.5)
PMV BLD AUTO: 10.5 FL (ref 8.1–13.5)
PMV BLD AUTO: 10.8 FL (ref 8.1–13.5)
PMV BLD AUTO: 7.3 FL (ref 6–12)
PMV BLD AUTO: 7.6 FL (ref 6–12)
PMV BLD AUTO: 7.7 FL (ref 6–12)
PMV BLD AUTO: 7.9 FL (ref 6–12)
PMV BLD AUTO: 7.9 FL (ref 6–12)
PMV BLD AUTO: 8 FL (ref 6–12)
PMV BLD AUTO: 9.4 FL (ref 8.1–13.5)
PMV BLD AUTO: 9.7 FL (ref 8.1–13.5)
PMV BLD AUTO: 9.9 FL (ref 8.1–13.5)
POC CHLORIDE: 106 MMOL/L (ref 98–107)
POC CHLORIDE: 109 MMOL/L (ref 98–107)
POC CREATININE: 1.43 MG/DL (ref 0.51–1.19)
POC CREATININE: 1.52 MG/DL (ref 0.51–1.19)
POC HCO3: 13 MMOL/L (ref 21–28)
POC HEMATOCRIT: 36 % (ref 36–46)
POC HEMATOCRIT: 41 % (ref 36–46)
POC HEMOGLOBIN: 12.4 G/DL (ref 12–16)
POC HEMOGLOBIN: 14 G/DL (ref 12–16)
POC IONIZED CALCIUM: 1.23 MMOL/L (ref 1.15–1.33)
POC LACTIC ACID: 3.84 MMOL/L (ref 0.56–1.39)
POC O2 SATURATION: 93 % (ref 94–98)
POC PCO2 TEMP: ABNORMAL MM HG
POC PCO2: 26.5 MM HG (ref 35–48)
POC PH TEMP: ABNORMAL
POC PH: 7.3 (ref 7.35–7.45)
POC PO2 TEMP: ABNORMAL MM HG
POC PO2: 73.2 MM HG (ref 83–108)
POC POTASSIUM: 3.4 MMOL/L (ref 3.5–4.5)
POC POTASSIUM: 4.2 MMOL/L (ref 3.5–4.5)
POC SODIUM: 138 MMOL/L (ref 138–146)
POC SODIUM: 143 MMOL/L (ref 138–146)
POSITIVE BASE EXCESS, ART: ABNORMAL (ref 0–3)
POTASSIUM SERPL-SCNC: 2.7 MMOL/L (ref 3.7–5.3)
POTASSIUM SERPL-SCNC: 2.9 MMOL/L (ref 3.7–5.3)
POTASSIUM SERPL-SCNC: 3 MMOL/L (ref 3.7–5.3)
POTASSIUM SERPL-SCNC: 3.1 MMOL/L (ref 3.7–5.3)
POTASSIUM SERPL-SCNC: 3.1 MMOL/L (ref 3.7–5.3)
POTASSIUM SERPL-SCNC: 3.2 MMOL/L (ref 3.7–5.3)
POTASSIUM SERPL-SCNC: 3.3 MMOL/L (ref 3.7–5.3)
POTASSIUM SERPL-SCNC: 3.4 MMOL/L (ref 3.7–5.3)
POTASSIUM SERPL-SCNC: 3.4 MMOL/L (ref 3.7–5.3)
POTASSIUM SERPL-SCNC: 3.5 MMOL/L (ref 3.7–5.3)
POTASSIUM SERPL-SCNC: 3.5 MMOL/L (ref 3.7–5.3)
POTASSIUM SERPL-SCNC: 3.6 MMOL/L (ref 3.7–5.3)
POTASSIUM SERPL-SCNC: 3.7 MMOL/L (ref 3.7–5.3)
POTASSIUM SERPL-SCNC: 3.8 MMOL/L (ref 3.7–5.3)
POTASSIUM SERPL-SCNC: 4 MMOL/L (ref 3.7–5.3)
POTASSIUM SERPL-SCNC: 4.1 MMOL/L (ref 3.7–5.3)
POTASSIUM SERPL-SCNC: 4.2 MMOL/L (ref 3.7–5.3)
POTASSIUM SERPL-SCNC: 4.3 MMOL/L (ref 3.7–5.3)
POTASSIUM SERPL-SCNC: 4.5 MMOL/L (ref 3.7–5.3)
POTASSIUM SERPL-SCNC: 4.5 MMOL/L (ref 3.7–5.3)
POTASSIUM SERPL-SCNC: 4.6 MMOL/L (ref 3.7–5.3)
POTASSIUM SERPL-SCNC: 5.3 MMOL/L (ref 3.7–5.3)
PRO-BNP: 4562 PG/ML
PRO-BNP: 6609 PG/ML
PRO-BNP: 6918 PG/ML
PRO-BNP: 8291 PG/ML
PROTEIN UA: ABNORMAL
PROTEIN UA: NEGATIVE
PROTEIN UA: NEGATIVE
PROTHROMBIN TIME: 10.7 SEC (ref 9–12)
PROTHROMBIN TIME: 11.9 SEC (ref 9.4–11.3)
PROTHROMBIN TIME: 12 SEC (ref 9–12)
PROTHROMBIN TIME: 12.5 SEC (ref 9–12)
PROTHROMBIN TIME: 12.8 SEC (ref 9.4–11.3)
PROTHROMBIN TIME: 13 SEC (ref 9.4–11.3)
PROTHROMBIN TIME: 19.5 SEC (ref 9.4–11.3)
RBC # BLD: 3.28 M/UL (ref 3.95–5.11)
RBC # BLD: 3.64 M/UL (ref 4–5.2)
RBC # BLD: 3.81 M/UL (ref 4–5.2)
RBC # BLD: 4.05 M/UL (ref 4–5.2)
RBC # BLD: 4.08 M/UL (ref 4–5.2)
RBC # BLD: 4.1 M/UL (ref 3.95–5.11)
RBC # BLD: 4.11 M/UL (ref 3.95–5.11)
RBC # BLD: 4.21 M/UL (ref 3.95–5.11)
RBC # BLD: 4.34 M/UL (ref 4–5.2)
RBC # BLD: 4.55 M/UL (ref 3.95–5.11)
RBC # BLD: 4.73 M/UL (ref 3.95–5.11)
RBC # BLD: 4.94 M/UL (ref 3.95–5.11)
RBC # BLD: 5.02 M/UL (ref 4–5.2)
RBC # BLD: ABNORMAL 10*6/UL
RBC UA: ABNORMAL /HPF (ref 0–4)
RBC UA: ABNORMAL /HPF (ref 0–4)
RBC UA: NORMAL /HPF (ref 0–4)
RENAL EPITHELIAL, UA: ABNORMAL /HPF
RENAL EPITHELIAL, UA: ABNORMAL /HPF
RENAL EPITHELIAL, UA: NORMAL /HPF
SAMPLE SITE: ABNORMAL
SEG NEUTROPHILS: 65 % (ref 43–77)
SEG NEUTROPHILS: 68 % (ref 43–77)
SEG NEUTROPHILS: 70 % (ref 43–77)
SEG NEUTROPHILS: 88 % (ref 43–77)
SEGMENTED NEUTROPHILS ABSOLUTE COUNT: 11.2 K/UL (ref 1.8–7.7)
SEGMENTED NEUTROPHILS ABSOLUTE COUNT: 5.1 K/UL (ref 1.8–7.7)
SEGMENTED NEUTROPHILS ABSOLUTE COUNT: 6.1 K/UL (ref 1.8–7.7)
SEGMENTED NEUTROPHILS ABSOLUTE COUNT: 6.9 K/UL (ref 1.8–7.7)
SODIUM BLD-SCNC: 128 MMOL/L (ref 135–144)
SODIUM BLD-SCNC: 131 MMOL/L (ref 135–144)
SODIUM BLD-SCNC: 133 MMOL/L (ref 135–144)
SODIUM BLD-SCNC: 135 MMOL/L (ref 135–144)
SODIUM BLD-SCNC: 135 MMOL/L (ref 135–144)
SODIUM BLD-SCNC: 136 MMOL/L (ref 135–144)
SODIUM BLD-SCNC: 137 MMOL/L (ref 135–144)
SODIUM BLD-SCNC: 137 MMOL/L (ref 135–144)
SODIUM BLD-SCNC: 138 MMOL/L (ref 135–144)
SODIUM BLD-SCNC: 138 MMOL/L (ref 135–144)
SODIUM BLD-SCNC: 139 MMOL/L (ref 135–144)
SODIUM BLD-SCNC: 140 MMOL/L (ref 135–144)
SODIUM BLD-SCNC: 141 MMOL/L (ref 135–144)
SODIUM BLD-SCNC: 142 MMOL/L (ref 135–144)
SODIUM BLD-SCNC: 144 MMOL/L (ref 135–144)
SODIUM BLD-SCNC: 144 MMOL/L (ref 135–144)
SODIUM BLD-SCNC: 147 MMOL/L (ref 135–144)
SPECIFIC GRAVITY UA: 1.01 (ref 1.01–1.02)
SPECIMEN DESCRIPTION: ABNORMAL
SPECIMEN DESCRIPTION: NORMAL
TCO2 (CALC), ART: 14 MMOL/L (ref 22–29)
TOTAL PROTEIN: 5.9 G/DL (ref 6.4–8.3)
TOTAL PROTEIN: 6.3 G/DL (ref 6.4–8.3)
TOTAL PROTEIN: 6.6 G/DL (ref 6.4–8.3)
TOTAL PROTEIN: 6.7 G/DL (ref 6.4–8.3)
TOTAL PROTEIN: 6.7 G/DL (ref 6.4–8.3)
TOTAL PROTEIN: 7.1 G/DL (ref 6.4–8.3)
TRICHOMONAS: ABNORMAL
TRICHOMONAS: ABNORMAL
TRICHOMONAS: NORMAL
TRIGL SERPL-MCNC: 103 MG/DL
TRIGL SERPL-MCNC: 135 MG/DL
TROPONIN INTERP: ABNORMAL
TROPONIN T: ABNORMAL NG/ML
TROPONIN, HIGH SENSITIVITY: 110 NG/L (ref 0–14)
TROPONIN, HIGH SENSITIVITY: 111 NG/L (ref 0–14)
TROPONIN, HIGH SENSITIVITY: 116 NG/L (ref 0–14)
TROPONIN, HIGH SENSITIVITY: 137 NG/L (ref 0–14)
TROPONIN, HIGH SENSITIVITY: 55 NG/L (ref 0–14)
TROPONIN, HIGH SENSITIVITY: 57 NG/L (ref 0–14)
TROPONIN, HIGH SENSITIVITY: 59 NG/L (ref 0–14)
TROPONIN, HIGH SENSITIVITY: 62 NG/L (ref 0–14)
TROPONIN, HIGH SENSITIVITY: 66 NG/L (ref 0–14)
TROPONIN, HIGH SENSITIVITY: 83 NG/L (ref 0–14)
TSH SERPL DL<=0.05 MIU/L-ACNC: 4.05 MIU/L (ref 0.3–5)
TSH SERPL DL<=0.05 MIU/L-ACNC: 4.3 MIU/L (ref 0.3–5)
TURBIDITY: ABNORMAL
URINE HGB: ABNORMAL
URINE HGB: ABNORMAL
URINE HGB: NEGATIVE
UROBILINOGEN, URINE: NORMAL
VLDLC SERPL CALC-MCNC: NORMAL MG/DL (ref 1–30)
VLDLC SERPL CALC-MCNC: NORMAL MG/DL (ref 1–30)
WBC # BLD: 10.1 K/UL (ref 3.5–11.3)
WBC # BLD: 10.6 K/UL (ref 3.5–11)
WBC # BLD: 10.6 K/UL (ref 3.5–11.3)
WBC # BLD: 11.9 K/UL (ref 3.5–11.3)
WBC # BLD: 12.7 K/UL (ref 3.5–11)
WBC # BLD: 6.7 K/UL (ref 3.5–11.3)
WBC # BLD: 7 K/UL (ref 3.5–11)
WBC # BLD: 7.4 K/UL (ref 3.5–11)
WBC # BLD: 8.6 K/UL (ref 3.5–11)
WBC # BLD: 8.6 K/UL (ref 3.5–11.3)
WBC # BLD: 9 K/UL (ref 3.5–11.3)
WBC # BLD: 9.5 K/UL (ref 3.5–11.3)
WBC # BLD: 9.9 K/UL (ref 3.5–11)
WBC # BLD: ABNORMAL 10*3/UL
WBC UA: ABNORMAL /HPF (ref 0–4)
WBC UA: ABNORMAL /HPF (ref 0–4)
WBC UA: NORMAL /HPF (ref 0–4)
YEAST: ABNORMAL
YEAST: ABNORMAL
YEAST: NORMAL

## 2019-01-01 PROCEDURE — 6360000002 HC RX W HCPCS: Performed by: EMERGENCY MEDICINE

## 2019-01-01 PROCEDURE — 93010 ELECTROCARDIOGRAM REPORT: CPT | Performed by: INTERNAL MEDICINE

## 2019-01-01 PROCEDURE — G8427 DOCREV CUR MEDS BY ELIG CLIN: HCPCS | Performed by: FAMILY MEDICINE

## 2019-01-01 PROCEDURE — G8417 CALC BMI ABV UP PARAM F/U: HCPCS | Performed by: INTERNAL MEDICINE

## 2019-01-01 PROCEDURE — 6370000000 HC RX 637 (ALT 250 FOR IP): Performed by: STUDENT IN AN ORGANIZED HEALTH CARE EDUCATION/TRAINING PROGRAM

## 2019-01-01 PROCEDURE — 83690 ASSAY OF LIPASE: CPT

## 2019-01-01 PROCEDURE — 96365 THER/PROPH/DIAG IV INF INIT: CPT

## 2019-01-01 PROCEDURE — 2060000000 HC ICU INTERMEDIATE R&B

## 2019-01-01 PROCEDURE — 85049 AUTOMATED PLATELET COUNT: CPT

## 2019-01-01 PROCEDURE — 6370000000 HC RX 637 (ALT 250 FOR IP): Performed by: INTERNAL MEDICINE

## 2019-01-01 PROCEDURE — 2700000000 HC OXYGEN THERAPY PER DAY

## 2019-01-01 PROCEDURE — 84132 ASSAY OF SERUM POTASSIUM: CPT

## 2019-01-01 PROCEDURE — 3430000000 HC RX DIAGNOSTIC RADIOPHARMACEUTICAL: Performed by: INTERNAL MEDICINE

## 2019-01-01 PROCEDURE — 82803 BLOOD GASES ANY COMBINATION: CPT

## 2019-01-01 PROCEDURE — 80048 BASIC METABOLIC PNL TOTAL CA: CPT

## 2019-01-01 PROCEDURE — 80076 HEPATIC FUNCTION PANEL: CPT

## 2019-01-01 PROCEDURE — G8598 ASA/ANTIPLAT THER USED: HCPCS | Performed by: INTERNAL MEDICINE

## 2019-01-01 PROCEDURE — 85014 HEMATOCRIT: CPT

## 2019-01-01 PROCEDURE — 82947 ASSAY GLUCOSE BLOOD QUANT: CPT

## 2019-01-01 PROCEDURE — 6370000000 HC RX 637 (ALT 250 FOR IP): Performed by: NURSE PRACTITIONER

## 2019-01-01 PROCEDURE — C1769 GUIDE WIRE: HCPCS

## 2019-01-01 PROCEDURE — 99232 SBSQ HOSP IP/OBS MODERATE 35: CPT | Performed by: FAMILY MEDICINE

## 2019-01-01 PROCEDURE — 36415 COLL VENOUS BLD VENIPUNCTURE: CPT

## 2019-01-01 PROCEDURE — 94640 AIRWAY INHALATION TREATMENT: CPT

## 2019-01-01 PROCEDURE — 93793 ANTICOAG MGMT PT WARFARIN: CPT | Performed by: INTERNAL MEDICINE

## 2019-01-01 PROCEDURE — 93000 ELECTROCARDIOGRAM COMPLETE: CPT | Performed by: INTERNAL MEDICINE

## 2019-01-01 PROCEDURE — 74176 CT ABD & PELVIS W/O CONTRAST: CPT

## 2019-01-01 PROCEDURE — 2500000003 HC RX 250 WO HCPCS: Performed by: INTERNAL MEDICINE

## 2019-01-01 PROCEDURE — 99285 EMERGENCY DEPT VISIT HI MDM: CPT

## 2019-01-01 PROCEDURE — 4040F PNEUMOC VAC/ADMIN/RCVD: CPT | Performed by: NURSE PRACTITIONER

## 2019-01-01 PROCEDURE — 97165 OT EVAL LOW COMPLEX 30 MIN: CPT | Performed by: OCCUPATIONAL THERAPIST

## 2019-01-01 PROCEDURE — 2580000003 HC RX 258: Performed by: INTERNAL MEDICINE

## 2019-01-01 PROCEDURE — 99232 SBSQ HOSP IP/OBS MODERATE 35: CPT | Performed by: INTERNAL MEDICINE

## 2019-01-01 PROCEDURE — 6360000002 HC RX W HCPCS

## 2019-01-01 PROCEDURE — APPSS45 APP SPLIT SHARED TIME 31-45 MINUTES: Performed by: NURSE PRACTITIONER

## 2019-01-01 PROCEDURE — G8417 CALC BMI ABV UP PARAM F/U: HCPCS | Performed by: DERMATOLOGY

## 2019-01-01 PROCEDURE — 83874 ASSAY OF MYOGLOBIN: CPT

## 2019-01-01 PROCEDURE — 6370000000 HC RX 637 (ALT 250 FOR IP): Performed by: FAMILY MEDICINE

## 2019-01-01 PROCEDURE — 4040F PNEUMOC VAC/ADMIN/RCVD: CPT | Performed by: INTERNAL MEDICINE

## 2019-01-01 PROCEDURE — 1036F TOBACCO NON-USER: CPT | Performed by: INTERNAL MEDICINE

## 2019-01-01 PROCEDURE — 1090F PRES/ABSN URINE INCON ASSESS: CPT | Performed by: INTERNAL MEDICINE

## 2019-01-01 PROCEDURE — A9560 TC99M LABELED RBC: HCPCS | Performed by: INTERNAL MEDICINE

## 2019-01-01 PROCEDURE — 94664 DEMO&/EVAL PT USE INHALER: CPT

## 2019-01-01 PROCEDURE — 1090F PRES/ABSN URINE INCON ASSESS: CPT | Performed by: FAMILY MEDICINE

## 2019-01-01 PROCEDURE — 11102 TANGNTL BX SKIN SINGLE LES: CPT | Performed by: DERMATOLOGY

## 2019-01-01 PROCEDURE — 2580000003 HC RX 258

## 2019-01-01 PROCEDURE — 4040F PNEUMOC VAC/ADMIN/RCVD: CPT | Performed by: FAMILY MEDICINE

## 2019-01-01 PROCEDURE — 99214 OFFICE O/P EST MOD 30 MIN: CPT | Performed by: INTERNAL MEDICINE

## 2019-01-01 PROCEDURE — 6360000002 HC RX W HCPCS: Performed by: NURSE PRACTITIONER

## 2019-01-01 PROCEDURE — 80053 COMPREHEN METABOLIC PANEL: CPT

## 2019-01-01 PROCEDURE — 93005 ELECTROCARDIOGRAM TRACING: CPT | Performed by: EMERGENCY MEDICINE

## 2019-01-01 PROCEDURE — 87075 CULTR BACTERIA EXCEPT BLOOD: CPT

## 2019-01-01 PROCEDURE — 97161 PT EVAL LOW COMPLEX 20 MIN: CPT | Performed by: PHYSICAL THERAPIST

## 2019-01-01 PROCEDURE — G8484 FLU IMMUNIZE NO ADMIN: HCPCS | Performed by: INTERNAL MEDICINE

## 2019-01-01 PROCEDURE — 2580000003 HC RX 258: Performed by: NURSE PRACTITIONER

## 2019-01-01 PROCEDURE — 2580000003 HC RX 258: Performed by: STUDENT IN AN ORGANIZED HEALTH CARE EDUCATION/TRAINING PROGRAM

## 2019-01-01 PROCEDURE — 84520 ASSAY OF UREA NITROGEN: CPT

## 2019-01-01 PROCEDURE — 6360000002 HC RX W HCPCS: Performed by: STUDENT IN AN ORGANIZED HEALTH CARE EDUCATION/TRAINING PROGRAM

## 2019-01-01 PROCEDURE — 85027 COMPLETE CBC AUTOMATED: CPT

## 2019-01-01 PROCEDURE — 6360000002 HC RX W HCPCS: Performed by: INTERNAL MEDICINE

## 2019-01-01 PROCEDURE — 1036F TOBACCO NON-USER: CPT | Performed by: NURSE PRACTITIONER

## 2019-01-01 PROCEDURE — C1777 LEAD, AICD, ENDO SINGLE COIL: HCPCS

## 2019-01-01 PROCEDURE — G8400 PT W/DXA NO RESULTS DOC: HCPCS | Performed by: INTERNAL MEDICINE

## 2019-01-01 PROCEDURE — 2709999900 HC NON-CHARGEABLE SUPPLY

## 2019-01-01 PROCEDURE — G0180 MD CERTIFICATION HHA PATIENT: HCPCS | Performed by: FAMILY MEDICINE

## 2019-01-01 PROCEDURE — 87205 SMEAR GRAM STAIN: CPT

## 2019-01-01 PROCEDURE — 2000000000 HC ICU R&B

## 2019-01-01 PROCEDURE — 99222 1ST HOSP IP/OBS MODERATE 55: CPT | Performed by: INTERNAL MEDICINE

## 2019-01-01 PROCEDURE — 83735 ASSAY OF MAGNESIUM: CPT

## 2019-01-01 PROCEDURE — G8427 DOCREV CUR MEDS BY ELIG CLIN: HCPCS | Performed by: INTERNAL MEDICINE

## 2019-01-01 PROCEDURE — 97110 THERAPEUTIC EXERCISES: CPT

## 2019-01-01 PROCEDURE — 2580000003 HC RX 258: Performed by: FAMILY MEDICINE

## 2019-01-01 PROCEDURE — B246ZZ4 ULTRASONOGRAPHY OF RIGHT AND LEFT HEART, TRANSESOPHAGEAL: ICD-10-PCS | Performed by: INTERNAL MEDICINE

## 2019-01-01 PROCEDURE — 87147 CULTURE TYPE IMMUNOLOGIC: CPT

## 2019-01-01 PROCEDURE — 2500000003 HC RX 250 WO HCPCS: Performed by: FAMILY MEDICINE

## 2019-01-01 PROCEDURE — 99238 HOSP IP/OBS DSCHRG MGMT 30/<: CPT | Performed by: FAMILY MEDICINE

## 2019-01-01 PROCEDURE — 2500000003 HC RX 250 WO HCPCS

## 2019-01-01 PROCEDURE — 2500000003 HC RX 250 WO HCPCS: Performed by: EMERGENCY MEDICINE

## 2019-01-01 PROCEDURE — 93005 ELECTROCARDIOGRAM TRACING: CPT | Performed by: INTERNAL MEDICINE

## 2019-01-01 PROCEDURE — 1111F DSCHRG MED/CURRENT MED MERGE: CPT | Performed by: INTERNAL MEDICINE

## 2019-01-01 PROCEDURE — 0JPT0PZ REMOVAL OF CARDIAC RHYTHM RELATED DEVICE FROM TRUNK SUBCUTANEOUS TISSUE AND FASCIA, OPEN APPROACH: ICD-10-PCS | Performed by: INTERNAL MEDICINE

## 2019-01-01 PROCEDURE — 85730 THROMBOPLASTIN TIME PARTIAL: CPT

## 2019-01-01 PROCEDURE — G8417 CALC BMI ABV UP PARAM F/U: HCPCS | Performed by: NURSE PRACTITIONER

## 2019-01-01 PROCEDURE — 71045 X-RAY EXAM CHEST 1 VIEW: CPT

## 2019-01-01 PROCEDURE — 76937 US GUIDE VASCULAR ACCESS: CPT

## 2019-01-01 PROCEDURE — 97116 GAIT TRAINING THERAPY: CPT

## 2019-01-01 PROCEDURE — 17003 DESTRUCT PREMALG LES 2-14: CPT | Performed by: DERMATOLOGY

## 2019-01-01 PROCEDURE — G8400 PT W/DXA NO RESULTS DOC: HCPCS | Performed by: FAMILY MEDICINE

## 2019-01-01 PROCEDURE — 82150 ASSAY OF AMYLASE: CPT

## 2019-01-01 PROCEDURE — 84484 ASSAY OF TROPONIN QUANT: CPT

## 2019-01-01 PROCEDURE — 99233 SBSQ HOSP IP/OBS HIGH 50: CPT | Performed by: INTERNAL MEDICINE

## 2019-01-01 PROCEDURE — 1111F DSCHRG MED/CURRENT MED MERGE: CPT | Performed by: FAMILY MEDICINE

## 2019-01-01 PROCEDURE — 84100 ASSAY OF PHOSPHORUS: CPT

## 2019-01-01 PROCEDURE — 99213 OFFICE O/P EST LOW 20 MIN: CPT | Performed by: FAMILY MEDICINE

## 2019-01-01 PROCEDURE — C1722 AICD, SINGLE CHAMBER: HCPCS

## 2019-01-01 PROCEDURE — 99223 1ST HOSP IP/OBS HIGH 75: CPT | Performed by: INTERNAL MEDICINE

## 2019-01-01 PROCEDURE — 86403 PARTICLE AGGLUT ANTBDY SCRN: CPT

## 2019-01-01 PROCEDURE — 82565 ASSAY OF CREATININE: CPT

## 2019-01-01 PROCEDURE — G8427 DOCREV CUR MEDS BY ELIG CLIN: HCPCS | Performed by: NURSE PRACTITIONER

## 2019-01-01 PROCEDURE — C1894 INTRO/SHEATH, NON-LASER: HCPCS

## 2019-01-01 PROCEDURE — 82330 ASSAY OF CALCIUM: CPT

## 2019-01-01 PROCEDURE — 87184 SC STD DISK METHOD PER PLATE: CPT

## 2019-01-01 PROCEDURE — 1123F ACP DISCUSS/DSCN MKR DOCD: CPT | Performed by: FAMILY MEDICINE

## 2019-01-01 PROCEDURE — G8400 PT W/DXA NO RESULTS DOC: HCPCS | Performed by: DERMATOLOGY

## 2019-01-01 PROCEDURE — 93270 REMOTE 30 DAY ECG REV/REPORT: CPT

## 2019-01-01 PROCEDURE — 83605 ASSAY OF LACTIC ACID: CPT

## 2019-01-01 PROCEDURE — 1200000000 HC SEMI PRIVATE

## 2019-01-01 PROCEDURE — 97530 THERAPEUTIC ACTIVITIES: CPT

## 2019-01-01 PROCEDURE — 96374 THER/PROPH/DIAG INJ IV PUSH: CPT

## 2019-01-01 PROCEDURE — 87070 CULTURE OTHR SPECIMN AEROBIC: CPT

## 2019-01-01 PROCEDURE — 72052 X-RAY EXAM NECK SPINE 6/>VWS: CPT

## 2019-01-01 PROCEDURE — G8598 ASA/ANTIPLAT THER USED: HCPCS | Performed by: NURSE PRACTITIONER

## 2019-01-01 PROCEDURE — 87040 BLOOD CULTURE FOR BACTERIA: CPT

## 2019-01-01 PROCEDURE — 6360000002 HC RX W HCPCS: Performed by: FAMILY MEDICINE

## 2019-01-01 PROCEDURE — APPNB180 APP NON BILLABLE TIME > 60 MINS: Performed by: NURSE PRACTITIONER

## 2019-01-01 PROCEDURE — 2580000003 HC RX 258: Performed by: EMERGENCY MEDICINE

## 2019-01-01 PROCEDURE — 84443 ASSAY THYROID STIM HORMONE: CPT

## 2019-01-01 PROCEDURE — 93005 ELECTROCARDIOGRAM TRACING: CPT | Performed by: FAMILY MEDICINE

## 2019-01-01 PROCEDURE — 1123F ACP DISCUSS/DSCN MKR DOCD: CPT | Performed by: NURSE PRACTITIONER

## 2019-01-01 PROCEDURE — 82435 ASSAY OF BLOOD CHLORIDE: CPT

## 2019-01-01 PROCEDURE — 1123F ACP DISCUSS/DSCN MKR DOCD: CPT | Performed by: INTERNAL MEDICINE

## 2019-01-01 PROCEDURE — 99223 1ST HOSP IP/OBS HIGH 75: CPT | Performed by: NURSE PRACTITIONER

## 2019-01-01 PROCEDURE — 87804 INFLUENZA ASSAY W/OPTIC: CPT | Performed by: FAMILY MEDICINE

## 2019-01-01 PROCEDURE — 93306 TTE W/DOPPLER COMPLETE: CPT

## 2019-01-01 PROCEDURE — 85025 COMPLETE CBC W/AUTO DIFF WBC: CPT

## 2019-01-01 PROCEDURE — 99232 SBSQ HOSP IP/OBS MODERATE 35: CPT | Performed by: PHYSICIAN ASSISTANT

## 2019-01-01 PROCEDURE — 71260 CT THORAX DX C+: CPT

## 2019-01-01 PROCEDURE — G8417 CALC BMI ABV UP PARAM F/U: HCPCS | Performed by: FAMILY MEDICINE

## 2019-01-01 PROCEDURE — 84295 ASSAY OF SERUM SODIUM: CPT

## 2019-01-01 PROCEDURE — G8598 ASA/ANTIPLAT THER USED: HCPCS | Performed by: FAMILY MEDICINE

## 2019-01-01 PROCEDURE — 97535 SELF CARE MNGMENT TRAINING: CPT

## 2019-01-01 PROCEDURE — 83880 ASSAY OF NATRIURETIC PEPTIDE: CPT

## 2019-01-01 PROCEDURE — 1123F ACP DISCUSS/DSCN MKR DOCD: CPT | Performed by: DERMATOLOGY

## 2019-01-01 PROCEDURE — 86850 RBC ANTIBODY SCREEN: CPT

## 2019-01-01 PROCEDURE — 86901 BLOOD TYPING SEROLOGIC RH(D): CPT

## 2019-01-01 PROCEDURE — 4040F PNEUMOC VAC/ADMIN/RCVD: CPT | Performed by: DERMATOLOGY

## 2019-01-01 PROCEDURE — 85610 PROTHROMBIN TIME: CPT

## 2019-01-01 PROCEDURE — 99231 SBSQ HOSP IP/OBS SF/LOW 25: CPT | Performed by: FAMILY MEDICINE

## 2019-01-01 PROCEDURE — 86900 BLOOD TYPING SEROLOGIC ABO: CPT

## 2019-01-01 PROCEDURE — 99213 OFFICE O/P EST LOW 20 MIN: CPT | Performed by: INTERNAL MEDICINE

## 2019-01-01 PROCEDURE — 2720000010 HC SURG SUPPLY STERILE

## 2019-01-01 PROCEDURE — 94761 N-INVAS EAR/PLS OXIMETRY MLT: CPT

## 2019-01-01 PROCEDURE — 93005 ELECTROCARDIOGRAM TRACING: CPT | Performed by: NURSE PRACTITIONER

## 2019-01-01 PROCEDURE — 81001 URINALYSIS AUTO W/SCOPE: CPT

## 2019-01-01 PROCEDURE — 2500000003 HC RX 250 WO HCPCS: Performed by: STUDENT IN AN ORGANIZED HEALTH CARE EDUCATION/TRAINING PROGRAM

## 2019-01-01 PROCEDURE — 1090F PRES/ABSN URINE INCON ASSESS: CPT | Performed by: DERMATOLOGY

## 2019-01-01 PROCEDURE — 85018 HEMOGLOBIN: CPT

## 2019-01-01 PROCEDURE — 33241 REMOVE PULSE GENERATOR: CPT | Performed by: INTERNAL MEDICINE

## 2019-01-01 PROCEDURE — 96361 HYDRATE IV INFUSION ADD-ON: CPT

## 2019-01-01 PROCEDURE — 99495 TRANSJ CARE MGMT MOD F2F 14D: CPT | Performed by: FAMILY MEDICINE

## 2019-01-01 PROCEDURE — 6370000000 HC RX 637 (ALT 250 FOR IP): Performed by: EMERGENCY MEDICINE

## 2019-01-01 PROCEDURE — 6360000004 HC RX CONTRAST MEDICATION: Performed by: EMERGENCY MEDICINE

## 2019-01-01 PROCEDURE — 94660 CPAP INITIATION&MGMT: CPT

## 2019-01-01 PROCEDURE — G8427 DOCREV CUR MEDS BY ELIG CLIN: HCPCS | Performed by: DERMATOLOGY

## 2019-01-01 PROCEDURE — 83036 HEMOGLOBIN GLYCOSYLATED A1C: CPT

## 2019-01-01 PROCEDURE — 99291 CRITICAL CARE FIRST HOUR: CPT | Performed by: INTERNAL MEDICINE

## 2019-01-01 PROCEDURE — 97161 PT EVAL LOW COMPLEX 20 MIN: CPT

## 2019-01-01 PROCEDURE — 94760 N-INVAS EAR/PLS OXIMETRY 1: CPT

## 2019-01-01 PROCEDURE — 6360000004 HC RX CONTRAST MEDICATION: Performed by: STUDENT IN AN ORGANIZED HEALTH CARE EDUCATION/TRAINING PROGRAM

## 2019-01-01 PROCEDURE — 93325 DOPPLER ECHO COLOR FLOW MAPG: CPT

## 2019-01-01 PROCEDURE — 87186 SC STD MICRODIL/AGAR DIL: CPT

## 2019-01-01 PROCEDURE — 2500000003 HC RX 250 WO HCPCS: Performed by: NURSE PRACTITIONER

## 2019-01-01 PROCEDURE — 93271 ECG/MONITORING AND ANALYSIS: CPT

## 2019-01-01 PROCEDURE — 94150 VITAL CAPACITY TEST: CPT

## 2019-01-01 PROCEDURE — 71046 X-RAY EXAM CHEST 2 VIEWS: CPT

## 2019-01-01 PROCEDURE — 93454 CORONARY ARTERY ANGIO S&I: CPT | Performed by: INTERNAL MEDICINE

## 2019-01-01 PROCEDURE — 72100 X-RAY EXAM L-S SPINE 2/3 VWS: CPT

## 2019-01-01 PROCEDURE — 99233 SBSQ HOSP IP/OBS HIGH 50: CPT | Performed by: FAMILY MEDICINE

## 2019-01-01 PROCEDURE — 97166 OT EVAL MOD COMPLEX 45 MIN: CPT

## 2019-01-01 PROCEDURE — 99239 HOSP IP/OBS DSCHRG MGMT >30: CPT | Performed by: PHYSICIAN ASSISTANT

## 2019-01-01 PROCEDURE — 96375 TX/PRO/DX INJ NEW DRUG ADDON: CPT

## 2019-01-01 PROCEDURE — 6370000000 HC RX 637 (ALT 250 FOR IP)

## 2019-01-01 PROCEDURE — 87150 DNA/RNA AMPLIFIED PROBE: CPT

## 2019-01-01 PROCEDURE — 1090F PRES/ABSN URINE INCON ASSESS: CPT | Performed by: NURSE PRACTITIONER

## 2019-01-01 PROCEDURE — 80061 LIPID PANEL: CPT

## 2019-01-01 PROCEDURE — 1036F TOBACCO NON-USER: CPT | Performed by: DERMATOLOGY

## 2019-01-01 PROCEDURE — 87086 URINE CULTURE/COLONY COUNT: CPT

## 2019-01-01 PROCEDURE — 99305 1ST NF CARE MODERATE MDM 35: CPT | Performed by: INTERNAL MEDICINE

## 2019-01-01 PROCEDURE — 1036F TOBACCO NON-USER: CPT | Performed by: FAMILY MEDICINE

## 2019-01-01 PROCEDURE — 33244 REMOVE ELCTRD TRANSVENOUSLY: CPT | Performed by: INTERNAL MEDICINE

## 2019-01-01 PROCEDURE — G8598 ASA/ANTIPLAT THER USED: HCPCS | Performed by: DERMATOLOGY

## 2019-01-01 PROCEDURE — G8484 FLU IMMUNIZE NO ADMIN: HCPCS | Performed by: FAMILY MEDICINE

## 2019-01-01 PROCEDURE — 02PA3MZ REMOVAL OF CARDIAC LEAD FROM HEART, PERCUTANEOUS APPROACH: ICD-10-PCS | Performed by: INTERNAL MEDICINE

## 2019-01-01 PROCEDURE — 85379 FIBRIN DEGRADATION QUANT: CPT

## 2019-01-01 PROCEDURE — 87641 MR-STAPH DNA AMP PROBE: CPT

## 2019-01-01 PROCEDURE — 96376 TX/PRO/DX INJ SAME DRUG ADON: CPT

## 2019-01-01 PROCEDURE — 93312 ECHO TRANSESOPHAGEAL: CPT

## 2019-01-01 PROCEDURE — 99202 OFFICE O/P NEW SF 15 MIN: CPT | Performed by: DERMATOLOGY

## 2019-01-01 PROCEDURE — 99214 OFFICE O/P EST MOD 30 MIN: CPT | Performed by: NURSE PRACTITIONER

## 2019-01-01 PROCEDURE — G8400 PT W/DXA NO RESULTS DOC: HCPCS | Performed by: NURSE PRACTITIONER

## 2019-01-01 PROCEDURE — 99238 HOSP IP/OBS DSCHRG MGMT 30/<: CPT | Performed by: INTERNAL MEDICINE

## 2019-01-01 PROCEDURE — 78472 GATED HEART PLANAR SINGLE: CPT

## 2019-01-01 PROCEDURE — 33249 INSJ/RPLCMT DEFIB W/LEAD(S): CPT | Performed by: INTERNAL MEDICINE

## 2019-01-01 PROCEDURE — C1751 CATH, INF, PER/CENT/MIDLINE: HCPCS

## 2019-01-01 PROCEDURE — 17000 DESTRUCT PREMALG LESION: CPT | Performed by: DERMATOLOGY

## 2019-01-01 PROCEDURE — 99214 OFFICE O/P EST MOD 30 MIN: CPT | Performed by: FAMILY MEDICINE

## 2019-01-01 RX ORDER — BUMETANIDE 1 MG/1
1 TABLET ORAL 2 TIMES DAILY
Qty: 30 TABLET | Refills: 0 | Status: SHIPPED | OUTPATIENT
Start: 2019-01-01 | End: 2019-01-01 | Stop reason: SDUPTHER

## 2019-01-01 RX ORDER — ONDANSETRON 4 MG/1
4 TABLET, ORALLY DISINTEGRATING ORAL EVERY 6 HOURS PRN
Qty: 60 TABLET | Refills: 1 | Status: SHIPPED | OUTPATIENT
Start: 2019-01-01 | End: 2020-01-01 | Stop reason: SDUPTHER

## 2019-01-01 RX ORDER — NICOTINE 21 MG/24HR
1 PATCH, TRANSDERMAL 24 HOURS TRANSDERMAL DAILY PRN
Status: DISCONTINUED | OUTPATIENT
Start: 2019-01-01 | End: 2019-01-01 | Stop reason: HOSPADM

## 2019-01-01 RX ORDER — NICOTINE POLACRILEX 4 MG
15 LOZENGE BUCCAL PRN
Status: DISCONTINUED | OUTPATIENT
Start: 2019-01-01 | End: 2019-01-01 | Stop reason: HOSPADM

## 2019-01-01 RX ORDER — AMIODARONE HYDROCHLORIDE 200 MG/1
200 TABLET ORAL DAILY
Status: DISCONTINUED | OUTPATIENT
Start: 2019-01-01 | End: 2019-01-01

## 2019-01-01 RX ORDER — NITROGLYCERIN 0.4 MG/1
0.4 TABLET SUBLINGUAL EVERY 5 MIN PRN
Status: DISCONTINUED | OUTPATIENT
Start: 2019-01-01 | End: 2019-01-01 | Stop reason: HOSPADM

## 2019-01-01 RX ORDER — LORAZEPAM 0.5 MG/1
TABLET ORAL
Qty: 90 TABLET | Refills: 0 | Status: ON HOLD | OUTPATIENT
Start: 2019-01-01 | End: 2019-01-01 | Stop reason: SDUPTHER

## 2019-01-01 RX ORDER — SPIRONOLACTONE 25 MG/1
25 TABLET ORAL DAILY
Status: DISCONTINUED | OUTPATIENT
Start: 2019-01-01 | End: 2019-01-01 | Stop reason: HOSPADM

## 2019-01-01 RX ORDER — VITAMIN B COMPLEX
1 CAPSULE ORAL EVERY OTHER DAY
Status: DISCONTINUED | OUTPATIENT
Start: 2019-01-01 | End: 2019-01-01

## 2019-01-01 RX ORDER — MIDODRINE HYDROCHLORIDE 5 MG/1
5 TABLET ORAL ONCE
Status: COMPLETED | OUTPATIENT
Start: 2019-01-01 | End: 2019-01-01

## 2019-01-01 RX ORDER — LISINOPRIL 2.5 MG/1
2.5 TABLET ORAL DAILY
Qty: 30 TABLET | Refills: 0 | Status: ON HOLD
Start: 2019-01-01 | End: 2020-01-01 | Stop reason: HOSPADM

## 2019-01-01 RX ORDER — GLIMEPIRIDE 2 MG/1
2 TABLET ORAL 2 TIMES DAILY
Qty: 60 TABLET | Refills: 0 | Status: SHIPPED | OUTPATIENT
Start: 2019-01-01 | End: 2020-01-01

## 2019-01-01 RX ORDER — DEXTROSE MONOHYDRATE 25 G/50ML
12.5 INJECTION, SOLUTION INTRAVENOUS PRN
Status: DISCONTINUED | OUTPATIENT
Start: 2019-01-01 | End: 2019-01-01 | Stop reason: HOSPADM

## 2019-01-01 RX ORDER — ASPIRIN 81 MG/1
81 TABLET, CHEWABLE ORAL DAILY
Status: DISCONTINUED | OUTPATIENT
Start: 2019-01-01 | End: 2019-01-01 | Stop reason: HOSPADM

## 2019-01-01 RX ORDER — ALBUTEROL SULFATE 2.5 MG/3ML
2.5 SOLUTION RESPIRATORY (INHALATION) EVERY 6 HOURS PRN
Status: DISCONTINUED | OUTPATIENT
Start: 2019-01-01 | End: 2019-01-01

## 2019-01-01 RX ORDER — AMIODARONE HYDROCHLORIDE 200 MG/1
200 TABLET ORAL DAILY
Status: DISCONTINUED | OUTPATIENT
Start: 2019-01-01 | End: 2019-01-01 | Stop reason: HOSPADM

## 2019-01-01 RX ORDER — POTASSIUM CHLORIDE 20 MEQ/1
20 TABLET, EXTENDED RELEASE ORAL 2 TIMES DAILY WITH MEALS
Qty: 60 TABLET | Refills: 0 | Status: SHIPPED | OUTPATIENT
Start: 2019-01-01 | End: 2019-01-01

## 2019-01-01 RX ORDER — PREDNISONE 20 MG/1
20 TABLET ORAL 2 TIMES DAILY
Qty: 10 TABLET | Refills: 0 | Status: SHIPPED | OUTPATIENT
Start: 2019-01-01 | End: 2019-01-01

## 2019-01-01 RX ORDER — AMIODARONE HYDROCHLORIDE 200 MG/1
200 TABLET ORAL DAILY
Qty: 30 TABLET | Refills: 3 | Status: SHIPPED | OUTPATIENT
Start: 2019-01-01 | End: 2019-01-01

## 2019-01-01 RX ORDER — AMIODARONE HYDROCHLORIDE 200 MG/1
200 TABLET ORAL 2 TIMES DAILY
Status: DISCONTINUED | OUTPATIENT
Start: 2019-01-01 | End: 2019-01-01

## 2019-01-01 RX ORDER — LORAZEPAM 0.5 MG/1
0.5 TABLET ORAL EVERY 8 HOURS PRN
Qty: 30 TABLET | Refills: 0
Start: 2019-01-01 | End: 2020-01-01

## 2019-01-01 RX ORDER — LORAZEPAM 0.5 MG/1
0.5 TABLET ORAL EVERY 4 HOURS PRN
Status: DISCONTINUED | OUTPATIENT
Start: 2019-01-01 | End: 2019-01-01 | Stop reason: HOSPADM

## 2019-01-01 RX ORDER — SODIUM CHLORIDE 0.9 % (FLUSH) 0.9 %
10 SYRINGE (ML) INJECTION PRN
Status: DISCONTINUED | OUTPATIENT
Start: 2019-01-01 | End: 2019-01-01 | Stop reason: SDUPTHER

## 2019-01-01 RX ORDER — 0.9 % SODIUM CHLORIDE 0.9 %
500 INTRAVENOUS SOLUTION INTRAVENOUS ONCE
Status: COMPLETED | OUTPATIENT
Start: 2019-01-01 | End: 2019-01-01

## 2019-01-01 RX ORDER — CEPHALEXIN 500 MG/1
500 CAPSULE ORAL 3 TIMES DAILY
Qty: 21 CAPSULE | Refills: 0 | Status: SHIPPED | OUTPATIENT
Start: 2019-01-01 | End: 2020-01-01

## 2019-01-01 RX ORDER — WARFARIN SODIUM 5 MG/1
5 TABLET ORAL
Status: COMPLETED | OUTPATIENT
Start: 2019-01-01 | End: 2019-01-01

## 2019-01-01 RX ORDER — HEPARIN SODIUM 5000 [USP'U]/ML
5000 INJECTION, SOLUTION INTRAVENOUS; SUBCUTANEOUS EVERY 8 HOURS SCHEDULED
Qty: 30 ML | Refills: 0
Start: 2019-01-01 | End: 2019-01-01

## 2019-01-01 RX ORDER — LORATADINE 10 MG/1
10 TABLET ORAL DAILY
Qty: 30 TABLET | Refills: 2 | COMMUNITY
Start: 2019-01-01 | End: 2019-01-01 | Stop reason: DRUGHIGH

## 2019-01-01 RX ORDER — LIDOCAINE HYDROCHLORIDE AND EPINEPHRINE 10; 10 MG/ML; UG/ML
0.5 INJECTION, SOLUTION INFILTRATION; PERINEURAL ONCE
Status: COMPLETED | OUTPATIENT
Start: 2019-01-01 | End: 2019-01-01

## 2019-01-01 RX ORDER — ONDANSETRON 2 MG/ML
4 INJECTION INTRAMUSCULAR; INTRAVENOUS EVERY 6 HOURS PRN
Status: DISCONTINUED | OUTPATIENT
Start: 2019-01-01 | End: 2019-01-01 | Stop reason: HOSPADM

## 2019-01-01 RX ORDER — METOPROLOL TARTRATE 50 MG/1
50 TABLET, FILM COATED ORAL 2 TIMES DAILY
Status: DISCONTINUED | OUTPATIENT
Start: 2019-01-01 | End: 2019-01-01 | Stop reason: HOSPADM

## 2019-01-01 RX ORDER — SODIUM CHLORIDE 0.9 % (FLUSH) 0.9 %
10 SYRINGE (ML) INJECTION EVERY 12 HOURS SCHEDULED
Status: DISCONTINUED | OUTPATIENT
Start: 2019-01-01 | End: 2019-01-01 | Stop reason: SDUPTHER

## 2019-01-01 RX ORDER — SUCRALFATE 1 G/1
1 TABLET ORAL 4 TIMES DAILY
Status: DISCONTINUED | OUTPATIENT
Start: 2019-01-01 | End: 2019-01-01 | Stop reason: HOSPADM

## 2019-01-01 RX ORDER — CHOLECALCIFEROL (VITAMIN D3) 10 MCG
1 TABLET ORAL EVERY OTHER DAY
Status: DISCONTINUED | OUTPATIENT
Start: 2019-01-01 | End: 2019-01-01 | Stop reason: HOSPADM

## 2019-01-01 RX ORDER — ONDANSETRON 2 MG/ML
4 INJECTION INTRAMUSCULAR; INTRAVENOUS ONCE
Status: COMPLETED | OUTPATIENT
Start: 2019-01-01 | End: 2019-01-01

## 2019-01-01 RX ORDER — FAMOTIDINE 20 MG/1
20 TABLET, FILM COATED ORAL EVERY EVENING
Status: DISCONTINUED | OUTPATIENT
Start: 2019-01-01 | End: 2019-01-01 | Stop reason: HOSPADM

## 2019-01-01 RX ORDER — ALBUTEROL SULFATE 2.5 MG/3ML
2.5 SOLUTION RESPIRATORY (INHALATION)
Status: DISCONTINUED | OUTPATIENT
Start: 2019-01-01 | End: 2019-01-01

## 2019-01-01 RX ORDER — GLIMEPIRIDE 1 MG/1
TABLET ORAL
Qty: 90 TABLET | Refills: 4 | Status: ON HOLD | OUTPATIENT
Start: 2019-01-01 | End: 2019-01-01 | Stop reason: HOSPADM

## 2019-01-01 RX ORDER — METRONIDAZOLE 7.5 MG/G
GEL TOPICAL 2 TIMES DAILY
Status: DISCONTINUED | OUTPATIENT
Start: 2019-01-01 | End: 2019-01-01 | Stop reason: HOSPADM

## 2019-01-01 RX ORDER — AMIODARONE HYDROCHLORIDE 200 MG/1
200 TABLET ORAL DAILY
Qty: 30 TABLET | Refills: 0 | Status: SHIPPED | OUTPATIENT
Start: 2019-01-01 | End: 2020-01-01

## 2019-01-01 RX ORDER — SODIUM CHLORIDE 9 MG/ML
INJECTION, SOLUTION INTRAVENOUS CONTINUOUS
Status: DISCONTINUED | OUTPATIENT
Start: 2019-01-01 | End: 2019-01-01

## 2019-01-01 RX ORDER — BUMETANIDE 0.25 MG/ML
2 INJECTION, SOLUTION INTRAMUSCULAR; INTRAVENOUS ONCE
Status: COMPLETED | OUTPATIENT
Start: 2019-01-01 | End: 2019-01-01

## 2019-01-01 RX ORDER — ASPIRIN 81 MG/1
81 TABLET, CHEWABLE ORAL DAILY
Status: DISCONTINUED | OUTPATIENT
Start: 2019-01-01 | End: 2019-01-01

## 2019-01-01 RX ORDER — LISINOPRIL 5 MG/1
5 TABLET ORAL DAILY
Qty: 30 TABLET | Refills: 0 | Status: SHIPPED | OUTPATIENT
Start: 2019-01-01 | End: 2019-01-01 | Stop reason: SDUPTHER

## 2019-01-01 RX ORDER — POTASSIUM CHLORIDE 20 MEQ/1
40 TABLET, EXTENDED RELEASE ORAL PRN
Status: DISCONTINUED | OUTPATIENT
Start: 2019-01-01 | End: 2019-01-01

## 2019-01-01 RX ORDER — VANCOMYCIN HYDROCHLORIDE 1 G/200ML
1000 INJECTION, SOLUTION INTRAVENOUS ONCE
Status: COMPLETED | OUTPATIENT
Start: 2019-01-01 | End: 2019-01-01

## 2019-01-01 RX ORDER — DEXTROSE MONOHYDRATE 50 MG/ML
100 INJECTION, SOLUTION INTRAVENOUS PRN
Status: DISCONTINUED | OUTPATIENT
Start: 2019-01-01 | End: 2019-01-01 | Stop reason: HOSPADM

## 2019-01-01 RX ORDER — AMIODARONE HYDROCHLORIDE 200 MG/1
200 TABLET ORAL 2 TIMES DAILY
Qty: 26 TABLET | Refills: 0 | Status: SHIPPED | OUTPATIENT
Start: 2019-01-01 | End: 2019-01-01 | Stop reason: HOSPADM

## 2019-01-01 RX ORDER — SUCRALFATE 1 G/1
1 TABLET ORAL 4 TIMES DAILY
Qty: 120 TABLET | Refills: 3 | Status: SHIPPED | OUTPATIENT
Start: 2019-01-01 | End: 2019-01-01 | Stop reason: SDUPTHER

## 2019-01-01 RX ORDER — GLIMEPIRIDE 1 MG/1
1 TABLET ORAL
Status: DISCONTINUED | OUTPATIENT
Start: 2019-01-01 | End: 2019-01-01

## 2019-01-01 RX ORDER — 0.9 % SODIUM CHLORIDE 0.9 %
250 INTRAVENOUS SOLUTION INTRAVENOUS ONCE
Status: DISCONTINUED | OUTPATIENT
Start: 2019-01-01 | End: 2019-01-01 | Stop reason: HOSPADM

## 2019-01-01 RX ORDER — SODIUM CHLORIDE 9 MG/ML
INJECTION, SOLUTION INTRAVENOUS CONTINUOUS
Status: DISCONTINUED | OUTPATIENT
Start: 2019-01-01 | End: 2019-01-01 | Stop reason: HOSPADM

## 2019-01-01 RX ORDER — LORAZEPAM 0.5 MG/1
0.5 TABLET ORAL EVERY 6 HOURS PRN
Status: DISCONTINUED | OUTPATIENT
Start: 2019-01-01 | End: 2019-01-01 | Stop reason: HOSPADM

## 2019-01-01 RX ORDER — SUCRALFATE 1 G/1
1 TABLET ORAL 4 TIMES DAILY
Qty: 120 TABLET | Refills: 0 | Status: SHIPPED | OUTPATIENT
Start: 2019-01-01

## 2019-01-01 RX ORDER — DIAPER,BRIEF,INFANT-TODD,DISP
EACH MISCELLANEOUS 2 TIMES DAILY
Status: DISCONTINUED | OUTPATIENT
Start: 2019-01-01 | End: 2019-01-01 | Stop reason: HOSPADM

## 2019-01-01 RX ORDER — ONDANSETRON 4 MG/1
4 TABLET, ORALLY DISINTEGRATING ORAL EVERY 6 HOURS PRN
Status: DISCONTINUED | OUTPATIENT
Start: 2019-01-01 | End: 2019-01-01 | Stop reason: HOSPADM

## 2019-01-01 RX ORDER — WARFARIN SODIUM 2.5 MG/1
5 TABLET ORAL DAILY
Qty: 30 TABLET | Refills: 0
Start: 2019-01-01 | End: 2019-01-01 | Stop reason: SDUPTHER

## 2019-01-01 RX ORDER — HEPARIN SODIUM 1000 [USP'U]/ML
4000 INJECTION, SOLUTION INTRAVENOUS; SUBCUTANEOUS PRN
Status: DISCONTINUED | OUTPATIENT
Start: 2019-01-01 | End: 2019-01-01

## 2019-01-01 RX ORDER — SODIUM CHLORIDE 0.9 % (FLUSH) 0.9 %
10 SYRINGE (ML) INJECTION EVERY 12 HOURS SCHEDULED
Status: DISCONTINUED | OUTPATIENT
Start: 2019-01-01 | End: 2019-01-01 | Stop reason: HOSPADM

## 2019-01-01 RX ORDER — ONDANSETRON 4 MG/1
4 TABLET, ORALLY DISINTEGRATING ORAL ONCE
Status: COMPLETED | OUTPATIENT
Start: 2019-01-01 | End: 2019-01-01

## 2019-01-01 RX ORDER — BUMETANIDE 1 MG/1
1 TABLET ORAL 2 TIMES DAILY
Status: DISCONTINUED | OUTPATIENT
Start: 2019-01-01 | End: 2019-01-01 | Stop reason: HOSPADM

## 2019-01-01 RX ORDER — AMIODARONE HYDROCHLORIDE 400 MG/1
400 TABLET ORAL 2 TIMES DAILY
Qty: 6 TABLET | Refills: 0 | Status: SHIPPED | OUTPATIENT
Start: 2019-01-01 | End: 2019-01-01 | Stop reason: ALTCHOICE

## 2019-01-01 RX ORDER — AMIODARONE HYDROCHLORIDE 200 MG/1
200 TABLET ORAL DAILY
Qty: 30 TABLET | Refills: 0
Start: 2019-01-01 | End: 2019-01-01 | Stop reason: SDUPTHER

## 2019-01-01 RX ORDER — METOPROLOL SUCCINATE 25 MG/1
25 TABLET, EXTENDED RELEASE ORAL DAILY
Status: DISCONTINUED | OUTPATIENT
Start: 2019-01-01 | End: 2019-01-01

## 2019-01-01 RX ORDER — METOPROLOL SUCCINATE 50 MG/1
50 TABLET, EXTENDED RELEASE ORAL DAILY
Status: DISCONTINUED | OUTPATIENT
Start: 2019-01-01 | End: 2019-01-01

## 2019-01-01 RX ORDER — 0.9 % SODIUM CHLORIDE 0.9 %
1000 INTRAVENOUS SOLUTION INTRAVENOUS ONCE
Status: DISCONTINUED | OUTPATIENT
Start: 2019-01-01 | End: 2019-01-01

## 2019-01-01 RX ORDER — BUMETANIDE 1 MG/1
1 TABLET ORAL 2 TIMES DAILY
Qty: 60 TABLET | Refills: 0 | Status: SHIPPED | OUTPATIENT
Start: 2019-01-01 | End: 2020-01-01 | Stop reason: SDUPTHER

## 2019-01-01 RX ORDER — PROCHLORPERAZINE MALEATE 10 MG
10 TABLET ORAL EVERY 6 HOURS PRN
Status: DISCONTINUED | OUTPATIENT
Start: 2019-01-01 | End: 2019-01-01 | Stop reason: HOSPADM

## 2019-01-01 RX ORDER — FAMOTIDINE 20 MG/1
10 TABLET, FILM COATED ORAL EVERY EVENING
Status: DISCONTINUED | OUTPATIENT
Start: 2019-01-01 | End: 2019-01-01 | Stop reason: HOSPADM

## 2019-01-01 RX ORDER — HEPARIN SODIUM 10000 [USP'U]/100ML
12 INJECTION, SOLUTION INTRAVENOUS CONTINUOUS
Status: DISCONTINUED | OUTPATIENT
Start: 2019-01-01 | End: 2019-01-01

## 2019-01-01 RX ORDER — ACETAMINOPHEN 325 MG/1
650 TABLET ORAL EVERY 4 HOURS PRN
Status: DISCONTINUED | OUTPATIENT
Start: 2019-01-01 | End: 2019-01-01 | Stop reason: HOSPADM

## 2019-01-01 RX ORDER — AMIODARONE HYDROCHLORIDE 200 MG/1
200 TABLET ORAL 2 TIMES DAILY
Qty: 28 TABLET | Refills: 0 | Status: SHIPPED | OUTPATIENT
Start: 2019-01-01 | End: 2019-01-01

## 2019-01-01 RX ORDER — VANCOMYCIN HYDROCHLORIDE 1 G/200ML
1000 INJECTION, SOLUTION INTRAVENOUS
Status: DISCONTINUED | OUTPATIENT
Start: 2019-01-01 | End: 2019-01-01

## 2019-01-01 RX ORDER — CLINDAMYCIN PHOSPHATE 10 UG/ML
LOTION TOPICAL
Qty: 60 G | Refills: 2 | Status: SHIPPED | OUTPATIENT
Start: 2019-01-01 | End: 2019-01-01

## 2019-01-01 RX ORDER — SODIUM CHLORIDE 0.9 % (FLUSH) 0.9 %
10 SYRINGE (ML) INJECTION PRN
Status: DISCONTINUED | OUTPATIENT
Start: 2019-01-01 | End: 2019-01-01 | Stop reason: HOSPADM

## 2019-01-01 RX ORDER — LISINOPRIL 5 MG/1
5 TABLET ORAL DAILY
Status: DISCONTINUED | OUTPATIENT
Start: 2019-01-01 | End: 2019-01-01

## 2019-01-01 RX ORDER — FUROSEMIDE 10 MG/ML
40 INJECTION INTRAMUSCULAR; INTRAVENOUS ONCE
Status: COMPLETED | OUTPATIENT
Start: 2019-01-01 | End: 2019-01-01

## 2019-01-01 RX ORDER — VITAMIN B COMPLEX
1 CAPSULE ORAL EVERY OTHER DAY
Status: DISCONTINUED | OUTPATIENT
Start: 2019-01-01 | End: 2019-01-01 | Stop reason: HOSPADM

## 2019-01-01 RX ORDER — GLIMEPIRIDE 2 MG/1
2 TABLET ORAL
Qty: 30 TABLET | Refills: 2 | Status: SHIPPED | OUTPATIENT
Start: 2019-01-01 | End: 2019-01-01 | Stop reason: SDUPTHER

## 2019-01-01 RX ORDER — GLIMEPIRIDE 2 MG/1
2 TABLET ORAL
Qty: 30 TABLET | Refills: 0 | Status: ON HOLD | OUTPATIENT
Start: 2019-01-01 | End: 2019-01-01 | Stop reason: SDUPTHER

## 2019-01-01 RX ORDER — LORAZEPAM 0.5 MG/1
0.5 TABLET ORAL EVERY 8 HOURS PRN
Qty: 30 TABLET | Refills: 0 | Status: SHIPPED | OUTPATIENT
Start: 2019-01-01 | End: 2019-01-01 | Stop reason: SDUPTHER

## 2019-01-01 RX ORDER — AMIODARONE HYDROCHLORIDE 200 MG/1
200 TABLET ORAL 2 TIMES DAILY
Qty: 30 TABLET | Refills: 3 | Status: ON HOLD | OUTPATIENT
Start: 2019-01-01 | End: 2019-01-01 | Stop reason: SDUPTHER

## 2019-01-01 RX ORDER — ACETAMINOPHEN 650 MG
TABLET, EXTENDED RELEASE ORAL PRN
Status: DISCONTINUED | OUTPATIENT
Start: 2019-01-01 | End: 2019-01-01 | Stop reason: HOSPADM

## 2019-01-01 RX ORDER — HEPARIN SODIUM 5000 [USP'U]/ML
5000 INJECTION, SOLUTION INTRAVENOUS; SUBCUTANEOUS EVERY 8 HOURS SCHEDULED
Status: DISCONTINUED | OUTPATIENT
Start: 2019-01-01 | End: 2019-01-01

## 2019-01-01 RX ORDER — WARFARIN SODIUM 2.5 MG/1
2.5 TABLET ORAL
Status: DISCONTINUED | OUTPATIENT
Start: 2019-01-01 | End: 2019-01-01 | Stop reason: HOSPADM

## 2019-01-01 RX ORDER — MULTIVITAMIN WITH IRON
100 TABLET ORAL EVERY OTHER DAY
COMMUNITY
End: 2020-01-01

## 2019-01-01 RX ORDER — SPIRONOLACTONE 25 MG/1
25 TABLET ORAL DAILY
Qty: 30 TABLET | Refills: 0 | Status: SHIPPED | OUTPATIENT
Start: 2019-01-01 | End: 2019-01-01 | Stop reason: SDUPTHER

## 2019-01-01 RX ORDER — LISINOPRIL 2.5 MG/1
2.5 TABLET ORAL DAILY
Status: DISCONTINUED | OUTPATIENT
Start: 2019-01-01 | End: 2019-01-01

## 2019-01-01 RX ORDER — LISINOPRIL 2.5 MG/1
2.5 TABLET ORAL DAILY
COMMUNITY
End: 2019-01-01 | Stop reason: SDUPTHER

## 2019-01-01 RX ORDER — METOPROLOL SUCCINATE 50 MG/1
50 TABLET, EXTENDED RELEASE ORAL DAILY
Qty: 30 TABLET | Refills: 3 | Status: SHIPPED | OUTPATIENT
Start: 2019-01-01 | End: 2019-01-01 | Stop reason: HOSPADM

## 2019-01-01 RX ORDER — FUROSEMIDE 20 MG/1
20 TABLET ORAL DAILY
Status: DISCONTINUED | OUTPATIENT
Start: 2019-01-01 | End: 2019-01-01 | Stop reason: HOSPADM

## 2019-01-01 RX ORDER — POTASSIUM CHLORIDE 20 MEQ/1
40 TABLET, EXTENDED RELEASE ORAL ONCE
Status: DISCONTINUED | OUTPATIENT
Start: 2019-01-01 | End: 2019-01-01

## 2019-01-01 RX ORDER — GLIMEPIRIDE 2 MG/1
2 TABLET ORAL
Qty: 30 TABLET | Refills: 5 | Status: SHIPPED | OUTPATIENT
Start: 2019-01-01 | End: 2019-01-01 | Stop reason: SDUPTHER

## 2019-01-01 RX ORDER — VANCOMYCIN HYDROCHLORIDE 1 G/200ML
15 INJECTION, SOLUTION INTRAVENOUS EVERY 12 HOURS
Status: DISCONTINUED | OUTPATIENT
Start: 2019-01-01 | End: 2019-01-01

## 2019-01-01 RX ORDER — AMIODARONE HYDROCHLORIDE 200 MG/1
400 TABLET ORAL 2 TIMES DAILY
Status: DISCONTINUED | OUTPATIENT
Start: 2019-01-01 | End: 2019-01-01 | Stop reason: HOSPADM

## 2019-01-01 RX ORDER — ASPIRIN 81 MG/1
81 TABLET ORAL DAILY
Status: DISCONTINUED | OUTPATIENT
Start: 2019-01-01 | End: 2019-01-01 | Stop reason: HOSPADM

## 2019-01-01 RX ORDER — AMIODARONE HYDROCHLORIDE 200 MG/1
200 TABLET ORAL DAILY
Qty: 30 TABLET | Refills: 3 | Status: SHIPPED | OUTPATIENT
Start: 2019-01-01 | End: 2019-01-01 | Stop reason: HOSPADM

## 2019-01-01 RX ORDER — AMIODARONE HYDROCHLORIDE 200 MG/1
200 TABLET ORAL 2 TIMES DAILY
Status: DISCONTINUED | OUTPATIENT
Start: 2019-01-01 | End: 2019-01-01 | Stop reason: HOSPADM

## 2019-01-01 RX ORDER — POTASSIUM CHLORIDE 20 MEQ/1
40 TABLET, EXTENDED RELEASE ORAL ONCE
Status: COMPLETED | OUTPATIENT
Start: 2019-01-01 | End: 2019-01-01

## 2019-01-01 RX ORDER — LISINOPRIL 5 MG/1
5 TABLET ORAL DAILY
Status: DISCONTINUED | OUTPATIENT
Start: 2019-01-01 | End: 2019-01-01 | Stop reason: HOSPADM

## 2019-01-01 RX ORDER — LORAZEPAM 0.5 MG/1
0.5 TABLET ORAL EVERY 8 HOURS PRN
Status: DISCONTINUED | OUTPATIENT
Start: 2019-01-01 | End: 2019-01-01 | Stop reason: HOSPADM

## 2019-01-01 RX ORDER — POTASSIUM CHLORIDE 20 MEQ/1
20 TABLET, EXTENDED RELEASE ORAL 2 TIMES DAILY WITH MEALS
Qty: 60 TABLET | Refills: 1
Start: 2019-01-01 | End: 2019-01-01 | Stop reason: ALTCHOICE

## 2019-01-01 RX ORDER — LISINOPRIL 2.5 MG/1
TABLET ORAL
Qty: 90 TABLET | Refills: 3 | Status: ON HOLD | OUTPATIENT
Start: 2019-01-01 | End: 2019-01-01 | Stop reason: HOSPADM

## 2019-01-01 RX ORDER — AMIODARONE HYDROCHLORIDE 200 MG/1
200 TABLET ORAL 2 TIMES DAILY
Qty: 30 TABLET | Refills: 3 | Status: SHIPPED | OUTPATIENT
Start: 2019-01-01 | End: 2019-01-01

## 2019-01-01 RX ORDER — POTASSIUM CHLORIDE 7.45 MG/ML
10 INJECTION INTRAVENOUS PRN
Status: DISCONTINUED | OUTPATIENT
Start: 2019-01-01 | End: 2019-01-01

## 2019-01-01 RX ORDER — NICOTINE 21 MG/24HR
1 PATCH, TRANSDERMAL 24 HOURS TRANSDERMAL DAILY PRN
Status: DISCONTINUED | OUTPATIENT
Start: 2019-01-01 | End: 2019-01-01

## 2019-01-01 RX ORDER — WARFARIN SODIUM 2 MG/1
2 TABLET ORAL DAILY
Qty: 30 TABLET | Refills: 0 | Status: SHIPPED | OUTPATIENT
Start: 2019-01-01 | End: 2019-01-01 | Stop reason: ALTCHOICE

## 2019-01-01 RX ORDER — PROCHLORPERAZINE MALEATE 10 MG
10 TABLET ORAL EVERY 6 HOURS PRN
COMMUNITY
End: 2019-01-01

## 2019-01-01 RX ORDER — BUMETANIDE 0.25 MG/ML
INJECTION, SOLUTION INTRAMUSCULAR; INTRAVENOUS
Status: COMPLETED
Start: 2019-01-01 | End: 2019-01-01

## 2019-01-01 RX ORDER — TRIAMCINOLONE ACETONIDE 1 MG/G
CREAM TOPICAL 2 TIMES DAILY
Status: DISCONTINUED | OUTPATIENT
Start: 2019-01-01 | End: 2019-01-01 | Stop reason: HOSPADM

## 2019-01-01 RX ORDER — 0.9 % SODIUM CHLORIDE 0.9 %
250 INTRAVENOUS SOLUTION INTRAVENOUS ONCE
Status: COMPLETED | OUTPATIENT
Start: 2019-01-01 | End: 2019-01-01

## 2019-01-01 RX ORDER — MAGNESIUM SULFATE 1 G/100ML
1 INJECTION INTRAVENOUS ONCE
Status: COMPLETED | OUTPATIENT
Start: 2019-01-01 | End: 2019-01-01

## 2019-01-01 RX ORDER — FUROSEMIDE 10 MG/ML
40 INJECTION INTRAMUSCULAR; INTRAVENOUS 2 TIMES DAILY
Status: DISCONTINUED | OUTPATIENT
Start: 2019-01-01 | End: 2019-01-01

## 2019-01-01 RX ORDER — GLIMEPIRIDE 2 MG/1
2 TABLET ORAL
Status: DISCONTINUED | OUTPATIENT
Start: 2019-01-01 | End: 2019-01-01 | Stop reason: HOSPADM

## 2019-01-01 RX ORDER — CETIRIZINE HYDROCHLORIDE 5 MG/1
10 TABLET ORAL DAILY
Status: DISCONTINUED | OUTPATIENT
Start: 2019-01-01 | End: 2019-01-01 | Stop reason: HOSPADM

## 2019-01-01 RX ORDER — CETIRIZINE HYDROCHLORIDE 5 MG/1
5 TABLET ORAL DAILY
Status: DISCONTINUED | OUTPATIENT
Start: 2019-01-01 | End: 2019-01-01 | Stop reason: HOSPADM

## 2019-01-01 RX ORDER — CETIRIZINE HYDROCHLORIDE 10 MG/1
5 TABLET ORAL DAILY
Status: DISCONTINUED | OUTPATIENT
Start: 2019-01-01 | End: 2019-01-01 | Stop reason: HOSPADM

## 2019-01-01 RX ORDER — METOPROLOL SUCCINATE 25 MG/1
25 TABLET, EXTENDED RELEASE ORAL DAILY
Qty: 30 TABLET | Refills: 3 | Status: SHIPPED | OUTPATIENT
Start: 2019-01-01 | End: 2019-01-01

## 2019-01-01 RX ORDER — WARFARIN SODIUM 2 MG/1
2 TABLET ORAL
COMMUNITY
End: 2019-01-01 | Stop reason: ALTCHOICE

## 2019-01-01 RX ORDER — METOPROLOL SUCCINATE 25 MG/1
50 TABLET, EXTENDED RELEASE ORAL DAILY
Qty: 30 TABLET | Refills: 3 | Status: SHIPPED | OUTPATIENT
Start: 2019-01-01 | End: 2019-01-01 | Stop reason: HOSPADM

## 2019-01-01 RX ORDER — HEPARIN SODIUM 5000 [USP'U]/ML
5000 INJECTION, SOLUTION INTRAVENOUS; SUBCUTANEOUS EVERY 8 HOURS SCHEDULED
Status: DISCONTINUED | OUTPATIENT
Start: 2019-01-01 | End: 2019-01-01 | Stop reason: HOSPADM

## 2019-01-01 RX ORDER — SPIRONOLACTONE 25 MG/1
25 TABLET ORAL DAILY
Qty: 30 TABLET | Refills: 0 | Status: ON HOLD | OUTPATIENT
Start: 2019-01-01 | End: 2020-01-01 | Stop reason: HOSPADM

## 2019-01-01 RX ORDER — GLIMEPIRIDE 1 MG/1
1 TABLET ORAL ONCE
Status: COMPLETED | OUTPATIENT
Start: 2019-01-01 | End: 2019-01-01

## 2019-01-01 RX ORDER — LISINOPRIL 5 MG/1
5 TABLET ORAL DAILY
Qty: 30 TABLET | Refills: 3 | Status: SHIPPED | OUTPATIENT
Start: 2019-01-01 | End: 2019-01-01 | Stop reason: ALTCHOICE

## 2019-01-01 RX ORDER — POTASSIUM CHLORIDE 7.45 MG/ML
10 INJECTION INTRAVENOUS PRN
Status: DISCONTINUED | OUTPATIENT
Start: 2019-01-01 | End: 2019-01-01 | Stop reason: HOSPADM

## 2019-01-01 RX ORDER — POTASSIUM CHLORIDE 20 MEQ/1
40 TABLET, EXTENDED RELEASE ORAL EVERY 6 HOURS
Status: DISCONTINUED | OUTPATIENT
Start: 2019-01-01 | End: 2019-01-01

## 2019-01-01 RX ORDER — SODIUM CHLORIDE FOR INHALATION 0.9 %
3 VIAL, NEBULIZER (ML) INHALATION
Status: DISCONTINUED | OUTPATIENT
Start: 2019-01-01 | End: 2019-01-01 | Stop reason: HOSPADM

## 2019-01-01 RX ORDER — HEPARIN SODIUM 1000 [USP'U]/ML
2000 INJECTION, SOLUTION INTRAVENOUS; SUBCUTANEOUS PRN
Status: DISCONTINUED | OUTPATIENT
Start: 2019-01-01 | End: 2019-01-01

## 2019-01-01 RX ORDER — METRONIDAZOLE 7.5 MG/G
GEL TOPICAL
Qty: 45 G | Refills: 1 | Status: SHIPPED | OUTPATIENT
Start: 2019-01-01 | End: 2019-01-01

## 2019-01-01 RX ORDER — AMIODARONE HYDROCHLORIDE 200 MG/1
200 TABLET ORAL DAILY
Qty: 30 TABLET | Refills: 1 | Status: SHIPPED | OUTPATIENT
Start: 2019-01-01 | End: 2019-01-01 | Stop reason: SDUPTHER

## 2019-01-01 RX ORDER — AMIODARONE HYDROCHLORIDE 400 MG/1
400 TABLET ORAL 2 TIMES DAILY
Qty: 30 TABLET | Refills: 3 | Status: SHIPPED | OUTPATIENT
Start: 2019-01-01 | End: 2019-01-01

## 2019-01-01 RX ORDER — ASPIRIN 81 MG/1
81 TABLET, CHEWABLE ORAL DAILY
Qty: 30 TABLET | Refills: 3 | Status: ON HOLD | OUTPATIENT
Start: 2019-01-01 | End: 2020-01-01 | Stop reason: HOSPADM

## 2019-01-01 RX ORDER — METOPROLOL TARTRATE 5 MG/5ML
5 INJECTION INTRAVENOUS EVERY 6 HOURS PRN
Status: DISCONTINUED | OUTPATIENT
Start: 2019-01-01 | End: 2019-01-01 | Stop reason: HOSPADM

## 2019-01-01 RX ORDER — INSULIN GLARGINE 100 [IU]/ML
15 INJECTION, SOLUTION SUBCUTANEOUS NIGHTLY
Status: DISCONTINUED | OUTPATIENT
Start: 2019-01-01 | End: 2019-01-01 | Stop reason: HOSPADM

## 2019-01-01 RX ORDER — FUROSEMIDE 20 MG/1
20 TABLET ORAL DAILY
Status: DISCONTINUED | OUTPATIENT
Start: 2019-01-01 | End: 2019-01-01

## 2019-01-01 RX ORDER — GLIMEPIRIDE 1 MG/1
TABLET ORAL
Qty: 90 TABLET | Refills: 4 | OUTPATIENT
Start: 2019-01-01

## 2019-01-01 RX ORDER — PROMETHAZINE HYDROCHLORIDE 25 MG/ML
12.5 INJECTION, SOLUTION INTRAMUSCULAR; INTRAVENOUS EVERY 6 HOURS PRN
Status: DISCONTINUED | OUTPATIENT
Start: 2019-01-01 | End: 2019-01-01 | Stop reason: HOSPADM

## 2019-01-01 RX ORDER — METOPROLOL TARTRATE 50 MG/1
50 TABLET, FILM COATED ORAL 2 TIMES DAILY
Qty: 60 TABLET | Refills: 3 | Status: SHIPPED | OUTPATIENT
Start: 2019-01-01 | End: 2019-01-01 | Stop reason: SDUPTHER

## 2019-01-01 RX ORDER — MAGNESIUM SULFATE 1 G/100ML
1 INJECTION INTRAVENOUS
Status: COMPLETED | OUTPATIENT
Start: 2019-01-01 | End: 2019-01-01

## 2019-01-01 RX ORDER — HEPARIN SODIUM 1000 [USP'U]/ML
4000 INJECTION, SOLUTION INTRAVENOUS; SUBCUTANEOUS ONCE
Status: COMPLETED | OUTPATIENT
Start: 2019-01-01 | End: 2019-01-01

## 2019-01-01 RX ORDER — FLUTICASONE PROPIONATE 50 MCG
1 SPRAY, SUSPENSION (ML) NASAL 2 TIMES DAILY
Qty: 2 BOTTLE | Refills: 1 | Status: SHIPPED | OUTPATIENT
Start: 2019-01-01 | End: 2019-01-01

## 2019-01-01 RX ORDER — DIPHENHYDRAMINE HYDROCHLORIDE 50 MG/ML
12.5 INJECTION INTRAMUSCULAR; INTRAVENOUS EVERY 6 HOURS PRN
Status: DISCONTINUED | OUTPATIENT
Start: 2019-01-01 | End: 2019-01-01 | Stop reason: HOSPADM

## 2019-01-01 RX ORDER — POTASSIUM CHLORIDE 20 MEQ/1
40 TABLET, EXTENDED RELEASE ORAL EVERY 4 HOURS
Status: DISCONTINUED | OUTPATIENT
Start: 2019-01-01 | End: 2019-01-01

## 2019-01-01 RX ORDER — LORATADINE 10 MG/1
10 TABLET ORAL DAILY PRN
Qty: 30 TABLET | Refills: 0 | Status: ON HOLD | OUTPATIENT
Start: 2019-01-01 | End: 2020-01-01 | Stop reason: HOSPADM

## 2019-01-01 RX ORDER — FOLIC ACID/VIT B COMPLEX AND C 5 MG
1 TABLET ORAL EVERY OTHER DAY
Status: DISCONTINUED | OUTPATIENT
Start: 2019-01-01 | End: 2019-01-01 | Stop reason: HOSPADM

## 2019-01-01 RX ORDER — GLIMEPIRIDE 2 MG/1
1 TABLET ORAL
Status: DISCONTINUED | OUTPATIENT
Start: 2019-01-01 | End: 2019-01-01 | Stop reason: HOSPADM

## 2019-01-01 RX ORDER — METOPROLOL TARTRATE 50 MG/1
50 TABLET, FILM COATED ORAL 2 TIMES DAILY
Qty: 60 TABLET | Refills: 0 | Status: SHIPPED | OUTPATIENT
Start: 2019-01-01 | End: 2019-01-01

## 2019-01-01 RX ORDER — POTASSIUM CHLORIDE 20 MEQ/1
20 TABLET, EXTENDED RELEASE ORAL 2 TIMES DAILY WITH MEALS
Status: DISCONTINUED | OUTPATIENT
Start: 2019-01-01 | End: 2019-01-01

## 2019-01-01 RX ORDER — METOPROLOL TARTRATE 5 MG/5ML
5 INJECTION INTRAVENOUS ONCE
Status: COMPLETED | OUTPATIENT
Start: 2019-01-01 | End: 2019-01-01

## 2019-01-01 RX ORDER — WARFARIN SODIUM 3 MG/1
3 TABLET ORAL DAILY
Qty: 30 TABLET | Refills: 0 | Status: SHIPPED | OUTPATIENT
Start: 2019-01-01 | End: 2019-01-01 | Stop reason: ALTCHOICE

## 2019-01-01 RX ORDER — ALBUTEROL SULFATE 2.5 MG/3ML
2.5 SOLUTION RESPIRATORY (INHALATION)
Status: DISCONTINUED | OUTPATIENT
Start: 2019-01-01 | End: 2019-01-01 | Stop reason: HOSPADM

## 2019-01-01 RX ORDER — VITAMIN C
1 TAB ORAL EVERY OTHER DAY
Status: DISCONTINUED | OUTPATIENT
Start: 2019-01-01 | End: 2019-01-01 | Stop reason: HOSPADM

## 2019-01-01 RX ORDER — BUMETANIDE 0.25 MG/ML
1 INJECTION, SOLUTION INTRAMUSCULAR; INTRAVENOUS 2 TIMES DAILY
Status: DISCONTINUED | OUTPATIENT
Start: 2019-01-01 | End: 2019-01-01

## 2019-01-01 RX ORDER — LORATADINE 10 MG/1
10 TABLET ORAL DAILY PRN
COMMUNITY
End: 2019-01-01 | Stop reason: SDUPTHER

## 2019-01-01 RX ORDER — AMOXICILLIN AND CLAVULANATE POTASSIUM 500; 125 MG/1; MG/1
1 TABLET, FILM COATED ORAL EVERY 8 HOURS SCHEDULED
Status: DISCONTINUED | OUTPATIENT
Start: 2019-01-01 | End: 2019-01-01

## 2019-01-01 RX ORDER — CLINDAMYCIN PHOSPHATE 600 MG/50ML
600 INJECTION INTRAVENOUS EVERY 8 HOURS
Status: DISCONTINUED | OUTPATIENT
Start: 2019-01-01 | End: 2019-01-01 | Stop reason: HOSPADM

## 2019-01-01 RX ORDER — DIAPER,BRIEF,INFANT-TODD,DISP
EACH MISCELLANEOUS
Qty: 1 TUBE | Refills: 0
Start: 2019-01-01 | End: 2019-01-01

## 2019-01-01 RX ORDER — METOPROLOL TARTRATE 50 MG/1
50 TABLET, FILM COATED ORAL 2 TIMES DAILY
Status: DISCONTINUED | OUTPATIENT
Start: 2019-01-01 | End: 2019-01-01

## 2019-01-01 RX ORDER — POTASSIUM CHLORIDE 20 MEQ/1
40 TABLET, EXTENDED RELEASE ORAL PRN
Status: DISCONTINUED | OUTPATIENT
Start: 2019-01-01 | End: 2019-01-01 | Stop reason: HOSPADM

## 2019-01-01 RX ORDER — METOCLOPRAMIDE HYDROCHLORIDE 5 MG/ML
10 INJECTION INTRAMUSCULAR; INTRAVENOUS EVERY 6 HOURS
Status: DISCONTINUED | OUTPATIENT
Start: 2019-01-01 | End: 2019-01-01 | Stop reason: HOSPADM

## 2019-01-01 RX ORDER — CEFAZOLIN SODIUM 1 G/3ML
1 INJECTION, POWDER, FOR SOLUTION INTRAMUSCULAR; INTRAVENOUS EVERY 12 HOURS
Qty: 62000 MG | Refills: 0 | Status: SHIPPED | OUTPATIENT
Start: 2019-01-01 | End: 2019-01-01

## 2019-01-01 RX ORDER — AMIODARONE HYDROCHLORIDE 200 MG/1
200 TABLET ORAL ONCE
Status: COMPLETED | OUTPATIENT
Start: 2019-01-01 | End: 2019-01-01

## 2019-01-01 RX ORDER — CLOPIDOGREL BISULFATE 75 MG/1
75 TABLET ORAL DAILY
Status: DISCONTINUED | OUTPATIENT
Start: 2019-01-01 | End: 2019-01-01

## 2019-01-01 RX ORDER — ALBUTEROL SULFATE 2.5 MG/3ML
2.5 SOLUTION RESPIRATORY (INHALATION) 3 TIMES DAILY
Status: DISCONTINUED | OUTPATIENT
Start: 2019-01-01 | End: 2019-01-01

## 2019-01-01 RX ORDER — ACETAMINOPHEN 500 MG
1000 TABLET ORAL ONCE
Status: COMPLETED | OUTPATIENT
Start: 2019-01-01 | End: 2019-01-01

## 2019-01-01 RX ORDER — ACETAMINOPHEN 325 MG/1
650 TABLET ORAL EVERY 6 HOURS PRN
Status: DISCONTINUED | OUTPATIENT
Start: 2019-01-01 | End: 2019-01-01

## 2019-01-01 RX ORDER — FUROSEMIDE 10 MG/ML
40 INJECTION INTRAMUSCULAR; INTRAVENOUS ONCE
Status: DISCONTINUED | OUTPATIENT
Start: 2019-01-01 | End: 2019-01-01

## 2019-01-01 RX ORDER — BUMETANIDE 1 MG/1
1 TABLET ORAL 2 TIMES DAILY
Qty: 60 TABLET | Refills: 1 | Status: SHIPPED | OUTPATIENT
Start: 2019-01-01 | End: 2019-01-01 | Stop reason: SDUPTHER

## 2019-01-01 RX ORDER — POTASSIUM CHLORIDE 1500 MG/1
TABLET, FILM COATED, EXTENDED RELEASE ORAL
Qty: 60 TABLET | Refills: 11 | Status: SHIPPED | OUTPATIENT
Start: 2019-01-01

## 2019-01-01 RX ORDER — FUROSEMIDE 10 MG/ML
20 INJECTION INTRAMUSCULAR; INTRAVENOUS ONCE
Status: COMPLETED | OUTPATIENT
Start: 2019-01-01 | End: 2019-01-01

## 2019-01-01 RX ADMIN — POTASSIUM BICARBONATE 40 MEQ: 782 TABLET, EFFERVESCENT ORAL at 12:15

## 2019-01-01 RX ADMIN — CEFTAROLINE FOSAMIL 300 MG: 600 POWDER, FOR SOLUTION INTRAVENOUS at 20:01

## 2019-01-01 RX ADMIN — GLIMEPIRIDE 2 MG: 2 TABLET ORAL at 08:42

## 2019-01-01 RX ADMIN — POTASSIUM CHLORIDE 40 MEQ: 1500 TABLET, EXTENDED RELEASE ORAL at 13:17

## 2019-01-01 RX ADMIN — GLIMEPIRIDE 1 MG: 1 TABLET ORAL at 14:45

## 2019-01-01 RX ADMIN — SUCRALFATE 1 G: 1 TABLET ORAL at 10:04

## 2019-01-01 RX ADMIN — Medication 10 ML: at 20:35

## 2019-01-01 RX ADMIN — SUCRALFATE 1 G: 1 TABLET ORAL at 08:22

## 2019-01-01 RX ADMIN — LIDOCAINE HYDROCHLORIDE AND EPINEPHRINE 0.5 ML: 10; 10 INJECTION, SOLUTION INFILTRATION; PERINEURAL at 15:03

## 2019-01-01 RX ADMIN — LISINOPRIL 2.5 MG: 2.5 TABLET ORAL at 09:29

## 2019-01-01 RX ADMIN — HEPARIN SODIUM 5000 UNITS: 5000 INJECTION INTRAVENOUS; SUBCUTANEOUS at 20:20

## 2019-01-01 RX ADMIN — GLIMEPIRIDE 1 MG: 2 TABLET ORAL at 09:09

## 2019-01-01 RX ADMIN — INSULIN LISPRO 4 UNITS: 100 INJECTION, SOLUTION INTRAVENOUS; SUBCUTANEOUS at 16:56

## 2019-01-01 RX ADMIN — CEFTAROLINE FOSAMIL 400 MG: 400 POWDER, FOR SOLUTION INTRAVENOUS at 20:30

## 2019-01-01 RX ADMIN — VITAMIN D, TAB 1000IU (100/BT) 1000 UNITS: 25 TAB at 09:09

## 2019-01-01 RX ADMIN — HEPARIN SODIUM 4000 UNITS: 1000 INJECTION, SOLUTION INTRAVENOUS; SUBCUTANEOUS at 10:32

## 2019-01-01 RX ADMIN — SERTRALINE 50 MG: 50 TABLET, FILM COATED ORAL at 08:19

## 2019-01-01 RX ADMIN — BUMETANIDE 1 MG: 1 TABLET ORAL at 16:40

## 2019-01-01 RX ADMIN — AMOXICILLIN AND CLAVULANATE POTASSIUM 1 TABLET: 500; 125 TABLET, FILM COATED ORAL at 15:24

## 2019-01-01 RX ADMIN — POTASSIUM CHLORIDE 10 MEQ: 7.46 INJECTION, SOLUTION INTRAVENOUS at 04:58

## 2019-01-01 RX ADMIN — CETIRIZINE HYDROCHLORIDE 5 MG: 10 TABLET ORAL at 08:02

## 2019-01-01 RX ADMIN — INSULIN LISPRO 4 UNITS: 100 INJECTION, SOLUTION INTRAVENOUS; SUBCUTANEOUS at 12:39

## 2019-01-01 RX ADMIN — LORAZEPAM 0.5 MG: 0.5 TABLET ORAL at 21:10

## 2019-01-01 RX ADMIN — SODIUM CHLORIDE, PRESERVATIVE FREE 10 ML: 5 INJECTION INTRAVENOUS at 21:26

## 2019-01-01 RX ADMIN — FUROSEMIDE 20 MG: 20 TABLET ORAL at 09:10

## 2019-01-01 RX ADMIN — POTASSIUM CHLORIDE 40 MEQ: 20 TABLET, EXTENDED RELEASE ORAL at 08:52

## 2019-01-01 RX ADMIN — BUMETANIDE 1 MG: 1 TABLET ORAL at 17:27

## 2019-01-01 RX ADMIN — VITAMIN C 1 TABLET: TAB at 08:32

## 2019-01-01 RX ADMIN — INSULIN GLARGINE 15 UNITS: 100 INJECTION, SOLUTION SUBCUTANEOUS at 20:45

## 2019-01-01 RX ADMIN — BUMETANIDE 1 MG: 1 TABLET ORAL at 18:49

## 2019-01-01 RX ADMIN — SPIRONOLACTONE 25 MG: 25 TABLET ORAL at 08:32

## 2019-01-01 RX ADMIN — BUMETANIDE 1 MG: 0.25 INJECTION INTRAMUSCULAR; INTRAVENOUS at 20:55

## 2019-01-01 RX ADMIN — ASPIRIN 81 MG: 81 TABLET, CHEWABLE ORAL at 09:08

## 2019-01-01 RX ADMIN — ASPIRIN 81 MG: 81 TABLET, CHEWABLE ORAL at 08:32

## 2019-01-01 RX ADMIN — HEPARIN SODIUM AND DEXTROSE 12 UNITS/KG/HR: 10000; 5 INJECTION INTRAVENOUS at 10:28

## 2019-01-01 RX ADMIN — SUCRALFATE 1 G: 1 TABLET ORAL at 13:37

## 2019-01-01 RX ADMIN — SUCRALFATE 1 G: 1 TABLET ORAL at 21:03

## 2019-01-01 RX ADMIN — AMOXICILLIN AND CLAVULANATE POTASSIUM 1 TABLET: 500; 125 TABLET, FILM COATED ORAL at 06:24

## 2019-01-01 RX ADMIN — VITAMIN C 1 TABLET: TAB at 12:16

## 2019-01-01 RX ADMIN — METOPROLOL TARTRATE 50 MG: 50 TABLET ORAL at 21:22

## 2019-01-01 RX ADMIN — INSULIN GLARGINE 15 UNITS: 100 INJECTION, SOLUTION SUBCUTANEOUS at 23:20

## 2019-01-01 RX ADMIN — SUCRALFATE 1 G: 1 TABLET ORAL at 20:31

## 2019-01-01 RX ADMIN — LISINOPRIL 5 MG: 5 TABLET ORAL at 08:23

## 2019-01-01 RX ADMIN — INSULIN LISPRO 1 UNITS: 100 INJECTION, SOLUTION INTRAVENOUS; SUBCUTANEOUS at 21:23

## 2019-01-01 RX ADMIN — METOPROLOL TARTRATE 25 MG: 25 TABLET ORAL at 10:04

## 2019-01-01 RX ADMIN — SUCRALFATE 1 G: 1 TABLET ORAL at 08:42

## 2019-01-01 RX ADMIN — ASPIRIN 81 MG: 81 TABLET, CHEWABLE ORAL at 08:03

## 2019-01-01 RX ADMIN — INSULIN GLARGINE 15 UNITS: 100 INJECTION, SOLUTION SUBCUTANEOUS at 20:33

## 2019-01-01 RX ADMIN — CEFTAROLINE FOSAMIL 300 MG: 600 POWDER, FOR SOLUTION INTRAVENOUS at 09:00

## 2019-01-01 RX ADMIN — DEXTROSE MONOHYDRATE 1 G: 5 INJECTION INTRAVENOUS at 22:24

## 2019-01-01 RX ADMIN — INSULIN LISPRO 2 UNITS: 100 INJECTION, SOLUTION INTRAVENOUS; SUBCUTANEOUS at 11:59

## 2019-01-01 RX ADMIN — CETIRIZINE HYDROCHLORIDE 5 MG: 10 TABLET ORAL at 08:22

## 2019-01-01 RX ADMIN — AMIODARONE HYDROCHLORIDE 200 MG: 200 TABLET ORAL at 11:55

## 2019-01-01 RX ADMIN — SUCRALFATE 1 G: 1 TABLET ORAL at 20:20

## 2019-01-01 RX ADMIN — MAGNESIUM SULFATE HEPTAHYDRATE 1 G: 1 INJECTION, SOLUTION INTRAVENOUS at 16:57

## 2019-01-01 RX ADMIN — FUROSEMIDE 20 MG: 10 INJECTION, SOLUTION INTRAMUSCULAR; INTRAVENOUS at 11:47

## 2019-01-01 RX ADMIN — SODIUM CHLORIDE: 9 INJECTION, SOLUTION INTRAVENOUS at 21:50

## 2019-01-01 RX ADMIN — METOPROLOL TARTRATE 50 MG: 50 TABLET, FILM COATED ORAL at 20:34

## 2019-01-01 RX ADMIN — HYDROCORTISONE: 1 CREAM TOPICAL at 17:41

## 2019-01-01 RX ADMIN — POTASSIUM CHLORIDE 40 MEQ: 20 TABLET, EXTENDED RELEASE ORAL at 10:30

## 2019-01-01 RX ADMIN — SUCRALFATE 1 G: 1 TABLET ORAL at 14:56

## 2019-01-01 RX ADMIN — INSULIN LISPRO 4 UNITS: 100 INJECTION, SOLUTION INTRAVENOUS; SUBCUTANEOUS at 16:57

## 2019-01-01 RX ADMIN — SUCRALFATE 1 G: 1 TABLET ORAL at 13:19

## 2019-01-01 RX ADMIN — SERTRALINE 50 MG: 50 TABLET, FILM COATED ORAL at 09:09

## 2019-01-01 RX ADMIN — FAMOTIDINE 20 MG: 20 TABLET, FILM COATED ORAL at 17:14

## 2019-01-01 RX ADMIN — LORAZEPAM 0.5 MG: 0.5 TABLET ORAL at 22:20

## 2019-01-01 RX ADMIN — SODIUM CHLORIDE: 9 INJECTION, SOLUTION INTRAVENOUS at 10:30

## 2019-01-01 RX ADMIN — METOCLOPRAMIDE 10 MG: 5 INJECTION, SOLUTION INTRAMUSCULAR; INTRAVENOUS at 12:14

## 2019-01-01 RX ADMIN — HEPARIN SODIUM 2000 UNITS: 1000 INJECTION, SOLUTION INTRAVENOUS; SUBCUTANEOUS at 21:59

## 2019-01-01 RX ADMIN — Medication 10 ML: at 09:51

## 2019-01-01 RX ADMIN — INSULIN LISPRO 2 UNITS: 100 INJECTION, SOLUTION INTRAVENOUS; SUBCUTANEOUS at 14:16

## 2019-01-01 RX ADMIN — DEXTROSE MONOHYDRATE 1 G: 5 INJECTION INTRAVENOUS at 02:44

## 2019-01-01 RX ADMIN — ONDANSETRON 4 MG: 2 INJECTION INTRAMUSCULAR; INTRAVENOUS at 15:33

## 2019-01-01 RX ADMIN — POTASSIUM CHLORIDE 10 MEQ: 7.46 INJECTION, SOLUTION INTRAVENOUS at 05:46

## 2019-01-01 RX ADMIN — PROMETHAZINE HYDROCHLORIDE 12.5 MG: 25 INJECTION INTRAMUSCULAR; INTRAVENOUS at 00:18

## 2019-01-01 RX ADMIN — VANCOMYCIN HYDROCHLORIDE 1000 MG: 1 INJECTION, SOLUTION INTRAVENOUS at 11:45

## 2019-01-01 RX ADMIN — ALBUTEROL SULFATE 2.5 MG: 2.5 SOLUTION RESPIRATORY (INHALATION) at 19:44

## 2019-01-01 RX ADMIN — Medication 10 ML: at 22:38

## 2019-01-01 RX ADMIN — POTASSIUM CHLORIDE 40 MEQ: 1500 TABLET, EXTENDED RELEASE ORAL at 06:42

## 2019-01-01 RX ADMIN — CEFEPIME HYDROCHLORIDE 2 G: 2 INJECTION, POWDER, FOR SOLUTION INTRAVENOUS at 22:45

## 2019-01-01 RX ADMIN — INSULIN LISPRO 2 UNITS: 100 INJECTION, SOLUTION INTRAVENOUS; SUBCUTANEOUS at 20:46

## 2019-01-01 RX ADMIN — Medication 10 ML: at 09:34

## 2019-01-01 RX ADMIN — IOHEXOL 50 ML: 240 INJECTION, SOLUTION INTRATHECAL; INTRAVASCULAR; INTRAVENOUS; ORAL at 15:39

## 2019-01-01 RX ADMIN — Medication 10 ML: at 21:35

## 2019-01-01 RX ADMIN — LORAZEPAM 0.5 MG: 0.5 TABLET ORAL at 21:19

## 2019-01-01 RX ADMIN — METOPROLOL TARTRATE 50 MG: 50 TABLET, FILM COATED ORAL at 22:05

## 2019-01-01 RX ADMIN — INSULIN LISPRO 2 UNITS: 100 INJECTION, SOLUTION INTRAVENOUS; SUBCUTANEOUS at 21:15

## 2019-01-01 RX ADMIN — VANCOMYCIN HYDROCHLORIDE 1000 MG: 1 INJECTION, POWDER, LYOPHILIZED, FOR SOLUTION INTRAVENOUS at 14:02

## 2019-01-01 RX ADMIN — INSULIN LISPRO 2 UNITS: 100 INJECTION, SOLUTION INTRAVENOUS; SUBCUTANEOUS at 09:23

## 2019-01-01 RX ADMIN — DEXTROSE MONOHYDRATE 1 G: 5 INJECTION INTRAVENOUS at 12:27

## 2019-01-01 RX ADMIN — VANCOMYCIN HYDROCHLORIDE 1000 MG: 1 INJECTION, SOLUTION INTRAVENOUS at 09:15

## 2019-01-01 RX ADMIN — POTASSIUM CHLORIDE 10 MEQ: 7.46 INJECTION, SOLUTION INTRAVENOUS at 07:32

## 2019-01-01 RX ADMIN — POTASSIUM CHLORIDE 10 MEQ: 7.46 INJECTION, SOLUTION INTRAVENOUS at 08:30

## 2019-01-01 RX ADMIN — BUMETANIDE 1 MG: 0.25 INJECTION INTRAMUSCULAR; INTRAVENOUS at 08:22

## 2019-01-01 RX ADMIN — SODIUM CHLORIDE: 9 INJECTION, SOLUTION INTRAVENOUS at 09:03

## 2019-01-01 RX ADMIN — METOCLOPRAMIDE 10 MG: 5 INJECTION, SOLUTION INTRAMUSCULAR; INTRAVENOUS at 16:40

## 2019-01-01 RX ADMIN — CETIRIZINE HYDROCHLORIDE 5 MG: 10 TABLET ORAL at 08:32

## 2019-01-01 RX ADMIN — SUCRALFATE 1 G: 1 TABLET ORAL at 10:21

## 2019-01-01 RX ADMIN — POTASSIUM CHLORIDE 10 MEQ: 7.46 INJECTION, SOLUTION INTRAVENOUS at 06:40

## 2019-01-01 RX ADMIN — ONDANSETRON 4 MG: 2 INJECTION INTRAMUSCULAR; INTRAVENOUS at 17:31

## 2019-01-01 RX ADMIN — AMOXICILLIN AND CLAVULANATE POTASSIUM 1 TABLET: 500; 125 TABLET, FILM COATED ORAL at 13:19

## 2019-01-01 RX ADMIN — SPIRONOLACTONE 25 MG: 25 TABLET ORAL at 09:28

## 2019-01-01 RX ADMIN — FAMOTIDINE 20 MG: 20 TABLET, FILM COATED ORAL at 16:40

## 2019-01-01 RX ADMIN — METOPROLOL SUCCINATE 50 MG: 50 TABLET, EXTENDED RELEASE ORAL at 09:08

## 2019-01-01 RX ADMIN — SUCRALFATE 1 G: 1 TABLET ORAL at 09:06

## 2019-01-01 RX ADMIN — AMIODARONE HYDROCHLORIDE 200 MG: 200 TABLET ORAL at 09:51

## 2019-01-01 RX ADMIN — SERTRALINE HYDROCHLORIDE 50 MG: 50 TABLET ORAL at 08:53

## 2019-01-01 RX ADMIN — RIFAMPIN 600 MG: 300 CAPSULE ORAL at 08:02

## 2019-01-01 RX ADMIN — HEPARIN SODIUM AND DEXTROSE 14.07 UNITS/KG/HR: 10000; 5 INJECTION INTRAVENOUS at 05:23

## 2019-01-01 RX ADMIN — MIDODRINE HYDROCHLORIDE 5 MG: 5 TABLET ORAL at 02:45

## 2019-01-01 RX ADMIN — RIFAMPIN 600 MG: 300 CAPSULE ORAL at 09:28

## 2019-01-01 RX ADMIN — SUCRALFATE 1 G: 1 TABLET ORAL at 08:52

## 2019-01-01 RX ADMIN — SERTRALINE HYDROCHLORIDE 50 MG: 50 TABLET ORAL at 01:00

## 2019-01-01 RX ADMIN — AMIODARONE HYDROCHLORIDE 200 MG: 200 TABLET ORAL at 12:18

## 2019-01-01 RX ADMIN — BUMETANIDE 1 MG: 0.25 INJECTION INTRAMUSCULAR; INTRAVENOUS at 20:51

## 2019-01-01 RX ADMIN — SERTRALINE 50 MG: 50 TABLET, FILM COATED ORAL at 09:24

## 2019-01-01 RX ADMIN — RIFAMPIN 600 MG: 300 CAPSULE ORAL at 08:21

## 2019-01-01 RX ADMIN — FAMOTIDINE 20 MG: 20 TABLET, FILM COATED ORAL at 18:16

## 2019-01-01 RX ADMIN — ACETAMINOPHEN 1000 MG: 500 TABLET, FILM COATED ORAL at 18:11

## 2019-01-01 RX ADMIN — AMIODARONE HYDROCHLORIDE 200 MG: 200 TABLET ORAL at 09:24

## 2019-01-01 RX ADMIN — Medication 10 ML: at 08:10

## 2019-01-01 RX ADMIN — SUCRALFATE 1 G: 1 TABLET ORAL at 22:37

## 2019-01-01 RX ADMIN — INSULIN LISPRO 2 UNITS: 100 INJECTION, SOLUTION INTRAVENOUS; SUBCUTANEOUS at 17:07

## 2019-01-01 RX ADMIN — POTASSIUM CHLORIDE 40 MEQ: 20 TABLET, EXTENDED RELEASE ORAL at 09:27

## 2019-01-01 RX ADMIN — RIVAROXABAN 15 MG: 15 TABLET, FILM COATED ORAL at 17:06

## 2019-01-01 RX ADMIN — INSULIN LISPRO 2 UNITS: 100 INJECTION, SOLUTION INTRAVENOUS; SUBCUTANEOUS at 17:24

## 2019-01-01 RX ADMIN — MIDODRINE HYDROCHLORIDE 5 MG: 5 TABLET ORAL at 09:24

## 2019-01-01 RX ADMIN — HEPARIN SODIUM 5000 UNITS: 5000 INJECTION INTRAVENOUS; SUBCUTANEOUS at 13:22

## 2019-01-01 RX ADMIN — RIVAROXABAN 15 MG: 15 TABLET, FILM COATED ORAL at 17:24

## 2019-01-01 RX ADMIN — INSULIN LISPRO 1 UNITS: 100 INJECTION, SOLUTION INTRAVENOUS; SUBCUTANEOUS at 12:46

## 2019-01-01 RX ADMIN — ASPIRIN 81 MG: 81 TABLET, CHEWABLE ORAL at 09:51

## 2019-01-01 RX ADMIN — SUCRALFATE 1 G: 1 TABLET ORAL at 16:31

## 2019-01-01 RX ADMIN — SUCRALFATE 1 G: 1 TABLET ORAL at 10:23

## 2019-01-01 RX ADMIN — AMIODARONE HYDROCHLORIDE 200 MG: 200 TABLET ORAL at 21:34

## 2019-01-01 RX ADMIN — SERTRALINE 50 MG: 50 TABLET, FILM COATED ORAL at 09:29

## 2019-01-01 RX ADMIN — AMOXICILLIN AND CLAVULANATE POTASSIUM 1 TABLET: 500; 125 TABLET, FILM COATED ORAL at 20:49

## 2019-01-01 RX ADMIN — SODIUM CHLORIDE: 9 INJECTION, SOLUTION INTRAVENOUS at 01:10

## 2019-01-01 RX ADMIN — METOCLOPRAMIDE 10 MG: 5 INJECTION, SOLUTION INTRAMUSCULAR; INTRAVENOUS at 20:30

## 2019-01-01 RX ADMIN — METOPROLOL TARTRATE 5 MG: 5 INJECTION INTRAVENOUS at 17:58

## 2019-01-01 RX ADMIN — AMIODARONE HYDROCHLORIDE 200 MG: 200 TABLET ORAL at 08:32

## 2019-01-01 RX ADMIN — DEXTROSE MONOHYDRATE 1 G: 5 INJECTION INTRAVENOUS at 20:46

## 2019-01-01 RX ADMIN — METOPROLOL TARTRATE 50 MG: 50 TABLET ORAL at 08:21

## 2019-01-01 RX ADMIN — HEPARIN SODIUM 5000 UNITS: 5000 INJECTION INTRAVENOUS; SUBCUTANEOUS at 14:22

## 2019-01-01 RX ADMIN — ASPIRIN 81 MG: 81 TABLET, CHEWABLE ORAL at 10:04

## 2019-01-01 RX ADMIN — INSULIN LISPRO 1 UNITS: 100 INJECTION, SOLUTION INTRAVENOUS; SUBCUTANEOUS at 20:53

## 2019-01-01 RX ADMIN — ASPIRIN 81 MG: 81 TABLET, CHEWABLE ORAL at 11:55

## 2019-01-01 RX ADMIN — BUMETANIDE 1 MG: 1 TABLET ORAL at 12:17

## 2019-01-01 RX ADMIN — AMIODARONE HYDROCHLORIDE 200 MG: 200 TABLET ORAL at 09:03

## 2019-01-01 RX ADMIN — INSULIN LISPRO 4 UNITS: 100 INJECTION, SOLUTION INTRAVENOUS; SUBCUTANEOUS at 13:45

## 2019-01-01 RX ADMIN — INSULIN LISPRO 1 UNITS: 100 INJECTION, SOLUTION INTRAVENOUS; SUBCUTANEOUS at 12:35

## 2019-01-01 RX ADMIN — Medication 10 ML: at 08:15

## 2019-01-01 RX ADMIN — SPIRONOLACTONE 25 MG: 25 TABLET ORAL at 10:18

## 2019-01-01 RX ADMIN — AMIODARONE HYDROCHLORIDE 200 MG: 200 TABLET ORAL at 10:03

## 2019-01-01 RX ADMIN — Medication 10 ML: at 21:20

## 2019-01-01 RX ADMIN — FAMOTIDINE 20 MG: 20 TABLET, FILM COATED ORAL at 17:06

## 2019-01-01 RX ADMIN — HEPARIN SODIUM AND DEXTROSE 12 UNITS/KG/HR: 10000; 5 INJECTION INTRAVENOUS at 15:30

## 2019-01-01 RX ADMIN — RIVAROXABAN 15 MG: 15 TABLET, FILM COATED ORAL at 08:42

## 2019-01-01 RX ADMIN — AMIODARONE HYDROCHLORIDE 1 MG/MIN: 50 INJECTION, SOLUTION INTRAVENOUS at 09:18

## 2019-01-01 RX ADMIN — SUCRALFATE 1 G: 1 TABLET ORAL at 12:46

## 2019-01-01 RX ADMIN — INSULIN LISPRO 1 UNITS: 100 INJECTION, SOLUTION INTRAVENOUS; SUBCUTANEOUS at 21:40

## 2019-01-01 RX ADMIN — SODIUM CHLORIDE 250 ML: 9 INJECTION, SOLUTION INTRAVENOUS at 06:23

## 2019-01-01 RX ADMIN — FUROSEMIDE 20 MG: 20 TABLET ORAL at 08:22

## 2019-01-01 RX ADMIN — Medication 10 ML: at 21:29

## 2019-01-01 RX ADMIN — BUMETANIDE 1 MG: 1 TABLET ORAL at 10:03

## 2019-01-01 RX ADMIN — DEXTROSE MONOHYDRATE 1 G: 5 INJECTION INTRAVENOUS at 12:11

## 2019-01-01 RX ADMIN — INSULIN LISPRO 2 UNITS: 100 INJECTION, SOLUTION INTRAVENOUS; SUBCUTANEOUS at 08:03

## 2019-01-01 RX ADMIN — VITAMIN D, TAB 1000IU (100/BT) 1000 UNITS: 25 TAB at 08:31

## 2019-01-01 RX ADMIN — ASPIRIN 81 MG: 81 TABLET, CHEWABLE ORAL at 20:20

## 2019-01-01 RX ADMIN — RIFAMPIN 600 MG: 300 CAPSULE ORAL at 09:07

## 2019-01-01 RX ADMIN — VITAMIN D, TAB 1000IU (100/BT) 1000 UNITS: 25 TAB at 12:16

## 2019-01-01 RX ADMIN — AMIODARONE HYDROCHLORIDE 0.5 MG/MIN: 150 INJECTION, SOLUTION INTRAVENOUS at 10:31

## 2019-01-01 RX ADMIN — HEPARIN SODIUM 2000 UNITS: 1000 INJECTION, SOLUTION INTRAVENOUS; SUBCUTANEOUS at 12:58

## 2019-01-01 RX ADMIN — POTASSIUM CHLORIDE 40 MEQ: 1500 TABLET, EXTENDED RELEASE ORAL at 17:56

## 2019-01-01 RX ADMIN — METOPROLOL TARTRATE 5 MG: 5 INJECTION, SOLUTION INTRAVENOUS at 22:47

## 2019-01-01 RX ADMIN — CETIRIZINE HYDROCHLORIDE 5 MG: 10 TABLET ORAL at 09:29

## 2019-01-01 RX ADMIN — DEXTROSE MONOHYDRATE 1 G: 5 INJECTION INTRAVENOUS at 10:55

## 2019-01-01 RX ADMIN — ONDANSETRON 4 MG: 2 INJECTION INTRAMUSCULAR; INTRAVENOUS at 18:09

## 2019-01-01 RX ADMIN — VITAMIN D, TAB 1000IU (100/BT) 1000 UNITS: 25 TAB at 09:08

## 2019-01-01 RX ADMIN — METOCLOPRAMIDE 10 MG: 5 INJECTION, SOLUTION INTRAMUSCULAR; INTRAVENOUS at 18:48

## 2019-01-01 RX ADMIN — METOCLOPRAMIDE 10 MG: 5 INJECTION, SOLUTION INTRAMUSCULAR; INTRAVENOUS at 09:30

## 2019-01-01 RX ADMIN — RIVAROXABAN 15 MG: 15 TABLET, FILM COATED ORAL at 08:52

## 2019-01-01 RX ADMIN — VITAMIN D, TAB 1000IU (100/BT) 1000 UNITS: 25 TAB at 08:52

## 2019-01-01 RX ADMIN — VITAMIN D, TAB 1000IU (100/BT) 1000 UNITS: 25 TAB at 08:21

## 2019-01-01 RX ADMIN — AMIODARONE HYDROCHLORIDE 200 MG: 200 TABLET ORAL at 08:42

## 2019-01-01 RX ADMIN — INSULIN LISPRO 1 UNITS: 100 INJECTION, SOLUTION INTRAVENOUS; SUBCUTANEOUS at 23:00

## 2019-01-01 RX ADMIN — METOCLOPRAMIDE 10 MG: 5 INJECTION, SOLUTION INTRAMUSCULAR; INTRAVENOUS at 09:28

## 2019-01-01 RX ADMIN — INSULIN LISPRO 1 UNITS: 100 INJECTION, SOLUTION INTRAVENOUS; SUBCUTANEOUS at 21:20

## 2019-01-01 RX ADMIN — RIVAROXABAN 15 MG: 15 TABLET, FILM COATED ORAL at 17:49

## 2019-01-01 RX ADMIN — METOPROLOL TARTRATE 50 MG: 50 TABLET, FILM COATED ORAL at 08:32

## 2019-01-01 RX ADMIN — METOPROLOL TARTRATE 25 MG: 25 TABLET ORAL at 20:54

## 2019-01-01 RX ADMIN — CLINDAMYCIN PHOSPHATE 600 MG: 600 INJECTION, SOLUTION INTRAVENOUS at 14:02

## 2019-01-01 RX ADMIN — SERTRALINE 50 MG: 50 TABLET, FILM COATED ORAL at 08:21

## 2019-01-01 RX ADMIN — VITAMIN D, TAB 1000IU (100/BT) 1000 UNITS: 25 TAB at 10:05

## 2019-01-01 RX ADMIN — SODIUM CHLORIDE 500 ML: 9 INJECTION, SOLUTION INTRAVENOUS at 17:57

## 2019-01-01 RX ADMIN — BUMETANIDE 2 MG: 0.25 INJECTION INTRAMUSCULAR; INTRAVENOUS at 05:15

## 2019-01-01 RX ADMIN — AMIODARONE HYDROCHLORIDE 150 MG: 150 INJECTION, SOLUTION INTRAVENOUS at 09:04

## 2019-01-01 RX ADMIN — AMIODARONE HYDROCHLORIDE 200 MG: 200 TABLET ORAL at 08:03

## 2019-01-01 RX ADMIN — WARFARIN SODIUM 5 MG: 5 TABLET ORAL at 17:14

## 2019-01-01 RX ADMIN — RIFAMPIN 600 MG: 300 CAPSULE ORAL at 10:04

## 2019-01-01 RX ADMIN — SERTRALINE 50 MG: 50 TABLET, FILM COATED ORAL at 12:15

## 2019-01-01 RX ADMIN — CETIRIZINE HYDROCHLORIDE 5 MG: 10 TABLET ORAL at 09:03

## 2019-01-01 RX ADMIN — METOCLOPRAMIDE 10 MG: 5 INJECTION, SOLUTION INTRAMUSCULAR; INTRAVENOUS at 06:12

## 2019-01-01 RX ADMIN — BUMETANIDE 1 MG: 1 TABLET ORAL at 08:21

## 2019-01-01 RX ADMIN — LORAZEPAM 0.5 MG: 0.5 TABLET ORAL at 21:40

## 2019-01-01 RX ADMIN — METOPROLOL TARTRATE 25 MG: 25 TABLET ORAL at 22:39

## 2019-01-01 RX ADMIN — SPIRONOLACTONE 25 MG: 25 TABLET ORAL at 08:21

## 2019-01-01 RX ADMIN — AMIODARONE HYDROCHLORIDE 200 MG: 200 TABLET ORAL at 09:29

## 2019-01-01 RX ADMIN — IOVERSOL 75 ML: 741 INJECTION INTRA-ARTERIAL; INTRAVENOUS at 07:32

## 2019-01-01 RX ADMIN — INSULIN LISPRO 10 UNITS: 100 INJECTION, SOLUTION INTRAVENOUS; SUBCUTANEOUS at 12:52

## 2019-01-01 RX ADMIN — METOPROLOL TARTRATE 5 MG: 5 INJECTION INTRAVENOUS at 18:28

## 2019-01-01 RX ADMIN — HEPARIN SODIUM 5000 UNITS: 5000 INJECTION INTRAVENOUS; SUBCUTANEOUS at 05:10

## 2019-01-01 RX ADMIN — SUCRALFATE 1 G: 1 TABLET ORAL at 17:32

## 2019-01-01 RX ADMIN — LORAZEPAM 0.5 MG: 0.5 TABLET ORAL at 17:24

## 2019-01-01 RX ADMIN — INSULIN LISPRO 2 UNITS: 100 INJECTION, SOLUTION INTRAVENOUS; SUBCUTANEOUS at 09:17

## 2019-01-01 RX ADMIN — AMIODARONE HYDROCHLORIDE 200 MG: 200 TABLET ORAL at 08:22

## 2019-01-01 RX ADMIN — FAMOTIDINE 20 MG: 20 TABLET, FILM COATED ORAL at 18:58

## 2019-01-01 RX ADMIN — AMOXICILLIN AND CLAVULANATE POTASSIUM 1 TABLET: 500; 125 TABLET, FILM COATED ORAL at 06:12

## 2019-01-01 RX ADMIN — DEXTROSE MONOHYDRATE 1 G: 5 INJECTION INTRAVENOUS at 12:06

## 2019-01-01 RX ADMIN — RIFAMPIN 600 MG: 300 CAPSULE ORAL at 12:16

## 2019-01-01 RX ADMIN — GLIMEPIRIDE 1 MG: 2 TABLET ORAL at 08:18

## 2019-01-01 RX ADMIN — MAGNESIUM SULFATE HEPTAHYDRATE 1 G: 1 INJECTION, SOLUTION INTRAVENOUS at 05:30

## 2019-01-01 RX ADMIN — INSULIN LISPRO 2 UNITS: 100 INJECTION, SOLUTION INTRAVENOUS; SUBCUTANEOUS at 08:41

## 2019-01-01 RX ADMIN — ASPIRIN 81 MG: 81 TABLET, CHEWABLE ORAL at 09:05

## 2019-01-01 RX ADMIN — SPIRONOLACTONE 25 MG: 25 TABLET ORAL at 08:22

## 2019-01-01 RX ADMIN — METOPROLOL TARTRATE 50 MG: 50 TABLET ORAL at 08:52

## 2019-01-01 RX ADMIN — ASPIRIN 650 MG: 325 TABLET, COATED ORAL at 00:27

## 2019-01-01 RX ADMIN — Medication 10 ML: at 22:06

## 2019-01-01 RX ADMIN — BUMETANIDE 1 MG: 0.25 INJECTION INTRAMUSCULAR; INTRAVENOUS at 09:04

## 2019-01-01 RX ADMIN — HYDROCORTISONE: 1 CREAM TOPICAL at 08:21

## 2019-01-01 RX ADMIN — LORAZEPAM 0.5 MG: 0.5 TABLET ORAL at 06:29

## 2019-01-01 RX ADMIN — INSULIN LISPRO 3 UNITS: 100 INJECTION, SOLUTION INTRAVENOUS; SUBCUTANEOUS at 21:56

## 2019-01-01 RX ADMIN — FAMOTIDINE 20 MG: 20 TABLET, FILM COATED ORAL at 20:20

## 2019-01-01 RX ADMIN — INSULIN LISPRO 2 UNITS: 100 INJECTION, SOLUTION INTRAVENOUS; SUBCUTANEOUS at 11:58

## 2019-01-01 RX ADMIN — AMOXICILLIN AND CLAVULANATE POTASSIUM 1 TABLET: 500; 125 TABLET, FILM COATED ORAL at 13:06

## 2019-01-01 RX ADMIN — DEXTROSE MONOHYDRATE 1 G: 5 INJECTION INTRAVENOUS at 10:18

## 2019-01-01 RX ADMIN — SODIUM CHLORIDE, PRESERVATIVE FREE 10 ML: 5 INJECTION INTRAVENOUS at 10:10

## 2019-01-01 RX ADMIN — INSULIN LISPRO 2 UNITS: 100 INJECTION, SOLUTION INTRAVENOUS; SUBCUTANEOUS at 08:32

## 2019-01-01 RX ADMIN — ASPIRIN 81 MG: 81 TABLET, CHEWABLE ORAL at 09:29

## 2019-01-01 RX ADMIN — SPIRONOLACTONE 25 MG: 25 TABLET ORAL at 09:09

## 2019-01-01 RX ADMIN — PIPERACILLIN AND TAZOBACTAM 3.38 G: 3; .375 INJECTION, POWDER, LYOPHILIZED, FOR SOLUTION INTRAVENOUS at 01:43

## 2019-01-01 RX ADMIN — SERTRALINE HYDROCHLORIDE 50 MG: 50 TABLET ORAL at 08:42

## 2019-01-01 RX ADMIN — INSULIN LISPRO 2 UNITS: 100 INJECTION, SOLUTION INTRAVENOUS; SUBCUTANEOUS at 12:19

## 2019-01-01 RX ADMIN — INSULIN LISPRO 6 UNITS: 100 INJECTION, SOLUTION INTRAVENOUS; SUBCUTANEOUS at 12:37

## 2019-01-01 RX ADMIN — METOPROLOL TARTRATE 5 MG: 5 INJECTION, SOLUTION INTRAVENOUS at 08:10

## 2019-01-01 RX ADMIN — Medication 10 ML: at 08:26

## 2019-01-01 RX ADMIN — AMIODARONE HYDROCHLORIDE 200 MG: 200 TABLET ORAL at 08:20

## 2019-01-01 RX ADMIN — DIPHENHYDRAMINE HYDROCHLORIDE 12.5 MG: 50 INJECTION, SOLUTION INTRAMUSCULAR; INTRAVENOUS at 21:59

## 2019-01-01 RX ADMIN — RIVAROXABAN 15 MG: 15 TABLET, FILM COATED ORAL at 08:22

## 2019-01-01 RX ADMIN — CETIRIZINE HYDROCHLORIDE 5 MG: 5 TABLET ORAL at 08:42

## 2019-01-01 RX ADMIN — METOCLOPRAMIDE 10 MG: 5 INJECTION, SOLUTION INTRAMUSCULAR; INTRAVENOUS at 17:27

## 2019-01-01 RX ADMIN — AMOXICILLIN AND CLAVULANATE POTASSIUM 1 TABLET: 500; 125 TABLET, FILM COATED ORAL at 22:32

## 2019-01-01 RX ADMIN — SODIUM CHLORIDE 250 ML: 9 INJECTION, SOLUTION INTRAVENOUS at 06:21

## 2019-01-01 RX ADMIN — POTASSIUM CHLORIDE 40 MEQ: 1500 TABLET, EXTENDED RELEASE ORAL at 17:31

## 2019-01-01 RX ADMIN — POTASSIUM CHLORIDE 40 MEQ: 1500 TABLET, EXTENDED RELEASE ORAL at 20:49

## 2019-01-01 RX ADMIN — BUMETANIDE 1 MG: 1 TABLET ORAL at 17:19

## 2019-01-01 RX ADMIN — Medication 10 ML: at 20:03

## 2019-01-01 RX ADMIN — METOCLOPRAMIDE 10 MG: 5 INJECTION, SOLUTION INTRAMUSCULAR; INTRAVENOUS at 10:05

## 2019-01-01 RX ADMIN — SODIUM CHLORIDE: 9 INJECTION, SOLUTION INTRAVENOUS at 02:45

## 2019-01-01 RX ADMIN — SODIUM CHLORIDE 1000 ML: 9 INJECTION, SOLUTION INTRAVENOUS at 00:31

## 2019-01-01 RX ADMIN — METOPROLOL TARTRATE 25 MG: 25 TABLET ORAL at 20:27

## 2019-01-01 RX ADMIN — VITAMIN C 1 TABLET: TAB at 10:18

## 2019-01-01 RX ADMIN — CETIRIZINE HYDROCHLORIDE 5 MG: 10 TABLET ORAL at 10:05

## 2019-01-01 RX ADMIN — ASPIRIN 81 MG: 81 TABLET, CHEWABLE ORAL at 09:03

## 2019-01-01 RX ADMIN — AMOXICILLIN AND CLAVULANATE POTASSIUM 1 TABLET: 500; 125 TABLET, FILM COATED ORAL at 20:31

## 2019-01-01 RX ADMIN — SPIRONOLACTONE 25 MG: 25 TABLET ORAL at 09:32

## 2019-01-01 RX ADMIN — SERTRALINE 50 MG: 50 TABLET, FILM COATED ORAL at 09:32

## 2019-01-01 RX ADMIN — FAMOTIDINE 20 MG: 20 TABLET ORAL at 21:21

## 2019-01-01 RX ADMIN — SUCRALFATE 1 G: 1 TABLET ORAL at 20:49

## 2019-01-01 RX ADMIN — VITAMIN D, TAB 1000IU (100/BT) 1000 UNITS: 25 TAB at 10:18

## 2019-01-01 RX ADMIN — METOPROLOL TARTRATE 5 MG: 5 INJECTION, SOLUTION INTRAVENOUS at 12:16

## 2019-01-01 RX ADMIN — INSULIN LISPRO 2 UNITS: 100 INJECTION, SOLUTION INTRAVENOUS; SUBCUTANEOUS at 09:33

## 2019-01-01 RX ADMIN — FAMOTIDINE 20 MG: 20 TABLET, FILM COATED ORAL at 17:24

## 2019-01-01 RX ADMIN — AMOXICILLIN AND CLAVULANATE POTASSIUM 1 TABLET: 500; 125 TABLET, FILM COATED ORAL at 20:27

## 2019-01-01 RX ADMIN — INSULIN LISPRO 8 UNITS: 100 INJECTION, SOLUTION INTRAVENOUS; SUBCUTANEOUS at 09:33

## 2019-01-01 RX ADMIN — SERTRALINE 50 MG: 50 TABLET, FILM COATED ORAL at 08:32

## 2019-01-01 RX ADMIN — SUCRALFATE 1 G: 1 TABLET ORAL at 17:31

## 2019-01-01 RX ADMIN — MAGNESIUM SULFATE HEPTAHYDRATE 1 G: 1 INJECTION, SOLUTION INTRAVENOUS at 18:04

## 2019-01-01 RX ADMIN — TRIAMCINOLONE ACETONIDE: 1 CREAM TOPICAL at 21:19

## 2019-01-01 RX ADMIN — METOCLOPRAMIDE 10 MG: 5 INJECTION, SOLUTION INTRAMUSCULAR; INTRAVENOUS at 17:14

## 2019-01-01 RX ADMIN — INSULIN GLARGINE 15 UNITS: 100 INJECTION, SOLUTION SUBCUTANEOUS at 20:52

## 2019-01-01 RX ADMIN — Medication 10 ML: at 08:37

## 2019-01-01 RX ADMIN — INSULIN LISPRO 4 UNITS: 100 INJECTION, SOLUTION INTRAVENOUS; SUBCUTANEOUS at 17:18

## 2019-01-01 RX ADMIN — METOCLOPRAMIDE 10 MG: 5 INJECTION, SOLUTION INTRAMUSCULAR; INTRAVENOUS at 20:55

## 2019-01-01 RX ADMIN — FUROSEMIDE 40 MG: 10 INJECTION, SOLUTION INTRAMUSCULAR; INTRAVENOUS at 06:08

## 2019-01-01 RX ADMIN — SUCRALFATE 1 G: 1 TABLET ORAL at 12:17

## 2019-01-01 RX ADMIN — LISINOPRIL 2.5 MG: 2.5 TABLET ORAL at 13:33

## 2019-01-01 RX ADMIN — HEPARIN SODIUM 5000 UNITS: 5000 INJECTION INTRAVENOUS; SUBCUTANEOUS at 06:24

## 2019-01-01 RX ADMIN — ONDANSETRON 4 MG: 4 TABLET, ORALLY DISINTEGRATING ORAL at 16:39

## 2019-01-01 RX ADMIN — ACETAMINOPHEN 650 MG: 325 TABLET ORAL at 21:27

## 2019-01-01 RX ADMIN — METOPROLOL TARTRATE 50 MG: 50 TABLET, FILM COATED ORAL at 08:21

## 2019-01-01 RX ADMIN — VITAMIN C 1 TABLET: TAB at 09:08

## 2019-01-01 RX ADMIN — Medication 10 ML: at 12:17

## 2019-01-01 RX ADMIN — GLIMEPIRIDE 2 MG: 2 TABLET ORAL at 08:22

## 2019-01-01 RX ADMIN — INSULIN LISPRO 6 UNITS: 100 INJECTION, SOLUTION INTRAVENOUS; SUBCUTANEOUS at 11:54

## 2019-01-01 RX ADMIN — ONDANSETRON 4 MG: 4 TABLET, ORALLY DISINTEGRATING ORAL at 22:04

## 2019-01-01 RX ADMIN — SODIUM CHLORIDE, PRESERVATIVE FREE 10 ML: 5 INJECTION INTRAVENOUS at 21:28

## 2019-01-01 RX ADMIN — INSULIN LISPRO 1 UNITS: 100 INJECTION, SOLUTION INTRAVENOUS; SUBCUTANEOUS at 21:22

## 2019-01-01 RX ADMIN — METOPROLOL TARTRATE 50 MG: 50 TABLET, FILM COATED ORAL at 09:50

## 2019-01-01 RX ADMIN — HEPARIN SODIUM 5000 UNITS: 5000 INJECTION INTRAVENOUS; SUBCUTANEOUS at 20:52

## 2019-01-01 RX ADMIN — SERTRALINE 50 MG: 50 TABLET, FILM COATED ORAL at 08:02

## 2019-01-01 RX ADMIN — METOCLOPRAMIDE 10 MG: 5 INJECTION, SOLUTION INTRAMUSCULAR; INTRAVENOUS at 03:54

## 2019-01-01 RX ADMIN — LISINOPRIL 5 MG: 5 TABLET ORAL at 08:52

## 2019-01-01 RX ADMIN — METOCLOPRAMIDE 10 MG: 5 INJECTION, SOLUTION INTRAMUSCULAR; INTRAVENOUS at 22:32

## 2019-01-01 RX ADMIN — METOPROLOL SUCCINATE 25 MG: 25 TABLET, FILM COATED, EXTENDED RELEASE ORAL at 11:55

## 2019-01-01 RX ADMIN — POTASSIUM CHLORIDE 40 MEQ: 1500 TABLET, EXTENDED RELEASE ORAL at 12:11

## 2019-01-01 RX ADMIN — SODIUM CHLORIDE 50000 UNITS: 9 INJECTION, SOLUTION INTRAVENOUS at 11:45

## 2019-01-01 RX ADMIN — SUCRALFATE 1 G: 1 TABLET ORAL at 08:02

## 2019-01-01 RX ADMIN — AMOXICILLIN AND CLAVULANATE POTASSIUM 1 TABLET: 500; 125 TABLET, FILM COATED ORAL at 14:55

## 2019-01-01 RX ADMIN — DEXTROSE MONOHYDRATE 1 G: 5 INJECTION INTRAVENOUS at 22:35

## 2019-01-01 RX ADMIN — HEPARIN SODIUM 2000 UNITS: 1000 INJECTION, SOLUTION INTRAVENOUS; SUBCUTANEOUS at 06:14

## 2019-01-01 RX ADMIN — ACETAMINOPHEN 650 MG: 325 TABLET ORAL at 00:05

## 2019-01-01 RX ADMIN — Medication 10 ML: at 12:33

## 2019-01-01 RX ADMIN — SPIRONOLACTONE 25 MG: 25 TABLET ORAL at 14:45

## 2019-01-01 RX ADMIN — RIVAROXABAN 15 MG: 15 TABLET, FILM COATED ORAL at 17:16

## 2019-01-01 RX ADMIN — FUROSEMIDE 40 MG: 10 INJECTION, SOLUTION INTRAMUSCULAR; INTRAVENOUS at 17:33

## 2019-01-01 RX ADMIN — SUCRALFATE 1 G: 1 TABLET ORAL at 12:15

## 2019-01-01 RX ADMIN — METOPROLOL TARTRATE 25 MG: 25 TABLET ORAL at 12:16

## 2019-01-01 RX ADMIN — SUCRALFATE 1 G: 1 TABLET ORAL at 22:05

## 2019-01-01 RX ADMIN — METOCLOPRAMIDE 10 MG: 5 INJECTION, SOLUTION INTRAMUSCULAR; INTRAVENOUS at 20:45

## 2019-01-01 RX ADMIN — INSULIN GLARGINE 15 UNITS: 100 INJECTION, SOLUTION SUBCUTANEOUS at 20:21

## 2019-01-01 RX ADMIN — SODIUM CHLORIDE 500 ML: 9 INJECTION, SOLUTION INTRAVENOUS at 18:09

## 2019-01-01 RX ADMIN — BACITRACIN 50000 UNITS: 50000 INJECTION, POWDER, FOR SOLUTION INTRAMUSCULAR at 09:15

## 2019-01-01 RX ADMIN — DEXTROSE MONOHYDRATE 1 G: 5 INJECTION INTRAVENOUS at 21:12

## 2019-01-01 RX ADMIN — INSULIN LISPRO 4 UNITS: 100 INJECTION, SOLUTION INTRAVENOUS; SUBCUTANEOUS at 12:35

## 2019-01-01 RX ADMIN — METOCLOPRAMIDE 10 MG: 5 INJECTION, SOLUTION INTRAMUSCULAR; INTRAVENOUS at 08:23

## 2019-01-01 RX ADMIN — HEPARIN SODIUM 5000 UNITS: 5000 INJECTION INTRAVENOUS; SUBCUTANEOUS at 15:25

## 2019-01-01 RX ADMIN — ALBUTEROL SULFATE 2.5 MG: 2.5 SOLUTION RESPIRATORY (INHALATION) at 07:57

## 2019-01-01 RX ADMIN — BUMETANIDE 1 MG: 1 TABLET ORAL at 09:50

## 2019-01-01 RX ADMIN — ASPIRIN 81 MG: 81 TABLET, CHEWABLE ORAL at 10:59

## 2019-01-01 RX ADMIN — BUMETANIDE 1 MG: 1 TABLET ORAL at 09:29

## 2019-01-01 RX ADMIN — CEFTAROLINE FOSAMIL 400 MG: 400 POWDER, FOR SOLUTION INTRAVENOUS at 11:00

## 2019-01-01 RX ADMIN — INSULIN GLARGINE 15 UNITS: 100 INJECTION, SOLUTION SUBCUTANEOUS at 20:54

## 2019-01-01 RX ADMIN — FUROSEMIDE 40 MG: 10 INJECTION, SOLUTION INTRAMUSCULAR; INTRAVENOUS at 17:41

## 2019-01-01 RX ADMIN — SERTRALINE 50 MG: 50 TABLET, FILM COATED ORAL at 09:03

## 2019-01-01 RX ADMIN — SUCRALFATE 1 G: 1 TABLET ORAL at 17:41

## 2019-01-01 RX ADMIN — SUCRALFATE 1 G: 1 TABLET ORAL at 09:32

## 2019-01-01 RX ADMIN — CETIRIZINE HYDROCHLORIDE 5 MG: 10 TABLET ORAL at 12:16

## 2019-01-01 RX ADMIN — CEFTRIAXONE 1 G: 1 INJECTION, POWDER, FOR SOLUTION INTRAMUSCULAR; INTRAVENOUS at 18:43

## 2019-01-01 RX ADMIN — AMIODARONE HYDROCHLORIDE 200 MG: 200 TABLET ORAL at 14:10

## 2019-01-01 RX ADMIN — FAMOTIDINE 20 MG: 20 TABLET, FILM COATED ORAL at 17:49

## 2019-01-01 RX ADMIN — GLIMEPIRIDE 1 MG: 2 TABLET ORAL at 10:19

## 2019-01-01 RX ADMIN — SUCRALFATE 1 G: 1 TABLET ORAL at 17:14

## 2019-01-01 RX ADMIN — BUMETANIDE 1 MG: 1 TABLET ORAL at 17:14

## 2019-01-01 RX ADMIN — SUCRALFATE 1 G: 1 TABLET ORAL at 20:26

## 2019-01-01 RX ADMIN — SUCRALFATE 1 G: 1 TABLET ORAL at 11:58

## 2019-01-01 RX ADMIN — SERTRALINE 50 MG: 50 TABLET, FILM COATED ORAL at 10:05

## 2019-01-01 RX ADMIN — FAMOTIDINE 20 MG: 20 TABLET ORAL at 17:41

## 2019-01-01 RX ADMIN — VITAMIN D, TAB 1000IU (100/BT) 1000 UNITS: 25 TAB at 09:32

## 2019-01-01 RX ADMIN — SUCRALFATE 1 G: 1 TABLET ORAL at 08:20

## 2019-01-01 RX ADMIN — METOCLOPRAMIDE 10 MG: 5 INJECTION, SOLUTION INTRAMUSCULAR; INTRAVENOUS at 20:52

## 2019-01-01 RX ADMIN — SUCRALFATE 1 G: 1 TABLET ORAL at 09:28

## 2019-01-01 RX ADMIN — AMOXICILLIN AND CLAVULANATE POTASSIUM 1 TABLET: 500; 125 TABLET, FILM COATED ORAL at 13:37

## 2019-01-01 RX ADMIN — SUCRALFATE 1 G: 1 TABLET ORAL at 16:40

## 2019-01-01 RX ADMIN — AMIODARONE HYDROCHLORIDE 0.5 MG/MIN: 900 INJECTION, SOLUTION INTRAVENOUS at 21:20

## 2019-01-01 RX ADMIN — FAMOTIDINE 20 MG: 20 TABLET, FILM COATED ORAL at 17:31

## 2019-01-01 RX ADMIN — METOCLOPRAMIDE 10 MG: 5 INJECTION, SOLUTION INTRAMUSCULAR; INTRAVENOUS at 05:23

## 2019-01-01 RX ADMIN — AMIODARONE HYDROCHLORIDE 200 MG: 200 TABLET ORAL at 22:07

## 2019-01-01 RX ADMIN — Medication 10 ML: at 22:04

## 2019-01-01 RX ADMIN — VITAMIN D, TAB 1000IU (100/BT) 1000 UNITS: 25 TAB at 08:02

## 2019-01-01 RX ADMIN — DIPHENHYDRAMINE HYDROCHLORIDE 12.5 MG: 50 INJECTION, SOLUTION INTRAMUSCULAR; INTRAVENOUS at 20:06

## 2019-01-01 RX ADMIN — ALBUTEROL SULFATE 2.5 MG: 2.5 SOLUTION RESPIRATORY (INHALATION) at 14:51

## 2019-01-01 RX ADMIN — GLIMEPIRIDE 1 MG: 2 TABLET ORAL at 08:32

## 2019-01-01 RX ADMIN — Medication 10 ML: at 21:04

## 2019-01-01 RX ADMIN — INSULIN LISPRO 1 UNITS: 100 INJECTION, SOLUTION INTRAVENOUS; SUBCUTANEOUS at 21:33

## 2019-01-01 RX ADMIN — HEPARIN SODIUM 5000 UNITS: 5000 INJECTION INTRAVENOUS; SUBCUTANEOUS at 21:57

## 2019-01-01 RX ADMIN — FAMOTIDINE 10 MG: 20 TABLET, FILM COATED ORAL at 17:19

## 2019-01-01 RX ADMIN — SODIUM CHLORIDE: 9 INJECTION, SOLUTION INTRAVENOUS at 09:30

## 2019-01-01 RX ADMIN — SUCRALFATE 1 G: 1 TABLET ORAL at 21:10

## 2019-01-01 RX ADMIN — FAMOTIDINE 20 MG: 20 TABLET, FILM COATED ORAL at 17:16

## 2019-01-01 RX ADMIN — POTASSIUM CHLORIDE 10 MEQ: 7.46 INJECTION, SOLUTION INTRAVENOUS at 09:30

## 2019-01-01 RX ADMIN — INSULIN LISPRO 2 UNITS: 100 INJECTION, SOLUTION INTRAVENOUS; SUBCUTANEOUS at 23:20

## 2019-01-01 RX ADMIN — ASPIRIN 81 MG: 81 TABLET, CHEWABLE ORAL at 12:16

## 2019-01-01 RX ADMIN — PIPERACILLIN AND TAZOBACTAM 3.38 G: 3; .375 INJECTION, POWDER, LYOPHILIZED, FOR SOLUTION INTRAVENOUS at 10:24

## 2019-01-01 RX ADMIN — INSULIN LISPRO 2 UNITS: 100 INJECTION, SOLUTION INTRAVENOUS; SUBCUTANEOUS at 09:54

## 2019-01-01 RX ADMIN — Medication 10 ML: at 23:00

## 2019-01-01 RX ADMIN — METOCLOPRAMIDE 10 MG: 5 INJECTION, SOLUTION INTRAMUSCULAR; INTRAVENOUS at 12:27

## 2019-01-01 RX ADMIN — CETIRIZINE HYDROCHLORIDE 5 MG: 10 TABLET ORAL at 20:20

## 2019-01-01 RX ADMIN — SERTRALINE 50 MG: 50 TABLET, FILM COATED ORAL at 10:59

## 2019-01-01 RX ADMIN — AMIODARONE HYDROCHLORIDE 200 MG: 200 TABLET ORAL at 08:52

## 2019-01-01 RX ADMIN — GLIMEPIRIDE 1 MG: 2 TABLET ORAL at 09:31

## 2019-01-01 RX ADMIN — SUCRALFATE 1 G: 1 TABLET ORAL at 16:30

## 2019-01-01 RX ADMIN — METOPROLOL TARTRATE 25 MG: 25 TABLET ORAL at 08:02

## 2019-01-01 RX ADMIN — ASPIRIN 81 MG 81 MG: 81 TABLET ORAL at 08:22

## 2019-01-01 RX ADMIN — VITAMIN D, TAB 1000IU (100/BT) 1000 UNITS: 25 TAB at 10:24

## 2019-01-01 RX ADMIN — SUCRALFATE 1 G: 1 TABLET ORAL at 20:45

## 2019-01-01 RX ADMIN — AMIODARONE HYDROCHLORIDE 0.5 MG/MIN: 900 INJECTION, SOLUTION INTRAVENOUS at 15:16

## 2019-01-01 RX ADMIN — BUMETANIDE 2 MG: 0.25 INJECTION, SOLUTION INTRAMUSCULAR; INTRAVENOUS at 05:15

## 2019-01-01 RX ADMIN — SODIUM CHLORIDE: 9 INJECTION, SOLUTION INTRAVENOUS at 22:06

## 2019-01-01 RX ADMIN — SUCRALFATE 1 G: 1 TABLET ORAL at 21:21

## 2019-01-01 RX ADMIN — SUCRALFATE 1 G: 1 TABLET ORAL at 21:23

## 2019-01-01 RX ADMIN — INSULIN LISPRO 2 UNITS: 100 INJECTION, SOLUTION INTRAVENOUS; SUBCUTANEOUS at 13:33

## 2019-01-01 RX ADMIN — METOPROLOL TARTRATE 5 MG: 5 INJECTION, SOLUTION INTRAVENOUS at 16:18

## 2019-01-01 RX ADMIN — CETIRIZINE HYDROCHLORIDE 10 MG: 5 TABLET ORAL at 08:52

## 2019-01-01 RX ADMIN — ASPIRIN 81 MG: 81 TABLET, CHEWABLE ORAL at 08:21

## 2019-01-01 RX ADMIN — POTASSIUM CHLORIDE 40 MEQ: 20 TABLET, EXTENDED RELEASE ORAL at 21:21

## 2019-01-01 RX ADMIN — METOCLOPRAMIDE 10 MG: 5 INJECTION, SOLUTION INTRAMUSCULAR; INTRAVENOUS at 06:24

## 2019-01-01 RX ADMIN — VITAMIN D, TAB 1000IU (100/BT) 1000 UNITS: 25 TAB at 08:42

## 2019-01-01 RX ADMIN — METOCLOPRAMIDE 10 MG: 5 INJECTION, SOLUTION INTRAMUSCULAR; INTRAVENOUS at 17:31

## 2019-01-01 RX ADMIN — METOPROLOL TARTRATE 25 MG: 25 TABLET ORAL at 09:28

## 2019-01-01 RX ADMIN — METOPROLOL TARTRATE 50 MG: 50 TABLET, FILM COATED ORAL at 21:33

## 2019-01-01 RX ADMIN — Medication 10 ML: at 21:05

## 2019-01-01 RX ADMIN — AMOXICILLIN AND CLAVULANATE POTASSIUM 1 TABLET: 500; 125 TABLET, FILM COATED ORAL at 05:40

## 2019-01-01 RX ADMIN — Medication 25 MILLICURIE: at 10:09

## 2019-01-01 RX ADMIN — SODIUM CHLORIDE, PRESERVATIVE FREE 10 ML: 5 INJECTION INTRAVENOUS at 09:19

## 2019-01-01 RX ADMIN — FUROSEMIDE 20 MG: 20 TABLET ORAL at 08:32

## 2019-01-01 RX ADMIN — SUCRALFATE 1 G: 1 TABLET ORAL at 10:31

## 2019-01-01 RX ADMIN — Medication 10 ML: at 09:37

## 2019-01-01 RX ADMIN — ACETAMINOPHEN 650 MG: 325 TABLET ORAL at 22:45

## 2019-01-01 RX ADMIN — SUCRALFATE 1 G: 1 TABLET ORAL at 14:02

## 2019-01-01 RX ADMIN — INSULIN LISPRO 2 UNITS: 100 INJECTION, SOLUTION INTRAVENOUS; SUBCUTANEOUS at 17:41

## 2019-01-01 RX ADMIN — METOPROLOL TARTRATE 25 MG: 25 TABLET ORAL at 20:45

## 2019-01-01 RX ADMIN — VITAMIN C 1 TABLET: TAB at 09:09

## 2019-01-01 RX ADMIN — SODIUM CHLORIDE: 9 INJECTION, SOLUTION INTRAVENOUS at 10:25

## 2019-01-01 RX ADMIN — RIVAROXABAN 15 MG: 15 TABLET, FILM COATED ORAL at 21:21

## 2019-01-01 RX ADMIN — AMIODARONE HYDROCHLORIDE 200 MG: 200 TABLET ORAL at 09:06

## 2019-01-01 RX ADMIN — ASPIRIN 81 MG 81 MG: 81 TABLET ORAL at 08:42

## 2019-01-01 RX ADMIN — SUCRALFATE 1 G: 1 TABLET ORAL at 18:49

## 2019-01-01 RX ADMIN — SUCRALFATE 1 G: 1 TABLET ORAL at 21:45

## 2019-01-01 RX ADMIN — SERTRALINE 50 MG: 50 TABLET, FILM COATED ORAL at 10:20

## 2019-01-01 RX ADMIN — VITAMIN C 1 TABLET: TAB at 10:04

## 2019-01-01 RX ADMIN — DEXTROSE MONOHYDRATE 1 G: 5 INJECTION INTRAVENOUS at 12:15

## 2019-01-01 RX ADMIN — SUCRALFATE 1 G: 1 TABLET ORAL at 21:19

## 2019-01-01 RX ADMIN — VITAMIN D, TAB 1000IU (100/BT) 1000 UNITS: 25 TAB at 09:28

## 2019-01-01 RX ADMIN — GLIMEPIRIDE 1 MG: 1 TABLET ORAL at 08:52

## 2019-01-01 RX ADMIN — FUROSEMIDE 20 MG: 20 TABLET ORAL at 09:32

## 2019-01-01 RX ADMIN — AMIODARONE HYDROCHLORIDE 200 MG: 200 TABLET ORAL at 10:59

## 2019-01-01 RX ADMIN — INSULIN LISPRO 1 UNITS: 100 INJECTION, SOLUTION INTRAVENOUS; SUBCUTANEOUS at 20:20

## 2019-01-01 RX ADMIN — SUCRALFATE 1 G: 1 TABLET ORAL at 13:33

## 2019-01-01 RX ADMIN — BUMETANIDE 1 MG: 1 TABLET ORAL at 08:03

## 2019-01-01 RX ADMIN — HEPARIN SODIUM 5000 UNITS: 5000 INJECTION INTRAVENOUS; SUBCUTANEOUS at 05:25

## 2019-01-01 RX ADMIN — SODIUM CHLORIDE: 9 INJECTION, SOLUTION INTRAVENOUS at 08:43

## 2019-01-01 RX ADMIN — ASPIRIN 81 MG 81 MG: 81 TABLET ORAL at 08:52

## 2019-01-01 RX ADMIN — AMIODARONE HYDROCHLORIDE 200 MG: 200 TABLET ORAL at 14:41

## 2019-01-01 RX ADMIN — CETIRIZINE HYDROCHLORIDE 10 MG: 5 TABLET ORAL at 08:22

## 2019-01-01 RX ADMIN — AMIODARONE HYDROCHLORIDE 200 MG: 200 TABLET ORAL at 21:10

## 2019-01-01 RX ADMIN — METOCLOPRAMIDE 10 MG: 5 INJECTION, SOLUTION INTRAMUSCULAR; INTRAVENOUS at 05:25

## 2019-01-01 RX ADMIN — AMOXICILLIN AND CLAVULANATE POTASSIUM 1 TABLET: 500; 125 TABLET, FILM COATED ORAL at 06:43

## 2019-01-01 SDOH — ECONOMIC STABILITY: TRANSPORTATION INSECURITY
IN THE PAST 12 MONTHS, HAS LACK OF TRANSPORTATION KEPT YOU FROM MEETINGS, WORK, OR FROM GETTING THINGS NEEDED FOR DAILY LIVING?: NO

## 2019-01-01 SDOH — HEALTH STABILITY: PHYSICAL HEALTH: ON AVERAGE, HOW MANY DAYS PER WEEK DO YOU ENGAGE IN MODERATE TO STRENUOUS EXERCISE (LIKE A BRISK WALK)?: 0 DAYS

## 2019-01-01 SDOH — SOCIAL STABILITY: SOCIAL NETWORK
IN A TYPICAL WEEK, HOW MANY TIMES DO YOU TALK ON THE PHONE WITH FAMILY, FRIENDS, OR NEIGHBORS?: MORE THAN THREE TIMES A WEEK

## 2019-01-01 SDOH — SOCIAL STABILITY: SOCIAL NETWORK: HOW OFTEN DO YOU ATTEND CHURCH OR RELIGIOUS SERVICES?: MORE THAN 4 TIMES PER YEAR

## 2019-01-01 SDOH — SOCIAL STABILITY: SOCIAL NETWORK: HOW OFTEN DO YOU ATTENT MEETINGS OF THE CLUB OR ORGANIZATION YOU BELONG TO?: NEVER

## 2019-01-01 SDOH — ECONOMIC STABILITY: FOOD INSECURITY: WITHIN THE PAST 12 MONTHS, YOU WORRIED THAT YOUR FOOD WOULD RUN OUT BEFORE YOU GOT MONEY TO BUY MORE.: NEVER TRUE

## 2019-01-01 SDOH — ECONOMIC STABILITY: FOOD INSECURITY: WITHIN THE PAST 12 MONTHS, THE FOOD YOU BOUGHT JUST DIDN'T LAST AND YOU DIDN'T HAVE MONEY TO GET MORE.: NEVER TRUE

## 2019-01-01 SDOH — SOCIAL STABILITY: SOCIAL NETWORK
DO YOU BELONG TO ANY CLUBS OR ORGANIZATIONS SUCH AS CHURCH GROUPS UNIONS, FRATERNAL OR ATHLETIC GROUPS, OR SCHOOL GROUPS?: NO

## 2019-01-01 SDOH — ECONOMIC STABILITY: INCOME INSECURITY: HOW HARD IS IT FOR YOU TO PAY FOR THE VERY BASICS LIKE FOOD, HOUSING, MEDICAL CARE, AND HEATING?: NOT HARD AT ALL

## 2019-01-01 SDOH — HEALTH STABILITY: MENTAL HEALTH
STRESS IS WHEN SOMEONE FEELS TENSE, NERVOUS, ANXIOUS, OR CAN'T SLEEP AT NIGHT BECAUSE THEIR MIND IS TROUBLED. HOW STRESSED ARE YOU?: NOT AT ALL

## 2019-01-01 SDOH — HEALTH STABILITY: PHYSICAL HEALTH: ON AVERAGE, HOW MANY MINUTES DO YOU ENGAGE IN EXERCISE AT THIS LEVEL?: 0 MIN

## 2019-01-01 SDOH — SOCIAL STABILITY: SOCIAL NETWORK: ARE YOU MARRIED, WIDOWED, DIVORCED, SEPARATED, NEVER MARRIED, OR LIVING WITH A PARTNER?: MARRIED

## 2019-01-01 SDOH — SOCIAL STABILITY: SOCIAL NETWORK: HOW OFTEN DO YOU GET TOGETHER WITH FRIENDS OR RELATIVES?: TWICE A WEEK

## 2019-01-01 SDOH — ECONOMIC STABILITY: TRANSPORTATION INSECURITY
IN THE PAST 12 MONTHS, HAS THE LACK OF TRANSPORTATION KEPT YOU FROM MEDICAL APPOINTMENTS OR FROM GETTING MEDICATIONS?: NO

## 2019-01-01 ASSESSMENT — ENCOUNTER SYMPTOMS
NAUSEA: 1
SINUS PAIN: 0
SINUS PAIN: 0
RHINORRHEA: 0
EYE ITCHING: 0
SORE THROAT: 1
COLOR CHANGE: 0
EYE PAIN: 0
NAUSEA: 1
NAUSEA: 0
COUGH: 0
EYE DISCHARGE: 0
COUGH: 1
CHEST TIGHTNESS: 0
COUGH: 0
DIARRHEA: 0
CONSTIPATION: 0
RECTAL PAIN: 0
EYE DISCHARGE: 0
SHORTNESS OF BREATH: 0
DIARRHEA: 0
ABDOMINAL PAIN: 0
APNEA: 0
ABDOMINAL PAIN: 0
BLOOD IN STOOL: 0
COUGH: 1
APNEA: 0
COLOR CHANGE: 0
SORE THROAT: 0
ABDOMINAL DISTENTION: 0
SHORTNESS OF BREATH: 1
EYE DISCHARGE: 0
BLOOD IN STOOL: 0
CONSTIPATION: 0
WHEEZING: 0
NAUSEA: 1
CONSTIPATION: 0
BLOOD IN STOOL: 0
CHEST TIGHTNESS: 0
BACK PAIN: 0
COUGH: 0
NAUSEA: 1
EYE DISCHARGE: 0
CHEST TIGHTNESS: 0
CHEST TIGHTNESS: 0
ABDOMINAL DISTENTION: 0
CHEST TIGHTNESS: 0
SINUS PRESSURE: 0
COUGH: 1
RHINORRHEA: 0
VOMITING: 0
DIARRHEA: 1
CONSTIPATION: 1
COUGH: 0
COLOR CHANGE: 0
RHINORRHEA: 0
CONSTIPATION: 0
SHORTNESS OF BREATH: 1
CHEST TIGHTNESS: 0
RECTAL PAIN: 0
RHINORRHEA: 0
COUGH: 0
CONSTIPATION: 0
DIARRHEA: 0
BLOOD IN STOOL: 0
EYE DISCHARGE: 0
VOMITING: 0
COUGH: 1
VOMITING: 0
ABDOMINAL PAIN: 0
WHEEZING: 0
EYE PAIN: 0
APNEA: 0
NAUSEA: 0
WHEEZING: 0
SHORTNESS OF BREATH: 0
ABDOMINAL PAIN: 0
EYE DISCHARGE: 0
DIARRHEA: 0
WHEEZING: 0
CONSTIPATION: 0
ABDOMINAL PAIN: 0
RECTAL PAIN: 0
ABDOMINAL PAIN: 0
DIARRHEA: 0
STRIDOR: 0
DIARRHEA: 0
SORE THROAT: 1
BACK PAIN: 0
ABDOMINAL PAIN: 0
ANAL BLEEDING: 0
VOMITING: 0
COUGH: 0
CHEST TIGHTNESS: 0
BLOOD IN STOOL: 0
BLOOD IN STOOL: 0
NAUSEA: 1
DIARRHEA: 1
WHEEZING: 0
DIARRHEA: 1
WHEEZING: 0
VOMITING: 0
SORE THROAT: 1
SHORTNESS OF BREATH: 1
DIARRHEA: 0
NAUSEA: 0
COUGH: 0
TROUBLE SWALLOWING: 0
CHEST TIGHTNESS: 0
NAUSEA: 1
SHORTNESS OF BREATH: 1
BACK PAIN: 1
COLOR CHANGE: 0
NAUSEA: 0
COUGH: 0
SHORTNESS OF BREATH: 1
VOMITING: 0
TROUBLE SWALLOWING: 0
RHINORRHEA: 0
WHEEZING: 0
SORE THROAT: 0
SINUS PAIN: 0
WHEEZING: 0
RHINORRHEA: 0
VOMITING: 0
COLOR CHANGE: 0
EYE PAIN: 0
EYE DISCHARGE: 0
TROUBLE SWALLOWING: 0
SHORTNESS OF BREATH: 0
BACK PAIN: 0
VOMITING: 0
BLOOD IN STOOL: 0
DIARRHEA: 0
WHEEZING: 0
NAUSEA: 0
WHEEZING: 0
SORE THROAT: 0
TROUBLE SWALLOWING: 0
SINUS PAIN: 0
DIARRHEA: 0
RHINORRHEA: 0
EYE DISCHARGE: 0
EYE PAIN: 0
BACK PAIN: 0
SHORTNESS OF BREATH: 0
ABDOMINAL DISTENTION: 0
WHEEZING: 0
COLOR CHANGE: 0
NAUSEA: 1
COUGH: 0
VOMITING: 0
EYE PAIN: 0
SHORTNESS OF BREATH: 1
SORE THROAT: 1
RHINORRHEA: 1
COLOR CHANGE: 0
SHORTNESS OF BREATH: 0
COUGH: 0
NAUSEA: 1
RHINORRHEA: 0
COUGH: 0
CHOKING: 0
WHEEZING: 0
CONSTIPATION: 0
NAUSEA: 0
COLOR CHANGE: 0
EYE PAIN: 0
SHORTNESS OF BREATH: 1
CONSTIPATION: 1
CONSTIPATION: 0
COUGH: 0
CHEST TIGHTNESS: 0
ABDOMINAL PAIN: 0
SHORTNESS OF BREATH: 0
APNEA: 0
WHEEZING: 0
TROUBLE SWALLOWING: 0
ABDOMINAL PAIN: 0
ABDOMINAL PAIN: 0

## 2019-01-01 ASSESSMENT — PAIN SCALES - GENERAL
PAINLEVEL_OUTOF10: 0
PAINLEVEL_OUTOF10: 3
PAINLEVEL_OUTOF10: 5
PAINLEVEL_OUTOF10: 0
PAINLEVEL_OUTOF10: 3
PAINLEVEL_OUTOF10: 0
PAINLEVEL_OUTOF10: 5
PAINLEVEL_OUTOF10: 0
PAINLEVEL_OUTOF10: 0
PAINLEVEL_OUTOF10: 5
PAINLEVEL_OUTOF10: 0
PAINLEVEL_OUTOF10: 0
PAINLEVEL_OUTOF10: 5
PAINLEVEL_OUTOF10: 0
PAINLEVEL_OUTOF10: 3
PAINLEVEL_OUTOF10: 0
PAINLEVEL_OUTOF10: 3
PAINLEVEL_OUTOF10: 0
PAINLEVEL_OUTOF10: 3

## 2019-01-01 ASSESSMENT — PAIN DESCRIPTION - PAIN TYPE
TYPE: ACUTE PAIN
TYPE: SURGICAL PAIN

## 2019-01-01 ASSESSMENT — PAIN DESCRIPTION - ONSET: ONSET: ON-GOING

## 2019-01-01 ASSESSMENT — PAIN DESCRIPTION - LOCATION
LOCATION: INCISION;NECK
LOCATION: INCISION;NECK
LOCATION: SHOULDER
LOCATION: INCISION
LOCATION: INCISION

## 2019-01-01 ASSESSMENT — PAIN DESCRIPTION - DESCRIPTORS
DESCRIPTORS: ACHING
DESCRIPTORS: SORE
DESCRIPTORS: SORE
DESCRIPTORS: ACHING

## 2019-01-01 ASSESSMENT — PAIN DESCRIPTION - ORIENTATION
ORIENTATION: RIGHT

## 2019-01-01 ASSESSMENT — PAIN DESCRIPTION - PROGRESSION
CLINICAL_PROGRESSION: NOT CHANGED
CLINICAL_PROGRESSION: NOT CHANGED

## 2019-01-01 ASSESSMENT — PAIN DESCRIPTION - FREQUENCY
FREQUENCY: INTERMITTENT
FREQUENCY: INTERMITTENT

## 2019-01-01 ASSESSMENT — PULMONARY FUNCTION TESTS
PIF_VALUE: 17
PIF_VALUE: 17

## 2019-01-01 ASSESSMENT — PAIN - FUNCTIONAL ASSESSMENT: PAIN_FUNCTIONAL_ASSESSMENT: PREVENTS OR INTERFERES SOME ACTIVE ACTIVITIES AND ADLS

## 2019-01-07 DIAGNOSIS — F41.1 ANXIETY, GENERALIZED: ICD-10-CM

## 2019-01-07 RX ORDER — GLIMEPIRIDE 1 MG/1
TABLET ORAL
Qty: 30 TABLET | Refills: 5 | Status: SHIPPED | OUTPATIENT
Start: 2019-01-07 | End: 2019-01-01 | Stop reason: SDUPTHER

## 2019-01-16 ENCOUNTER — CARE COORDINATION (OUTPATIENT)
Dept: CARE COORDINATION | Age: 84
End: 2019-01-16

## 2019-01-28 ENCOUNTER — OFFICE VISIT (OUTPATIENT)
Dept: CARDIOLOGY | Age: 84
End: 2019-01-28
Payer: MEDICARE

## 2019-01-28 VITALS
HEART RATE: 72 BPM | DIASTOLIC BLOOD PRESSURE: 70 MMHG | BODY MASS INDEX: 26.55 KG/M2 | WEIGHT: 135.2 LBS | HEIGHT: 60 IN | SYSTOLIC BLOOD PRESSURE: 109 MMHG

## 2019-01-28 DIAGNOSIS — I25.10 CORONARY ARTERY DISEASE INVOLVING NATIVE CORONARY ARTERY OF NATIVE HEART WITHOUT ANGINA PECTORIS: ICD-10-CM

## 2019-01-28 DIAGNOSIS — I10 ESSENTIAL HYPERTENSION: ICD-10-CM

## 2019-01-28 DIAGNOSIS — I50.43 ACUTE ON CHRONIC COMBINED SYSTOLIC AND DIASTOLIC CONGESTIVE HEART FAILURE (HCC): Primary | ICD-10-CM

## 2019-01-28 DIAGNOSIS — I48.19 PERSISTENT ATRIAL FIBRILLATION (HCC): ICD-10-CM

## 2019-01-28 DIAGNOSIS — I49.3 PVC'S (PREMATURE VENTRICULAR CONTRACTIONS): ICD-10-CM

## 2019-01-28 DIAGNOSIS — I34.0 MODERATE MITRAL REGURGITATION: ICD-10-CM

## 2019-01-28 DIAGNOSIS — Z95.2 S/P TAVR (TRANSCATHETER AORTIC VALVE REPLACEMENT): ICD-10-CM

## 2019-01-28 PROCEDURE — 1123F ACP DISCUSS/DSCN MKR DOCD: CPT | Performed by: INTERNAL MEDICINE

## 2019-01-28 PROCEDURE — 99213 OFFICE O/P EST LOW 20 MIN: CPT | Performed by: INTERNAL MEDICINE

## 2019-01-28 PROCEDURE — G8417 CALC BMI ABV UP PARAM F/U: HCPCS | Performed by: INTERNAL MEDICINE

## 2019-01-28 PROCEDURE — G8400 PT W/DXA NO RESULTS DOC: HCPCS | Performed by: INTERNAL MEDICINE

## 2019-01-28 PROCEDURE — G8598 ASA/ANTIPLAT THER USED: HCPCS | Performed by: INTERNAL MEDICINE

## 2019-01-28 PROCEDURE — 4040F PNEUMOC VAC/ADMIN/RCVD: CPT | Performed by: INTERNAL MEDICINE

## 2019-01-28 PROCEDURE — G8427 DOCREV CUR MEDS BY ELIG CLIN: HCPCS | Performed by: INTERNAL MEDICINE

## 2019-01-28 PROCEDURE — 1036F TOBACCO NON-USER: CPT | Performed by: INTERNAL MEDICINE

## 2019-01-28 PROCEDURE — G8484 FLU IMMUNIZE NO ADMIN: HCPCS | Performed by: INTERNAL MEDICINE

## 2019-01-28 PROCEDURE — 1101F PT FALLS ASSESS-DOCD LE1/YR: CPT | Performed by: INTERNAL MEDICINE

## 2019-01-28 PROCEDURE — 1090F PRES/ABSN URINE INCON ASSESS: CPT | Performed by: INTERNAL MEDICINE

## 2019-02-04 ENCOUNTER — TELEPHONE (OUTPATIENT)
Dept: CARDIOLOGY | Age: 84
End: 2019-02-04

## 2019-02-13 ENCOUNTER — CARE COORDINATION (OUTPATIENT)
Dept: CARE COORDINATION | Age: 84
End: 2019-02-13

## 2019-03-08 DIAGNOSIS — I49.3 PVC'S (PREMATURE VENTRICULAR CONTRACTIONS): Primary | ICD-10-CM

## 2019-03-08 RX ORDER — RIVAROXABAN 20 MG/1
TABLET, FILM COATED ORAL
Qty: 30 TABLET | Refills: 0 | Status: SHIPPED | OUTPATIENT
Start: 2019-03-08 | End: 2019-04-15 | Stop reason: SDUPTHER

## 2019-03-14 ENCOUNTER — CARE COORDINATION (OUTPATIENT)
Dept: CARE COORDINATION | Age: 84
End: 2019-03-14

## 2019-03-25 ENCOUNTER — HOSPITAL ENCOUNTER (INPATIENT)
Age: 84
LOS: 1 days | Discharge: HOME | DRG: 309 | End: 2019-03-26
Attending: EMERGENCY MEDICINE | Admitting: FAMILY MEDICINE
Payer: MEDICARE

## 2019-03-25 ENCOUNTER — APPOINTMENT (OUTPATIENT)
Dept: GENERAL RADIOLOGY | Age: 84
DRG: 309 | End: 2019-03-25
Payer: MEDICARE

## 2019-03-25 DIAGNOSIS — I50.30 DIASTOLIC CONGESTIVE HEART FAILURE, UNSPECIFIED HF CHRONICITY (HCC): ICD-10-CM

## 2019-03-25 DIAGNOSIS — I48.0 PAROXYSMAL ATRIAL FIBRILLATION (HCC): ICD-10-CM

## 2019-03-25 DIAGNOSIS — R07.9 CHEST PAIN, UNSPECIFIED TYPE: Primary | ICD-10-CM

## 2019-03-25 PROBLEM — I48.91 ATRIAL FIBRILLATION WITH RVR (HCC): Status: ACTIVE | Noted: 2019-03-25

## 2019-03-25 LAB
ABSOLUTE EOS #: 0.1 K/UL (ref 0–0.4)
ABSOLUTE IMMATURE GRANULOCYTE: ABNORMAL K/UL (ref 0–0.3)
ABSOLUTE LYMPH #: 1.9 K/UL (ref 1–4.8)
ABSOLUTE MONO #: 0.7 K/UL (ref 0.1–1.2)
ANION GAP SERPL CALCULATED.3IONS-SCNC: 18 MMOL/L (ref 9–17)
BASOPHILS # BLD: 0 % (ref 0–1)
BASOPHILS ABSOLUTE: 0 K/UL (ref 0–0.2)
BNP INTERPRETATION: ABNORMAL
BUN BLDV-MCNC: 31 MG/DL (ref 8–23)
BUN/CREAT BLD: 23 (ref 9–20)
CALCIUM SERPL-MCNC: 9.3 MG/DL (ref 8.6–10.4)
CHLORIDE BLD-SCNC: 103 MMOL/L (ref 98–107)
CO2: 23 MMOL/L (ref 20–31)
CREAT SERPL-MCNC: 1.35 MG/DL (ref 0.5–0.9)
DIFFERENTIAL TYPE: ABNORMAL
EOSINOPHILS RELATIVE PERCENT: 1 % (ref 1–7)
GFR AFRICAN AMERICAN: 46 ML/MIN
GFR NON-AFRICAN AMERICAN: 38 ML/MIN
GFR SERPL CREATININE-BSD FRML MDRD: ABNORMAL ML/MIN/{1.73_M2}
GFR SERPL CREATININE-BSD FRML MDRD: ABNORMAL ML/MIN/{1.73_M2}
GLUCOSE BLD-MCNC: 276 MG/DL (ref 70–99)
HCT VFR BLD CALC: 38.7 % (ref 36–46)
HEMOGLOBIN: 12.5 G/DL (ref 12–16)
IMMATURE GRANULOCYTES: ABNORMAL %
INR BLD: 1.1
LYMPHOCYTES # BLD: 19 % (ref 16–46)
MCH RBC QN AUTO: 28.2 PG (ref 26–34)
MCHC RBC AUTO-ENTMCNC: 32.2 G/DL (ref 31–37)
MCV RBC AUTO: 87.4 FL (ref 80–100)
MONOCYTES # BLD: 8 % (ref 4–11)
NRBC AUTOMATED: ABNORMAL PER 100 WBC
PDW BLD-RTO: 15.8 % (ref 11–14.5)
PLATELET # BLD: 211 K/UL (ref 140–450)
PLATELET ESTIMATE: ABNORMAL
PMV BLD AUTO: 8 FL (ref 6–12)
POTASSIUM SERPL-SCNC: 3.4 MMOL/L (ref 3.7–5.3)
PRO-BNP: 5350 PG/ML
PROTHROMBIN TIME: 11.7 SEC (ref 9.4–11.3)
RBC # BLD: 4.43 M/UL (ref 4–5.2)
RBC # BLD: ABNORMAL 10*6/UL
SEG NEUTROPHILS: 72 % (ref 43–77)
SEGMENTED NEUTROPHILS ABSOLUTE COUNT: 7 K/UL (ref 1.8–7.7)
SODIUM BLD-SCNC: 144 MMOL/L (ref 135–144)
TROPONIN INTERP: ABNORMAL
TROPONIN INTERP: ABNORMAL
TROPONIN T: ABNORMAL NG/ML
TROPONIN T: ABNORMAL NG/ML
TROPONIN, HIGH SENSITIVITY: 51 NG/L (ref 0–14)
TROPONIN, HIGH SENSITIVITY: 53 NG/L (ref 0–14)
WBC # BLD: 9.7 K/UL (ref 3.5–11)
WBC # BLD: ABNORMAL 10*3/UL

## 2019-03-25 PROCEDURE — 80048 BASIC METABOLIC PNL TOTAL CA: CPT

## 2019-03-25 PROCEDURE — 85025 COMPLETE CBC W/AUTO DIFF WBC: CPT

## 2019-03-25 PROCEDURE — 84484 ASSAY OF TROPONIN QUANT: CPT

## 2019-03-25 PROCEDURE — 71045 X-RAY EXAM CHEST 1 VIEW: CPT

## 2019-03-25 PROCEDURE — 36415 COLL VENOUS BLD VENIPUNCTURE: CPT

## 2019-03-25 PROCEDURE — 83880 ASSAY OF NATRIURETIC PEPTIDE: CPT

## 2019-03-25 PROCEDURE — 85610 PROTHROMBIN TIME: CPT

## 2019-03-25 PROCEDURE — 2060000000 HC ICU INTERMEDIATE R&B

## 2019-03-25 PROCEDURE — 93005 ELECTROCARDIOGRAM TRACING: CPT

## 2019-03-25 PROCEDURE — 99285 EMERGENCY DEPT VISIT HI MDM: CPT

## 2019-03-25 RX ORDER — CLOPIDOGREL BISULFATE 75 MG/1
75 TABLET ORAL DAILY
Status: DISCONTINUED | OUTPATIENT
Start: 2019-03-26 | End: 2019-03-26 | Stop reason: HOSPADM

## 2019-03-25 RX ORDER — SODIUM CHLORIDE 0.9 % (FLUSH) 0.9 %
10 SYRINGE (ML) INJECTION EVERY 12 HOURS SCHEDULED
Status: DISCONTINUED | OUTPATIENT
Start: 2019-03-26 | End: 2019-03-26 | Stop reason: HOSPADM

## 2019-03-25 RX ORDER — DEXTROSE MONOHYDRATE 50 MG/ML
100 INJECTION, SOLUTION INTRAVENOUS PRN
Status: DISCONTINUED | OUTPATIENT
Start: 2019-03-25 | End: 2019-03-26 | Stop reason: HOSPADM

## 2019-03-25 RX ORDER — ALBUTEROL SULFATE 90 UG/1
2 AEROSOL, METERED RESPIRATORY (INHALATION)
Status: DISCONTINUED | OUTPATIENT
Start: 2019-03-25 | End: 2019-03-26 | Stop reason: HOSPADM

## 2019-03-25 RX ORDER — SPIRONOLACTONE 25 MG/1
25 TABLET ORAL DAILY
Status: DISCONTINUED | OUTPATIENT
Start: 2019-03-26 | End: 2019-03-26 | Stop reason: HOSPADM

## 2019-03-25 RX ORDER — SUCRALFATE 1 G/1
1 TABLET ORAL 4 TIMES DAILY
Status: DISCONTINUED | OUTPATIENT
Start: 2019-03-26 | End: 2019-03-26 | Stop reason: HOSPADM

## 2019-03-25 RX ORDER — NICOTINE POLACRILEX 4 MG
15 LOZENGE BUCCAL PRN
Status: DISCONTINUED | OUTPATIENT
Start: 2019-03-25 | End: 2019-03-26 | Stop reason: HOSPADM

## 2019-03-25 RX ORDER — FAMOTIDINE 20 MG/1
20 TABLET, FILM COATED ORAL EVERY EVENING
Status: DISCONTINUED | OUTPATIENT
Start: 2019-03-26 | End: 2019-03-26 | Stop reason: HOSPADM

## 2019-03-25 RX ORDER — ONDANSETRON 2 MG/ML
4 INJECTION INTRAMUSCULAR; INTRAVENOUS EVERY 6 HOURS PRN
Status: DISCONTINUED | OUTPATIENT
Start: 2019-03-25 | End: 2019-03-26 | Stop reason: HOSPADM

## 2019-03-25 RX ORDER — NICOTINE 21 MG/24HR
1 PATCH, TRANSDERMAL 24 HOURS TRANSDERMAL DAILY PRN
Status: DISCONTINUED | OUTPATIENT
Start: 2019-03-25 | End: 2019-03-26

## 2019-03-25 RX ORDER — FUROSEMIDE 20 MG/1
20 TABLET ORAL DAILY
Status: DISCONTINUED | OUTPATIENT
Start: 2019-03-26 | End: 2019-03-26 | Stop reason: HOSPADM

## 2019-03-25 RX ORDER — LORAZEPAM 0.5 MG/1
0.5 TABLET ORAL EVERY 8 HOURS PRN
Status: DISCONTINUED | OUTPATIENT
Start: 2019-03-25 | End: 2019-03-26 | Stop reason: HOSPADM

## 2019-03-25 RX ORDER — SODIUM CHLORIDE 9 MG/ML
INJECTION, SOLUTION INTRAVENOUS CONTINUOUS
Status: DISCONTINUED | OUTPATIENT
Start: 2019-03-26 | End: 2019-03-26

## 2019-03-25 RX ORDER — LISINOPRIL 2.5 MG/1
2.5 TABLET ORAL DAILY
Status: DISCONTINUED | OUTPATIENT
Start: 2019-03-26 | End: 2019-03-26 | Stop reason: HOSPADM

## 2019-03-25 RX ORDER — DEXTROSE MONOHYDRATE 25 G/50ML
12.5 INJECTION, SOLUTION INTRAVENOUS PRN
Status: DISCONTINUED | OUTPATIENT
Start: 2019-03-25 | End: 2019-03-26 | Stop reason: HOSPADM

## 2019-03-25 RX ORDER — ALBUTEROL SULFATE 2.5 MG/3ML
2.5 SOLUTION RESPIRATORY (INHALATION)
Status: DISCONTINUED | OUTPATIENT
Start: 2019-03-25 | End: 2019-03-26 | Stop reason: HOSPADM

## 2019-03-25 RX ORDER — ACETAMINOPHEN 325 MG/1
650 TABLET ORAL EVERY 4 HOURS PRN
Status: DISCONTINUED | OUTPATIENT
Start: 2019-03-25 | End: 2019-03-26 | Stop reason: HOSPADM

## 2019-03-25 RX ORDER — SODIUM CHLORIDE 0.9 % (FLUSH) 0.9 %
10 SYRINGE (ML) INJECTION PRN
Status: DISCONTINUED | OUTPATIENT
Start: 2019-03-25 | End: 2019-03-26 | Stop reason: HOSPADM

## 2019-03-25 RX ORDER — LACTOBACILLUS RHAMNOSUS GG 10B CELL
1 CAPSULE ORAL 3 TIMES DAILY
Status: DISCONTINUED | OUTPATIENT
Start: 2019-03-26 | End: 2019-03-26 | Stop reason: HOSPADM

## 2019-03-25 RX ORDER — ASPIRIN 81 MG/1
81 TABLET, CHEWABLE ORAL DAILY
Status: DISCONTINUED | OUTPATIENT
Start: 2019-03-26 | End: 2019-03-26

## 2019-03-25 RX ORDER — METOPROLOL SUCCINATE 50 MG/1
100 TABLET, EXTENDED RELEASE ORAL DAILY
Status: DISCONTINUED | OUTPATIENT
Start: 2019-03-26 | End: 2019-03-26 | Stop reason: HOSPADM

## 2019-03-25 ASSESSMENT — PAIN SCALES - GENERAL: PAINLEVEL_OUTOF10: 0

## 2019-03-26 ENCOUNTER — TELEPHONE (OUTPATIENT)
Dept: FAMILY MEDICINE CLINIC | Age: 84
End: 2019-03-26

## 2019-03-26 VITALS
HEART RATE: 68 BPM | BODY MASS INDEX: 26.68 KG/M2 | RESPIRATION RATE: 20 BRPM | WEIGHT: 136.6 LBS | DIASTOLIC BLOOD PRESSURE: 39 MMHG | OXYGEN SATURATION: 95 % | TEMPERATURE: 97.5 F | SYSTOLIC BLOOD PRESSURE: 97 MMHG

## 2019-03-26 LAB
ALBUMIN SERPL-MCNC: 3.7 G/DL (ref 3.5–5.2)
ALBUMIN/GLOBULIN RATIO: 1.4 (ref 1–2.5)
ALP BLD-CCNC: 109 U/L (ref 35–104)
ALT SERPL-CCNC: 11 U/L (ref 5–33)
ANION GAP SERPL CALCULATED.3IONS-SCNC: 17 MMOL/L (ref 9–17)
AST SERPL-CCNC: 17 U/L
BILIRUB SERPL-MCNC: 0.22 MG/DL (ref 0.3–1.2)
BNP INTERPRETATION: ABNORMAL
BUN BLDV-MCNC: 30 MG/DL (ref 8–23)
BUN/CREAT BLD: 30 (ref 9–20)
CALCIUM SERPL-MCNC: 8.8 MG/DL (ref 8.6–10.4)
CHLORIDE BLD-SCNC: 105 MMOL/L (ref 98–107)
CO2: 23 MMOL/L (ref 20–31)
CREAT SERPL-MCNC: 0.99 MG/DL (ref 0.5–0.9)
EKG ATRIAL RATE: 79 BPM
EKG ATRIAL RATE: 84 BPM
EKG ATRIAL RATE: 90 BPM
EKG P AXIS: 73 DEGREES
EKG P-R INTERVAL: 226 MS
EKG Q-T INTERVAL: 338 MS
EKG Q-T INTERVAL: 378 MS
EKG Q-T INTERVAL: 454 MS
EKG QRS DURATION: 124 MS
EKG QRS DURATION: 126 MS
EKG QRS DURATION: 146 MS
EKG QTC CALCULATION (BAZETT): 519 MS
EKG QTC CALCULATION (BAZETT): 529 MS
EKG QTC CALCULATION (BAZETT): 536 MS
EKG R AXIS: -30 DEGREES
EKG R AXIS: -38 DEGREES
EKG R AXIS: -54 DEGREES
EKG T AXIS: 107 DEGREES
EKG T AXIS: 123 DEGREES
EKG T AXIS: 152 DEGREES
EKG VENTRICULAR RATE: 118 BPM
EKG VENTRICULAR RATE: 142 BPM
EKG VENTRICULAR RATE: 84 BPM
ESTIMATED AVERAGE GLUCOSE: 148 MG/DL
GFR AFRICAN AMERICAN: >60 ML/MIN
GFR NON-AFRICAN AMERICAN: 54 ML/MIN
GFR SERPL CREATININE-BSD FRML MDRD: ABNORMAL ML/MIN/{1.73_M2}
GFR SERPL CREATININE-BSD FRML MDRD: ABNORMAL ML/MIN/{1.73_M2}
GLUCOSE BLD-MCNC: 144 MG/DL (ref 65–105)
GLUCOSE BLD-MCNC: 147 MG/DL (ref 70–99)
GLUCOSE BLD-MCNC: 201 MG/DL (ref 65–105)
HBA1C MFR BLD: 6.8 % (ref 4.8–5.9)
HCT VFR BLD CALC: 36.1 % (ref 36–46)
HEMOGLOBIN: 11.6 G/DL (ref 12–16)
MAGNESIUM: 2.1 MG/DL (ref 1.6–2.6)
MCH RBC QN AUTO: 27.8 PG (ref 26–34)
MCHC RBC AUTO-ENTMCNC: 32.1 G/DL (ref 31–37)
MCV RBC AUTO: 86.6 FL (ref 80–100)
NRBC AUTOMATED: ABNORMAL PER 100 WBC
PDW BLD-RTO: 15.8 % (ref 11–14.5)
PLATELET # BLD: 169 K/UL (ref 140–450)
PMV BLD AUTO: 8 FL (ref 6–12)
POTASSIUM SERPL-SCNC: 3.7 MMOL/L (ref 3.7–5.3)
PRO-BNP: 6846 PG/ML
RBC # BLD: 4.17 M/UL (ref 4–5.2)
SODIUM BLD-SCNC: 145 MMOL/L (ref 135–144)
TOTAL PROTEIN: 6.4 G/DL (ref 6.4–8.3)
TROPONIN INTERP: ABNORMAL
TROPONIN INTERP: ABNORMAL
TROPONIN T: ABNORMAL NG/ML
TROPONIN T: ABNORMAL NG/ML
TROPONIN, HIGH SENSITIVITY: 52 NG/L (ref 0–14)
TROPONIN, HIGH SENSITIVITY: 55 NG/L (ref 0–14)
TSH SERPL DL<=0.05 MIU/L-ACNC: 2.26 MIU/L (ref 0.3–5)
WBC # BLD: 8.4 K/UL (ref 3.5–11)

## 2019-03-26 PROCEDURE — 94150 VITAL CAPACITY TEST: CPT

## 2019-03-26 PROCEDURE — 93005 ELECTROCARDIOGRAM TRACING: CPT

## 2019-03-26 PROCEDURE — 94664 DEMO&/EVAL PT USE INHALER: CPT

## 2019-03-26 PROCEDURE — 6370000000 HC RX 637 (ALT 250 FOR IP): Performed by: NURSE PRACTITIONER

## 2019-03-26 PROCEDURE — 94761 N-INVAS EAR/PLS OXIMETRY MLT: CPT

## 2019-03-26 PROCEDURE — 84443 ASSAY THYROID STIM HORMONE: CPT

## 2019-03-26 PROCEDURE — 2580000003 HC RX 258: Performed by: NURSE PRACTITIONER

## 2019-03-26 PROCEDURE — 82947 ASSAY GLUCOSE BLOOD QUANT: CPT

## 2019-03-26 PROCEDURE — APPSS45 APP SPLIT SHARED TIME 31-45 MINUTES: Performed by: NURSE PRACTITIONER

## 2019-03-26 PROCEDURE — 83880 ASSAY OF NATRIURETIC PEPTIDE: CPT

## 2019-03-26 PROCEDURE — 99238 HOSP IP/OBS DSCHRG MGMT 30/<: CPT | Performed by: INTERNAL MEDICINE

## 2019-03-26 PROCEDURE — 85027 COMPLETE CBC AUTOMATED: CPT

## 2019-03-26 PROCEDURE — 84484 ASSAY OF TROPONIN QUANT: CPT

## 2019-03-26 PROCEDURE — 80053 COMPREHEN METABOLIC PANEL: CPT

## 2019-03-26 PROCEDURE — 83735 ASSAY OF MAGNESIUM: CPT

## 2019-03-26 PROCEDURE — 6360000002 HC RX W HCPCS: Performed by: NURSE PRACTITIONER

## 2019-03-26 PROCEDURE — 36415 COLL VENOUS BLD VENIPUNCTURE: CPT

## 2019-03-26 PROCEDURE — 99222 1ST HOSP IP/OBS MODERATE 55: CPT | Performed by: INTERNAL MEDICINE

## 2019-03-26 PROCEDURE — 83036 HEMOGLOBIN GLYCOSYLATED A1C: CPT

## 2019-03-26 RX ORDER — DILTIAZEM HYDROCHLORIDE 5 MG/ML
10 INJECTION INTRAVENOUS ONCE
Status: DISCONTINUED | OUTPATIENT
Start: 2019-03-26 | End: 2019-03-26 | Stop reason: HOSPADM

## 2019-03-26 RX ORDER — DIAPER,BRIEF,INFANT-TODD,DISP
EACH MISCELLANEOUS 2 TIMES DAILY
Status: DISCONTINUED | OUTPATIENT
Start: 2019-03-26 | End: 2019-03-26 | Stop reason: HOSPADM

## 2019-03-26 RX ORDER — METOPROLOL SUCCINATE 100 MG/1
150 TABLET, EXTENDED RELEASE ORAL DAILY
Qty: 90 TABLET | Refills: 0 | Status: ON HOLD | OUTPATIENT
Start: 2019-03-26 | End: 2019-01-01 | Stop reason: HOSPADM

## 2019-03-26 RX ORDER — 0.9 % SODIUM CHLORIDE 0.9 %
250 INTRAVENOUS SOLUTION INTRAVENOUS ONCE
Status: COMPLETED | OUTPATIENT
Start: 2019-03-26 | End: 2019-03-26

## 2019-03-26 RX ADMIN — CLOPIDOGREL BISULFATE 75 MG: 75 TABLET ORAL at 08:06

## 2019-03-26 RX ADMIN — LORAZEPAM 0.5 MG: 0.5 TABLET ORAL at 00:29

## 2019-03-26 RX ADMIN — ONDANSETRON 4 MG: 2 INJECTION INTRAMUSCULAR; INTRAVENOUS at 00:33

## 2019-03-26 RX ADMIN — SUCRALFATE 1 G: 1 TABLET ORAL at 00:25

## 2019-03-26 RX ADMIN — SUCRALFATE 1 G: 1 TABLET ORAL at 13:24

## 2019-03-26 RX ADMIN — INSULIN LISPRO 1 UNITS: 100 INJECTION, SOLUTION INTRAVENOUS; SUBCUTANEOUS at 13:23

## 2019-03-26 RX ADMIN — FUROSEMIDE 20 MG: 20 TABLET ORAL at 08:06

## 2019-03-26 RX ADMIN — SERTRALINE HYDROCHLORIDE 50 MG: 50 TABLET ORAL at 08:06

## 2019-03-26 RX ADMIN — SODIUM CHLORIDE 250 ML: 9 INJECTION, SOLUTION INTRAVENOUS at 01:02

## 2019-03-26 RX ADMIN — SODIUM CHLORIDE: 9 INJECTION, SOLUTION INTRAVENOUS at 00:17

## 2019-03-26 RX ADMIN — INSULIN LISPRO 1 UNITS: 100 INJECTION, SOLUTION INTRAVENOUS; SUBCUTANEOUS at 08:08

## 2019-03-26 RX ADMIN — Medication 1 CAPSULE: at 13:24

## 2019-03-26 RX ADMIN — RIVAROXABAN 20 MG: 10 TABLET, FILM COATED ORAL at 08:17

## 2019-03-26 RX ADMIN — LISINOPRIL 2.5 MG: 2.5 TABLET ORAL at 08:06

## 2019-03-26 RX ADMIN — METOPROLOL SUCCINATE 100 MG: 50 TABLET, EXTENDED RELEASE ORAL at 08:05

## 2019-03-26 RX ADMIN — INSULIN LISPRO 2 UNITS: 100 INJECTION, SOLUTION INTRAVENOUS; SUBCUTANEOUS at 00:30

## 2019-03-26 RX ADMIN — FAMOTIDINE 20 MG: 20 TABLET, FILM COATED ORAL at 00:25

## 2019-03-26 RX ADMIN — SUCRALFATE 1 G: 1 TABLET ORAL at 08:07

## 2019-03-26 RX ADMIN — Medication 1 CAPSULE: at 08:06

## 2019-03-26 RX ADMIN — SODIUM CHLORIDE: 9 INJECTION, SOLUTION INTRAVENOUS at 08:14

## 2019-03-26 RX ADMIN — SPIRONOLACTONE 25 MG: 25 TABLET ORAL at 08:06

## 2019-03-26 ASSESSMENT — PAIN SCALES - GENERAL: PAINLEVEL_OUTOF10: 0

## 2019-03-26 NOTE — FLOWSHEET NOTE
Assessment: Patient sleeping    Intervention Left prayer card.     Plan: Chaplains remain available     03/26/19 1154   Encounter Summary   Services provided to: Patient   Referral/Consult From: [address] Family members   Continue Visiting   (3/26/19)   Complexity of Encounter Low   Routine   Assessment Sleeping

## 2019-03-26 NOTE — PROGRESS NOTES
Historical Provider, MD   LORazepam (ATIVAN) 0.5 MG tablet Take 1 tablet by mouth every 8 hours as needed for Anxiety for up to 30 days. Quetaleroylavell Garcia 11/20/18 12/20/18  Dawson Galvan MD   acetaminophen (TYLENOL) 325 MG tablet Take 2 tablets by mouth every 4 hours as needed for Pain 11/15/18   Shanna Vera   prochlorperazine (COMPAZINE) 10 MG tablet Take 1 tablet by mouth every 6 hours as needed (nausea) 11/15/18   Shanna Vera   blood glucose test strips (FREESTYLE TEST STRIPS) strip TEST ONCE DAILY AS NEEDED 11/5/18   Dawson Galvan MD   Handicap Placard MISC by Does not apply route Exp 1/1/2023 12/20/17   Dawson Galvan MD   nitroGLYCERIN (NITROSTAT) 0.4 MG SL tablet Place 1 tablet under the tongue every 5 minutes as needed for Chest pain up to max of 3 total doses. If no relief after 1 dose, call 911. 9/20/17   DIYA Sheikh CNP   .   Recent Surgical History: None = 0     Assessment     Peak Flow (asthma only)    Predicted: 282  Personal Best: 443    % Predicted 100%  Peak Flow : 80- 100% = 0    FEV1/FVC    FEV1 Predicted 1.82      FEV1 1.10    FEV1 % Predicted 61%  FVC 1.49  IS volume 1500  IBW na    RR 20  Breath Sounds: clear      · Bronchodilator assessment at level  1  · Hyperinflation assessment at level   1  · Secretion Management assessment at level  1  ·   · [x]    Bronchodilator Assessment  BRONCHODILATOR ASSESSMENT SCORE  Score 0 1 2 3 4 5   Breath Sounds   []  Patient Baseline []  No Wheeze good aeration [x]  Faint, scattered wheezing, good aeration []  Expiratory Wheezing and or moderately diminished []  Insp/Exp wheeze and/or very diminished []  Insp/Exp and/ or marked distress   Respiratory Rate   []  Patient Baseline []  Less than 20 [x]  Less than 20 []  20-25 []  Greater than 25 []  Greater than 25   Peak flow % of Pred or PB []  NA   [x]  Greater than 90%  []  81-90% []  71-80% []  Less than or equal to 70%  or unable to perform []  Unable due to Respiratory Distress   Dyspnea re []  Patient Baseline []  No SOB []  No SOB [x]  SOB on exertion []  SOB min activity []  At rest/acute   e FEV% Predicted       []  NA []  Above 69%  []  Unable [x]  Above 60-69%  []  Unable []  Above 50-59%  []  Unable []  Above 35-49%  []  Unable []  Less than 35%  []  Unable                 [x]  Hyperinflation Assessment  Score 1 2 3   CXR and Breath Sounds   [x]  Clear []  No atelectasis  Basilar aeration []  Atelectasis or absent basilar breath sounds   Incentive Spirometry Volume  (Per IBW)   [x]  Greater than or equal to 15ml/Kg []  less than 15ml/Kg []  less than 15ml/Kg   Surgery within last 2 weeks [x]  None or general   []  Abdominal or thoracic surgery  []  Abdominal or thoracic   Chronic Pulmonary Historyre [x]  No []  Yes []  Yes     [x]  Secretion Management Assessment  Score 1 2 3   Bilateral Breath Sounds   [x]  Occasional Rhonchi []  Scattered Rhonchi []  Course Rhonchi and/or poor aeration   Sputum    [x]  Small amount of thin secretions []  Moderate amount of viscous secretions []  Copius, Viscious Yellow/ Secretions   CXR as reported by physician [x]  clear  []  Unavailable []  Infiltrates and/or consolidation  []  Unavailable []  Mucus Plugging and or lobar consolidation  []  Unavailable   Cough [x]  Strong, productive cough []  Weak productive cough []  No cough or weak non-productive cough   Corey Harder  12:51 AM                            FEMALE                                  MALE                            FEV1 Predicted Normal Values                        FEV1 Predicted Normal Values          Age                                     Height in Feet and Inches       Age                                     Height in Feet and Inches       4' 11\" 5' 1\" 5' 3\" 5' 5\" 5' 7\" 5' 9\" 5' 11\" 6' 1\"  4' 11\" 5' 1\" 5' 3\" 5' 5\" 5' 7\" 5' 9\" 5' 11\" 6' 1\"   42 - 45 2.49 2.66 2.84 3.03 3.22 3.42 3.62 3.83 42 - 45 2.82 3.03 3.26 3.49 3.72 3.96 4.22 4.47   46 - 49 2.40 2.57 2.76 2.94 3.14 3.33 3.54 3.75 46 - 49 2. 70 2.92 3.14 3.37 3.61 3.85 4.10 4.36   50 - 53 2.31 2.48 2.66 2.85 3.04 3.24 3.45 3.66 50 - 53 2.58 2.80 3.02 3.25 3.49 3.73 3.98 4.24   54 - 57 2.21 2.38 2.57 2.75 2.95 3.14 3.35 3.56 54 - 57 2.46 2.67 2.89 3.12 3.36 3.60 3.85 4.11   58 - 61 2.10 2.28 2.46 2.65 2.84 3.04 3.24 3.45 58 - 61 2.32 2.54 2.76 2.99 3.23 3.47 3.72 3.98   62 - 65 1.99 2.17 2.35 2.54 2.73 2.93 3.13 3.34 62 - 65 2.19 2.40 2.62 2.85 3.09 3.33 3.58 3.84   66 - 69 1.88 2.05 2.23 2.42 2.61 2.81 3.02 3.23 66 - 69 2.04 2.26 2.48 2.71 2.95 3.19 3.44 3.70   70+ 1.82 1.99 2.17 2.36 2.55 2.75 2.95 3.16 70+ 1.97 2.19 2.41 2.64 2.87 3.12 3.37 3.62             Predicted Peak Expiratory Flow Rate                                       Height (in)  Female       Height (in) Male           Age 64 62 64 63 57 71 78 74 Age            21 344 357 372 387 402 417 432 446  60 62 64 66 68 70 72 74 76   25 337 352 366 381 396 411 426 441 25 447 476 505 533 562 591 619 648 677   30 329 344 359 374 389 404 419 434 30 437 466 494 523 552 580 609 638 667   35 322 337 351 366 381 396 411 426 35 426 455 484 512 541 570 598 627 657   40 314 329 344 359 374 389 404 419 40 416 445 473 502 531 559 588 617 647   45 307 322 336 351 366 381 396 411 45 405 434 463 491 520 549 577 606 636   50 299 314 329 344 359 374 389 404 50 395 424 452 481 510 538 567 596 625   55 292 307 321 336 351 366 381 396 55 384 413 442 470 499 528 556 585 615   60 284 299 314 329 344 359 374 389 60 374 403 431 460 489 517 546 575 605   65 277 292 306 321 336 351 366 381 65 363 392 421 449 478 507 535 564 594   70 269 284 299 314 329 344 359 374 70 353 382 410 439 468 496 525 554 583   75 261 274 289 305 319 334 348 364 75 344 372 400 429 458 487 515 544 573   80 253 266 282 296 312 327 342 356 80 335 362 390 419 448 476 [phone]

## 2019-03-26 NOTE — ED PROVIDER NOTES
9/24/2018). CURRENT MEDICATIONS       Previous Medications    ACETAMINOPHEN (TYLENOL) 325 MG TABLET    Take 2 tablets by mouth every 4 hours as needed for Pain    B COMPLEX VITAMINS CAPSULE    Take 1 capsule by mouth every other day Indications: overy other day     BLOOD GLUCOSE TEST STRIPS (FREESTYLE TEST STRIPS) STRIP    TEST ONCE DAILY AS NEEDED    CLOPIDOGREL (PLAVIX) 75 MG TABLET    TAKE ONE TABLET BY MOUTH DAILY    FAMOTIDINE (PEPCID) 20 MG TABLET    TAKE ONE TABLET BY MOUTH EVERY EVENING    FUROSEMIDE (LASIX) 20 MG TABLET    Take 1 tablet by mouth daily    GLIMEPIRIDE (AMARYL) 1 MG TABLET    TAKE ONE TABLET BY MOUTH EVERY MORNING WITH BREAKFAST    HANDICAP PLACARD MISC    by Does not apply route Exp 1/1/2023    LACTOBACILLUS (CULTURELLE) CAPS CAPSULE    Take 1 capsule by mouth 3 times daily    LISINOPRIL (PRINIVIL;ZESTRIL) 2.5 MG TABLET    TAKE 1 TABLET BY MOUTH DAILY    LORAZEPAM (ATIVAN) 0.5 MG TABLET    Take 1 tablet by mouth every 8 hours as needed for Anxiety for up to 30 days. Allayne Kaley METOPROLOL SUCCINATE (TOPROL XL) 100 MG EXTENDED RELEASE TABLET    TAKE ONE TABLET BY MOUTH DAILY    NITROGLYCERIN (NITROSTAT) 0.4 MG SL TABLET    Place 1 tablet under the tongue every 5 minutes as needed for Chest pain up to max of 3 total doses. If no relief after 1 dose, call 911. OMEPRAZOLE (PRILOSEC) 40 MG DELAYED RELEASE CAPSULE    Take 1 capsule by mouth daily    PROBIOTIC PRODUCT (PROBIOTIC DAILY PO)    Take by mouth daily    PROCHLORPERAZINE (COMPAZINE) 10 MG TABLET    Take 1 tablet by mouth every 6 hours as needed (nausea)    SERTRALINE (ZOLOFT) 50 MG TABLET    TAKE ONE TABLET BY MOUTH DAILY    SPIRONOLACTONE (ALDACTONE) 25 MG TABLET    TAKE ONE TABLET BY MOUTH DAILY    SUCRALFATE (CARAFATE) 1 GM TABLET    Take 1 tablet by mouth 4 times daily    VITAMIN D (CHOLECALCIFEROL) 1000 UNITS CAPS CAPSULE    Take 1,000 Units by mouth daily.     XARELTO 20 MG TABS TABLET    TAKE ONE TABLET BY MOUTH DAILY WITH BREAKFAST ALLERGIES     is allergic to darvocet a500 [propoxyphene n-acetaminophen]; demerol hcl [meperidine]; marinol [dronabinol]; morphine sulfate [morphine]; statins [statins]; and levaquin [levofloxacin in d5w]. FAMILY HISTORY     indicated that her mother is . She indicated that her father is . She indicated that all of her five sisters are alive. family history includes Diabetes in her mother; Glaucoma in her mother; Stroke (age of onset: 64) in her father. SOCIAL HISTORY      reports that she is a non-smoker but has been exposed to tobacco smoke. She has never used smokeless tobacco. She reports that she does not drink alcohol or use drugs. PHYSICAL EXAM     INITIAL VITALS:  weight is 135 lb (61.2 kg). Her tympanic temperature is 98 °F (36.7 °C). Her blood pressure is 135/79 and her pulse is 85. Her respiration is 18 and oxygen saturation is 94%. Gen.: Patient is well-hydrated, nontoxic-appearing, very pleasant, elderly female in no apparent distress. HEENT: Head is atraumatic. Mouth shows moist mucous membranes. Neck: Supple. Respiratory: Lung sounds are clear bilateral.  Cardiac: Heart is irregularly irregular. GI: Abdomen soft, nontender.   Peripheral exam no cyanosis or edema    DIFFERENTIAL DIAGNOSIS/ MDM:     Chest pain, atrial fibrillation    DIAGNOSTIC RESULTS     EKG: All EKG's are interpreted by the Emergency Department Physician who either signs or Co-signs this chart in the absence of a cardiologist.    .  EKG interpreted by me for her first EKG reveals a atrial fib a rate of 142, left axis deviation, left bundle branch block, QRS of 124 seconds EKG is interpreted by me, reveals sinus rhythm, first-degree AV block, RI interval of 226, QRS of 146, left bundle branch block, left axis deviation    RADIOLOGY:   I directly visualized the following  images and reviewed the radiologist interpretations:  XR CHEST PORTABLE   Final Result   No evidence of acute cardiopulmonary disease. LABS:  Labs Reviewed   CBC WITH AUTO DIFFERENTIAL - Abnormal; Notable for the following components:       Result Value    RDW 15.8 (*)     All other components within normal limits   BASIC METABOLIC PANEL - Abnormal; Notable for the following components:    Glucose 276 (*)     BUN 31 (*)     CREATININE 1.35 (*)     Bun/Cre Ratio 23 (*)     Potassium 3.4 (*)     Anion Gap 18 (*)     GFR Non- 38 (*)     GFR  46 (*)     All other components within normal limits   BRAIN NATRIURETIC PEPTIDE - Abnormal; Notable for the following components:    Pro-BNP 5,350 (*)     All other components within normal limits   PROTIME-INR - Abnormal; Notable for the following components:    Protime 11.7 (*)     All other components within normal limits   TROPONIN - Abnormal; Notable for the following components:    Troponin, High Sensitivity 51 (*)     All other components within normal limits   TROPONIN - Abnormal; Notable for the following components:    Troponin, High Sensitivity 53 (*)     All other components within normal limits         EMERGENCY DEPARTMENT COURSE:   Vitals:    Vitals:    03/25/19 2156 03/25/19 2203 03/25/19 2233 03/25/19 2303   BP: (!) 142/70 127/66 135/79 135/79   Pulse: 126 133 111 85   Resp: 16 15 16 18   Temp:       TempSrc:       SpO2: 95% 95% 95% 94%   Weight:         -------------------------  BP: 135/79, Temp: 98 °F (36.7 °C), Pulse: 85, Resp: 18    No orders of the defined types were placed in this encounter. Re-evaluation Notes    . I spoke with nurse practitioner Bandar Goncalves is agreeable to admit patient is been relatively comfortable during her stay here        FINAL IMPRESSION      1. Chest pain, unspecified type    2.  Paroxysmal atrial fibrillation [facility])          DISPOSITION/PLAN   DISPOSITION Decision To Admit 03/25/2019 11:12:12 PM      Condition on Disposition    Stable    PATIENT REFERRED TO:  No follow-up provider specified. DISCHARGE MEDICATIONS:  New Prescriptions    No medications on file       (Please note that portions of this note were completed with a voice recognition program.  Efforts were made to edit the dictations but occasionally words are mis-transcribed.)    Trevino MD, F.A.C.E.P.   Attending Emergency Physician        Christiano Peterson MD  03/25/19 [id number]

## 2019-03-26 NOTE — PROGRESS NOTES
Incentive Spirometry education and demonstration given by Respiratory Therapy. Pt achieving 1500 mL at time of instruction. Incentive Spirometer left at bedside and   Patient instructed to do a minimum of 10 breaths every hour.       Jose Staton  12:50 AM

## 2019-03-26 NOTE — H&P
HOSPITALIST ADMISSION H&P      REASON FOR ADMISSION:  Atrial fibrillation with RVR  ESTIMATED LENGTH OF STAY: <2mn, 1-2 days    ATTENDING/ADMITTING PHYSICIAN: Jovany Sidhu MD  PCP: Jennifer Bojorquez MD    HISTORY OF PRESENT ILLNESS:      The patient is a 82 y.o. female patient of Jennifer Bojorquez MD who presents from the ER with c/o palpitations, nausea, fatigue, and mild dyspnea on exertion. She denies any chest pain or pressure, orthopnea, PND, or leg edema. She has a history of atrial fibrillation and was found to be in atrial fibrillation with RVR with left axis deviation, NS IVCB, and T wave abnormality in ER. While in ER, she was going in and out of atrial fibrillation alternating with sinus rhythm with a first degree AVB and left BBB. Chest xray was negative for acute process. Initial troponin was 51-->53. ProBNP 5,350. TSH 2.26. DMII---HgbA1C 6.8--blood glucose 276 in ER    Hypokalemia--serum potassium 3.4    Creatinine elevated at 1.35 from 1.1 at baseline-- possible dehydration    She has a history of CAD with LAD stent, hypertension, HL, and bioprosthetic TAVR. Last Echo 11/18- EF 35-40%, GII DD, LAD, TAVR- nml, mod MR. Patient c/o a rash on her upper forehead that she noticed 2 days ago. She denies any itching, burning, drainage, or pain from the rash. She has not tried any treatments. She has never had a similar rash before. See below for additional PMH. Patient wura-[id number]-[id number]-available records reviewed, including, but not limited to ER records, imaging results, lab results, office records, personal records, and [place] -- no signs of abuse or diversion. Past Medical History:   Diagnosis Date    Allergic rhinitis     With chronic cough.  Anxiety, generalized     Chronic with probable depression. She will take Ativan but refuses antidepressant.     Aortic stenosis     Atrial fibrillation (HCC)     Breast cancer (HCC)     2 occurrences    CAD (coronary artery disease) never used smokeless tobacco. She reports that she does not drink alcohol or use drugs. Family History:   family history includes Diabetes in her mother; Glaucoma in her mother; Stroke (age of onset: 64) in her father. REVIEW OF SYSTEMS:  See HPI and problem list; otherwise no other new complaints with respect to eyes, ENT, neck, pulmonary, coronary, chest, GI, , endocrine, musculoskeletal, immune system/connective tissue disease, hematologic, neurologic, psychiatric, skin, lymphatics, or malignancies. Code status discussed with patient/family--wishes for Full Code at this time. PHYSICAL EXAM:  Vitals:  /69   Pulse 81   Temp 97.9 °F (36.6 °C) (Tympanic)   Resp 20   Wt 136 lb 9.6 oz (62 kg)   SpO2 95%   BMI 26.68 kg/m²     General: awake, alert and cooperative  HEENT: Mucosa Pink, Moist, External nose normal, Normocephalic and Atraumatic  Neck: Supple, No Masses, Tenderness, Nodularity and No Lymphadenopathy  Chest/Lungs: Clear to Auscultation without Rales, Rhonchi, or Wheezes and Respirations even and unlabored  Cardiac: Regular Rate and Rhythm and Pedal Pulses Palpable Bilaterally  GI/Abdomen:  Bowel Sounds Present and Soft, Non-tender, without Guarding or Rebound Tenderness  : Not examined  Extremities/Musculoskeletal: All four extremities without edema and All four extremities with 5/5 strength  Skin: skin colored dry rough plaques noted on forehead along hairline,, No Cyanosis and Skin warm and dry  Neuro: Alert and Oriented, to Person, to Time, to Place, to Situation and No Localizing Signs/Symptoms  Psychiatric: Normal mood and affect      LABS:    CBC with Differential:    Lab Results   Component Value Date    WBC 8.4 03/26/2019    RBC 4.17 03/26/2019    HGB 11.6 03/26/2019    HCT 36.1 03/26/2019     03/26/2019    MCV 86.6 03/26/2019    MCH 27.8 03/26/2019    MCHC 32.1 03/26/2019    RDW 15.8 03/26/2019    LYMPHOPCT 19 03/25/2019    MONOPCT 8 03/25/2019    BASOPCT 0 03/25/2019    MONOSABS 0.70 03/25/2019    LYMPHSABS 1.90 03/25/2019    EOSABS 0.10 03/25/2019    BASOSABS 0.00 03/25/2019    DIFFTYPE NOT REPORTED 03/25/2019     CMP:    Lab Results   Component Value Date     03/26/2019    K 3.7 03/26/2019     03/26/2019    CO2 23 03/26/2019    BUN 30 03/26/2019    CREATININE 0.99 03/26/2019    GFRAA >60 03/26/2019    LABGLOM 54 03/26/2019    GLUCOSE 147 03/26/2019    PROT 6.4 03/26/2019    LABALBU 3.7 03/26/2019    CALCIUM 8.8 03/26/2019    BILITOT 0.22 03/26/2019    ALKPHOS 109 03/26/2019    AST 17 03/26/2019    ALT 11 03/26/2019       ASSESSMENT:      Patient Active Problem List   Diagnosis    Hyperlipidemia    Hypertension    [facility] neuropathy [facility] [address]    Breast cancer ([facility] [address]    Lymphedema of arm    Vitamin D deficiency    Hyperplastic colon polyp    PVC's (premature ventricular contractions)    Allergic rhinitis    Anxiety, generalized    CHF (congestive heart failure) (HCC)    DM (diabetes mellitus), type 2 ([address]    Coronary artery disease involving native coronary artery of native heart    Cellulitis of arm, left    Persistent atrial fibrillation (HCC)    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 Melina Curran    Chronic nausea    Moderate mitral regurgitation    Leg cramps    Sinus drainage    Cellulitis of left arm    Cellulitis    Atrial fibrillation with RVR ([facility] [address]       PLAN:    1. Paroxsymal atrial fibrillation with RVR-- currently in sinus rhythm-- telemetry monitoring, serial troponins, cardiology consult, con't Xarelto  2. DMII-- carb controlled diet, home medications plus SSI--monitor and titrate prn  3. Hypokalemia-- recheck in AM  4. Dehydration-- resolved with IVF, monitor renal function  5. Chronic combined CHF-- con't home lasix and aldactone, monitor fluid balance  6. Home medications reviewed  7.  See orders     Note that over 50 minutes was spent in evaluation of the patient, review of the chart and pertinent records, discussion with family/staff, etc    DESTINY NormanC, FNP-BC  8:50 AM  3/26/2019

## 2019-03-26 NOTE — PLAN OF CARE
Problem:  Activity:  Goal: Ability to tolerate increased activity will improve  Description  Ability to tolerate increased activity will improve  Outcome: Ongoing  Goal: Expression of feelings of increased energy will increase  Description  Expression of feelings of increased energy will increase  Outcome: Ongoing     Problem: Cardiac:  Goal: Ability to maintain an adequate cardiac output will improve  Description  Ability to maintain an adequate cardiac output will improve  Outcome: Ongoing  Goal: Complications related to the disease process, condition or treatment will be avoided or minimized  Description  Complications related to the disease process, condition or treatment will be avoided or minimized  Outcome: Ongoing     Problem: Coping:  Goal: Level of anxiety will decrease  Description  Level of anxiety will decrease  Outcome: Ongoing  Goal: General experience of comfort will improve  Description  General experience of comfort will improve  Outcome: Ongoing     Problem: Health Behavior:  Goal: Ability to manage health-related needs will improve  Description  Ability to manage health-related needs will improve  Outcome: Ongoing     Problem: Safety:  Goal: Ability to remain free from injury will improve  Description  Ability to remain free from injury will improve  Outcome: Ongoing  Goal: Will show no signs and symptoms of excessive bleeding  Description  Will show no signs and symptoms of excessive bleeding  Outcome: Ongoing

## 2019-03-26 NOTE — CONSULTS
ONE TABLET BY MOUTH EVERY MORNING WITH BREAKFAST 1/7/19  Yes Joel Gray MD   sertraline (ZOLOFT) 50 MG tablet TAKE ONE TABLET BY MOUTH DAILY 1/7/19  Yes Joel Gray MD   metoprolol succinate (TOPROL XL) 100 MG extended release tablet TAKE ONE TABLET BY MOUTH DAILY 12/3/18  Yes Arpit Landa MD   spironolactone (ALDACTONE) 25 MG tablet TAKE ONE TABLET BY MOUTH DAILY 12/3/18  Yes Arpit Landa MD   lactobacillus (CULTURELLE) CAPS capsule Take 1 capsule by mouth 3 times daily 11/15/18  Yes Astrid Rene   omeprazole (PRILOSEC) 40 MG delayed release capsule Take 1 capsule by mouth daily 11/15/18  Yes Jessee Valverde   famotidine (PEPCID) 20 MG tablet TAKE ONE TABLET BY MOUTH EVERY EVENING 11/5/18  Yes Joel Gray MD   sucralfate (CARAFATE) 1 GM tablet Take 1 tablet by mouth 4 times daily 11/5/18  Yes Joel Gray MD   furosemide (LASIX) 20 MG tablet Take 1 tablet by mouth daily 9/25/18  Yes Joel Gray MD   clopidogrel (PLAVIX) 75 MG tablet TAKE ONE TABLET BY MOUTH DAILY 8/15/18  Yes Jb Vuong MD   lisinopril (PRINIVIL;ZESTRIL) 2.5 MG tablet TAKE 1 TABLET BY MOUTH DAILY 5/3/18  Yes Joel Gray MD   b complex vitamins capsule Take 1 capsule by mouth every other day Indications: overy other day    Yes Historical Provider, MD   Vitamin D (CHOLECALCIFEROL) 1000 UNITS CAPS capsule Take 1,000 Units by mouth daily. Yes Historical Provider, MD   LORazepam (ATIVAN) 0.5 MG tablet Take 1 tablet by mouth every 8 hours as needed for Anxiety for up to 30 days. Domenic Gu  11/20/18 12/20/18  Joel Gray MD   acetaminophen (TYLENOL) 325 MG tablet Take 2 tablets by mouth every 4 hours as needed for Pain 11/15/18   Astrid Rene   prochlorperazine (COMPAZINE) 10 MG tablet Take 1 tablet by mouth every 6 hours as needed (nausea) 11/15/18   Astrid Rene   blood glucose test strips (FREESTYLE TEST STRIPS) strip TEST ONCE DAILY AS NEEDED 11/5/18   Joel Gray MD   Handicap Placard MISC by Does not apply route BMI 26.68 kg/m²    Constitutional and General Appearance: alert, cooperative, no distress and appears stated age  [de-identified]: PERRL, no cervical lymphadenopathy. No masses palpable. Normal oral mucosa  Respiratory:  · Normal excursion and expansion without use of accessory muscles  · Resp Auscultation: Good respiratory effort. No for increased work of breathing. On auscultation: Clear  Cardiovascular:  · The apical impulse is not displaced  · Heart tones are crisp and normal. regular S1 and S2. Murmurs: 2/6 sys murmur LSB  · Jugular venous pulsation Normal  · The carotid upstroke is normal in amplitude and contour without delay or bruit  · Peripheral pulses are symmetrical and full   Abdomen:  · No masses or tenderness  · Bowel sounds present  Extremities:  ·  No Cyanosis or Clubbing  ·  Lower extremity edema: none  ·  Skin: Warm and dry  Neurological:  · Alert and oriented.   · Moves all extremities well  · No abnormalities of mood, affect, memory, mentation, or behavior are noted    DATA:    Diagnostics:      EKG:   Results for orders placed or performed during the hospital encounter of 03/25/19   EKG 12 Lead   Result Value Ref Range    Ventricular Rate 118 BPM    Atrial Rate 79 BPM    QRS Duration 126 ms    Q-T Interval 378 ms    QTc Calculation (Bazett) 529 ms    R Axis -54 degrees    T Axis 123 degrees    Narrative    Atrial fibrillation with rapid ventricular response  Left axis deviation  Non-specific intra-ventricular conduction block  Cannot rule out Septal infarct , age undetermined  T wave abnormality, consider lateral ischemia  Abnormal ECG  When compared with ECG of 25-MAR-2019 20:13,  Atrial fibrillation has replaced Sinus rhythm       Echo:  Results for orders placed or performed during the hospital encounter of 11/12/18   ECHO Complete 2D W Doppler W Color    Narrative    Transthoracic Echocardiography Report (TTE)     Patient Name Jonnathan Estrada    Date of Study             11/14/2018                [facility] M      Date of      1935  Gender                    Female   Birth      Age          82 year(s)  Race                            Room Number  0215        Height:                   56 inch, 142.24 cm      Corporate ID [id number]  Weight:                   138 pounds, 62.6 kg   #      Patient Acct [de-identified]   BSA:         1.52 m^2     BMI:       30.94 kg/m^2   #      MR #         [id number]     Sonographer               Triston Cummins RDCS      Accession #  [id number]   Interpreting Physician    [address] [place] [address] Street      Fellow                   Referring Nurse                            Practitioner      Interpreting             Referring Physician       Tai Bravo,   Fellow                                             DEFIANCE*     Type of Study      TTE procedure:2D Echocardiogram, M-Mode, Doppler, Color Doppler. Procedure Date  Date: 11/14/2018 Start: 11:09 AM    Study Location: [facility]  Technical Quality: Adequate visualization    Indications:Congestive heart failure, combined systolic and diastolic  dysfunction. Patient Status: Inpatient    Height: 56 inches Weight: 138 pounds BSA: 1.52 m^2 BMI: 30.94 kg/m^2    Allergies    - Demerol.      - Morphine.      - *Unlisted:(DARVOCET). - *Unlisted:(MARINAL). CONCLUSIONS    Summary  Left ventricle is normal in size with mild concentric hypertrophy. Global hypokinesis. Global left ventricular systolic function is moderately reduced. Estimated ejection fraction of 35 to 40%. Grade II (moderate) left ventricular diastolic dysfunction. Left atrium is mildly dilated. Bioprosthetic aortic valve, per TAVR, that is normal in appearance and  function. No aortic insufficiency. Moderate mitral regurgitation. No significant pericardial effusion is seen. Left pleural effusion.     Signature  ----------------------------------------------------------------------------   Electronically signed by Triston Cummins RDCS(Sonographer) on 2018   12:13 PM  ----------------------------------------------------------------------------    ----------------------------------------------------------------------------   Electronically signed by Pal Corona(Interpreting physician) on   2018 12:57 PM  ----------------------------------------------------------------------------  FINDINGS  Left Atrium  Left atrium is mildly dilated. Left Ventricle  Left ventricle is normal in size with mild concentric hypertrophy. Global hypokinesis. Global left ventricular systolic function is moderately reduced with an  estimated ejection fraction of 35 to 40%. Grade II (moderate) left ventricular diastolic dysfunction. Right Atrium  Right atrium is normal in size. Right Ventricle  Normal right ventricular size and function. Mitral Valve  Mitral annular calcification is seen. Moderate mitral regurgitation. Aortic Valve  Bioprosthetic aortic valve, per TAVR, that is normal in appearance and  function. Peak instantaneous gradient 4 mmHg and mean gradient 3 mmHg. No aortic insufficiency. Tricuspid Valve  Normal tricuspid valve leaflets. Mild tricuspid regurgitation. Estimated right ventricular systolic pressure is 56 mmHg. Moderate pulmonary hypertension. Pulmonic Valve  The pulmonic valve is normal in structure. Pericardial Effusion  No significant pericardial effusion is seen. Pleural Effusion  Left pleural effusion. Miscellaneous  Normal aortic root dimension.     M-mode / 2D Measurements & Calculations:      LVIDd:4.39 cm(3.7 - 5.6 cm)      Diastolic XSFDTV:255.4 ml   LVIDs:3.41 cm(2.2 - 4.0 cm)      Systolic UYHAKI:85.0 ml   IVSd:1.21 cm(0.6 - 1.1 cm)       Aortic Root:2.4 cm(2.0 - 3.7 cm)   LVPWd:1.19 cm(0.6 - 1.1 cm)      LA Dimension: 4.6 cm(1.9 - 4.0 cm)   Fractional Shortenin.32 %   Calculated LVEF (%): 37.77 %      Mitral:                                 Aortic      Peak E-Wave: 1.15 m/s                   Peak Velocity: 0.99 m/s   Peak tricuspid valve structure. Trivial tricuspid regurgitation. Pulmonic Valve  Pulmonic valve was not visualized. Miscellaneous  Descending aorta shows mild plaque formation. Results for orders placed or performed during the hospital encounter of 09/25/17   Cardiac Catheterization    Narrative    Glenda Kam MD     9/25/2017  3:50 PM  [facility]  ESSENCE procedure Note         Today's Date: 9/25/2017  Indication: Aortic stenosis and Mitral regurgitation    Operators:  Primary: Glenda Kam MD  (Attending)   Assistant: Saintclair Fitch, MD (Fellow)    Patient seen and examined. History and Physical reviewed. Labs   reviewed. After informed consent was obtained with explanation of the risks   and benefits, the patient was brought to Cath lab. All sedation   was administered by the cardiologist. The oropharynx was pre   anesthetisized with cetacaine spray. ESSENCE:    Structures:  LA: Enlarged  CADY: No thrombus  RA: Normal  RV:  Normal  LV: Normal in size with Moderately reduced LV systolic function. Estimated LVEF: 35-40%  Aorta: Mild atheromatous disease arch  Percardium: No pericardial effusion  Septum: No intracardiac shunt via color Doppler. Valves:  Mitral Valve: Structurally normal. Moderate regurgitation is   identified. Aortic Valve: The aortic valve is trileaflet and heavily   calcified with restricted mobility and opening. EDITH is 0.41 cm2    by planimetry. Difficult to obatin velocities/gradients. Trace   regurgitiation is identified. Tricuspid valve: Structurally normal. Mild regurgitation is   identified. Pulmonary valve: Normal. No significant regurgitation    No valvular vegetations or thrombus. Summary:     1. A ESSENCE was performed without complications. 2. Normal Lv size with moderately reduced systolic function. Estimated LVEF 35-40%  3. Heavily calcified aortic valve with severe aortic stenosis   with EDITH of 0.4 cm2 by planimetry  4.  Moderate mitral regurgitation        Electronically signed by Jonas Drake MD on 9/25/2017 at   1:56 PM  Fellow, [address] [place] [address]       I have reviewed the case with resident / fellow  I have examined the patient personally  Agree with treatment plan, correction innotes was made as   appropriate, and discussed final arrangement based on  my   evaluation and exam  Risk and benefit of procedure explained     Procedure was performed by me, with all aspect of the procedure   being done using standard protocol. Phuc Weber MD  [facility]              Labs:     CBC:   Recent Labs     03/25/19 2055 03/26/19  0343   WBC 9.7 8.4   HGB 12.5 11.6*   HCT 38.7 36.1    169     BMP:   Recent Labs     03/25/19 2055 03/26/19 0343    145*   K 3.4* 3.7   CO2 23 23   BUN 31* 30*   CREATININE 1.35* 0.99*   LABGLOM 38* 54*   GLUCOSE 276* 147*     BNP: No results for input(s): BNP in the last 72 hours. PT/INR:   Recent Labs     03/25/19 2055   PROTIME 11.7*   INR 1.1     APTT:No results for input(s): APTT in the last 72 hours.   CARDIAC ENZYMES:  Recent Labs     03/25/19  2228 03/26/19  0043 03/26/19  0338   TROPHS 53* 55* 52*     FASTING LIPID PANEL:  Lab Results   Component Value Date    HDL 52 11/13/2018    LDLCALC 124 01/06/2016    TRIG 93 11/13/2018     LIVER PROFILE:  Recent Labs     03/26/19  0343   AST 17   ALT 11   LABALBU 3.7         IMPRESSION:    Patient Active Problem List   Diagnosis    Hyperlipidemia    Hypertension    Diabetic neuropathy ([address]    Breast cancer ([address]    Lymphedema of arm    Vitamin D deficiency    Hyperplastic colon polyp    PVC's (premature ventricular contractions)    Allergic rhinitis    Anxiety, generalized    CHF (congestive heart failure) ([address]    DM (diabetes mellitus), type 2 ([address]    Coronary artery disease involving native coronary artery of native heart    Cellulitis of arm, left    Persistent atrial fibrillation (HCC)    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 Kiya Hamlin    Chronic nausea    Moderate mitral regurgitation    Leg cramps    Sinus drainage    Cellulitis of left arm    Cellulitis    Atrial fibrillation with RVR (HCC)     - Parox AF with RVR on admission- now NSR  - Known CAD s/p PCI to LAD in 2016  - Known Ischemic CM, HFrEF- EF usually around 40%  - Severe AS s/p TAVR on 12/17  - LBBB  - HTN  - DL  - Last Echo 11/18- EF 35-40%, GIIDD, LAD, TAVR- nml, mod MR, left pleural effusion    RECOMMENDATIONS:  - now back in NSR  - agree with toprol 100 mg qd  - acute MI ruled out with troponins  - continue plavix, xarelto, lasix, lisinopril, aldactone  - no further inpatient work up, follow up in 2 weeks. Discussed with patient and nursing. Thank you for allowing me to participate in the care of this patient, please do not hesitate to call if you have any questions. Barrett Davenport DO, [facility], [address] [facility]  [facility]  [phone]

## 2019-03-26 NOTE — PLAN OF CARE
Problem:  Activity:  Goal: Ability to tolerate increased activity will improve  Description  Ability to tolerate increased activity will improve  3/26/2019 1135 by Shara Chu RN  Outcome: Ongoing  3/26/2019 0339 by Patricia Cobb RN  Outcome: Ongoing     Problem: Cardiac:  Goal: Ability to maintain an adequate cardiac output will improve  Description  Ability to maintain an adequate cardiac output will improve  3/26/2019 1135 by Shara Chu RN  Outcome: Ongoing  3/26/2019 0339 by Patricia Cobb RN  Outcome: Ongoing     Problem: Cardiac:  Goal: Complications related to the disease process, condition or treatment will be avoided or minimized  Description  Complications related to the disease process, condition or treatment will be avoided or minimized  3/26/2019 1135 by Shara Chu RN  Outcome: Ongoing  3/26/2019 0339 by Patricia Cobb RN  Outcome: Ongoing

## 2019-03-27 ENCOUNTER — CARE COORDINATION (OUTPATIENT)
Dept: CASE MANAGEMENT | Age: 84
End: 2019-03-27

## 2019-03-27 DIAGNOSIS — R07.9 CHEST PAIN, UNSPECIFIED TYPE: Primary | ICD-10-CM

## 2019-03-27 PROCEDURE — 1111F DSCHRG MED/CURRENT MED MERGE: CPT | Performed by: FAMILY MEDICINE

## 2019-03-27 NOTE — DISCHARGE SUMMARY
[address]                 [address] [place] [address], [address] [place] [address]                               DISCHARGE SUMMARY    PATIENT NAME: Lyric Sarmiento                 :        1935  MED REC NO:   [id number]                             ROOM:       0214  ACCOUNT NO:   [de-identified]                           ADMIT DATE: 2019  PROVIDER:     Cullen Cee                  DISCHARGE DATE: 2019    ATTENDING PHYSICIAN OF HOSPITALIZATION:  Haroon Krishna MD    PERSONAL PHYSICIAN:  Davy Treviño, [facility]. The patient was seen by [facility], [facility]. DIAGNOSES:  1. Atrial fibrillation, RVR intermittent, 2019. MI ruled out,  2019. EKG 2019, atrial fibrillation, rate 118, LAD, left  bundle branch block. EKG, 2019, sinus rhythm, rate 84,  first-degree AV block, LAD, left bundle branch block. EKG, 2019,  atrial fibrillation, rate 142, LAD, left bundle-branch block, lateral  T-wave inversion. 2.  Congestive heart failure, chronic combined systolic diastolic,  . Prior history of flash pulmonary edema, none this  hospitalization. A 2D echo, 2018, mildly dilated LAE, mild  concentric LVH, global hypokinesis, moderately reduced left ventricular  systolic function, normal right ventricular systolic function, MAC, mild  MR, bioprosthetic AVR, TAVR; normal appearance and function, peak  gradient 4 mmHg, mean gradient 3 mmHg, mild TR, RVSP approximately 55  mmHg, mild-to-moderate pulmonary hypertension, grade II moderate  diastolic dysfunction. LVEF 35-40%. 3.  Coronary artery disease. Cardiac catheterization, 2017,  patent LAD, LVEF 30%. Cardiac catheterization, 2016, normal OM;  95% mid LAD; normal D1, ERON LAD stent placed; moderate LV systolic  dysfunction, LVEF 30-35%.   Aortic stenosis, moderate with grade 3/6  systolic ejection murmur radiation to carotids. TAVR 12/07/2017.  4. Intermittent atrial fibrillation history, now recurrent. 5.  Diabetes mellitus type 2 with diabetic neuropathy. 6.  Hypertension. 7.  Hyperlipidemia. 8.  Chronic kidney disease, stage 3.  9. Anxiety. Other medical problems set forth in the progress note of 03/26/2019,  incorporated for reference herein. HISTORY OF PRESENT ILLNESS AND HOSPITAL COURSE:  The patient is an  28-year-old white female, patient of [facility] and of [facility], [facility]. The patient presented after a rapid heart rate at home, has had a prior  history of intermittent atrial fibrillation, no events for about a  2-year period per patient report. The patient noted she felt somewhat  of a fast heart rate and a little bit lightheaded. When it was checked  with a pulse ox, it was found to be in the 140 range and an irregular  rhythm. The patient was seen and evaluated in Emergency Department,  subsequently admitted. The patient was seen by Cardiology,  recommendation at this time is for rate control where there is an  increase in the patient's Toprol-XL to 150 mg p.o. daily with no other  interventions at this time. The patient is to be seen in the office in  the outpatient setting by Cardiology. The patient had intermittent atrial fibrillation in the hospital  setting; came in in AFib, initially converted to a normal sinus rhythm  and then back into atrial fibrillation. The patient noted to have some plaque-like rash present on the forehead,  nontender, no pruritus. She has been putting thick doses of Neosporin  ointment on the surface of it. This may in fact have been compounding  the issue for her. Discontinued the Neosporin and other topicals, the  patient will be placed on a 1% hydrocortisone cream with a thin coat to  be applied twice daily.     LABORATORY DATA AROUND THE TIME OF DISCHARGE:  White cell count was

## 2019-04-01 ENCOUNTER — CARE COORDINATION (OUTPATIENT)
Dept: CARE COORDINATION | Age: 84
End: 2019-04-01

## 2019-04-01 NOTE — CARE COORDINATION
[facility] [address] Transitions Follow Up Call    2019    Patient: Liborio [place]  Patient : 1935   MRN: [id number]  Reason for Admission:   Discharge Date: 3/26/19 RARS: Readmission Risk Score: 26         Spoke with: Called and spoke with patient. Patient states \"I'm doing very well\". She states no further episodes of Chest pain, or heart palpitations. She has a F/U appointment tomorrow. She states she had her sister-in-law staying with her since discharge. Patient has no immediate needs for me at this time. Will Follow up at later time. Care Transitions Subsequent and Final Call    Subsequent and Final Calls  Do you have any ongoing symptoms?:  No  Have your medications changed?:  No  Do you have any questions related to your medications?:  No  Do you currently have any active services?:  No  Do you have any needs or concerns that I can assist you with?:  No  Identified Barriers:  None  Care Transitions Interventions  Other Interventions:             Follow Up  Future Appointments   Date Time Provider Destinee Gaffney   2019  9:20 AM MD ROME Coyle DP   4/15/2019  2:45 PM Jacquie Kidney, DO [facility]   2019  9:05 AM SCHEDULE, Sam Grand[address]ar [address] [facility] [address] [facility] [place]   2019  1:30 PM [facility]   2019 10:20 AM MD ROME Coyle DPP   2019 11:00 AM Jacquie Ortiz DO [facility]       Keegan Argueta LPN

## 2019-04-02 ENCOUNTER — OFFICE VISIT (OUTPATIENT)
Dept: FAMILY MEDICINE CLINIC | Age: 84
End: 2019-04-02
Payer: MEDICARE

## 2019-04-02 ENCOUNTER — CARE COORDINATION (OUTPATIENT)
Dept: CARE COORDINATION | Age: 84
End: 2019-04-02

## 2019-04-02 VITALS
TEMPERATURE: 98.1 F | OXYGEN SATURATION: 96 % | HEIGHT: 60 IN | DIASTOLIC BLOOD PRESSURE: 64 MMHG | WEIGHT: 137.8 LBS | HEART RATE: 75 BPM | SYSTOLIC BLOOD PRESSURE: 118 MMHG | BODY MASS INDEX: 27.05 KG/M2

## 2019-04-02 DIAGNOSIS — I50.43 ACUTE ON CHRONIC COMBINED SYSTOLIC AND DIASTOLIC CONGESTIVE HEART FAILURE (HCC): ICD-10-CM

## 2019-04-02 DIAGNOSIS — L85.3 DRY SKIN DERMATITIS: ICD-10-CM

## 2019-04-02 DIAGNOSIS — I48.19 PERSISTENT ATRIAL FIBRILLATION (HCC): Primary | ICD-10-CM

## 2019-04-02 PROCEDURE — 99495 TRANSJ CARE MGMT MOD F2F 14D: CPT | Performed by: FAMILY MEDICINE

## 2019-04-02 PROCEDURE — 1111F DSCHRG MED/CURRENT MED MERGE: CPT | Performed by: FAMILY MEDICINE

## 2019-04-02 RX ORDER — TRIAMCINOLONE ACETONIDE 1 MG/G
CREAM TOPICAL
Qty: 80 G | Refills: 0 | Status: SHIPPED | OUTPATIENT
Start: 2019-04-02 | End: 2019-01-01

## 2019-04-02 ASSESSMENT — PATIENT HEALTH QUESTIONNAIRE - PHQ9
2. FEELING DOWN, DEPRESSED OR HOPELESS: 0
1. LITTLE INTEREST OR PLEASURE IN DOING THINGS: 0
SUM OF ALL RESPONSES TO PHQ9 QUESTIONS 1 & 2: 0
SUM OF ALL RESPONSES TO PHQ QUESTIONS 1-9: 0
SUM OF ALL RESPONSES TO PHQ QUESTIONS 1-9: 0

## 2019-04-02 ASSESSMENT — ENCOUNTER SYMPTOMS
COUGH: 0
WHEEZING: 0
CHEST TIGHTNESS: 0
SHORTNESS OF BREATH: 0

## 2019-04-02 NOTE — CARE COORDINATION
Ambulatory Care Coordination Note  4/2/2019  CM Risk Score: 10  [place] Mortality Risk Score: 24    ACC: Partha Doyle, RN    Summary Note: : Adrian Linares has been following in care coordination for Patient has Type II Diabetes- non insulin, CHF, A-Fib, Hx of cellulitis lt upper extremity, CAD, Hx of breast cancer, Lymphedema arm     S/P TAVR 12/7/2017.      She follows with cardiology, pulmonology, and optometrist.     [facility] 11/12-15/2018 Cellulitis lt arm, nausea, dehydration  [facility] 3/25-26/2019 Chest pain, A Fib.       Plan of Care : Continue assessment, support, and education- if appropriate- transfer to surveillance. F/U on literature given on A-Fib                       Met with Adrian Linares while here to see PCP. Reinforced CHF and DM Zone Tools through teach back and given to Patient. She voiced understanding. BS log sheet given after reviewing target ranges. Adrian Belenjasbir voiced understanding. Advanced Directives discussed and literature given. Adrian Linares voiced understanding but declined doing Advanced Directives. She was given literature on A-fib. Her questions were answered. Lab Results   Component Value Date    LABA1C 6.8 (H) 03/26/2019    LABA1C 7.6 (H) 10/29/2018    LABA1C 6.9 (H) 04/23/2018     Lab Results   Component Value Date     03/26/2019     10/29/2018     04/23/2018     Diabetes Assessment    Medic Alert ID:  No  Meal Planning:  Avoidance of concentrated sweets   How often do you test your blood sugar?:  Daily (Comment: could test 3-4 times a day)   Do you have barriers with adherence to non-pharmacologic self-management interventions?  (Nutrition/Exercise/Self-Monitoring):  Yes   Have you ever had to go to the ED for symptoms of low blood sugar?:  No       No patient-reported symptoms   Do you have hyperglycemia symptoms?:  No   Do you have hypoglycemia symptoms?:  No   Last Blood Sugar Value:  130   Blood Sugar Monitoring Regimen:  Before Meals, At Bedtime   Blood Sugar Trends: pain up to max of 3 total doses. If no relief after 1 dose, call 911. 9/20/17   DIYA Clifton - CNP   b complex vitamins capsule Take 1 capsule by mouth every other day Indications: overy other day     Historical Provider, MD   Vitamin D (CHOLECALCIFEROL) 1000 UNITS CAPS capsule Take 1,000 Units by mouth daily.     Historical Provider, MD       Future Appointments   Date Time Provider Destinee Gaffney   4/15/2019  2:45 PM DO PANTERA Henson [facility]   4/29/2019  9:05 AM SCHEDULE, [facility] LAB MD LAB [place]   5/1/2019  1:30 PM [facility]   5/6/2019 10:20 AM Lizeth Snyder MD [facility]   6/24/2019 11:00 AM DO PANTERA Henson [facility]

## 2019-04-02 NOTE — PROGRESS NOTES
Post-Discharge Transitional Care Management Services or Hospital Follow Up      Armando Bhagat   YOB: 1935    Date of Office Visit:  4/2/2019  Date of Hospital Admission: 3/25/19  Date of Hospital Discharge: 3/26/19  Readmission Risk Score(high >=14%. Medium >=10%):Readmission Risk Score: 26      Care management risk score Rising risk (score 2-5) and Complex Care (Scores >=6): 10     Non face to face  following discharge, date last encounter closed (first attempt may have been earlier): 3/28/2019 11:06 AM 3/28/2019 11:06 AM    Call initiated 2 business days of discharge: Yes     Patient Active Problem List   Diagnosis    Hyperlipidemia    Hypertension    Diabetic neuropathy ([address]    Breast cancer ([address]    Lymphedema of arm    Vitamin D deficiency    Hyperplastic colon polyp    PVC's (premature ventricular contractions)    Allergic rhinitis    Anxiety, generalized    CHF (congestive heart failure) ([address]    DM (diabetes mellitus), type 2 ([address]    Coronary artery disease involving native coronary artery of native heart    Cellulitis of arm, left    Persistent atrial fibrillation ([address]    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 Aliza Peterson    Chronic nausea    Moderate mitral regurgitation    Leg cramps    Sinus drainage    Cellulitis of left arm    Cellulitis    [facility])    Chest pain       Allergies   Allergen Reactions    Darvocet A500 [Propoxyphene N-Acetaminophen] Other (See Comments)     weakness    Demerol Hcl [Meperidine] Other (See Comments)     Weakness      Marinol [Dronabinol] Other (See Comments)     weakness    Morphine Sulfate [Morphine] Other (See Comments)     Weakness      Statins [Statins] Other (See Comments)     Cause muscle pain and she would prefer not to take them any longer.     Levaquin [Levofloxacin In D5w] Nausea Only and Anxiety       Medications listed as [facility] the [facility] Medication Instructions ALIZA:    Printed on:04/02/19 1800   Medication Information                      acetaminophen (TYLENOL) 325 MG tablet  Take 2 tablets by mouth every 4 hours as needed for Pain             b complex vitamins capsule  Take 1 capsule by mouth every other day Indications: overy other day              blood glucose test strips (FREESTYLE TEST STRIPS) strip  TEST ONCE DAILY AS NEEDED             clopidogrel (PLAVIX) 75 MG tablet  TAKE ONE TABLET BY MOUTH DAILY             famotidine (PEPCID) 20 MG tablet  TAKE ONE TABLET BY MOUTH EVERY EVENING             furosemide (LASIX) 20 MG tablet  Take 1 tablet by mouth daily             glimepiride (AMARYL) 1 MG tablet  TAKE ONE TABLET BY MOUTH EVERY MORNING WITH BREAKFAST             Handicap Placard MISC  by Does not apply route Exp 1/1/2023             lactobacillus (CULTURELLE) CAPS capsule  Take 1 capsule by mouth 3 times daily             lisinopril (PRINIVIL;ZESTRIL) 2.5 MG tablet  TAKE 1 TABLET BY MOUTH DAILY             LORazepam (ATIVAN) 0.5 MG tablet  Take 1 tablet by mouth every 8 hours as needed for Anxiety for up to 30 days. .             metoprolol succinate (TOPROL XL) 100 MG extended release tablet  Take 1.5 tablets by mouth daily             nitroGLYCERIN (NITROSTAT) 0.4 MG SL tablet  Place 1 tablet under the tongue every 5 minutes as needed for Chest pain up to max of 3 total doses. If no relief after 1 dose, call 911.              omeprazole (PRILOSEC) 40 MG delayed release capsule  Take 1 capsule by mouth daily             Probiotic Product (PROBIOTIC DAILY PO)  Take by mouth daily             prochlorperazine (COMPAZINE) 10 MG tablet  Take 1 tablet by mouth every 6 hours as needed (nausea)             sertraline (ZOLOFT) 50 MG tablet  TAKE ONE TABLET BY MOUTH DAILY             spironolactone (ALDACTONE) 25 MG tablet  TAKE ONE TABLET BY MOUTH DAILY             sucralfate (CARAFATE) 1 GM tablet  Take 1 20 MG tablet Take 1 tablet by mouth daily 90 tablet 3    clopidogrel (PLAVIX) 75 MG tablet TAKE ONE TABLET BY MOUTH DAILY 90 tablet 2    lisinopril (PRINIVIL;ZESTRIL) 2.5 MG tablet TAKE 1 TABLET BY MOUTH DAILY 90 tablet 3    Handicap Placard MISC by Does not apply route Exp 1/1/2023 1 each 0    b complex vitamins capsule Take 1 capsule by mouth every other day Indications: overy other day       Vitamin D (CHOLECALCIFEROL) 1000 UNITS CAPS capsule Take 1,000 Units by mouth daily. Medications patient taking as of now reconciled against medications ordered at time of hospital discharge: Yes    Chief Complaint   Patient presents with    Follow-Up from [facility] dc 3/26/19 dx Afib- - she wants to know the difference between RVR and AFIB - her daughter saw it on her problem list- on discharge paperwork    Rash     around her hairline- doesn't itch or burn       HPI Here today for a hospital follow up for afib and she has a rash. She was admitted on 3/25 with afib with RVR. She had her metoprolol increased to 150mg daily which helped to control her rate. She did not require cardioversion. She is not having any chest pain. She is not getting short of breath unless she starts to have a panic attack. Because her rate came down quickly and responded well to the increase in metoprolol she was able to be discharged home on 3/26. Rash: she has a new rash that has been on her forehead that has been present for about a month. She tends to pick at it. It does not hurt or itch but she cannot help herself from picking at it. She has been using cortisone 10 on it without any improvement. It is across her entire forehead. Inpatient course: Discharge summary reviewed- see chart. Interval history/Current status: improving    Review of Systems   Constitutional: Negative for activity change, appetite change, chills, fatigue and fever. Eyes: Negative for visual disturbance.    Respiratory: LIST    2. Acute on chronic combined systolic and diastolic congestive heart failure ([address]  Stable; she is not having any issues with shortness of breath or leg swelling so her heart is compensating well. - AL DISCHARGE MEDS RECONCILED W/ CURRENT OUTPATIENT MED LIST    3. Dry skin dermatitis  New; appears to be severely dry skin that is irritated from her picking at it. I sent in some triamcinolone cream for her.         Medical Decision Making: moderate complexity    Rosa Elena York MD  4/2/2019  6:00 PM

## 2019-04-02 NOTE — PATIENT INSTRUCTIONS
Patient Education        Learning About Atrial Fibrillation  What is atrial fibrillation? Atrial fibrillation (say \"AY-tree-cally pvf-omzs-MJQ-shun\") is the most common type of irregular heartbeat (arrhythmia). Normally, the heart beats in a strong, steady rhythm. In atrial fibrillation, a problem with the heart's electrical system causes the two upper parts of the heart (the atria) to quiver, or fibrillate. Your heart rate also may be faster than normal.  Atrial fibrillation can be dangerous because if the heartbeat isn't strong and steady, blood can collect, or pool, in the atria. And pooled blood is more likely to form clots. Clots can travel to the brain, block blood flow, and cause a stroke. Atrial fibrillation can also lead to heart failure. Treatment for atrial fibrillation helps prevent stroke and heart failure. It also helps relieve symptoms. Atrial fibrillation is often caused by another heart problem. It may happen after heart surgery. It may also be caused by other problems, such as an overactive thyroid gland or lung disease. Many people with atrial fibrillation are able to live full and active lives. What are the symptoms? Some people feel symptoms when they have episodes of atrial fibrillation. But other people don't notice any symptoms. If you have symptoms, you may feel:  · A fluttering, racing, or pounding feeling in your chest called palpitations. · Weak or tired. · Dizzy or lightheaded. · Short of breath. · Chest pain. · Confused. You may notice signs of atrial fibrillation when you check your pulse. Your pulse may seem uneven or fast.  What can you expect when you have atrial fibrillation? At first, spells of atrial fibrillation may come on suddenly and last a short time. It may go away on its own or it goes away after treatment. This is called paroxysmal atrial fibrillation. Over time, the spells may last longer and occur more often. They often don't go away on their own.   How [employer], [employer]. Care instructions adapted under license by [facility] ([facility]). If you have questions about a medical condition or this instruction, always ask your healthcare professional. [address] any warranty or liability for your use of this information. Patient Education        Learning About Living [place]  What is a living will? A living will is a legal form you use to write down the kind of care you want at the end of your life. It is used by the health professionals who will treat you if you aren't able to decide for yourself. If you put your wishes in writing, your loved ones and others will know what kind of care you want. They won't need to guess. This can ease your mind and be helpful to others. A living will is not the same as an estate or property will. An estate will explains what you want to happen with your money and property after you die. Is a living will a legal document? A living will is a legal document. Each state has its own laws about living choi. If you move to another state, make sure that your living will is legal in the state where you now live. Or you might use a universal form that has been approved by many states. This kind of form can sometimes be completed and stored online. Your electronic copy will then be available wherever you have a connection to the Internet. In most cases, doctors will respect your wishes even if you have a form from a different state. · You don't need an  to complete a living will. But legal advice can be helpful if your state's laws are unclear, your health history is complicated, or your family can't agree on what should be in your living will. · You can change your living will at any time. Some people find that their wishes about end-of-life care change as their health changes. · In addition to making a living will, think about completing a medical power of  form.  This form lets you name the living will to keep in your medical record. If you have more than one doctor, make sure that each one has a copy. · You may want to put a copy of your living will where it can be easily found. Where can you learn more? Go to [email or web address] and sign in to your [employer] account. Enter B149 in the [employer] box to learn more about \"Learning About Living Jeanette Luke. \"     If you do not have an account, please click on the \"Sign Up Now\" link. Current as of: April 18, 2018  Content Version: 11.9  © 4185-6043 [employer]. Care instructions adapted under license by [facility] ([facility]). If you have questions about a medical condition or this instruction, always ask your healthcare professional. [address] any warranty or liability for your use of this information.

## 2019-04-15 ENCOUNTER — OFFICE VISIT (OUTPATIENT)
Dept: CARDIOLOGY | Age: 84
End: 2019-04-15
Payer: MEDICARE

## 2019-04-15 VITALS
HEART RATE: 76 BPM | DIASTOLIC BLOOD PRESSURE: 68 MMHG | HEIGHT: 60 IN | SYSTOLIC BLOOD PRESSURE: 120 MMHG | WEIGHT: 140 LBS | BODY MASS INDEX: 27.48 KG/M2

## 2019-04-15 DIAGNOSIS — E78.5 HYPERLIPIDEMIA, UNSPECIFIED HYPERLIPIDEMIA TYPE: ICD-10-CM

## 2019-04-15 DIAGNOSIS — I49.3 PVC'S (PREMATURE VENTRICULAR CONTRACTIONS): ICD-10-CM

## 2019-04-15 DIAGNOSIS — R06.02 SHORTNESS OF BREATH: ICD-10-CM

## 2019-04-15 DIAGNOSIS — I48.91 ATRIAL FIBRILLATION, UNSPECIFIED TYPE (HCC): ICD-10-CM

## 2019-04-15 DIAGNOSIS — E78.5 DYSLIPIDEMIA: ICD-10-CM

## 2019-04-15 DIAGNOSIS — I10 ESSENTIAL HYPERTENSION: Primary | ICD-10-CM

## 2019-04-15 PROCEDURE — G8400 PT W/DXA NO RESULTS DOC: HCPCS | Performed by: INTERNAL MEDICINE

## 2019-04-15 PROCEDURE — G8598 ASA/ANTIPLAT THER USED: HCPCS | Performed by: INTERNAL MEDICINE

## 2019-04-15 PROCEDURE — 1036F TOBACCO NON-USER: CPT | Performed by: INTERNAL MEDICINE

## 2019-04-15 PROCEDURE — 1123F ACP DISCUSS/DSCN MKR DOCD: CPT | Performed by: INTERNAL MEDICINE

## 2019-04-15 PROCEDURE — 99214 OFFICE O/P EST MOD 30 MIN: CPT | Performed by: INTERNAL MEDICINE

## 2019-04-15 PROCEDURE — G8417 CALC BMI ABV UP PARAM F/U: HCPCS | Performed by: INTERNAL MEDICINE

## 2019-04-15 PROCEDURE — 1111F DSCHRG MED/CURRENT MED MERGE: CPT | Performed by: INTERNAL MEDICINE

## 2019-04-15 PROCEDURE — 4040F PNEUMOC VAC/ADMIN/RCVD: CPT | Performed by: INTERNAL MEDICINE

## 2019-04-15 PROCEDURE — 1090F PRES/ABSN URINE INCON ASSESS: CPT | Performed by: INTERNAL MEDICINE

## 2019-04-15 PROCEDURE — 93000 ELECTROCARDIOGRAM COMPLETE: CPT | Performed by: INTERNAL MEDICINE

## 2019-04-15 PROCEDURE — G8427 DOCREV CUR MEDS BY ELIG CLIN: HCPCS | Performed by: INTERNAL MEDICINE

## 2019-04-15 RX ORDER — RIVAROXABAN 20 MG/1
TABLET, FILM COATED ORAL
Qty: 30 TABLET | Refills: 5 | Status: ON HOLD | OUTPATIENT
Start: 2019-04-15 | End: 2019-01-01 | Stop reason: HOSPADM

## 2019-04-15 NOTE — PROGRESS NOTES
Cardiology Consultation  [facility]  [place], Skyla Garduno, Jose Alberto Ryan)    04/15/19    Patient is here for follow up after being hospitalized for Afib c RVR, doing well in NSR    HPI and Chief Complaint:  Erlinda Dennis  is doing well from a cardiac standpoint. Good functional capacity with no significant change in functional capacity. No chest pain, no dyspnea, no PND, no syncope or pre-syncope, no orthopnea. No symptoms of CHF or angina/chest pain. REVIEW OF SYSTEMS:    · Constitutional: there has been no unanticipated weight loss. There's been No change in energy level, No change in activity level. · Eyes: No visual changes or diplopia. No scleral icterus. · ENT: No Headaches, hearing loss or vertigo. No mouth sores or sore throat. · Cardiovascular: No chest pain, no dyspnea, no chf like symptoms  · Respiratory: No previous pulmonary problems  · Gastrointestinal: No abdominal pain, appetite loss, blood in stools. No change in bowel or bladder habits. · Genitourinary: No dysuria, trouble voiding, or hematuria. · Musculoskeletal:  No gait disturbance, No weakness or joint complaints. · Integumentary: No rash or pruritis. · Neurological: No headache, diplopia, change in muscle strength, numbness or tingling. No change in gait, balance, coordination, mood, affect, memory, mentation, behavior. · Psychiatric: No new anxiety or depression. · Endocrine: No temperature intolerance. No excessive thirst, fluid intake, or urination. No tremor. · Hematologic/Lymphatic: No abnormal bruising or bleeding, blood clots or swollen lymph nodes. · Allergic/Immunologic: No nasal congestion or hives. Physical Exam:   Vitals: /68   Pulse 76   Ht 5' (1.524 m)   Wt 140 lb (63.5 kg)   BMI 27.34 kg/m²   General appearance: alert and cooperative with exam  HEENT: Head: Normocephalic, no lesions, without obvious abnormality.   Neck: no carotid bruit, no JVD  Lungs: clear to auscultation bilaterally  Heart: regular rate and rhythm, S1, S2 normal, 3/6 systolic murmur, no click, rub or gallop  Abdomen: soft, non-tender; bowel sounds normal; no masses,  no organomegaly  Extremities: extremities normal, atraumatic, no cyanosis or edema  Neurologic: Mental status: Alert, oriented, thought content appropriate      EKG: Normal Sinus Rhythm with no ischemic changes. LAST ECHO:    LAST STRESS:    LAST CATH:    TTE 1/15/18  SUMMARY:  1.  Left ventricular dimensions are within normal diameter, LV ejection fraction 40 to 45% range. 2.  Grade 1 diastolic dysfunction. 3.  Mild to moderate left ventricular hypertrophy. 4.  Left atrial enlargement noted. 5.  Normal right ventricular size and systolic function. 6.  Mitral annular calcification with mild mitral regurgitation. 7.  Status post TAVR, mean gradient of 2 mmHg.  No aortic valve regurgitation noted. 8.  Trivial tricuspid regurgitation, right ventricular systolic pressure 30 mmHg. 9.  No pericardial effusion. Past Medical and Surgical History, Problem List, Allergies, Medications, Labs, Imaging, all reviewed extensively in EMR and with the patient.       Assessment and Plan:     S/p hospitalization for Afib c RVR- placed on BB and well controlled in NSR now. Doing well. -Severe aortic stenosis S/p TAVR 12/7/17. Doing well. Dental antibiotic prophylaxis need discussed. Follow with annual echoes. -CAD- Cardiac cath 09/6/2016 showed LM normal, LAD mid 95% treated with xience 2.5/18 mm ERON, LCX normal, RCA small vessel with proximal 40% stenosis. On plavix.  -Cardiomyopathy-LVEF 40-45%  -Chronic LBBB  -PAF- on xarelto. Continue metoprolol  -HTN- Continue current therapy.   -Hyperlipidemia- continue statin.  -Chronic systolic/diastolic CHF and chronic left upper ext lymphedema. H/o b/l mastectomy. Continue lasix and aldactone  -Carotid u/s 04/19/2016 no evidence of hemodynamically significant stenosis.       Thank you for allowing me to

## 2019-04-18 ENCOUNTER — CARE COORDINATION (OUTPATIENT)
Dept: CARE COORDINATION | Age: 84
End: 2019-04-18

## 2019-04-18 NOTE — CARE COORDINATION
Ambulatory Care Coordination Note  4/18/2019  CM Risk Score: 10  [place] Mortality Risk Score: 24    ACC: Hamlet Almonte RN     Summary Note: Keshawn Bender has been following in care coordination for Patient has Type II Diabetes- non insulin, CHF, A-Fib, Hx of cellulitis lt upper extremity, CAD, Hx of breast cancer, Lymphedema arm     S/P TAVR 12/7/2017.      She follows with cardiology, pulmonology, and optometrist.     [facility] 11/12-15/2018 Cellulitis lt arm, nausea, dehydration  [facility] 3/25-26/2019 Chest pain, A Fib.       Plan of Care : Continue assessment, support, and education- if appropriate- transfer to surveillance. Continue to reinforce Zone Tools- DM and CHF. F/U lab work 4/29/2019  and PCP appt 5/6/2019. Spoke with Keshawn Bender. She reported that she has read information on A-Fib that this writer gave her. She denied any questions. Reviewed through teach back. She saw cardiologist- no changes in medications. F/U in 6 months. She is taking Xarelto and Plavix. She is no longer on ASA. She was able to discuss medications through teach back. She denied any concerns at this time. CHF and DM Zone Tool reviewed through teach back. This writer will see Patient at PCP appt 5-6-2019 and if appropriate- return to surveillance. Lab Results   Component Value Date    LABA1C 6.8 (H) 03/26/2019    LABA1C 7.6 (H) 10/29/2018    LABA1C 6.9 (H) 04/23/2018     Lab Results   Component Value Date     03/26/2019     10/29/2018     04/23/2018     Diabetes Assessment    Medic Alert ID:  No  Meal Planning:  Avoidance of concentrated sweets   How often do you test your blood sugar?:  Daily (Comment: could test 3-4 times a day)   Do you have barriers with adherence to non-pharmacologic self-management interventions?  (Nutrition/Exercise/Self-Monitoring):  Yes   Have you ever had to go to the ED for symptoms of low blood sugar?:  No       No patient-reported symptoms   Do you have hyperglycemia symptoms?:  No   Do you have hypoglycemia symptoms?:  No   Last Blood Sugar Value:  157   Blood Sugar Monitoring Regimen:  Morning Fasting   Blood Sugar Trends:  No Change       and   Congestive Heart Failure Assessment    Are you currently restricting fluids?:  No Restriction  Do you understand a low sodium diet?:  Yes  Do you understand how to read food labels?:  Yes  How many restaurant meals do you eat per week?:  1-2  Do you salt your food before tasting it?:  No     No patient-reported symptoms      Symptoms:      Symptom course:  stable  Patient-reported weight (lb):  137  Weight trend:  stable  Salt intake watch compared to last visit:  stable         Care Coordination Interventions    Program Enrollment:  Complex Care  Referral from Primary Care Provider:  No  Suggested Interventions and Community Resources  Diabetes Education:  Completed  Zone Management Tools:  Completed (Comment: CHF and DM )         Goals Addressed                 This Visit's Progress       Patient Stated     Conditions and Symptoms (pt-stated)   On track     I will schedule office visits, as directed by my provider. I will keep my appointment or reschedule if I have to cancel. I will notify my provider of any barriers to my plan of care. I will follow my Zone Management tool to seek urgent or emergent care. I will notify my provider of any symptoms that indicate a worsening of my condition. Barriers: lack of education  Plan for overcoming my barriers: Care Coordination Intervention  Confidence: 9/10  Anticipated Goal Completion Date: 5/13/2019         Patient Stated (pt-stated)   On track     Will log blood sugar and bring long to appt    Barriers: none  Plan for overcoming my barriers: N/A  Confidence: 10/10  Anticipated Goal Completion Date: will bring log at next appt            Prior to Admission medications    Medication Sig Start Date End Date Taking?  Authorizing Provider   XARELTO 20 MG TABS tablet TAKE ONE TABLET BY MOUTH EVERY MORNING WITH BREAKFAST 4/15/19   Daryle Ok, MD   triamcinolone (KENALOG) 0.1 % cream Apply topically 2 times daily. 4/2/19   Daryle Ok, MD   metoprolol succinate (TOPROL XL) 100 MG extended release tablet Take 1.5 tablets by mouth daily 3/26/19   Corinne Esqueda   Probiotic Product (PROBIOTIC DAILY PO) Take by mouth daily    Historical Provider, MD   glimepiride (AMARYL) 1 MG tablet TAKE ONE TABLET BY MOUTH EVERY MORNING WITH BREAKFAST 1/7/19   Daryle Ok, MD   sertraline (ZOLOFT) 50 MG tablet TAKE ONE TABLET BY MOUTH DAILY 1/7/19   Daryle Ok, MD   spironolactone (ALDACTONE) 25 MG tablet TAKE ONE TABLET BY MOUTH DAILY 12/3/18   Aris Gosselin, MD   LORazepam (ATIVAN) 0.5 MG tablet Take 1 tablet by mouth every 8 hours as needed for Anxiety for up to 30 days. Sherry Rodriguez  11/20/18 4/15/19  Daryle Ok, MD   acetaminophen (TYLENOL) 325 MG tablet Take 2 tablets by mouth every 4 hours as needed for Pain 11/15/18   Corinne Esqueda   lactobacillus (CULTURELLE) CAPS capsule Take 1 capsule by mouth 3 times daily 11/15/18   Corinne Esqueda   omeprazole (PRILOSEC) 40 MG delayed release capsule Take 1 capsule by mouth daily 11/15/18   Corinne Esqueda   prochlorperazine (COMPAZINE) 10 MG tablet Take 1 tablet by mouth every 6 hours as needed (nausea) 11/15/18   Corinne Esqueda   famotidine (PEPCID) 20 MG tablet TAKE ONE TABLET BY MOUTH EVERY EVENING 11/5/18   Daryle Ok, MD   blood glucose test strips (FREESTYLE TEST STRIPS) strip TEST ONCE DAILY AS NEEDED 11/5/18   Daryle Ok, MD   sucralfate (CARAFATE) 1 GM tablet Take 1 tablet by mouth 4 times daily 11/5/18   Daryle Ok, MD   furosemide (LASIX) 20 MG tablet Take 1 tablet by mouth daily 9/25/18   Daryle Ok, MD   clopidogrel (PLAVIX) 75 MG tablet TAKE ONE TABLET BY MOUTH DAILY 8/15/18   Khalida Lassiter MD   lisinopril (PRINIVIL;ZESTRIL) 2.5 MG tablet TAKE 1 TABLET BY MOUTH DAILY 5/3/18   Daryle Ok, MD   Handicap [facility] [address] [facility] by Does not apply route Exp 1/1/2023 12/20/17   Umu Shields MD   nitroGLYCERIN (NITROSTAT) 0.4 MG SL tablet Place 1 tablet under the tongue every 5 minutes as needed for Chest pain up to max of 3 total doses. If no relief after 1 dose, call 911. 9/20/17   DIYA Brandt - CNP   b complex vitamins capsule Take 1 capsule by mouth every other day Indications: overy other day     Historical Provider, MD   Vitamin D (CHOLECALCIFEROL) 1000 UNITS CAPS capsule Take 1,000 Units by mouth daily.     Historical Provider, MD       Future Appointments   Date Time Provider Destinee Gaffney   4/29/2019  9:05 AM SCHEDULE, Sam Katz [address] [facility] [place]   5/1/2019  1:30 PM [facility]   5/6/2019 10:20 AM MD [facility]   10/21/2019 11:30 AM DO PANTERA Walker [facility]

## 2019-04-23 NOTE — TELEPHONE ENCOUNTER
Pt calling stating that the cream for the rash on her face is not helping. Pt is wondering if she can try something different. Pt is wondering if it could be rosacea. Pt would like to speak with nurse. Please call pt.

## 2019-05-01 NOTE — PROGRESS NOTES
REASON FOR FOLLOW UP   Pleural effusion    HISTORY OF PRESENT ILLNESS:    Tangela Crabtree is a 82y.o. year old female   From pulmonary standpoint, patient is doing well. She did have atrial fibrillation in March was admitted to hospital, treated for afib -  CHF is compensated. She has no lower extremity edema, crackles. Denies any cough, hemoptysis. Previous visit  with history of severe aortic stenosis and mitral regurgitation with diastolic heart failure who has been referred to me because of left pleural effusion which was seen on CT of the chest during evaluation for TAVR . Patient is a has atrial fibrillation and is on anticoagulation and has PCI with Plavix and aspirin being used as antiplatelet agents. She does complain of grade 3 dyspnea. Does have chronic orthopnea which is better at this present time as she is feeling compensated from her congestive heart failure. She is denies any cough, any weight loss, any fever, chills or rigors. She has a history of bilateral mastectomy with 33 radiations to left chest in 1996, hands has been in remission. No history of DVT or PE. She is chronic lymphedema of the left arm. .  I reviewed her x-rays and CT scans in 2014, when she returned from [place]. He went into pulmonary edema and was diuresed at that time and 16. Her x-ray shows cardiomegaly but clear CP angles and no pulmonary edema. Thanks again got worse in October. She denies any chest pain, no wheezing. A lifelong nonsmoker.                   Immunization   Immunization History   Administered Date(s) Administered    Influenza, High Dose (Fluzone 65 yrs and older) 10/17/2013, 12/15/2014, 10/23/2015, 10/14/2016    Pneumococcal 13-valent Conjugate ([id number]) 04/27/2015    Pneumococcal Polysaccharide ([id number]) 05/02/2017        Pneumococcal Vaccine     [x] Up to date    [] Indicated   [] Refused  [] Contraindicated       Influenza Vaccine   [] Up to date    [x] Indicated   [] Refused [] Contraindicated     PAST MEDICAL HISTORY:       Diagnosis Date    Allergic rhinitis     With chronic cough.  Anxiety, generalized     Chronic with probable depression. She will take Ativan but refuses antidepressant.  Aortic stenosis     Atrial fibrillation (HCC)     Breast cancer ([facility] [address]     2 occurrences    CAD (coronary artery disease) November 2012    Minimal disease by catheterization, 11/12, with wedge pressure of the right heart. She is taking Lasix for this and being followed by Cardiology.  Diabetic neuropathy (HCC)     Hyperlipidemia     Hypertension     runs low    Lymphedema of arm     Left arm, due to lymph node dissection and mastectomy.  PVC's (premature ventricular contractions)     Chronic. Patient currently undergoing workup for arrhythmia.  S/P cardiac cath 09/06/2016    Type II or unspecified type diabetes mellitus without mention of complication, not stated as uncontrolled     Vitamin D deficiency          Family History:       Problem Relation Age of Onset    Diabetes Mother     Glaucoma Mother     Stroke Father 64       SURGICAL HISTORY:   Past Surgical History:   Procedure Laterality Date    AORTIC VALVE REPLACEMENT  12/07/2017    TAVR    CARDIAC CATHETERIZATION  November 2012    Showed minimal CAD with increased wedge pressure of the right heart. Patient saw Cardiology and started on Lasix.  CARDIAC CATHETERIZATION  09/19/2017    right and left heart cath   EF 30% WITH PATENT LAD STENT    COLONOSCOPY  08/12/2009    diverticulosis being found.  COLONOSCOPY  9/8/14    grade 2-3 internal hemorrhoids - banded x 2, random biopsies taken to r/o - normal, repeat 5yrs due to hx of polyps- garber    CORONARY ANGIOPLASTY WITH STENT PLACEMENT  09/2016    PTCA / Drug Eluting Stent:, LAD and / or branches    FRACTURE SURGERY  08/10/99    Left elbow ORIF     HYSTERECTOMY  09/09/2002    With bladder repair for benign reasons.     MASTECTOMY Bilateral 1995 and 2000 With lymph node dissections in 1995 and 2000.  OTHER SURGICAL HISTORY Right 6/2015    Index and long trigger finger release    RI COLONOSCOPY FLX DX W/COLLJ SPEC WHEN PFRMD N/A 9/24/2018    COLONOSCOPY w/ hemorrhoid bending performed by Lis Hutchison MD at [facility]:      There  no history of TB or TB exposure. There  no asbestos or silica dust exposure. The patient reports  no coal, foundry, quarry or [employer] exposure. Travel history reveals [place]. There  no history of recreational or IV drug use. There  no hot tube exposure. Pets  no    Occupational history office work     TOBACCO:   reports that she is a non-smoker but has been exposed to tobacco smoke. She has never used smokeless tobacco.  ETOH:   reports that she does not drink alcohol. ALLERGIES:      Allergies   Allergen Reactions    Darvocet A500 [Propoxyphene N-Acetaminophen] Other (See Comments)     weakness    Demerol Hcl [Meperidine] Other (See Comments)     Weakness      Marinol [Dronabinol] Other (See Comments)     weakness    Morphine Sulfate [Morphine] Other (See Comments)     Weakness      Statins [Statins] Other (See Comments)     Cause muscle pain and she would prefer not to take them any longer.  Levaquin [Levofloxacin In D5w] Nausea Only and Anxiety         Home Meds:   Prior to Admission medications    Medication Sig Start Date End Date Taking? Authorizing Provider   metroNIDAZOLE (METROGEL) 0.75 % gel Apply topically 2 times daily.  4/23/19  Yes Daryle Ok, MD   XARELTO 20 MG TABS tablet TAKE ONE TABLET BY MOUTH EVERY MORNING WITH BREAKFAST 4/15/19  Yes Daryle Ok, MD   metoprolol succinate (TOPROL XL) 100 MG extended release tablet Take 1.5 tablets by mouth daily 3/26/19  Yes Corinne Innocent   Probiotic Product (PROBIOTIC DAILY PO) Take by mouth daily   Yes Historical Provider, MD   glimepiride (AMARYL) 1 MG tablet TAKE ONE TABLET BY MOUTH EVERY MORNING WITH BREAKFAST 1/7/19  Yes Joel Gray MD   sertraline (ZOLOFT) 50 MG tablet TAKE ONE TABLET BY MOUTH DAILY 1/7/19  Yes Joel Gray MD   spironolactone (ALDACTONE) 25 MG tablet TAKE ONE TABLET BY MOUTH DAILY 12/3/18  Yes Arpit Landa MD   LORazepam (ATIVAN) 0.5 MG tablet Take 1 tablet by mouth every 8 hours as needed for Anxiety for up to 30 days. Domenic Gu 11/20/18 5/1/19 Yes Joel Gray MD   lactobacillus (CULTURELLE) CAPS capsule Take 1 capsule by mouth 3 times daily 11/15/18  Yes Jessee Valverde   famotidine (PEPCID) 20 MG tablet TAKE ONE TABLET BY MOUTH EVERY EVENING 11/5/18  Yes Joel Gray MD   blood glucose test strips (FREESTYLE TEST STRIPS) strip TEST ONCE DAILY AS NEEDED 11/5/18  Yes Joel Gray MD   sucralfate (CARAFATE) 1 GM tablet Take 1 tablet by mouth 4 times daily 11/5/18  Yes Joel Gray MD   furosemide (LASIX) 20 MG tablet Take 1 tablet by mouth daily 9/25/18  Yes Joel Gray MD   clopidogrel (PLAVIX) 75 MG tablet TAKE ONE TABLET BY MOUTH DAILY 8/15/18  Yes Jb Vuong MD   lisinopril (PRINIVIL;ZESTRIL) 2.5 MG tablet TAKE 1 TABLET BY MOUTH DAILY 5/3/18  Yes Joel Gray MD   Handicap Placard MISC by Does not apply route Exp 1/1/2023 12/20/17  Yes Joel Gray MD   b complex vitamins capsule Take 1 capsule by mouth every other day Indications: overy other day    Yes Historical Provider, MD   Vitamin D (CHOLECALCIFEROL) 1000 UNITS CAPS capsule Take 1,000 Units by mouth daily. Yes Historical Provider, MD   triamcinolone (KENALOG) 0.1 % cream Apply topically 2 times daily.  4/2/19   Joel Gray MD   acetaminophen (TYLENOL) 325 MG tablet Take 2 tablets by mouth every 4 hours as needed for Pain 11/15/18   Astrid Rene   omeprazole (PRILOSEC) 40 MG delayed release capsule Take 1 capsule by mouth daily 11/15/18   Astrid Rene   prochlorperazine (COMPAZINE) 10 MG tablet Take 1 tablet by mouth every 6 hours as needed (nausea) 11/15/18   Astrid Rene   nitroGLYCERIN show pleural effusion pulmonary edema. .  Presently she is better. A. fib is under control. CHF is compensated  Follow-up in one year      Requested Prescriptions      No prescriptions requested or ordered in this encounter       There are no discontinued medications. Lidia Mcleod received counseling on the following healthy behaviors: nutrition, exercise and medication adherence    Patient given educational materials : see patient instruction       Discussed use, benefit, and side effects of prescribed medications. Barriers to medication compliance addressed. All patient questions answered. Pt voiced understanding. I hope this updates you on my evaluation and clinical thinking. Thank you for allowing me to participate in his care. Sincerely,    Electronically signed by Mame Dewitt MD on 5/1/2019 at 1:54 PM     Please note that this chart was generated using voice recognition Dragon dictation software. Although every effort was made to ensure the accuracy of this automated transcription, some errors in transcription may have occurred.

## 2019-05-06 NOTE — PROGRESS NOTES
[address] [facility]  Dept: [phone]  Dept Fax: [phone]  Loc: [phone]    Anup Blackman is a 82 y.o. female who presents today for her medical conditions/complaints as noted below. Anup Blackman is c/o of   Chief Complaint   Patient presents with    6 Month Follow-Up     would like to talk about the rash on her face is not getting better she has been putting on the medication but it is not getting better    Medication Refill     loaded- some one must have sent you a refill request for the lisinopril wouldn't let me load it    Discuss Labs     drawn 4-29-19       HPI:     HPI Here today for a rash and follow up of her her diabetes and a cough. Rash: stable; she has tried treating it with triamcinolone without any improvement and she has not had any improvement with metrogel. She has not tried any new hair gels and she did not recently get her hair done. She does tend to pick at it although she claims it does not itch. This rash has been there for about a month and it really hasn't improved. DM: stable; her morning sugars are about 150 in the mornings. She is not having any hypoglycemic episodes and her highest was 200 a few weeks ago. Cough: she is getting a lot of drainage at night time. She is still taking her pepcid and carafate with good relief of her GERD. She has tried a benadryl and mucinex DM without any improvement. She has never tried claritin. Past Medical History:   Diagnosis Date    Allergic rhinitis     With chronic cough.  Anxiety, generalized     Chronic with probable depression. She will take Ativan but refuses antidepressant.  Aortic stenosis     Atrial fibrillation (HCC)     Breast cancer ([facility] [address]     2 occurrences    CAD (coronary artery disease) November 2012    Minimal disease by catheterization, 11/12, with wedge pressure of the right heart.  She is taking Lasix for this and (TYLENOL) 325 MG tablet Take 2 tablets by mouth every 4 hours as needed for Pain 120 tablet 0    famotidine (PEPCID) 20 MG tablet TAKE ONE TABLET BY MOUTH EVERY EVENING 90 tablet 3    blood glucose test strips (FREESTYLE TEST STRIPS) strip TEST ONCE DAILY AS NEEDED 100 strip 3    furosemide (LASIX) 20 MG tablet Take 1 tablet by mouth daily 90 tablet 3    clopidogrel (PLAVIX) 75 MG tablet TAKE ONE TABLET BY MOUTH DAILY 90 tablet 2    lisinopril (PRINIVIL;ZESTRIL) 2.5 MG tablet TAKE 1 TABLET BY MOUTH DAILY 90 tablet 3    Handicap Placard MISC by Does not apply route Exp 1/1/2023 1 each 0    b complex vitamins capsule Take 1 capsule by mouth every other day Indications: overy other day       Vitamin D (CHOLECALCIFEROL) 1000 UNITS CAPS capsule Take 1,000 Units by mouth daily.  prochlorperazine (COMPAZINE) 10 MG tablet Take 1 tablet by mouth every 6 hours as needed (nausea) 20 tablet 0    nitroGLYCERIN (NITROSTAT) 0.4 MG SL tablet Place 1 tablet under the tongue every 5 minutes as needed for Chest pain up to max of 3 total doses. If no relief after 1 dose, call 911. 25 tablet 3     No current facility-administered medications for this visit. Allergies   Allergen Reactions    Darvocet A500 [Propoxyphene N-Acetaminophen] Other (See Comments)     weakness    Demerol Hcl [Meperidine] Other (See Comments)     Weakness      Marinol [Dronabinol] Other (See Comments)     weakness    Morphine Sulfate [Morphine] Other (See Comments)     Weakness      Statins [Statins] Other (See Comments)     Cause muscle pain and she would prefer not to take them any longer.  Levaquin [Levofloxacin In D5w] Nausea Only and Anxiety       Subjective:     Review of Systems   Constitutional: Negative for activity change, appetite change, chills, fatigue and fever. HENT: Positive for congestion. Eyes: Negative for visual disturbance.    Respiratory: Negative for cough, chest tightness, shortness of breath and wheezing. Cardiovascular: Negative for chest pain, palpitations and leg swelling. Gastrointestinal: Positive for nausea (occasionally). Negative for blood in stool, constipation and diarrhea. Genitourinary: Negative for difficulty urinating. Skin: Positive for rash (on her face). Neurological: Positive for headaches. Negative for dizziness, syncope, weakness and light-headedness. Psychiatric/Behavioral: Negative for suicidal ideas. The patient is nervous/anxious (stable; worse if she rushes to do something). Objective:      Physical Exam   Constitutional: She is oriented to person, place, and time. She appears well-developed and well-nourished. No distress. Eyes: Conjunctivae are normal.   Neck: Normal range of motion. Neck supple. No thyromegaly present. Cardiovascular: Normal rate, regular rhythm, normal heart sounds and intact distal pulses. No murmur heard. Pulmonary/Chest: Effort normal and breath sounds normal. No respiratory distress. She has no wheezes. Musculoskeletal: She exhibits no edema. Lymphadenopathy:     She has no cervical adenopathy. Neurological: She is alert and oriented to person, place, and time. Skin: Skin is warm and dry. Rash noted. Rash is papular. No erythema. Nursing note and vitals reviewed. /78 (Site: Right Upper Arm, Position: Sitting, Cuff Size: Medium Adult)   Pulse 75   Ht 5' (1.524 m)   Wt 139 lb 3.2 oz (63.1 kg)   SpO2 97%   BMI 27.19 kg/m²     Assessment:       Diagnosis Orders   1. Vivian Kothari MD, Dermatology, [place]   2. Type 2 diabetes mellitus with hyperosmolarity without coma, without long-term current use of insulin ([facility])  Basic Metabolic Panel    Hemoglobin A1C   3. Non-seasonal allergic rhinitis due to pollen     4. Pure hypercholesterolemia  Lipid Panel      No visits with results within 1 Day(s) from this visit.    Latest known visit with results is:   Hospital Outpatient Visit on

## 2019-05-06 NOTE — CARE COORDINATION
Ambulatory Care Coordination Note  5/6/2019  CM Risk Score: 10  [place] Mortality Risk Score:      ACC: Kelby Rivas RN    Summary Note: Leoncio Oliveira has been following in care coordination for Patient has Type II Diabetes- non insulin, CHF, A-Fib, Hx of cellulitis lt upper extremity, CAD, Hx of breast cancer, Lymphedema arm   S/P TAVR 12/7/2017. Leoncio Oliveira had been in surveillance [address] [place] [address]- reactivated ACC.      She follows with cardiology, pulmonology, and optometrist.     [facility] 11/12-15/2018 Cellulitis lt arm, nausea, dehydration  [facility] 3/25-26/2019 Chest pain, A Fib.        Plan of Care : Continue assessment, support, and education- if appropriate- graduate from [facility] [employer] [facility]. F/U on dermatology appointment. Continue to reinforce Zone Tools- DM and CHF    Met with Leoncio Oliveira while here to see PCP. Her PCP has returned from medical leave. She has been referred to dermatologist d/u facial skin issues. Lab Results   Component Value Date    LABA1C 6.8 (H) 04/29/2019    LABA1C 6.8 (H) 03/26/2019    LABA1C 7.6 (H) 10/29/2018     Lab Results   Component Value Date     04/29/2019     03/26/2019     10/29/2018     General Assessment    Do you have any symptoms that are causing concern?:  Yes  Progression since Onset:  Unchanged  Reported Symptoms:  Other (Comment: face with skin issues)       Diabetes Assessment    Medic Alert ID:  No  Meal Planning:  Avoidance of concentrated sweets   How often do you test your blood sugar?:  Daily (Comment: could test 3-4 times a day)   Do you have barriers with adherence to non-pharmacologic self-management interventions?  (Nutrition/Exercise/Self-Monitoring):  Yes   Have you ever had to go to the ED for symptoms of low blood sugar?:  No       No patient-reported symptoms   Do you have hyperglycemia symptoms?:  No   Do you have hypoglycemia symptoms?:  No   Last Blood Sugar Value:  185   Blood Sugar Monitoring Regimen:  Once a Day   Blood Sugar Trends:  Fluctuating       and   Congestive Heart Failure Assessment    Are you currently restricting fluids?:  No Restriction  Do you understand a low sodium diet?:  Yes  Do you understand how to read food labels?:  Yes  How many restaurant meals do you eat per week?:  1-2  Do you salt your food before tasting it?:  No     No patient-reported symptoms      Symptoms:      Symptom course:  stable  Patient-reported weight (lb):  139  Weight trend:  stable  Salt intake watch compared to last visit:  stable         Care Coordination Interventions    Program Enrollment:  Complex Care  Referral from Primary Care Provider:  No  Suggested Interventions and Community Resources  Diabetes Education:  Completed  Zone Management Tools:  Completed (Comment: CHF and DM )         Goals Addressed                 This Visit's Progress       Patient Stated     Conditions and Symptoms (pt-stated)   On track     I will schedule office visits, as directed by my provider. I will keep my appointment or reschedule if I have to cancel. I will notify my provider of any barriers to my plan of care. I will follow my Zone Management tool to seek urgent or emergent care. I will notify my provider of any symptoms that indicate a worsening of my condition. Barriers: lack of education  Plan for overcoming my barriers: Care Coordination Intervention  Confidence: 9/10  Anticipated Goal Completion Date: 5/13/2019         COMPLETED: Patient Stated (pt-stated)   On track     Will log blood sugar and bring long to appt    Barriers: none  Plan for overcoming my barriers: N/A  Confidence: 10/10  Anticipated Goal Completion Date: will bring log at next appt            Prior to Admission medications    Medication Sig Start Date End Date Taking?  Authorizing Provider   sucralfate (CARAFATE) 1 GM tablet Take 1 tablet by mouth 4 times daily 5/6/19   Coy Flores MD   loratadine (CLARITIN) 10 MG tablet Take 1 tablet by mouth daily 5/6/19   Alba Colon Karel Branch MD   Handicap Placard MISC by Does not apply route Exp 1/1/2023 12/20/17   Karel Branch MD   nitroGLYCERIN (NITROSTAT) 0.4 MG SL tablet Place 1 tablet under the tongue every 5 minutes as needed for Chest pain up to max of 3 total doses. If no relief after 1 dose, call 911. 9/20/17   DIYA Baum - CNP   b complex vitamins capsule Take 1 capsule by mouth every other day Indications: overy other day     Historical Provider, MD   Vitamin D (CHOLECALCIFEROL) 1000 UNITS CAPS capsule Take 1,000 Units by mouth daily.     Historical Provider, MD       Future Appointments   Date Time Provider Destinee Yeimy   10/21/2019 11:30 AM DO PANTERA Navarro [facility]   11/6/2019 10:20 [facility]   5/6/2020  1:15 PM Otis [place] MD [facility]   5/7/2020 11:00 AM Karel Branch MD [facility]

## 2019-05-06 NOTE — PATIENT INSTRUCTIONS
No visits with results within 1 Day(s) from this visit.    Latest known visit with results is:   Hospital Outpatient Visit on 04/29/2019   Component Date Value Ref Range Status    Glucose 04/29/2019 185* 70 - 99 mg/dL Final    BUN 04/29/2019 27* 8 - 23 mg/dL Final    CREATININE 04/29/2019 1.08* 0.50 - 0.90 mg/dL Final    Bun/Cre Ratio 04/29/2019 25* 9 - 20 Final    Calcium 04/29/2019 9.2  8.6 - 10.4 mg/dL Final    Sodium 04/29/2019 142  135 - 144 mmol/L Final    Potassium 04/29/2019 4.5  3.7 - 5.3 mmol/L Final    Chloride 04/29/2019 104  98 - 107 mmol/L Final    CO2 04/29/2019 22  20 - 31 mmol/L Final    Anion Gap 04/29/2019 16  9 - 17 mmol/L Final    GFR Non- 04/29/2019 49* >60 mL/min Final    GFR  04/29/2019 59* >60 mL/min Final    GFR Comment 04/29/2019        Final    GFR Staging 04/29/2019 NOT REPORTED   Final    Hemoglobin A1C 04/29/2019 6.8* 4.8 - 5.9 % Final    Estimated Avg Glucose 04/29/2019 148  mg/dL Final

## 2019-05-22 NOTE — CARE COORDINATION
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      Kimberly Bronw had been in surveillance [facility]- reactivated ACC.      She follows with cardiology, pulmonology, and optometrist.     [facility] 11/12-15/2018 Cellulitis lt arm, nausea, dehydration  [facility] 3/25-26/2019 Chest pain, A Fib.        Plan of Care : Graduate from [facility] [address] with Kimberly Brown. She denied any concerns/issues. Weight and BS stable. Discussed completion of ACC. She is in agreement. She is aware that writer and Nataliya Encarnacion will no longer be calling her. She voiced understanding. She has this writer's contact information to call prn with concerns. General Assessment    Do you have any symptoms that are causing concern?:  No       Diabetes Assessment    Medic Alert ID:  No  Meal Planning:  Avoidance of concentrated sweets   How often do you test your blood sugar?:  Daily (Comment: could test 3-4 times a day)   Do you have barriers with adherence to non-pharmacologic self-management interventions?  (Nutrition/Exercise/Self-Monitoring):  Yes   Have you ever had to go to the ED for symptoms of low blood sugar?:  No       No patient-reported symptoms   Do you have hyperglycemia symptoms?:  No   Do you have hypoglycemia symptoms?:  No   Last Blood Sugar Value:  125   Blood Sugar Monitoring Regimen:  Once a Day   Blood Sugar Trends:  No Change       and   Congestive Heart Failure Assessment    Are you currently restricting fluids?:  No Restriction  Do you understand a low sodium diet?:  Yes  Do you understand how to read food labels?:  Yes  How many restaurant meals do you eat per week?:  1-2  Do you salt your food before tasting it?:  No     No patient-reported symptoms      Symptoms:      Symptom course:  stable  Patient-reported weight (lb):  132  Weight trend:  stable  Salt intake watch compared to last visit:  stable         Care Coordination Interventions    Program Enrollment:  Complex Care  Referral from Primary Care Provider:  No  Suggested Interventions and Community Resources  Diabetes Education:  Completed  Zone Management Tools:  Completed (Comment: CHF and DM )         Goals Addressed                 This Visit's Progress       Patient Stated     COMPLETED: Conditions and Symptoms (pt-stated)   On track     I will schedule office visits, as directed by my provider. I will keep my appointment or reschedule if I have to cancel. I will notify my provider of any barriers to my plan of care. I will follow my Zone Management tool to seek urgent or emergent care. I will notify my provider of any symptoms that indicate a worsening of my condition. Barriers: lack of education  Plan for overcoming my barriers: Care Coordination Intervention  Confidence: 9/10  Anticipated Goal Completion Date: 5/13/2019              Prior to Admission medications    Medication Sig Start Date End Date Taking? Authorizing Provider   lisinopril (PRINIVIL;ZESTRIL) 2.5 MG tablet TAKE ONE TABLET BY MOUTH DAILY 5/6/19   Pino Toney MD   sucralfate (CARAFATE) 1 GM tablet Take 1 tablet by mouth 4 times daily 5/6/19   Pino Toney MD   loratadine (CLARITIN) 10 MG tablet Take 1 tablet by mouth daily 5/6/19   Pino Toney MD   clindamycin (CLEOCIN T) 1 % lotion Apply topically 2 times daily. 5/6/19 6/5/19  Pino Toney MD   metroNIDAZOLE (METROGEL) 0.75 % gel Apply topically 2 times daily. 4/23/19   Pino Toney MD   XARELTO 20 MG TABS tablet TAKE ONE TABLET BY MOUTH EVERY MORNING WITH BREAKFAST 4/15/19   Pino Toney MD   triamcinolone (KENALOG) 0.1 % cream Apply topically 2 times daily.  4/2/19   Pino Toney MD   metoprolol succinate (TOPROL XL) 100 MG extended release tablet Take 1.5 tablets by mouth daily 3/26/19   Elana Orozco   Probiotic Product (PROBIOTIC DAILY PO) Take by mouth daily    Historical Provider, MD   glimepiride (AMARYL) 1 MG tablet TAKE ONE TABLET BY MOUTH EVERY MORNING WITH BREAKFAST 1/7/19   Rosa Elena York MD   sertraline (ZOLOFT) 50 MG tablet TAKE ONE TABLET BY MOUTH DAILY 1/7/19   Rosa Elena York MD   spironolactone (ALDACTONE) 25 MG tablet TAKE ONE TABLET BY MOUTH DAILY 12/3/18   Jeannie Casper MD   LORazepam (ATIVAN) 0.5 MG tablet Take 1 tablet by mouth every 8 hours as needed for Anxiety for up to 30 days. Cody Tompkins 11/20/18 5/6/19  Rosa Elena York MD   acetaminophen (TYLENOL) 325 MG tablet Take 2 tablets by mouth every 4 hours as needed for Pain 11/15/18   Elana Orozco   prochlorperazine (COMPAZINE) 10 MG tablet Take 1 tablet by mouth every 6 hours as needed (nausea) 11/15/18   Elana Orozco   famotidine (PEPCID) 20 MG tablet TAKE ONE TABLET BY MOUTH EVERY EVENING 11/5/18   Rosa Elena York MD   blood glucose test strips (FREESTYLE TEST STRIPS) strip TEST ONCE DAILY AS NEEDED 11/5/18   Rosa Elena York MD   furosemide (LASIX) 20 MG tablet Take 1 tablet by mouth daily 9/25/18   Rosa Elena York MD   clopidogrel (PLAVIX) 75 MG tablet TAKE ONE TABLET BY MOUTH DAILY 8/15/18   Abhinav Nuñez MD   Handicap Placard MISC by Does not apply route Exp 1/1/2023 12/20/17   Rosa Elena York MD   nitroGLYCERIN (NITROSTAT) 0.4 MG SL tablet Place 1 tablet under the tongue every 5 minutes as needed for Chest pain up to max of 3 total doses. If no relief after 1 dose, call 911. 9/20/17   DIYA Calix - CNP   b complex vitamins capsule Take 1 capsule by mouth every other day Indications: overy other day     Historical Provider, MD   Vitamin D (CHOLECALCIFEROL) 1000 UNITS CAPS capsule Take 1,000 Units by mouth daily.     Historical Provider, MD       Future Appointments   Date Time Provider Destinee Gaffney   10/21/2019 11:30 AM DO PANTERA Whitmore [facility]

## 2019-06-08 NOTE — ED PROVIDER NOTES
[facility]  eMERGENCY dEPARTMENT eNCOUnter      Pt Name: Gabe Rondon  MRN: [id number]  [place] 1935  Date of evaluation: 6/8/2019      CHIEF COMPLAINT       Chief Complaint   Patient presents with    Other     felt like she was going to pass out.  Tachycardia     pt with hx of a-fib. HISTORY OF PRESENT ILLNESS      The patient presents with rapid heart rate and feeling like she might pass out. She has a history of A. fib. She said this just started about an hour ago. The patient describes a tightness in her chest.  She is on 2 different blood thinners. The patient also takes metoprolol. She denies fever. She denies cough. She's had minimal swelling in her legs. She's had some mild upset stomach but no vomiting or diarrhea. Nothing makes her symptoms better or worse otherwise. REVIEW OF SYSTEMS       All systems reviewed and negative unless noted in HPI. The patient denies fever or constitutional symptoms. Denies vision change. Denies any sore throat or rhinorrhea. Denies any neck pain or stiffness. Chest tightness, rapid heart rate, and mild shortness of breath. No nausea,  vomiting or diarrhea. Mild abdominal pain. Denies any dysuria. Denies urinary frequency or hematuria. Denies musculoskeletal injury or pain. Denies any weakness, numbness or focal neurologic deficit. Denies any skin rash or edema. No recent psychiatric issues. No easy bruising or bleeding. Denies any polyuria, polydypsia or history of immunocompromise.       PAST MEDICAL HISTORY    has a past medical history of Allergic rhinitis, Anxiety, generalized, Aortic stenosis, Atrial fibrillation ([address] Breast cancer ([address] CAD (coronary artery disease), Diabetic neuropathy ([address] Hyperlipidemia, Hypertension, Lymphedema of arm, PVC's (premature ventricular contractions), S/P cardiac cath, Type II or unspecified type diabetes mellitus without mention of complication, not stated as uncontrolled, and Vitamin D deficiency. SURGICAL HISTORY      has a past surgical history that includes Mastectomy (Bilateral, 1995 and 2000); Hysterectomy (09/09/2002); fracture surgery (08/10/99); Cardiac catheterization (November 2012); Colonoscopy (08/12/2009); Colonoscopy (9/8/14); other surgical history (Right, 6/2015); Coronary angioplasty with stent (09/2016); Cardiac catheterization (09/19/2017); Aortic valve replacement (12/07/2017); and pr colonoscopy flx dx w/collj spec when pfrmd (N/A, 9/24/2018). CURRENT MEDICATIONS       Previous Medications    ACETAMINOPHEN (TYLENOL) 325 MG TABLET    Take 2 tablets by mouth every 4 hours as needed for Pain    B COMPLEX VITAMINS CAPSULE    Take 1 capsule by mouth every other day Indications: overy other day     BLOOD GLUCOSE TEST STRIPS (FREESTYLE TEST STRIPS) STRIP    TEST ONCE DAILY AS NEEDED    CLOPIDOGREL (PLAVIX) 75 MG TABLET    TAKE ONE TABLET BY MOUTH DAILY    FAMOTIDINE (PEPCID) 20 MG TABLET    TAKE ONE TABLET BY MOUTH EVERY EVENING    FUROSEMIDE (LASIX) 20 MG TABLET    Take 1 tablet by mouth daily    GLIMEPIRIDE (AMARYL) 1 MG TABLET    TAKE ONE TABLET BY MOUTH EVERY MORNING WITH BREAKFAST    HANDICAP PLACARD MISC    by Does not apply route Exp 1/1/2023    LISINOPRIL (PRINIVIL;ZESTRIL) 2.5 MG TABLET    TAKE ONE TABLET BY MOUTH DAILY    LORATADINE (CLARITIN) 10 MG TABLET    Take 1 tablet by mouth daily    LORAZEPAM (ATIVAN) 0.5 MG TABLET    Take 1 tablet by mouth every 8 hours as needed for Anxiety for up to 30 days. Jen Lawler METOPROLOL SUCCINATE (TOPROL XL) 100 MG EXTENDED RELEASE TABLET    Take 1.5 tablets by mouth daily    METRONIDAZOLE (METROGEL) 0.75 % GEL    Apply topically 2 times daily. NITROGLYCERIN (NITROSTAT) 0.4 MG SL TABLET    Place 1 tablet under the tongue every 5 minutes as needed for Chest pain up to max of 3 total doses. If no relief after 1 dose, call 911.     PROBIOTIC PRODUCT (PROBIOTIC DAILY PO)    Take by mouth daily PROCHLORPERAZINE (COMPAZINE) 10 MG TABLET    Take 1 tablet by mouth every 6 hours as needed (nausea)    SERTRALINE (ZOLOFT) 50 MG TABLET    TAKE ONE TABLET BY MOUTH DAILY    SPIRONOLACTONE (ALDACTONE) 25 MG TABLET    TAKE ONE TABLET BY MOUTH DAILY    SUCRALFATE (CARAFATE) 1 GM TABLET    Take 1 tablet by mouth 4 times daily    TRIAMCINOLONE (KENALOG) 0.1 % CREAM    Apply topically 2 times daily. VITAMIN D (CHOLECALCIFEROL) 1000 UNITS CAPS CAPSULE    Take 1,000 Units by mouth daily. XARELTO 20 MG TABS TABLET    TAKE ONE TABLET BY MOUTH EVERY MORNING WITH BREAKFAST       ALLERGIES     is allergic to darvocet a500 [propoxyphene n-acetaminophen]; demerol hcl [meperidine]; marinol [dronabinol]; morphine sulfate [morphine]; statins [statins]; and levaquin [levofloxacin in d5w]. FAMILY HISTORY     indicated that her mother is . She indicated that her father is . She indicated that all of her five sisters are alive. family history includes Diabetes in her mother; Glaucoma in her mother; Stroke (age of onset: 64) in her father. SOCIAL HISTORY      reports that she is a non-smoker but has been exposed to tobacco smoke. She has never used smokeless tobacco. She reports that she does not drink alcohol or use drugs. PHYSICAL EXAM     INITIAL VITALS:  height is 5' (1.524 m) and weight is 135 lb (61.2 kg). Her temperature is 99 °F (37.2 °C). Her pulse is 150. Her respiration is 18 and oxygen saturation is 94%. Patient is alert and oriented, in mild distress due to discomfort. HEENT is atraumatic. Pupils are PERRL at 4 mm. Mucous membranes moist.    Neck is supple with no lymphadenopathy. No JVD. No meningismus. Heart sounds regular rate and rhythm with no gallops, murmurs, or rubs. Lungs clear, no wheezes, rales or rhonchi. Abdomen: soft, nontender with no pain to palpation. Normal bowel sounds are noted. No rebound or guarding. No tympany.   Musculoskeletal exam shows no evidence of trauma. Intact distal pulses. Skin: trace edema in legs. Neurological exam reveals cranial nerves 2 through 12 grossly intact. Patient has equal  and normal deep tendon reflexes. Psychiatric: no hallucinations or suicidal ideation. Lymphatics.:  No lymphadenopathy. DIFFERENTIAL DIAGNOSIS/ MDM:     afib with RVR, ACS, CHF exacerbation, AMI    DIAGNOSTIC RESULTS     EKG: All EKG's are interpreted by the Emergency Department Physician who either signs or Co-signs this chart in the absence of a cardiologist.    A. fib 142 with rapid ventricular response and occasional PVCs. A strain pattern noted. Axis -49, , . RADIOLOGY:   I directly visualized the following  images and reviewed the radiologist interpretations:  XR CHEST PORTABLE   Final Result   Stable chest.  No acute cardiopulmonary abnormality. XR CHEST PORTABLE (Final result)   Result time 06/08/19 18:19:44   Final result by Amara Perry MD (06/08/19 18:19:44)                Impression:    Stable chest.  No acute cardiopulmonary abnormality.             Narrative:    EXAMINATION:  ONE XRAY VIEW OF THE CHEST    6/8/2019 6:09 pm    COMPARISON:  03/25/2019    HISTORY:  ORDERING SYSTEM PROVIDED HISTORY: chest pain  TECHNOLOGIST PROVIDED HISTORY:  chest pain  Ordering Physician Provided Reason for Exam: Chest tightness; near syncope;  hx of afib  Acuity: Acute  Type of Exam: Initial    FINDINGS:  The cardiac silhouette remains enlarged.  Aortic root stent graft appears  unchanged.  Mediastinal contours are normal.  The lungs are clear.  No  pneumothorax or pleural effusion is seen. Carlito Perez is no acute osseous  abnormality.  Left axillary surgical clips are unchanged.                    LABS:  Results for orders placed or performed during the hospital encounter of 06/08/19   CBC Auto Differential   Result Value Ref Range    WBC 10.6 3.5 - 11.0 k/uL    RBC 5.02 4.0 - 5.2 m/uL    Hemoglobin 14.4 12.0 - 16.0 g/dL    Hematocrit 43.3 36 - 46 %    MCV 86.3 80 - 100 fL    MCH 28.7 26 - 34 pg    MCHC 33.3 31 - 37 g/dL    RDW 15.1 (H) 11.0 - 14.5 %    Platelets 702 342 - 954 k/uL    MPV 8.0 6.0 - 12.0 fL    NRBC Automated NOT REPORTED per 100 WBC    Differential Type NOT REPORTED     Immature Granulocytes NOT REPORTED 0 %    Absolute Immature Granulocyte NOT REPORTED 0.00 - 0.30 k/uL    WBC Morphology NOT REPORTED     RBC Morphology NOT REPORTED     Platelet Estimate NOT REPORTED     Seg Neutrophils 65 43 - 77 %    Lymphocytes 25 16 - 46 %    Monocytes 9 4 - 11 %    Eosinophils % 0 (L) 1 - 7 %    Basophils 1 0 - 1 %    Segs Absolute 6.90 1.8 - 7.7 k/uL    Absolute Lymph # 2.70 1.0 - 4.8 k/uL    Absolute Mono # 0.90 0.1 - 1.2 k/uL    Absolute Eos # 0.00 0.0 - 0.4 k/uL    Basophils # 0.10 0.0 - 0.2 k/uL   Basic Metabolic Panel   Result Value Ref Range    Glucose 226 (H) 70 - 99 mg/dL    BUN 41 (H) 8 - 23 mg/dL    CREATININE 1.27 (H) 0.50 - 0.90 mg/dL    Bun/Cre Ratio 32 (H) 9 - 20    Calcium 10.1 8.6 - 10.4 mg/dL    Sodium 128 (L) 135 - 144 mmol/L    Potassium 4.2 3.7 - 5.3 mmol/L    Chloride 95 (L) 98 - 107 mmol/L    CO2 20 20 - 31 mmol/L    Anion Gap 13 9 - 17 mmol/L    GFR Non-African American 40 (L) >60 mL/min    GFR  49 (L) >60 mL/min    GFR Comment          GFR Staging NOT REPORTED    Brain Natriuretic Peptide   Result Value Ref Range    Pro-BNP 4,562 (H) <300 pg/mL    BNP Interpretation Pro-BNP Reference Range:    Protime-INR   Result Value Ref Range    Protime 11.9 (H) 9.4 - 11.3 sec    INR 1.1    APTT   Result Value Ref Range    PTT 29.9 27.0 - 35.0 sec   Troponin   Result Value Ref Range    Troponin, High Sensitivity 62 (HH) 0 - 14 ng/L    Troponin T NOT REPORTED <0.03 ng/mL    Troponin Interp NOT REPORTED    EKG 12 Lead   Result Value Ref Range    Ventricular Rate 142 BPM    Atrial Rate 159 BPM    QRS Duration 126 ms    Q-T Interval 318 ms    QTc Calculation (Bazett) 489 ms    R Axis -49 degrees    T Axis 133 degrees         EMERGENCY DEPARTMENT COURSE:   Vitals:    Vitals:    06/08/19 1747 06/08/19 1751 06/08/19 1854   Pulse: 160  150   Resp: 18     Temp:  99 °F (37.2 °C)    TempSrc: Tympanic     SpO2: 94%     Weight: 135 lb (61.2 kg)     Height: 5' (1.524 m)       -------------------------  BP: (), Temp: 99 °F (37.2 °C), Pulse: 150(HR 's), Resp: 18      Re-evaluation Notes    The patient received beta blocker because she is on a beta blocker currently. Her heart rate is slowly coming down, but alternates between a normal rate and tachycardia. I spoke with the hospitalist here and he did not feel comfortable keeping her since she cannot see cardiology. He requests that we transfer her to [address] [place] [address]:     45 minutes in addition to any other billable procedures. Management of Afib with RVR. CONSULTS:    [address] paged. [phone]  Discussed with Dr. Edda Craig. Would prefer pt be transferred so that she can see a cardiologist over the weekend. [address] contacted. [phone]  Discussed with Dr. Quintana. Consider diltiazem. Accepts pt for transfer to [address] [place] [address]      1. Atrial fibrillation with rapid ventricular response (HCC)          DISPOSITION/PLAN   DISPOSITION        Condition on Disposition    stable    PATIENT REFERRED TO:  No follow-up provider specified.     DISCHARGE MEDICATIONS:  New Prescriptions    No medications on file       (Please note that portions of this note were completed with a voice recognition program.  Efforts were made to edit the dictations but occasionally words are mis-transcribed.)    Luna MD   Attending Emergency Physician         Marilu Richmond MD  06/08/19 1916

## 2019-06-08 NOTE — ED NOTES
2nd dose of metoprolol given. SBP per doppler 110. Pt cont to not feel great. States she is still nauseated.       Evelyne Yousif RN  06/08/19 1497

## 2019-06-08 NOTE — ED NOTES
Pt resting on cart. C/o feeling nauseated. States she doesn't feel well. 's lopressor given. SBP per doppler 130's. NIBP not taking pressure. Pt has periods that slow down but then she bounces right back up to 160's. When slow appears to be a-fib.       Abbey Marin RN  06/08/19 [id number]

## 2019-06-09 PROBLEM — I50.43 ACUTE ON CHRONIC COMBINED SYSTOLIC AND DIASTOLIC CONGESTIVE HEART FAILURE (HCC): Status: ACTIVE | Noted: 2019-01-01

## 2019-06-09 NOTE — CARE COORDINATION
Case Management Initial Discharge Plan  Hailey Berry,             Met with:patient to discuss discharge plans. Information verified: address, contacts, phone number, , insurance Yes  PCP: Kathryn Staples MD  Date of last visit: month ago     Insurance Provider: Medicare     Discharge Planning    Living Arrangements:  Spouse/Significant Other   Support Systems:  Spouse/Significant Other    Home has one stories  no stairs to climb to get into front door, no stairs to climb to reach second floor  Location of bedroom/bathroom in home ,main     Patient able to perform ADL's:Independent    Current Services (outpatient & in home) na  DME equipment: na  DME provider: jackie    Pharmacy: [facility]    Potential Assistance Purchasing Medications:     Does patient want to participate in local refill/ meds to beds program?       Potential Assistance Needed:       Patient agreeable to home care: No  [place] of choice provided:  n/a    Prior SNF/Rehab Placement and Facility: na  Agreeable to SNF/Rehab: No  [place] of choice provided: n/a   Evaluation: no    Expected Discharge date:     Patient expects to be discharged to: Follow Up Appointment: Best Day/ Time:      Transportation provider: family   Transportation arrangements needed for discharge: No    Readmission Risk              Risk of Unplanned Readmission:        29             Does patient have a readmission risk score greater than 14?: Yes  If yes, follow-up appointment must be made within 7 days of discharge. Discharge Plan: plan to return home with , will follow for any skilled need.           Electronically signed by Rockey Cranker, RN on 19 at 2:05 PM

## 2019-06-09 NOTE — ED NOTES
[facility], spoke with Irma Platt. Crew has not yet arrived, called to check on status of fax. Stated crew had to stop by their base for supplies and are enroute.       Jenni Farley RN  06/08/19 2026

## 2019-06-09 NOTE — H&P
VALVE REPLACEMENT  12/07/2017    TAVR    CARDIAC CATHETERIZATION  November 2012    Showed minimal CAD with increased wedge pressure of the right heart. Patient saw Cardiology and started on Lasix.  CARDIAC CATHETERIZATION  09/19/2017    right and left heart cath   EF 30% WITH PATENT LAD STENT    COLONOSCOPY  08/12/2009    diverticulosis being found.  COLONOSCOPY  9/8/14    grade 2-3 internal hemorrhoids - banded x 2, random biopsies taken to r/o - normal, repeat 5yrs due to hx of polyps- [facility]    CORONARY ANGIOPLASTY WITH STENT PLACEMENT  09/2016    PTCA / Drug Eluting Stent:, LAD and / or branches    FRACTURE SURGERY  08/10/99    Left elbow ORIF     HYSTERECTOMY  09/09/2002    With bladder repair for benign reasons.  MASTECTOMY Bilateral 1995 and 2000    With lymph node dissections in 1995 and 2000.  OTHER SURGICAL HISTORY Right 6/2015    Index and long trigger finger release    SC COLONOSCOPY FLX DX W/COLLJ SPEC WHEN PFRMD N/A 9/24/2018    COLONOSCOPY w/ hemorrhoid bending performed by Chiara Celis MD at [address] [place] [address]        Medications Prior to Admission:     Prior to Admission medications    Medication Sig Start Date End Date Taking? Authorizing Provider   lisinopril (PRINIVIL;ZESTRIL) 2.5 MG tablet TAKE ONE TABLET BY MOUTH DAILY 5/6/19   Kathryn Staples MD   sucralfate (CARAFATE) 1 GM tablet Take 1 tablet by mouth 4 times daily 5/6/19   Kathryn Staples MD   loratadine (CLARITIN) 10 MG tablet Take 1 tablet by mouth daily 5/6/19   Kathryn Staples MD   metroNIDAZOLE (METROGEL) 0.75 % gel Apply topically 2 times daily. 4/23/19   Kathryn Staples MD   XARELTO 20 MG TABS tablet TAKE ONE TABLET BY MOUTH EVERY MORNING WITH BREAKFAST 4/15/19   Kathryn Staples MD   triamcinolone (KENALOG) 0.1 % cream Apply topically 2 times daily.  4/2/19   Kathryn Staples MD   metoprolol succinate (TOPROL XL) 100 MG extended release tablet Take 1.5 tablets by mouth daily 3/26/19   Seema Katz   Probiotic Product (PROBIOTIC

## 2019-06-09 NOTE — CONSULTS
TAKE ONE TABLET BY MOUTH DAILY 8/15/18   Jose Albright MD   Handicap Placard MISC by Does not apply route Exp 1/1/2023 12/20/17   Amparo Palma MD   nitroGLYCERIN (NITROSTAT) 0.4 MG SL tablet Place 1 tablet under the tongue every 5 minutes as needed for Chest pain up to max of 3 total doses. If no relief after 1 dose, call 911. 9/20/17   DIYA Ramos - CNP   b complex vitamins capsule Take 1 capsule by mouth every other day Indications: overy other day     Historical Provider, MD   Vitamin D (CHOLECALCIFEROL) 1000 UNITS CAPS capsule Take 1,000 Units by mouth daily.     Historical Provider, MD      Current Facility-Administered Medications: rivaroxaban (XARELTO) tablet 15 mg, 15 mg, Oral, Daily  amiodarone (CORDARONE) 150 mg in dextrose 5 % 100 mL bolus, 150 mg, Intravenous, Once **FOLLOWED BY** amiodarone (CORDARONE) 450 mg in dextrose 5 % 250 mL infusion, 1 mg/min, Intravenous, Continuous **FOLLOWED BY** amiodarone (CORDARONE) 450 mg in dextrose 5 % 250 mL infusion, 0.5 mg/min, Intravenous, Continuous  vitamin D (CHOLECALCIFEROL) tablet 1,000 Units, 1,000 Units, Oral, Daily  b complex vitamins capsule 1 capsule, 1 capsule, Oral, Every Other Day  nitroGLYCERIN (NITROSTAT) SL tablet 0.4 mg, 0.4 mg, Sublingual, Q5 Min PRN  clopidogrel (PLAVIX) tablet 75 mg, 1 tablet, Oral, Daily  famotidine (PEPCID) tablet 20 mg, 1 tablet, Oral, QPM  acetaminophen (TYLENOL) tablet 650 mg, 650 mg, Oral, Q4H PRN  prochlorperazine (COMPAZINE) tablet 10 mg, 10 mg, Oral, Q6H PRN  LORazepam (ATIVAN) tablet 0.5 mg, 0.5 mg, Oral, Q8H PRN  spironolactone (ALDACTONE) tablet 25 mg, 1 tablet, Oral, Daily  glimepiride (AMARYL) tablet 1 mg, 1 mg, Oral, Daily with breakfast  sertraline (ZOLOFT) tablet 50 mg, 1 tablet, Oral, Daily  metoprolol succinate (TOPROL XL) extended release tablet 150 mg, 150 mg, Oral, Daily  triamcinolone (KENALOG) 0.1 % cream, , Topical, BID  metroNIDAZOLE (METROGEL) 0.75 % gel, , Topical, BID  lisinopril PM  ----------------------------------------------------------------------------     ----------------------------------------------------------------------------   Electronically signed by Pal Corona(Interpreting physician) on   2018 12:57 PM  ----------------------------------------------------------------------------  FINDINGS  Left Atrium  Left atrium is mildly dilated. Left Ventricle  Left ventricle is normal in size with mild concentric hypertrophy. Global hypokinesis. Global left ventricular systolic function is moderately reduced with an  estimated ejection fraction of 35 to 40%. Grade II (moderate) left ventricular diastolic dysfunction. Right Atrium  Right atrium is normal in size. Right Ventricle  Normal right ventricular size and function. Mitral Valve  Mitral annular calcification is seen. Moderate mitral regurgitation. Aortic Valve  Bioprosthetic aortic valve, per TAVR, that is normal in appearance and  function. Peak instantaneous gradient 4 mmHg and mean gradient 3 mmHg. No aortic insufficiency. Tricuspid Valve  Normal tricuspid valve leaflets. Mild tricuspid regurgitation. Estimated right ventricular systolic pressure is 56 mmHg. Moderate pulmonary hypertension. Pulmonic Valve  The pulmonic valve is normal in structure. Pericardial Effusion  No significant pericardial effusion is seen. Pleural Effusion  Left pleural effusion.   Miscellaneous  Normal aortic root dimension.     M-mode / 2D Measurements & Calculations:      LVIDd:4.39 cm(3.7 - 5.6 cm)      Diastolic GXPDCX:343.5 ml   LVIDs:3.41 cm(2.2 - 4.0 cm)      Systolic NKCZPB:91.7 ml   IVSd:1.21 cm(0.6 - 1.1 cm)       Aortic Root:2.4 cm(2.0 - 3.7 cm)   LVPWd:1.19 cm(0.6 - 1.1 cm)      LA Dimension: 4.6 cm(1.9 - 4.0 cm)   Fractional Shortenin.32 %   Calculated LVEF (%): 37.77 %      Mitral:                                 Aortic      Peak E-Wave: 1.15 m/s                   Peak Velocity: 0.99 m/s   Peak A-Wave: 0.83 m/s fibrillation ([address]    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 Dre Rutledge    Chronic nausea    Moderate mitral regurgitation    Leg cramps    Sinus drainage    Cellulitis of left arm    Cellulitis    Atrial fibrillation with RVR (HCC)    Chest pain     Impression:  3  44-year-old female with recurrent A. fib with RVR with hypotension. CHADsVaSC 6. Patient is already in [address] for anticoagulation. 2.  Patient has a previous history of intolerance to Eliquis and was switched to Vearl Daniel. RECOMMENDATIONS:  1. Start amiodarone IV bolus with drip. Monitor for 24 hours for hypotension and A. fib. Likely transition to by mouth amiodarone and discharge tomorrow. Continue home medications. Resume homes Vearl Daniel. 2. Stress test as outpatient to rule out any ischemic changes causing A. fib. Discussed with patient and family and Nurse. Simone Salcido MD  [address] [facility]         6/9/2019, 8:32 AM  [phone]    [facility]      [phone]    Attending Physician Statement  I have discussed the care of Aleida Joseph, including pertinent history and exam findings,  with the cardiology fellow/resident. I have seen and examined the patient and the key elements of all parts of the encounter have been performed by me. I agree with the assessment, plan and orders as documented by the resident with additional recommendations as below:       81 y/o female with hx of TAVR in 2017, mild non obstructive CAD, paroxysmal atrial fibrillation presented with atrial fibrillation with RVR , spontaneously converted to sinus rhythm overnight, now in SR with frequent APCs, will load with IV amio x 24 hours and then switch to PO. Continue AC with xarelto. D/c plavix and continue asa 81 mg qd. Hold antihypertensives for now due to borderline BP.  Discussed with patient and her family in detail.        Heminder meet [id number]

## 2019-06-09 NOTE — ED NOTES
Noted to be back in a-fib with RVR at this time, rate anywhere from 110s-150s. Dr. Everett Langford notified.       Yogesh Stoll, NOELLE  06/08/19 2025

## 2019-06-10 NOTE — PROGRESS NOTES
(See Comments)     Cause muscle pain and she would prefer not to take them any longer.  Levaquin [Levofloxacin In D5w] Nausea Only and Anxiety       Current Meds:   Scheduled Meds:    rivaroxaban  15 mg Oral Daily    vitamin D  1,000 Units Oral Daily    famotidine  20 mg Oral QPM    spironolactone  25 mg Oral Daily    glimepiride  1 mg Oral Daily with breakfast    sertraline  50 mg Oral Daily    triamcinolone   Topical BID    metroNIDAZOLE   Topical BID    sucralfate  1 g Oral 4x Daily    cetirizine  5 mg Oral Daily    furosemide  20 mg Oral Daily    vitamin B and C  1 tablet Oral Every Other Day    metoprolol succinate  50 mg Oral Daily    sodium chloride flush  10 mL Intravenous 2 times per day    aspirin  81 mg Oral Daily    insulin lispro  0-12 Units Subcutaneous TID WC    insulin lispro  0-6 Units Subcutaneous Nightly     Continuous Infusions:    amiodarone 0.5 mg/min (06/10/19 0400)    dextrose       PRN Meds: nitroGLYCERIN, acetaminophen, prochlorperazine, LORazepam, sodium chloride flush, magnesium hydroxide, ondansetron, nicotine, acetaminophen, glucose, dextrose, glucagon (rDNA), dextrose, metoprolol    Data:     Past Medical History:   has a past medical history of Allergic rhinitis, Anxiety, generalized, Aortic stenosis, Atrial fibrillation ([address] Breast cancer ([facility] CAD (coronary artery disease), Diabetic neuropathy ([facility] [address] Hyperlipidemia, Hypertension, Lymphedema of arm, PVC's (premature ventricular contractions), S/P cardiac cath, Type II or unspecified type diabetes mellitus without mention of complication, not stated as uncontrolled, and Vitamin D deficiency. Social History:   reports that she is a non-smoker but has been exposed to tobacco smoke. She has never used smokeless tobacco. She reports that she does not drink alcohol or use drugs.      Family History:   Family History   Problem Relation Age of Onset    Diabetes Mother     Glaucoma Mother     Stroke Father 64 Vitals:  BP (!) 153/55   Pulse 72   Temp 98.1 °F (36.7 °C)   Resp 24   Ht 5' (1.524 m)   Wt 131 lb 2.8 oz (59.5 kg)   SpO2 94%   BMI 25.62 kg/m²   Temp (24hrs), Av °F (36.7 °C), Min:97.7 °F (36.5 °C), Max:98.2 °F (36.8 °C)    Recent Labs     19  1112 19  1606 19  2036 06/10/19  0651   POCGLU 224* 126* 188* 158*       I/O (24Hr):     Intake/Output Summary (Last 24 hours) at 6/10/2019 0826  Last data filed at 6/10/2019 0730  Gross per 24 hour   Intake 240 ml   Output 1900 ml   Net -1660 ml       Labs:    Hematology:  Recent Labs     19  0904   WBC 10.6 8.6   RBC 5.02 4.94   HGB 14.4 13.5   HCT 43.3 44.8   MCV 86.3 90.7   MCH 28.7 27.3   MCHC 33.3 30.1   RDW 15.1* 14.6*    182   MPV 8.0 10.5   INR 1.1  --      Chemistry:  Recent Labs     19  0229 19  0904   *  --   --  139   K 4.2  --   --  4.3   CL 95*  --   --  106   CO2 20  --   --  22   GLUCOSE 226*  --   --  253*   BUN 41*  --   --  32*   CREATININE 1.27*  --   --  1.17*   MG  --   --   --  2.2   ANIONGAP 13  --   --  11   LABGLOM 40*  --   --  44*   GFRAA 49*  --   --  54*   CALCIUM 10.1  --   --  8.8   PROBNP 4,562*  --   --  6,609*   TROPHS 62* 59* 55*  --      Recent Labs     19  0153 19  0645 19  0904 19  1112 19  1606 196 06/10/19  0651   PROT  --   --   --  6.7  --   --   --   --    LABALBU  --   --   --  3.8  --   --   --   --    LABA1C 7.7*  --   --   --   --   --   --   --    TSH  --   --   --  4.05  --   --   --   --    AST  --   --   --  26  --   --   --   --    ALT  --   --   --  20  --   --   --   --    ALKPHOS  --   --   --  99  --   --   --   --    BILITOT  --   --   --  0.33  --   --   --   --    POCGLU  --  180* 123*  --  224* 126* 188* 158*         Lab Results   Component Value Date/Time    SPECIAL NOT REPORTED 2018 11:09 AM     Lab Results   Component Value Date/Time

## 2019-06-10 NOTE — PLAN OF CARE
improve  Description  Ability to maintain an adequate cardiac output will improve  6/10/2019 0521 by Papito Cobian RN  Outcome: Ongoing     Problem: Respiratory:  Goal: Ability to maintain adequate ventilation will improve  Description  Ability to maintain adequate ventilation will improve  6/10/2019 0521 by Papito Cobian RN  Outcome: Ongoing  Goal: Respiratory status will improve  Description  Respiratory status will improve  6/10/2019 0521 by Papito Cobian RN  Outcome: Ongoing

## 2019-06-10 NOTE — PLAN OF CARE
Ongoing     Problem: Skin Integrity:  Goal: Risk for impaired skin integrity will decrease  Description  Risk for impaired skin integrity will decrease  Outcome: Ongoing     Problem: Tissue Perfusion:  Goal: Adequacy of tissue perfusion will improve  Description  Adequacy of tissue perfusion will improve  Outcome: Ongoing     Problem: Cardiac:  Goal: Hemodynamic stability will improve  Description  Hemodynamic stability will improve  Outcome: Ongoing  Goal: Ability to maintain an adequate cardiac output will improve  Description  Ability to maintain an adequate cardiac output will improve  Outcome: Ongoing     Problem: Respiratory:  Goal: Ability to maintain adequate ventilation will improve  Description  Ability to maintain adequate ventilation will improve  Outcome: Ongoing  Goal: Respiratory status will improve  Description  Respiratory status will improve  Outcome: Ongoing     Problem: Skin Integrity:  Goal: Risk for impaired skin integrity will decrease  Description  Risk for impaired skin integrity will decrease  Outcome: Ongoing     Problem: Tissue Perfusion:  Goal: Adequacy of tissue perfusion will improve  Description  Adequacy of tissue perfusion will improve  Outcome: Ongoing     Problem: Cardiac:  Goal: Hemodynamic stability will improve  Description  Hemodynamic stability will improve  Outcome: Ongoing     Problem: Cardiac:  Goal: Ability to maintain an adequate cardiac output will improve  Description  Ability to maintain an adequate cardiac output will improve  Outcome: Ongoing     Problem: Skin Integrity:  Goal: Risk for impaired skin integrity will decrease  Description  Risk for impaired skin integrity will decrease  Outcome: Ongoing     Problem: Tissue Perfusion:  Goal: Adequacy of tissue perfusion will improve  Description  Adequacy of tissue perfusion will improve  Outcome: Ongoing     Problem: Respiratory:  Goal: Respiratory status will improve  Description  Respiratory status will improve  Outcome: Ongoing

## 2019-06-10 NOTE — PROGRESS NOTES
11/14/18  Summary  Left ventricle is normal in size with mild concentric hypertrophy. Global hypokinesis. Global left ventricular systolic function is moderately reduced. Estimated ejection fraction of 35 to 40%. Grade II (moderate) left ventricular diastolic dysfunction. Left atrium is mildly dilated. Bioprosthetic aortic valve, per TAVR, that is normal in appearance and  function. No aortic insufficiency. Moderate mitral regurgitation. No significant pericardial effusion is seen. Left pleural effusion. Objective:   Vitals: BP (!) 129/59   Pulse 72   Temp 98.1 °F (36.7 °C)   Resp 18   Ht 5' (1.524 m)   Wt 131 lb 2.8 oz (59.5 kg)   SpO2 94%   BMI 25.62 kg/m²   General appearance: alert and cooperative with exam  HEENT: Head: Normocephalic, no lesions, without obvious abnormality. Neck: no JVD, trachea midline, no adenopathy  Lungs: Clear to auscultation  Heart: Regular rate and rhythm, s1/s2 auscultated, no murmurs  Abdomen: soft, non-tender, bowel sounds active  Extremities: no edema  Neurologic: not done        Assessment / Acute Cardiac Problems:   1. Afib with RVR   2. Intolerance to eliquis   3. TAVR 2017  4.  Nonobstuctive CAD     Patient Active Problem List:     Hyperlipidemia     Hypertension     Diabetic neuropathy ([address]     Breast cancer ([facility]     Lymphedema of arm     Vitamin D deficiency     Hyperplastic colon polyp     PVC's (premature ventricular contractions)     Allergic rhinitis     Anxiety, generalized     Acute on chronic combined systolic and diastolic congestive heart failure (HCC)     DM (diabetes mellitus), type 2 (HCC)     Coronary artery disease involving native coronary artery of native heart     Cellulitis of arm, left     Persistent atrial fibrillation (HCC)     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 Eber Cook     Chronic nausea     Moderate mitral regurgitation     Leg cramps     Sinus drainage     Cellulitis of left arm     Cellulitis     Atrial

## 2019-06-11 NOTE — PROGRESS NOTES
input(s): BNP in the last 72 hours. Lipids: No results for input(s): CHOL, HDL in the last 72 hours. Invalid input(s): LDLCALCU  INR:   Recent Labs     06/08/19  1752   INR 1.1     ECHO 11/14/18  Summary  Left ventricle is normal in size with mild concentric hypertrophy. Global hypokinesis. Global left ventricular systolic function is moderately reduced. Estimated ejection fraction of 35 to 40%. Grade II (moderate) left ventricular diastolic dysfunction. Left atrium is mildly dilated. Bioprosthetic aortic valve, per TAVR, that is normal in appearance and  function. No aortic insufficiency. Moderate mitral regurgitation. No significant pericardial effusion is seen. Left pleural effusion. Objective:   Vitals: BP (!) 125/51   Pulse 87   Temp 98.2 °F (36.8 °C) (Oral)   Resp 21   Ht 5' (1.524 m)   Wt 131 lb 2.8 oz (59.5 kg)   SpO2 97%   BMI 25.62 kg/m²   General appearance: alert and cooperative with exam  HEENT: Head: Normocephalic, no lesions, without obvious abnormality. Neck: no JVD, trachea midline, no adenopathy  Lungs: Clear to auscultation  Heart: Irregular rate and rhythm, s1/s2 auscultated, no murmurs. Afib 90-120s  Abdomen: soft, non-tender, bowel sounds active  Extremities: no edema  Neurologic: not done        Assessment / Acute Cardiac Problems:   1. Afib with RVR   2. Intolerance to eliquis   3. TAVR 2017  4.  Nonobstuctive CAD     Patient Active Problem List:     Hyperlipidemia     Hypertension     Diabetic neuropathy ([address]     Breast cancer ([address]     Lymphedema of arm     Vitamin D deficiency     Hyperplastic colon polyp     PVC's (premature ventricular contractions)     Allergic rhinitis     Anxiety, generalized     Acute on chronic combined systolic and diastolic congestive heart failure (HCC)     DM (diabetes mellitus), type 2 (HCC)     Coronary artery disease involving native coronary artery of native heart     Cellulitis of arm, left     Persistent atrial fibrillation ([address]

## 2019-06-11 NOTE — PROGRESS NOTES
 Statins [Statins] Other (See Comments)     Cause muscle pain and she would prefer not to take them any longer.  Levaquin [Levofloxacin In D5w] Nausea Only and Anxiety       Current Meds:   Scheduled Meds:    metoprolol succinate  50 mg Oral Daily    amiodarone  200 mg Oral BID    [START ON 6/25/2019] amiodarone  200 mg Oral Daily    rivaroxaban  15 mg Oral Daily    vitamin D  1,000 Units Oral Daily    famotidine  20 mg Oral QPM    spironolactone  25 mg Oral Daily    glimepiride  1 mg Oral Daily with breakfast    sertraline  50 mg Oral Daily    triamcinolone   Topical BID    metroNIDAZOLE   Topical BID    sucralfate  1 g Oral 4x Daily    cetirizine  5 mg Oral Daily    furosemide  20 mg Oral Daily    vitamin B and C  1 tablet Oral Every Other Day    sodium chloride flush  10 mL Intravenous 2 times per day    aspirin  81 mg Oral Daily    insulin lispro  0-12 Units Subcutaneous TID WC    insulin lispro  0-6 Units Subcutaneous Nightly     Continuous Infusions:    dextrose       PRN Meds: nitroGLYCERIN, acetaminophen, prochlorperazine, LORazepam, sodium chloride flush, magnesium hydroxide, ondansetron, nicotine, acetaminophen, glucose, dextrose, glucagon (rDNA), dextrose, metoprolol    Data:     Past Medical History:   has a past medical history of Allergic rhinitis, Anxiety, generalized, Aortic stenosis, Atrial fibrillation ([facility]), Breast cancer ([place] [facility]), CAD (coronary artery disease), Diabetic neuropathy ([place]), Hyperlipidemia, Hypertension, Lymphedema of arm, PVC's (premature ventricular contractions), S/P cardiac cath, Type II or unspecified type diabetes mellitus without mention of complication, not stated as uncontrolled, and Vitamin D deficiency. Social History:   reports that she is a non-smoker but has been exposed to tobacco smoke. She has never used smokeless tobacco. She reports that she does not drink alcohol or use drugs.      Family History:   Family History   Problem Relation Age of

## 2019-06-12 NOTE — PLAN OF CARE
Cardiac:  Goal: Hemodynamic stability will improve  Description  Hemodynamic stability will improve  Outcome: Ongoing

## 2019-06-12 NOTE — PROGRESS NOTES
input(s): BNP in the last 72 hours. Lipids: No results for input(s): CHOL, HDL in the last 72 hours. Invalid input(s): LDLCALCU  INR:   No results for input(s): INR in the last 72 hours. ECHO 11/14/18  Summary  Left ventricle is normal in size with mild concentric hypertrophy. Global hypokinesis. Global left ventricular systolic function is moderately reduced. Estimated ejection fraction of 35 to 40%. Grade II (moderate) left ventricular diastolic dysfunction. Left atrium is mildly dilated. Bioprosthetic aortic valve, per TAVR, that is normal in appearance and  function. No aortic insufficiency. Moderate mitral regurgitation. No significant pericardial effusion is seen. Left pleural effusion. Objective:   Vitals: BP (!) 110/52   Pulse 63   Temp 98.4 °F (36.9 °C) (Oral)   Resp 16   Ht 5' (1.524 m)   Wt 131 lb 2.8 oz (59.5 kg)   SpO2 98%   BMI 25.62 kg/m²   General appearance: alert and cooperative with exam  HEENT: Head: Normocephalic, no lesions, without obvious abnormality. Neck: no JVD, trachea midline, no adenopathy  Lungs: Clear to auscultation throughout  Heart: Irregular rate and rhythm, s1/s2 auscultated, no murmurs. Afib 70-90s  Abdomen: soft, non-tender, bowel sounds active  Extremities: no edema  Neurologic: not done        Assessment / Acute Cardiac Problems:   1. Afib with RVR   2. Intolerance to eliquis   3. TAVR 2017  4.  Nonobstuctive CAD     Patient Active Problem List:     Hyperlipidemia     Hypertension     Diabetic neuropathy ([facility] [address]     Breast cancer ([facility] [address]     Lymphedema of arm     Vitamin D deficiency     Hyperplastic colon polyp     PVC's (premature ventricular contractions)     Allergic rhinitis     Anxiety, generalized     Acute on chronic combined systolic and diastolic congestive heart failure (HCC)     DM (diabetes mellitus), type 2 (HCC)     Coronary artery disease involving native coronary artery of native heart     Cellulitis of arm, left     Persistent atrial fibrillation (HCC)     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 vernon     Chronic nausea     Moderate mitral regurgitation     Leg cramps     Sinus drainage     Cellulitis of left arm     Cellulitis     Atrial fibrillation with RVR ([facility])     Chest pain      Plan of Treatment:   1. PAF, improved rate control yesterday afternoon/overnight. Will continue Amio PO 200mg BID for 2 weeks then 200mg daily for now. Continue Xarelto. Keep Lopressor 50mg BID. 2. Continue asa, aldactone and lasix. 3. Await any further recommendations with EP. If no changes possible discharge home this afternoon. Discussed in detail with patient and spouse.      Electronically signed by DIYA Sharp CNP on 6/12/2019 at 10:23 [address] [facility].  [phone]

## 2019-06-12 NOTE — PROGRESS NOTES
 Hypertension [I10]        Plan:        Amiodarone loading for 14 days then daily, rate not fully controlled   BB dose adjusted per cardiology  Echo with reduced EF, not accurate 2/2 elevated HR, evidence of diastolic dysfunction, cardiology aware , await further recommendations  Glycemic control, sliding scale and hypoglycemia protocol  Continue other chronic medications  Patient HR is better overnight, if not seen by EP today might be discharged, still need to know if there is any plan for her new reduced EF on her echo from cardiology side      Sudheer Jhaveri MD  6/12/2019  8:20 AM

## 2019-06-13 NOTE — PROGRESS NOTES
[facility]  Progress Note                   Date:   6/13/2019  Patient name: Gillian Muller  Date of admission:  6/8/2019 10:08 PM  MRN:   [id number]  YOB: 1935  PCP: Angel Bowling MD    Reason for Admission: Atrial fibrillation with RVR ([facility] [address] [I48.91]    Subjective:       Clinical Changes /Abnormalities: Seen & examined in room with spouse. Yesterday even patient converted to SR. She was up walking in halls an doing well. Stayed SR all night until this AM nurse noticed Afib with RVR after getting back from bathroom. Patient states she was feeling \"so much better all night\" but now during exam is having \"cold sweats again. \" HR currently Afib 110-130s. Review of Systems    Medications:   Scheduled Meds:   [START ON 6/25/2019] amiodarone  200 mg Oral Daily    amiodarone  200 mg Oral BID    metoprolol tartrate  50 mg Oral BID    rivaroxaban  15 mg Oral Daily    vitamin D  1,000 Units Oral Daily    famotidine  20 mg Oral QPM    spironolactone  25 mg Oral Daily    glimepiride  1 mg Oral Daily with breakfast    sertraline  50 mg Oral Daily    triamcinolone   Topical BID    metroNIDAZOLE   Topical BID    sucralfate  1 g Oral 4x Daily    cetirizine  5 mg Oral Daily    furosemide  20 mg Oral Daily    vitamin B and C  1 tablet Oral Every Other Day    sodium chloride flush  10 mL Intravenous 2 times per day    aspirin  81 mg Oral Daily    insulin lispro  0-12 Units Subcutaneous TID WC    insulin lispro  0-6 Units Subcutaneous Nightly     Continuous Infusions:   amiodarone 450mg/250ml D5W infusion      dextrose       CBC: No results for input(s): WBC, HGB, PLT in the last 72 hours. BMP:    Recent Labs     06/12/19  0936      K 3.8      CO2 22   BUN 31*   CREATININE 1.13*   GLUCOSE 203*     Hepatic:No results for input(s): AST, ALT, ALB, BILITOT, ALKPHOS in the last 72 hours. Troponin: No results for input(s): TROPHS in the last 72 hours.   BNP: No effusion. Assessment / Acute Cardiac Problems:   1. Afib RVR  2. Intolerance to Eliquis - on Xarelto  3. TAVR 2017  4. Non=obstructive CAD  5. Reduced LVEF on echo, poss r/t tachycardia/RVR    Patient Active Problem List:     Hyperlipidemia     Hypertension     Diabetic neuropathy ([facility] [address]     Breast cancer ([facility] [address]     Lymphedema of arm     Vitamin D deficiency     Hyperplastic colon polyp     PVC's (premature ventricular contractions)     Allergic rhinitis     Anxiety, generalized     Acute on chronic combined systolic and diastolic congestive heart failure (HCC)     DM (diabetes mellitus), type 2 (HCC)     Coronary artery disease involving native coronary artery of native heart     Cellulitis of arm, left     Persistent atrial fibrillation (HCC)     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 vernon     Chronic nausea     Moderate mitral regurgitation     Leg cramps     Sinus drainage     Cellulitis of left arm     Cellulitis     Atrial fibrillation with RVR (HCC)     Chest pain      Plan of Treatment:   1. Discussed with Dr. Luis Nogueira yesterday. If patient was still in Afib or went back into afib plan was for Cardioversion today. Discussed this in detail with patient and spouse. Patient did eat small breakfast this AM so will plan CV this afternoon since she went back into Afib this morning. Amio gtt. 2. Reviewed reduced LVEF also with Dr. Luis Nogueira. Plan medical management at this time and will repeat after HR more controlled.   3. Keep NPO    Electronically signed by DIYA Meeks CNP on 6/13/2019 at 9:54 AM  [address]  [phone]

## 2019-06-14 NOTE — CARE COORDINATION
and reviewed discharge summary and/or continuity of care documents  Assessment and support for treatment adherence and medication management-reviewed and educated.     Care Transitions 24 Hour Call    Do you have any ongoing symptoms?:  Yes  Patient-reported symptoms:  Other (Comment: irregular pulse and not feeling well)  Interventions for patient-reported symptoms:  Notified PCP/Physician (Comment: appt made today)  Do you have a copy of your discharge instructions?:  Yes  Do you have all of your prescriptions and are they filled?:  Yes  Have you been contacted by a [address]?:  No  Have you scheduled your follow up appointment?:  Yes  How are you going to get to your appointment?:  Car - family or friend to transport  Were you discharged with any Home Care or Post Acute Services:  No  Patient DME:  Straight cane, Other  Other Patient DME:  blood pressure machine, pulse oximeter  Do you have support at home?:  Partner/Spouse/SO  Do you feel like you have everything you need to keep you well at home?:  Yes  Are you an active caregiver in your home?:  No  Care Transitions Interventions         Follow Up  Future Appointments   Date Time Provider Destinee Gaffney   6/14/2019  1:45 PM Krysten Ortiz MD [facility]   10/21/2019 11:30 AM [facility]   11/6/2019 10:20 AM Daryle Ok, MD [facility]   5/6/2020  1:15 PM Blayne Serrano MD [facility]   5/7/2020 11:00 AM Daryle Ok, MD [facility]       Flower Carbajal RN

## 2019-06-14 NOTE — PROGRESS NOTES
[address]  [address] [address]  Dept: [phone]  Loc: [phone]    Subjective: The patient is a 82y.o. year old, , female is in the office for a follow up visit. Patient was just discharged yesterday from [address] [place] [address],[address] night went to bathroom felt palpitation checked the pulse was between 40 -150 , rested . Pt daughter angry that hospital had not given the instruction , she was supposed to be on Lisinopril and could have seen EP doctor   Now pt denies any CP , palpitation dizziness . Past Medical History:   has a past medical history of Allergic rhinitis, Anxiety, generalized, Aortic stenosis, Atrial fibrillation ([address] Breast cancer ([facility] [address] CAD (coronary artery disease), Diabetic neuropathy ([facility] [address] Hyperlipidemia, Hypertension, Lymphedema of arm, PVC's (premature ventricular contractions), S/P cardiac cath, Type II or unspecified type diabetes mellitus without mention of complication, not stated as uncontrolled, and Vitamin D deficiency. Past Surgical History:   has a past surgical history that includes Mastectomy (Bilateral, 1995 and 2000); Hysterectomy (09/09/2002); fracture surgery (08/10/99); Cardiac catheterization (November 2012); Colonoscopy (08/12/2009); Colonoscopy (9/8/14); other surgical history (Right, 6/2015); Coronary angioplasty with stent (09/2016); Cardiac catheterization (09/19/2017); Aortic valve replacement (12/07/2017); and pr colonoscopy flx dx w/collj spec when pfrmd (N/A, 9/24/2018). Home Medications:  Prior to Admission medications    Medication Sig Start Date End Date Taking?  Authorizing Provider   metoprolol tartrate (LOPRESSOR) 50 MG tablet Take 1 tablet by mouth 2 times daily 6/13/19  Yes DIYA Lorenzana - CNP   amiodarone (PACERONE) 400 MG tablet Take 1 tablet by mouth 2 times daily 6/13/19  Yes Roeb Carmona MD   amiodarone (CORDARONE) 200 MG tablet Take 1 tablet by mouth 2 times daily 6/16/19 Yes Kristy Courtney MD   rivaroxaban (XARELTO) 15 MG TABS tablet Take 1 tablet by mouth daily 6/11/19  Yes Kristy Courtney MD   LORazepam (ATIVAN) 0.5 MG tablet TAKE ONE TABLET BY MOUTH EVERY 8 HOURS AS NEEDED FOR ANXIETY FOR UP TO 30 DAYS. 6/10/19 7/10/19 Yes Rosa Elena York MD   aspirin 81 MG chewable tablet Take 1 tablet by mouth daily 6/10/19  Yes DIYA Calix CNP   sucralfate (CARAFATE) 1 GM tablet Take 1 tablet by mouth 4 times daily 5/6/19  Yes Rosa Elena York MD   loratadine (CLARITIN) 10 MG tablet Take 1 tablet by mouth daily 5/6/19  Yes Rosa Elena York MD   metroNIDAZOLE (METROGEL) 0.75 % gel Apply topically 2 times daily. 4/23/19  Yes Rosa Elena York MD   triamcinolone (KENALOG) 0.1 % cream Apply topically 2 times daily. 4/2/19  Yes Rosa Elena York MD   Probiotic Product (PROBIOTIC DAILY PO) Take by mouth daily   Yes Anali Ventura MD   glimepiride (AMARYL) 1 MG tablet TAKE ONE TABLET BY MOUTH EVERY MORNING WITH BREAKFAST 1/7/19  Yes Rosa Elena York MD   sertraline (ZOLOFT) 50 MG tablet TAKE ONE TABLET BY MOUTH DAILY 1/7/19  Yes Rosa Elena York MD   spironolactone (ALDACTONE) 25 MG tablet TAKE ONE TABLET BY MOUTH DAILY 12/3/18  Yes Jeannie Casper MD   acetaminophen (TYLENOL) 325 MG tablet Take 2 tablets by mouth every 4 hours as needed for Pain 11/15/18  Yes Elana Orozco   famotidine (PEPCID) 20 MG tablet TAKE ONE TABLET BY MOUTH EVERY EVENING 11/5/18  Yes Rosa Elena York MD   furosemide (LASIX) 20 MG tablet Take 1 tablet by mouth daily 9/25/18  Yes Rosa Elena York MD   nitroGLYCERIN (NITROSTAT) 0.4 MG SL tablet Place 1 tablet under the tongue every 5 minutes as needed for Chest pain up to max of 3 total doses.  If no relief after 1 dose, call 911. 9/20/17  Yes Preeti , APRN - CNP   b complex vitamins capsule Take 1 capsule by mouth every other day Indications: overy other day    Yes Historical Provider, MD   Vitamin D (CHOLECALCIFEROL) 1000 UNITS CAPS capsule Take 1,000 Units by mouth daily. Yes Historical Provider, MD       Allergies:  Darvocet a500 [propoxyphene n-acetaminophen]; Demerol hcl [meperidine]; Marinol [dronabinol]; Morphine sulfate [morphine]; Statins [statins]; and Levaquin [levofloxacin in d5w]    Social History:   reports that she is a non-smoker but has been exposed to tobacco smoke. She has never used smokeless tobacco. She reports that she does not drink alcohol or use drugs. Review of Systems:  · Constitutional: there has been no unanticipated weight loss. There's been No change in energy level, No change in activity level. · Eyes: No visual changes or diplopia. No scleral icterus. · ENT: No Headaches, hearing loss or vertigo. No mouth sores or sore throat. · Cardiovascular: As above. · Respiratory: No SOB, cough or hemoptysis. · Gastrointestinal: No abdominal pain, appetite loss, blood in stools. No change in bowel or bladder habits. · Genitourinary: No dysuria, trouble voiding, or hematuria. · Musculoskeletal:  No gait disturbance, No weakness or joint complaints. · Integumentary: No rash or pruritis. · Psychiatric: No anxiety, or depression. · Hematologic/Lymphatic: No abnormal bruising or bleeding, blood clots or swollen lymph nodes. · Allergic/Immunologic: No nasal congestion or hives. Physical Exam:  /60   Pulse 59   Ht 5' (1.524 m)   Wt 134 lb (60.8 kg)   BMI 26.17 kg/m²     Constitutional and General Appearance: alert, cooperative, no distress and appears stated age  [de-identified]: PERRL, no cervical lymphadenopathy. No masses palpable. Normal oral mucosa  Respiratory:  · Normal excursion and expansion without use of accessory muscles  · Resp Auscultation: Good respiratory effort. No for increased work of breathing.  On auscultation: clear to auscultation bilaterally  Cardiovascular:  · The apical impulse is not displaced  · Heart tones are crisp and normal. regular S1 and S2.+SM   · Jugular venous pulsation Normal  · The carotid upstroke is normal in amplitude and contour without delay or bruit  · Peripheral pulses are symmetrical and full   Abdomen:   · No masses or tenderness  · Bowel sounds present  Extremities:  ·  No Cyanosis or Clubbing  ·  Lower extremity edema: No  ·  Skin: Warm and dry    Cardiac Data:  EKG: NSR ,LBBB    Labs:     CBC: No results for input(s): WBC, HGB, HCT, PLT in the last 72 hours. BMP:   Recent Labs     06/12/19  0936      K 3.8   CO2 22   BUN 31*   CREATININE 1.13*   LABGLOM 46*   GLUCOSE 203*     PT/INR: No results for input(s): PROTIME, INR in the last 72 hours. FASTING LIPID PANEL:  Lab Results   Component Value Date    CHOL 155 11/13/2018    CHOL 220 01/06/2016    HDL 52 11/13/2018    LDLCHOLESTEROL 84 11/13/2018    TRIG 93 11/13/2018    CHOLHDLRATIO 3.0 11/13/2018     LIVER PROFILE:No results for input(s): AST, ALT, LABALBU in the last 72 hours.       IMPRESSION:    Patient Active Problem List   Diagnosis    Hyperlipidemia    Hypertension    Diabetic neuropathy ([address]    Breast cancer ([address]    Lymphedema of arm    Vitamin D deficiency    Hyperplastic colon polyp    PVC's (premature ventricular contractions)    Allergic rhinitis    Anxiety, generalized    Acute on chronic combined systolic and diastolic congestive heart failure ([address]    DM (diabetes mellitus), type 2 ([address]    Coronary artery disease involving native coronary artery of native heart    Cellulitis of arm, left    Persistent atrial fibrillation (HCC)    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 Lou Amador    Chronic nausea    Moderate mitral regurgitation    Leg cramps    Sinus drainage    Cellulitis of left arm    Cellulitis    Atrial fibrillation with RVR ([facility])    Chest pain       RECOMMENDATIONS:  PAF , WITH SPONTANEOUS CONVERSION TO NSR ON AMIODARONE , BB , XARELTO   WILL EVENT RECORDER , IF HAS LOW HR<40 AND >120 GO ER      HYPERTENSION- ON LOWER SIDE WILL NOT START ZESTRIL NOW ,HAS APPOINTMENT WITH  6/24/19      HYPERLIPIDEMIA- ON STATIN, NEEDS TO WATCH LIPID PROFILE AND LFT'S    DIABETES MELITIS AS PER PRIMARY CARE PROVIDER    DISCUSSED IN DETAILS ABOUT RISK MODIFICATION    RETURN VISIT ON 6/24 WITH   IF ANY SYMPTOM CHANGE PATIENT ADVISED TO GO TO THE EMERGENCY ROOM.           Sam Cochran [facility]           [phone]

## 2019-06-14 NOTE — TELEPHONE ENCOUNTER
[id number] [facility] cardiology calling to set up hospital discharge 06/13/19 from [facility]  Afib trans care appt 06/18/19 at 8:40am

## 2019-06-14 NOTE — PATIENT INSTRUCTIONS
shot. If you have had one before, ask your doctor whether you need another dose. Get a flu shot every year. If you must be around people with colds or flu, wash your hands often. Activity    · If your doctor recommends it, get more exercise. Walking is a good choice. Bit by bit, increase the amount you walk every day. Try for at least 30 minutes on most days of the week. You also may want to swim, bike, or do other activities. Your doctor may suggest that you join a cardiac rehabilitation program so that you can have help increasing your physical activity safely.     · Start light exercise if your doctor says it is okay. Even a small amount will help you get stronger, have more energy, and manage stress. Walking is an easy way to get exercise. Start out by walking a little more than you did in the hospital. Gradually increase the amount you walk.     · When you exercise, watch for signs that your heart is working too hard. You are pushing too hard if you cannot talk while you are exercising. If you become short of breath or dizzy or have chest pain, sit down and rest immediately.     · Check your pulse regularly. Place two fingers on the artery at the palm side of your wrist, in line with your thumb. If your heartbeat seems uneven or fast, talk to your doctor. When should you call for help? Call 911 anytime you think you may need emergency care. For example, call if:    · You have symptoms of a heart attack. These may include:  ? Chest pain or pressure, or a strange feeling in the chest.  ? Sweating. ? Shortness of breath. ? Nausea or vomiting. ? Pain, pressure, or a strange feeling in the back, neck, jaw, or upper belly or in one or both shoulders or arms. ? Lightheadedness or sudden weakness. ? A fast or irregular heartbeat. After you call 911, the  may tell you to chew 1 adult-strength or 2 to 4 low-dose aspirin. Wait for an ambulance.  Do not try to drive yourself.     · You have symptoms of a stroke. These may include:  ? Sudden numbness, tingling, weakness, or loss of movement in your face, arm, or leg, especially on only one side of your body. ? Sudden vision changes. ? Sudden trouble speaking. ? Sudden confusion or trouble understanding simple statements. ? Sudden problems with walking or balance. ? A sudden, severe headache that is different from past headaches.     · You passed out (lost consciousness).    Call your doctor now or seek immediate medical care if:    · You have new or increased shortness of breath.     · You feel dizzy or lightheaded, or you feel like you may faint.     · Your heart rate becomes irregular.     · You can feel your heart flutter in your chest or skip heartbeats. Tell your doctor if these symptoms are new or worse.    Watch closely for changes in your health, and be sure to contact your doctor if you have any problems. Where can you learn more? Go to [email or web address] org and sign in to your [employer] account. Enter U020 in the Jodange box to learn more about \"Atrial Fibrillation: Care Instructions. \"     If you do not have an account, please click on the \"Sign Up Now\" link. Current as of: July 22, 2018  Content Version: 12.0  © 5584-0058 [employer]wise, Incorporated. Care instructions adapted under license by [facility] ([facility]). If you have questions about a medical condition or this instruction, always ask your healthcare professional. [place] [address] any warranty or liability for your use of this information.

## 2019-06-17 NOTE — TELEPHONE ENCOUNTER
Abbey Panchal stated when she comes to see you tomorrow she would like to discuss med changes they did at the hospital, she was confused about this.

## 2019-06-17 NOTE — CARE COORDINATION
[facility] Transitions Follow Up Call    2019     Patient: Kiko Ram  Patient : 1935   MRN: [id number]  Reason for Admission: Afib with RVR  Discharge Date: 19 RARS: Readmission Risk Score: 30         Spoke with: Ari Carranza    Attempted to contact Jrarod Levien, but Jarrod Levine handed phone to her daughter Ari Carranza. Ari Carranza stated that Jarrod Levine had a small episode this morning. She had some dizziness, nausea and felt like she may pass out. Ari Carranza said that Nicole's heart rate stayed in the 60's and her blood pressure was good. Blood sugar was in the 200's. They were not sure if it was here heart or the blood sugar. Jarrod Levine went to see cardiologist on  and monitor ordered and was told when go to ED. Jarrod Levine is going to have an even monitor applied tomorrow. Encouraged them to keep a diary of her episodes. She also has an appointment with her PCP tomorrow. No further questions or concerns at this time. Encouraged to call if need arises. Care Transitions Subsequent and Final Call    Subsequent and Final Calls  Care Transitions Interventions  Other Interventions:             Follow Up  Future Appointments   Date Time Provider Destinee Gaffney   2019  8:40 AM [facility]   2019  3:00 PM SCHEDULE, HEART [facility] EKG [place]   2019  1:30 PM DO PANTERA Wilson DP   2019 10:20 AM [facility]   2020  1:15 PM [facility]   2020 11:00 AM Jania Mae MD [facility]       Arash Kowalski RN

## 2019-06-17 NOTE — TELEPHONE ENCOUNTER
[facility] Transitions Initial Follow Up Call    Outreach made within 2 business days of discharge: Yes    Patient: Bong Fournier Patient : 1935   MRN: [id number]  Reason for Admission: There are no discharge diagnoses documented for the most recent discharge. Discharge Date: 19       Spoke with: Trevon Car    Discharge department/facility: [facility] Interactive Patient Contact:  Was patient able to fill all prescriptions: Yes  Was patient instructed to bring all medications to the follow-up visit: Yes  Is patient taking all medications as directed in the discharge summary?  Yes  Does patient understand their discharge instructions: No: Med change questions  Does patient have questions or concerns that need addressed prior to 7-14 day follow up office visit: no    Scheduled appointment with PCP within 7-14 days    Follow Up  Future Appointments   Date Time Provider Destinee Gaffney   2019  8:40 AM MD COSME Bautista [facility]   2019  3:00 PM SCHEDULE, HEART [facility] EKG [place]   2019  1:30 PM DO PANTERA Navarro [facility]   2019 10:20 AM MD ROME Bautista [facility]   2020  1:15 PM Otis Whitney MD [facility]   2020 11:00 AM MD COSME Bautista [facility]       Moo Monge

## 2019-06-18 NOTE — PROGRESS NOTES
Event monitor applied and explained to patient and patient's daughter who both state understanding. Instructions reviewed with patient who deny questions and concerns.

## 2019-06-18 NOTE — PROGRESS NOTES
Post-Discharge Transitional Care Management Services or Hospital Follow Up      Shanika Sandoval   YOB: 1935    Date of Office Visit:  6/18/2019  Date of Hospital Admission: 6/8/19  Date of Hospital Discharge: 6/13/19  Readmission Risk Score(high >=14%. Medium >=10%):Readmission Risk Score: 30      Care management risk score Rising risk (score 2-5) and Complex Care (Scores >=6): 10     Non face to face  following discharge, date last encounter closed (first attempt may have been earlier): 6/17/2019  4:34 PM 6/17/2019  4:34 PM    Call initiated 2 business days of discharge: Yes     Patient Active Problem List   Diagnosis    Hyperlipidemia    Hypertension    [facility] neuropathy [facility]    Breast cancer ([facility]    Lymphedema of arm    Vitamin D deficiency    Hyperplastic colon polyp    PVC's (premature ventricular contractions)    Allergic rhinitis    Anxiety, generalized    Acute on chronic combined systolic and diastolic congestive heart failure ([address]    DM (diabetes mellitus), type 2 ([address]    Coronary artery disease involving native coronary artery of native heart    Cellulitis of arm, left    Persistent atrial fibrillation (HCC)    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 Dorothey Fleischer    Chronic nausea    Moderate mitral regurgitation    Leg cramps    Sinus drainage    Cellulitis of left arm    Cellulitis    [facility]    Chest pain       Allergies   Allergen Reactions    Darvocet A500 [Propoxyphene N-Acetaminophen] Other (See Comments)     weakness    Demerol Hcl [Meperidine] Other (See Comments)     Weakness      Marinol [Dronabinol] Other (See Comments)     weakness    Morphine Sulfate [Morphine] Other (See Comments)     Weakness      Statins [Statins] Other (See Comments)     Cause muscle pain and she would prefer not to take them any longer.     Levaquin [Levofloxacin In D5w] Nausea Only and Anxiety       Medications listed as ordered at the time of discharge from hospital   Radha Manifold   Home Medication Instructions ALIZA:    Printed on:06/18/19 1200   Medication Information                      acetaminophen (TYLENOL) 325 MG tablet  Take 2 tablets by mouth every 4 hours as needed for Pain             amiodarone (CORDARONE) 200 MG tablet  Take 1 tablet by mouth 2 times daily             aspirin 81 MG chewable tablet  Take 1 tablet by mouth daily             b complex vitamins capsule  Take 1 capsule by mouth every other day Indications: overy other day              famotidine (PEPCID) 20 MG tablet  TAKE ONE TABLET BY MOUTH EVERY EVENING             fluticasone (FLONASE) 50 MCG/ACT nasal spray  1 spray by Each Nostril route 2 times daily             furosemide (LASIX) 20 MG tablet  Take 1 tablet by mouth daily             glimepiride (AMARYL) 1 MG tablet  TAKE ONE TABLET BY MOUTH EVERY MORNING WITH BREAKFAST             loratadine (CLARITIN) 10 MG tablet  Take 1 tablet by mouth daily             LORazepam (ATIVAN) 0.5 MG tablet  TAKE ONE TABLET BY MOUTH EVERY 8 HOURS AS NEEDED FOR ANXIETY FOR UP TO 30 DAYS. metoprolol tartrate (LOPRESSOR) 50 MG tablet  Take 1 tablet by mouth 2 times daily             metroNIDAZOLE (METROGEL) 0.75 % gel  Apply topically 2 times daily. nitroGLYCERIN (NITROSTAT) 0.4 MG SL tablet  Place 1 tablet under the tongue every 5 minutes as needed for Chest pain up to max of 3 total doses. If no relief after 1 dose, call 911. Probiotic Product (PROBIOTIC DAILY PO)  Take by mouth daily             rivaroxaban (XARELTO) 15 MG TABS tablet  Take 1 tablet by mouth daily             sertraline (ZOLOFT) 50 MG tablet  TAKE ONE TABLET BY MOUTH DAILY             sucralfate (CARAFATE) 1 GM tablet  Take 1 tablet by mouth 4 times daily             triamcinolone (KENALOG) 0.1 % cream  Apply topically 2 times daily.              Vitamin D (CHOLECALCIFEROL) 1000 UNITS CAPS capsule  Take 1,000 Units by mouth daily. Medications marked \"taking\" at this time  Outpatient Medications Marked as Taking for the 6/18/19 encounter (Office Visit) with Shala Menezes MD   Medication Sig Dispense Refill    fluticasone (FLONASE) 50 MCG/ACT nasal spray 1 spray by Each Nostril route 2 times daily 2 Bottle 1    metoprolol tartrate (LOPRESSOR) 50 MG tablet Take 1 tablet by mouth 2 times daily 60 tablet 3    amiodarone (CORDARONE) 200 MG tablet Take 1 tablet by mouth 2 times daily 30 tablet 3    rivaroxaban (XARELTO) 15 MG TABS tablet Take 1 tablet by mouth daily 30 tablet 3    LORazepam (ATIVAN) 0.5 MG tablet TAKE ONE TABLET BY MOUTH EVERY 8 HOURS AS NEEDED FOR ANXIETY FOR UP TO 30 DAYS. 90 tablet 0    aspirin 81 MG chewable tablet Take 1 tablet by mouth daily 30 tablet 3    sucralfate (CARAFATE) 1 GM tablet Take 1 tablet by mouth 4 times daily 120 tablet 3    loratadine (CLARITIN) 10 MG tablet Take 1 tablet by mouth daily 30 tablet 2    metroNIDAZOLE (METROGEL) 0.75 % gel Apply topically 2 times daily. 45 g 1    triamcinolone (KENALOG) 0.1 % cream Apply topically 2 times daily. 80 g 0    Probiotic Product (PROBIOTIC DAILY PO) Take by mouth daily      glimepiride (AMARYL) 1 MG tablet TAKE ONE TABLET BY MOUTH EVERY MORNING WITH BREAKFAST 30 tablet 5    sertraline (ZOLOFT) 50 MG tablet TAKE ONE TABLET BY MOUTH DAILY 30 tablet 5    famotidine (PEPCID) 20 MG tablet TAKE ONE TABLET BY MOUTH EVERY EVENING 90 tablet 3    furosemide (LASIX) 20 MG tablet Take 1 tablet by mouth daily 90 tablet 3    b complex vitamins capsule Take 1 capsule by mouth every other day Indications: overy other day       Vitamin D (CHOLECALCIFEROL) 1000 UNITS CAPS capsule Take 1,000 Units by mouth daily.           Medications patient taking as of now reconciled against medications ordered at time of hospital discharge: Yes    Chief Complaint   Patient presents with    Follow-Up from [facility]-, Dx Afib, DC 6-13-19 - pt states she is feeling a little weak and nervous this morning    Discuss Medications     - they took off some medications while in [facility] and she would like to know if she should stay off or what - they didn't go over this with her on discharge- just found it on her AVS    Other     would like to have a referral for the Derm that you had talked about- is willing to go to [facility], [facility] Other     she has a lot of phlegm but last time you gave her prednisone and that really cleared it would like to get some again       HPI Here today for a hospital follow up for a recent episode of afib with rvr. She was admitted on 6/8 and discharged on 6/13. While in the hospital she was cardioverted and she was started on amiodarone. She was also hypotensive so her lisinopril, spironolactone were stopped. She also had her plavix stopped and she was put on xarelto. She was very confused about her discharge instructions so she has still been taking her lisinopril. She had a small \"episdoe\" a few days ago but it resolved quickly and she did not have any syncope. She also would like to make an appointment with dermatology in [facility] because she can't get an appointment with them here until Nov.     Phlegm: worsening; she previously had been treated with prednisone and flonase which helped but she has not been continuing her flonase. She feels like the phelgm is choking her so she wants to try the flonase and prednisone again. Inpatient course: Discharge summary reviewed- see chart. Interval history/Current status: stable    Review of Systems   Constitutional: Negative for activity change, appetite change, chills, fatigue and fever. HENT: Positive for postnasal drip. Eyes: Negative for visual disturbance. Respiratory: Negative for cough, chest tightness, shortness of breath and wheezing. Cardiovascular: Positive for palpitations. Negative for chest pain and leg swelling.    Genitourinary: Negative for difficulty urinating. Neurological: Positive for light-headedness. Negative for dizziness, syncope, weakness and headaches. Psychiatric/Behavioral: Negative for dysphoric mood and sleep disturbance. The patient is not nervous/anxious (no more than normal). Vitals:    06/18/19 0906   BP: 136/78   Site: Right Upper Arm   Position: Sitting   Cuff Size: Medium Adult   Pulse: 71   SpO2: 97%     There is no height or weight on file to calculate BMI. Wt Readings from Last 3 Encounters:   06/14/19 134 lb (60.8 kg)   06/13/19 131 lb 3.2 oz (59.5 kg)   06/08/19 135 lb (61.2 kg)     BP Readings from Last 3 Encounters:   06/18/19 136/78   06/14/19 116/60   06/13/19 (!) 128/93       Physical Exam   Constitutional: She is oriented to person, place, and time. She appears well-developed and well-nourished. No distress. Eyes: Conjunctivae are normal.   Neck: Normal range of motion. Neck supple. No thyromegaly present. Cardiovascular: Normal rate, regular rhythm, normal heart sounds and intact distal pulses. No murmur heard. Pulmonary/Chest: Effort normal and breath sounds normal. No respiratory distress. She has no wheezes. Musculoskeletal: She exhibits no edema. Lymphadenopathy:     She has no cervical adenopathy. Neurological: She is alert and oriented to person, place, and time. Skin: Skin is warm and dry. No rash noted. No erythema. Nursing note and vitals reviewed. Assessment/Plan:  1. Persistent atrial fibrillation (HCC)  Stable; she is doing better since she has been cardioverted and she was started on amiodarone. Her heart rate is normal today. She is not getting as lightheaded in the past few days either. I told her to ask cardiology at her next appointment about what she is supposed to do when she has an episode because my recommendation is to go to the ER.    - HI DISCHARGE MEDS RECONCILED W/ CURRENT OUTPATIENT MED LIST    2.  Dermatitis  Worsening; she would like to see derm in

## 2019-06-21 NOTE — CARE COORDINATION
[facility] Transitions Follow Up Call    2019    Patient: Michael Davenport  Patient : 1935   MRN: [id number]  Reason for Admission:   Discharge Date: 19 RARS: Readmission Risk Score: 30         Spoke with: Patient, Shonda Santos. Patient states she is doing well since discharge. No further episodes of A-fib that the patient can feel. She is wearing an Event Monitor and will F/U with cardiologist on 19. She is taking medications as directed (especially Xarelto and amiodarone as directed). Patient has no needs or concerns for writer at this time. Will Follow up at later time. Care Transitions Subsequent and Final Call    Subsequent and Final Calls  Do you have any ongoing symptoms?:  No  Have your medications changed?:  No  Do you have any questions related to your medications?:  No  Do you currently have any active services?:  No  Do you have any needs or concerns that I can assist you with?:  No  Care Transitions Interventions  Other Interventions:             Follow Up  Future Appointments   Date Time Provider Destinee Gaffney   2019  1:30 PM DO PANTERA Whitmore [facility]   2019 11:00 AM [facility]   2019 10:20 AM MD COSME Lou [facility]   2020  1:15 PM Julio Buckner MD [facility]   2020 11:00 AM [facility]       Jami Albright LPN

## 2019-06-24 NOTE — PROGRESS NOTES
Cardiology Consultation  [facility] [place], [place], Alivia Youssef)    06/24/19    Patient is here for follow up after being hospitalized for Afib c RVR, doing well in NSR    HPI and Chief Complaint:  Anup Blackman  is doing well from a cardiac standpoint. Good functional capacity with no significant change in functional capacity. No chest pain, no dyspnea, no PND, no syncope or pre-syncope, no orthopnea. No symptoms of CHF or angina/chest pain. REVIEW OF SYSTEMS:    · Constitutional: there has been no unanticipated weight loss. There's been No change in energy level, No change in activity level. · Eyes: No visual changes or diplopia. No scleral icterus. · ENT: No Headaches, hearing loss or vertigo. No mouth sores or sore throat. · Cardiovascular: No chest pain, no dyspnea, no chf like symptoms  · Respiratory: No previous pulmonary problems  · Gastrointestinal: No abdominal pain, appetite loss, blood in stools. No change in bowel or bladder habits. · Genitourinary: No dysuria, trouble voiding, or hematuria. · Musculoskeletal:  No gait disturbance, No weakness or joint complaints. · Integumentary: No rash or pruritis. · Neurological: No headache, diplopia, change in muscle strength, numbness or tingling. No change in gait, balance, coordination, mood, affect, memory, mentation, behavior. · Psychiatric: No new anxiety or depression. · Endocrine: No temperature intolerance. No excessive thirst, fluid intake, or urination. No tremor. · Hematologic/Lymphatic: No abnormal bruising or bleeding, blood clots or swollen lymph nodes. · Allergic/Immunologic: No nasal congestion or hives. Physical Exam:   Vitals: /62   Pulse 68   Ht 5' (1.524 m)   Wt 138 lb (62.6 kg)   BMI 26.95 kg/m²   General appearance: alert and cooperative with exam  HEENT: Head: Normocephalic, no lesions, without obvious abnormality.   Neck: no carotid bruit, no JVD  Lungs: clear to auscultation bilaterally  Heart: regular rate and rhythm, S1, S2 normal, 3/6 systolic murmur, no click, rub or gallop  Abdomen: soft, non-tender; bowel sounds normal; no masses,  no organomegaly  Extremities: extremities normal, atraumatic, no cyanosis or edema  Neurologic: Mental status: Alert, oriented, thought content appropriate      EKG: Normal Sinus Rhythm with no ischemic changes. LAST ECHO:    LAST STRESS:    LAST CATH:    TTE 1/15/18  SUMMARY:  1.  Left ventricular dimensions are within normal diameter, LV ejection fraction 40 to 45% range. 2.  Grade 1 diastolic dysfunction. 3.  Mild to moderate left ventricular hypertrophy. 4.  Left atrial enlargement noted. 5.  Normal right ventricular size and systolic function. 6.  Mitral annular calcification with mild mitral regurgitation. 7.  Status post TAVR, mean gradient of 2 mmHg.  No aortic valve regurgitation noted. 8.  Trivial tricuspid regurgitation, right ventricular systolic pressure 30 mmHg. 9.  No pericardial effusion. Past Medical and Surgical History, Problem List, Allergies, Medications, Labs, Imaging, all reviewed extensively in EMR and with the patient.       Assessment and Plan:     S/p hospitalization for Afib c RVR- placed on BB and well controlled in NSR now. Doing well. Had tachycardia induced cardiomyopathy. Check echo in 3 months now that she is back in NSR. Cont higher dose amio    -Severe aortic stenosis S/p TAVR 12/7/17. Doing well. Dental antibiotic prophylaxis need discussed. Follow with annual echoes. -CAD- Cardiac cath 09/6/2016 showed LM normal, LAD mid 95% treated with xience 2.5/18 mm ERON, LCX normal, RCA small vessel with proximal 40% stenosis. On plavix.  -Cardiomyopathy-LVEF 40-45%  -Chronic LBBB  -PAF- on xarelto. Continue metoprolol  -HTN- Continue current therapy.   -Hyperlipidemia- continue statin.  -Chronic systolic/diastolic CHF and chronic left upper ext lymphedema. H/o b/l mastectomy.  Continue lasix and aldactone  -Carotid u/s 04/19/2016 no evidence of hemodynamically significant stenosis. Thank you for allowing me to participate in the care of this patient, please do not hesitate to call if you have any questions. Ken Eckert, [id number] [facility]  [facility]  [phone]

## 2019-06-25 NOTE — CARE COORDINATION
[facility] Transitions Final Call    2019    Patient: Ning Saunders  Patient : 1935   MRN: [id number]  Reason for Admission: Afib with RVR  Discharge Date: 19 RARS: Readmission Risk Score: 30       1st attempt to reach patient for Care Transitions. [facility] requesting return call. Contact information provided. [phone]    Care Transitions Subsequent and Final Call    Subsequent and Final Calls  Care Transitions Interventions  Other Interventions:             Follow Up  Future Appointments   Date Time Provider Destinee Gaffney   2019 11:00 AM [facility]   2019  3:00 PM SCHEDULE, ECHO [facility] ECHO [place]   2019  2:00 PM DO PANTERA Cerda DP   2019 10:20 AM [facility]   2020  1:15 PM Jean Paul Gil MD [facility]UL[facility]   2020 11:00 AM [facility]       Chantel Phillips, NOELLE

## 2019-06-26 NOTE — CARE COORDINATION
[facility] 45 Transitions Final Call    2019   Patient: Ning Saunders  Patient : 1935   MRN: <[id number]>  Reason for Admission: Afib with RVR      Discharge Date: 19 RARS: Readmission Risk Score: 30         Spoke with: Ning Saunders    Was able to contact Ifeanyi Mclaughlin for final transitional outreach. She stated that she was doing \"well\". She denied \"episodes\", chest pain, shortness of breath, dizziness and N/V. She is able to get around the house without difficulty. No concerns or question. Episode ended. Handoff to [address] [place] [address] [place] Transitions Subsequent and Final Call    Subsequent and Final Calls  Do you have any ongoing symptoms?:  No  Have your medications changed?:  Yes  Patient Reports:  stopped flonase  Do you have any questions related to your medications?:  No  Do you currently have any active services?:  No  Do you have any needs or concerns that I can assist you with?:  No  Care Transitions Interventions  Other Interventions:             Follow Up  Future Appointments   Date Time Provider Destinee Gaffney   2019 11:00 AM [facility]   2019  3:00 PM SCHEDULE, ECHO [address] [facility] ECHO [place]   2019  2:00 PM DO PANTERA Cerda DPP   2019 10:20 AM [facility]   2020  1:15 PM Jean Paul Gil MD [facility]   2020 11:00 AM Bethany Pickering MD [facility]       Chantel Phillips RN

## 2019-07-02 NOTE — CARE COORDINATION
week?:  1-2  Do you salt your food before tasting it?:  No     No patient-reported symptoms      Symptoms:      Symptom course:  stable  Patient-reported weight (lb):  139  Weight trend:  fluctuating minimally         Ambulatory Care Coordination Assessment    Care Coordination Protocol  Program Enrollment:  Complex Care  Referral from Primary Care Provider:  No  Week 1 - Initial Assessment     Do you have all of your prescriptions and are they filled?:  Yes  Barriers to medication adherence:  None  Are you able to afford your medications?:  Yes  How often do you have trouble taking your medications the way you have been told to take them?:  I always take them as prescribed. Do you have Home O2 Therapy?:  No      Ability to seek help/take action for Emergent Urgent situations i.e. fire, crime, inclement weather or health crisis. :  Independent  Ability to ambulate to restroom:  Independent  Ability handle personal hygeine needs (bathing/dressing/grooming): Independent  Ability to manage Medications: Independent  Ability to prepare Food Preparation:  Independent  Ability to maintain home (clean home, laundry): Independent  Ability to drive and/or has transportation:  Needs Assistance  Ability to do shopping:  Needs Assistance  Ability to manage finances:   Independent  Is patient able to live independently?:  Yes     Current Housing:  Private Residence        Per the Fall Risk Screening, did the patient have 2 or more falls or 1 fall with injury in the past year?:  No     Frequent urination at night?:  No  Do you use rails/bars?:  No  Do you have a non-slip tub mat?:  Yes     Are you experiencing loss of meaning?:  No  Are you experiencing loss of hope and peace?:  No     Thinking about your patient's physical health needs, are there any symptoms or problems (risk indicators) you are unsure about that require further investigation?:  No identified areas of uncertainly or problems already being investigated   Are the patients physical health problems impacting on their mental well-being?:  No identified areas of concern   Are there any problems with your patients lifestyle behaviors (alcohol, drugs, diet, exercise) that are impacting on physical or mental well-being?:  No identified areas of concern   Do you have any other concerns about your patients mental well-being? How would you rate their severity and impact on the patient?:  No identified areas of concern   How would you rate their home environment in terms of safety and stability (including domestic violence, insecure housing, neighbor harassment)?:  Consistently safe, supportive, stable, no identified problems   How do daily activities impact on the patient's well-being? (include current or anticipated unemployment, work, caregiving, access to transportation or other):  No identified problems or perceived positive benefits   How would you rate their social network (family, work, friends)?:  Good participation with social networks   How would you rate their financial resources (including ability to afford all required medical care)?:  Financially secure, resources adequate, no identified problems   How wells does the patient now understand their health and well-being (symptoms, signs or risk factors) and what they need to do to manage their health?:  Reasonable to good understanding and already engages in managing health or is willing to undertake better management   How well do you think your patient can engage in healthcare discussions? (Barriers include language, deafness, aphasia, alcohol or drug problems, learning difficulties, concentration):  Clear and open communication, no identified barriers   Do other services need to be involved to help this patient?:  Other care/services not required at this time   Are current services involved with this patient well-coordinated? (Include coordination with other services you are now recommendation):   All required capsule Take 1 capsule by mouth every other day Indications: overy other day     Historical Provider, MD   Vitamin D (CHOLECALCIFEROL) 1000 UNITS CAPS capsule Take 1,000 Units by mouth daily.     Historical Provider, MD       Future Appointments   Date Time Provider Dsetinee Gaffney   7/26/2019 11:00 AM [facility]   9/26/2019  3:00 PM SCHEDULE, ECHO [address] [facility] ECHO [place]   9/30/2019  2:00 PM Carole Martin DO [facility]   11/6/2019 10:20 AM Dhruv Santana MD [facility]   5/6/2020  1:15 PM Mindy Cadet MD [facility]   5/7/2020 11:00 AM Dhruv Santana MD [facility]

## 2019-07-06 NOTE — TELEPHONE ENCOUNTER
Dr. Lily Cueto reviewed today and wrote, \"Routine Follow up. \" He will be out of office next 2 weeks.

## 2019-07-09 NOTE — TELEPHONE ENCOUNTER
Notified patient of Event recorder results. Instructed patient to keep upcoming appointment with cardiologist and to call our office for any questions. Patient also instructed to utilize the E.D. for any emergent health issues that may arise.

## 2019-07-12 NOTE — CARE COORDINATION
(H) 04/29/2019    LABA1C 6.8 (H) 03/26/2019     Lab Results   Component Value Date     06/08/2019     04/29/2019     03/26/2019        General Assessment    Do you have any symptoms that are causing concern?:  Yes  Progression since Onset:  Gradually Worsening  Reported Symptoms:  Shortness of Breath       Diabetes Assessment    Medic Alert ID:  No  Meal Planning:  Avoidance of concentrated sweets   How often do you test your blood sugar?:  Daily (Comment: could test 3-4 times a day)   Do you have barriers with adherence to non-pharmacologic self-management interventions? (Nutrition/Exercise/Self-Monitoring):  Yes   Have you ever had to go to the ED for symptoms of low blood sugar?:  No       Increase or Decrease trend in Blood Sugars   Do you have hyperglycemia symptoms?:  No   Do you have hypoglycemia symptoms?:  No   Last Blood Sugar Value:  132   Blood Sugar Monitoring Regimen:  Morning Fasting, At Bedtime   Blood Sugar Trends:  Fluctuating       and   Congestive Heart Failure Assessment    Are you currently restricting fluids?:  No Restriction  Do you understand a low sodium diet?:  Yes  Do you understand how to read food labels?:  Yes  How many restaurant meals do you eat per week?:  1-2  Do you salt your food before tasting it?:  No     Shortness of breath (worse than baseline)      Symptoms:      Symptom course:  worsening  Patient-reported weight (lb):  140  Weight trend:  increasing steadily         Care Coordination Interventions    Program Enrollment:  Complex Care  Referral from Primary Care Provider:  No  Suggested Interventions and Community Resources  Zone Management Tools:   In Process (Comment: CHF and DM )         Goals Addressed                 This Visit's Progress       Patient Stated     Self Monitoring (pt-stated)   On track     Daily Weights - I will weight myself as directed - Daily and write down weights  I will notify my provider of any increase in weight by 3 or more Eric Peoples, APRN - CNP   b complex vitamins capsule Take 1 capsule by mouth every other day Indications: overy other day     Historical Provider, MD   Vitamin D (CHOLECALCIFEROL) 1000 UNITS CAPS capsule Take 1,000 Units by mouth daily.     Historical Provider, MD       Future Appointments   Date Time Provider Destinee Mayoi   7/26/2019 11:00 AM [facility]   9/26/2019  3:00 PM SCHEDULE, [facility] [address] [facility] [place]   9/30/2019  2:00 PM Poonam Millan DO [facility]   11/6/2019 10:20 AM Stephen Arias MD [facility]   5/6/2020  1:15 PM Porsha Wright MD [facility]   5/7/2020 11:00 AM Stephen Arias MD [facility]

## 2019-07-24 NOTE — CARE COORDINATION
Date End Date Taking? Authorizing Provider   sertraline (ZOLOFT) 50 MG tablet TAKE ONE TABLET BY MOUTH DAILY 7/15/19  Yes Kathryn Staples MD   glimepiride (AMARYL) 1 MG tablet TAKE ONE TABLET BY MOUTH EVERY MORNING WITH BREAKFAST 7/12/19  Yes Kathryn Staples MD   metoprolol tartrate (LOPRESSOR) 50 MG tablet Take 1 tablet by mouth 2 times daily 6/13/19  Yes DIYA James CNP   amiodarone (CORDARONE) 200 MG tablet Take 1 tablet by mouth 2 times daily 6/16/19  Yes Sandra Douglas MD   rivaroxaban (XARELTO) 15 MG TABS tablet Take 1 tablet by mouth daily 6/11/19  Yes Sandra Douglas MD   aspirin 81 MG chewable tablet Take 1 tablet by mouth daily 6/10/19  Yes DIYA Ponce CNP   sucralfate (CARAFATE) 1 GM tablet Take 1 tablet by mouth 4 times daily 5/6/19  Yes Kathryn Staples MD   loratadine (CLARITIN) 10 MG tablet Take 1 tablet by mouth daily 5/6/19  Yes Kathryn Staples MD   Probiotic Product (PROBIOTIC DAILY PO) Take by mouth daily   Yes Historical Provider, MD   acetaminophen (TYLENOL) 325 MG tablet Take 2 tablets by mouth every 4 hours as needed for Pain 11/15/18  Yes Garner Morelle   famotidine (PEPCID) 20 MG tablet TAKE ONE TABLET BY MOUTH EVERY EVENING 11/5/18  Yes Kathryn Staples MD   furosemide (LASIX) 20 MG tablet Take 1 tablet by mouth daily 9/25/18  Yes Kathryn Staples MD   nitroGLYCERIN (NITROSTAT) 0.4 MG SL tablet Place 1 tablet under the tongue every 5 minutes as needed for Chest pain up to max of 3 total doses. If no relief after 1 dose, call 911. 9/20/17  Yes DIYA Ponce CNP   b complex vitamins capsule Take 1 capsule by mouth every other day Indications: overy other day    Yes Historical Provider, MD   Vitamin D (CHOLECALCIFEROL) 1000 UNITS CAPS capsule Take 1,000 Units by mouth daily. Yes Historical Provider, MD   LORazepam (ATIVAN) 0.5 MG tablet TAKE ONE TABLET BY MOUTH EVERY 8 HOURS AS NEEDED FOR ANXIETY FOR UP TO 30 DAYS.  6/10/19 7/10/19  Kathryn Staples MD

## 2019-07-26 NOTE — PROGRESS NOTES
Dermatology Patient Note  [facility] PHYSICIANS  [facility] DERMATOLOG[facility]  [place] [address] [address]  Dept: [phone]  Dept Fax: [phone]      VISITDATE: 7/26/2019   REFERRING PROVIDER: Benoit Arellano MD      Carolina Weinberg is a 82 y.o. female  who presents today in the office for:    New Patient (splotches on face x3-4 months that is not going away; PCP treating for rosacea--gave metrogel, triamcinolone and clinamycin with no relief)      HISTORY OF PRESENT ILLNESS:  82 y.o. female presenting for lesiosn  Location: face  Duration: 3-4 months  Symptoms: painful  Course: growing  Exacerbating factors: unsure  Prior treatments: metrogel, triamcinolone and clindamycin with no relief      CURRENT MEDICATIONS:   Current Outpatient Medications   Medication Sig Dispense Refill    sertraline (ZOLOFT) 50 MG tablet TAKE ONE TABLET BY MOUTH DAILY 30 tablet 4    glimepiride (AMARYL) 1 MG tablet TAKE ONE TABLET BY MOUTH EVERY MORNING WITH BREAKFAST 90 tablet 4    metoprolol tartrate (LOPRESSOR) 50 MG tablet Take 1 tablet by mouth 2 times daily 60 tablet 3    amiodarone (CORDARONE) 200 MG tablet Take 1 tablet by mouth 2 times daily 30 tablet 3    rivaroxaban (XARELTO) 15 MG TABS tablet Take 1 tablet by mouth daily 30 tablet 3    aspirin 81 MG chewable tablet Take 1 tablet by mouth daily 30 tablet 3    sucralfate (CARAFATE) 1 GM tablet Take 1 tablet by mouth 4 times daily 120 tablet 3    Probiotic Product (PROBIOTIC DAILY PO) Take by mouth daily      acetaminophen (TYLENOL) 325 MG tablet Take 2 tablets by mouth every 4 hours as needed for Pain 120 tablet 0    famotidine (PEPCID) 20 MG tablet TAKE ONE TABLET BY MOUTH EVERY EVENING 90 tablet 3    furosemide (LASIX) 20 MG tablet Take 1 tablet by mouth daily 90 tablet 3    nitroGLYCERIN (NITROSTAT) 0.4 MG SL tablet Place 1 tablet under the tongue every 5 minutes as needed for Chest pain up to max of 3 total doses.  If no relief after 1 dose, call arms, digits and/or nails was examined. Pertinent Physical Exam Findings:  Physical Exam  Gritty erythematous macules of face  Right distal nose with scarred pearly papule  Crusted tan to brown stuck-on papules on trunk and extremities, excoriated    Photo surveillance performed: Yes    Medical Necessity of Exam Performed:   Distribution of patient concerns    Additional Diagnostic Testing performed during exam: Not performed ,  Not performed    ASSESSMENT:   Diagnosis Orders   1. Neoplasm of uncertain behavior of skin  Surgical Pathology    SD TANGENTIAL BIOPSY SKIN SINGLE LESION   2. Actinic keratosis  SD DESTRUC PREMALIGNANT, FIRST LESION    SD DESTRUC PREMALIGNANT,2-14 LESIONS   3. Seborrheic keratosis         Plan of Action is as Follows:  Assessment   1. R/o BCC  Shave Biopsy: After cleaning with alcohol the lesion was anesthetized with 1% lidocaine with epinephrine and was removed with a dermablade. Hemostasis was achieved with aluminum chloride and Vaseline and a bandage were applied.  - Surgical Pathology; Future  - SD TANGENTIAL BIOPSY SKIN SINGLE LESION    2. Actinic keratosis  Cryotherapy: After verbal consent was obtained including discussion of the risks (lesion persistence, lesion recurrence and hypo/hyperpigmentation) and benefits (resolution of the lesion) 4 total Actinic Keratosis on the face were treated once with liquid nitrogen to achieve a 2-3 mm freeze border.  - SD DESTRUC PREMALIGNANT, FIRST LESION  - SD DESTRUC PREMALIGNANT,2-14 LESIONS    3. Seborrheic keratosis  reassurance and education  Try to stop picking    RTC 6 months            Patient Instructions   Seborrheic Keratosis  Seborrheic keratoses are common benign growths of unknown cause seen in adults due to a thickening of an area of the top skin layer. Who's At Risk  Although they can occur anytime after puberty, almost everyone over 48 has one or more of these and they increase in number with age.  Some families have an

## 2019-07-31 NOTE — TELEPHONE ENCOUNTER
Please inform patient that the biopsy of the lesion on the nose did not show skin cancer, but looks to be a benign and more rare lesion. It was only partially sampled so the diagnosis is not entirely certain. We should watch the area carefully to make sure it doesn't change signficantly. We will take a look at it at her 6 month f/u, but if changes much before that, call for an appt.     Marty Singh

## 2019-08-15 PROBLEM — L03.114 CELLULITIS OF LEFT ARM: Status: RESOLVED | Noted: 2018-11-12 | Resolved: 2019-01-01

## 2019-08-15 PROBLEM — J34.89 SINUS DRAINAGE: Status: RESOLVED | Noted: 2018-05-03 | Resolved: 2019-01-01

## 2019-08-15 NOTE — PROGRESS NOTES
Medications   Medication Sig Dispense Refill    LORazepam (ATIVAN) 0.5 MG tablet TAKE ONE TABLET BY MOUTH EVERY 8 HOURS AS NEEDED FOR ANXIETY FOR UP TO 30 DAYS 90 tablet 0    sertraline (ZOLOFT) 50 MG tablet TAKE ONE TABLET BY MOUTH DAILY 30 tablet 4    glimepiride (AMARYL) 1 MG tablet TAKE ONE TABLET BY MOUTH EVERY MORNING WITH BREAKFAST 90 tablet 4    metoprolol tartrate (LOPRESSOR) 50 MG tablet Take 1 tablet by mouth 2 times daily 60 tablet 3    amiodarone (CORDARONE) 200 MG tablet Take 1 tablet by mouth 2 times daily 30 tablet 3    rivaroxaban (XARELTO) 15 MG TABS tablet Take 1 tablet by mouth daily 30 tablet 3    aspirin 81 MG chewable tablet Take 1 tablet by mouth daily 30 tablet 3    sucralfate (CARAFATE) 1 GM tablet Take 1 tablet by mouth 4 times daily 120 tablet 3    Probiotic Product (PROBIOTIC DAILY PO) Take by mouth daily      acetaminophen (TYLENOL) 325 MG tablet Take 2 tablets by mouth every 4 hours as needed for Pain 120 tablet 0    famotidine (PEPCID) 20 MG tablet TAKE ONE TABLET BY MOUTH EVERY EVENING 90 tablet 3    furosemide (LASIX) 20 MG tablet Take 1 tablet by mouth daily 90 tablet 3    nitroGLYCERIN (NITROSTAT) 0.4 MG SL tablet Place 1 tablet under the tongue every 5 minutes as needed for Chest pain up to max of 3 total doses. If no relief after 1 dose, call 911. 25 tablet 3    b complex vitamins capsule Take 1 capsule by mouth every other day Indications: overy other day       Vitamin D (CHOLECALCIFEROL) 1000 UNITS CAPS capsule Take 1,000 Units by mouth daily.  loratadine (CLARITIN) 10 MG tablet Take 1 tablet by mouth daily (Patient not taking: Reported on 8/15/2019) 30 tablet 2     No current facility-administered medications for this visit. Past Medical History:   Diagnosis Date    Allergic rhinitis     With chronic cough.  Anxiety, generalized     Chronic with probable depression. She will take Ativan but refuses antidepressant.     Aortic stenosis     [facility]rial fibrillation ([facility]     Breast cancer ([facility]     2 occurrences    CAD (coronary artery disease) November 2012    Minimal disease by catheterization, 11/12, with wedge pressure of the right heart. She is taking Lasix for this and being followed by Cardiology.  Cellulitis of left arm 11/12/2018    Diabetic neuropathy (HCC)     Hyperlipidemia     Hypertension     runs low    Lymphedema of arm     Left arm, due to lymph node dissection and mastectomy.  PVC's (premature ventricular contractions)     Chronic. Patient currently undergoing workup for arrhythmia.  S/P cardiac cath 09/06/2016    Type II or unspecified type diabetes mellitus without mention of complication, not stated as uncontrolled     Vitamin D deficiency        Social History     Tobacco Use    Smoking status: Passive Smoke Exposure - Never Smoker    Smokeless tobacco: Never Used    Tobacco comment: Robertlori Jones RRT 11/12/18   Substance Use Topics    Alcohol use: No    Drug use: No       Significant family and surgical history reviewed as noted in the patient's record. Objective:    Physical Exam:  Vitals: /70 (Site: Right Upper Arm, Position: Sitting, Cuff Size: Medium Adult)   Pulse 62   Temp 97.9 °F (36.6 °C) (Tympanic)   Ht 5' (1.524 m)   Wt 140 lb 12.8 oz (63.9 kg)   SpO2 98%   BMI 27.50 kg/m²     LABS:  CBC:  No results for input(s): WBC, HGB, PLT in the last 72 hours. BMP:  No results for input(s): NA, K, CL, CO2, BUN, CREATININE, GLUCOSE in the last 72 hours. Hepatic:  No results for input(s): AST, ALT, ALB, BILITOT, ALKPHOS in the last 72 hours. Pertinent lab and radiology results reviewed.        General Appearance: well developed, well nourished, and in no acute distress  Skin: pale, warm and dry, no rash, no erythema  Head: normocephalic and atraumatic  Eyes: pupils equal, round, and reactive to light, sclerae white, conjunctivae normal  Neck: supple and non-tender without mass, no thyromegaly or

## 2019-08-15 NOTE — PATIENT INSTRUCTIONS
immediate medical care if:  · You have new or worse symptoms in your legs, belly, or buttocks. Symptoms may include:  ? Numbness or tingling. ? Weakness. ? Pain. · You lose bladder or bowel control. Watch closely for changes in your health, and be sure to contact your doctor if:  · Along with the back pain, you have a fever, lose weight, or don't feel well. · You do not get better as expected. Where can you learn more? Go to https://[employer]. org and sign in to your [employer] account. Enter A007 in the [employer] box to learn more about \"Learning About Low Back Pain. \"     If you do not have an account, please click on the \"Sign Up Now\" link. Current as of: September 20, 2018  Content Version: 12.1  © 4628-9456 [employer]. Care instructions adapted under license by [facility] ([facility]). If you have questions about a medical condition or this instruction, always ask your healthcare professional. [place] [address] any warranty or liability for your use of this information. Patient Education        Low Back Pain: Exercises  Introduction  Here are some examples of exercises for you to try. The exercises may be suggested for a condition or for rehabilitation. Start each exercise slowly. Ease off the exercises if you start to have pain. You will be told when to start these exercises and which ones will work best for you. How to do the exercises  Press-up    1. Lie on your stomach, supporting your body with your forearms. 2. Press your elbows down into the floor to raise your upper back. As you do this, relax your stomach muscles and allow your back to arch without using your back muscles. As your press up, do not let your hips or pelvis come off the floor. 3. Hold for 15 to 30 seconds, then relax. 4. Repeat 2 to 4 times. Alternate arm and leg (bird dog) exercise    1. Start on the floor, on your hands and knees.   2. Tighten your belly muscles. 3. Raise one leg off the floor, and hold it straight out behind you. Be careful not to let your hip drop down, because that will twist your trunk. 4. Hold for about 6 seconds, then lower your leg and switch to the other leg. 5. Repeat 8 to 12 times on each leg. 6. Over time, work up to holding for 10 to 30 seconds each time. 7. If you feel stable and secure with your leg raised, try raising the opposite arm straight out in front of you at the same time. Knee-to-chest exercise    1. Lie on your back with your knees bent and your feet flat on the floor. 2. Bring one knee to your chest, keeping the other foot flat on the floor (or keeping the other leg straight, whichever feels better on your lower back). 3. Keep your lower back pressed to the floor. Hold for at least 15 to 30 seconds. 4. Relax, and lower the knee to the starting position. 5. Repeat with the other leg. Repeat 2 to 4 times with each leg. 6. To get more stretch, put your other leg flat on the floor while pulling your knee to your chest.    Curl-ups    1. Lie on the floor on your back with your knees bent at a 90-degree angle. Your feet should be flat on the floor, about 12 inches from your buttocks. 2. Cross your arms over your chest. If this bothers your neck, try putting your hands behind your neck (not your head), with your elbows spread apart. 3. Slowly tighten your belly muscles and raise your shoulder blades off the floor. 4. Keep your head in line with your body, and do not press your chin to your chest.  5. Hold this position for 1 or 2 seconds, then slowly lower yourself back down to the floor. 6. Repeat 8 to 12 times. Pelvic tilt exercise    1. Lie on your back with your knees bent. 2. \"Brace\" your stomach. This means to tighten your muscles by pulling in and imagining your belly button moving toward your spine.  You should feel like your back is pressing to the floor and your hips and pelvis are rocking back.  3. Hold for about 6 seconds while you breathe smoothly. 4. Repeat 8 to 12 times. Heel dig bridging    1. Lie on your back with both knees bent and your ankles bent so that only your heels are digging into the floor. Your knees should be bent about 90 degrees. 2. Then push your heels into the floor, squeeze your buttocks, and lift your hips off the floor until your shoulders, hips, and knees are all in a straight line. 3. Hold for about 6 seconds as you continue to breathe normally, and then slowly lower your hips back down to the floor and rest for up to 10 seconds. 4. Do 8 to 12 repetitions. Hamstring stretch in doorway    1. Lie on your back in a doorway, with one leg through the open door. 2. Slide your leg up the wall to straighten your knee. You should feel a gentle stretch down the back of your leg. 3. Hold the stretch for at least 15 to 30 seconds. Do not arch your back, point your toes, or bend either knee. Keep one heel touching the floor and the other heel touching the wall. 4. Repeat with your other leg. 5. Do 2 to 4 times for each leg. Hip flexor stretch    1. Kneel on the floor with one knee bent and one leg behind you. Place your forward knee over your foot. Keep your other knee touching the floor. 2. Slowly push your hips forward until you feel a stretch in the upper thigh of your rear leg. 3. Hold the stretch for at least 15 to 30 seconds. Repeat with your other leg. 4. Do 2 to 4 times on each side. Wall sit    1. Stand with your back 10 to 12 inches away from a wall. 2. Lean into the wall until your back is flat against it. 3. Slowly slide down until your knees are slightly bent, pressing your lower back into the wall. 4. Hold for about 6 seconds, then slide back up the wall. 5. Repeat 8 to 12 times. Follow-up care is a key part of your treatment and safety. Be sure to make and go to all appointments, and call your doctor if you are having problems.  It's also a

## 2019-08-16 NOTE — CARE COORDINATION
Ambulatory Care Coordination Note  8/16/2019  CM Risk Score: 10  Charlson 10 Year Mortality Risk Score: 100%     ACC: Doreen Romero, RN    Summary Note:  Selina Montes De Oca was referred for [facility] by CTN. She has A-Fib, Cardiomyopathy, S/P TAVR, Type II DM controlled not on insulin, H/O breast cancer, CAD, CHF, HTN, Hyperlipidemia.      She follows with cardiology, pulmonology     Plan of Care : Continue assessments, support, and education                                      Discussed Advanced Directives- she has paperwork but has not completed. She is aware that this can assist if needed. She is also aware that it needs placed in EMR.                                     Discussed needed vaccines- Shingles and Tdap- she voiced understanding but declined receiving. She also stated that she does not receive flu vaccines either.   Surekha Menendez is overdue for diabetic eye exam. She stated she will schedule in near future.                                        F/U wt- now 140#- stable. She is taking Lasix once a day and if with increase weight- will add second pill as per cardiologist request- she will do this for 3 days. Same day appointments and Urgent Care reviewed again 8/16/2019.                          F/U on increase of Amaryl and BS readings. Patient increased from 1 mg daily to 1 mg BID with meals. This continues per Patient 8/16/2019. Spoke with Selina Montes [place] Oca. She was seen in Urgent Care yesterday for rt sciatica. She is to use Tylenol and compresses- warm/cold. She stated Tylenol and compresses are helping some. She is not scheduled for PCP f/u until 11/2019. She declined sooner appointment- she stated \"it just takes time to heal\". She is aware that she will need fasting lab prior to PCP appt in 11/2019. She stated she would have it done.      Lab Results   Component Value Date    LABA1C 7.7 (H) 06/08/2019    LABA1C 6.8 (H) 04/29/2019    LABA1C 6.8 (H) 03/26/2019     Lab Results Component Value Date     06/08/2019     04/29/2019     03/26/2019     General Assessment    Do you have any symptoms that are causing concern?:  Yes  Progression since Onset:  Gradually Improving  Reported Symptoms:  Pain (Comment: sciatica- treating with Tylenol and compresses)       Diabetes Assessment    Medic Alert ID:  No  Meal Planning:  Avoidance of concentrated sweets   How often do you test your blood sugar?:  Daily (Comment: could test 3-4 times a day)   Do you have barriers with adherence to non-pharmacologic self-management interventions?  (Nutrition/Exercise/Self-Monitoring):  Yes   Have you ever had to go to the ED for symptoms of low blood sugar?:  No       No patient-reported symptoms   Do you have hyperglycemia symptoms?:  No   Do you have hypoglycemia symptoms?:  No   Last Blood Sugar Value:  200   Blood Sugar Monitoring Regimen:  2 Hours Post Meal   Blood Sugar Trends:  No Change       and   Congestive Heart Failure Assessment    Are you currently restricting fluids?:  No Restriction  Do you understand a low sodium diet?:  Yes  Do you understand how to read food labels?:  Yes  How many restaurant meals do you eat per week?:  1-2  Do you salt your food before tasting it?:  No     No patient-reported symptoms      Symptoms:      Symptom course:  stable  Patient-reported weight (lb):  140  Weight trend:  stable  Salt intake watch compared to last visit:  stable         Care Coordination Interventions    Program Enrollment:  Complex Care  Referral from Primary Care Provider:  No  Suggested Interventions and Community Resources  Zone Management Tools:  Completed (Comment: CHF and DM )         Goals Addressed                 This Visit's Progress       Patient Stated     Self Monitoring (pt-stated)   On track     Daily Weights - I will weight myself as directed - Daily and write down weights  I will notify my provider of any increase in weight by 3 or more pounds in 2 days OR 5 or

## 2019-08-18 PROBLEM — I50.9 HEART FAILURE (HCC): Status: ACTIVE | Noted: 2019-01-01

## 2019-08-19 PROBLEM — N18.30 CHRONIC KIDNEY DISEASE, STAGE 3 (HCC): Status: ACTIVE | Noted: 2019-01-01

## 2019-08-19 PROBLEM — L03.90 CELLULITIS: Status: RESOLVED | Noted: 2018-11-12 | Resolved: 2019-01-01

## 2019-08-19 PROBLEM — I48.91 ATRIAL FIBRILLATION WITH RVR (HCC): Status: RESOLVED | Noted: 2019-03-25 | Resolved: 2019-01-01

## 2019-08-19 NOTE — PROGRESS NOTES
Pain: No       Orientation  Orientation  Overall Orientation Status: Within Normal Limits  Social/Functional History  Social/Functional History  Lives With: Spouse  Type of Home: House  Home Layout: One level  Home Access: Stairs to enter with rails  Entrance Stairs - Number of Steps: 2  Bathroom Accessibility: Accessible  ADL Assistance: Independent  Homemaking Assistance: Needs assistance  Ambulation Assistance: Independent  Transfer Assistance: Independent  Occupation: Retired  Cognition        Objective          AROM RLE (degrees)  RLE AROM: WFL  AROM LLE (degrees)  LLE AROM : WFL  Strength RLE  Strength RLE: WFL  Strength LLE  Strength LLE: WFL        Bed mobility  Supine to Sit: Independent  Sit to Supine: Independent  Scooting: Independent  Transfers  Sit to Stand: Independent  Stand to sit: Independent  Bed to Chair: Independent  Ambulation  Ambulation?: Yes  Ambulation 1  Surface: level tile  Device: No Device  Assistance: Independent  Distance: 50 ft x2.  No SOB, No O2 needed     Balance  Sitting - Static: Good  Sitting - Dynamic: Good  Standing - Static: Good  Standing - Dynamic: Good        Plan   Safety Devices  Type of devices: Call light within reach, Left in bed    G-Code       OutComes Score                                                  AM-PAC Score             Goals  Short term goals  Time Frame for Short term goals: 1 day  Short term goal 1: Assess functional status       Therapy Time   Individual Concurrent Group Co-treatment   Time In 0900         Time Out 0910         Minutes 10                 Arti Bhakta PT

## 2019-08-19 NOTE — CONSULTS
Check Echo, if LVEF still < 35% will need to discuss possible AICD for primary prevention. Discussed with patient and nursing. Thank you for allowing me to participate in the care of this patient, please do not hesitate to call if you have any questions. Jessica Hendrickson DO, [address] [facility]  [facility]  [facility]  [phone]

## 2019-08-19 NOTE — H&P
respect to eyes, ENT, neck, pulmonary, coronary, chest, GI, , endocrine, musculoskeletal, immune system/connective tissue disease, hematologic, neurologic, psychiatric, skin, lymphatics, or malignancies. Code status discussed with patient/family--wishes for Full Code at this time. PHYSICAL EXAM:  Vitals:  /60   Pulse 59   Temp 97.7 °F (36.5 °C) (Oral)   Resp 18   Wt 143 lb 3.2 oz (65 kg)   SpO2 99%   BMI 27.97 kg/m²     General: awake, alert and cooperative  HEENT: Mucosa Pink, Moist, External nose normal, Normocephalic and Atraumatic  Neck: Supple, No Masses, Tenderness, Nodularity and No Lymphadenopathy  Chest/Lungs: bases diminished bilaterally and Respirations even and unlabored  Cardiac: Regular Rate and Rhythm and Systolic Murmur Present  GI/Abdomen:  Bowel Sounds Present, Soft, Non-tender, without Guarding or Rebound Tenderness and No Masses  : Not examined  Extremities/Musculoskeletal: chronic left lymphedema, generalized weakness  Skin: No Cyanosis, No rash and Skin warm and dry  Neuro: Alert and Oriented, to Person, to Time, to Place, to Situation and No Localizing Signs/Symptoms  Psychiatric: Normal mood and affect      LABS:    CBC with Differential:    Lab Results   Component Value Date    WBC 7.0 08/19/2019    RBC 3.81 08/19/2019    HGB 10.4 08/19/2019    HCT 32.2 08/19/2019     08/19/2019    MCV 84.4 08/19/2019    MCH 27.4 08/19/2019    MCHC 32.4 08/19/2019    RDW 16.9 08/19/2019    LYMPHOPCT 18 08/18/2019    MONOPCT 10 08/18/2019    BASOPCT 1 08/18/2019    MONOSABS 0.90 08/18/2019    LYMPHSABS 1.50 08/18/2019    EOSABS 0.10 08/18/2019    BASOSABS 0.00 08/18/2019    DIFFTYPE NOT REPORTED 08/18/2019     CMP:    Lab Results   Component Value Date     08/19/2019    K 3.1 08/19/2019     08/19/2019    CO2 28 08/19/2019    BUN 31 08/19/2019    CREATININE 1.37 08/19/2019    GFRAA 45 08/19/2019    LABGLOM 37 08/19/2019    GLUCOSE 121 08/19/2019    PROT 7.1 08/18/2019 LABALBU 4.0 08/18/2019    CALCIUM 8.6 08/19/2019    BILITOT 0.46 08/18/2019    ALKPHOS 158 08/18/2019    AST 39 08/18/2019    ALT 35 08/18/2019       ASSESSMENT:      Patient Active Problem List   Diagnosis    Hyperlipidemia    Hypertension    Diabetic neuropathy ([facility]    Breast cancer ([facility]    Lymphedema of arm    Vitamin D deficiency    Hyperplastic colon polyp    PVC's (premature ventricular contractions)    Allergic rhinitis    Anxiety, generalized    Acute on chronic combined systolic and diastolic congestive heart failure ([facility]    DM (diabetes mellitus), type 2 ([facility]    Coronary artery disease involving native coronary artery of native heart    Persistent atrial fibrillation (HCC)    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 Aurea Mishra    Chronic nausea    Moderate mitral regurgitation    Leg cramps    Chronic kidney disease, stage 3 ([facility]       PLAN:    1. Acute on chronic CHF -- telemetry monitoring, IV diuresis, cardiology consult, 2D echo, fluid restriction, daily weight, monitor I&Os, monitor electrolytes and replace prn  2. Hypoxia -- supplemental oxygen prn, RT protocols, as above  3. Hypokalemia -- replacement protocol, recheck   4. DMII -- home amaryl, ISS, carb controlled diet, monitor and titrate prn  5. CKD -- monitor renal function and I&Os  6. Possible early cholecystitis -- patient denies abdominal pain, monitor, consider gallbladder US vs outpatient follow up  7. Home medications reviewed  8.  See orders     Note that over 50 minutes was spent in evaluation of the patient, review of the chart and pertinent records, discussion with family/staff, etc    Ramon KEANE, NP-C, FNP-BC  8:24 AM  8/19/2019

## 2019-08-20 NOTE — PROGRESS NOTES
[x]  None or general   []  Abdominal or thoracic surgery  []  Abdominal or thoracic   Chronic Pulmonary Historyre [x]  No []  Yes []  Yes     [x]  Secretion Management Assessment  Score 1 2 3   Bilateral Breath Sounds   [x]  Occasional Rhonchi []  Scattered Rhonchi []  Course Rhonchi and/or poor aeration   Sputum    [x]  Small amount of thin secretions []  Moderate amount of viscous secretions []  Copius, Viscious Yellow/ Secretions   CXR as reported by physician [x]  clear  []  Unavailable []  Infiltrates and/or consolidation  []  Unavailable []  Mucus Plugging and or lobar consolidation  []  Unavailable   Cough [x]  Strong, productive cough []  Weak productive cough []  No cough or weak non-productive cough   Ricky Cuevas  8:46 AM                            FEMALE                                  MALE                            FEV1 Predicted Normal Values                        FEV1 Predicted Normal Values          Age                                     Height in Feet and Inches       Age                                     Height in Feet and Inches       4' 11\" 5' 1\" 5' 3\" 5' 5\" 5' 7\" 5' 9\" 5' 11\" 6' 1\"  4' 11\" 5' 1\" 5' 3\" 5' 5\" 5' 7\" 5' 9\" 5' 11\" 6' 1\"   42 - 45 2.49 2.66 2.84 3.03 3.22 3.42 3.62 3.83 42 - 45 2.82 3.03 3.26 3.49 3.72 3.96 4.22 4.47   46 - 49 2.40 2.57 2.76 2.94 3.14 3.33 3.54 3.75 46 - 49 2.70 2.92 3.14 3.37 3.61 3.85 4.10 4.36   50 - 53 2.31 2.48 2.66 2.85 3.04 3.24 3.45 3.66 50 - 53 2.58 2.80 3.02 3.25 3.49 3.73 3.98 4.24   54 - 57 2.21 2.38 2.57 2.75 2.95 3.14 3.35 3.56 54 - 57 2.46 2.67 2.89 3.12 3.36 3.60 3.85 4.11   58 - 61 2.10 2.28 2.46 2.65 2.84 3.04 3.24 3.45 58 - 61 2.32 2.54 2.76 2.99 3.23 3.47 3.72 3.98   62 - 65 1.99 2.17 2.35 2.54 2.73 2.93 3.13 3.34 62 - 65 2.19 2.40 2.62 2.85 3.09 3.33 3.58 3.84   66 - 69 1.88 2.05 2.23 2.42 2.61 2.81 3.02 3.23 66 - 69 2.04 2.26 2.48 2.71 2.95 3.19 3.44 3.70   70+ 1.82 1.99 2.17 2.36 2.55 2.75 2.95 3.16 70+ 1.97 2.19 2.41 2.64 2.87 3.12 3.37

## 2019-08-20 NOTE — PROGRESS NOTES
atrium is mildly dilated. Right atrium is mildly dilated . Bioprosthetic aortic valve per TAVR. Mean gradient is 2 mm Hg. No  regurgitation. Mild mitral valve thickening with annular calcification. Moderate mitral regurgitation. Mild tricuspid regurgitation. Estimated right ventricular systolic pressure is 47 mmHg. Mild pulmonary  hypertension. Stress Test: not obtained. Cardiac Angiography: not obtained.     Cath 9/17:     Elevated left sided filling pressure.   Low cardiac output   Moderately elevated pulmonary artery pressure.   Critical aortic stenosis   Patent LAD stent           Assessment / Acute Cardiac Problems:       Patient Active Problem List:     Hyperlipidemia     Hypertension     Diabetic neuropathy ([address]     Breast cancer ([address]     Lymphedema of arm     Vitamin D deficiency     Hyperplastic colon polyp     PVC's (premature ventricular contractions)     Allergic rhinitis     Anxiety, generalized     Acute on chronic combined systolic and diastolic congestive heart failure (HCC)     DM (diabetes mellitus), type 2 (HCC)     Coronary artery disease involving native coronary artery of native heart     Persistent atrial fibrillation (HCC)     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 vernon     Chronic nausea     Moderate mitral regurgitation     Leg cramps     Chronic kidney disease, stage 3 (HCC)     Systolic congestive heart failure ([address]      Plan of Treatment:   ACUTE ON CHRONIC CHF , HFrF , GETTING BETTER CONTINUE BB , BUMEX , ALDACTONE   PT MAY BE BENEFIT WITH CRT-THERAPY (BI-V, AICD)   DR. ODONNELL WILL DECIDE   CRITICAL AS , S/P TAVR, STILL MODERATE MR   ADVISE WATCH DAILY WT   CHF CLINIC CONSULT   OK TO DC FROM CARDIAC POINT   PT HAS COMING APPOINTMENT WITH DR. Karyle Lundborg, [facility]  [phone]

## 2019-08-20 NOTE — PROGRESS NOTES
Occupational Therapy   Occupational Therapy Initial Assessment  Date: 2019   Patient Name: Eleuterio Jamil  MRN: [id number]     : 1935    Date of Service: 2019    Discharge Recommendations:  Home with assist PRN       Assessment   Performance deficits / Impairments: Decreased endurance  Decision Making: Low Complexity  No Skilled OT: Independent with ADL's  REQUIRES OT FOLLOW UP: No  Safety Devices  Safety Devices in place: Yes  Type of devices: Call light within reach; Left in bed           Patient Diagnosis(es): The primary encounter diagnosis was Systolic congestive heart failure, unspecified HF chronicity ([address] A diagnosis of Nausea was also pertinent to this visit. has a past medical history of Allergic rhinitis, Anxiety, generalized, Aortic stenosis, Atrial fibrillation ([address] Atrial fibrillation with RVR ([address] Breast cancer ([facility] CAD (coronary artery disease), Cellulitis, Cellulitis of left arm, Diabetic neuropathy ([facility] Hyperlipidemia, Hypertension, Lymphedema of arm, PVC's (premature ventricular contractions), S/P cardiac cath, Type II or unspecified type diabetes mellitus without mention of complication, not stated as uncontrolled, and Vitamin D deficiency. has a past surgical history that includes Mastectomy (Bilateral,  and ); Hysterectomy (2002); fracture surgery (08/10/99); Cardiac catheterization (2012); Colonoscopy (2009); Colonoscopy (14); other surgical history (Right, 2015); Coronary angioplasty with stent (2016); Cardiac catheterization (2017); Aortic valve replacement (2017); and pr colonoscopy flx dx w/collj spec when pfrmd (N/A, 2018).     Subjective   General  Chart Reviewed: Yes  Patient assessed for rehabilitation services?: Yes  Family / Caregiver Present: Yes  Referring Practitioner: CHARLENE Stratton  Diagnosis: CHF  Subjective  Subjective: Patient rec'd in bed, pleasant and cooperative 82 yr old female with

## 2019-08-20 NOTE — PROGRESS NOTES
increased RA size--                  thickened MV leaflets--HAI--moderate aortic                  stenosis---RVSP 34 mm Hg--Grade 1 DD---                  LVEF ~ 40-45%          MI ruled out--12. 1.2014           Spiral CTA--11.30.2014--no PE     ASCVD           Cardiac catheterization---9.19.2017--patent LAD stent--LVEF ~ 30%           Cardiac catheterization---9. 6.2016--normal LM--                           95% mid-LAD--normal D1--ERON LAD stent--                           moderate LV systolic dysfunction--                           LVEF ~ 30-35%   Aortic stenosis---moderate----III/VI MECHE radiation to carotids          TAVR---12.7.2017         DUS--CV---4.2016---no hemodynamically significant stenosis  MR---mild   Intermittent atrial fibrillation  LBBB        EKG---11.13.2018--SR--89--1st degree AVB--LBBB--[old]        EKG---11.12.2018--SR--94--frequent PVCs---LBBB---[old]  Diabetes Mellitus Type 2         Diabetic neuropathy   Hypertension  Hyperlipidemia   CKD--Stage 3   Bilateral breast carcinoma--sp MRM--LND x 2        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                 Left radical mastectomy--1995                 Right mastectomy--LND--2000   Anxiety   Rash--excoriation--medial RUE---8.18.2019  PMH:   left elbow fracture, uterine prolapse--cystocele,               chest--normal cardiac catheterization--1999, left               arm--cellulitis--lymphadenitis--2011, left arm               lymphedema--recurrent episodes, acute respiratory              failure--due to CHF and possible pneumonia---2014--              required  CPAP, chest pain--11.30.2014, pneumonia               bilaterally--fever--leukocytosis---11.30.2014, Vitamin D              deficiency, PVCs, allergic rhinitis, hypokalemia, sinus               drainage, mild esophagitis---2017,  left arm cellulitis----              2018--left arm lymphadenitis--recurrent--acute--on--

## 2019-08-20 NOTE — PROGRESS NOTES
Hospitalist Progress Note    Patient:  Klever Buckley     YOB: 1935    MRN: [id number]   Admit date: 8/18/2019     Acct: [de-identified]     PCP: Hermelinda Pierce MD    CC--Interval History:   Acute-on-chronic combined systolic--diastolic-----2D ECHO shows severely reduced LVEF ~ 25%---Grade III severe DD---mild pulmonary HTN--see Cardiology note----may require AICD    TAVR    CKD---3    Rash medial right upper--lower arm---itches---excoriated so cannot tell original presentation ---> steroid topical    Patient [id number]-[id number]-available records reviewed, including, but not limited to, ER reports--labs--imaging----EKG---office records---personal notes    See below     All other ROS negative except noted in HPI    Diet:  DIET CARB CONTROL; Daily Fluid Restriction: 1500 ml    Medications:  Scheduled Meds:   albuterol  2.5 mg Nebulization TID    [START ON 8/20/2019] amiodarone  200 mg Oral Daily    spironolactone  25 mg Oral Daily    [START ON 8/20/2019] bumetanide  1 mg Oral BID    [START ON 8/20/2019] glimepiride  2 mg Oral Daily with breakfast    hydrocortisone   Topical BID    aspirin  81 mg Oral Daily    b complex-C-folic acid  1 capsule Oral Every Other Day    famotidine  20 mg Oral QPM    cetirizine  10 mg Oral Daily    metoprolol tartrate  50 mg Oral BID    rivaroxaban  15 mg Oral Daily    sertraline  50 mg Oral Daily    sucralfate  1 g Oral 4x Daily    vitamin D  1,000 Units Oral Daily    insulin lispro  0-6 Units Subcutaneous TID     insulin lispro  0-3 Units Subcutaneous Nightly    sodium chloride flush  10 mL Intravenous 2 times per day    lisinopril  5 mg Oral Daily     Continuous Infusions:   dextrose       PRN Meds:albuterol, sodium chloride nebulizer, acetaminophen, LORazepam, nitroGLYCERIN, sodium chloride flush, magnesium hydroxide, ondansetron, glucose, dextrose, glucagon (rDNA), dextrose    Objective:  Labs:  CBC with Differential:    Lab Results   Component Value Date    WBC 7.0 08/19/2019    RBC 3.81 08/19/2019    HGB 10.4 08/19/2019    HCT 32.2 08/19/2019     08/19/2019    MCV 84.4 08/19/2019    MCH 27.4 08/19/2019    MCHC 32.4 08/19/2019    RDW 16.9 08/19/2019    LYMPHOPCT 18 08/18/2019    MONOPCT 10 08/18/2019    BASOPCT 1 08/18/2019    MONOSABS 0.90 08/18/2019    LYMPHSABS 1.50 08/18/2019    EOSABS 0.10 08/18/2019    BASOSABS 0.00 08/18/2019    DIFFTYPE NOT REPORTED 08/18/2019     BMP:    Lab Results   Component Value Date     08/19/2019    K 3.7 08/19/2019     08/19/2019    CO2 28 08/19/2019    BUN 31 08/19/2019    LABALBU 4.0 08/18/2019    CREATININE 1.37 08/19/2019    CALCIUM 8.6 08/19/2019    GFRAA 45 08/19/2019    LABGLOM 37 08/19/2019    GLUCOSE 121 08/19/2019     Last 3 Troponin:  No results found for: TROPONINI        Physical Exam:  Vitals: BP (!) 122/58   Pulse 61   Temp 98.1 °F (36.7 °C) (Oral)   Resp 18   Wt 143 lb 3.2 oz (65 kg)   SpO2 95%   BMI 27.97 kg/m²   24 hour intake/output:    Intake/Output Summary (Last 24 hours) at 8/19/2019 2154  Last data filed at 8/19/2019 1755  Gross per 24 hour   Intake 820 ml   Output 1300 ml   Net -480 ml     Last 3 weights: Wt Readings from Last 3 Encounters:   08/19/19 143 lb 3.2 oz (65 kg)   08/15/19 140 lb 12.8 oz (63.9 kg)   07/26/19 141 lb (64 kg)     HEENT: Normocephalic and Atraumatic  Neck: Supple, No Masses, Tenderness, Nodularity and No Lymphadenopathy  Chest/Lungs: Rales Present, Prolonged Expiratory Phase and Distant Breath Sounds  Cardiac: Regular Rate and Rhythm  GI/Abdomen:  Bowel Sounds Present and Soft, Non-tender, without Guarding or Rebound Tenderness  : Not examined  EXT/Skin: No Cyanosis, No Clubbing and Edema Present---BLE --> knee  Neuro: Alert and Oriented and No Localizing Signs/Symptoms      Assessment:    Principal Problem:    Acute on chronic combined systolic and diastolic congestive heart failure (HCC)  Active Problems:    Systolic congestive heart failure

## 2019-08-20 NOTE — PLAN OF CARE
Problem: Falls - Risk of:  Goal: Will remain free from falls  Description  Will remain free from falls  Outcome: Ongoing  Goal: Absence of physical injury  Description  Absence of physical injury  Outcome: Ongoing     Problem: Risk for Impaired Skin Integrity  Goal: Tissue integrity - skin and mucous membranes  Description  Structural intactness and normal physiological function of skin and  mucous membranes.   Outcome: Ongoing     Problem: IP BALANCE  Goal: LTG - Patient will maintain balance to allow for safe/functional mobility  Outcome: Ongoing

## 2019-08-21 NOTE — TELEPHONE ENCOUNTER
bumetanide (BUMEX) 1 MG tablet Take 1 tablet by mouth 2 times daily     spironolactone (ALDACTONE) 25 MG tablet Take 1 tablet by mouth daily      Changed:  Medication Sig Comment     amiodarone (CORDARONE) 200 MG tablet Take 1 tablet by mouth daily -- Patient has old dose bottle at home and will continue to take from home supply but understands dose decrease and has written it down appropriately. No refill remains at pharmacy. Needs an updated script.  glimepiride (AMARYL) 2 MG tablet Take 1 tablet by mouth daily (with breakfast)  -Patient has old dose bottle at home and will continue to take from home supply but understands there is a new prescription with updated dose and directions. Patient is currently taking 1mg BID.   - Patient understands that there is an updated prescription ready at the pharmacy. Continued:  Medication Sig Comment     LORazepam (ATIVAN) 0.5 MG tablet TAKE ONE TABLET BY MOUTH EVERY 8 HOURS AS NEEDED FOR ANXIETY FOR UP TO 30 DAYS Taking 1 tablet every evening or sometimes BID     sertraline (ZOLOFT) 50 MG tablet TAKE ONE TABLET BY MOUTH DAILY     metoprolol tartrate (LOPRESSOR) 50 MG tablet Take 1 tablet by mouth 2 times daily Confirmed this is the only dose of metoprolol patient is taking     rivaroxaban (XARELTO) 15 MG TABS tablet Take 1 tablet by mouth daily Still taking   - patient taking QPM    aspirin 81 MG chewable tablet Take 1 tablet by mouth daily      sucralfate (CARAFATE) 1 GM tablet Take 1 tablet by mouth 4 times daily     loratadine (CLARITIN) 10 MG tablet Take 1 tablet by mouth daily PRN    Probiotic Product (PROBIOTIC DAILY PO) Take by mouth daily     acetaminophen (TYLENOL) 325 MG tablet Take 2 tablets by mouth every 4 hours as needed for Pain Takes 1 tablet/ day if needed.      famotidine (PEPCID) 20 MG tablet TAKE ONE TABLET BY MOUTH EVERY EVENING     nitroGLYCERIN (NITROSTAT) 0.4 MG SL tablet Place 1 tablet under the tongue every 5 minutes as needed for Chest pain up to max of 3 total doses. If no relief after 1 dose, call 911. Never needed, but has it. - Counseled and patient already carries in purse    b complex vitamins capsule Take 1 capsule by mouth every other day      Vitamin D (CHOLECALCIFEROL) 1000 UNITS CAPS capsule Take 1,000 Units by mouth daily. Discontinued:  - Furosemide- patient did take dose today and understands to discontinue and that Bumex is replacing it upon pickup. Additional Medications: N/A    Estimated Creatinine Clearance: 25 mL/min (A) (based on SCr of 1.43 mg/dL (H)). Assessment/Plan:  - Medication reconciliation completed. And medication review list in [facility] completed.     - Pt is taking medications as directed by discharging physician.    - Identified Potential Medication Interactions: No clinically significant interactions identified via [employer] Interaction Analysis as category D or higher.    - Renal Dosing: No renal adjustments necessary.    - Follow up appointment date (7 days for more severe illness, 14 days for others):    · Patient reminded of upcoming appointment    Future Appointments   Date Time Provider Destinee Mayoi   8/28/2019 10:20 AM Diamante Roque MD [facility]   9/3/2019 10:45 AM Tima Packer MD [facility]   9/26/2019  3:00 PM SCHEDULE, ECHO [address] [facility] ECHO [place]   11/6/2019 10:20 AM [facility]   1/30/2020  1:00 PM Nila Botello MD [facility]   5/6/2020  1:15 PM [facility]   5/7/2020 11:00 AM Diamante Roque MD [facility]     Thank you,    [address] Department   Direct: [phone]  Toll Free: [phone], option 7     --------------------------------------------------------------------------------------------------------------------------------------    For Pharmacy Admin Tracking Only  TCM Call Made?: Yes  [facility] Select Patient?: Yes  Total # of Interventions Recommended: 1 - Updated Order #: 1 - med rev, amio  Total # Interventions Accepted: 1 - med rev  Intervention Severity:   - Level 1 Intervention Present?: No   - Level 2 #: 0   - Level 3 #: 0  Outreach Status: Review Complete  Care Coordinator Outreach to Patient?: Yes  Provider Contacted?: Yes - Note Routed, Routine  Waiting on response from: N/A  Time Spent (min): 90

## 2019-08-22 NOTE — CARE COORDINATION
[facility] Transitions Initial Follow Up Call    Call within 2 business days of discharge: Yes    Patient: Allyn Malik Patient : 1935   MRN: [id number]  Reason for Admission: CHF  Discharge Date: 19 RARS: Readmission Risk Score: 31      Last Discharge [facility]       Complaint Diagnosis Description Type Department Provider    19 Shortness of Breath; Leg Swelling Systolic congestive heart failure, unspecified HF chronicity ([facility] [address] . .. ED to Hosp-Admission (Discharged) (ADMITTED) JASON Recinos Spoke with: [address] [place] [address] Street: Cassia    Non-face-to-face services provided:  Obtained and reviewed discharge summary and/or continuity of care documents    Care Transitions 24 Hour Call    Do you have any ongoing symptoms?:  No  Do you have a copy of your discharge instructions?:  Yes  Do you have all of your prescriptions and are they filled?:  Yes  Have you been contacted by a [id number] [facility] [employer] Pharmacist?:  Yes  Have you scheduled your follow up appointment?:  Yes  How are you going to get to your appointment?:  Car - family or friend to transport  Were you discharged with any Home Care or Post Acute Services:  No  Patient DME:  Straight cane, Other  Other Patient DME:  blood pressure machine, pulse oximeter  Do you have support at home?:  Partner/Spouse/SO  Do you feel like you have everything you need to keep you well at home?:  Yes  Are you an active caregiver in your home?:  No  Care Transitions Interventions       CTN spoke to [facility] for initial transitions call. Pt stated she is feeling better so far today, has her sister visiting from [place]. Stated her  picked up her medications yesterday and she has no questions or concerns regarding new medications and changes to current ones. Pt has replaced Lasix with Bumex as directed. Stated her  will  Zofran from pharmacy later today.  Denies nausea at this time, stated FBS was @ 135

## 2019-09-03 NOTE — TELEPHONE ENCOUNTER
Last Appt:  8/28/2019  Next Appt:   11/6/2019  Med verified in [address] 9/3/19      Pt was given 15 day supply with last fill.  I changed to 60 tabs

## 2019-09-05 NOTE — CARE COORDINATION
amiodarone (CORDARONE) 200 MG tablet Take 1 tablet by mouth daily 8/20/19   Courtney Dorsey   glimepiride (AMARYL) 2 MG tablet Take 1 tablet by mouth daily (with breakfast) 8/21/19   Courtney Echavarriaald   lisinopril (PRINIVIL;ZESTRIL) 5 MG tablet Take 1 tablet by mouth daily 8/21/19   Courtney Echavarriaald   spironolactone (ALDACTONE) 25 MG tablet Take 1 tablet by mouth daily 8/21/19   Courtney Echavarriaald   LORazepam (ATIVAN) 0.5 MG tablet TAKE ONE TABLET BY MOUTH EVERY 8 HOURS AS NEEDED FOR ANXIETY FOR UP TO 30 DAYS 8/5/19 9/4/19  Adeola Hernandez MD   sertraline (ZOLOFT) 50 MG tablet TAKE ONE TABLET BY MOUTH DAILY 7/15/19   Adeola Hernandez MD   metoprolol tartrate (LOPRESSOR) 50 MG tablet Take 1 tablet by mouth 2 times daily 6/13/19   DIYA Yeboah CNP   rivaroxaban (XARELTO) 15 MG TABS tablet Take 1 tablet by mouth daily 6/11/19   Nasima Echavarria MD   aspirin 81 MG chewable tablet Take 1 tablet by mouth daily 6/10/19   DIYA Pillai CNP   sucralfate (CARAFATE) 1 GM tablet Take 1 tablet by mouth 4 times daily 5/6/19   Adeola Hernandez MD   Probiotic Product (PROBIOTIC DAILY PO) Take by mouth daily    Historical Provider, MD   acetaminophen (TYLENOL) 325 MG tablet Take 2 tablets by mouth every 4 hours as needed for Pain  Patient not taking: Reported on 8/28/2019 11/15/18   Courtney Dorsey   famotidine (PEPCID) 20 MG tablet TAKE ONE TABLET BY MOUTH EVERY EVENING 11/5/18   Adeola Hernandez MD   nitroGLYCERIN (NITROSTAT) 0.4 MG SL tablet Place 1 tablet under the tongue every 5 minutes as needed for Chest pain up to max of 3 total doses. If no relief after 1 dose, call 911. Patient not taking: Reported on 8/28/2019 9/20/17   DIYA Pillai CNP   b complex vitamins capsule Take 1 capsule by mouth every other day     Historical Provider, MD   Vitamin D (CHOLECALCIFEROL) 1000 UNITS CAPS capsule Take 1,000 Units by mouth daily.     Historical Provider, MD       Future Appointments   Date Time Provider

## 2019-09-11 NOTE — PROGRESS NOTES
Cardiology Consultation  [facility] [place], Peri Bernabe, Les Jones)    09/11/19    Patient is here for follow up for low EF, Afib c RVR, doing well in NSR    HPI and Chief Complaint:  Hailey Berry  is doing well from a cardiac standpoint. Good functional capacity with no significant change in functional capacity. No chest pain, no dyspnea, no PND, no syncope or pre-syncope, no orthopnea. No symptoms of CHF or angina/chest pain. Afib was corrected, and EF still 25%. Past Medical History:   Diagnosis Date    Allergic rhinitis     With chronic cough.  Anxiety, generalized     Chronic with probable depression. She will take Ativan but refuses antidepressant.  Aortic stenosis     Atrial fibrillation (HCC)     Atrial fibrillation with RVR ([facility] [address] 3/25/2019    Breast cancer ([facility] [address]     2 occurrences    CAD (coronary artery disease) November 2012    Minimal disease by catheterization, 11/12, with wedge pressure of the right heart. She is taking Lasix for this and being followed by Cardiology.  Cellulitis 11/12/2018    Cellulitis of left arm 11/12/2018    Diabetic neuropathy (HCC)     Hyperlipidemia     Hypertension     runs low    Lymphedema of arm     Left arm, due to lymph node dissection and mastectomy.  PVC's (premature ventricular contractions)     Chronic. Patient currently undergoing workup for arrhythmia.  S/P cardiac cath 09/06/2016    Type II or unspecified type diabetes mellitus without mention of complication, not stated as uncontrolled     Vitamin D deficiency        Past Surgical History:   Procedure Laterality Date    AORTIC VALVE REPLACEMENT  12/07/2017    TAVR 26 mm CoreValve     CARDIAC CATHETERIZATION  November 2012    Showed minimal CAD with increased wedge pressure of the right heart. Patient saw Cardiology and started on Lasix.     CARDIAC CATHETERIZATION  09/19/2017    right and left heart cath   EF 30% WITH PATENT LAD STENT    COLONOSCOPY  Trivial tricuspid regurgitation, right ventricular systolic pressure 30 mmHg. 9.  No pericardial effusion. Echo: 6/8/2019  Left ventricle is normal in size. Overall left ventricular systolic function appears to be severely reduced;  Estimated left ventricular ejection fraction 25% Difficult to evaluate  systolic function due to patients irregular heart rate. Mild left ventricular hypertrophy. Evidence of diastolic dysfunction. Left atrium is moderately dilated. Normal right ventricle size with reduced systolic function. Normal functioning bioprosthetic aortic valve (TAVR) in aortic position. No  significant regurgitation or stenosis is seen. Mildly thickened mitral valve leaflets. Moderate mitral regurgitation. Mild tricuspid regurgitation. RVSP is 22 mmHg. Echo: 8/18/19  Normal left ventricular diameter. Mild to moderate left ventricular hypertrophy. Left ventricular systolic function is severely reduced. Left ventricular ejection fraction 25 %. Grade III (severe) left ventricular diastolic dysfunction. Left atrium is mildly dilated. Right atrium is mildly dilated . Bioprosthetic aortic valve per TAVR. Mean gradient is 2 mm Hg. No  regurgitation. Mild mitral valve thickening with annular calcification. Moderate mitral regurgitation. Mild tricuspid regurgitation. Estimated right ventricular systolic pressure is 47 mmHg. Mild pulmonary  hypertension. Past Medical and Surgical History, Problem List, Allergies, Medications, Labs, Imaging, all reviewed extensively in EMR and with the patient.       Assessment and Plan:     EF still 25% even after correcting Afib c RVR. May be ischemia. Will plan for cath. If clean cath, will need ICD. Did not improved with correction of Afib and optimal medical management. EF previously was 40-45% in January 2019. 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  Follow up echo shows EF still 25%, will plan for cardiac cath and

## 2019-09-12 NOTE — CARE COORDINATION
interventions? (Nutrition/Exercise/Self-Monitoring):  Yes   Have you ever had to go to the ED for symptoms of low blood sugar?:  No       No patient-reported symptoms   Do you have hyperglycemia symptoms?:  No   Do you have hypoglycemia symptoms?:  No   Last Blood Sugar Value:  130   Blood Sugar Monitoring Regimen:  Before Meals, At Bedtime   Blood Sugar Trends:  No Change       and   Congestive Heart Failure Assessment    Are you currently restricting fluids?:  No Restriction  Do you understand a low sodium diet?:  Yes  Do you understand how to read food labels?:  Yes  How many restaurant meals do you eat per week?:  1-2  Do you salt your food before tasting it?:  No     No patient-reported symptoms      Symptoms:      Symptom course:  stable  Patient-reported weight (lb):  141  Weight trend:  fluctuating minimally  Salt intake watch compared to last visit:  stable         Care Coordination Interventions    Program Enrollment:  Complex Care  Referral from Primary Care Provider:  No  Suggested Interventions and Community Resources  Zone Management Tools:  Completed (Comment: CHF and DM )         Goals Addressed                 This Visit's Progress       Patient Stated     Self Monitoring (pt-stated)   On track     Daily Weights - I will weight myself as directed - Daily and write down weights  I will notify my provider of any increase in weight by 3 or more pounds in 2 days OR 5 or more pounds in a week. None Recently Recorded    Barriers: lack of support and lack of education  Plan for overcoming my barriers: Care Coordination Intervention  Confidence: 10/10  Anticipated Goal Completion Date: 10/2/2019              Prior to Admission medications    Medication Sig Start Date End Date Taking?  Authorizing Provider   bumetanide (BUMEX) 1 MG tablet Take 1 tablet by mouth 2 times daily 9/3/19   Haresh Aguirre MD   ondansetron (ZOFRAN ODT) 4 MG disintegrating tablet Take 1 tablet by mouth every 6 hours as needed for

## 2019-09-23 PROBLEM — Z95.810 S/P ICD (INTERNAL CARDIAC DEFIBRILLATOR) PROCEDURE: Status: ACTIVE | Noted: 2019-01-01

## 2019-09-23 PROBLEM — Z95.0 PACEMAKER: Status: ACTIVE | Noted: 2019-01-01

## 2019-09-23 NOTE — PLAN OF CARE
Problem: Falls - Risk of:  Goal: Will remain free from falls  Description  Will remain free from falls  9/23/2019 1646 by Tariq King RN  Outcome: Ongoing  Note:   No falls this shift. Precautions include nonskid footwear on, bed locked in lowest position, siderails up x2, table and call light within reach. Bed alarm on. Patient calls out appropriately for assistance. Hourly rounding to assess for needs. 9/23/2019 1005 by Yusra Hodge RN  Outcome: Ongoing  Goal: Absence of physical injury  Description  Absence of physical injury  9/23/2019 1646 by Tariq King RN  Outcome: Ongoing  9/23/2019 1005 by Yusra Hodge RN  Outcome: Ongoing     Problem: Cardiac Output - Decreased:  Goal: Hemodynamic stability will improve  Description  Hemodynamic stability will improve  Outcome: Ongoing  Note:   Patient monitored on continuous telemetry. Patient assessed for chest pain, palpitations or shortness of breath. Patient denies these symptoms. Patient educated on and complaint with fluid restriction.

## 2019-09-23 NOTE — OP NOTE
[facility]        Date:   9/23/2019  Patient name:  Chet Harry  Date of admission:  No admission date for patient encounter. MRN:   [id number]  YOB: 1935    CARDIAC CATHETERIZATION    Operators:  Ge Brizuela MD      Procedure performed:       [] Left Heart Catheterization. [] Graft Angiography.  [] Left Ventriculography. [] Right Heart Catheterization. [x] Coronary Angiography. [] Aortic Valve Studies. [] PCI:      [] Other:       Pre Procedure Conscious Sedation Data:    ASA Class:    [] I [x] II [] III [] IV    Mallampati Class:  [] I [x] II [] III [] IV      Indication:  [] STEMI      [] + Stress test  [] ACS      [] + EKG Changes  [] Non Q MI       [] Significant Risk Factors  [] Recurrent Angina             [] Diabetes Mellitus    [] New LBBB      [] Uncontrolled HTN. [x] CHF / Low EF changes     [] Abnormal CTA / Ca Score  [] Other:     Procedure:  Access:  [] Femoral artery  [x] Radial  artery       [x] Right   [] Left    Procedure: After informed consent was obtained with explanation of the risks and benefits, patient was brought to the cath lab. The access area was prepped and draped in sterile fashion. 1% lidocaine was used for local block. The artery was cannulated with 5  Fr sheath with brisk arterial blood return. The side port was frequently flushed and aspirated with normal saline. Findings:    LMCA: Normal 0% stenosis. LAD: Mild irregularities 20-30%. Patent mid stent area  d1: Minimal disease    LCx: Mild irregularities 10-20%. RCA: Non dominant  Proximal 70% stenosis     Coronary Tree      Dominance: Left           Conclusions:  1. Patent LAD stent  2. Minimal disease in LCX  3. RCA small with 70% stenosis, does not explain CHF / Cardiomyopathy.       Recommendations:  1. Medical Therapy. 2. Risk Factors Modification.   3. AICD implantation      History and Risk Factors    [x] Hypertension     [] Family history of CAD  [x]
The ventricular lead was advanced through the sheath into the right heart. The lead was then positioned in the right ventricle under fluoroscopic control. The acute pacing and sensing thresholds were measured and found to be satisfactory. Generator: The implanted leads were attached to the device using the setscrews. The pocket was irrigated with antibiotic solution. The pulse generator and leads were coiled and placed in the pocket. Fluoroscopy was used to verify the final placement of the pacemaker and leads. The pocket was closed using multiple layers of suture and a dry sterile dressing was applied. There were no complications, patient tolerated the procedure well. The patient left the EP lab in stable condition. · Impression / Device:    1. Successful Implantation of AICD      · Plan:    1. Telemetry monitoring. 2. Post-Device Protocol. 3. Interrogate pacemaker piror to discharge. 4. CXR if needed. 5. Incision and Device Check at [facility] in 7 days. 6. Discharge if patient remains stable. Ezra Sacks, MD  Fellow, Cardiovascular Diseases    [address] [facility]            I have reviewed the case / procedure with resident / fellow  I have examined the patient personally  Patient agree with treatment plan, correction innotes was made as appropriate, and discussed final arrangement based on  my evaluation and exam.    Risk and benefit of procedure if planned were explained in details. Procedure was performed by me, with all aspect of the procedure being done using standard protocol. Note was modified based on my own assessment and treatment.     Dorothy Gayle MD  [facility]

## 2019-09-24 NOTE — DISCHARGE INSTR - DIET

## 2019-10-08 PROBLEM — A41.9 SEPSIS (HCC): Status: ACTIVE | Noted: 2019-01-01

## 2019-10-10 PROBLEM — A41.01 STAPHYLOCOCCUS AUREUS SEPSIS (HCC): Status: ACTIVE | Noted: 2019-01-01

## 2019-10-11 NOTE — PROGRESS NOTES
fatigue limited your ability to do what you wanted? All of the    Several times     At least             3 or more times          1-2 times        Less than    Never over     Time             per day           once per day      per week but not         per week       once/week The past 2          everyday        Weeks           []                 [x]                      []                       []                              []                   []                    []           1                  2                        3                         4                                 5                     6                       7  ________________________________________________________________________________________________       4. Over the past 2 weeks, on average, how many times has shortness of breath limited your ability to do what you    wanted? All of the    Several times     At least             3 or more times          1-2 times        Less than    Never over     Time             per day           once per day      per week but not         per week       once/week The past 2          everyday        Weeks           []                 []                      [x]                       []                             []                   []                    []           1                  2                        3                         4                                5                     6                       7  ________________________________________________________________________________________________    5. Over the past 2 weeks, on average, how many times have you been forced to sleep sitting up in a chair or with at least 3 pillows to prop you up because of shortness of breath?      Every Morning       3 or more times                1-2 times per Less than once    Never over the                            per week/not QD      week                     per week    past 2 weeks [Follow-Up] : a follow-up visit [Bronchiectasis] : bronchiectasis

## 2019-10-17 PROBLEM — I50.43 ACUTE ON CHRONIC COMBINED SYSTOLIC AND DIASTOLIC CONGESTIVE HEART FAILURE (HCC): Status: RESOLVED | Noted: 2019-01-01 | Resolved: 2019-01-01

## 2019-10-17 PROBLEM — A41.01 STAPHYLOCOCCUS AUREUS SEPSIS (HCC): Status: RESOLVED | Noted: 2019-01-01 | Resolved: 2019-01-01

## 2019-11-08 PROBLEM — E86.0 DEHYDRATION: Status: RESOLVED | Noted: 2018-11-12 | Resolved: 2019-01-01

## 2019-11-18 PROBLEM — E66.3 OVERWEIGHT: Status: ACTIVE | Noted: 2019-01-01

## 2019-12-06 PROBLEM — R79.89 CREATININE ELEVATION: Status: ACTIVE | Noted: 2019-01-01

## 2020-01-01 ENCOUNTER — APPOINTMENT (OUTPATIENT)
Dept: GENERAL RADIOLOGY | Age: 85
End: 2020-01-01
Payer: MEDICARE

## 2020-01-01 ENCOUNTER — APPOINTMENT (OUTPATIENT)
Dept: GENERAL RADIOLOGY | Age: 85
DRG: 811 | End: 2020-01-01
Payer: MEDICARE

## 2020-01-01 ENCOUNTER — CARE COORDINATION (OUTPATIENT)
Dept: CASE MANAGEMENT | Age: 85
End: 2020-01-01

## 2020-01-01 ENCOUNTER — TELEPHONE (OUTPATIENT)
Dept: FAMILY MEDICINE CLINIC | Age: 85
End: 2020-01-01

## 2020-01-01 ENCOUNTER — HOSPITAL ENCOUNTER (EMERGENCY)
Age: 85
Discharge: ANOTHER HOSPITAL | End: 2020-04-18
Attending: EMERGENCY MEDICINE
Payer: MEDICARE

## 2020-01-01 ENCOUNTER — HOSPITAL ENCOUNTER (EMERGENCY)
Age: 85
Discharge: ANOTHER HOSPITAL | End: 2020-03-01
Attending: EMERGENCY MEDICINE
Payer: MEDICARE

## 2020-01-01 ENCOUNTER — TELEPHONE (OUTPATIENT)
Dept: GENERAL RADIOLOGY | Age: 85
End: 2020-01-01

## 2020-01-01 ENCOUNTER — HOSPITAL ENCOUNTER (INPATIENT)
Age: 85
LOS: 4 days | Discharge: NURSING, REHAB OR LONG-TERM CARE | DRG: 690 | End: 2020-01-15
Attending: EMERGENCY MEDICINE | Admitting: FAMILY MEDICINE
Payer: MEDICARE

## 2020-01-01 ENCOUNTER — HOSPITAL ENCOUNTER (INPATIENT)
Age: 85
LOS: 2 days | Discharge: HOME HEALTH | DRG: 682 | End: 2020-03-03
Attending: INTERNAL MEDICINE | Admitting: FAMILY MEDICINE
Payer: MEDICARE

## 2020-01-01 ENCOUNTER — VIRTUAL VISIT (OUTPATIENT)
Dept: PRIMARY CARE CLINIC | Age: 85
End: 2020-01-01
Payer: MEDICARE

## 2020-01-01 ENCOUNTER — APPOINTMENT (OUTPATIENT)
Dept: CT IMAGING | Age: 85
DRG: 690 | End: 2020-01-01
Payer: MEDICARE

## 2020-01-01 ENCOUNTER — OFFICE VISIT (OUTPATIENT)
Dept: FAMILY MEDICINE CLINIC | Age: 85
End: 2020-01-01
Payer: MEDICARE

## 2020-01-01 ENCOUNTER — HOSPITAL ENCOUNTER (EMERGENCY)
Age: 85
Discharge: HOME | DRG: 690 | End: 2020-01-08
Attending: EMERGENCY MEDICINE
Payer: MEDICARE

## 2020-01-01 ENCOUNTER — VIRTUAL VISIT (OUTPATIENT)
Dept: FAMILY MEDICINE CLINIC | Age: 85
End: 2020-01-01
Payer: MEDICARE

## 2020-01-01 ENCOUNTER — HOSPITAL ENCOUNTER (INPATIENT)
Age: 85
LOS: 3 days | Discharge: HOME HEALTH | DRG: 637 | End: 2020-02-12
Attending: INTERNAL MEDICINE | Admitting: INTERNAL MEDICINE
Payer: MEDICARE

## 2020-01-01 ENCOUNTER — CARE COORDINATION (OUTPATIENT)
Dept: CARE COORDINATION | Age: 85
End: 2020-01-01

## 2020-01-01 ENCOUNTER — TELEPHONE (OUTPATIENT)
Dept: OTHER | Facility: CLINIC | Age: 85
End: 2020-01-01

## 2020-01-01 ENCOUNTER — ANESTHESIA EVENT (OUTPATIENT)
Dept: OPERATING ROOM | Age: 85
DRG: 811 | End: 2020-01-01
Payer: MEDICARE

## 2020-01-01 ENCOUNTER — OFFICE VISIT (OUTPATIENT)
Dept: NEPHROLOGY | Age: 85
End: 2020-01-01
Payer: MEDICARE

## 2020-01-01 ENCOUNTER — APPOINTMENT (OUTPATIENT)
Dept: MRI IMAGING | Age: 85
DRG: 637 | End: 2020-01-01
Attending: INTERNAL MEDICINE
Payer: MEDICARE

## 2020-01-01 ENCOUNTER — APPOINTMENT (OUTPATIENT)
Dept: GENERAL RADIOLOGY | Age: 85
DRG: 871 | End: 2020-01-01
Attending: INTERNAL MEDICINE
Payer: MEDICARE

## 2020-01-01 ENCOUNTER — TELEPHONE (OUTPATIENT)
Dept: INTERNAL MEDICINE | Age: 85
End: 2020-01-01

## 2020-01-01 ENCOUNTER — TELEPHONE (OUTPATIENT)
Dept: FAMILY MEDICINE CLINIC | Age: 85
End: 2020-01-01
Payer: MEDICARE

## 2020-01-01 ENCOUNTER — APPOINTMENT (OUTPATIENT)
Dept: INTERVENTIONAL RADIOLOGY/VASCULAR | Age: 85
DRG: 690 | End: 2020-01-01
Payer: MEDICARE

## 2020-01-01 ENCOUNTER — ANESTHESIA (OUTPATIENT)
Dept: OPERATING ROOM | Age: 85
DRG: 811 | End: 2020-01-01
Payer: MEDICARE

## 2020-01-01 ENCOUNTER — HOSPITAL ENCOUNTER (EMERGENCY)
Age: 85
Discharge: ANOTHER HOSPITAL | End: 2020-02-09
Attending: EMERGENCY MEDICINE
Payer: MEDICARE

## 2020-01-01 ENCOUNTER — TELEPHONE (OUTPATIENT)
Dept: SURGERY | Age: 85
End: 2020-01-01

## 2020-01-01 ENCOUNTER — OUTSIDE SERVICES (OUTPATIENT)
Dept: INTERNAL MEDICINE | Age: 85
End: 2020-01-01
Payer: MEDICARE

## 2020-01-01 ENCOUNTER — APPOINTMENT (OUTPATIENT)
Dept: ULTRASOUND IMAGING | Age: 85
DRG: 637 | End: 2020-01-01
Attending: INTERNAL MEDICINE
Payer: MEDICARE

## 2020-01-01 ENCOUNTER — APPOINTMENT (OUTPATIENT)
Dept: GENERAL RADIOLOGY | Age: 85
DRG: 690 | End: 2020-01-01
Payer: MEDICARE

## 2020-01-01 ENCOUNTER — HOSPITAL ENCOUNTER (EMERGENCY)
Age: 85
Discharge: HOME | End: 2020-04-02
Attending: EMERGENCY MEDICINE
Payer: MEDICARE

## 2020-01-01 ENCOUNTER — HOSPITAL ENCOUNTER (OUTPATIENT)
Age: 85
Setting detail: SPECIMEN
Discharge: HOME | DRG: 811 | End: 2020-03-13
Payer: MEDICARE

## 2020-01-01 ENCOUNTER — ANESTHESIA EVENT (OUTPATIENT)
Dept: ICU | Age: 85
DRG: 871 | End: 2020-01-01
Payer: MEDICARE

## 2020-01-01 ENCOUNTER — ANESTHESIA (OUTPATIENT)
Dept: ICU | Age: 85
DRG: 871 | End: 2020-01-01
Payer: MEDICARE

## 2020-01-01 ENCOUNTER — HOSPITAL ENCOUNTER (INPATIENT)
Age: 85
LOS: 4 days | Discharge: HOME HEALTH | DRG: 811 | End: 2020-03-18
Attending: SPECIALIST | Admitting: FAMILY MEDICINE
Payer: MEDICARE

## 2020-01-01 ENCOUNTER — HOSPITAL ENCOUNTER (OUTPATIENT)
Age: 85
Setting detail: SPECIMEN
Discharge: HOME | End: 2020-03-26
Payer: MEDICARE

## 2020-01-01 ENCOUNTER — HOSPITAL ENCOUNTER (INPATIENT)
Age: 85
LOS: 1 days | DRG: 871 | End: 2020-04-19
Attending: INTERNAL MEDICINE | Admitting: INTERNAL MEDICINE
Payer: MEDICARE

## 2020-01-01 VITALS
OXYGEN SATURATION: 96 % | RESPIRATION RATE: 16 BRPM | SYSTOLIC BLOOD PRESSURE: 98 MMHG | TEMPERATURE: 98.6 F | HEART RATE: 83 BPM | DIASTOLIC BLOOD PRESSURE: 55 MMHG

## 2020-01-01 VITALS
HEART RATE: 74 BPM | HEIGHT: 60 IN | OXYGEN SATURATION: 99 % | SYSTOLIC BLOOD PRESSURE: 102 MMHG | WEIGHT: 128 LBS | BODY MASS INDEX: 25.13 KG/M2 | DIASTOLIC BLOOD PRESSURE: 56 MMHG

## 2020-01-01 VITALS
BODY MASS INDEX: 25.8 KG/M2 | DIASTOLIC BLOOD PRESSURE: 63 MMHG | OXYGEN SATURATION: 82 % | TEMPERATURE: 97.5 F | HEIGHT: 60 IN | WEIGHT: 131.4 LBS | SYSTOLIC BLOOD PRESSURE: 116 MMHG

## 2020-01-01 VITALS
HEART RATE: 73 BPM | TEMPERATURE: 98.4 F | RESPIRATION RATE: 18 BRPM | OXYGEN SATURATION: 98 % | HEIGHT: 60 IN | DIASTOLIC BLOOD PRESSURE: 58 MMHG | SYSTOLIC BLOOD PRESSURE: 131 MMHG | BODY MASS INDEX: 25.58 KG/M2 | WEIGHT: 130.3 LBS

## 2020-01-01 VITALS
BODY MASS INDEX: 25.52 KG/M2 | WEIGHT: 130 LBS | DIASTOLIC BLOOD PRESSURE: 49 MMHG | HEIGHT: 60 IN | RESPIRATION RATE: 16 BRPM | HEART RATE: 75 BPM | OXYGEN SATURATION: 98 % | TEMPERATURE: 98.2 F | SYSTOLIC BLOOD PRESSURE: 101 MMHG

## 2020-01-01 VITALS
OXYGEN SATURATION: 100 % | HEART RATE: 80 BPM | DIASTOLIC BLOOD PRESSURE: 54 MMHG | WEIGHT: 136.47 LBS | RESPIRATION RATE: 30 BRPM | HEIGHT: 60 IN | TEMPERATURE: 96.3 F | BODY MASS INDEX: 26.79 KG/M2 | SYSTOLIC BLOOD PRESSURE: 80 MMHG

## 2020-01-01 VITALS
DIASTOLIC BLOOD PRESSURE: 57 MMHG | SYSTOLIC BLOOD PRESSURE: 109 MMHG | BODY MASS INDEX: 25.91 KG/M2 | WEIGHT: 132 LBS | OXYGEN SATURATION: 95 % | RESPIRATION RATE: 16 BRPM | HEIGHT: 60 IN | HEART RATE: 79 BPM | TEMPERATURE: 97.5 F

## 2020-01-01 VITALS
TEMPERATURE: 95.7 F | OXYGEN SATURATION: 94 % | RESPIRATION RATE: 12 BRPM | DIASTOLIC BLOOD PRESSURE: 59 MMHG | WEIGHT: 131.4 LBS | BODY MASS INDEX: 25.66 KG/M2 | HEART RATE: 91 BPM | SYSTOLIC BLOOD PRESSURE: 107 MMHG

## 2020-01-01 VITALS
HEART RATE: 90 BPM | WEIGHT: 133.4 LBS | TEMPERATURE: 98.1 F | BODY MASS INDEX: 26.19 KG/M2 | OXYGEN SATURATION: 96 % | DIASTOLIC BLOOD PRESSURE: 39 MMHG | SYSTOLIC BLOOD PRESSURE: 111 MMHG | HEIGHT: 60 IN | RESPIRATION RATE: 25 BRPM

## 2020-01-01 VITALS
DIASTOLIC BLOOD PRESSURE: 48 MMHG | BODY MASS INDEX: 25.9 KG/M2 | SYSTOLIC BLOOD PRESSURE: 107 MMHG | HEART RATE: 75 BPM | WEIGHT: 131.9 LBS | RESPIRATION RATE: 19 BRPM | TEMPERATURE: 98.1 F | OXYGEN SATURATION: 100 % | HEIGHT: 60 IN

## 2020-01-01 VITALS — DIASTOLIC BLOOD PRESSURE: 46 MMHG | SYSTOLIC BLOOD PRESSURE: 104 MMHG | OXYGEN SATURATION: 95 % | TEMPERATURE: 97 F

## 2020-01-01 VITALS
DIASTOLIC BLOOD PRESSURE: 48 MMHG | TEMPERATURE: 97.7 F | WEIGHT: 134.9 LBS | OXYGEN SATURATION: 96 % | HEIGHT: 60 IN | RESPIRATION RATE: 18 BRPM | HEART RATE: 83 BPM | SYSTOLIC BLOOD PRESSURE: 99 MMHG | BODY MASS INDEX: 26.48 KG/M2

## 2020-01-01 VITALS
WEIGHT: 128 LBS | SYSTOLIC BLOOD PRESSURE: 97 MMHG | BODY MASS INDEX: 25 KG/M2 | TEMPERATURE: 97.4 F | DIASTOLIC BLOOD PRESSURE: 56 MMHG | RESPIRATION RATE: 18 BRPM | OXYGEN SATURATION: 96 % | HEART RATE: 96 BPM

## 2020-01-01 VITALS
SYSTOLIC BLOOD PRESSURE: 110 MMHG | WEIGHT: 135 LBS | HEIGHT: 60 IN | HEART RATE: 83 BPM | BODY MASS INDEX: 26.5 KG/M2 | DIASTOLIC BLOOD PRESSURE: 71 MMHG | TEMPERATURE: 97.4 F

## 2020-01-01 VITALS
HEART RATE: 96 BPM | DIASTOLIC BLOOD PRESSURE: 68 MMHG | SYSTOLIC BLOOD PRESSURE: 124 MMHG | OXYGEN SATURATION: 100 % | WEIGHT: 131.2 LBS | HEIGHT: 60 IN | BODY MASS INDEX: 25.76 KG/M2

## 2020-01-01 VITALS
BODY MASS INDEX: 26.35 KG/M2 | DIASTOLIC BLOOD PRESSURE: 68 MMHG | OXYGEN SATURATION: 96 % | HEIGHT: 60 IN | HEART RATE: 85 BPM | SYSTOLIC BLOOD PRESSURE: 82 MMHG

## 2020-01-01 VITALS
OXYGEN SATURATION: 98 % | SYSTOLIC BLOOD PRESSURE: 118 MMHG | DIASTOLIC BLOOD PRESSURE: 72 MMHG | WEIGHT: 130.29 LBS | HEIGHT: 60 IN | BODY MASS INDEX: 25.58 KG/M2 | HEART RATE: 66 BPM

## 2020-01-01 LAB
-: ABNORMAL
-: NORMAL
ABO/RH: NORMAL
ABSOLUTE EOS #: 0 K/UL (ref 0–0.4)
ABSOLUTE EOS #: 0.05 K/UL (ref 0–0.44)
ABSOLUTE EOS #: 0.07 K/UL (ref 0–0.44)
ABSOLUTE EOS #: 0.09 K/UL (ref 0–0.44)
ABSOLUTE EOS #: 0.11 K/UL (ref 0–0.44)
ABSOLUTE EOS #: 0.11 K/UL (ref 0–0.44)
ABSOLUTE EOS #: 0.12 K/UL (ref 0–0.44)
ABSOLUTE EOS #: 0.14 K/UL (ref 0–0.44)
ABSOLUTE EOS #: 0.14 K/UL (ref 0–0.44)
ABSOLUTE EOS #: 0.17 K/UL (ref 0–0.44)
ABSOLUTE EOS #: 0.18 K/UL (ref 0–0.44)
ABSOLUTE EOS #: 0.22 K/UL (ref 0–0.44)
ABSOLUTE EOS #: 0.28 K/UL (ref 0–0.44)
ABSOLUTE IMMATURE GRANULOCYTE: 0 K/UL (ref 0–0.3)
ABSOLUTE IMMATURE GRANULOCYTE: 0.04 K/UL (ref 0–0.3)
ABSOLUTE IMMATURE GRANULOCYTE: 0.05 K/UL (ref 0–0.3)
ABSOLUTE IMMATURE GRANULOCYTE: 0.06 K/UL (ref 0–0.3)
ABSOLUTE IMMATURE GRANULOCYTE: 0.07 K/UL (ref 0–0.3)
ABSOLUTE IMMATURE GRANULOCYTE: 0.08 K/UL (ref 0–0.3)
ABSOLUTE IMMATURE GRANULOCYTE: 0.09 K/UL (ref 0–0.3)
ABSOLUTE IMMATURE GRANULOCYTE: 0.1 K/UL (ref 0–0.3)
ABSOLUTE IMMATURE GRANULOCYTE: 0.1 K/UL (ref 0–0.3)
ABSOLUTE IMMATURE GRANULOCYTE: 0.11 K/UL (ref 0–0.3)
ABSOLUTE IMMATURE GRANULOCYTE: 0.12 K/UL (ref 0–0.3)
ABSOLUTE IMMATURE GRANULOCYTE: 0.12 K/UL (ref 0–0.3)
ABSOLUTE IMMATURE GRANULOCYTE: 0.13 K/UL (ref 0–0.3)
ABSOLUTE IMMATURE GRANULOCYTE: 0.16 K/UL (ref 0–0.3)
ABSOLUTE IMMATURE GRANULOCYTE: 0.17 K/UL (ref 0–0.3)
ABSOLUTE IMMATURE GRANULOCYTE: 0.18 K/UL (ref 0–0.3)
ABSOLUTE IMMATURE GRANULOCYTE: 0.22 K/UL (ref 0–0.3)
ABSOLUTE LYMPH #: 0.53 K/UL (ref 1–4.8)
ABSOLUTE LYMPH #: 0.62 K/UL (ref 1–4.8)
ABSOLUTE LYMPH #: 0.88 K/UL (ref 1.1–3.7)
ABSOLUTE LYMPH #: 1.29 K/UL (ref 1.1–3.7)
ABSOLUTE LYMPH #: 1.44 K/UL (ref 1.1–3.7)
ABSOLUTE LYMPH #: 1.49 K/UL (ref 1.1–3.7)
ABSOLUTE LYMPH #: 1.52 K/UL (ref 1.1–3.7)
ABSOLUTE LYMPH #: 1.59 K/UL (ref 1.1–3.7)
ABSOLUTE LYMPH #: 1.68 K/UL (ref 1–4.8)
ABSOLUTE LYMPH #: 1.72 K/UL (ref 1.1–3.7)
ABSOLUTE LYMPH #: 1.8 K/UL (ref 1.1–3.7)
ABSOLUTE LYMPH #: 1.87 K/UL (ref 1.1–3.7)
ABSOLUTE LYMPH #: 1.9 K/UL (ref 1.1–3.7)
ABSOLUTE LYMPH #: 2.16 K/UL (ref 1.1–3.7)
ABSOLUTE LYMPH #: 2.23 K/UL (ref 1.1–3.7)
ABSOLUTE LYMPH #: 2.38 K/UL (ref 1.1–3.7)
ABSOLUTE LYMPH #: 2.95 K/UL (ref 1.1–3.7)
ABSOLUTE MONO #: 0.47 K/UL (ref 0.1–0.8)
ABSOLUTE MONO #: 0.65 K/UL (ref 0.1–1.2)
ABSOLUTE MONO #: 0.68 K/UL (ref 0.1–1.2)
ABSOLUTE MONO #: 0.68 K/UL (ref 0.1–1.2)
ABSOLUTE MONO #: 0.71 K/UL (ref 0.1–1.2)
ABSOLUTE MONO #: 0.71 K/UL (ref 0.1–1.2)
ABSOLUTE MONO #: 0.74 K/UL (ref 0.1–1.2)
ABSOLUTE MONO #: 0.75 K/UL (ref 0.1–1.2)
ABSOLUTE MONO #: 0.84 K/UL (ref 0.1–1.2)
ABSOLUTE MONO #: 0.84 K/UL (ref 0.1–1.2)
ABSOLUTE MONO #: 0.91 K/UL (ref 0.1–1.2)
ABSOLUTE MONO #: 0.91 K/UL (ref 0.1–1.2)
ABSOLUTE MONO #: 0.95 K/UL (ref 0.1–1.2)
ABSOLUTE MONO #: 0.96 K/UL (ref 0.1–1.2)
ABSOLUTE MONO #: 0.97 K/UL (ref 0.1–1.2)
ABSOLUTE MONO #: 1.25 K/UL (ref 0.1–1.2)
ABSOLUTE MONO #: 1.3 K/UL (ref 0.1–1.2)
ACTION: NORMAL
ADENOVIRUS PCR: NOT DETECTED
ADENOVIRUS PCR: NOT DETECTED
ALBUMIN SERPL-MCNC: 2.7 G/DL (ref 3.5–5.2)
ALBUMIN SERPL-MCNC: 2.9 G/DL (ref 3.5–5.2)
ALBUMIN SERPL-MCNC: 2.9 G/DL (ref 3.5–5.2)
ALBUMIN SERPL-MCNC: 3 G/DL (ref 3.5–5.2)
ALBUMIN SERPL-MCNC: 3.5 G/DL (ref 3.5–5.2)
ALBUMIN SERPL-MCNC: 3.5 G/DL (ref 3.5–5.2)
ALBUMIN SERPL-MCNC: 3.6 G/DL (ref 3.5–5.2)
ALBUMIN SERPL-MCNC: 4 G/DL (ref 3.5–5.2)
ALBUMIN/GLOBULIN RATIO: 0.7 (ref 1–2.5)
ALBUMIN/GLOBULIN RATIO: 0.8 (ref 1–2.5)
ALBUMIN/GLOBULIN RATIO: 1 (ref 1–2.5)
ALBUMIN/GLOBULIN RATIO: 1 (ref 1–2.5)
ALBUMIN/GLOBULIN RATIO: 1.1 (ref 1–2.5)
ALBUMIN/GLOBULIN RATIO: 1.1 (ref 1–2.5)
ALLEN TEST: ABNORMAL
ALP BLD-CCNC: 124 U/L (ref 35–104)
ALP BLD-CCNC: 150 U/L (ref 35–104)
ALP BLD-CCNC: 179 U/L (ref 35–104)
ALP BLD-CCNC: 82 U/L (ref 35–104)
ALP BLD-CCNC: 83 U/L (ref 35–104)
ALP BLD-CCNC: 85 U/L (ref 35–104)
ALT SERPL-CCNC: 13 U/L (ref 5–33)
ALT SERPL-CCNC: 13 U/L (ref 5–33)
ALT SERPL-CCNC: 16 U/L (ref 5–33)
ALT SERPL-CCNC: 20 U/L (ref 5–33)
ALT SERPL-CCNC: 268 U/L (ref 5–33)
ALT SERPL-CCNC: 35 U/L (ref 5–33)
AMMONIA: 27 UMOL/L (ref 11–51)
AMORPHOUS: ABNORMAL
AMORPHOUS: NORMAL
AMORPHOUS: NORMAL
AMYLASE: 34 U/L (ref 28–100)
ANION GAP SERPL CALCULATED.3IONS-SCNC: 12 MMOL/L (ref 9–17)
ANION GAP SERPL CALCULATED.3IONS-SCNC: 13 MMOL/L (ref 9–17)
ANION GAP SERPL CALCULATED.3IONS-SCNC: 14 MMOL/L (ref 9–17)
ANION GAP SERPL CALCULATED.3IONS-SCNC: 15 MMOL/L (ref 9–17)
ANION GAP SERPL CALCULATED.3IONS-SCNC: 16 MMOL/L (ref 9–17)
ANION GAP SERPL CALCULATED.3IONS-SCNC: 17 MMOL/L (ref 9–17)
ANION GAP SERPL CALCULATED.3IONS-SCNC: 18 MMOL/L (ref 9–17)
ANION GAP SERPL CALCULATED.3IONS-SCNC: 19 MMOL/L (ref 9–17)
ANION GAP SERPL CALCULATED.3IONS-SCNC: 19 MMOL/L (ref 9–17)
ANION GAP SERPL CALCULATED.3IONS-SCNC: 20 MMOL/L (ref 9–17)
ANION GAP SERPL CALCULATED.3IONS-SCNC: 21 MMOL/L (ref 9–17)
ANION GAP SERPL CALCULATED.3IONS-SCNC: 22 MMOL/L (ref 9–17)
ANION GAP SERPL CALCULATED.3IONS-SCNC: 24 MMOL/L (ref 9–17)
ANION GAP SERPL CALCULATED.3IONS-SCNC: 32 MMOL/L (ref 9–17)
ANTIBODY SCREEN: NEGATIVE
ARM BAND NUMBER: NORMAL
AST SERPL-CCNC: 19 U/L
AST SERPL-CCNC: 20 U/L
AST SERPL-CCNC: 22 U/L
AST SERPL-CCNC: 26 U/L
AST SERPL-CCNC: 47 U/L
AST SERPL-CCNC: 483 U/L
ATYPICAL LYMPHOCYTE ABSOLUTE COUNT: 0.08 K/UL
ATYPICAL LYMPHOCYTES: 1 %
BACTERIA: ABNORMAL
BACTERIA: NORMAL
BACTERIA: NORMAL
BASOPHILS # BLD: 0 % (ref 0–1)
BASOPHILS # BLD: 0 % (ref 0–1)
BASOPHILS # BLD: 0 % (ref 0–2)
BASOPHILS # BLD: 1 % (ref 0–2)
BASOPHILS ABSOLUTE: 0 K/UL (ref 0–0.2)
BASOPHILS ABSOLUTE: 0.03 K/UL (ref 0–0.2)
BASOPHILS ABSOLUTE: 0.04 K/UL (ref 0–0.2)
BASOPHILS ABSOLUTE: 0.05 K/UL (ref 0–0.2)
BASOPHILS ABSOLUTE: 0.05 K/UL (ref 0–0.2)
BASOPHILS ABSOLUTE: 0.06 K/UL (ref 0–0.2)
BASOPHILS ABSOLUTE: 0.08 K/UL (ref 0–0.2)
BASOPHILS ABSOLUTE: 0.09 K/UL (ref 0–0.2)
BASOPHILS ABSOLUTE: 0.11 K/UL (ref 0–0.2)
BASOPHILS ABSOLUTE: 0.12 K/UL (ref 0–0.2)
BETA-HYDROXYBUTYRATE: 3.09 MMOL/L (ref 0.02–0.27)
BILIRUB SERPL-MCNC: 0.12 MG/DL (ref 0.3–1.2)
BILIRUB SERPL-MCNC: 0.22 MG/DL (ref 0.3–1.2)
BILIRUB SERPL-MCNC: 0.23 MG/DL (ref 0.3–1.2)
BILIRUB SERPL-MCNC: 0.35 MG/DL (ref 0.3–1.2)
BILIRUB SERPL-MCNC: 0.38 MG/DL (ref 0.3–1.2)
BILIRUB SERPL-MCNC: 0.95 MG/DL (ref 0.3–1.2)
BILIRUBIN DIRECT: 0.16 MG/DL
BILIRUBIN DIRECT: 0.39 MG/DL
BILIRUBIN URINE: NEGATIVE
BILIRUBIN, INDIRECT: 0.22 MG/DL (ref 0–1)
BILIRUBIN, INDIRECT: 0.56 MG/DL (ref 0–1)
BLD PROD TYP BPU: NORMAL
BNP INTERPRETATION: ABNORMAL
BORDETELLA PARAPERTUSSIS: NOT DETECTED
BORDETELLA PARAPERTUSSIS: NOT DETECTED
BORDETELLA PERTUSSIS PCR: NOT DETECTED
BORDETELLA PERTUSSIS PCR: NOT DETECTED
BUN BLDV-MCNC: 17 MG/DL (ref 8–23)
BUN BLDV-MCNC: 18 MG/DL (ref 8–23)
BUN BLDV-MCNC: 20 MG/DL (ref 8–23)
BUN BLDV-MCNC: 20 MG/DL (ref 8–23)
BUN BLDV-MCNC: 21 MG/DL (ref 8–23)
BUN BLDV-MCNC: 22 MG/DL (ref 8–23)
BUN BLDV-MCNC: 25 MG/DL (ref 8–23)
BUN BLDV-MCNC: 26 MG/DL (ref 8–23)
BUN BLDV-MCNC: 26 MG/DL (ref 8–23)
BUN BLDV-MCNC: 27 MG/DL (ref 8–23)
BUN BLDV-MCNC: 29 MG/DL (ref 8–23)
BUN BLDV-MCNC: 32 MG/DL (ref 8–23)
BUN BLDV-MCNC: 33 MG/DL (ref 8–23)
BUN BLDV-MCNC: 37 MG/DL (ref 8–23)
BUN BLDV-MCNC: 38 MG/DL (ref 8–23)
BUN BLDV-MCNC: 41 MG/DL (ref 8–23)
BUN BLDV-MCNC: 46 MG/DL (ref 8–23)
BUN BLDV-MCNC: 48 MG/DL (ref 8–23)
BUN BLDV-MCNC: 48 MG/DL (ref 8–23)
BUN BLDV-MCNC: 49 MG/DL (ref 8–23)
BUN BLDV-MCNC: 55 MG/DL (ref 8–23)
BUN BLDV-MCNC: 57 MG/DL (ref 8–23)
BUN BLDV-MCNC: 64 MG/DL (ref 8–23)
BUN BLDV-MCNC: 71 MG/DL (ref 8–23)
BUN BLDV-MCNC: 83 MG/DL (ref 8–23)
BUN BLDV-MCNC: 92 MG/DL (ref 8–23)
BUN/CREAT BLD: 10 (ref 9–20)
BUN/CREAT BLD: 11 (ref 9–20)
BUN/CREAT BLD: 11 (ref 9–20)
BUN/CREAT BLD: 12 (ref 9–20)
BUN/CREAT BLD: 12 (ref 9–20)
BUN/CREAT BLD: 15 (ref 9–20)
BUN/CREAT BLD: 15 (ref 9–20)
BUN/CREAT BLD: 16 (ref 9–20)
BUN/CREAT BLD: 16 (ref 9–20)
BUN/CREAT BLD: 17 (ref 9–20)
BUN/CREAT BLD: 18 (ref 9–20)
BUN/CREAT BLD: 22 (ref 9–20)
BUN/CREAT BLD: 24 (ref 9–20)
BUN/CREAT BLD: 34 (ref 9–20)
BUN/CREAT BLD: 9 (ref 9–20)
BUN/CREAT BLD: 9 (ref 9–20)
BUN/CREAT BLD: ABNORMAL (ref 9–20)
C-REACTIVE PROTEIN: 60.2 MG/L (ref 0–5)
CALCIUM SERPL-MCNC: 10 MG/DL (ref 8.6–10.4)
CALCIUM SERPL-MCNC: 5.9 MG/DL (ref 8.6–10.4)
CALCIUM SERPL-MCNC: 8 MG/DL (ref 8.6–10.4)
CALCIUM SERPL-MCNC: 8 MG/DL (ref 8.6–10.4)
CALCIUM SERPL-MCNC: 8.1 MG/DL (ref 8.6–10.4)
CALCIUM SERPL-MCNC: 8.1 MG/DL (ref 8.6–10.4)
CALCIUM SERPL-MCNC: 8.3 MG/DL (ref 8.6–10.4)
CALCIUM SERPL-MCNC: 8.3 MG/DL (ref 8.6–10.4)
CALCIUM SERPL-MCNC: 8.4 MG/DL (ref 8.6–10.4)
CALCIUM SERPL-MCNC: 8.6 MG/DL (ref 8.6–10.4)
CALCIUM SERPL-MCNC: 8.7 MG/DL (ref 8.6–10.4)
CALCIUM SERPL-MCNC: 8.9 MG/DL (ref 8.6–10.4)
CALCIUM SERPL-MCNC: 9 MG/DL (ref 8.6–10.4)
CALCIUM SERPL-MCNC: 9 MG/DL (ref 8.6–10.4)
CALCIUM SERPL-MCNC: 9.2 MG/DL (ref 8.6–10.4)
CALCIUM SERPL-MCNC: 9.3 MG/DL (ref 8.6–10.4)
CALCIUM SERPL-MCNC: 9.3 MG/DL (ref 8.6–10.4)
CALCIUM SERPL-MCNC: 9.6 MG/DL (ref 8.6–10.4)
CALCIUM SERPL-MCNC: 9.7 MG/DL (ref 8.6–10.4)
CALCIUM SERPL-MCNC: 9.9 MG/DL (ref 8.6–10.4)
CALCIUM SERPL-MCNC: 9.9 MG/DL (ref 8.6–10.4)
CASTS UA: ABNORMAL /LPF (ref 0–2)
CASTS UA: ABNORMAL /LPF (ref 0–8)
CASTS UA: NORMAL /LPF (ref 0–2)
CHLAMYDIA PNEUMONIAE BY PCR: NOT DETECTED
CHLAMYDIA PNEUMONIAE BY PCR: NOT DETECTED
CHLORIDE BLD-SCNC: 100 MMOL/L (ref 98–107)
CHLORIDE BLD-SCNC: 100 MMOL/L (ref 98–107)
CHLORIDE BLD-SCNC: 101 MMOL/L (ref 98–107)
CHLORIDE BLD-SCNC: 102 MMOL/L (ref 98–107)
CHLORIDE BLD-SCNC: 102 MMOL/L (ref 98–107)
CHLORIDE BLD-SCNC: 103 MMOL/L (ref 98–107)
CHLORIDE BLD-SCNC: 103 MMOL/L (ref 98–107)
CHLORIDE BLD-SCNC: 104 MMOL/L (ref 98–107)
CHLORIDE BLD-SCNC: 105 MMOL/L (ref 98–107)
CHLORIDE BLD-SCNC: 107 MMOL/L (ref 98–107)
CHLORIDE BLD-SCNC: 84 MMOL/L (ref 98–107)
CHLORIDE BLD-SCNC: 90 MMOL/L (ref 98–107)
CHLORIDE BLD-SCNC: 92 MMOL/L (ref 98–107)
CHLORIDE BLD-SCNC: 93 MMOL/L (ref 98–107)
CHLORIDE BLD-SCNC: 94 MMOL/L (ref 98–107)
CHLORIDE BLD-SCNC: 95 MMOL/L (ref 98–107)
CHLORIDE BLD-SCNC: 95 MMOL/L (ref 98–107)
CHLORIDE BLD-SCNC: 96 MMOL/L (ref 98–107)
CHLORIDE BLD-SCNC: 97 MMOL/L (ref 98–107)
CHLORIDE BLD-SCNC: 98 MMOL/L (ref 98–107)
CHLORIDE BLD-SCNC: 98 MMOL/L (ref 98–107)
CHLORIDE BLD-SCNC: 99 MMOL/L (ref 98–107)
CHOLESTEROL/HDL RATIO: 3.3
CHOLESTEROL: 75 MG/DL
CK MB: 2.3 NG/ML
CK MB: 2.5 NG/ML
CO2: 11 MMOL/L (ref 20–31)
CO2: 11 MMOL/L (ref 20–31)
CO2: 15 MMOL/L (ref 20–31)
CO2: 16 MMOL/L (ref 20–31)
CO2: 17 MMOL/L (ref 20–31)
CO2: 18 MMOL/L (ref 20–31)
CO2: 18 MMOL/L (ref 20–31)
CO2: 19 MMOL/L (ref 20–31)
CO2: 20 MMOL/L (ref 20–31)
CO2: 21 MMOL/L (ref 20–31)
CO2: 21 MMOL/L (ref 20–31)
CO2: 22 MMOL/L (ref 20–31)
CO2: 23 MMOL/L (ref 20–31)
CO2: 26 MMOL/L (ref 20–31)
COLOR: ABNORMAL
COLOR: NORMAL
COLOR: YELLOW
COMMENT UA: ABNORMAL
COMMENT UA: NORMAL
CORONAVIRUS 229E PCR: NOT DETECTED
CORONAVIRUS 229E PCR: NOT DETECTED
CORONAVIRUS HKU1 PCR: NOT DETECTED
CORONAVIRUS HKU1 PCR: NOT DETECTED
CORONAVIRUS NL63 PCR: NOT DETECTED
CORONAVIRUS NL63 PCR: NOT DETECTED
CORONAVIRUS OC43 PCR: NOT DETECTED
CORONAVIRUS OC43 PCR: NOT DETECTED
CORTISOL COLLECTION INFO: NORMAL
CORTISOL: 14.1 UG/DL (ref 2.7–18.4)
CREAT SERPL-MCNC: 1.24 MG/DL (ref 0.5–0.9)
CREAT SERPL-MCNC: 1.39 MG/DL (ref 0.5–0.9)
CREAT SERPL-MCNC: 1.42 MG/DL (ref 0.5–0.9)
CREAT SERPL-MCNC: 1.6 MG/DL (ref 0.5–0.9)
CREAT SERPL-MCNC: 1.63 MG/DL (ref 0.5–0.9)
CREAT SERPL-MCNC: 1.66 MG/DL (ref 0.5–0.9)
CREAT SERPL-MCNC: 1.67 MG/DL (ref 0.5–0.9)
CREAT SERPL-MCNC: 1.67 MG/DL (ref 0.5–0.9)
CREAT SERPL-MCNC: 1.69 MG/DL (ref 0.5–0.9)
CREAT SERPL-MCNC: 1.71 MG/DL (ref 0.5–0.9)
CREAT SERPL-MCNC: 1.75 MG/DL (ref 0.5–0.9)
CREAT SERPL-MCNC: 1.81 MG/DL (ref 0.5–0.9)
CREAT SERPL-MCNC: 1.89 MG/DL (ref 0.5–0.9)
CREAT SERPL-MCNC: 1.89 MG/DL (ref 0.5–0.9)
CREAT SERPL-MCNC: 1.93 MG/DL (ref 0.5–0.9)
CREAT SERPL-MCNC: 1.99 MG/DL (ref 0.5–0.9)
CREAT SERPL-MCNC: 2.03 MG/DL (ref 0.5–0.9)
CREAT SERPL-MCNC: 2.1 MG/DL (ref 0.5–0.9)
CREAT SERPL-MCNC: 2.19 MG/DL (ref 0.5–0.9)
CREAT SERPL-MCNC: 2.24 MG/DL (ref 0.5–0.9)
CREAT SERPL-MCNC: 2.26 MG/DL (ref 0.5–0.9)
CREAT SERPL-MCNC: 2.36 MG/DL (ref 0.5–0.9)
CREAT SERPL-MCNC: 2.42 MG/DL (ref 0.5–0.9)
CREAT SERPL-MCNC: 2.55 MG/DL (ref 0.5–0.9)
CREAT SERPL-MCNC: 2.68 MG/DL (ref 0.5–0.9)
CREAT SERPL-MCNC: 2.71 MG/DL (ref 0.5–0.9)
CREAT SERPL-MCNC: 2.73 MG/DL (ref 0.5–0.9)
CREAT SERPL-MCNC: 2.87 MG/DL (ref 0.5–0.9)
CREAT SERPL-MCNC: 2.89 MG/DL (ref 0.5–0.9)
CREAT SERPL-MCNC: 3.11 MG/DL (ref 0.5–0.9)
CREATININE URINE: 30.1 MG/DL (ref 28–217)
CROSSMATCH RESULT: NORMAL
CRYSTALS, UA: ABNORMAL /HPF
CRYSTALS, UA: NORMAL /HPF
CRYSTALS, UA: NORMAL /HPF
CULTURE: ABNORMAL
CULTURE: NO GROWTH
CULTURE: NORMAL
D-DIMER QUANTITATIVE: 2.44 MG/L FEU (ref 0–0.59)
DATE AND TIME: NORMAL
DIFFERENTIAL TYPE: ABNORMAL
DIRECT EXAM: NORMAL
DIRECT EXAM: NORMAL
DISPENSE STATUS BLOOD BANK: NORMAL
EKG ATRIAL RATE: 105 BPM
EKG ATRIAL RATE: 69 BPM
EKG ATRIAL RATE: 78 BPM
EKG ATRIAL RATE: 84 BPM
EKG ATRIAL RATE: 85 BPM
EKG P AXIS: 58 DEGREES
EKG P AXIS: 65 DEGREES
EKG P AXIS: 66 DEGREES
EKG P AXIS: 69 DEGREES
EKG P-R INTERVAL: 234 MS
EKG P-R INTERVAL: 238 MS
EKG P-R INTERVAL: 248 MS
EKG P-R INTERVAL: 282 MS
EKG Q-T INTERVAL: 424 MS
EKG Q-T INTERVAL: 466 MS
EKG Q-T INTERVAL: 474 MS
EKG Q-T INTERVAL: 480 MS
EKG Q-T INTERVAL: 518 MS
EKG QRS DURATION: 150 MS
EKG QRS DURATION: 158 MS
EKG QRS DURATION: 160 MS
EKG QRS DURATION: 162 MS
EKG QRS DURATION: 174 MS
EKG QTC CALCULATION (BAZETT): 547 MS
EKG QTC CALCULATION (BAZETT): 550 MS
EKG QTC CALCULATION (BAZETT): 555 MS
EKG QTC CALCULATION (BAZETT): 564 MS
EKG QTC CALCULATION (BAZETT): 568 MS
EKG R AXIS: -14 DEGREES
EKG R AXIS: -15 DEGREES
EKG R AXIS: -31 DEGREES
EKG R AXIS: -32 DEGREES
EKG R AXIS: -63 DEGREES
EKG T AXIS: 101 DEGREES
EKG T AXIS: 126 DEGREES
EKG T AXIS: 129 DEGREES
EKG T AXIS: 131 DEGREES
EKG T AXIS: 91 DEGREES
EKG VENTRICULAR RATE: 108 BPM
EKG VENTRICULAR RATE: 69 BPM
EKG VENTRICULAR RATE: 78 BPM
EKG VENTRICULAR RATE: 84 BPM
EKG VENTRICULAR RATE: 85 BPM
EOSINOPHILS RELATIVE PERCENT: 0 % (ref 1–4)
EOSINOPHILS RELATIVE PERCENT: 0 % (ref 1–7)
EOSINOPHILS RELATIVE PERCENT: 0 % (ref 1–7)
EOSINOPHILS RELATIVE PERCENT: 1 % (ref 1–4)
EOSINOPHILS RELATIVE PERCENT: 2 % (ref 1–4)
EOSINOPHILS RELATIVE PERCENT: 3 % (ref 1–4)
EOSINOPHILS RELATIVE PERCENT: 3 % (ref 1–4)
EPITHELIAL CELLS UA: ABNORMAL /HPF (ref 0–5)
EPITHELIAL CELLS UA: NORMAL /HPF (ref 0–5)
EPITHELIAL CELLS UA: NORMAL /HPF (ref 0–5)
ESTIMATED AVERAGE GLUCOSE: 183 MG/DL
ESTIMATED AVERAGE GLUCOSE: 192 MG/DL
EXPIRATION DATE: NORMAL
FERRITIN: 43 UG/L (ref 13–150)
FIO2: 100
FIO2: 70
FIO2: 80
FIO2: ABNORMAL
FOLATE: 17 NG/ML
GFR AFRICAN AMERICAN: 17 ML/MIN
GFR AFRICAN AMERICAN: 19 ML/MIN
GFR AFRICAN AMERICAN: 19 ML/MIN
GFR AFRICAN AMERICAN: 20 ML/MIN
GFR AFRICAN AMERICAN: 20 ML/MIN
GFR AFRICAN AMERICAN: 21 ML/MIN
GFR AFRICAN AMERICAN: 22 ML/MIN
GFR AFRICAN AMERICAN: 23 ML/MIN
GFR AFRICAN AMERICAN: 24 ML/MIN
GFR AFRICAN AMERICAN: 25 ML/MIN
GFR AFRICAN AMERICAN: 25 ML/MIN
GFR AFRICAN AMERICAN: 26 ML/MIN
GFR AFRICAN AMERICAN: 27 ML/MIN
GFR AFRICAN AMERICAN: 28 ML/MIN
GFR AFRICAN AMERICAN: 29 ML/MIN
GFR AFRICAN AMERICAN: 30 ML/MIN
GFR AFRICAN AMERICAN: 31 ML/MIN
GFR AFRICAN AMERICAN: 31 ML/MIN
GFR AFRICAN AMERICAN: 32 ML/MIN
GFR AFRICAN AMERICAN: 34 ML/MIN
GFR AFRICAN AMERICAN: 35 ML/MIN
GFR AFRICAN AMERICAN: 35 ML/MIN
GFR AFRICAN AMERICAN: 36 ML/MIN
GFR AFRICAN AMERICAN: 37 ML/MIN
GFR AFRICAN AMERICAN: 37 ML/MIN
GFR AFRICAN AMERICAN: 43 ML/MIN
GFR AFRICAN AMERICAN: 44 ML/MIN
GFR AFRICAN AMERICAN: 50 ML/MIN
GFR NON-AFRICAN AMERICAN: 14 ML/MIN
GFR NON-AFRICAN AMERICAN: 16 ML/MIN
GFR NON-AFRICAN AMERICAN: 16 ML/MIN
GFR NON-AFRICAN AMERICAN: 17 ML/MIN
GFR NON-AFRICAN AMERICAN: 18 ML/MIN
GFR NON-AFRICAN AMERICAN: 19 ML/MIN
GFR NON-AFRICAN AMERICAN: 20 ML/MIN
GFR NON-AFRICAN AMERICAN: 21 ML/MIN
GFR NON-AFRICAN AMERICAN: 22 ML/MIN
GFR NON-AFRICAN AMERICAN: 23 ML/MIN
GFR NON-AFRICAN AMERICAN: 24 ML/MIN
GFR NON-AFRICAN AMERICAN: 25 ML/MIN
GFR NON-AFRICAN AMERICAN: 27 ML/MIN
GFR NON-AFRICAN AMERICAN: 28 ML/MIN
GFR NON-AFRICAN AMERICAN: 29 ML/MIN
GFR NON-AFRICAN AMERICAN: 30 ML/MIN
GFR NON-AFRICAN AMERICAN: 30 ML/MIN
GFR NON-AFRICAN AMERICAN: 31 ML/MIN
GFR NON-AFRICAN AMERICAN: 35 ML/MIN
GFR NON-AFRICAN AMERICAN: 36 ML/MIN
GFR NON-AFRICAN AMERICAN: 41 ML/MIN
GFR SERPL CREATININE-BSD FRML MDRD: ABNORMAL ML/MIN/{1.73_M2}
GLOBULIN: 3.2 G/DL (ref 1.5–3.8)
GLOBULIN: 3.6 G/DL (ref 1.5–3.8)
GLUCOSE BLD-MCNC: 101 MG/DL (ref 65–105)
GLUCOSE BLD-MCNC: 110 MG/DL (ref 65–105)
GLUCOSE BLD-MCNC: 115 MG/DL (ref 65–105)
GLUCOSE BLD-MCNC: 117 MG/DL (ref 65–105)
GLUCOSE BLD-MCNC: 117 MG/DL (ref 65–105)
GLUCOSE BLD-MCNC: 121 MG/DL (ref 70–99)
GLUCOSE BLD-MCNC: 123 MG/DL (ref 70–99)
GLUCOSE BLD-MCNC: 125 MG/DL (ref 70–99)
GLUCOSE BLD-MCNC: 126 MG/DL (ref 65–105)
GLUCOSE BLD-MCNC: 127 MG/DL (ref 65–105)
GLUCOSE BLD-MCNC: 130 MG/DL (ref 65–105)
GLUCOSE BLD-MCNC: 130 MG/DL (ref 65–105)
GLUCOSE BLD-MCNC: 130 MG/DL (ref 70–99)
GLUCOSE BLD-MCNC: 131 MG/DL (ref 65–105)
GLUCOSE BLD-MCNC: 133 MG/DL (ref 65–105)
GLUCOSE BLD-MCNC: 135 MG/DL (ref 70–99)
GLUCOSE BLD-MCNC: 138 MG/DL (ref 65–105)
GLUCOSE BLD-MCNC: 139 MG/DL (ref 65–105)
GLUCOSE BLD-MCNC: 141 MG/DL (ref 65–105)
GLUCOSE BLD-MCNC: 141 MG/DL (ref 70–99)
GLUCOSE BLD-MCNC: 147 MG/DL (ref 65–105)
GLUCOSE BLD-MCNC: 148 MG/DL (ref 65–105)
GLUCOSE BLD-MCNC: 149 MG/DL (ref 65–105)
GLUCOSE BLD-MCNC: 150 MG/DL (ref 65–105)
GLUCOSE BLD-MCNC: 150 MG/DL (ref 65–105)
GLUCOSE BLD-MCNC: 152 MG/DL (ref 65–105)
GLUCOSE BLD-MCNC: 152 MG/DL (ref 65–105)
GLUCOSE BLD-MCNC: 153 MG/DL (ref 65–105)
GLUCOSE BLD-MCNC: 154 MG/DL (ref 65–105)
GLUCOSE BLD-MCNC: 154 MG/DL (ref 65–105)
GLUCOSE BLD-MCNC: 158 MG/DL (ref 65–105)
GLUCOSE BLD-MCNC: 158 MG/DL (ref 65–105)
GLUCOSE BLD-MCNC: 161 MG/DL (ref 65–105)
GLUCOSE BLD-MCNC: 162 MG/DL (ref 65–105)
GLUCOSE BLD-MCNC: 163 MG/DL (ref 65–105)
GLUCOSE BLD-MCNC: 163 MG/DL (ref 70–99)
GLUCOSE BLD-MCNC: 165 MG/DL (ref 65–105)
GLUCOSE BLD-MCNC: 165 MG/DL (ref 70–99)
GLUCOSE BLD-MCNC: 166 MG/DL (ref 65–105)
GLUCOSE BLD-MCNC: 169 MG/DL (ref 65–105)
GLUCOSE BLD-MCNC: 171 MG/DL (ref 65–105)
GLUCOSE BLD-MCNC: 173 MG/DL (ref 65–105)
GLUCOSE BLD-MCNC: 177 MG/DL (ref 65–105)
GLUCOSE BLD-MCNC: 177 MG/DL (ref 65–105)
GLUCOSE BLD-MCNC: 181 MG/DL (ref 65–105)
GLUCOSE BLD-MCNC: 185 MG/DL (ref 65–105)
GLUCOSE BLD-MCNC: 185 MG/DL (ref 70–99)
GLUCOSE BLD-MCNC: 185 MG/DL (ref 70–99)
GLUCOSE BLD-MCNC: 187 MG/DL (ref 65–105)
GLUCOSE BLD-MCNC: 191 MG/DL (ref 65–105)
GLUCOSE BLD-MCNC: 192 MG/DL (ref 65–105)
GLUCOSE BLD-MCNC: 193 MG/DL (ref 65–105)
GLUCOSE BLD-MCNC: 193 MG/DL (ref 70–99)
GLUCOSE BLD-MCNC: 195 MG/DL (ref 65–105)
GLUCOSE BLD-MCNC: 196 MG/DL (ref 65–105)
GLUCOSE BLD-MCNC: 197 MG/DL (ref 65–105)
GLUCOSE BLD-MCNC: 198 MG/DL (ref 65–105)
GLUCOSE BLD-MCNC: 199 MG/DL (ref 65–105)
GLUCOSE BLD-MCNC: 199 MG/DL (ref 70–99)
GLUCOSE BLD-MCNC: 200 MG/DL (ref 65–105)
GLUCOSE BLD-MCNC: 200 MG/DL (ref 70–99)
GLUCOSE BLD-MCNC: 201 MG/DL (ref 70–99)
GLUCOSE BLD-MCNC: 202 MG/DL (ref 65–105)
GLUCOSE BLD-MCNC: 202 MG/DL (ref 70–99)
GLUCOSE BLD-MCNC: 205 MG/DL (ref 70–99)
GLUCOSE BLD-MCNC: 210 MG/DL (ref 65–105)
GLUCOSE BLD-MCNC: 212 MG/DL (ref 70–99)
GLUCOSE BLD-MCNC: 215 MG/DL (ref 65–105)
GLUCOSE BLD-MCNC: 217 MG/DL (ref 65–105)
GLUCOSE BLD-MCNC: 218 MG/DL (ref 70–99)
GLUCOSE BLD-MCNC: 227 MG/DL (ref 70–99)
GLUCOSE BLD-MCNC: 230 MG/DL (ref 70–99)
GLUCOSE BLD-MCNC: 232 MG/DL (ref 65–105)
GLUCOSE BLD-MCNC: 242 MG/DL (ref 70–99)
GLUCOSE BLD-MCNC: 253 MG/DL (ref 65–105)
GLUCOSE BLD-MCNC: 264 MG/DL (ref 65–105)
GLUCOSE BLD-MCNC: 265 MG/DL (ref 65–105)
GLUCOSE BLD-MCNC: 285 MG/DL (ref 70–99)
GLUCOSE BLD-MCNC: 298 MG/DL (ref 65–105)
GLUCOSE BLD-MCNC: 314 MG/DL (ref 65–105)
GLUCOSE BLD-MCNC: 352 MG/DL (ref 70–99)
GLUCOSE BLD-MCNC: 357 MG/DL (ref 65–105)
GLUCOSE BLD-MCNC: 370 MG/DL (ref 74–100)
GLUCOSE BLD-MCNC: 388 MG/DL (ref 65–105)
GLUCOSE BLD-MCNC: 432 MG/DL (ref 65–105)
GLUCOSE BLD-MCNC: 518 MG/DL (ref 70–99)
GLUCOSE BLD-MCNC: 548 MG/DL (ref 65–105)
GLUCOSE BLD-MCNC: 555 MG/DL (ref 65–105)
GLUCOSE BLD-MCNC: 58 MG/DL (ref 65–105)
GLUCOSE BLD-MCNC: 594 MG/DL (ref 65–105)
GLUCOSE BLD-MCNC: 65 MG/DL (ref 70–99)
GLUCOSE BLD-MCNC: 671 MG/DL (ref 70–99)
GLUCOSE BLD-MCNC: 69 MG/DL (ref 65–105)
GLUCOSE BLD-MCNC: 75 MG/DL (ref 70–99)
GLUCOSE BLD-MCNC: 85 MG/DL (ref 70–99)
GLUCOSE BLD-MCNC: 94 MG/DL (ref 70–99)
GLUCOSE BLD-MCNC: 96 MG/DL (ref 70–99)
GLUCOSE BLD-MCNC: >600 MG/DL (ref 65–105)
GLUCOSE URINE: ABNORMAL
GLUCOSE URINE: NEGATIVE
HAV IGM SER IA-ACNC: NONREACTIVE
HBA1C MFR BLD: 8 % (ref 4–6)
HBA1C MFR BLD: 8.3 % (ref 4–6)
HCO3 VENOUS: 20.9 MMOL/L (ref 24–30)
HCT VFR BLD CALC: 22.8 % (ref 36.3–47.1)
HCT VFR BLD CALC: 25.5 % (ref 36.3–47.1)
HCT VFR BLD CALC: 27.4 % (ref 36.3–47.1)
HCT VFR BLD CALC: 27.7 % (ref 36.3–47.1)
HCT VFR BLD CALC: 27.8 % (ref 36.3–47.1)
HCT VFR BLD CALC: 27.9 % (ref 36.3–47.1)
HCT VFR BLD CALC: 28.1 % (ref 36.3–47.1)
HCT VFR BLD CALC: 28.3 % (ref 36.3–47.1)
HCT VFR BLD CALC: 28.5 % (ref 36.3–47.1)
HCT VFR BLD CALC: 28.6 % (ref 36.3–47.1)
HCT VFR BLD CALC: 28.6 % (ref 36.3–47.1)
HCT VFR BLD CALC: 28.8 % (ref 36.3–47.1)
HCT VFR BLD CALC: 28.8 % (ref 36.3–47.1)
HCT VFR BLD CALC: 29.2 % (ref 36.3–47.1)
HCT VFR BLD CALC: 29.2 % (ref 36.3–47.1)
HCT VFR BLD CALC: 29.3 % (ref 36.3–47.1)
HCT VFR BLD CALC: 30 % (ref 36.3–47.1)
HCT VFR BLD CALC: 30 % (ref 36.3–47.1)
HCT VFR BLD CALC: 30.1 % (ref 36.3–47.1)
HCT VFR BLD CALC: 30.2 % (ref 36.3–47.1)
HCT VFR BLD CALC: 30.6 % (ref 36.3–47.1)
HCT VFR BLD CALC: 30.7 % (ref 36.3–47.1)
HCT VFR BLD CALC: 31 % (ref 36.3–47.1)
HCT VFR BLD CALC: 31.1 % (ref 36.3–47.1)
HCT VFR BLD CALC: 31.2 % (ref 36.3–47.1)
HCT VFR BLD CALC: 31.3 % (ref 36.3–47.1)
HCT VFR BLD CALC: 31.8 % (ref 36.3–47.1)
HCT VFR BLD CALC: 32 % (ref 36.3–47.1)
HCT VFR BLD CALC: 32.6 % (ref 36.3–47.1)
HCT VFR BLD CALC: 32.7 % (ref 36.3–47.1)
HCT VFR BLD CALC: 33.1 % (ref 36.3–47.1)
HDLC SERPL-MCNC: 23 MG/DL
HEMOGLOBIN: 10.2 G/DL (ref 11.9–15.1)
HEMOGLOBIN: 10.5 G/DL (ref 11.9–15.1)
HEMOGLOBIN: 6.8 G/DL (ref 11.9–15.1)
HEMOGLOBIN: 7.3 G/DL (ref 11.9–15.1)
HEMOGLOBIN: 7.8 G/DL (ref 11.9–15.1)
HEMOGLOBIN: 7.9 G/DL (ref 11.9–15.1)
HEMOGLOBIN: 8.2 G/DL (ref 11.9–15.1)
HEMOGLOBIN: 8.3 G/DL (ref 11.9–15.1)
HEMOGLOBIN: 8.4 G/DL (ref 11.9–15.1)
HEMOGLOBIN: 8.5 G/DL (ref 11.9–15.1)
HEMOGLOBIN: 8.6 G/DL (ref 11.9–15.1)
HEMOGLOBIN: 8.7 G/DL (ref 11.9–15.1)
HEMOGLOBIN: 8.8 G/DL (ref 11.9–15.1)
HEMOGLOBIN: 9.2 G/DL (ref 11.9–15.1)
HEMOGLOBIN: 9.2 G/DL (ref 11.9–15.1)
HEMOGLOBIN: 9.3 G/DL (ref 11.9–15.1)
HEMOGLOBIN: 9.4 G/DL (ref 11.9–15.1)
HEMOGLOBIN: 9.6 G/DL (ref 11.9–15.1)
HEMOGLOBIN: 9.8 G/DL (ref 11.9–15.1)
HEMOGLOBIN: 9.9 G/DL (ref 11.9–15.1)
HEMOGLOBIN: 9.9 G/DL (ref 11.9–15.1)
HEPATITIS B CORE IGM ANTIBODY: NONREACTIVE
HEPATITIS B SURFACE ANTIGEN: NONREACTIVE
HEPATITIS C ANTIBODY: NONREACTIVE
HUMAN METAPNEUMOVIRUS PCR: NOT DETECTED
HUMAN METAPNEUMOVIRUS PCR: NOT DETECTED
IMMATURE GRANULOCYTES: 0 %
IMMATURE GRANULOCYTES: 1 %
IMMATURE GRANULOCYTES: 2 %
IMMATURE GRANULOCYTES: 2 %
INFLUENZA A BY PCR: NOT DETECTED
INFLUENZA A BY PCR: NOT DETECTED
INFLUENZA A H1 (2009) PCR: ABNORMAL
INFLUENZA A H1 (2009) PCR: NORMAL
INFLUENZA A H1 PCR: ABNORMAL
INFLUENZA A H1 PCR: NORMAL
INFLUENZA A H3 PCR: ABNORMAL
INFLUENZA A H3 PCR: NORMAL
INFLUENZA B BY PCR: NOT DETECTED
INFLUENZA B BY PCR: NOT DETECTED
INR BLD: 1.1
INR BLD: 1.2
IRON SATURATION: 6 % (ref 20–55)
IRON: 15 UG/DL (ref 37–145)
KETONES, URINE: NEGATIVE
LACTATE DEHYDROGENASE: 711 U/L (ref 135–214)
LACTIC ACID, SEPSIS WHOLE BLOOD: ABNORMAL MMOL/L (ref 0.5–1.9)
LACTIC ACID, SEPSIS: 3.5 MMOL/L (ref 0.5–1.9)
LACTIC ACID, WHOLE BLOOD: 1.8 MMOL/L (ref 0.7–2.1)
LACTIC ACID, WHOLE BLOOD: 14.4 MMOL/L (ref 0.7–2.1)
LACTIC ACID, WHOLE BLOOD: 6.8 MMOL/L (ref 0.7–2.1)
LACTIC ACID: 3.1 MMOL/L (ref 0.5–2.2)
LACTIC ACID: NORMAL MMOL/L
LDL CHOLESTEROL: 41 MG/DL (ref 0–130)
LEUKOCYTE ESTERASE, URINE: ABNORMAL
LEUKOCYTE ESTERASE, URINE: NEGATIVE
LIPASE: 28 U/L (ref 13–60)
LIPASE: 43 U/L (ref 13–60)
LV EF: 30 %
LVEF MODALITY: NORMAL
LYMPHOCYTES # BLD: 15 % (ref 24–43)
LYMPHOCYTES # BLD: 18 % (ref 24–43)
LYMPHOCYTES # BLD: 20 % (ref 24–43)
LYMPHOCYTES # BLD: 21 % (ref 16–46)
LYMPHOCYTES # BLD: 21 % (ref 24–43)
LYMPHOCYTES # BLD: 21 % (ref 24–43)
LYMPHOCYTES # BLD: 22 % (ref 24–43)
LYMPHOCYTES # BLD: 22 % (ref 24–43)
LYMPHOCYTES # BLD: 24 % (ref 24–43)
LYMPHOCYTES # BLD: 25 % (ref 24–43)
LYMPHOCYTES # BLD: 3 % (ref 16–46)
LYMPHOCYTES # BLD: 4 % (ref 24–44)
LYMPHOCYTES # BLD: 9 % (ref 24–43)
Lab: ABNORMAL
Lab: NORMAL
MAGNESIUM: 1.9 MG/DL (ref 1.6–2.6)
MAGNESIUM: 2 MG/DL (ref 1.6–2.6)
MAGNESIUM: 2.1 MG/DL (ref 1.6–2.6)
MAGNESIUM: 2.1 MG/DL (ref 1.6–2.6)
MAGNESIUM: 2.2 MG/DL (ref 1.6–2.6)
MAGNESIUM: 2.3 MG/DL (ref 1.6–2.6)
MAGNESIUM: 2.5 MG/DL (ref 1.6–2.6)
MAGNESIUM: 2.6 MG/DL (ref 1.6–2.6)
MAGNESIUM: 2.6 MG/DL (ref 1.6–2.6)
MCH RBC QN AUTO: 22.1 PG (ref 25.2–33.5)
MCH RBC QN AUTO: 22.3 PG (ref 25.2–33.5)
MCH RBC QN AUTO: 22.8 PG (ref 25.2–33.5)
MCH RBC QN AUTO: 23.7 PG (ref 25.2–33.5)
MCH RBC QN AUTO: 24.2 PG (ref 25.2–33.5)
MCH RBC QN AUTO: 25 PG (ref 25.2–33.5)
MCH RBC QN AUTO: 25.3 PG (ref 25.2–33.5)
MCH RBC QN AUTO: 25.3 PG (ref 25.2–33.5)
MCH RBC QN AUTO: 25.4 PG (ref 25.2–33.5)
MCH RBC QN AUTO: 25.6 PG (ref 25.2–33.5)
MCH RBC QN AUTO: 25.7 PG (ref 25.2–33.5)
MCH RBC QN AUTO: 25.8 PG (ref 25.2–33.5)
MCH RBC QN AUTO: 25.9 PG (ref 25.2–33.5)
MCH RBC QN AUTO: 26.2 PG (ref 25.2–33.5)
MCH RBC QN AUTO: 26.3 PG (ref 25.2–33.5)
MCH RBC QN AUTO: 26.5 PG (ref 25.2–33.5)
MCH RBC QN AUTO: 26.6 PG (ref 25.2–33.5)
MCH RBC QN AUTO: 27 PG (ref 25.2–33.5)
MCH RBC QN AUTO: 27 PG (ref 25.2–33.5)
MCH RBC QN AUTO: 27.2 PG (ref 25.2–33.5)
MCHC RBC AUTO-ENTMCNC: 26.2 G/DL (ref 28.4–34.8)
MCHC RBC AUTO-ENTMCNC: 27.3 G/DL (ref 25.2–33.5)
MCHC RBC AUTO-ENTMCNC: 27.8 G/DL (ref 25.2–33.5)
MCHC RBC AUTO-ENTMCNC: 28.1 G/DL (ref 25.2–33.5)
MCHC RBC AUTO-ENTMCNC: 29 G/DL (ref 28.4–34.8)
MCHC RBC AUTO-ENTMCNC: 29.1 G/DL (ref 25.2–33.5)
MCHC RBC AUTO-ENTMCNC: 29.5 G/DL (ref 25.2–33.5)
MCHC RBC AUTO-ENTMCNC: 29.7 G/DL (ref 25.2–33.5)
MCHC RBC AUTO-ENTMCNC: 29.7 G/DL (ref 25.2–33.5)
MCHC RBC AUTO-ENTMCNC: 29.8 G/DL (ref 25.2–33.5)
MCHC RBC AUTO-ENTMCNC: 30.1 G/DL (ref 25.2–33.5)
MCHC RBC AUTO-ENTMCNC: 30.6 G/DL (ref 25.2–33.5)
MCHC RBC AUTO-ENTMCNC: 30.7 G/DL (ref 25.2–33.5)
MCHC RBC AUTO-ENTMCNC: 30.9 G/DL (ref 25.2–33.5)
MCHC RBC AUTO-ENTMCNC: 30.9 G/DL (ref 28.4–34.8)
MCHC RBC AUTO-ENTMCNC: 31 G/DL (ref 25.2–33.5)
MCHC RBC AUTO-ENTMCNC: 31 G/DL (ref 25.2–33.5)
MCHC RBC AUTO-ENTMCNC: 31 G/DL (ref 28.4–34.8)
MCHC RBC AUTO-ENTMCNC: 31.1 G/DL (ref 25.2–33.5)
MCHC RBC AUTO-ENTMCNC: 31.1 G/DL (ref 25.2–33.5)
MCHC RBC AUTO-ENTMCNC: 31.4 G/DL (ref 28.4–34.8)
MCHC RBC AUTO-ENTMCNC: 31.7 G/DL (ref 25.2–33.5)
MCV RBC AUTO: 79.5 FL (ref 82.6–102.9)
MCV RBC AUTO: 81.3 FL (ref 82.6–102.9)
MCV RBC AUTO: 81.7 FL (ref 82.6–102.9)
MCV RBC AUTO: 81.8 FL (ref 82.6–102.9)
MCV RBC AUTO: 81.8 FL (ref 82.6–102.9)
MCV RBC AUTO: 82.2 FL (ref 82.6–102.9)
MCV RBC AUTO: 82.7 FL (ref 82.6–102.9)
MCV RBC AUTO: 83.6 FL (ref 82.6–102.9)
MCV RBC AUTO: 83.8 FL (ref 82.6–102.9)
MCV RBC AUTO: 84.2 FL (ref 82.6–102.9)
MCV RBC AUTO: 84.4 FL (ref 82.6–102.9)
MCV RBC AUTO: 84.4 FL (ref 82.6–102.9)
MCV RBC AUTO: 85.7 FL (ref 82.6–102.9)
MCV RBC AUTO: 86 FL (ref 82.6–102.9)
MCV RBC AUTO: 86.2 FL (ref 82.6–102.9)
MCV RBC AUTO: 86.7 FL (ref 82.6–102.9)
MCV RBC AUTO: 87.2 FL (ref 82.6–102.9)
MCV RBC AUTO: 87.3 FL (ref 82.6–102.9)
MCV RBC AUTO: 87.4 FL (ref 82.6–102.9)
MCV RBC AUTO: 87.8 FL (ref 82.6–102.9)
MCV RBC AUTO: 88.8 FL (ref 82.6–102.9)
MCV RBC AUTO: 91.5 FL (ref 82.6–102.9)
MODE: ABNORMAL
MONOCYTES # BLD: 10 % (ref 3–12)
MONOCYTES # BLD: 11 % (ref 3–12)
MONOCYTES # BLD: 12 % (ref 4–11)
MONOCYTES # BLD: 3 % (ref 1–7)
MONOCYTES # BLD: 7 % (ref 3–12)
MONOCYTES # BLD: 7 % (ref 3–12)
MONOCYTES # BLD: 7 % (ref 4–11)
MONOCYTES # BLD: 8 % (ref 3–12)
MONOCYTES # BLD: 9 % (ref 3–12)
MORPHOLOGY: ABNORMAL
MUCUS: ABNORMAL
MUCUS: NORMAL
MUCUS: NORMAL
MYCOPLASMA PNEUMONIAE PCR: NOT DETECTED
MYCOPLASMA PNEUMONIAE PCR: NOT DETECTED
MYOGLOBIN: 115 NG/ML (ref 25–58)
MYOGLOBIN: 119 NG/ML (ref 25–58)
MYOGLOBIN: 151 NG/ML (ref 25–58)
NEGATIVE BASE EXCESS, ART: 13 (ref 0–2)
NEGATIVE BASE EXCESS, ART: 16 (ref 0–2)
NEGATIVE BASE EXCESS, ART: 19 (ref 0–2)
NEGATIVE BASE EXCESS, ART: 24 (ref 0–2)
NEGATIVE BASE EXCESS, ART: 8 (ref 0–2)
NEGATIVE BASE EXCESS, VEN: 7 (ref 0–2)
NITRITE, URINE: NEGATIVE
NOTIFY: NORMAL
NRBC AUTOMATED: 0 PER 100 WBC
NRBC AUTOMATED: 0.3 PER 100 WBC
NRBC AUTOMATED: 1.2 PER 100 WBC
O2 DEVICE/FLOW/%: ABNORMAL
O2 SAT, VEN: 54 %
OSMOLALITY URINE: 374 MOSM/KG (ref 80–1300)
OTHER OBSERVATIONS UA: ABNORMAL
OTHER OBSERVATIONS UA: NORMAL
OTHER OBSERVATIONS UA: NORMAL
PARAINFLUENZA 1 PCR: NOT DETECTED
PARAINFLUENZA 1 PCR: NOT DETECTED
PARAINFLUENZA 2 PCR: NOT DETECTED
PARAINFLUENZA 2 PCR: NOT DETECTED
PARAINFLUENZA 3 PCR: NOT DETECTED
PARAINFLUENZA 3 PCR: NOT DETECTED
PARAINFLUENZA 4 PCR: NOT DETECTED
PARAINFLUENZA 4 PCR: NOT DETECTED
PARTIAL THROMBOPLASTIN TIME: 32.5 SEC (ref 20.5–30.5)
PARTIAL THROMBOPLASTIN TIME: 39.3 SEC (ref 20.5–30.5)
PARTIAL THROMBOPLASTIN TIME: 41.4 SEC (ref 20.5–30.5)
PARTIAL THROMBOPLASTIN TIME: 72.3 SEC (ref 20.5–30.5)
PARTIAL THROMBOPLASTIN TIME: >120 SEC (ref 20.5–30.5)
PATIENT TEMP: 36.4
PATIENT TEMP: ABNORMAL
PCO2, VEN: 56 MM HG (ref 39–55)
PDW BLD-RTO: 18.6 % (ref 11.8–14.4)
PDW BLD-RTO: 18.8 % (ref 11.8–14.4)
PDW BLD-RTO: 18.9 % (ref 11.8–14.4)
PDW BLD-RTO: 19 % (ref 11.8–14.4)
PDW BLD-RTO: 19.1 % (ref 11.8–14.4)
PDW BLD-RTO: 19.1 % (ref 11.8–14.4)
PDW BLD-RTO: 19.2 % (ref 11.8–14.4)
PDW BLD-RTO: 19.3 % (ref 11.8–14.4)
PDW BLD-RTO: 19.4 % (ref 11.8–14.4)
PDW BLD-RTO: 19.5 % (ref 11.8–14.4)
PDW BLD-RTO: 19.6 % (ref 11.8–14.4)
PDW BLD-RTO: 19.7 % (ref 11.8–14.4)
PDW BLD-RTO: 19.7 % (ref 11.8–14.4)
PDW BLD-RTO: 19.8 % (ref 11.8–14.4)
PDW BLD-RTO: 19.8 % (ref 11.8–14.4)
PDW BLD-RTO: 19.9 % (ref 11.8–14.4)
PDW BLD-RTO: 19.9 % (ref 11.8–14.4)
PDW BLD-RTO: 20.1 % (ref 11.8–14.4)
PDW BLD-RTO: 20.9 % (ref 11.8–14.4)
PH UA: 5.5 (ref 5–6)
PH UA: 6 (ref 5–6)
PH UA: 6 (ref 5–6)
PH UA: 6.5 (ref 5–6)
PH UA: 6.5 (ref 5–8)
PH UA: 7 (ref 5–6)
PH VENOUS: 7.18 (ref 7.32–7.42)
PHOSPHORUS: 1.9 MG/DL (ref 2.6–4.5)
PHOSPHORUS: 2 MG/DL (ref 2.6–4.5)
PHOSPHORUS: 2.2 MG/DL (ref 2.6–4.5)
PHOSPHORUS: 2.7 MG/DL (ref 2.6–4.5)
PHOSPHORUS: 2.8 MG/DL (ref 2.6–4.5)
PHOSPHORUS: 3.3 MG/DL (ref 2.6–4.5)
PHOSPHORUS: 3.7 MG/DL (ref 2.6–4.5)
PLATELET # BLD: 252 K/UL (ref 138–453)
PLATELET # BLD: 253 K/UL (ref 138–453)
PLATELET # BLD: 265 K/UL (ref 138–453)
PLATELET # BLD: 267 K/UL (ref 138–453)
PLATELET # BLD: 272 K/UL (ref 138–453)
PLATELET # BLD: 278 K/UL (ref 138–453)
PLATELET # BLD: 85 K/UL (ref 138–453)
PLATELET # BLD: 87 K/UL (ref 138–453)
PLATELET # BLD: 96 K/UL (ref 138–453)
PLATELET # BLD: 98 K/UL (ref 138–453)
PLATELET # BLD: 99 K/UL (ref 138–453)
PLATELET # BLD: ABNORMAL K/UL (ref 138–453)
PLATELET ESTIMATE: ABNORMAL
PLATELET, FLUORESCENCE: 120 K/UL (ref 138–453)
PLATELET, FLUORESCENCE: 124 K/UL (ref 138–453)
PLATELET, FLUORESCENCE: 170 K/UL (ref 138–453)
PLATELET, FLUORESCENCE: 228 K/UL (ref 138–453)
PLATELET, FLUORESCENCE: 245 K/UL (ref 138–453)
PLATELET, FLUORESCENCE: 247 K/UL (ref 138–453)
PLATELET, FLUORESCENCE: NORMAL K/UL (ref 138–453)
PLATELET, IMMATURE FRACTION: 1.9 % (ref 1.1–10.3)
PLATELET, IMMATURE FRACTION: 2.9 % (ref 1.1–10.3)
PLATELET, IMMATURE FRACTION: 4 % (ref 1.1–10.3)
PLATELET, IMMATURE FRACTION: 5 % (ref 1.1–10.3)
PLATELET, IMMATURE FRACTION: 7 % (ref 1.1–10.3)
PLATELET, IMMATURE FRACTION: 7 % (ref 1.1–10.3)
PLATELET, IMMATURE FRACTION: NORMAL % (ref 1.1–10.3)
PLATELET, IMMATURE FRACTION: NORMAL % (ref 1.1–10.3)
PMV BLD AUTO: 10.2 FL (ref 8.1–13.5)
PMV BLD AUTO: 10.4 FL (ref 8.1–13.5)
PMV BLD AUTO: 10.6 FL (ref 8.1–13.5)
PMV BLD AUTO: 8.7 FL (ref 8.1–13.5)
PMV BLD AUTO: 8.8 FL (ref 8.1–13.5)
PMV BLD AUTO: 8.9 FL (ref 8.1–13.5)
PMV BLD AUTO: 8.9 FL (ref 8.1–13.5)
PMV BLD AUTO: 9 FL (ref 8.1–13.5)
PMV BLD AUTO: 9.2 FL (ref 8.1–13.5)
PMV BLD AUTO: 9.5 FL (ref 8.1–13.5)
PMV BLD AUTO: 9.6 FL (ref 8.1–13.5)
PMV BLD AUTO: ABNORMAL FL (ref 8.1–13.5)
PO2, VEN: 36 MM HG (ref 30–50)
POC HCO3: 11.1 MMOL/L (ref 21–28)
POC HCO3: 14 MMOL/L (ref 21–28)
POC HCO3: 16.7 MMOL/L (ref 21–28)
POC HCO3: 19.8 MMOL/L (ref 22–27)
POC HCO3: 8.5 MMOL/L (ref 21–28)
POC LACTIC ACID: 9.94 MMOL/L (ref 0.56–1.39)
POC O2 SATURATION: 68 % (ref 94–98)
POC O2 SATURATION: 82 % (ref 94–98)
POC O2 SATURATION: 93 %
POC O2 SATURATION: 99 % (ref 94–98)
POC O2 SATURATION: 99 % (ref 94–98)
POC OCCULT BLOOD, FECAL: POSITIVE
POC PCO2 TEMP: ABNORMAL MM HG
POC PCO2: 45 MM HG (ref 35–48)
POC PCO2: 47 MM HG (ref 35–48)
POC PCO2: 49 MM HG (ref 32–45)
POC PCO2: 49.2 MM HG (ref 35–48)
POC PCO2: 58.1 MM HG (ref 35–48)
POC PH TEMP: ABNORMAL
POC PH: 6.86 (ref 7.35–7.45)
POC PH: 7 (ref 7.35–7.45)
POC PH: 7.06 (ref 7.35–7.45)
POC PH: 7.07 (ref 7.35–7.45)
POC PH: 7.21 (ref 7.35–7.45)
POC PO2 TEMP: ABNORMAL MM HG
POC PO2: 167.2 MM HG (ref 83–108)
POC PO2: 174.4 MM HG (ref 83–108)
POC PO2: 61 MM HG (ref 83–108)
POC PO2: 69.6 MM HG (ref 83–108)
POC PO2: 81 MM HG (ref 75–95)
POSITIVE BASE EXCESS, ART: ABNORMAL (ref 0–2)
POSITIVE BASE EXCESS, ART: ABNORMAL (ref 0–3)
POSITIVE BASE EXCESS, VEN: ABNORMAL (ref 0–2)
POTASSIUM SERPL-SCNC: 3.3 MMOL/L (ref 3.7–5.3)
POTASSIUM SERPL-SCNC: 3.5 MMOL/L (ref 3.7–5.3)
POTASSIUM SERPL-SCNC: 3.5 MMOL/L (ref 3.7–5.3)
POTASSIUM SERPL-SCNC: 3.7 MMOL/L (ref 3.7–5.3)
POTASSIUM SERPL-SCNC: 3.9 MMOL/L (ref 3.7–5.3)
POTASSIUM SERPL-SCNC: 4 MMOL/L (ref 3.7–5.3)
POTASSIUM SERPL-SCNC: 4 MMOL/L (ref 3.7–5.3)
POTASSIUM SERPL-SCNC: 4.1 MMOL/L (ref 3.7–5.3)
POTASSIUM SERPL-SCNC: 4.2 MMOL/L (ref 3.7–5.3)
POTASSIUM SERPL-SCNC: 4.3 MMOL/L (ref 3.7–5.3)
POTASSIUM SERPL-SCNC: 4.4 MMOL/L (ref 3.7–5.3)
POTASSIUM SERPL-SCNC: 4.5 MMOL/L (ref 3.7–5.3)
POTASSIUM SERPL-SCNC: 4.5 MMOL/L (ref 3.7–5.3)
POTASSIUM SERPL-SCNC: 4.6 MMOL/L (ref 3.7–5.3)
POTASSIUM SERPL-SCNC: 4.7 MMOL/L (ref 3.7–5.3)
POTASSIUM SERPL-SCNC: 4.8 MMOL/L (ref 3.7–5.3)
POTASSIUM SERPL-SCNC: 5.3 MMOL/L (ref 3.7–5.3)
POTASSIUM SERPL-SCNC: 5.4 MMOL/L (ref 3.7–5.3)
POTASSIUM SERPL-SCNC: 6.1 MMOL/L (ref 3.7–5.3)
POTASSIUM SERPL-SCNC: 6.2 MMOL/L (ref 3.7–5.3)
PRO-BNP: 7203 PG/ML
PRO-BNP: 7575 PG/ML
PRO-BNP: 8733 PG/ML
PRO-BNP: 9300 PG/ML
PROCALCITONIN: 0.2 NG/ML
PROTEIN UA: ABNORMAL
PROTEIN UA: ABNORMAL
PROTEIN UA: NEGATIVE
PROTHROMBIN TIME: 11.2 SEC (ref 9–12)
PROTHROMBIN TIME: 12.1 SEC (ref 9.4–11.3)
RBC # BLD: 3.02 M/UL (ref 3.95–5.11)
RBC # BLD: 3.18 M/UL (ref 3.95–5.11)
RBC # BLD: 3.2 M/UL (ref 3.95–5.11)
RBC # BLD: 3.2 M/UL (ref 3.95–5.11)
RBC # BLD: 3.24 M/UL (ref 3.95–5.11)
RBC # BLD: 3.26 M/UL (ref 3.95–5.11)
RBC # BLD: 3.28 M/UL (ref 3.95–5.11)
RBC # BLD: 3.29 M/UL (ref 3.95–5.11)
RBC # BLD: 3.3 M/UL (ref 3.95–5.11)
RBC # BLD: 3.35 M/UL (ref 3.95–5.11)
RBC # BLD: 3.42 M/UL (ref 3.95–5.11)
RBC # BLD: 3.5 M/UL (ref 3.95–5.11)
RBC # BLD: 3.57 M/UL (ref 3.95–5.11)
RBC # BLD: 3.68 M/UL (ref 3.95–5.11)
RBC # BLD: 3.71 M/UL (ref 3.95–5.11)
RBC # BLD: 3.79 M/UL (ref 3.95–5.11)
RBC # BLD: 3.8 M/UL (ref 3.95–5.11)
RBC # BLD: 3.83 M/UL (ref 3.95–5.11)
RBC # BLD: 3.84 M/UL (ref 3.95–5.11)
RBC # BLD: 3.87 M/UL (ref 3.95–5.11)
RBC # BLD: 3.87 M/UL (ref 3.95–5.11)
RBC # BLD: 3.95 M/UL (ref 3.95–5.11)
RBC # BLD: ABNORMAL 10*6/UL
RBC UA: ABNORMAL /HPF (ref 0–4)
RBC UA: NORMAL /HPF (ref 0–4)
RBC UA: NORMAL /HPF (ref 0–4)
READ BACK: YES
REASON FOR REJECTION: NORMAL
RENAL EPITHELIAL, UA: ABNORMAL /HPF
RENAL EPITHELIAL, UA: NORMAL /HPF
RENAL EPITHELIAL, UA: NORMAL /HPF
RESP SYNCYTIAL VIRUS PCR: NOT DETECTED
RESP SYNCYTIAL VIRUS PCR: NOT DETECTED
RHINO/ENTEROVIRUS PCR: DETECTED
RHINO/ENTEROVIRUS PCR: NOT DETECTED
SAMPLE SITE: ABNORMAL
SARS-COV-2, PCR: NORMAL
SARS-COV-2: NOT DETECTED
SEG NEUTROPHILS: 61 % (ref 36–65)
SEG NEUTROPHILS: 63 % (ref 36–65)
SEG NEUTROPHILS: 66 % (ref 36–65)
SEG NEUTROPHILS: 66 % (ref 43–77)
SEG NEUTROPHILS: 67 % (ref 36–65)
SEG NEUTROPHILS: 69 % (ref 36–65)
SEG NEUTROPHILS: 70 % (ref 36–65)
SEG NEUTROPHILS: 72 % (ref 36–65)
SEG NEUTROPHILS: 72 % (ref 36–65)
SEG NEUTROPHILS: 79 % (ref 36–65)
SEG NEUTROPHILS: 89 % (ref 43–77)
SEG NEUTROPHILS: 92 % (ref 36–66)
SEGMENTED NEUTROPHILS ABSOLUTE COUNT: 14.25 K/UL (ref 1.8–7.7)
SEGMENTED NEUTROPHILS ABSOLUTE COUNT: 15.84 K/UL (ref 1.8–7.7)
SEGMENTED NEUTROPHILS ABSOLUTE COUNT: 4.54 K/UL (ref 1.5–8.1)
SEGMENTED NEUTROPHILS ABSOLUTE COUNT: 4.63 K/UL (ref 1.5–8.1)
SEGMENTED NEUTROPHILS ABSOLUTE COUNT: 4.93 K/UL (ref 1.5–8.1)
SEGMENTED NEUTROPHILS ABSOLUTE COUNT: 5.28 K/UL (ref 1.8–7.7)
SEGMENTED NEUTROPHILS ABSOLUTE COUNT: 5.55 K/UL (ref 1.5–8.1)
SEGMENTED NEUTROPHILS ABSOLUTE COUNT: 5.57 K/UL (ref 1.5–8.1)
SEGMENTED NEUTROPHILS ABSOLUTE COUNT: 5.6 K/UL (ref 1.5–8.1)
SEGMENTED NEUTROPHILS ABSOLUTE COUNT: 5.79 K/UL (ref 1.5–8.1)
SEGMENTED NEUTROPHILS ABSOLUTE COUNT: 5.79 K/UL (ref 1.5–8.1)
SEGMENTED NEUTROPHILS ABSOLUTE COUNT: 5.93 K/UL (ref 1.5–8.1)
SEGMENTED NEUTROPHILS ABSOLUTE COUNT: 6.55 K/UL (ref 1.5–8.1)
SEGMENTED NEUTROPHILS ABSOLUTE COUNT: 7.12 K/UL (ref 1.5–8.1)
SEGMENTED NEUTROPHILS ABSOLUTE COUNT: 7.19 K/UL (ref 1.5–8.1)
SEGMENTED NEUTROPHILS ABSOLUTE COUNT: 7.54 K/UL (ref 1.5–8.1)
SEGMENTED NEUTROPHILS ABSOLUTE COUNT: 7.54 K/UL (ref 1.5–8.1)
SERUM OSMOLALITY: 301 MOSM/KG (ref 275–295)
SODIUM BLD-SCNC: 121 MMOL/L (ref 135–144)
SODIUM BLD-SCNC: 126 MMOL/L (ref 135–144)
SODIUM BLD-SCNC: 128 MMOL/L (ref 135–144)
SODIUM BLD-SCNC: 129 MMOL/L (ref 135–144)
SODIUM BLD-SCNC: 131 MMOL/L (ref 135–144)
SODIUM BLD-SCNC: 131 MMOL/L (ref 135–144)
SODIUM BLD-SCNC: 132 MMOL/L (ref 135–144)
SODIUM BLD-SCNC: 133 MMOL/L (ref 135–144)
SODIUM BLD-SCNC: 134 MMOL/L (ref 135–144)
SODIUM BLD-SCNC: 135 MMOL/L (ref 135–144)
SODIUM BLD-SCNC: 136 MMOL/L (ref 135–144)
SODIUM BLD-SCNC: 137 MMOL/L (ref 135–144)
SODIUM BLD-SCNC: 138 MMOL/L (ref 135–144)
SODIUM BLD-SCNC: 139 MMOL/L (ref 135–144)
SODIUM BLD-SCNC: 139 MMOL/L (ref 135–144)
SODIUM BLD-SCNC: 140 MMOL/L (ref 135–144)
SODIUM BLD-SCNC: 140 MMOL/L (ref 135–144)
SODIUM BLD-SCNC: 141 MMOL/L (ref 135–144)
SODIUM,UR: 130 MMOL/L
SODIUM,UR: 44 MMOL/L
SODIUM,UR: 74 MMOL/L
SOURCE: NORMAL
SPECIFIC GRAVITY UA: 1.01 (ref 1.01–1.02)
SPECIFIC GRAVITY UA: 1.01 (ref 1–1.03)
SPECIFIC GRAVITY UA: 1.02 (ref 1.01–1.02)
SPECIMEN DESCRIPTION: ABNORMAL
SPECIMEN DESCRIPTION: ABNORMAL
SPECIMEN DESCRIPTION: NORMAL
TCO2 (CALC), ART: 10 MMOL/L (ref 22–29)
TCO2 (CALC), ART: 13 MMOL/L (ref 22–29)
TCO2 (CALC), ART: 16 MMOL/L (ref 22–29)
TCO2 (CALC), ART: 18 MMOL/L (ref 22–29)
TCO2 (CALC), ART: 21 MMOL/L (ref 23–28)
TOTAL CK: 47 U/L (ref 26–192)
TOTAL CK: 56 U/L (ref 26–192)
TOTAL CO2, VENOUS: 23 MMOL/L (ref 25–31)
TOTAL IRON BINDING CAPACITY: 260 UG/DL (ref 250–450)
TOTAL PROTEIN, URINE: 14 MG/DL
TOTAL PROTEIN, URINE: 9 MG/DL
TOTAL PROTEIN: 5.7 G/DL (ref 6.4–8.3)
TOTAL PROTEIN: 6.7 G/DL (ref 6.4–8.3)
TOTAL PROTEIN: 7.1 G/DL (ref 6.4–8.3)
TOTAL PROTEIN: 7.4 G/DL (ref 6.4–8.3)
TOTAL PROTEIN: 7.7 G/DL (ref 6.4–8.3)
TOTAL PROTEIN: 8.1 G/DL (ref 6.4–8.3)
TRANSFUSION STATUS: NORMAL
TRICHOMONAS: ABNORMAL
TRICHOMONAS: NORMAL
TRICHOMONAS: NORMAL
TRIGL SERPL-MCNC: 56 MG/DL
TROPONIN INTERP: ABNORMAL
TROPONIN T: ABNORMAL NG/ML
TROPONIN, HIGH SENSITIVITY: 106 NG/L (ref 0–14)
TROPONIN, HIGH SENSITIVITY: 108 NG/L (ref 0–14)
TROPONIN, HIGH SENSITIVITY: 108 NG/L (ref 0–14)
TROPONIN, HIGH SENSITIVITY: 116 NG/L (ref 0–14)
TROPONIN, HIGH SENSITIVITY: 118 NG/L (ref 0–14)
TROPONIN, HIGH SENSITIVITY: 66 NG/L (ref 0–14)
TROPONIN, HIGH SENSITIVITY: 66 NG/L (ref 0–14)
TROPONIN, HIGH SENSITIVITY: 67 NG/L (ref 0–14)
TROPONIN, HIGH SENSITIVITY: 69 NG/L (ref 0–14)
TROPONIN, HIGH SENSITIVITY: 71 NG/L (ref 0–14)
TROPONIN, HIGH SENSITIVITY: 72 NG/L (ref 0–14)
TROPONIN, HIGH SENSITIVITY: 75 NG/L (ref 0–14)
TROPONIN, HIGH SENSITIVITY: 76 NG/L (ref 0–14)
TROPONIN, HIGH SENSITIVITY: 78 NG/L (ref 0–14)
TROPONIN, HIGH SENSITIVITY: 78 NG/L (ref 0–14)
TROPONIN, HIGH SENSITIVITY: 81 NG/L (ref 0–14)
TROPONIN, HIGH SENSITIVITY: 88 NG/L (ref 0–14)
TROPONIN, HIGH SENSITIVITY: 92 NG/L (ref 0–14)
TROPONIN, HIGH SENSITIVITY: 94 NG/L (ref 0–14)
TROPONIN, HIGH SENSITIVITY: 94 NG/L (ref 0–14)
TROPONIN, HIGH SENSITIVITY: 95 NG/L (ref 0–14)
TSH SERPL DL<=0.05 MIU/L-ACNC: 1.92 MIU/L (ref 0.3–5)
TSH SERPL DL<=0.05 MIU/L-ACNC: 3.15 MIU/L (ref 0.3–5)
TURBIDITY: ABNORMAL
TURBIDITY: CLEAR
TURBIDITY: NORMAL
UNIT DIVISION: 0
UNIT NUMBER: NORMAL
UNSATURATED IRON BINDING CAPACITY: 245 UG/DL (ref 112–347)
URIC ACID: 7.3 MG/DL (ref 2.4–5.7)
URINE HGB: ABNORMAL
URINE HGB: NEGATIVE
UROBILINOGEN, URINE: NORMAL
VITAMIN B-12: 368 PG/ML (ref 232–1245)
VLDLC SERPL CALC-MCNC: ABNORMAL MG/DL (ref 1–30)
WBC # BLD: 10 K/UL (ref 3.5–11.3)
WBC # BLD: 10.4 K/UL (ref 3.5–11.3)
WBC # BLD: 10.4 K/UL (ref 3.5–11.3)
WBC # BLD: 10.8 K/UL (ref 3.5–11.3)
WBC # BLD: 11.8 K/UL (ref 3.5–11.3)
WBC # BLD: 15.5 K/UL (ref 3.5–11.3)
WBC # BLD: 17.8 K/UL (ref 3.5–11.3)
WBC # BLD: 7 K/UL (ref 3.5–11.3)
WBC # BLD: 7.2 K/UL (ref 3.5–11.3)
WBC # BLD: 7.3 K/UL (ref 3.5–11.3)
WBC # BLD: 8 K/UL (ref 3.5–11.3)
WBC # BLD: 8.2 K/UL (ref 3.5–11.3)
WBC # BLD: 8.3 K/UL (ref 3.5–11.3)
WBC # BLD: 8.8 K/UL (ref 3.5–11.3)
WBC # BLD: 8.8 K/UL (ref 3.5–11.3)
WBC # BLD: 8.9 K/UL (ref 3.5–11.3)
WBC # BLD: 9 K/UL (ref 3.5–11.3)
WBC # BLD: 9.2 K/UL (ref 3.5–11.3)
WBC # BLD: 9.3 K/UL (ref 3.5–11.3)
WBC # BLD: 9.6 K/UL (ref 3.5–11.3)
WBC # BLD: ABNORMAL 10*3/UL
WBC UA: ABNORMAL /HPF (ref 0–4)
WBC UA: ABNORMAL /HPF (ref 0–5)
WBC UA: NORMAL /HPF (ref 0–4)
WBC UA: NORMAL /HPF (ref 0–4)
YEAST: ABNORMAL
YEAST: NORMAL
YEAST: NORMAL
ZZ NTE CLEAN UP: ORDERED TEST: NORMAL
ZZ NTE WITH NAME CLEAN UP: SPECIMEN SOURCE: NORMAL

## 2020-01-01 PROCEDURE — 81001 URINALYSIS AUTO W/SCOPE: CPT

## 2020-01-01 PROCEDURE — 36415 COLL VENOUS BLD VENIPUNCTURE: CPT

## 2020-01-01 PROCEDURE — 4040F PNEUMOC VAC/ADMIN/RCVD: CPT | Performed by: INTERNAL MEDICINE

## 2020-01-01 PROCEDURE — 93005 ELECTROCARDIOGRAM TRACING: CPT | Performed by: EMERGENCY MEDICINE

## 2020-01-01 PROCEDURE — 96375 TX/PRO/DX INJ NEW DRUG ADDON: CPT

## 2020-01-01 PROCEDURE — 6360000002 HC RX W HCPCS: Performed by: NURSE PRACTITIONER

## 2020-01-01 PROCEDURE — 83874 ASSAY OF MYOGLOBIN: CPT

## 2020-01-01 PROCEDURE — 80074 ACUTE HEPATITIS PANEL: CPT

## 2020-01-01 PROCEDURE — 85055 RETICULATED PLATELET ASSAY: CPT

## 2020-01-01 PROCEDURE — 6370000000 HC RX 637 (ALT 250 FOR IP): Performed by: EMERGENCY MEDICINE

## 2020-01-01 PROCEDURE — 6370000000 HC RX 637 (ALT 250 FOR IP): Performed by: STUDENT IN AN ORGANIZED HEALTH CARE EDUCATION/TRAINING PROGRAM

## 2020-01-01 PROCEDURE — 84484 ASSAY OF TROPONIN QUANT: CPT

## 2020-01-01 PROCEDURE — 93306 TTE W/DOPPLER COMPLETE: CPT

## 2020-01-01 PROCEDURE — 6370000000 HC RX 637 (ALT 250 FOR IP): Performed by: SURGERY

## 2020-01-01 PROCEDURE — 1111F DSCHRG MED/CURRENT MED MERGE: CPT | Performed by: FAMILY MEDICINE

## 2020-01-01 PROCEDURE — 6360000002 HC RX W HCPCS: Performed by: EMERGENCY MEDICINE

## 2020-01-01 PROCEDURE — 99233 SBSQ HOSP IP/OBS HIGH 50: CPT | Performed by: INTERNAL MEDICINE

## 2020-01-01 PROCEDURE — 85014 HEMATOCRIT: CPT

## 2020-01-01 PROCEDURE — 80048 BASIC METABOLIC PNL TOTAL CA: CPT

## 2020-01-01 PROCEDURE — 85027 COMPLETE CBC AUTOMATED: CPT

## 2020-01-01 PROCEDURE — 2060000000 HC ICU INTERMEDIATE R&B

## 2020-01-01 PROCEDURE — 82570 ASSAY OF URINE CREATININE: CPT

## 2020-01-01 PROCEDURE — 94770 HC ETCO2 MONITOR DAILY: CPT

## 2020-01-01 PROCEDURE — 6370000000 HC RX 637 (ALT 250 FOR IP): Performed by: INTERNAL MEDICINE

## 2020-01-01 PROCEDURE — 1090F PRES/ABSN URINE INCON ASSESS: CPT | Performed by: FAMILY MEDICINE

## 2020-01-01 PROCEDURE — 37799 UNLISTED PX VASCULAR SURGERY: CPT

## 2020-01-01 PROCEDURE — 96374 THER/PROPH/DIAG INJ IV PUSH: CPT

## 2020-01-01 PROCEDURE — C9113 INJ PANTOPRAZOLE SODIUM, VIA: HCPCS | Performed by: SPECIALIST

## 2020-01-01 PROCEDURE — 1123F ACP DISCUSS/DSCN MKR DOCD: CPT | Performed by: FAMILY MEDICINE

## 2020-01-01 PROCEDURE — 6360000002 HC RX W HCPCS: Performed by: INTERNAL MEDICINE

## 2020-01-01 PROCEDURE — 85025 COMPLETE CBC W/AUTO DIFF WBC: CPT

## 2020-01-01 PROCEDURE — 86140 C-REACTIVE PROTEIN: CPT

## 2020-01-01 PROCEDURE — 99284 EMERGENCY DEPT VISIT MOD MDM: CPT

## 2020-01-01 PROCEDURE — 2580000003 HC RX 258: Performed by: EMERGENCY MEDICINE

## 2020-01-01 PROCEDURE — 6370000000 HC RX 637 (ALT 250 FOR IP): Performed by: FAMILY MEDICINE

## 2020-01-01 PROCEDURE — 82947 ASSAY GLUCOSE BLOOD QUANT: CPT

## 2020-01-01 PROCEDURE — 6360000002 HC RX W HCPCS

## 2020-01-01 PROCEDURE — 83550 IRON BINDING TEST: CPT

## 2020-01-01 PROCEDURE — 84100 ASSAY OF PHOSPHORUS: CPT

## 2020-01-01 PROCEDURE — 82803 BLOOD GASES ANY COMBINATION: CPT

## 2020-01-01 PROCEDURE — 20610 DRAIN/INJ JOINT/BURSA W/O US: CPT | Performed by: FAMILY MEDICINE

## 2020-01-01 PROCEDURE — 99285 EMERGENCY DEPT VISIT HI MDM: CPT

## 2020-01-01 PROCEDURE — 80061 LIPID PANEL: CPT

## 2020-01-01 PROCEDURE — 2580000003 HC RX 258: Performed by: SURGERY

## 2020-01-01 PROCEDURE — G8427 DOCREV CUR MEDS BY ELIG CLIN: HCPCS | Performed by: INTERNAL MEDICINE

## 2020-01-01 PROCEDURE — 84295 ASSAY OF SERUM SODIUM: CPT

## 2020-01-01 PROCEDURE — 84156 ASSAY OF PROTEIN URINE: CPT

## 2020-01-01 PROCEDURE — 99291 CRITICAL CARE FIRST HOUR: CPT | Performed by: INTERNAL MEDICINE

## 2020-01-01 PROCEDURE — 99232 SBSQ HOSP IP/OBS MODERATE 35: CPT | Performed by: NURSE PRACTITIONER

## 2020-01-01 PROCEDURE — 80069 RENAL FUNCTION PANEL: CPT

## 2020-01-01 PROCEDURE — 6370000000 HC RX 637 (ALT 250 FOR IP): Performed by: NURSE PRACTITIONER

## 2020-01-01 PROCEDURE — 2500000003 HC RX 250 WO HCPCS: Performed by: STUDENT IN AN ORGANIZED HEALTH CARE EDUCATION/TRAINING PROGRAM

## 2020-01-01 PROCEDURE — G8427 DOCREV CUR MEDS BY ELIG CLIN: HCPCS | Performed by: FAMILY MEDICINE

## 2020-01-01 PROCEDURE — 6360000002 HC RX W HCPCS: Performed by: SPECIALIST

## 2020-01-01 PROCEDURE — 96365 THER/PROPH/DIAG IV INF INIT: CPT

## 2020-01-01 PROCEDURE — 2580000003 HC RX 258: Performed by: INTERNAL MEDICINE

## 2020-01-01 PROCEDURE — P9016 RBC LEUKOCYTES REDUCED: HCPCS

## 2020-01-01 PROCEDURE — 97161 PT EVAL LOW COMPLEX 20 MIN: CPT

## 2020-01-01 PROCEDURE — 99232 SBSQ HOSP IP/OBS MODERATE 35: CPT | Performed by: INTERNAL MEDICINE

## 2020-01-01 PROCEDURE — 85018 HEMOGLOBIN: CPT

## 2020-01-01 PROCEDURE — 84300 ASSAY OF URINE SODIUM: CPT

## 2020-01-01 PROCEDURE — 83735 ASSAY OF MAGNESIUM: CPT

## 2020-01-01 PROCEDURE — 96376 TX/PRO/DX INJ SAME DRUG ADON: CPT

## 2020-01-01 PROCEDURE — 71045 X-RAY EXAM CHEST 1 VIEW: CPT

## 2020-01-01 PROCEDURE — G8484 FLU IMMUNIZE NO ADMIN: HCPCS | Performed by: FAMILY MEDICINE

## 2020-01-01 PROCEDURE — 82550 ASSAY OF CK (CPK): CPT

## 2020-01-01 PROCEDURE — 82607 VITAMIN B-12: CPT

## 2020-01-01 PROCEDURE — 83605 ASSAY OF LACTIC ACID: CPT

## 2020-01-01 PROCEDURE — 71046 X-RAY EXAM CHEST 2 VIEWS: CPT

## 2020-01-01 PROCEDURE — 4040F PNEUMOC VAC/ADMIN/RCVD: CPT | Performed by: FAMILY MEDICINE

## 2020-01-01 PROCEDURE — G0108 DIAB MANAGE TRN  PER INDIV: HCPCS

## 2020-01-01 PROCEDURE — 84132 ASSAY OF SERUM POTASSIUM: CPT

## 2020-01-01 PROCEDURE — 84550 ASSAY OF BLOOD/URIC ACID: CPT

## 2020-01-01 PROCEDURE — 93005 ELECTROCARDIOGRAM TRACING: CPT | Performed by: SPECIALIST

## 2020-01-01 PROCEDURE — 94760 N-INVAS EAR/PLS OXIMETRY 1: CPT

## 2020-01-01 PROCEDURE — 1111F DSCHRG MED/CURRENT MED MERGE: CPT

## 2020-01-01 PROCEDURE — 83935 ASSAY OF URINE OSMOLALITY: CPT

## 2020-01-01 PROCEDURE — 99222 1ST HOSP IP/OBS MODERATE 55: CPT | Performed by: INTERNAL MEDICINE

## 2020-01-01 PROCEDURE — 80076 HEPATIC FUNCTION PANEL: CPT

## 2020-01-01 PROCEDURE — 87040 BLOOD CULTURE FOR BACTERIA: CPT

## 2020-01-01 PROCEDURE — 99213 OFFICE O/P EST LOW 20 MIN: CPT | Performed by: FAMILY MEDICINE

## 2020-01-01 PROCEDURE — 82553 CREATINE MB FRACTION: CPT

## 2020-01-01 PROCEDURE — 94761 N-INVAS EAR/PLS OXIMETRY MLT: CPT

## 2020-01-01 PROCEDURE — 2500000003 HC RX 250 WO HCPCS: Performed by: FAMILY MEDICINE

## 2020-01-01 PROCEDURE — 87086 URINE CULTURE/COLONY COUNT: CPT

## 2020-01-01 PROCEDURE — 85379 FIBRIN DEGRADATION QUANT: CPT

## 2020-01-01 PROCEDURE — 99222 1ST HOSP IP/OBS MODERATE 55: CPT | Performed by: FAMILY MEDICINE

## 2020-01-01 PROCEDURE — 82150 ASSAY OF AMYLASE: CPT

## 2020-01-01 PROCEDURE — 83615 LACTATE (LD) (LDH) ENZYME: CPT

## 2020-01-01 PROCEDURE — 6360000002 HC RX W HCPCS: Performed by: SURGERY

## 2020-01-01 PROCEDURE — 0100U HC RESPIRPTHGN MULT REV TRANS & AMP PRB TECH 21 TRGT: CPT

## 2020-01-01 PROCEDURE — 72148 MRI LUMBAR SPINE W/O DYE: CPT

## 2020-01-01 PROCEDURE — 83930 ASSAY OF BLOOD OSMOLALITY: CPT

## 2020-01-01 PROCEDURE — 80053 COMPREHEN METABOLIC PANEL: CPT

## 2020-01-01 PROCEDURE — 3052F HG A1C>EQUAL 8.0%<EQUAL 9.0%: CPT | Performed by: FAMILY MEDICINE

## 2020-01-01 PROCEDURE — 2580000003 HC RX 258: Performed by: SPECIALIST

## 2020-01-01 PROCEDURE — 1036F TOBACCO NON-USER: CPT | Performed by: INTERNAL MEDICINE

## 2020-01-01 PROCEDURE — 87804 INFLUENZA ASSAY W/OPTIC: CPT

## 2020-01-01 PROCEDURE — 76937 US GUIDE VASCULAR ACCESS: CPT

## 2020-01-01 PROCEDURE — 82140 ASSAY OF AMMONIA: CPT

## 2020-01-01 PROCEDURE — 99315 NF DSCHRG MGMT 30 MIN/LESS: CPT | Performed by: NURSE PRACTITIONER

## 2020-01-01 PROCEDURE — 2700000000 HC OXYGEN THERAPY PER DAY

## 2020-01-01 PROCEDURE — 82746 ASSAY OF FOLIC ACID SERUM: CPT

## 2020-01-01 PROCEDURE — 6360000002 HC RX W HCPCS: Performed by: FAMILY MEDICINE

## 2020-01-01 PROCEDURE — 83880 ASSAY OF NATRIURETIC PEPTIDE: CPT

## 2020-01-01 PROCEDURE — 2580000003 HC RX 258: Performed by: NURSE PRACTITIONER

## 2020-01-01 PROCEDURE — 99214 OFFICE O/P EST MOD 30 MIN: CPT | Performed by: FAMILY MEDICINE

## 2020-01-01 PROCEDURE — 83036 HEMOGLOBIN GLYCOSYLATED A1C: CPT

## 2020-01-01 PROCEDURE — 82272 OCCULT BLD FECES 1-3 TESTS: CPT

## 2020-01-01 PROCEDURE — 99232 SBSQ HOSP IP/OBS MODERATE 35: CPT | Performed by: FAMILY MEDICINE

## 2020-01-01 PROCEDURE — 97535 SELF CARE MNGMENT TRAINING: CPT

## 2020-01-01 PROCEDURE — 84443 ASSAY THYROID STIM HORMONE: CPT

## 2020-01-01 PROCEDURE — 85610 PROTHROMBIN TIME: CPT

## 2020-01-01 PROCEDURE — 2580000003 HC RX 258: Performed by: STUDENT IN AN ORGANIZED HEALTH CARE EDUCATION/TRAINING PROGRAM

## 2020-01-01 PROCEDURE — 86920 COMPATIBILITY TEST SPIN: CPT

## 2020-01-01 PROCEDURE — 6360000002 HC RX W HCPCS: Performed by: STUDENT IN AN ORGANIZED HEALTH CARE EDUCATION/TRAINING PROGRAM

## 2020-01-01 PROCEDURE — 2500000003 HC RX 250 WO HCPCS: Performed by: INTERNAL MEDICINE

## 2020-01-01 PROCEDURE — 73502 X-RAY EXAM HIP UNI 2-3 VIEWS: CPT

## 2020-01-01 PROCEDURE — 99238 HOSP IP/OBS DSCHRG MGMT 30/<: CPT | Performed by: INTERNAL MEDICINE

## 2020-01-01 PROCEDURE — 6370000000 HC RX 637 (ALT 250 FOR IP)

## 2020-01-01 PROCEDURE — G0180 MD CERTIFICATION HHA PATIENT: HCPCS | Performed by: FAMILY MEDICINE

## 2020-01-01 PROCEDURE — 2000000000 HC ICU R&B

## 2020-01-01 PROCEDURE — 7100000010 HC PHASE II RECOVERY - FIRST 15 MIN: Performed by: SURGERY

## 2020-01-01 PROCEDURE — 99213 OFFICE O/P EST LOW 20 MIN: CPT

## 2020-01-01 PROCEDURE — 7100000011 HC PHASE II RECOVERY - ADDTL 15 MIN: Performed by: SURGERY

## 2020-01-01 PROCEDURE — 97110 THERAPEUTIC EXERCISES: CPT

## 2020-01-01 PROCEDURE — 99223 1ST HOSP IP/OBS HIGH 75: CPT | Performed by: PSYCHIATRY & NEUROLOGY

## 2020-01-01 PROCEDURE — 1036F TOBACCO NON-USER: CPT | Performed by: FAMILY MEDICINE

## 2020-01-01 PROCEDURE — 2500000003 HC RX 250 WO HCPCS: Performed by: EMERGENCY MEDICINE

## 2020-01-01 PROCEDURE — 84145 PROCALCITONIN (PCT): CPT

## 2020-01-01 PROCEDURE — 83690 ASSAY OF LIPASE: CPT

## 2020-01-01 PROCEDURE — G8400 PT W/DXA NO RESULTS DOC: HCPCS | Performed by: FAMILY MEDICINE

## 2020-01-01 PROCEDURE — 36600 WITHDRAWAL OF ARTERIAL BLOOD: CPT

## 2020-01-01 PROCEDURE — 99223 1ST HOSP IP/OBS HIGH 75: CPT | Performed by: INTERNAL MEDICINE

## 2020-01-01 PROCEDURE — 96361 HYDRATE IV INFUSION ADD-ON: CPT

## 2020-01-01 PROCEDURE — 94150 VITAL CAPACITY TEST: CPT

## 2020-01-01 PROCEDURE — 2580000003 HC RX 258: Performed by: FAMILY MEDICINE

## 2020-01-01 PROCEDURE — 86901 BLOOD TYPING SEROLOGIC RH(D): CPT

## 2020-01-01 PROCEDURE — 85730 THROMBOPLASTIN TIME PARTIAL: CPT

## 2020-01-01 PROCEDURE — 87186 SC STD MICRODIL/AGAR DIL: CPT

## 2020-01-01 PROCEDURE — 74176 CT ABD & PELVIS W/O CONTRAST: CPT

## 2020-01-01 PROCEDURE — 96366 THER/PROPH/DIAG IV INF ADDON: CPT

## 2020-01-01 PROCEDURE — 99223 1ST HOSP IP/OBS HIGH 75: CPT | Performed by: FAMILY MEDICINE

## 2020-01-01 PROCEDURE — 93005 ELECTROCARDIOGRAM TRACING: CPT | Performed by: NURSE PRACTITIONER

## 2020-01-01 PROCEDURE — 94003 VENT MGMT INPAT SUBQ DAY: CPT

## 2020-01-01 PROCEDURE — 99204 OFFICE O/P NEW MOD 45 MIN: CPT | Performed by: INTERNAL MEDICINE

## 2020-01-01 PROCEDURE — 99495 TRANSJ CARE MGMT MOD F2F 14D: CPT | Performed by: FAMILY MEDICINE

## 2020-01-01 PROCEDURE — 1090F PRES/ABSN URINE INCON ASSESS: CPT | Performed by: INTERNAL MEDICINE

## 2020-01-01 PROCEDURE — 2500000003 HC RX 250 WO HCPCS: Performed by: NURSE ANESTHETIST, CERTIFIED REGISTERED

## 2020-01-01 PROCEDURE — 97530 THERAPEUTIC ACTIVITIES: CPT

## 2020-01-01 PROCEDURE — G8484 FLU IMMUNIZE NO ADMIN: HCPCS | Performed by: INTERNAL MEDICINE

## 2020-01-01 PROCEDURE — 36620 INSERTION CATHETER ARTERY: CPT | Performed by: NURSE ANESTHETIST, CERTIFIED REGISTERED

## 2020-01-01 PROCEDURE — 87077 CULTURE AEROBIC IDENTIFY: CPT

## 2020-01-01 PROCEDURE — 94002 VENT MGMT INPAT INIT DAY: CPT

## 2020-01-01 PROCEDURE — 86900 BLOOD TYPING SEROLOGIC ABO: CPT

## 2020-01-01 PROCEDURE — 1123F ACP DISCUSS/DSCN MKR DOCD: CPT | Performed by: INTERNAL MEDICINE

## 2020-01-01 PROCEDURE — 76770 US EXAM ABDO BACK WALL COMP: CPT

## 2020-01-01 PROCEDURE — G8417 CALC BMI ABV UP PARAM F/U: HCPCS | Performed by: INTERNAL MEDICINE

## 2020-01-01 PROCEDURE — 99308 SBSQ NF CARE LOW MDM 20: CPT | Performed by: NURSE PRACTITIONER

## 2020-01-01 PROCEDURE — 99222 1ST HOSP IP/OBS MODERATE 55: CPT | Performed by: STUDENT IN AN ORGANIZED HEALTH CARE EDUCATION/TRAINING PROGRAM

## 2020-01-01 PROCEDURE — 97166 OT EVAL MOD COMPLEX 45 MIN: CPT

## 2020-01-01 PROCEDURE — G8417 CALC BMI ABV UP PARAM F/U: HCPCS | Performed by: FAMILY MEDICINE

## 2020-01-01 PROCEDURE — 83540 ASSAY OF IRON: CPT

## 2020-01-01 PROCEDURE — 82728 ASSAY OF FERRITIN: CPT

## 2020-01-01 PROCEDURE — 99283 EMERGENCY DEPT VISIT LOW MDM: CPT

## 2020-01-01 PROCEDURE — 6360000002 HC RX W HCPCS: Performed by: NURSE ANESTHETIST, CERTIFIED REGISTERED

## 2020-01-01 PROCEDURE — 2709999900 HC NON-CHARGEABLE SUPPLY: Performed by: SURGERY

## 2020-01-01 PROCEDURE — 82533 TOTAL CORTISOL: CPT

## 2020-01-01 PROCEDURE — C9113 INJ PANTOPRAZOLE SODIUM, VIA: HCPCS | Performed by: STUDENT IN AN ORGANIZED HEALTH CARE EDUCATION/TRAINING PROGRAM

## 2020-01-01 PROCEDURE — 5A1945Z RESPIRATORY VENTILATION, 24-96 CONSECUTIVE HOURS: ICD-10-PCS | Performed by: INTERNAL MEDICINE

## 2020-01-01 PROCEDURE — 99214 OFFICE O/P EST MOD 30 MIN: CPT | Performed by: INTERNAL MEDICINE

## 2020-01-01 PROCEDURE — 97161 PT EVAL LOW COMPLEX 20 MIN: CPT | Performed by: PHYSICAL THERAPIST

## 2020-01-01 PROCEDURE — 0DB98ZX EXCISION OF DUODENUM, VIA NATURAL OR ARTIFICIAL OPENING ENDOSCOPIC, DIAGNOSTIC: ICD-10-PCS | Performed by: SURGERY

## 2020-01-01 PROCEDURE — 97116 GAIT TRAINING THERAPY: CPT

## 2020-01-01 PROCEDURE — 2500000003 HC RX 250 WO HCPCS

## 2020-01-01 PROCEDURE — 82010 KETONE BODYS QUAN: CPT

## 2020-01-01 PROCEDURE — G8400 PT W/DXA NO RESULTS DOC: HCPCS | Performed by: INTERNAL MEDICINE

## 2020-01-01 PROCEDURE — 74022 RADEX COMPL AQT ABD SERIES: CPT

## 2020-01-01 PROCEDURE — U0002 COVID-19 LAB TEST NON-CDC: HCPCS

## 2020-01-01 PROCEDURE — 86850 RBC ANTIBODY SCREEN: CPT

## 2020-01-01 PROCEDURE — 94664 DEMO&/EVAL PT USE INHALER: CPT

## 2020-01-01 PROCEDURE — 96372 THER/PROPH/DIAG INJ SC/IM: CPT

## 2020-01-01 PROCEDURE — 97165 OT EVAL LOW COMPLEX 30 MIN: CPT | Performed by: OCCUPATIONAL THERAPIST

## 2020-01-01 PROCEDURE — 99221 1ST HOSP IP/OBS SF/LOW 40: CPT | Performed by: SURGERY

## 2020-01-01 PROCEDURE — 1111F DSCHRG MED/CURRENT MED MERGE: CPT | Performed by: INTERNAL MEDICINE

## 2020-01-01 PROCEDURE — 43235 EGD DIAGNOSTIC BRUSH WASH: CPT | Performed by: SURGERY

## 2020-01-01 PROCEDURE — 3609017100 HC EGD: Performed by: SURGERY

## 2020-01-01 PROCEDURE — 3700000000 HC ANESTHESIA ATTENDED CARE: Performed by: SURGERY

## 2020-01-01 PROCEDURE — 36430 TRANSFUSION BLD/BLD COMPNT: CPT

## 2020-01-01 RX ORDER — FUROSEMIDE 10 MG/ML
20 INJECTION INTRAMUSCULAR; INTRAVENOUS ONCE
Status: COMPLETED | OUTPATIENT
Start: 2020-01-01 | End: 2020-01-01

## 2020-01-01 RX ORDER — PANTOPRAZOLE SODIUM 40 MG/10ML
INJECTION, POWDER, LYOPHILIZED, FOR SOLUTION INTRAVENOUS
Status: DISPENSED
Start: 2020-01-01 | End: 2020-01-01

## 2020-01-01 RX ORDER — LORAZEPAM 0.5 MG/1
0.5 TABLET ORAL EVERY 8 HOURS PRN
Status: DISCONTINUED | OUTPATIENT
Start: 2020-01-01 | End: 2020-01-01 | Stop reason: HOSPADM

## 2020-01-01 RX ORDER — SUCRALFATE 1 G/1
1 TABLET ORAL EVERY 6 HOURS SCHEDULED
Status: DISCONTINUED | OUTPATIENT
Start: 2020-01-01 | End: 2020-01-01 | Stop reason: HOSPADM

## 2020-01-01 RX ORDER — DEXTROSE MONOHYDRATE 50 MG/ML
100 INJECTION, SOLUTION INTRAVENOUS PRN
Status: DISCONTINUED | OUTPATIENT
Start: 2020-01-01 | End: 2020-01-01 | Stop reason: HOSPADM

## 2020-01-01 RX ORDER — LORAZEPAM 0.5 MG/1
TABLET ORAL
Qty: 90 TABLET | Refills: 0 | Status: SHIPPED | OUTPATIENT
Start: 2020-01-01 | End: 2020-04-23

## 2020-01-01 RX ORDER — DIPHENHYDRAMINE HYDROCHLORIDE 50 MG/ML
25 INJECTION INTRAMUSCULAR; INTRAVENOUS ONCE
Status: COMPLETED | OUTPATIENT
Start: 2020-01-01 | End: 2020-01-01

## 2020-01-01 RX ORDER — MAGNESIUM SULFATE 1 G/100ML
1 INJECTION INTRAVENOUS PRN
Status: DISCONTINUED | OUTPATIENT
Start: 2020-01-01 | End: 2020-01-01 | Stop reason: HOSPADM

## 2020-01-01 RX ORDER — ASPIRIN 81 MG/1
81 TABLET, CHEWABLE ORAL DAILY
Status: DISCONTINUED | OUTPATIENT
Start: 2020-01-01 | End: 2020-01-01 | Stop reason: HOSPADM

## 2020-01-01 RX ORDER — 0.9 % SODIUM CHLORIDE 0.9 %
500 INTRAVENOUS SOLUTION INTRAVENOUS ONCE
Status: COMPLETED | OUTPATIENT
Start: 2020-01-01 | End: 2020-01-01

## 2020-01-01 RX ORDER — NICOTINE POLACRILEX 4 MG
15 LOZENGE BUCCAL PRN
Status: DISCONTINUED | OUTPATIENT
Start: 2020-01-01 | End: 2020-01-01 | Stop reason: HOSPADM

## 2020-01-01 RX ORDER — HYDROCODONE BITARTRATE AND ACETAMINOPHEN 5; 325 MG/1; MG/1
1 TABLET ORAL EVERY 6 HOURS PRN
Qty: 10 TABLET | Refills: 0 | Status: SHIPPED | OUTPATIENT
Start: 2020-01-01 | End: 2020-01-01

## 2020-01-01 RX ORDER — AMIODARONE HYDROCHLORIDE 200 MG/1
200 TABLET ORAL DAILY
Status: DISCONTINUED | OUTPATIENT
Start: 2020-01-01 | End: 2020-01-01 | Stop reason: HOSPADM

## 2020-01-01 RX ORDER — PROMETHAZINE HYDROCHLORIDE 25 MG/ML
12.5 INJECTION, SOLUTION INTRAMUSCULAR; INTRAVENOUS ONCE
Status: DISCONTINUED | OUTPATIENT
Start: 2020-01-01 | End: 2020-01-01

## 2020-01-01 RX ORDER — 0.9 % SODIUM CHLORIDE 0.9 %
1000 INTRAVENOUS SOLUTION INTRAVENOUS ONCE
Status: DISCONTINUED | OUTPATIENT
Start: 2020-01-01 | End: 2020-01-01

## 2020-01-01 RX ORDER — SODIUM CHLORIDE 0.9 % (FLUSH) 0.9 %
10 SYRINGE (ML) INJECTION EVERY 12 HOURS SCHEDULED
Status: DISCONTINUED | OUTPATIENT
Start: 2020-01-01 | End: 2020-01-01 | Stop reason: HOSPADM

## 2020-01-01 RX ORDER — 0.9 % SODIUM CHLORIDE 0.9 %
1000 INTRAVENOUS SOLUTION INTRAVENOUS ONCE
Status: COMPLETED | OUTPATIENT
Start: 2020-01-01 | End: 2020-01-01

## 2020-01-01 RX ORDER — BUMETANIDE 1 MG/1
1 TABLET ORAL 2 TIMES DAILY
Status: DISCONTINUED | OUTPATIENT
Start: 2020-01-01 | End: 2020-01-01 | Stop reason: HOSPADM

## 2020-01-01 RX ORDER — DEXTROSE MONOHYDRATE 25 G/50ML
25 INJECTION, SOLUTION INTRAVENOUS ONCE
Status: COMPLETED | OUTPATIENT
Start: 2020-01-01 | End: 2020-01-01

## 2020-01-01 RX ORDER — HEPARIN SODIUM 5000 [USP'U]/ML
5000 INJECTION, SOLUTION INTRAVENOUS; SUBCUTANEOUS EVERY 8 HOURS SCHEDULED
Status: DISCONTINUED | OUTPATIENT
Start: 2020-01-01 | End: 2020-01-01

## 2020-01-01 RX ORDER — LORAZEPAM 0.5 MG/1
0.5 TABLET ORAL ONCE
Status: COMPLETED | OUTPATIENT
Start: 2020-01-01 | End: 2020-01-01

## 2020-01-01 RX ORDER — HEPARIN SODIUM 1000 [USP'U]/ML
60 INJECTION, SOLUTION INTRAVENOUS; SUBCUTANEOUS PRN
Status: DISCONTINUED | OUTPATIENT
Start: 2020-01-01 | End: 2020-01-01

## 2020-01-01 RX ORDER — FAMOTIDINE 20 MG/1
20 TABLET, FILM COATED ORAL EVERY EVENING
Status: DISCONTINUED | OUTPATIENT
Start: 2020-01-01 | End: 2020-01-01 | Stop reason: HOSPADM

## 2020-01-01 RX ORDER — FENTANYL CITRATE 50 UG/ML
25 INJECTION, SOLUTION INTRAMUSCULAR; INTRAVENOUS ONCE
Status: COMPLETED | OUTPATIENT
Start: 2020-01-01 | End: 2020-01-01

## 2020-01-01 RX ORDER — ACETAMINOPHEN 325 MG/1
TABLET ORAL
Status: COMPLETED
Start: 2020-01-01 | End: 2020-01-01

## 2020-01-01 RX ORDER — ACETAMINOPHEN 650 MG/1
650 SUPPOSITORY RECTAL EVERY 6 HOURS PRN
Status: DISCONTINUED | OUTPATIENT
Start: 2020-01-01 | End: 2020-01-01 | Stop reason: HOSPADM

## 2020-01-01 RX ORDER — 0.9 % SODIUM CHLORIDE 0.9 %
15 INTRAVENOUS SOLUTION INTRAVENOUS ONCE
Status: DISCONTINUED | OUTPATIENT
Start: 2020-01-01 | End: 2020-01-01

## 2020-01-01 RX ORDER — LORAZEPAM 0.5 MG/1
0.5 TABLET ORAL EVERY 8 HOURS PRN
Qty: 3 TABLET | Refills: 0 | Status: SHIPPED | OUTPATIENT
Start: 2020-01-01 | End: 2020-01-01

## 2020-01-01 RX ORDER — ONDANSETRON 2 MG/ML
4 INJECTION INTRAMUSCULAR; INTRAVENOUS EVERY 6 HOURS PRN
Status: DISCONTINUED | OUTPATIENT
Start: 2020-01-01 | End: 2020-01-01

## 2020-01-01 RX ORDER — HEPARIN SODIUM 1000 [USP'U]/ML
30 INJECTION, SOLUTION INTRAVENOUS; SUBCUTANEOUS PRN
Status: DISCONTINUED | OUTPATIENT
Start: 2020-01-01 | End: 2020-01-01

## 2020-01-01 RX ORDER — ONDANSETRON 2 MG/ML
4 INJECTION INTRAMUSCULAR; INTRAVENOUS EVERY 6 HOURS PRN
Status: DISCONTINUED | OUTPATIENT
Start: 2020-01-01 | End: 2020-01-01 | Stop reason: HOSPADM

## 2020-01-01 RX ORDER — SULFAMETHOXAZOLE AND TRIMETHOPRIM 800; 160 MG/1; MG/1
1 TABLET ORAL 2 TIMES DAILY
Qty: 14 TABLET | Refills: 0 | Status: ON HOLD | OUTPATIENT
Start: 2020-01-01 | End: 2020-01-01 | Stop reason: HOSPADM

## 2020-01-01 RX ORDER — ETOMIDATE 2 MG/ML
10 INJECTION INTRAVENOUS ONCE
Status: COMPLETED | OUTPATIENT
Start: 2020-01-01 | End: 2020-01-01

## 2020-01-01 RX ORDER — ONDANSETRON 2 MG/ML
4 INJECTION INTRAMUSCULAR; INTRAVENOUS ONCE
Status: COMPLETED | OUTPATIENT
Start: 2020-01-01 | End: 2020-01-01

## 2020-01-01 RX ORDER — SODIUM CHLORIDE 9 MG/ML
INJECTION, SOLUTION INTRAVENOUS CONTINUOUS
Status: DISCONTINUED | OUTPATIENT
Start: 2020-01-01 | End: 2020-01-01

## 2020-01-01 RX ORDER — SODIUM CHLORIDE 0.9 % (FLUSH) 0.9 %
10 SYRINGE (ML) INJECTION PRN
Status: DISCONTINUED | OUTPATIENT
Start: 2020-01-01 | End: 2020-01-01 | Stop reason: SDUPTHER

## 2020-01-01 RX ORDER — PROMETHAZINE HYDROCHLORIDE 25 MG/ML
25 INJECTION, SOLUTION INTRAMUSCULAR; INTRAVENOUS ONCE
Status: COMPLETED | OUTPATIENT
Start: 2020-01-01 | End: 2020-01-01

## 2020-01-01 RX ORDER — BENZONATATE 100 MG/1
200 CAPSULE ORAL 3 TIMES DAILY PRN
Status: DISCONTINUED | OUTPATIENT
Start: 2020-01-01 | End: 2020-01-01 | Stop reason: HOSPADM

## 2020-01-01 RX ORDER — PROMETHAZINE HYDROCHLORIDE 25 MG/1
12.5 TABLET ORAL EVERY 6 HOURS PRN
Status: DISCONTINUED | OUTPATIENT
Start: 2020-01-01 | End: 2020-01-01 | Stop reason: HOSPADM

## 2020-01-01 RX ORDER — SODIUM CHLORIDE 450 MG/100ML
INJECTION, SOLUTION INTRAVENOUS CONTINUOUS
Status: DISCONTINUED | OUTPATIENT
Start: 2020-01-01 | End: 2020-01-01

## 2020-01-01 RX ORDER — LACTOBACILLUS RHAMNOSUS GG 10B CELL
1 CAPSULE ORAL DAILY
Status: DISCONTINUED | OUTPATIENT
Start: 2020-01-01 | End: 2020-01-01 | Stop reason: HOSPADM

## 2020-01-01 RX ORDER — HYDROCODONE BITARTRATE AND ACETAMINOPHEN 5; 325 MG/1; MG/1
2 TABLET ORAL ONCE
Status: DISCONTINUED | OUTPATIENT
Start: 2020-01-01 | End: 2020-01-01

## 2020-01-01 RX ORDER — ONDANSETRON 4 MG/1
4 TABLET, ORALLY DISINTEGRATING ORAL EVERY 6 HOURS PRN
Qty: 60 TABLET | Refills: 1 | Status: SHIPPED | OUTPATIENT
Start: 2020-01-01

## 2020-01-01 RX ORDER — POTASSIUM CHLORIDE 7.45 MG/ML
10 INJECTION INTRAVENOUS PRN
Status: DISCONTINUED | OUTPATIENT
Start: 2020-01-01 | End: 2020-01-01 | Stop reason: HOSPADM

## 2020-01-01 RX ORDER — ACETAMINOPHEN 650 MG/1
650 SUPPOSITORY RECTAL EVERY 6 HOURS PRN
Status: DISCONTINUED | OUTPATIENT
Start: 2020-01-01 | End: 2020-01-01

## 2020-01-01 RX ORDER — DEXTROSE MONOHYDRATE 25 G/50ML
12.5 INJECTION, SOLUTION INTRAVENOUS PRN
Status: DISCONTINUED | OUTPATIENT
Start: 2020-01-01 | End: 2020-01-01 | Stop reason: HOSPADM

## 2020-01-01 RX ORDER — SULFAMETHOXAZOLE AND TRIMETHOPRIM 800; 160 MG/1; MG/1
1 TABLET ORAL EVERY 12 HOURS SCHEDULED
Status: DISCONTINUED | OUTPATIENT
Start: 2020-01-01 | End: 2020-01-01 | Stop reason: HOSPADM

## 2020-01-01 RX ORDER — PANTOPRAZOLE SODIUM 40 MG/10ML
40 INJECTION, POWDER, LYOPHILIZED, FOR SOLUTION INTRAVENOUS DAILY
Status: DISCONTINUED | OUTPATIENT
Start: 2020-01-01 | End: 2020-01-01 | Stop reason: HOSPADM

## 2020-01-01 RX ORDER — 0.9 % SODIUM CHLORIDE 0.9 %
20 INTRAVENOUS SOLUTION INTRAVENOUS ONCE
Status: COMPLETED | OUTPATIENT
Start: 2020-01-01 | End: 2020-01-01

## 2020-01-01 RX ORDER — DOPAMINE HYDROCHLORIDE 160 MG/100ML
INJECTION, SOLUTION INTRAVENOUS
Status: DISCONTINUED
Start: 2020-01-01 | End: 2020-01-01 | Stop reason: HOSPADM

## 2020-01-01 RX ORDER — METOCLOPRAMIDE 10 MG/1
10 TABLET ORAL
Qty: 90 TABLET | Refills: 3 | Status: SHIPPED | OUTPATIENT
Start: 2020-01-01

## 2020-01-01 RX ORDER — HEPARIN SODIUM 10000 [USP'U]/100ML
12 INJECTION, SOLUTION INTRAVENOUS CONTINUOUS
Status: DISCONTINUED | OUTPATIENT
Start: 2020-01-01 | End: 2020-01-01

## 2020-01-01 RX ORDER — MEROPENEM 1 G/1
INJECTION, POWDER, FOR SOLUTION INTRAVENOUS
Status: COMPLETED
Start: 2020-01-01 | End: 2020-01-01

## 2020-01-01 RX ORDER — FUROSEMIDE 10 MG/ML
40 INJECTION INTRAMUSCULAR; INTRAVENOUS 2 TIMES DAILY
Status: DISCONTINUED | OUTPATIENT
Start: 2020-01-01 | End: 2020-01-01 | Stop reason: HOSPADM

## 2020-01-01 RX ORDER — SODIUM CHLORIDE 0.9 % (FLUSH) 0.9 %
10 SYRINGE (ML) INJECTION PRN
Status: DISCONTINUED | OUTPATIENT
Start: 2020-01-01 | End: 2020-01-01 | Stop reason: HOSPADM

## 2020-01-01 RX ORDER — GLIMEPIRIDE 2 MG/1
TABLET ORAL
COMMUNITY
Start: 2019-01-01 | End: 2020-01-01

## 2020-01-01 RX ORDER — BUMETANIDE 1 MG/1
1 TABLET ORAL DAILY
Qty: 30 TABLET | Refills: 0 | Status: SHIPPED | OUTPATIENT
Start: 2020-01-01 | End: 2020-01-01

## 2020-01-01 RX ORDER — GLIMEPIRIDE 2 MG/1
2 TABLET ORAL 2 TIMES DAILY
Status: DISCONTINUED | OUTPATIENT
Start: 2020-01-01 | End: 2020-01-01 | Stop reason: HOSPADM

## 2020-01-01 RX ORDER — SODIUM CHLORIDE 9 MG/ML
INJECTION, SOLUTION INTRAVENOUS CONTINUOUS
Status: DISCONTINUED | OUTPATIENT
Start: 2020-01-01 | End: 2020-01-01 | Stop reason: HOSPADM

## 2020-01-01 RX ORDER — GLIMEPIRIDE 2 MG/1
TABLET ORAL
Qty: 180 TABLET | Refills: 3 | Status: ON HOLD
Start: 2020-01-01 | End: 2020-01-01 | Stop reason: HOSPADM

## 2020-01-01 RX ORDER — CALCIUM GLUCONATE 20 MG/ML
1 INJECTION, SOLUTION INTRAVENOUS ONCE
Status: DISCONTINUED | OUTPATIENT
Start: 2020-01-01 | End: 2020-01-01 | Stop reason: HOSPADM

## 2020-01-01 RX ORDER — POLYETHYLENE GLYCOL 3350 17 G/17G
17 POWDER, FOR SOLUTION ORAL DAILY PRN
Status: DISCONTINUED | OUTPATIENT
Start: 2020-01-01 | End: 2020-01-01 | Stop reason: HOSPADM

## 2020-01-01 RX ORDER — SODIUM BICARBONATE 650 MG/1
650 TABLET ORAL 2 TIMES DAILY
Status: DISCONTINUED | OUTPATIENT
Start: 2020-01-01 | End: 2020-01-01 | Stop reason: HOSPADM

## 2020-01-01 RX ORDER — PROMETHAZINE HYDROCHLORIDE 25 MG/ML
12.5 INJECTION, SOLUTION INTRAMUSCULAR; INTRAVENOUS ONCE
Status: COMPLETED | OUTPATIENT
Start: 2020-01-01 | End: 2020-01-01

## 2020-01-01 RX ORDER — 0.9 % SODIUM CHLORIDE 0.9 %
250 INTRAVENOUS SOLUTION INTRAVENOUS ONCE
Status: COMPLETED | OUTPATIENT
Start: 2020-01-01 | End: 2020-01-01

## 2020-01-01 RX ORDER — SODIUM CHLORIDE, SODIUM LACTATE, POTASSIUM CHLORIDE, CALCIUM CHLORIDE 600; 310; 30; 20 MG/100ML; MG/100ML; MG/100ML; MG/100ML
INJECTION, SOLUTION INTRAVENOUS CONTINUOUS
Status: DISCONTINUED | OUTPATIENT
Start: 2020-01-01 | End: 2020-01-01

## 2020-01-01 RX ORDER — INSULIN GLARGINE 100 [IU]/ML
10 INJECTION, SOLUTION SUBCUTANEOUS DAILY
Status: DISCONTINUED | OUTPATIENT
Start: 2020-01-01 | End: 2020-01-01

## 2020-01-01 RX ORDER — ONDANSETRON 2 MG/ML
INJECTION INTRAMUSCULAR; INTRAVENOUS
Status: COMPLETED
Start: 2020-01-01 | End: 2020-01-01

## 2020-01-01 RX ORDER — SODIUM POLYSTYRENE SULFONATE 15 G/60ML
15 SUSPENSION ORAL; RECTAL
Status: ACTIVE | OUTPATIENT
Start: 2020-01-01 | End: 2020-01-01

## 2020-01-01 RX ORDER — CARVEDILOL 3.12 MG/1
3.12 TABLET ORAL 2 TIMES DAILY
Status: DISCONTINUED | OUTPATIENT
Start: 2020-01-01 | End: 2020-01-01 | Stop reason: HOSPADM

## 2020-01-01 RX ORDER — VITAMIN B COMPLEX
1000 TABLET ORAL DAILY
Status: DISCONTINUED | OUTPATIENT
Start: 2020-01-01 | End: 2020-01-01 | Stop reason: HOSPADM

## 2020-01-01 RX ORDER — ACETAMINOPHEN 325 MG/1
650 TABLET ORAL EVERY 6 HOURS PRN
Status: DISCONTINUED | OUTPATIENT
Start: 2020-01-01 | End: 2020-01-01 | Stop reason: HOSPADM

## 2020-01-01 RX ORDER — SODIUM CHLORIDE 450 MG/100ML
INJECTION, SOLUTION INTRAVENOUS CONTINUOUS
Status: DISCONTINUED | OUTPATIENT
Start: 2020-01-01 | End: 2020-01-01 | Stop reason: HOSPADM

## 2020-01-01 RX ORDER — DOBUTAMINE HYDROCHLORIDE 400 MG/100ML
2.5 INJECTION INTRAVENOUS CONTINUOUS
Status: DISCONTINUED | OUTPATIENT
Start: 2020-01-01 | End: 2020-01-01 | Stop reason: HOSPADM

## 2020-01-01 RX ORDER — POTASSIUM CHLORIDE 20 MEQ/1
40 TABLET, EXTENDED RELEASE ORAL ONCE
Status: COMPLETED | OUTPATIENT
Start: 2020-01-01 | End: 2020-01-01

## 2020-01-01 RX ORDER — NICOTINE 21 MG/24HR
1 PATCH, TRANSDERMAL 24 HOURS TRANSDERMAL DAILY PRN
Status: DISCONTINUED | OUTPATIENT
Start: 2020-01-01 | End: 2020-01-01 | Stop reason: HOSPADM

## 2020-01-01 RX ORDER — SODIUM POLYSTYRENE SULFONATE 15 G/60ML
30 SUSPENSION ORAL; RECTAL ONCE
Status: COMPLETED | OUTPATIENT
Start: 2020-01-01 | End: 2020-01-01

## 2020-01-01 RX ORDER — TRIAMCINOLONE ACETONIDE 40 MG/ML
40 INJECTION, SUSPENSION INTRA-ARTICULAR; INTRAMUSCULAR ONCE
Status: COMPLETED | OUTPATIENT
Start: 2020-01-01 | End: 2020-01-01

## 2020-01-01 RX ORDER — FLASH GLUCOSE SCANNING READER
1 EACH MISCELLANEOUS DAILY
Qty: 1 DEVICE | Refills: 1 | Status: SHIPPED | OUTPATIENT
Start: 2020-01-01

## 2020-01-01 RX ORDER — SODIUM CHLORIDE FOR INHALATION 0.9 %
3 VIAL, NEBULIZER (ML) INHALATION
Status: DISCONTINUED | OUTPATIENT
Start: 2020-01-01 | End: 2020-01-01 | Stop reason: HOSPADM

## 2020-01-01 RX ORDER — TRAMADOL HYDROCHLORIDE 50 MG/1
50 TABLET ORAL 2 TIMES DAILY PRN
Qty: 14 TABLET | Refills: 0 | Status: SHIPPED | OUTPATIENT
Start: 2020-01-01 | End: 2020-01-01

## 2020-01-01 RX ORDER — DEXTROSE AND SODIUM CHLORIDE 5; .45 G/100ML; G/100ML
INJECTION, SOLUTION INTRAVENOUS CONTINUOUS PRN
Status: DISCONTINUED | OUTPATIENT
Start: 2020-01-01 | End: 2020-01-01 | Stop reason: HOSPADM

## 2020-01-01 RX ORDER — ONDANSETRON 4 MG/1
4 TABLET, ORALLY DISINTEGRATING ORAL EVERY 6 HOURS PRN
Status: DISCONTINUED | OUTPATIENT
Start: 2020-01-01 | End: 2020-01-01 | Stop reason: HOSPADM

## 2020-01-01 RX ORDER — SODIUM BICARBONATE 650 MG/1
650 TABLET ORAL 2 TIMES DAILY
Qty: 60 TABLET | Refills: 0 | Status: ON HOLD | OUTPATIENT
Start: 2020-01-01 | End: 2020-01-01 | Stop reason: HOSPADM

## 2020-01-01 RX ORDER — CHLORHEXIDINE GLUCONATE 0.12 MG/ML
15 RINSE ORAL 2 TIMES DAILY
Status: DISCONTINUED | OUTPATIENT
Start: 2020-01-01 | End: 2020-01-01 | Stop reason: HOSPADM

## 2020-01-01 RX ORDER — BENZONATATE 200 MG/1
200 CAPSULE ORAL 3 TIMES DAILY PRN
Qty: 21 CAPSULE | Refills: 0 | Status: SHIPPED | OUTPATIENT
Start: 2020-01-01 | End: 2020-01-01

## 2020-01-01 RX ORDER — ACETAMINOPHEN 325 MG/1
650 TABLET ORAL EVERY 6 HOURS PRN
Status: DISCONTINUED | OUTPATIENT
Start: 2020-01-01 | End: 2020-01-01

## 2020-01-01 RX ORDER — SODIUM CHLORIDE, SODIUM LACTATE, POTASSIUM CHLORIDE, AND CALCIUM CHLORIDE .6; .31; .03; .02 G/100ML; G/100ML; G/100ML; G/100ML
1000 INJECTION, SOLUTION INTRAVENOUS ONCE
Status: COMPLETED | OUTPATIENT
Start: 2020-01-01 | End: 2020-01-01

## 2020-01-01 RX ORDER — SODIUM CHLORIDE 9 MG/ML
INJECTION, SOLUTION INTRAVENOUS CONTINUOUS
Status: ACTIVE | OUTPATIENT
Start: 2020-01-01 | End: 2020-01-01

## 2020-01-01 RX ORDER — FAMOTIDINE 20 MG/1
TABLET, FILM COATED ORAL
Qty: 90 TABLET | Refills: 2 | Status: SHIPPED | OUTPATIENT
Start: 2020-01-01

## 2020-01-01 RX ORDER — SODIUM CHLORIDE 9 MG/ML
10 INJECTION INTRAVENOUS DAILY
Status: DISCONTINUED | OUTPATIENT
Start: 2020-01-01 | End: 2020-01-01 | Stop reason: HOSPADM

## 2020-01-01 RX ORDER — GUAIFENESIN DEXTROMETHORPHAN HYDROBROMIDE ORAL SOLUTION 10; 100 MG/5ML; MG/5ML
10 SOLUTION ORAL EVERY 4 HOURS PRN
Status: DISCONTINUED | OUTPATIENT
Start: 2020-01-01 | End: 2020-01-01 | Stop reason: HOSPADM

## 2020-01-01 RX ORDER — ACETAMINOPHEN 325 MG/1
650 TABLET ORAL EVERY 4 HOURS PRN
Status: DISCONTINUED | OUTPATIENT
Start: 2020-01-01 | End: 2020-01-01 | Stop reason: HOSPADM

## 2020-01-01 RX ORDER — TRAMADOL HYDROCHLORIDE 50 MG/1
50 TABLET ORAL 2 TIMES DAILY PRN
Qty: 14 TABLET | Refills: 0 | Status: SHIPPED | OUTPATIENT
Start: 2020-01-01 | End: 2020-01-01 | Stop reason: SDUPTHER

## 2020-01-01 RX ORDER — KETAMINE HCL IN NACL, ISO-OSM 100MG/10ML
SYRINGE (ML) INJECTION PRN
Status: DISCONTINUED | OUTPATIENT
Start: 2020-01-01 | End: 2020-01-01 | Stop reason: SDUPTHER

## 2020-01-01 RX ORDER — NITROGLYCERIN 0.4 MG/1
0.4 TABLET SUBLINGUAL EVERY 5 MIN PRN
Status: DISCONTINUED | OUTPATIENT
Start: 2020-01-01 | End: 2020-01-01 | Stop reason: HOSPADM

## 2020-01-01 RX ORDER — SUCRALFATE 1 G/1
1 TABLET ORAL 4 TIMES DAILY
Status: DISCONTINUED | OUTPATIENT
Start: 2020-01-01 | End: 2020-01-01 | Stop reason: HOSPADM

## 2020-01-01 RX ORDER — BUMETANIDE 1 MG/1
1 TABLET ORAL 2 TIMES DAILY
Qty: 60 TABLET | Refills: 5 | Status: SHIPPED | OUTPATIENT
Start: 2020-01-01

## 2020-01-01 RX ORDER — HEPARIN SODIUM 5000 [USP'U]/ML
5000 INJECTION, SOLUTION INTRAVENOUS; SUBCUTANEOUS EVERY 8 HOURS SCHEDULED
Status: DISCONTINUED | OUTPATIENT
Start: 2020-01-01 | End: 2020-01-01 | Stop reason: HOSPADM

## 2020-01-01 RX ORDER — ACETAMINOPHEN 325 MG/1
650 TABLET ORAL ONCE
Status: COMPLETED | OUTPATIENT
Start: 2020-01-01 | End: 2020-01-01

## 2020-01-01 RX ORDER — SODIUM POLYSTYRENE SULFONATE 15 G/60ML
30 SUSPENSION ORAL; RECTAL
Status: ACTIVE | OUTPATIENT
Start: 2020-01-01 | End: 2020-01-01

## 2020-01-01 RX ORDER — BUMETANIDE 1 MG/1
1 TABLET ORAL 2 TIMES DAILY
Qty: 60 TABLET | Refills: 0 | Status: ON HOLD | OUTPATIENT
Start: 2020-01-01 | End: 2020-01-01 | Stop reason: SDUPTHER

## 2020-01-01 RX ORDER — MEROPENEM 1 G/1
INJECTION, POWDER, FOR SOLUTION INTRAVENOUS
Status: DISPENSED
Start: 2020-01-01 | End: 2020-01-01

## 2020-01-01 RX ORDER — SODIUM POLYSTYRENE SULFONATE 15 G/60ML
30 SUSPENSION ORAL; RECTAL ONCE
Qty: 1 BOTTLE | Refills: 3 | Status: SHIPPED | OUTPATIENT
Start: 2020-01-01 | End: 2020-01-01

## 2020-01-01 RX ORDER — FLASH GLUCOSE SENSOR
1 KIT MISCELLANEOUS DAILY
Qty: 2 EACH | Refills: 5 | Status: SHIPPED | OUTPATIENT
Start: 2020-01-01

## 2020-01-01 RX ORDER — HEPARIN SODIUM 1000 [USP'U]/ML
60 INJECTION, SOLUTION INTRAVENOUS; SUBCUTANEOUS ONCE
Status: COMPLETED | OUTPATIENT
Start: 2020-01-01 | End: 2020-01-01

## 2020-01-01 RX ORDER — PANTOPRAZOLE SODIUM 40 MG/1
40 TABLET, DELAYED RELEASE ORAL
Status: DISCONTINUED | OUTPATIENT
Start: 2020-01-01 | End: 2020-01-01 | Stop reason: HOSPADM

## 2020-01-01 RX ORDER — TRAMADOL HYDROCHLORIDE 50 MG/1
50 TABLET ORAL EVERY 4 HOURS PRN
Status: DISCONTINUED | OUTPATIENT
Start: 2020-01-01 | End: 2020-01-01

## 2020-01-01 RX ORDER — POLYETHYLENE GLYCOL 3350 17 G/17G
17 POWDER, FOR SOLUTION ORAL DAILY PRN
Status: DISCONTINUED | OUTPATIENT
Start: 2020-01-01 | End: 2020-01-01 | Stop reason: SDUPTHER

## 2020-01-01 RX ORDER — PROPOFOL 10 MG/ML
INJECTION, EMULSION INTRAVENOUS PRN
Status: DISCONTINUED | OUTPATIENT
Start: 2020-01-01 | End: 2020-01-01 | Stop reason: SDUPTHER

## 2020-01-01 RX ORDER — ZINC OXIDE 13 %
1 CREAM (GRAM) TOPICAL 3 TIMES DAILY
Status: DISCONTINUED | OUTPATIENT
Start: 2020-01-01 | End: 2020-01-01

## 2020-01-01 RX ORDER — INSULIN GLARGINE 100 [IU]/ML
10 INJECTION, SOLUTION SUBCUTANEOUS ONCE
Status: DISCONTINUED | OUTPATIENT
Start: 2020-01-01 | End: 2020-01-01 | Stop reason: HOSPADM

## 2020-01-01 RX ORDER — CEPHALEXIN 250 MG/1
250 CAPSULE ORAL EVERY 12 HOURS SCHEDULED
Status: DISCONTINUED | OUTPATIENT
Start: 2020-01-01 | End: 2020-01-01 | Stop reason: HOSPADM

## 2020-01-01 RX ORDER — ALBUTEROL SULFATE 2.5 MG/3ML
2.5 SOLUTION RESPIRATORY (INHALATION)
Status: DISCONTINUED | OUTPATIENT
Start: 2020-01-01 | End: 2020-01-01 | Stop reason: HOSPADM

## 2020-01-01 RX ORDER — FUROSEMIDE 10 MG/ML
40 INJECTION INTRAMUSCULAR; INTRAVENOUS ONCE
Status: COMPLETED | OUTPATIENT
Start: 2020-01-01 | End: 2020-01-01

## 2020-01-01 RX ORDER — KETOROLAC TROMETHAMINE 30 MG/ML
15 INJECTION, SOLUTION INTRAMUSCULAR; INTRAVENOUS ONCE
Status: COMPLETED | OUTPATIENT
Start: 2020-01-01 | End: 2020-01-01

## 2020-01-01 RX ORDER — CEPHALEXIN 250 MG/1
250 CAPSULE ORAL EVERY 12 HOURS SCHEDULED
Qty: 6 CAPSULE | Refills: 0 | Status: SHIPPED | OUTPATIENT
Start: 2020-01-01 | End: 2020-01-01

## 2020-01-01 RX ORDER — DOPAMINE HYDROCHLORIDE 160 MG/100ML
2 INJECTION, SOLUTION INTRAVENOUS CONTINUOUS
Status: DISCONTINUED | OUTPATIENT
Start: 2020-01-01 | End: 2020-01-01 | Stop reason: HOSPADM

## 2020-01-01 RX ORDER — DEXTROSE AND SODIUM CHLORIDE 5; .9 G/100ML; G/100ML
INJECTION, SOLUTION INTRAVENOUS CONTINUOUS
Status: DISCONTINUED | OUTPATIENT
Start: 2020-01-01 | End: 2020-01-01

## 2020-01-01 RX ORDER — DOPAMINE HYDROCHLORIDE 160 MG/100ML
INJECTION, SOLUTION INTRAVENOUS
Status: COMPLETED
Start: 2020-01-01 | End: 2020-01-01

## 2020-01-01 RX ORDER — DOPAMINE HYDROCHLORIDE 160 MG/100ML
5 INJECTION, SOLUTION INTRAVENOUS CONTINUOUS
Status: DISCONTINUED | OUTPATIENT
Start: 2020-01-01 | End: 2020-01-01 | Stop reason: HOSPADM

## 2020-01-01 RX ORDER — ZINC OXIDE 13 %
1 CREAM (GRAM) TOPICAL 3 TIMES DAILY
Qty: 90 CAPSULE | Refills: 0 | COMMUNITY
Start: 2020-01-01 | End: 2020-01-01 | Stop reason: SDUPTHER

## 2020-01-01 RX ORDER — BENZONATATE 100 MG/1
100 CAPSULE ORAL 3 TIMES DAILY PRN
Status: DISCONTINUED | OUTPATIENT
Start: 2020-01-01 | End: 2020-01-01 | Stop reason: HOSPADM

## 2020-01-01 RX ORDER — ALBUTEROL SULFATE 2.5 MG/3ML
2.5 SOLUTION RESPIRATORY (INHALATION)
Status: DISCONTINUED | OUTPATIENT
Start: 2020-01-01 | End: 2020-01-01

## 2020-01-01 RX ORDER — INSULIN ASPART 100 [IU]/ML
INJECTION, SOLUTION INTRAVENOUS; SUBCUTANEOUS
Qty: 1 PEN | Refills: 0
Start: 2020-01-01

## 2020-01-01 RX ORDER — MIRTAZAPINE 15 MG/1
7.5 TABLET, FILM COATED ORAL NIGHTLY
Status: DISCONTINUED | OUTPATIENT
Start: 2020-01-01 | End: 2020-01-01 | Stop reason: HOSPADM

## 2020-01-01 RX ORDER — INSULIN GLARGINE 100 [IU]/ML
15 INJECTION, SOLUTION SUBCUTANEOUS
Status: DISCONTINUED | OUTPATIENT
Start: 2020-01-01 | End: 2020-01-01 | Stop reason: HOSPADM

## 2020-01-01 RX ORDER — TRAMADOL HYDROCHLORIDE 50 MG/1
25 TABLET ORAL EVERY 12 HOURS PRN
Status: DISCONTINUED | OUTPATIENT
Start: 2020-01-01 | End: 2020-01-01 | Stop reason: HOSPADM

## 2020-01-01 RX ORDER — DEXTROSE MONOHYDRATE 25 G/50ML
12.5 INJECTION, SOLUTION INTRAVENOUS PRN
Status: DISCONTINUED | OUTPATIENT
Start: 2020-01-01 | End: 2020-01-01 | Stop reason: SDUPTHER

## 2020-01-01 RX ORDER — BUMETANIDE 1 MG/1
1 TABLET ORAL DAILY
Status: DISCONTINUED | OUTPATIENT
Start: 2020-01-01 | End: 2020-01-01 | Stop reason: HOSPADM

## 2020-01-01 RX ORDER — LORAZEPAM 0.5 MG/1
0.5 TABLET ORAL 2 TIMES DAILY PRN
Status: DISCONTINUED | OUTPATIENT
Start: 2020-01-01 | End: 2020-01-01 | Stop reason: HOSPADM

## 2020-01-01 RX ORDER — PROMETHAZINE HYDROCHLORIDE 25 MG/1
25 TABLET ORAL EVERY 6 HOURS PRN
Qty: 20 TABLET | Refills: 0 | Status: SHIPPED | OUTPATIENT
Start: 2020-01-01

## 2020-01-01 RX ORDER — LIDOCAINE 50 MG/G
1 PATCH TOPICAL DAILY
Qty: 10 PATCH | Refills: 0 | Status: SHIPPED | OUTPATIENT
Start: 2020-01-01 | End: 2020-01-01

## 2020-01-01 RX ORDER — POTASSIUM CHLORIDE 750 MG/1
20 CAPSULE, EXTENDED RELEASE ORAL 2 TIMES DAILY WITH MEALS
Status: DISCONTINUED | OUTPATIENT
Start: 2020-01-01 | End: 2020-01-01 | Stop reason: HOSPADM

## 2020-01-01 RX ORDER — DEXTROSE AND SODIUM CHLORIDE 5; .45 G/100ML; G/100ML
INJECTION, SOLUTION INTRAVENOUS CONTINUOUS PRN
Status: DISCONTINUED | OUTPATIENT
Start: 2020-01-01 | End: 2020-01-01

## 2020-01-01 RX ORDER — MIRTAZAPINE 7.5 MG/1
7.5 TABLET, FILM COATED ORAL NIGHTLY
Qty: 90 TABLET | Refills: 1 | Status: SHIPPED | OUTPATIENT
Start: 2020-01-01

## 2020-01-01 RX ORDER — LIDOCAINE 4 G/G
1 PATCH TOPICAL DAILY
Status: DISCONTINUED | OUTPATIENT
Start: 2020-01-01 | End: 2020-01-01 | Stop reason: HOSPADM

## 2020-01-01 RX ORDER — ZINC OXIDE 13 %
1 CREAM (GRAM) TOPICAL 3 TIMES DAILY
Qty: 90 CAPSULE | Refills: 2 | Status: SHIPPED | OUTPATIENT
Start: 2020-01-01

## 2020-01-01 RX ORDER — DEXTROSE MONOHYDRATE 50 MG/ML
100 INJECTION, SOLUTION INTRAVENOUS PRN
Status: DISCONTINUED | OUTPATIENT
Start: 2020-01-01 | End: 2020-01-01 | Stop reason: SDUPTHER

## 2020-01-01 RX ORDER — INSULIN GLARGINE 100 [IU]/ML
5 INJECTION, SOLUTION SUBCUTANEOUS DAILY
Status: DISCONTINUED | OUTPATIENT
Start: 2020-01-01 | End: 2020-01-01 | Stop reason: HOSPADM

## 2020-01-01 RX ORDER — ROCURONIUM BROMIDE 10 MG/ML
50 INJECTION, SOLUTION INTRAVENOUS ONCE
Status: COMPLETED | OUTPATIENT
Start: 2020-01-01 | End: 2020-01-01

## 2020-01-01 RX ORDER — SODIUM CHLORIDE 0.9 % (FLUSH) 0.9 %
10 SYRINGE (ML) INJECTION EVERY 12 HOURS SCHEDULED
Status: DISCONTINUED | OUTPATIENT
Start: 2020-01-01 | End: 2020-01-01 | Stop reason: SDUPTHER

## 2020-01-01 RX ORDER — POLYETHYLENE GLYCOL 3350 17 G/17G
17 POWDER, FOR SOLUTION ORAL DAILY PRN
Status: DISCONTINUED | OUTPATIENT
Start: 2020-01-01 | End: 2020-01-01

## 2020-01-01 RX ORDER — LORAZEPAM 1 MG/1
1 TABLET ORAL ONCE
Status: COMPLETED | OUTPATIENT
Start: 2020-01-01 | End: 2020-01-01

## 2020-01-01 RX ORDER — TRAMADOL HYDROCHLORIDE 50 MG/1
25 TABLET ORAL EVERY 12 HOURS
Status: DISCONTINUED | OUTPATIENT
Start: 2020-01-01 | End: 2020-01-01

## 2020-01-01 RX ORDER — PROMETHAZINE HYDROCHLORIDE 25 MG/1
12.5 TABLET ORAL EVERY 6 HOURS PRN
Status: DISCONTINUED | OUTPATIENT
Start: 2020-01-01 | End: 2020-01-01

## 2020-01-01 RX ORDER — NICOTINE POLACRILEX 4 MG
15 LOZENGE BUCCAL PRN
Status: DISCONTINUED | OUTPATIENT
Start: 2020-01-01 | End: 2020-01-01 | Stop reason: SDUPTHER

## 2020-01-01 RX ADMIN — ONDANSETRON 4 MG: 2 INJECTION INTRAMUSCULAR; INTRAVENOUS at 16:08

## 2020-01-01 RX ADMIN — DOBUTAMINE HYDROCHLORIDE 2.5 MCG/KG/MIN: 400 INJECTION INTRAVENOUS at 00:44

## 2020-01-01 RX ADMIN — SULFAMETHOXAZOLE AND TRIMETHOPRIM 1 TABLET: 800; 160 TABLET ORAL at 20:28

## 2020-01-01 RX ADMIN — TRAMADOL HYDROCHLORIDE 25 MG: 50 TABLET, COATED ORAL at 09:10

## 2020-01-01 RX ADMIN — Medication 10 ML: at 22:32

## 2020-01-01 RX ADMIN — INSULIN LISPRO 1 UNITS: 100 INJECTION, SOLUTION INTRAVENOUS; SUBCUTANEOUS at 20:47

## 2020-01-01 RX ADMIN — BENZONATATE 200 MG: 100 CAPSULE ORAL at 08:42

## 2020-01-01 RX ADMIN — ROCURONIUM BROMIDE 50 MG: 10 INJECTION, SOLUTION INTRAVENOUS at 08:33

## 2020-01-01 RX ADMIN — SUCRALFATE 1 G: 1 TABLET ORAL at 08:55

## 2020-01-01 RX ADMIN — Medication 1 CAPSULE: at 08:55

## 2020-01-01 RX ADMIN — NOREPINEPHRINE BITARTRATE 1.9 MG/ML: 1 INJECTION, SOLUTION, CONCENTRATE INTRAVENOUS at 09:59

## 2020-01-01 RX ADMIN — HYDROCORTISONE SODIUM SUCCINATE 100 MG: 100 INJECTION, POWDER, FOR SOLUTION INTRAMUSCULAR; INTRAVENOUS at 17:14

## 2020-01-01 RX ADMIN — AMIODARONE HYDROCHLORIDE 200 MG: 200 TABLET ORAL at 08:05

## 2020-01-01 RX ADMIN — SULFAMETHOXAZOLE AND TRIMETHOPRIM 1 TABLET: 800; 160 TABLET ORAL at 08:09

## 2020-01-01 RX ADMIN — SODIUM CHLORIDE 1000 ML: 9 INJECTION, SOLUTION INTRAVENOUS at 23:49

## 2020-01-01 RX ADMIN — SUCRALFATE 1 G: 1 TABLET ORAL at 20:19

## 2020-01-01 RX ADMIN — Medication: at 14:59

## 2020-01-01 RX ADMIN — AMIODARONE HYDROCHLORIDE 200 MG: 200 TABLET ORAL at 09:01

## 2020-01-01 RX ADMIN — ACETAMINOPHEN 650 MG: 325 TABLET ORAL at 20:28

## 2020-01-01 RX ADMIN — HEPARIN SODIUM 1810 UNITS: 1000 INJECTION INTRAVENOUS; SUBCUTANEOUS at 22:54

## 2020-01-01 RX ADMIN — VASOPRESSIN 0.04 UNITS/MIN: 20 INJECTION INTRAVENOUS at 04:41

## 2020-01-01 RX ADMIN — INSULIN LISPRO 3 UNITS: 100 INJECTION, SOLUTION INTRAVENOUS; SUBCUTANEOUS at 17:40

## 2020-01-01 RX ADMIN — SODIUM BICARBONATE 650 MG: 650 TABLET ORAL at 09:39

## 2020-01-01 RX ADMIN — ONDANSETRON 4 MG: 2 INJECTION INTRAMUSCULAR; INTRAVENOUS at 16:11

## 2020-01-01 RX ADMIN — HYDROCORTISONE SODIUM SUCCINATE 100 MG: 100 INJECTION, POWDER, FOR SOLUTION INTRAMUSCULAR; INTRAVENOUS at 06:17

## 2020-01-01 RX ADMIN — FUROSEMIDE 20 MG: 10 INJECTION, SOLUTION INTRAMUSCULAR; INTRAVENOUS at 07:33

## 2020-01-01 RX ADMIN — METOPROLOL TARTRATE 25 MG: 25 TABLET ORAL at 11:45

## 2020-01-01 RX ADMIN — LORAZEPAM 0.5 MG: 0.5 TABLET ORAL at 23:21

## 2020-01-01 RX ADMIN — HEPARIN SODIUM 5000 UNITS: 5000 INJECTION INTRAVENOUS; SUBCUTANEOUS at 06:27

## 2020-01-01 RX ADMIN — MEROPENEM 1 G: 1 INJECTION, POWDER, FOR SOLUTION INTRAVENOUS at 12:01

## 2020-01-01 RX ADMIN — Medication 1 CAPSULE: at 09:39

## 2020-01-01 RX ADMIN — METOPROLOL TARTRATE 25 MG: 25 TABLET ORAL at 09:38

## 2020-01-01 RX ADMIN — SUCRALFATE 1 G: 1 TABLET ORAL at 12:00

## 2020-01-01 RX ADMIN — METOPROLOL TARTRATE 25 MG: 25 TABLET ORAL at 10:12

## 2020-01-01 RX ADMIN — DOPAMINE HYDROCHLORIDE IN DEXTROSE 5 MCG/KG/MIN: 1.6 INJECTION, SOLUTION INTRAVENOUS at 01:08

## 2020-01-01 RX ADMIN — SUCRALFATE 1 G: 1 TABLET ORAL at 17:31

## 2020-01-01 RX ADMIN — INSULIN HUMAN 10 UNITS: 100 INJECTION, SOLUTION PARENTERAL at 23:02

## 2020-01-01 RX ADMIN — POTASSIUM CHLORIDE 40 MEQ: 20 TABLET, EXTENDED RELEASE ORAL at 06:53

## 2020-01-01 RX ADMIN — ASPIRIN 81 MG: 81 TABLET, CHEWABLE ORAL at 09:01

## 2020-01-01 RX ADMIN — ASPIRIN 81 MG: 81 TABLET, CHEWABLE ORAL at 08:41

## 2020-01-01 RX ADMIN — INSULIN LISPRO 1 UNITS: 100 INJECTION, SOLUTION INTRAVENOUS; SUBCUTANEOUS at 21:57

## 2020-01-01 RX ADMIN — SUCRALFATE 1 G: 1 TABLET ORAL at 14:17

## 2020-01-01 RX ADMIN — ACETAMINOPHEN 650 MG: 325 TABLET ORAL at 12:32

## 2020-01-01 RX ADMIN — FAMOTIDINE 20 MG: 20 TABLET, FILM COATED ORAL at 20:45

## 2020-01-01 RX ADMIN — INSULIN LISPRO 1 UNITS: 100 INJECTION, SOLUTION INTRAVENOUS; SUBCUTANEOUS at 22:03

## 2020-01-01 RX ADMIN — PROPOFOL 50 MG: 10 INJECTION, EMULSION INTRAVENOUS at 16:16

## 2020-01-01 RX ADMIN — BUMETANIDE 1 MG: 1 TABLET ORAL at 17:33

## 2020-01-01 RX ADMIN — INSULIN LISPRO 1 UNITS: 100 INJECTION, SOLUTION INTRAVENOUS; SUBCUTANEOUS at 17:05

## 2020-01-01 RX ADMIN — ACETAMINOPHEN 650 MG: 325 TABLET ORAL at 12:10

## 2020-01-01 RX ADMIN — SODIUM CHLORIDE 1000 ML: 9 INJECTION, SOLUTION INTRAVENOUS at 22:13

## 2020-01-01 RX ADMIN — PHENYLEPHRINE HYDROCHLORIDE 100 MCG/MIN: 10 INJECTION INTRAVENOUS at 22:51

## 2020-01-01 RX ADMIN — ASPIRIN 81 MG: 81 TABLET, CHEWABLE ORAL at 10:10

## 2020-01-01 RX ADMIN — SODIUM CHLORIDE: 9 INJECTION, SOLUTION INTRAVENOUS at 13:04

## 2020-01-01 RX ADMIN — METOPROLOL TARTRATE 25 MG: 25 TABLET ORAL at 21:46

## 2020-01-01 RX ADMIN — SERTRALINE 50 MG: 50 TABLET, FILM COATED ORAL at 09:01

## 2020-01-01 RX ADMIN — VITAMIN D, TAB 1000IU (100/BT) 1000 UNITS: 25 TAB at 08:55

## 2020-01-01 RX ADMIN — INSULIN LISPRO 1 UNITS: 100 INJECTION, SOLUTION INTRAVENOUS; SUBCUTANEOUS at 17:41

## 2020-01-01 RX ADMIN — ASPIRIN 81 MG: 81 TABLET, CHEWABLE ORAL at 11:45

## 2020-01-01 RX ADMIN — ACETAMINOPHEN 650 MG: 325 TABLET ORAL at 10:33

## 2020-01-01 RX ADMIN — MIRTAZAPINE 7.5 MG: 15 TABLET, FILM COATED ORAL at 21:03

## 2020-01-01 RX ADMIN — BUMETANIDE 1 MG: 1 TABLET ORAL at 09:11

## 2020-01-01 RX ADMIN — Medication: at 12:11

## 2020-01-01 RX ADMIN — INSULIN LISPRO 2 UNITS: 100 INJECTION, SOLUTION INTRAVENOUS; SUBCUTANEOUS at 09:54

## 2020-01-01 RX ADMIN — LORAZEPAM 0.5 MG: 0.5 TABLET ORAL at 12:10

## 2020-01-01 RX ADMIN — TRAMADOL HYDROCHLORIDE 25 MG: 50 TABLET, COATED ORAL at 18:38

## 2020-01-01 RX ADMIN — SODIUM CHLORIDE 80 MG: 9 INJECTION, SOLUTION INTRAVENOUS at 17:03

## 2020-01-01 RX ADMIN — SODIUM CHLORIDE: 9 INJECTION, SOLUTION INTRAVENOUS at 20:51

## 2020-01-01 RX ADMIN — HEPARIN SODIUM 1810 UNITS: 1000 INJECTION INTRAVENOUS; SUBCUTANEOUS at 10:12

## 2020-01-01 RX ADMIN — AMIODARONE HYDROCHLORIDE 200 MG: 200 TABLET ORAL at 08:41

## 2020-01-01 RX ADMIN — AMIODARONE HYDROCHLORIDE 200 MG: 200 TABLET ORAL at 08:42

## 2020-01-01 RX ADMIN — SODIUM CHLORIDE 1000 ML: 9 INJECTION, SOLUTION INTRAVENOUS at 02:47

## 2020-01-01 RX ADMIN — ONDANSETRON 4 MG: 2 INJECTION INTRAMUSCULAR; INTRAVENOUS at 20:05

## 2020-01-01 RX ADMIN — INSULIN GLARGINE 10 UNITS: 100 INJECTION, SOLUTION SUBCUTANEOUS at 09:53

## 2020-01-01 RX ADMIN — MEROPENEM 1 G: 1 INJECTION, POWDER, FOR SOLUTION INTRAVENOUS at 12:05

## 2020-01-01 RX ADMIN — CEPHALEXIN 250 MG: 250 CAPSULE ORAL at 20:07

## 2020-01-01 RX ADMIN — Medication 25 MCG/MIN: at 19:59

## 2020-01-01 RX ADMIN — Medication 10 ML: at 20:50

## 2020-01-01 RX ADMIN — PANTOPRAZOLE SODIUM 40 MG: 40 TABLET, DELAYED RELEASE ORAL at 05:59

## 2020-01-01 RX ADMIN — SODIUM CHLORIDE: 9 INJECTION, SOLUTION INTRAVENOUS at 21:02

## 2020-01-01 RX ADMIN — ONDANSETRON 4 MG: 2 INJECTION INTRAMUSCULAR; INTRAVENOUS at 20:42

## 2020-01-01 RX ADMIN — AMIODARONE HYDROCHLORIDE 200 MG: 200 TABLET ORAL at 08:26

## 2020-01-01 RX ADMIN — HEPARIN SODIUM 1810 UNITS: 1000 INJECTION INTRAVENOUS; SUBCUTANEOUS at 23:51

## 2020-01-01 RX ADMIN — ONDANSETRON 4 MG: 2 INJECTION INTRAMUSCULAR; INTRAVENOUS at 11:46

## 2020-01-01 RX ADMIN — SODIUM CHLORIDE, POTASSIUM CHLORIDE, SODIUM LACTATE AND CALCIUM CHLORIDE 1000 ML: 600; 310; 30; 20 INJECTION, SOLUTION INTRAVENOUS at 13:30

## 2020-01-01 RX ADMIN — INSULIN LISPRO 1 UNITS: 100 INJECTION, SOLUTION INTRAVENOUS; SUBCUTANEOUS at 21:03

## 2020-01-01 RX ADMIN — ASPIRIN 81 MG 81 MG: 81 TABLET ORAL at 08:31

## 2020-01-01 RX ADMIN — FENTANYL CITRATE 25 MCG: 50 INJECTION, SOLUTION INTRAMUSCULAR; INTRAVENOUS at 12:57

## 2020-01-01 RX ADMIN — MEROPENEM 1 G: 1 INJECTION, POWDER, FOR SOLUTION INTRAVENOUS at 23:40

## 2020-01-01 RX ADMIN — INSULIN LISPRO 1 UNITS: 100 INJECTION, SOLUTION INTRAVENOUS; SUBCUTANEOUS at 12:22

## 2020-01-01 RX ADMIN — SODIUM CHLORIDE 500 ML: 9 INJECTION, SOLUTION INTRAVENOUS at 02:25

## 2020-01-01 RX ADMIN — INSULIN LISPRO 1 UNITS: 100 INJECTION, SOLUTION INTRAVENOUS; SUBCUTANEOUS at 13:20

## 2020-01-01 RX ADMIN — AMIODARONE HYDROCHLORIDE 200 MG: 200 TABLET ORAL at 08:22

## 2020-01-01 RX ADMIN — CHLORHEXIDINE GLUCONATE 0.12% ORAL RINSE 15 ML: 1.2 LIQUID ORAL at 15:28

## 2020-01-01 RX ADMIN — SODIUM CHLORIDE 250 ML: 0.9 INJECTION, SOLUTION INTRAVENOUS at 18:33

## 2020-01-01 RX ADMIN — Medication: at 03:24

## 2020-01-01 RX ADMIN — SULFAMETHOXAZOLE AND TRIMETHOPRIM 1 TABLET: 800; 160 TABLET ORAL at 09:11

## 2020-01-01 RX ADMIN — POTASSIUM CHLORIDE 20 MEQ: 750 CAPSULE, EXTENDED RELEASE ORAL at 08:09

## 2020-01-01 RX ADMIN — SERTRALINE HYDROCHLORIDE 50 MG: 50 TABLET ORAL at 08:10

## 2020-01-01 RX ADMIN — BUMETANIDE 1 MG: 1 TABLET ORAL at 08:42

## 2020-01-01 RX ADMIN — SUCRALFATE 1 G: 1 TABLET ORAL at 17:03

## 2020-01-01 RX ADMIN — POTASSIUM CHLORIDE 20 MEQ: 750 CAPSULE, EXTENDED RELEASE ORAL at 08:42

## 2020-01-01 RX ADMIN — SODIUM POLYSTYRENE SULFONATE 30 G: 15 SUSPENSION ORAL; RECTAL at 13:54

## 2020-01-01 RX ADMIN — VITAMIN D, TAB 1000IU (100/BT) 1000 UNITS: 25 TAB at 09:39

## 2020-01-01 RX ADMIN — CHLORHEXIDINE GLUCONATE 0.12% ORAL RINSE 15 ML: 1.2 LIQUID ORAL at 20:12

## 2020-01-01 RX ADMIN — ACETAMINOPHEN 650 MG: 325 TABLET ORAL at 14:17

## 2020-01-01 RX ADMIN — HEPARIN SODIUM 5000 UNITS: 5000 INJECTION INTRAVENOUS; SUBCUTANEOUS at 05:43

## 2020-01-01 RX ADMIN — TRAMADOL HYDROCHLORIDE 25 MG: 50 TABLET, COATED ORAL at 09:49

## 2020-01-01 RX ADMIN — SERTRALINE HYDROCHLORIDE 50 MG: 50 TABLET ORAL at 10:33

## 2020-01-01 RX ADMIN — INSULIN LISPRO 2 UNITS: 100 INJECTION, SOLUTION INTRAVENOUS; SUBCUTANEOUS at 08:29

## 2020-01-01 RX ADMIN — ACETAMINOPHEN 650 MG: 325 TABLET ORAL at 09:15

## 2020-01-01 RX ADMIN — ACETAMINOPHEN 650 MG: 325 TABLET ORAL at 00:32

## 2020-01-01 RX ADMIN — MEROPENEM 1 G: 1 INJECTION, POWDER, FOR SOLUTION INTRAVENOUS at 23:49

## 2020-01-01 RX ADMIN — BUMETANIDE 1 MG: 1 TABLET ORAL at 10:33

## 2020-01-01 RX ADMIN — INSULIN LISPRO 5 UNITS: 100 INJECTION, SOLUTION INTRAVENOUS; SUBCUTANEOUS at 08:22

## 2020-01-01 RX ADMIN — FAMOTIDINE 20 MG: 20 TABLET, FILM COATED ORAL at 18:38

## 2020-01-01 RX ADMIN — TRIAMCINOLONE ACETONIDE 40 MG: 40 INJECTION, SUSPENSION INTRA-ARTICULAR; INTRAMUSCULAR at 14:53

## 2020-01-01 RX ADMIN — HEPARIN SODIUM 5000 UNITS: 5000 INJECTION INTRAVENOUS; SUBCUTANEOUS at 05:29

## 2020-01-01 RX ADMIN — INSULIN LISPRO 1 UNITS: 100 INJECTION, SOLUTION INTRAVENOUS; SUBCUTANEOUS at 12:41

## 2020-01-01 RX ADMIN — ACETAMINOPHEN 650 MG: 325 TABLET ORAL at 22:26

## 2020-01-01 RX ADMIN — INSULIN LISPRO 1 UNITS: 100 INJECTION, SOLUTION INTRAVENOUS; SUBCUTANEOUS at 21:51

## 2020-01-01 RX ADMIN — POTASSIUM CHLORIDE 20 MEQ: 750 CAPSULE, EXTENDED RELEASE ORAL at 17:18

## 2020-01-01 RX ADMIN — BUMETANIDE 1 MG: 1 TABLET ORAL at 17:47

## 2020-01-01 RX ADMIN — LORAZEPAM 0.5 MG: 0.5 TABLET ORAL at 19:50

## 2020-01-01 RX ADMIN — DEXTROSE AND SODIUM CHLORIDE: 5; 900 INJECTION, SOLUTION INTRAVENOUS at 17:04

## 2020-01-01 RX ADMIN — POTASSIUM CHLORIDE 20 MEQ: 750 CAPSULE, EXTENDED RELEASE ORAL at 17:39

## 2020-01-01 RX ADMIN — RIVAROXABAN 15 MG: 15 TABLET, FILM COATED ORAL at 12:44

## 2020-01-01 RX ADMIN — METOPROLOL TARTRATE 25 MG: 25 TABLET ORAL at 08:22

## 2020-01-01 RX ADMIN — INSULIN LISPRO 4 UNITS: 100 INJECTION, SOLUTION INTRAVENOUS; SUBCUTANEOUS at 17:15

## 2020-01-01 RX ADMIN — ACETAMINOPHEN 650 MG: 325 TABLET ORAL at 16:17

## 2020-01-01 RX ADMIN — PIPERACILLIN AND TAZOBACTAM 3.38 G: 3; .375 INJECTION, POWDER, FOR SOLUTION INTRAVENOUS at 19:36

## 2020-01-01 RX ADMIN — ACETAMINOPHEN 650 MG: 325 TABLET ORAL at 12:14

## 2020-01-01 RX ADMIN — FAMOTIDINE 20 MG: 20 TABLET, FILM COATED ORAL at 17:30

## 2020-01-01 RX ADMIN — PROMETHAZINE HYDROCHLORIDE 12.5 MG: 25 INJECTION INTRAMUSCULAR; INTRAVENOUS at 02:17

## 2020-01-01 RX ADMIN — SERTRALINE 50 MG: 50 TABLET, FILM COATED ORAL at 09:39

## 2020-01-01 RX ADMIN — SUCRALFATE 1 G: 1 TABLET ORAL at 20:07

## 2020-01-01 RX ADMIN — EPINEPHRINE 5 MCG/MIN: 1 INJECTION PARENTERAL at 01:59

## 2020-01-01 RX ADMIN — ONDANSETRON 4 MG: 2 INJECTION INTRAMUSCULAR; INTRAVENOUS at 11:01

## 2020-01-01 RX ADMIN — POTASSIUM CHLORIDE 20 MEQ: 750 CAPSULE, EXTENDED RELEASE ORAL at 09:11

## 2020-01-01 RX ADMIN — ASPIRIN 81 MG: 81 TABLET, CHEWABLE ORAL at 11:44

## 2020-01-01 RX ADMIN — ACETAMINOPHEN 650 MG: 325 TABLET ORAL at 00:54

## 2020-01-01 RX ADMIN — PANTOPRAZOLE SODIUM 40 MG: 40 TABLET, DELAYED RELEASE ORAL at 17:39

## 2020-01-01 RX ADMIN — PHENYLEPHRINE HYDROCHLORIDE 300 MCG/MIN: 10 INJECTION INTRAVENOUS at 00:00

## 2020-01-01 RX ADMIN — GLIMEPIRIDE 2 MG: 2 TABLET ORAL at 12:11

## 2020-01-01 RX ADMIN — SODIUM CHLORIDE 500 ML: 9 INJECTION, SOLUTION INTRAVENOUS at 05:03

## 2020-01-01 RX ADMIN — LORAZEPAM 0.5 MG: 0.5 TABLET ORAL at 20:16

## 2020-01-01 RX ADMIN — Medication 10 ML: at 21:46

## 2020-01-01 RX ADMIN — PROMETHAZINE HYDROCHLORIDE 25 MG: 25 INJECTION INTRAMUSCULAR; INTRAVENOUS at 10:55

## 2020-01-01 RX ADMIN — DEXTROSE 50 % IN WATER (D50W) INTRAVENOUS SYRINGE 25 G: at 23:02

## 2020-01-01 RX ADMIN — FAMOTIDINE 20 MG: 20 TABLET, FILM COATED ORAL at 16:47

## 2020-01-01 RX ADMIN — INSULIN LISPRO 2 UNITS: 100 INJECTION, SOLUTION INTRAVENOUS; SUBCUTANEOUS at 20:08

## 2020-01-01 RX ADMIN — SODIUM CHLORIDE 0.1 UNITS/KG/HR: 9 INJECTION, SOLUTION INTRAVENOUS at 11:16

## 2020-01-01 RX ADMIN — VITAMIN D, TAB 1000IU (100/BT) 1000 UNITS: 25 TAB at 10:10

## 2020-01-01 RX ADMIN — INSULIN LISPRO 1 UNITS: 100 INJECTION, SOLUTION INTRAVENOUS; SUBCUTANEOUS at 17:17

## 2020-01-01 RX ADMIN — GLIMEPIRIDE 2 MG: 2 TABLET ORAL at 20:07

## 2020-01-01 RX ADMIN — VITAMIN D, TAB 1000IU (100/BT) 1000 UNITS: 25 TAB at 08:41

## 2020-01-01 RX ADMIN — SODIUM CHLORIDE: 9 INJECTION, SOLUTION INTRAVENOUS at 20:40

## 2020-01-01 RX ADMIN — PIPERACILLIN AND TAZOBACTAM 3.38 G: 3; .375 INJECTION, POWDER, FOR SOLUTION INTRAVENOUS at 06:55

## 2020-01-01 RX ADMIN — SERTRALINE HYDROCHLORIDE 50 MG: 50 TABLET ORAL at 08:42

## 2020-01-01 RX ADMIN — SODIUM CHLORIDE 1000 ML: 9 INJECTION, SOLUTION INTRAVENOUS at 01:38

## 2020-01-01 RX ADMIN — ASPIRIN 81 MG: 81 TABLET, CHEWABLE ORAL at 08:55

## 2020-01-01 RX ADMIN — ONDANSETRON 4 MG: 2 INJECTION INTRAMUSCULAR; INTRAVENOUS at 20:30

## 2020-01-01 RX ADMIN — SUCRALFATE 1 G: 1 TABLET ORAL at 20:44

## 2020-01-01 RX ADMIN — INSULIN LISPRO 1 UNITS: 100 INJECTION, SOLUTION INTRAVENOUS; SUBCUTANEOUS at 16:00

## 2020-01-01 RX ADMIN — LORAZEPAM 1 MG: 1 TABLET ORAL at 14:24

## 2020-01-01 RX ADMIN — Medication: at 04:38

## 2020-01-01 RX ADMIN — LORAZEPAM 0.5 MG: 0.5 TABLET ORAL at 08:09

## 2020-01-01 RX ADMIN — INSULIN LISPRO 5 UNITS: 100 INJECTION, SOLUTION INTRAVENOUS; SUBCUTANEOUS at 17:47

## 2020-01-01 RX ADMIN — FAMOTIDINE 20 MG: 20 TABLET, FILM COATED ORAL at 17:04

## 2020-01-01 RX ADMIN — INSULIN LISPRO 5 UNITS: 100 INJECTION, SOLUTION INTRAVENOUS; SUBCUTANEOUS at 12:06

## 2020-01-01 RX ADMIN — BENZONATATE 200 MG: 100 CAPSULE ORAL at 22:47

## 2020-01-01 RX ADMIN — SODIUM CHLORIDE: 9 INJECTION, SOLUTION INTRAVENOUS at 14:42

## 2020-01-01 RX ADMIN — INSULIN LISPRO 6 UNITS: 100 INJECTION, SOLUTION INTRAVENOUS; SUBCUTANEOUS at 12:25

## 2020-01-01 RX ADMIN — SODIUM BICARBONATE: 84 INJECTION, SOLUTION INTRAVENOUS at 05:46

## 2020-01-01 RX ADMIN — ONDANSETRON 4 MG: 2 INJECTION INTRAMUSCULAR; INTRAVENOUS at 11:47

## 2020-01-01 RX ADMIN — POTASSIUM CHLORIDE 20 MEQ: 750 CAPSULE, EXTENDED RELEASE ORAL at 10:33

## 2020-01-01 RX ADMIN — FAMOTIDINE 20 MG: 20 TABLET, FILM COATED ORAL at 17:56

## 2020-01-01 RX ADMIN — Medication 1 CAPSULE: at 09:02

## 2020-01-01 RX ADMIN — RIVAROXABAN 15 MG: 15 TABLET, FILM COATED ORAL at 17:33

## 2020-01-01 RX ADMIN — SODIUM CHLORIDE, PRESERVATIVE FREE 10 ML: 5 INJECTION INTRAVENOUS at 10:12

## 2020-01-01 RX ADMIN — Medication 2 UNITS/HR: at 04:14

## 2020-01-01 RX ADMIN — INSULIN LISPRO 1 UNITS: 100 INJECTION, SOLUTION INTRAVENOUS; SUBCUTANEOUS at 11:44

## 2020-01-01 RX ADMIN — VITAMIN D, TAB 1000IU (100/BT) 1000 UNITS: 25 TAB at 11:45

## 2020-01-01 RX ADMIN — SUCRALFATE 1 G: 1 TABLET ORAL at 12:39

## 2020-01-01 RX ADMIN — SODIUM CHLORIDE, POTASSIUM CHLORIDE, SODIUM LACTATE AND CALCIUM CHLORIDE: 600; 310; 30; 20 INJECTION, SOLUTION INTRAVENOUS at 12:44

## 2020-01-01 RX ADMIN — SODIUM CHLORIDE: 9 INJECTION, SOLUTION INTRAVENOUS at 01:13

## 2020-01-01 RX ADMIN — Medication 50 MCG/MIN: at 03:25

## 2020-01-01 RX ADMIN — ONDANSETRON 4 MG: 2 INJECTION INTRAMUSCULAR; INTRAVENOUS at 12:27

## 2020-01-01 RX ADMIN — FUROSEMIDE 40 MG: 10 INJECTION, SOLUTION INTRAMUSCULAR; INTRAVENOUS at 23:03

## 2020-01-01 RX ADMIN — MEROPENEM 1 G: 1 INJECTION, POWDER, FOR SOLUTION INTRAVENOUS at 00:22

## 2020-01-01 RX ADMIN — TRAMADOL HYDROCHLORIDE 25 MG: 50 TABLET, COATED ORAL at 05:41

## 2020-01-01 RX ADMIN — PHENYLEPHRINE HYDROCHLORIDE 300 MCG/MIN: 10 INJECTION INTRAVENOUS at 04:59

## 2020-01-01 RX ADMIN — ASPIRIN 81 MG 81 MG: 81 TABLET ORAL at 08:22

## 2020-01-01 RX ADMIN — SODIUM CHLORIDE 500 ML: 9 INJECTION, SOLUTION INTRAVENOUS at 13:02

## 2020-01-01 RX ADMIN — SODIUM BICARBONATE 100 MEQ: 84 INJECTION INTRAVENOUS at 04:52

## 2020-01-01 RX ADMIN — SODIUM CHLORIDE, POTASSIUM CHLORIDE, SODIUM LACTATE AND CALCIUM CHLORIDE: 600; 310; 30; 20 INJECTION, SOLUTION INTRAVENOUS at 15:25

## 2020-01-01 RX ADMIN — AMIODARONE HYDROCHLORIDE 200 MG: 200 TABLET ORAL at 10:10

## 2020-01-01 RX ADMIN — MEROPENEM 1 G: 1 INJECTION, POWDER, FOR SOLUTION INTRAVENOUS at 13:06

## 2020-01-01 RX ADMIN — MEROPENEM 1 G: 1 INJECTION, POWDER, FOR SOLUTION INTRAVENOUS at 00:17

## 2020-01-01 RX ADMIN — FENTANYL CITRATE 25 MCG: 50 INJECTION, SOLUTION INTRAMUSCULAR; INTRAVENOUS at 11:01

## 2020-01-01 RX ADMIN — KETOROLAC TROMETHAMINE 15 MG: 30 INJECTION, SOLUTION INTRAMUSCULAR at 04:57

## 2020-01-01 RX ADMIN — BUMETANIDE 1 MG: 1 TABLET ORAL at 08:26

## 2020-01-01 RX ADMIN — Medication 10 ML: at 12:01

## 2020-01-01 RX ADMIN — PIPERACILLIN AND TAZOBACTAM 3.38 G: 3; .375 INJECTION, POWDER, FOR SOLUTION INTRAVENOUS at 06:45

## 2020-01-01 RX ADMIN — FUROSEMIDE 40 MG: 10 INJECTION, SOLUTION INTRAMUSCULAR; INTRAVENOUS at 08:09

## 2020-01-01 RX ADMIN — SODIUM CHLORIDE 500 ML: 9 INJECTION, SOLUTION INTRAVENOUS at 20:04

## 2020-01-01 RX ADMIN — CEPHALEXIN 250 MG: 250 CAPSULE ORAL at 09:38

## 2020-01-01 RX ADMIN — ACETAMINOPHEN 650 MG: 325 TABLET ORAL at 08:18

## 2020-01-01 RX ADMIN — MEROPENEM 1 G: 1 INJECTION, POWDER, FOR SOLUTION INTRAVENOUS at 12:04

## 2020-01-01 RX ADMIN — INSULIN LISPRO 5 UNITS: 100 INJECTION, SOLUTION INTRAVENOUS; SUBCUTANEOUS at 08:26

## 2020-01-01 RX ADMIN — SUCRALFATE 1 G: 1 TABLET ORAL at 12:45

## 2020-01-01 RX ADMIN — SODIUM CHLORIDE 8 MG/HR: 9 INJECTION, SOLUTION INTRAVENOUS at 00:22

## 2020-01-01 RX ADMIN — RIVAROXABAN 15 MG: 15 TABLET, FILM COATED ORAL at 16:47

## 2020-01-01 RX ADMIN — SODIUM CHLORIDE, PRESERVATIVE FREE 10 ML: 5 INJECTION INTRAVENOUS at 11:46

## 2020-01-01 RX ADMIN — RIVAROXABAN 15 MG: 15 TABLET, FILM COATED ORAL at 17:47

## 2020-01-01 RX ADMIN — Medication 45 MCG/MIN: at 12:20

## 2020-01-01 RX ADMIN — INSULIN LISPRO 2 UNITS: 100 INJECTION, SOLUTION INTRAVENOUS; SUBCUTANEOUS at 12:00

## 2020-01-01 RX ADMIN — FAMOTIDINE 20 MG: 20 TABLET ORAL at 22:27

## 2020-01-01 RX ADMIN — INSULIN LISPRO 2 UNITS: 100 INJECTION, SOLUTION INTRAVENOUS; SUBCUTANEOUS at 12:45

## 2020-01-01 RX ADMIN — HYDROCORTISONE SODIUM SUCCINATE 100 MG: 100 INJECTION, POWDER, FOR SOLUTION INTRAMUSCULAR; INTRAVENOUS at 01:35

## 2020-01-01 RX ADMIN — BUMETANIDE 1 MG: 1 TABLET ORAL at 17:13

## 2020-01-01 RX ADMIN — METOPROLOL TARTRATE 25 MG: 25 TABLET ORAL at 09:01

## 2020-01-01 RX ADMIN — INSULIN LISPRO 1 UNITS: 100 INJECTION, SOLUTION INTRAVENOUS; SUBCUTANEOUS at 11:53

## 2020-01-01 RX ADMIN — EPINEPHRINE 30 MCG/MIN: 1 INJECTION PARENTERAL at 04:38

## 2020-01-01 RX ADMIN — FUROSEMIDE 20 MG: 10 INJECTION, SOLUTION INTRAMUSCULAR; INTRAVENOUS at 10:37

## 2020-01-01 RX ADMIN — HEPARIN SODIUM 5000 UNITS: 5000 INJECTION INTRAVENOUS; SUBCUTANEOUS at 13:42

## 2020-01-01 RX ADMIN — SODIUM CHLORIDE 8 MG/HR: 9 INJECTION, SOLUTION INTRAVENOUS at 15:21

## 2020-01-01 RX ADMIN — SUCRALFATE 1 G: 1 TABLET ORAL at 21:45

## 2020-01-01 RX ADMIN — Medication 10 ML: at 20:35

## 2020-01-01 RX ADMIN — SODIUM CHLORIDE 8 MG/HR: 9 INJECTION, SOLUTION INTRAVENOUS at 05:00

## 2020-01-01 RX ADMIN — VASOPRESSIN 0.04 UNITS/MIN: 20 INJECTION INTRAVENOUS at 21:55

## 2020-01-01 RX ADMIN — INSULIN LISPRO 1 UNITS: 100 INJECTION, SOLUTION INTRAVENOUS; SUBCUTANEOUS at 08:29

## 2020-01-01 RX ADMIN — DOPAMINE HYDROCHLORIDE IN DEXTROSE 30 MCG/KG/MIN: 1.6 INJECTION, SOLUTION INTRAVENOUS at 08:18

## 2020-01-01 RX ADMIN — SULFAMETHOXAZOLE AND TRIMETHOPRIM 1 TABLET: 800; 160 TABLET ORAL at 10:33

## 2020-01-01 RX ADMIN — SUCRALFATE 1 G: 1 TABLET ORAL at 17:56

## 2020-01-01 RX ADMIN — SODIUM CHLORIDE 20 ML: 0.9 INJECTION, SOLUTION INTRAVENOUS at 22:05

## 2020-01-01 RX ADMIN — ACETAMINOPHEN 650 MG: 325 TABLET ORAL at 23:23

## 2020-01-01 RX ADMIN — SUCRALFATE 1 G: 1 TABLET ORAL at 14:08

## 2020-01-01 RX ADMIN — PIPERACILLIN AND TAZOBACTAM 3.38 G: 3; .375 INJECTION, POWDER, FOR SOLUTION INTRAVENOUS at 15:30

## 2020-01-01 RX ADMIN — HEPARIN SODIUM 3620 UNITS: 1000 INJECTION INTRAVENOUS; SUBCUTANEOUS at 11:44

## 2020-01-01 RX ADMIN — CEFTRIAXONE 1 G: 1 INJECTION, POWDER, FOR SOLUTION INTRAMUSCULAR; INTRAVENOUS at 12:59

## 2020-01-01 RX ADMIN — ONDANSETRON 4 MG: 2 INJECTION INTRAMUSCULAR; INTRAVENOUS at 00:02

## 2020-01-01 RX ADMIN — METOPROLOL TARTRATE 25 MG: 25 TABLET ORAL at 08:05

## 2020-01-01 RX ADMIN — Medication 1 CAPSULE: at 11:47

## 2020-01-01 RX ADMIN — EPINEPHRINE 30 MCG/MIN: 1 INJECTION PARENTERAL at 07:08

## 2020-01-01 RX ADMIN — SODIUM CHLORIDE 1000 ML: 9 INJECTION, SOLUTION INTRAVENOUS at 04:50

## 2020-01-01 RX ADMIN — SODIUM CHLORIDE: 4.5 INJECTION, SOLUTION INTRAVENOUS at 12:00

## 2020-01-01 RX ADMIN — FAMOTIDINE 20 MG: 20 TABLET ORAL at 17:13

## 2020-01-01 RX ADMIN — SUCRALFATE 1 G: 1 TABLET ORAL at 09:38

## 2020-01-01 RX ADMIN — RIVAROXABAN 15 MG: 15 TABLET, FILM COATED ORAL at 08:41

## 2020-01-01 RX ADMIN — ACETAMINOPHEN 650 MG: 325 TABLET ORAL at 16:00

## 2020-01-01 RX ADMIN — PROMETHAZINE HYDROCHLORIDE 12.5 MG: 25 INJECTION INTRAMUSCULAR; INTRAVENOUS at 16:59

## 2020-01-01 RX ADMIN — INSULIN GLARGINE 15 UNITS: 100 INJECTION, SOLUTION SUBCUTANEOUS at 15:00

## 2020-01-01 RX ADMIN — SULFAMETHOXAZOLE AND TRIMETHOPRIM 1 TABLET: 800; 160 TABLET ORAL at 21:03

## 2020-01-01 RX ADMIN — POTASSIUM CHLORIDE 40 MEQ: 20 TABLET, EXTENDED RELEASE ORAL at 09:53

## 2020-01-01 RX ADMIN — ONDANSETRON 4 MG: 2 INJECTION INTRAMUSCULAR; INTRAVENOUS at 20:16

## 2020-01-01 RX ADMIN — PANTOPRAZOLE SODIUM 40 MG: 40 INJECTION, POWDER, LYOPHILIZED, FOR SOLUTION INTRAVENOUS at 15:30

## 2020-01-01 RX ADMIN — SERTRALINE 50 MG: 50 TABLET, FILM COATED ORAL at 11:44

## 2020-01-01 RX ADMIN — FAMOTIDINE 20 MG: 20 TABLET ORAL at 17:47

## 2020-01-01 RX ADMIN — BUMETANIDE 1 MG: 1 TABLET ORAL at 08:09

## 2020-01-01 RX ADMIN — DOPAMINE HYDROCHLORIDE IN DEXTROSE 30 MCG/KG/MIN: 1.6 INJECTION, SOLUTION INTRAVENOUS at 05:19

## 2020-01-01 RX ADMIN — ACETAMINOPHEN 650 MG: 325 TABLET ORAL at 13:41

## 2020-01-01 RX ADMIN — INSULIN LISPRO 2 UNITS: 100 INJECTION, SOLUTION INTRAVENOUS; SUBCUTANEOUS at 09:12

## 2020-01-01 RX ADMIN — DEXTROSE AND SODIUM CHLORIDE: 5; 450 INJECTION, SOLUTION INTRAVENOUS at 03:57

## 2020-01-01 RX ADMIN — HEPARIN SODIUM AND DEXTROSE 12 UNITS/KG/HR: 10000; 5 INJECTION INTRAVENOUS at 11:19

## 2020-01-01 RX ADMIN — ONDANSETRON 4 MG: 2 INJECTION INTRAMUSCULAR; INTRAVENOUS at 20:50

## 2020-01-01 RX ADMIN — SODIUM CHLORIDE: 9 INJECTION, SOLUTION INTRAVENOUS at 00:17

## 2020-01-01 RX ADMIN — INSULIN LISPRO 1 UNITS: 100 INJECTION, SOLUTION INTRAVENOUS; SUBCUTANEOUS at 22:21

## 2020-01-01 RX ADMIN — MIRTAZAPINE 7.5 MG: 15 TABLET, FILM COATED ORAL at 20:28

## 2020-01-01 RX ADMIN — SODIUM BICARBONATE 650 MG: 650 TABLET ORAL at 11:45

## 2020-01-01 RX ADMIN — RIVAROXABAN 15 MG: 15 TABLET, FILM COATED ORAL at 10:08

## 2020-01-01 RX ADMIN — AZITHROMYCIN MONOHYDRATE 500 MG: 500 INJECTION, POWDER, LYOPHILIZED, FOR SOLUTION INTRAVENOUS at 10:12

## 2020-01-01 RX ADMIN — CEFTRIAXONE SODIUM 1 G: 1 INJECTION, POWDER, FOR SOLUTION INTRAMUSCULAR; INTRAVENOUS at 02:45

## 2020-01-01 RX ADMIN — SODIUM BICARBONATE: 84 INJECTION, SOLUTION INTRAVENOUS at 14:08

## 2020-01-01 RX ADMIN — Medication 50 MCG/MIN: at 07:12

## 2020-01-01 RX ADMIN — SUCRALFATE 1 G: 1 TABLET ORAL at 10:08

## 2020-01-01 RX ADMIN — MEROPENEM 2 G: 1 INJECTION, POWDER, FOR SOLUTION INTRAVENOUS at 11:48

## 2020-01-01 RX ADMIN — INSULIN LISPRO 1 UNITS: 100 INJECTION, SOLUTION INTRAVENOUS; SUBCUTANEOUS at 17:24

## 2020-01-01 RX ADMIN — PROMETHAZINE HYDROCHLORIDE 12.5 MG: 25 INJECTION INTRAMUSCULAR; INTRAVENOUS at 15:12

## 2020-01-01 RX ADMIN — ASPIRIN 81 MG 81 MG: 81 TABLET ORAL at 08:26

## 2020-01-01 RX ADMIN — AMIODARONE HYDROCHLORIDE 200 MG: 200 TABLET ORAL at 09:40

## 2020-01-01 RX ADMIN — INSULIN LISPRO 3 UNITS: 100 INJECTION, SOLUTION INTRAVENOUS; SUBCUTANEOUS at 13:22

## 2020-01-01 RX ADMIN — BUMETANIDE 1 MG: 1 TABLET ORAL at 08:22

## 2020-01-01 RX ADMIN — MEROPENEM 1 G: 1 INJECTION, POWDER, FOR SOLUTION INTRAVENOUS at 23:52

## 2020-01-01 RX ADMIN — ACETAMINOPHEN 650 MG: 325 TABLET ORAL at 22:27

## 2020-01-01 RX ADMIN — INSULIN LISPRO 2 UNITS: 100 INJECTION, SOLUTION INTRAVENOUS; SUBCUTANEOUS at 16:47

## 2020-01-01 RX ADMIN — SERTRALINE HYDROCHLORIDE 50 MG: 50 TABLET ORAL at 09:11

## 2020-01-01 RX ADMIN — INSULIN LISPRO 1 UNITS: 100 INJECTION, SOLUTION INTRAVENOUS; SUBCUTANEOUS at 20:45

## 2020-01-01 RX ADMIN — PIPERACILLIN AND TAZOBACTAM 3.38 G: 3; .375 INJECTION, POWDER, FOR SOLUTION INTRAVENOUS at 22:59

## 2020-01-01 RX ADMIN — INSULIN LISPRO 3 UNITS: 100 INJECTION, SOLUTION INTRAVENOUS; SUBCUTANEOUS at 12:00

## 2020-01-01 RX ADMIN — AMIODARONE HYDROCHLORIDE 200 MG: 200 TABLET ORAL at 11:45

## 2020-01-01 RX ADMIN — INSULIN LISPRO 1 UNITS: 100 INJECTION, SOLUTION INTRAVENOUS; SUBCUTANEOUS at 20:28

## 2020-01-01 RX ADMIN — PHENYLEPHRINE HYDROCHLORIDE 300 MCG/MIN: 10 INJECTION INTRAVENOUS at 07:11

## 2020-01-01 RX ADMIN — NITROGLYCERIN 0.4 MG: 0.4 TABLET, ORALLY DISINTEGRATING SUBLINGUAL at 20:56

## 2020-01-01 RX ADMIN — SUCRALFATE 1 G: 1 TABLET ORAL at 18:38

## 2020-01-01 RX ADMIN — DIPHENHYDRAMINE HYDROCHLORIDE 25 MG: 50 INJECTION, SOLUTION INTRAMUSCULAR; INTRAVENOUS at 15:13

## 2020-01-01 RX ADMIN — AMIODARONE HYDROCHLORIDE 200 MG: 200 TABLET ORAL at 08:31

## 2020-01-01 RX ADMIN — METOPROLOL TARTRATE 25 MG: 25 TABLET ORAL at 08:42

## 2020-01-01 RX ADMIN — SUCRALFATE 1 G: 1 TABLET ORAL at 08:41

## 2020-01-01 RX ADMIN — LORAZEPAM 0.5 MG: 0.5 TABLET ORAL at 20:10

## 2020-01-01 RX ADMIN — INSULIN GLARGINE 15 UNITS: 100 INJECTION, SOLUTION SUBCUTANEOUS at 17:33

## 2020-01-01 RX ADMIN — SERTRALINE 50 MG: 50 TABLET, FILM COATED ORAL at 08:55

## 2020-01-01 RX ADMIN — HEPARIN SODIUM AND DEXTROSE 12 UNITS/KG/HR: 10000; 5 INJECTION INTRAVENOUS at 22:00

## 2020-01-01 RX ADMIN — INSULIN LISPRO 3 UNITS: 100 INJECTION, SOLUTION INTRAVENOUS; SUBCUTANEOUS at 03:00

## 2020-01-01 RX ADMIN — AMIODARONE HYDROCHLORIDE 200 MG: 200 TABLET ORAL at 08:55

## 2020-01-01 RX ADMIN — BUMETANIDE 1 MG: 1 TABLET ORAL at 08:05

## 2020-01-01 RX ADMIN — ONDANSETRON 4 MG: 2 INJECTION INTRAMUSCULAR; INTRAVENOUS at 02:25

## 2020-01-01 RX ADMIN — SODIUM CHLORIDE 250 ML: 9 INJECTION, SOLUTION INTRAVENOUS at 17:45

## 2020-01-01 RX ADMIN — SULFAMETHOXAZOLE AND TRIMETHOPRIM 1 TABLET: 800; 160 TABLET ORAL at 21:51

## 2020-01-01 RX ADMIN — FUROSEMIDE 20 MG: 10 INJECTION, SOLUTION INTRAMUSCULAR; INTRAVENOUS at 21:29

## 2020-01-01 RX ADMIN — SODIUM CHLORIDE 500 ML: 9 INJECTION, SOLUTION INTRAVENOUS at 20:49

## 2020-01-01 RX ADMIN — Medication 20 MG: at 16:16

## 2020-01-01 RX ADMIN — GLIMEPIRIDE 2 MG: 2 TABLET ORAL at 09:38

## 2020-01-01 RX ADMIN — SODIUM CHLORIDE: 9 INJECTION, SOLUTION INTRAVENOUS at 20:15

## 2020-01-01 RX ADMIN — VITAMIN D, TAB 1000IU (100/BT) 1000 UNITS: 25 TAB at 09:01

## 2020-01-01 RX ADMIN — SODIUM CHLORIDE: 9 INJECTION, SOLUTION INTRAVENOUS at 14:18

## 2020-01-01 RX ADMIN — HEPARIN SODIUM 5000 UNITS: 5000 INJECTION INTRAVENOUS; SUBCUTANEOUS at 22:03

## 2020-01-01 RX ADMIN — CEFTRIAXONE 1 G: 1 INJECTION, POWDER, FOR SOLUTION INTRAMUSCULAR; INTRAVENOUS at 10:09

## 2020-01-01 RX ADMIN — ASPIRIN 81 MG 81 MG: 81 TABLET ORAL at 08:05

## 2020-01-01 RX ADMIN — SODIUM CHLORIDE: 9 INJECTION, SOLUTION INTRAVENOUS at 17:15

## 2020-01-01 RX ADMIN — SUCRALFATE 1 G: 1 TABLET ORAL at 09:01

## 2020-01-01 RX ADMIN — AMIODARONE HYDROCHLORIDE 200 MG: 200 TABLET ORAL at 08:09

## 2020-01-01 RX ADMIN — DOPAMINE HYDROCHLORIDE 2 MCG/KG/MIN: 160 INJECTION, SOLUTION INTRAVENOUS at 05:26

## 2020-01-01 RX ADMIN — FAMOTIDINE 20 MG: 20 TABLET ORAL at 17:33

## 2020-01-01 RX ADMIN — VITAMIN D, TAB 1000IU (100/BT) 1000 UNITS: 25 TAB at 11:44

## 2020-01-01 RX ADMIN — MIRTAZAPINE 7.5 MG: 15 TABLET, FILM COATED ORAL at 20:16

## 2020-01-01 RX ADMIN — AMIODARONE HYDROCHLORIDE 200 MG: 200 TABLET ORAL at 11:44

## 2020-01-01 RX ADMIN — ETOMIDATE 10 MG: 2 INJECTION INTRAVENOUS at 08:32

## 2020-01-01 RX ADMIN — HEPARIN SODIUM 5000 UNITS: 5000 INJECTION INTRAVENOUS; SUBCUTANEOUS at 22:27

## 2020-01-01 RX ADMIN — ASPIRIN 81 MG: 81 TABLET, CHEWABLE ORAL at 09:38

## 2020-01-01 RX ADMIN — SODIUM CHLORIDE 8 MG/HR: 9 INJECTION, SOLUTION INTRAVENOUS at 17:50

## 2020-01-01 RX ADMIN — PHENYLEPHRINE HYDROCHLORIDE 300 MCG/MIN: 10 INJECTION INTRAVENOUS at 07:10

## 2020-01-01 RX ADMIN — AMIODARONE HYDROCHLORIDE 200 MG: 200 TABLET ORAL at 10:33

## 2020-01-01 RX ADMIN — MEROPENEM 1 G: 1 INJECTION, POWDER, FOR SOLUTION INTRAVENOUS at 12:07

## 2020-01-01 RX ADMIN — SODIUM CHLORIDE 0.04 UNITS/KG/HR: 9 INJECTION, SOLUTION INTRAVENOUS at 17:04

## 2020-01-01 RX ADMIN — AMIODARONE HYDROCHLORIDE 200 MG: 200 TABLET ORAL at 09:11

## 2020-01-01 RX ADMIN — BUMETANIDE 1 MG: 1 TABLET ORAL at 08:31

## 2020-01-01 RX ADMIN — BENZONATATE 100 MG: 100 CAPSULE ORAL at 00:54

## 2020-01-01 RX ADMIN — SUCRALFATE 1 G: 1 TABLET ORAL at 16:50

## 2020-01-01 ASSESSMENT — PAIN DESCRIPTION - ONSET
ONSET: OTHER (COMMENT)
ONSET: SUDDEN
ONSET: ON-GOING

## 2020-01-01 ASSESSMENT — PAIN SCALES - GENERAL
PAINLEVEL_OUTOF10: 3
PAINLEVEL_OUTOF10: 1
PAINLEVEL_OUTOF10: 4
PAINLEVEL_OUTOF10: 0
PAINLEVEL_OUTOF10: 3
PAINLEVEL_OUTOF10: 6
PAINLEVEL_OUTOF10: 4
PAINLEVEL_OUTOF10: 4
PAINLEVEL_OUTOF10: 0
PAINLEVEL_OUTOF10: 2
PAINLEVEL_OUTOF10: 4
PAINLEVEL_OUTOF10: 8
PAINLEVEL_OUTOF10: 4
PAINLEVEL_OUTOF10: 1
PAINLEVEL_OUTOF10: 3
PAINLEVEL_OUTOF10: 5
PAINLEVEL_OUTOF10: 0
PAINLEVEL_OUTOF10: 6
PAINLEVEL_OUTOF10: 6
PAINLEVEL_OUTOF10: 5
PAINLEVEL_OUTOF10: 4
PAINLEVEL_OUTOF10: 3
PAINLEVEL_OUTOF10: 6
PAINLEVEL_OUTOF10: 3
PAINLEVEL_OUTOF10: 4
PAINLEVEL_OUTOF10: 0
PAINLEVEL_OUTOF10: 0
PAINLEVEL_OUTOF10: 6
PAINLEVEL_OUTOF10: 2
PAINLEVEL_OUTOF10: 0
PAINLEVEL_OUTOF10: 6
PAINLEVEL_OUTOF10: 0
PAINLEVEL_OUTOF10: 0
PAINLEVEL_OUTOF10: 2
PAINLEVEL_OUTOF10: 0
PAINLEVEL_OUTOF10: 1
PAINLEVEL_OUTOF10: 6
PAINLEVEL_OUTOF10: 3
PAINLEVEL_OUTOF10: 0
PAINLEVEL_OUTOF10: 4
PAINLEVEL_OUTOF10: 7
PAINLEVEL_OUTOF10: 7
PAINLEVEL_OUTOF10: 6
PAINLEVEL_OUTOF10: 0
PAINLEVEL_OUTOF10: 0
PAINLEVEL_OUTOF10: 4
PAINLEVEL_OUTOF10: 0
PAINLEVEL_OUTOF10: 9
PAINLEVEL_OUTOF10: 3
PAINLEVEL_OUTOF10: 4
PAINLEVEL_OUTOF10: 0

## 2020-01-01 ASSESSMENT — PAIN DESCRIPTION - LOCATION
LOCATION: CHEST
LOCATION: BACK;KNEE
LOCATION: HIP
LOCATION: KNEE
LOCATION: HIP
LOCATION: GENERALIZED
LOCATION: CHEST
LOCATION: HIP;LEG
LOCATION: BACK
LOCATION: KNEE
LOCATION: LEG
LOCATION: KNEE
LOCATION: LEG;BACK
LOCATION: BACK;LEG
LOCATION: BACK;KNEE
LOCATION: LEG

## 2020-01-01 ASSESSMENT — PAIN DESCRIPTION - PAIN TYPE
TYPE: CHRONIC PAIN
TYPE: ACUTE PAIN
TYPE: CHRONIC PAIN
TYPE: ACUTE PAIN
TYPE: ACUTE PAIN
TYPE: CHRONIC PAIN
TYPE: ACUTE PAIN
TYPE: CHRONIC PAIN
TYPE: CHRONIC PAIN;ACUTE PAIN
TYPE: CHRONIC PAIN
TYPE: CHRONIC PAIN
TYPE: ACUTE PAIN
TYPE: CHRONIC PAIN
TYPE: CHRONIC PAIN

## 2020-01-01 ASSESSMENT — ENCOUNTER SYMPTOMS
ABDOMINAL PAIN: 0
SHORTNESS OF BREATH: 0
WHEEZING: 0
CHEST TIGHTNESS: 0
COUGH: 0
VOMITING: 0
NAUSEA: 1
SHORTNESS OF BREATH: 0
ABDOMINAL PAIN: 1
TROUBLE SWALLOWING: 0
SHORTNESS OF BREATH: 0
VOMITING: 0
WHEEZING: 0
COUGH: 0
CONSTIPATION: 0
BLOOD IN STOOL: 0
SORE THROAT: 0
SHORTNESS OF BREATH: 0
WHEEZING: 0
SHORTNESS OF BREATH: 0
SHORTNESS OF BREATH: 0
ABDOMINAL PAIN: 0
SHORTNESS OF BREATH: 0
VOMITING: 0
VOMITING: 0
SHORTNESS OF BREATH: 1
VOMITING: 0
TROUBLE SWALLOWING: 0
CHEST TIGHTNESS: 0
COUGH: 0
RHINORRHEA: 0
CHEST TIGHTNESS: 0
SORE THROAT: 0
CONSTIPATION: 1
ABDOMINAL PAIN: 0
DIARRHEA: 0
COUGH: 0
BLOOD IN STOOL: 0
DIARRHEA: 0
CONSTIPATION: 0
WHEEZING: 0
WHEEZING: 0
SHORTNESS OF BREATH: 0
VOMITING: 0
COLOR CHANGE: 0
COUGH: 0
CONSTIPATION: 0
WHEEZING: 0
NAUSEA: 0
COUGH: 0
SHORTNESS OF BREATH: 1
CONSTIPATION: 0
SINUS PRESSURE: 0
CHEST TIGHTNESS: 0
ABDOMINAL PAIN: 0
COUGH: 0
CHEST TIGHTNESS: 0
ABDOMINAL PAIN: 0
NAUSEA: 0
BLOOD IN STOOL: 0
FACIAL SWELLING: 0
COUGH: 1
BACK PAIN: 1
CONSTIPATION: 1
VOMITING: 0
NAUSEA: 0
ABDOMINAL PAIN: 0
ABDOMINAL PAIN: 0
NAUSEA: 1
SHORTNESS OF BREATH: 0
DIARRHEA: 0
BLOOD IN STOOL: 0
CHEST TIGHTNESS: 0
WHEEZING: 0
SORE THROAT: 0
WHEEZING: 0
DIARRHEA: 0
WHEEZING: 0
COUGH: 0
DIARRHEA: 0
SHORTNESS OF BREATH: 0
NAUSEA: 0
CHEST TIGHTNESS: 0
BLOOD IN STOOL: 0
COUGH: 0
EYE PAIN: 0
CHEST TIGHTNESS: 0
ABDOMINAL PAIN: 0
BLOOD IN STOOL: 0
COUGH: 0
NAUSEA: 0
BACK PAIN: 1
BACK PAIN: 1
SINUS PRESSURE: 0
WHEEZING: 0
BLOOD IN STOOL: 0
SORE THROAT: 0
VOMITING: 0
RHINORRHEA: 0
CHEST TIGHTNESS: 0
DIARRHEA: 0
CONSTIPATION: 0
ABDOMINAL PAIN: 0
NAUSEA: 1
COUGH: 0
NAUSEA: 0
COLOR CHANGE: 0
EYE PAIN: 0
DIARRHEA: 0
BACK PAIN: 1
ABDOMINAL PAIN: 0
CHEST TIGHTNESS: 0
CHANGE IN BOWEL HABIT: 0
NAUSEA: 1
FACIAL SWELLING: 0
NAUSEA: 1
CONSTIPATION: 0
BLOOD IN STOOL: 0
EYE PAIN: 0
CONSTIPATION: 0
RHINORRHEA: 0
WHEEZING: 0
RHINORRHEA: 0
BACK PAIN: 1
SHORTNESS OF BREATH: 0
RHINORRHEA: 0

## 2020-01-01 ASSESSMENT — PAIN DESCRIPTION - FREQUENCY
FREQUENCY: INTERMITTENT
FREQUENCY: CONTINUOUS
FREQUENCY: INTERMITTENT
FREQUENCY: CONTINUOUS

## 2020-01-01 ASSESSMENT — PAIN DESCRIPTION - DIRECTION
RADIATING_TOWARDS: DOWN R LEG
RADIATING_TOWARDS: RIGHT LEG
RADIATING_TOWARDS: FEET
RADIATING_TOWARDS: DOWN RIGHT LEG

## 2020-01-01 ASSESSMENT — PAIN DESCRIPTION - DESCRIPTORS
DESCRIPTORS: ACHING;SORE
DESCRIPTORS: CONSTANT
DESCRIPTORS: ACHING
DESCRIPTORS: CONSTANT
DESCRIPTORS: SORE
DESCRIPTORS: CONSTANT;ACHING
DESCRIPTORS: TIGHTNESS
DESCRIPTORS: ACHING;SORE;THROBBING
DESCRIPTORS: SHOOTING
DESCRIPTORS: CRAMPING
DESCRIPTORS: TIGHTNESS
DESCRIPTORS: ACHING

## 2020-01-01 ASSESSMENT — PATIENT HEALTH QUESTIONNAIRE - PHQ9
2. FEELING DOWN, DEPRESSED OR HOPELESS: 1
SUM OF ALL RESPONSES TO PHQ9 QUESTIONS 1 & 2: 1
SUM OF ALL RESPONSES TO PHQ QUESTIONS 1-9: 1
1. LITTLE INTEREST OR PLEASURE IN DOING THINGS: 0
SUM OF ALL RESPONSES TO PHQ QUESTIONS 1-9: 1

## 2020-01-01 ASSESSMENT — PULMONARY FUNCTION TESTS
PIF_VALUE: 28
PIF_VALUE: 28
PIF_VALUE: 25
PIF_VALUE: 29

## 2020-01-01 ASSESSMENT — PAIN DESCRIPTION - ORIENTATION
ORIENTATION: RIGHT
ORIENTATION: RIGHT;LOWER
ORIENTATION: MID
ORIENTATION: RIGHT
ORIENTATION: RIGHT;LEFT
ORIENTATION: MID
ORIENTATION: RIGHT

## 2020-01-01 ASSESSMENT — PAIN DESCRIPTION - PROGRESSION
CLINICAL_PROGRESSION: GRADUALLY WORSENING
CLINICAL_PROGRESSION: NOT CHANGED
CLINICAL_PROGRESSION: RAPIDLY WORSENING
CLINICAL_PROGRESSION: NOT CHANGED
CLINICAL_PROGRESSION: GRADUALLY IMPROVING
CLINICAL_PROGRESSION: NOT CHANGED

## 2020-01-08 NOTE — ED PROVIDER NOTES
HISTORY    has a past medical history of Allergic rhinitis, Anticoagulated, Anxiety, generalized, Aortic stenosis, Atrial fibrillation ([facility] [address] Atrial fibrillation with RVR ([facility] [address] [facility] ([facility] CAD (coronary artery disease), Cellulitis, Cellulitis of left arm, Diabetic neuropathy ([facility] [address] Hyperlipidemia, Hypertension, Lymphedema of arm, PVC's (premature ventricular contractions), S/P cardiac cath, Type II or unspecified type diabetes mellitus without mention of complication, not stated as uncontrolled, and Vitamin D deficiency. SURGICAL HISTORY      has a past surgical history that includes Mastectomy (Bilateral, 1995 and 2000); Hysterectomy (09/09/2002); fracture surgery (08/10/99); Cardiac catheterization (November 2012); Colonoscopy (08/12/2009); Colonoscopy (9/8/14); other surgical history (Right, 6/2015); Coronary angioplasty with stent (09/2016); Cardiac catheterization (09/19/2017); Aortic valve replacement (12/07/2017); pr colonoscopy flx dx w/collj spec when pfrmd (N/A, 9/24/2018); Cardiac catheterization (09/23/2019); and pacemaker placement.     CURRENT MEDICATIONS       Previous Medications    ACETAMINOPHEN (TYLENOL) 325 MG TABLET    Take 2 tablets by mouth every 4 hours as needed for Pain    AMIODARONE (CORDARONE) 200 MG TABLET    Take 1 tablet by mouth daily    ASPIRIN 81 MG CHEWABLE TABLET    Take 1 tablet by mouth daily    BLOOD GLUCOSE TEST STRIPS (FREESTYLE TEST STRIPS) STRIP    USE ONE STRIP TO TEST DAILY AS NEEDED    BUMETANIDE (BUMEX) 1 MG TABLET    Take 1 tablet by mouth 2 times daily    FAMOTIDINE (PEPCID) 20 MG TABLET    TAKE ONE TABLET BY MOUTH EVERY EVENING    GLIMEPIRIDE (AMARYL) 2 MG TABLET    TAKE ONE TABLET BY MOUTH TWICE A DAY    LISINOPRIL (PRINIVIL;ZESTRIL) 2.5 MG TABLET    Take 1 tablet by mouth daily    LORATADINE (CLARITIN) 10 MG TABLET    Take 1 tablet by mouth daily as needed (allergies)    LORAZEPAM (ATIVAN) 0.5 MG TABLET    TAKE ONE TABLET BY MOUTH EVERY 8 HOURS AS NEEDED FOR ANXIETY FOR UP TO 30 DAYS    METOPROLOL TARTRATE (LOPRESSOR) 25 MG TABLET    Take 1 tablet by mouth 2 times daily    NITROGLYCERIN (NITROSTAT) 0.4 MG SL TABLET    Place 1 tablet under the tongue every 5 minutes as needed for Chest pain up to max of 3 total doses. If no relief after 1 dose, call 911. ONDANSETRON (ZOFRAN ODT) 4 MG DISINTEGRATING TABLET    Take 1 tablet by mouth every 6 hours as needed for Nausea or Vomiting    POTASSIUM CHLORIDE (KLOR-CON M) 20 MEQ TBCR EXTENDED RELEASE TABLET    TAKE ONE TABLET BY MOUTH TWICE A DAY WITH MEALS    PROBIOTIC PRODUCT (PROBIOTIC DAILY PO)    Take by mouth daily    RIVAROXABAN (XARELTO) 15 MG TABS TABLET    Take 1 tablet by mouth daily (with breakfast)    SERTRALINE (ZOLOFT) 50 MG TABLET    Take 1 tablet by mouth daily    SPIRONOLACTONE (ALDACTONE) 25 MG TABLET    Take 1 tablet by mouth daily    SUCRALFATE (CARAFATE) 1 GM TABLET    Take 1 tablet by mouth 4 times daily    VITAMIN B-6 (PYRIDOXINE) 100 MG TABLET    Take 100 mg by mouth every other day    VITAMIN D (CHOLECALCIFEROL) 1000 UNITS CAPS CAPSULE    Take 1,000 Units by mouth daily. ALLERGIES     is allergic to darvocet a500 [propoxyphene n-acetaminophen]; demerol hcl [meperidine]; marinol [dronabinol]; morphine sulfate [morphine]; statins [statins]; and levaquin [levofloxacin in d5w]. FAMILY HISTORY     She indicated that her mother is . She indicated that her father is . She indicated that all of her five sisters are alive. family history includes Diabetes in her mother; Glaucoma in her mother; Stroke (age of onset: 64) in her father. SOCIAL HISTORY      reports that she is a non-smoker but has been exposed to tobacco smoke. She has never used smokeless tobacco. She reports that she does not drink alcohol or use drugs. PHYSICAL EXAM     INITIAL VITALS:  height is 5' (1.524 m) and weight is 130 lb (59 kg). Her temperature is 98.2 °F (36.8 °C).  Her blood pressure is 149/56 (abnormal) and her pulse is 78. Her respiration is 16 and oxygen saturation is 98%. Physical Exam  Constitutional:       Appearance: She is well-developed. HENT:      Head: Normocephalic and atraumatic. Right Ear: External ear normal.      Left Ear: External ear normal.   Eyes:      Conjunctiva/sclera: Conjunctivae normal.      Pupils: Pupils are equal, round, and reactive to light. Neck:      Musculoskeletal: Normal range of motion. Cardiovascular:      Rate and Rhythm: Normal rate and regular rhythm. Pulmonary:      Effort: Pulmonary effort is normal.      Breath sounds: Normal breath sounds. Abdominal:      General: Bowel sounds are normal.      Palpations: Abdomen is soft. Musculoskeletal: Normal range of motion. General: No tenderness. Comments: Is tender over the right hip tender over the right CVA straight leg raising makes the pain worse   Skin:     General: Skin is warm and dry. Neurological:      General: No focal deficit present. Mental Status: She is alert and oriented to person, place, and time. Psychiatric:         Behavior: Behavior normal.           DIFFERENTIAL DIAGNOSIS/ MDM:     Flank and lower back pain rule out kidney stone or urinary tract infection this may just be her sciatica    DIAGNOSTIC RESULTS     EKG: All EKG's are interpreted by the Emergency Department Physician who either signs or Co-signs this chart in the absence of a cardiologist.        RADIOLOGY:   I directly visualized the following  images and reviewed the radiologist interpretations:       EXAMINATION:   CT OF THE ABDOMEN AND PELVIS WITHOUT CONTRAST 1/8/2020 11:20 am       TECHNIQUE:   CT of the abdomen and pelvis was performed without the administration of   intravenous contrast. Multiplanar reformatted images are provided for review.    Dose modulation, iterative reconstruction, and/or weight based adjustment of   the mA/kV was utilized to reduce the radiation dose to as low as reasonably   achievable.       COMPARISON:   CT abdomen and pelvis August 18, 2019.  Chest CT October 24, 2017.       HISTORY:   ORDERING SYSTEM PROVIDED HISTORY: Abdominal Pain   TECHNOLOGIST PROVIDED HISTORY:   WITHOUT Contrast   Abdominal Pain   Reason for Exam: Right sided back pain that radiates anterior with nausea for   2 weeks. Acuity: Acute   Type of Exam: Initial       FINDINGS:   LOWER CHEST: Resolution of previously seen pleural effusions.  Small   subpleural nodule in the lateral right lower lobe measures 4 mm, unchanged   over 2 years compatible with a benign process for which no follow-up is   necessary.       ORGANS: Lack of intravenous contrast limits evaluation of the solid organs   and bowel.  The liver, gallbladder, pancreas, spleen, kidneys and adrenals   reveal no acute abnormality.  No renal calculi identified.  Benign-appearing   cyst in the left hepatic lobe again demonstrated.       GI/BOWEL: No bowel dilatation or wall thickening.       PELVIS: No acute findings.       PERITONEUM/RETROPERITONEUM: No lymphadenopathy is noted.  No free air or free   fluid.  The aorta is normal in caliber.       BONES/SOFT TISSUES: No acute osseous abnormality is identified.  Multilevel   degenerative disc disease.  Mild degenerative-type vertebral body height loss   at T12 is again demonstrated.           Impression   No acute abnormality identified.  No renal calculi.               ED BEDSIDE ULTRASOUND:       LABS:  Labs Reviewed   CBC WITH AUTO DIFFERENTIAL - Abnormal; Notable for the following components:       Result Value    RBC 3.87 (*)     Hemoglobin 9.9 (*)     Hematocrit 31.8 (*)     MCV 82.2 (*)     RDW 19.0 (*)     Seg Neutrophils 72 (*)     Lymphocytes 15 (*)     Immature Granulocytes 1 (*)     All other components within normal limits   URINALYSIS - Abnormal; Notable for the following components:    Urine Hgb TRACE (*)     Leukocyte Esterase, Urine 2+ (*)     All other components within normal

## 2020-01-09 NOTE — CARE COORDINATION
Ambulatory Care Coordination ED Follow up Call       Reason for ED Visit:  Sciatica right side and UTI  Care Management Risk Score: CMRS 11  How are you feeling? :     not changed  Patient Reports the following:  Pt right side is still bothering her. She is waiting for prior approval for the lidocaine patches. Pt is taking Bactrim DS. Writer advised pt to completer the antibiotic and follow up with status of lidocaine patches. Pt was advised to follow up with PCP. Contact [facility] regarding any worsening symptoms from above. Did you call your PCP prior to going to the ED? No          Post Discharge Status:  What health concerns since you left the Emergency Room?  none    Do you have wounds that you are caring for at home? No    Do you have a follow up appt scheduled? yes    Review of Instructions:                                 Do you have any questions regarding your discharge instructions?:  No  Medications:    What questions do you have about your medications? No  Are you taking your medications as directed? If not - why? Yes   Can you afford your medications? Yes, but waiting for prior authorization for lidocaine patches  ADLS:  Do you need assistance of any kind at home? N/A   What assistance is needed?  n/a      FU appts/Provider:    Future Appointments   Date Time Provider Destinee Gaffney   1/21/2020  9:50 AM Jose Raul Conrad MD [facility]   1/30/2020  1:00 PM Mily Snyder MD [facility]   3/6/2020  9:00 AM Ginny Laughlin MD [facility]   5/6/2020  1:15 PM Kanika Simms MD [facility]   5/7/2020 11:00 AM Ginny Laughlin MD [facility]   5/27/2020  2:15 PM Dustin Morris MD [address] Maintenance Due   Topic Date Due    Diabetic retinal exam  08/01/2018    Annual Wellness Visit (AWV)  05/29/2019    Diabetic foot exam  11/05/2019     Patient advised to contact PCP office to have HM items/records faxed to PCP Office directly?   N/A

## 2020-01-11 PROBLEM — N39.0 UTI (URINARY TRACT INFECTION): Status: ACTIVE | Noted: 2020-01-01

## 2020-01-11 PROBLEM — N17.9 ACUTE KIDNEY INJURY (HCC): Status: ACTIVE | Noted: 2020-01-01

## 2020-01-11 NOTE — ED PROVIDER NOTES
REQ.    Yeast, UA NOT REPORTED None   Potassium w/ Reflex to Magnesium   Result Value Ref Range    Potassium 5.3 3.7 - 5.3 mmol/L       ABNORMAL LABS:  Labs Reviewed   BASIC METABOLIC PANEL - Abnormal; Notable for the following components:       Result Value    Glucose 200 (*)     BUN 48 (*)     CREATININE 3.11 (*)     Sodium 132 (*)     Potassium 6.1 (*)     Chloride 96 (*)     CO2 18 (*)     Anion Gap 18 (*)     GFR Non- 14 (*)     GFR  17 (*)     All other components within normal limits   CBC WITH AUTO DIFFERENTIAL - Abnormal; Notable for the following components:    RBC 3.79 (*)     Hemoglobin 9.6 (*)     Hematocrit 31.0 (*)     MCV 81.8 (*)     RDW 18.9 (*)     Seg Neutrophils 79 (*)     Lymphocytes 9 (*)     Immature Granulocytes 2 (*)     Absolute Lymph # 0.88 (*)     All other components within normal limits   MICROSCOPIC URINALYSIS - Abnormal; Notable for the following components:    Bacteria, UA TRACE (*)     All other components within normal limits   URINE RT REFLEX TO CULTURE   POTASSIUM W/ REFLEX TO MAGNESIUM        EKG: All EKG's are interpreted by the Emergency Department Physician who either signs or Co-signs this chart in the absence of a cardiologist.        EMERGENCY DEPARTMENT COURSE:   Vitals:    Vitals:    01/11/20 1148 01/11/20 1411 01/11/20 1543 01/11/20 1704   BP:  (!) 103/51 (!) 99/55 (!) 101/49   Pulse:  94 76 78   Resp:  12 12 12   Temp:       TempSrc:       SpO2:  96% 99% 95%   Weight:       Height: 5' (1.524 m)        -------------------------  BP: (!) 101/49, Temp: 98.5 °F (36.9 °C), Pulse: 78, Resp: 12    See DDX/MDM (Differential Diagnosis/Medical Decision Making) above      FINAL IMPRESSION      1. Acute renal failure, unspecified acute renal failure type ([facility] [address]    2.  Hyperkalemia          DISPOSITION/PLAN   DISPOSITION Decision To Admit 01/11/2020 07:22:36 PM      Condition on Disposition    Stable    PATIENT REFERRED TO:  No follow-up provider specified.     DISCHARGE MEDICATIONS:  New Prescriptions    SODIUM POLYSTYRENE (SPS) 15 GM/60ML SUSPENSION    Take 120 mLs by mouth once for 1 dose       (Please note that portions of this note were completed with a voice recognition program.  Efforts were made to edit the dictations but occasionally words are mis-transcribed.)    Igor Penny,, DO  Attending Emergency Physician       Igor ePnny, DO  01/11/20 [address] [place] [address], DO  01/11/20 [address] [place] [address], DO  01/11/20 1926

## 2020-01-12 NOTE — H&P
Denies any weight changes recently. HEENT:  Denies any sore throat or postnasal drainage. CARDIAC:  No chest pain or palpitations. RESPIRATORY:  Has chronic exertional dyspnea. GASTROINTESTINAL:  Denies any nausea or emesis. No diarrhea or  constipation. GENITOURINARY:  History of recent UTI. However, denies any urinary  frequency or urgency at the present time. No hematuria. MUSCULOSKELETAL:  Complaints of back pain and also pain in the left hip  and legs. Has been seeing a chiropractor for sciatica. NEUROPSYCHIATRY:  History of anxiety. Denies any depression. Rest of review of systems is unremarkable. HOME MEDICATIONS:  She is on Lidoderm patch for her back pain, Bactrim  DS recently for UTI, Ativan 0.5 mg t.i.d. p.r.n. for anxiety, Amaryl 2  mg a day, potassium chloride 20 mEq daily, metoprolol 25 mg daily,  Xarelto 15 mg daily, Zoloft 50 mg a day, Aldactone 25 mg daily,  amiodarone 200 mg daily, Bumex 1 mg a day, Carafate 1 gm q.i.d. p.r.n.,  lisinopril 2.5 mg daily, Zofran p.r.n. for nausea, aspirin 81 mg a day,  Pepcid 20 mg twice a day, Claritin 10 mg p.r.n., pyridoxine 100 mg every  other day, Nitrostat sublingual p.r.n. for chest pain. ALLERGIES:  Allergic to DEMEROL, MORPHINE, STATINS, LEVAQUIN, AND  DARVOCET. PHYSICAL EXAMINATION:  GENERAL:  On exam, the patient in no acute distress, resting at this  time. VITAL SIGNS:  Temp of 98.1, pulse 75, blood pressure 118/75, O2 sats  97%. HEENT:  Mucosa looks moist and pink. Throat is clear. She has a small  scab on the left cheek area. NECK:  Supple. No JVD or bruits. LUNGS:  Bilaterally clear. HEART:  Regular and no murmurs. ABDOMEN:  Obese, soft. No CVA tenderness. EXTREMITIES:  No ankle edema. No calf tenderness. BACK:  No C-spine tenderness. Had some tenderness in the right  sacroiliac area with slightly decreased range of movement in the right  hip area. No effusion or swelling.     ANCILLARY STUDIES:  Labs in the ER showed sodium 152, potassium was  elevated at 6.1, BUN of 48, creatinine of 3.11. WBC count of 9.6,  hemoglobin 9.6, hematocrit of 31.0, and platelet was 336. Urine culture  from recent ER visit showed E. coli resistant to multiple organisms  sensitive meropenem, gentamicin, and tobramycin. Labs from this morning  show white count of 7.0, hemoglobin is 8.8, sodium is 132, potassium is  improving at 4.7, BUN is 46, and creatinine is improving at 2.87. ASSESSMENT:  1.  UTI with E. coli sensitive to meropenem, gentamicin, and tobramycin. 2.  Acute kidney injury. 3.  Hyperkalemia, corrected. 4.  Non-ischemic cardiomyopathy. 5.  Back pain with history of sciatica. 6.  History of paroxysmal atrial fibrillation, status post TAVR. 7.  Diabetes mellitus type 2. PLAN:  The patient admitted for management of her UTI. Has been started  on meropenem and also gentle IV hydration for acute kidney injury due to  history of systolic congestive heart failure. The patient had a renal  ultrasound arranged. Continued with Xarelto and EPC cuffs for DVT prophylaxis. Get an x-ray for the right hip due to the patient's complaints of right  hip pain. Consult Cardiology tomorrow. Expected length of stay is 2 to  3 nights.         CATARINA CASTILLO    D: 01/12/2020 10:35:25       T: 01/12/2020 12:06:28     [email or web address]  Job#: [id number]     Doc#: [id number]    CC:

## 2020-01-13 NOTE — PLAN OF CARE
Problem: Falls - Risk of:  Goal: Will remain free from falls  Description  Will remain free from falls  Outcome: Ongoing  Goal: Absence of physical injury  Description  Absence of physical injury  Outcome: Ongoing     Problem: Urinary Elimination:  Goal: Signs and symptoms of infection will decrease  Description  Signs and symptoms of infection will decrease  Outcome: Ongoing  Goal: Ability to reestablish a normal urinary elimination pattern will improve - after catheter removal  Description  Ability to reestablish a normal urinary elimination pattern will improve  Outcome: Ongoing  Goal: Complications related to the disease process, condition or treatment will be avoided or minimized  Description  Complications related to the disease process, condition or treatment will be avoided or minimized  Outcome: Ongoing     Problem: Pain:  Goal: Pain level will decrease  Description  Pain level will decrease  Outcome: Ongoing  Goal: Control of acute pain  Description  Control of acute pain  Outcome: Ongoing  Goal: Control of chronic pain  Description  Control of chronic pain  Outcome: Ongoing

## 2020-01-13 NOTE — PLAN OF CARE
Problem: Falls - Risk of:  Goal: Will remain free from falls  Description  Will remain free from falls  1/13/2020 1321 by Radha Jimenez RN  Outcome: Ongoing  1/13/2020 0353 by Vidhi Caputo RN  Outcome: Ongoing  Goal: Absence of physical injury  Description  Absence of physical injury  1/13/2020 1321 by Radha Jimenez RN  Outcome: Ongoing  1/13/2020 0353 by Vidhi Caputo RN  Outcome: Ongoing     Problem: Urinary Elimination:  Goal: Signs and symptoms of infection will decrease  Description  Signs and symptoms of infection will decrease  1/13/2020 1321 by Radha Jimenez RN  Outcome: Ongoing  1/13/2020 0353 by Vidhi Caputo RN  Outcome: Ongoing  Goal: Ability to reestablish a normal urinary elimination pattern will improve - after catheter removal  Description  Ability to reestablish a normal urinary elimination pattern will improve  1/13/2020 1321 by Radha Jimenez RN  Outcome: Ongoing  1/13/2020 0353 by Vidhi Caputo RN  Outcome: Ongoing  Goal: Complications related to the disease process, condition or treatment will be avoided or minimized  Description  Complications related to the disease process, condition or treatment will be avoided or minimized  1/13/2020 1321 by Radha Jimenez RN  Outcome: Ongoing  1/13/2020 0353 by Vidhi Caputo RN  Outcome: Ongoing     Problem: Pain:  Goal: Pain level will decrease  Description  Pain level will decrease  1/13/2020 1321 by Radha Jimenez RN  Outcome: Ongoing  1/13/2020 0353 by Vidhi Caputo RN  Outcome: Ongoing  Goal: Control of acute pain  Description  Control of acute pain  1/13/2020 1321 by Radha Jimenez RN  Outcome: Ongoing  1/13/2020 0353 by Vidhi Caputo RN  Outcome: Ongoing  Goal: Control of chronic pain  Description  Control of chronic pain  1/13/2020 1321 by Radha Jimenez RN  Outcome: Ongoing  1/13/2020 0353 by Vidhi Caputo RN  Outcome: Ongoing

## 2020-01-13 NOTE — CONSULTS
[facility]   Consult Note         Today's Date: 1/13/2020  Patient Name: Janna Coronado  Date of admission: 1/11/2020 11:02 AM  Patient's age: 82 y. o., 1935  Admission Dx: UTI (urinary tract infection) [N39.0]    Reason for Consult:  Cardiac evaluation    Requesting Physician: Nanette Young MD    REASON FOR CONSULT:  NICM    History Obtained From:  Patient, chart, staff, records    HISTORY OF PRESENT ILLNESS:      The patient is a 82 y.o. female who is admitted to the hospital for UTI with resistant bacteria. Called back to receive IV abx that are quite strong. Cardiology called for help with fluid management, CHF, NICM. Past Medical History:   has a past medical history of Allergic rhinitis, Anticoagulated, Anxiety, generalized, Aortic stenosis, Atrial fibrillation ([address] Atrial fibrillation with RVR ([address] Breast cancer ([address] CAD (coronary artery disease), Cellulitis, Cellulitis of left arm, Diabetic neuropathy ([address] Hyperlipidemia, Hypertension, Lymphedema of arm, PVC's (premature ventricular contractions), S/P cardiac cath, Type II or unspecified type diabetes mellitus without mention of complication, not stated as uncontrolled, and Vitamin D deficiency. Past Surgical History:   has a past surgical history that includes Mastectomy (Bilateral, 1995 and 2000); Hysterectomy (09/09/2002); fracture surgery (08/10/99); Cardiac catheterization (November 2012); Colonoscopy (08/12/2009); Colonoscopy (9/8/14); other surgical history (Right, 6/2015); Coronary angioplasty with stent (09/2016); Cardiac catheterization (09/19/2017); Aortic valve replacement (12/07/2017); pr colonoscopy flx dx w/collj spec when pfrmd (N/A, 9/24/2018); Cardiac catheterization (09/23/2019); and pacemaker placement. Home Medications:    Prior to Admission medications    Medication Sig Start Date End Date Taking?  Authorizing Provider   sodium polystyrene (SPS) 15 GM/60ML suspension Take 120 mLs by her mother; Glaucoma in her mother; Stroke (age of onset: 64) in her father. No h/o sudden cardiac death. REVIEW OF SYSTEMS:    · Constitutional: there has been no unanticipated weight loss. There's been No change in energy level, No change in activity level. · Eyes: No visual changes or diplopia. No scleral icterus. · ENT: No Headaches, hearing loss or vertigo. No mouth sores or sore throat. · Cardiovascular: No cardiac history  · Respiratory: No previous pulmonary problems  · Gastrointestinal: No abdominal pain, appetite loss, blood in stools. No change in bowel or bladder habits. · Genitourinary: No dysuria, trouble voiding, or hematuria. · Musculoskeletal:  No gait disturbance, No weakness or joint complaints. · Integumentary: No rash or pruritis. · Neurological: No headache, diplopia, change in muscle strength, numbness or tingling. No change in gait, balance, coordination, mood, affect, memory, mentation, behavior. · Psychiatric: No anxiety, or depression. · Endocrine: No temperature intolerance. No excessive thirst, fluid intake, or urination. No tremor. · Hematologic/Lymphatic: No abnormal bruising or bleeding, blood clots or swollen lymph nodes. · Allergic/Immunologic: No nasal congestion or hives. PHYSICAL EXAM:      BP (!) 100/53   Pulse 84   Temp 97 °F (36.1 °C) (Tympanic)   Resp 18   Ht 5' (1.524 m)   Wt 132 lb 6.4 oz (60.1 kg)   SpO2 93%   BMI 25.86 kg/m²    Constitutional and General Appearance: alert, cooperative, no distress and appears stated age  HEENT: PERRL, no cervical lymphadenopathy. No masses palpable. Normal oral mucosa  Respiratory:  · Normal excursion and expansion without use of accessory muscles  · Resp Auscultation: Good respiratory effort. No for increased work of breathing.  On auscultation: clear to auscultation bilaterally  Cardiovascular:  · The apical impulse is not displaced  · Heart tones are crisp and normal. regular S1 and S2. 3/6 MECHE  · Jugular venous pulsation Normal  · The carotid upstroke is normal in amplitude and contour without delay or bruit  · Peripheral pulses are symmetrical and full   Abdomen:   · No masses or tenderness  · Bowel sounds present  Extremities:  ·  No Cyanosis or Clubbing  ·  Lower extremity edema: No  ·  Skin: Warm and dry  Neurological:  · Alert and oriented. · Moves all extremities well  · No abnormalities of mood, affect, memory, mentation, or behavior are noted        EKG:    Date: 01/13/20  Reading: No acute ischemia      LAST ECHO:  Date:  Findings Summary:      LAST Stress Test:   Date of last ST:  Major Findings:    LAST Cardiac Angiography:.  Date:  Findings:      Labs:     CBC:   Recent Labs     01/12/20 0513 01/13/20  0509   WBC 7.0 7.3   HGB 8.8* 9.2*   HCT 28.3* 30.1*    267     BMP:   Recent Labs     01/12/20  0513 01/13/20  0509   * 138   K 4.7 3.9   CO2 19* 23   BUN 46* 37*   CREATININE 2.87* 2.19*   LABGLOM 16* 21*   GLUCOSE 85 141*     BNP: No results for input(s): BNP in the last 72 hours. PT/INR:   Recent Labs     01/12/20 0513   PROTIME 12.1*   INR 1.2     APTT:No results for input(s): APTT in the last 72 hours. CARDIAC ENZYMES:No results for input(s): CKTOTAL, CKMB, CKMBINDEX, TROPONINI in the last 72 hours.   FASTING LIPID PANEL:  Lab Results   Component Value Date    HDL 80 12/02/2019    [address] 124 01/06/2016    TRIG 135 12/02/2019     LIVER PROFILE:  Recent Labs     01/12/20  0513   AST 47*   ALT 35*   LABALBU 3.0*     Troponins: Invalid input(s): TROPONIN     Other Current Problems  Patient Active Problem List   Diagnosis    Hyperlipidemia    Hypertension    Diabetic neuropathy ([address]    Breast cancer ([address]    Lymphedema of arm    Vitamin D deficiency    Hyperplastic colon polyp    PVC's (premature ventricular contractions)    Allergic rhinitis    Generalized anxiety disorder    DM (diabetes mellitus), type 2 ([address]    Coronary artery disease involving native coronary artery of native heart    Persistent atrial fibrillation    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 Rita Duncan    Chronic nausea    Moderate mitral regurgitation    Leg cramps    Chronic kidney disease, stage 3 (HCC)    Systolic congestive heart failure (HCC)    S/P ICD (internal cardiac defibrillator) procedure    Pacemaker    Sepsis (HCC)    MSSA (methicillin susceptible Staphylococcus aureus) septicemia (HCC)    Overweight    Creatinine elevation    [facility]    UTI (urinary tract infection)    Acute renal failure ([facility])       Cardiac Testing      ECHO 10/9/19: EF 30%, grade III DD, MALKA moderately dilated, TAVR that is normal in appearance and function, moderate MR, mild TR, RVSP is 71 mmHg, severe PHTN.      ICD 9/23/19: [employer] device placed by Dr. Jud Orantes for ICM.    CATH 9/23/19: Patent LAD stent. Minimal disease in LCx. RCA small with70% stenosis, does not explain CHF/CM.      Dr. Leonila Parada 9/11/19 --   EF still 25% even after correcting Afib c RVR. May be ischemia. Will plan for cath. If clean cath, will need ICD. Did not improved with correction of Afib and optimal medical management. EF previously was 40-45% in January 2019.     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 Follow up echo shows EF still 25%, will plan for cardiac cath and ICD.     -Severe aortic stenosis S/p TAVR 12/7/17. Doing well. Dental antibiotic prophylaxis need discussed. Follow with annual echoes. -CAD- Cardiac cath 09/6/2016 showed LM normal, LAD mid 95% treated with xience 2.5/18 mm ERON, LCX normal, RCA small vessel with proximal 40% stenosis. On plavix.  -Cardiomyopathy-LVEF 40-45%  -Chronic LBBB  -PAF- on xarelto. Continue metoprolol  -HTN- Continue current therapy.   -Hyperlipidemia- continue statin.  -Chronic systolic/diastolic CHF and chronic left upper ext lymphedema. H/o b/l mastectomy.  Continue lasix and aldactone  -Carotid u/s 04/19/2016 no evidence of

## 2020-01-13 NOTE — PROGRESS NOTES
01/13/2020     01/13/2020    CO2 23 01/13/2020    BUN 37 01/13/2020    LABALBU 3.0 01/12/2020    CREATININE 2.19 01/13/2020    CALCIUM 9.3 01/13/2020    GFRAA 26 01/13/2020    LABGLOM 21 01/13/2020    GLUCOSE 141 01/13/2020           Physical Exam:  Vitals: BP (!) 100/53   Pulse 84   Temp 97 °F (36.1 °C) (Tympanic)   Resp 18   Ht 5' (1.524 m)   Wt 132 lb 6.4 oz (60.1 kg)   SpO2 93%   BMI 25.86 kg/m²   24 hour intake/output:    Intake/Output Summary (Last 24 hours) at 1/13/2020 1215  Last data filed at 1/13/2020 0651  Gross per 24 hour   Intake 2340 ml   Output 200 ml   Net 2140 ml     Last 3 weights: Wt Readings from Last 3 Encounters:   01/13/20 132 lb 6.4 oz (60.1 kg)   01/08/20 130 lb (59 kg)   12/06/19 128 lb 9.6 oz (58.3 kg)     HEENT: Normocephalic and Atraumatic  Neck: Supple, No Masses, Tenderness, Nodularity and No Lymphadenopathy  Chest/Lungs: Clear to Auscultation without Rales, Rhonchi, or Wheezes  Cardiac: Irregularly Irregular  GI/Abdomen: Bowel Sounds Present and Soft, Non-tender, without Guarding or Rebound Tenderness  : Not examined  EXT/Skin: No Cyanosis, No Clubbing and Edema Present  Neuro: Alert and Oriented and No Localizing Signs/Symptoms      Assessment:    Active Problems:    UTI (urinary tract infection)    Acute renal failure (HCC)  Resolved Problems:    * No resolved hospital problems.  Gloria Glover md         dc FP   [address]  JOSHUA Logan; ERIC Shannon; [facility]]      FULL CODE    Jan Ortiz subcutaneously]--dcd  TAVR--12.7.2017  AMIODARONE   AICD--RV lead--9.23.2019    Anti-infectives:  Meropenem IV     ESBL E coli UTI---POA---1.13.2020-----[1.10.2020 culture]  Dilated Cardiomyopathy           Cardiac catheterization---9.23.2019---0% LM---20-30%---LAD                             70% RCA--stent patent--10-20% mild irregularities LCx            AICD--RV lead--9.23.2019  CHF--acute-on-chronic combined systolic-diastolic--8.18.2019         Elevated troponin----10.9.2019         2D ECHO--8.19.2019--mildly dilated LA--mild-to-moderate LVH--                          severely reduced LVSF--RA mildly dilated--NRVSF---                          MAC--MV thickening--moderate MR--TAVR mg 2 mm Hg--                          RVSP ~ 47 mm Hg--mild pulmonary HTN---Grade III severe                          DD---LVEF ~ 25%           EKG---8.19.2019--SR--62--1st degree AVB--LBBB           CXR--8.18.2019--COPD---small left pleural effusion           CT abdomen--pelvis---8.18.2019--pulmonary edema---bilateral pleural                             effusions---fat surrounding gall bladder            EKG----8.18.2019---sinus bradycardia--59--LBBB           Chronic combined systolic-diastolic----3.25.2019         Acute-on-chronic combined systolic-diastolic--flash pulmonary edema-----11.13.2018          2D ECHO---11.14.2018----mildly dilated LA--mild concentric LVH--                   global hypokinesis---moderately reduced LVSF--NRVSF--                   MAC--mild MR--bioprosthetic AVR--TAVR---normal appearance                   and function---pg 4 mm Hg--mg 3 mm Hg--mild TR-----RVSP ~ 56                    mm Hg---moderate pulmonary hypertension---Grade II moderate DD----                   LVEF ~ 35-40%  Hip pain---1.11.2020            X-ray--hip--pelvis--1.11.2020--no fracture--no dislocation---OA    Atrial fibrillation--chronic          RVR---3.25.2019          MI ruled out--3.26.2019          EKG---3.26.2019---atrial fibrillation---118--LAD--LBBB          EKG--3.25.2019--SR--84--1st degree AVB--LAD---LBBB          EKG---3.25.2019--atrial fibrillation--142---LAD--LBBB--lateral T-wave inversion         CXR---11.14.2018--improved pulmonary edema with persistent                   mild pulmonary venous congestion--left-sided effusion         CXR---11.12.2018---no acute---[-]         2D ECHO---1.5.2016--moderate LVH [id number] anteroseptal region--hypokinetic--mild-moderate                  LV dysfunction--NRVSF--mild increased RA size--                  thickened MV leaflets--HAI--moderate aortic                  stenosis---RVSP 34 mm Hg--Grade 1 DD---                  LVEF ~ 40-45%          MI ruled out--12. 1.2014           Spiral CTA--11.30.2014--no PE     ASCVD           Cardiac catheterization---9.19.2017--patent LAD stent--LVEF ~ 30%           Cardiac catheterization---9. 6.2016--normal LM--                           95% mid-LAD--normal D1--ERON LAD stent--                           moderate LV systolic dysfunction--                           LVEF ~ 30-35%   Aortic stenosis---moderate----III/VI MECHE radiation to carotids          TAVR---12.7.2017         DUS--CV---4.2016---no hemodynamically significant stenosis  MR---mild   Intermittent atrial fibrillation  LBBB        EKG---11.13.2018--SR--89--1st degree AVB--LBBB--[old]        EKG---11.12.2018--SR--94--frequent PVCs---LBBB---[old]  Diabetes Mellitus Type 2         Diabetic neuropathy   Hypertension  Hyperlipidemia   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   Bilateral breast carcinoma--sp MRM--LND x 2        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                 Left radical mastectomy--1995                 Right mastectomy--LND--2000   Anxiety   Rash--excoriation--medial RUE---8.18.2019  Prior:              Fever--generalized weakness---10.8.2019--mild cough            EKG---10.8.2019---SR--83---1st degree AVB---LBBB [old]             BLOOD CULTURE--[+] 2/2--Staphylococcus aureus---11.8.2019             Staphylococcus aureus--bacteremia---complicated by recent AICD placement             2D ECHO---10.9.2019--moderately dilated LA---severe LVH--                               severely reduced LVSF globally---moderately dilated RA--                               pacing lead RA--bowing IAS motion consistent with aneurysmal

## 2020-01-14 NOTE — PROGRESS NOTES
Hospitalist Progress Note    Patient:  Gloria Nettles     YOB: 1935    MRN: [id number]   Admit date: 1/11/2020     Acct: [de-identified]     PCP: Eagle Lucero MD    CC--Interval History:  ESBL E coli--on Meropenem IV----will need venous access for extended IV antibiotic treatment    K = 3.5 ---> potassium replacement    CKD---creatinine 2.1 --->  1.7    See note below      All other ROS negative except noted in HPI    Diet:  DIET RENAL; Carb Control: 4 carb choices (60 gms)/meal    Medications:  Scheduled Meds:   insulin lispro  0-6 Units Subcutaneous TID WC    insulin lispro  0-3 Units Subcutaneous Nightly    rivaroxaban  15 mg Oral Daily    insulin glargine  15 Units Subcutaneous Dinner    insulin lispro  5 Units Subcutaneous TID WC    lidocaine  1 patch Transdermal Daily    meropenem (MERREM) IVPB  1 g Intravenous Q12H    amiodarone  200 mg Oral Daily    aspirin  81 mg Oral Daily    bumetanide  1 mg Oral BID    famotidine  20 mg Oral QPM    metoprolol tartrate  25 mg Oral BID    sodium chloride flush  10 mL Intravenous 2 times per day     Continuous Infusions:   dextrose      sodium chloride 50 mL/hr at 01/13/20 1304     PRN Meds:glucose, dextrose, glucagon (rDNA), dextrose, LORazepam, sodium chloride flush, ondansetron, nicotine, acetaminophen    Objective:  Labs:  CBC with Differential:    Lab Results   Component Value Date    WBC 7.2 01/14/2020    RBC 3.87 01/14/2020    HGB 9.8 01/14/2020    HCT 32.6 01/14/2020     01/14/2020    MCV 84.2 01/14/2020    MCH 25.3 01/14/2020    MCHC 30.1 01/14/2020    RDW 19.5 01/14/2020    LYMPHOPCT 24 01/14/2020    MONOPCT 10 01/14/2020    BASOPCT 1 01/14/2020    MONOSABS 0.68 01/14/2020    LYMPHSABS 1.72 01/14/2020    EOSABS 0.07 01/14/2020    BASOSABS 0.05 01/14/2020    DIFFTYPE NOT REPORTED 01/14/2020     BMP:    Lab Results   Component Value Date     01/14/2020    K 3.5 01/14/2020     01/14/2020    CO2 22 01/14/2020    BUN 29 01/14/2020    LABALBU 3.0 01/12/2020    CREATININE 1.75 01/14/2020    CALCIUM 8.9 01/14/2020    GFRAA 34 01/14/2020    LABGLOM 28 01/14/2020    GLUCOSE 135 01/14/2020           Physical Exam:  Vitals: BP (!) 115/52   Pulse 83   Temp 97.6 °F (36.4 °C) (Tympanic)   Resp 18   Ht 5' (1.524 m)   Wt 130 lb 4.8 oz (59.1 kg)   SpO2 96%   BMI 25.45 kg/m²   24 hour intake/output:    Intake/Output Summary (Last 24 hours) at 1/14/2020 1149  Last data filed at 1/14/2020 0449  Gross per 24 hour   Intake 1302 ml   Output 1400 ml   Net -98 ml     Last 3 weights: Wt Readings from Last 3 Encounters:   01/14/20 130 lb 4.8 oz (59.1 kg)   01/08/20 130 lb (59 kg)   12/06/19 128 lb 9.6 oz (58.3 kg)     HEENT: Normocephalic and Atraumatic  Neck: Supple, No Masses, Tenderness, Nodularity and No Lymphadenopathy  Chest/Lungs: Clear to Auscultation without Rales, Rhonchi, or Wheezes  Cardiac: Regular Rate and Rhythm  GI/Abdomen: Bowel Sounds Present and Soft, Non-tender, without Guarding or Rebound Tenderness  : Not examined  EXT/Skin: BUE lymphedema----, No Cyanosis and No Clubbing  Neuro: Alert and Oriented and No Localizing Signs/Symptoms      Assessment:    Active Problems:    UTI (urinary tract infection)    Acute renal failure (HCC)  Resolved Problems:    * No resolved hospital problems.  Jenny Yepez      [facility]   [address]  Steven Vaughn,  FP; Jennifer Milligan, GS; [facility]]      FULL CODE    Meghan Fernandez subcutaneously]--dcd  TAVR--12.7.2017  AMIODARONE   AICD--RV lead--9.23.2019    Anti-infectives:  Meropenem IV     ESBL E coli UTI---POA---1.13.2020-----[1.10.2020 culture]  Dilated Cardiomyopathy           Cardiac catheterization---9.23.2019---0% LM---20-30%---LAD                             70% RCA--stent patent--10-20% mild irregularities LCx            AICD--RV lead--9.23.2019  CHF--acute-on-chronic combined systolic-diastolic--8.18.2019         Elevated troponin----10.9.2019 2D ECHO--8.19.2019--mildly dilated LA--mild-to-moderate LVH--                          severely reduced LVSF--RA mildly dilated--NRVSF---                          MAC--MV thickening--moderate MR--TAVR mg 2 mm Hg--                          RVSP ~ 47 mm Hg--mild pulmonary HTN---Grade III severe                          DD---LVEF ~ 25%           EKG---8.19.2019--SR--62--1st degree AVB--LBBB           CXR--8.18.2019--COPD---small left pleural effusion           CT abdomen--pelvis---8.18.2019--pulmonary edema---bilateral pleural                             effusions---fat surrounding gall bladder            EKG----8.18.2019---sinus bradycardia--59--LBBB           Chronic combined systolic-diastolic----3.25.2019         Acute-on-chronic combined systolic-diastolic--flash pulmonary edema-----11.13.2018          2D ECHO---11.14.2018----mildly dilated LA--mild concentric LVH--                   global hypokinesis---moderately reduced LVSF--NRVSF--                   MAC--mild MR--bioprosthetic AVR--TAVR---normal appearance                   and function---pg 4 mm Hg--mg 3 mm Hg--mild TR-----RVSP ~ 56                    mm Hg---moderate pulmonary hypertension---Grade II moderate DD----                   LVEF ~ 35-40%  Hip pain---1.11.2020            X-ray--hip--pelvis--1.11.2020--no fracture--no dislocation---OA    Atrial fibrillation--chronic          RVR---3.25.2019          MI ruled out--3.26.2019          EKG---3.26.2019---atrial fibrillation---118--LAD--LBBB          EKG--3.25.2019--SR--84--1st degree AVB--LAD---LBBB          EKG---3.25.2019--atrial fibrillation--142---LAD--LBBB--lateral T-wave inversion         CXR---11.14.2018--improved pulmonary edema with persistent                   mild pulmonary venous congestion--left-sided effusion         CXR---11.12.2018---no acute---[-]         2D ECHO---1.5.2016--moderate LVH [id number]                   anteroseptal region--hypokinetic--mild-moderate                  LV dysfunction--NRVSF--mild increased RA size--                  thickened MV leaflets--HAI--moderate aortic                  stenosis---RVSP 34 mm Hg--Grade 1 DD---                  LVEF ~ 40-45%          MI ruled out--12. 1.2014           Spiral CTA--11.30.2014--no PE     ASCVD           Cardiac catheterization---9.19.2017--patent LAD stent--LVEF ~ 30%           Cardiac catheterization---9. 6.2016--normal LM--                           95% mid-LAD--normal D1--ERON LAD stent--                           moderate LV systolic dysfunction--                           LVEF ~ 30-35%   Aortic stenosis---moderate----III/VI MECHE radiation to carotids          TAVR---12.7.2017         DUS--CV---4.2016---no hemodynamically significant stenosis  MR---mild   Intermittent atrial fibrillation  LBBB        EKG---11.13.2018--SR--89--1st degree AVB--LBBB--[old]        EKG---11.12.2018--SR--94--frequent PVCs---LBBB---[old]  Diabetes Mellitus Type 2         Diabetic neuropathy   Hypertension  Hyperlipidemia   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2019--moderately dilated LA---severe LVH--                               severely reduced LVSF globally---moderately dilated RA--                               pacing lead RA--bowing IAS motion consistent with aneurysmal                                atrial septum--AICD lead RV---mild MV thickening with MAC---moderate MR---normal appearing TAVR pg 6 mm Hg                                 mg 4 mm Hg----mild TR---RVSP ~ 71 mm Hg---severe pulmonary                                 hypertension---Grade III severe DD----LVEF ~ 30%    PMH:   left elbow fracture, uterine prolapse--cystocele,               chest--normal cardiac catheterization--1999, left               arm--cellulitis--lymphadenitis--2011, left arm               lymphedema--recurrent episodes, acute respiratory              failure--due to CHF and possible pneumonia---2014--              required  CPAP, chest pain--11.30.2014, pneumonia               bilaterally--fever--leukocytosis---11.30.2014, Vitamin D              deficiency, PVCs, allergic rhinitis, hypokalemia, sinus               drainage, mild esophagitis---2017,  left arm cellulitis----              2018--left arm lymphadenitis--recurrent--acute--on--               chronic lymphedema, elevated lactic acid level---renal ----               dehydration----11.12.2018, gastroparesis?, acute respiratory               failure with hypoxia--due to CHF--2018, rash--forehead---               3.25.2019, hyponatremia,  dehydration--10.8.2019,                urinary colonization--POA----10.8.2019, fall---10.8.2019,               LDH isolated elevation of uncertain [id number]---[id number],                rash--excoriation--medial RUE---8.18.2019      PSH:    see above, bilateral mastectomy--LND--1995--2000,               vaginal hysterectomy--anterior repair--SPT--              bladder repair--no malignancy--2002,  left elbow--ORIF--              1999,  colonoscopy---2009--diverticulosis, colonoscopy---               2014--internal hemorrhoids--banded--normal random               biopsies, right index trigger finger release---2015, EGD---               2017--paulding---GEJ 34 cm--mild esophagitis     Allergies:         NKDA   Intolerances:   Marinol--dronabinol, weakness, Darvocet--propoxyphene--weakness                           morphine sulfate, Levaquin--nausea---anxiety, statins--muscle                          pain, Demerol---weakness           Plan:  1. Cont' meropenem IV  2. Central access  3. Potassium replacement  4. Renal---current regimen  5. To rehab when midline completed  6.    See orders     Add:  Multiple attempts for central IV access unsuccessful by Anesthesia [patient has had prior PICCs and breast surgery with LND]--will likely require a peripheral access    Electronically signed by Naima Mattson on 1/14/2020 at 11:49 AM    Hospitalist

## 2020-01-14 NOTE — CARE COORDINATION
Business card provided, spouse at bedside, educated on need for long-term antibiotic therapy due to resistant UTI. Provided options at discharge. Writer to check with [facility] where patient had previously been, on medicare days remaining in this cert period. The Plan for Transition of Care is related to the following treatment goals: Possible home health with [facility] vs [facility]    The Patient and/or patient representative spouse was provided with a choice of provider and agrees with the discharge plan. [] Yes [x] No    Freedom of choice list was provided with basic dialogue that supports the patient's individualized plan of care/goals, treatment preferences and shares the quality data associated with the providers.  [] Yes [x] No

## 2020-01-14 NOTE — FLOWSHEET NOTE
[phone]  Numerous attempts to place Mid-Line, by Zach Wilson, Nadia Fofana, and Tiara Yanes. Jovita Mistry Unsuccessful. Unable to use Left arm due to Lymphedema from previous mastectomy.

## 2020-01-14 NOTE — DISCHARGE INSTR - DIET

## 2020-01-14 NOTE — PROGRESS NOTES
Limits  Pain Screening  Patient Currently in Pain: No  Vital Signs  Patient Currently in Pain: No       Orientation  Orientation  Overall Orientation Status: Within Normal Limits  Social/Functional History  Social/Functional History  Lives With: Spouse  Type of Home: House  Home Layout: One level  Home Access: Stairs to enter with rails  Entrance Stairs - Number of Steps: 1  Home Equipment: Standard walker, [employer] Help From: Family  ADL Assistance: Independent  Homemaking Assistance: Needs assistance  Ambulation Assistance: Independent  Transfer Assistance: Independent  Active : No  Occupation: Retired  Cognition        Objective          AROM RLE (degrees)  RLE AROM: WFL  AROM LLE (degrees)  LLE AROM : WFL  Strength RLE  Strength RLE: WFL  Strength LLE  Strength LLE: WFL        Bed mobility  Bridging: Independent  Rolling to Left: Independent  Rolling to Right: Independent  Supine to Sit: Independent  Sit to Supine: Independent  Scooting: Independent  Transfers  Sit to Stand: Independent  Stand to sit:  Independent  Ambulation  Ambulation?: Yes  Ambulation 1  Surface: level tile  Device: No Device  Assistance: Supervision  Distance: 40 ft     Balance  Sitting - Static: Good  Sitting - Dynamic: Good  Standing - Static: Good  Standing - Dynamic: Fair        Plan   Safety Devices  Type of devices: Left in bed, Call light within reach    G-Code       OutComes Score                                                  AM-PAC Score             Goals  Short term goals  Time Frame for Short term goals: 1 day  Short term goal 1: Assess functional status       Therapy Time   Individual Concurrent Group Co-treatment   Time In 1500         Time Out 1510         Minutes 10                 Feroz Kraft PT

## 2020-01-14 NOTE — PROGRESS NOTES
Occupational Therapy        Multiple attempts for OT eval, unable to complete due to nursing attempting to place Mid-Line. OT eval to be completed 1/15/2020.

## 2020-01-14 NOTE — FLOWSHEET NOTE
Assessment: Patient was awake and alert, in room with . Patient expressed continued feelings of grief regarded her sister's recent passing, combined with peace at knowing that her sister is \"with the good Lord. \"  is supportive and attentive. Intervention: Provided listening presence. Explored thoughts and feelings about ongoing health challenges and sister's death. Offered continued prayer. Plan: Patient engaged in conversation. Patient expressed gratitude and a sense of comfort from visit. Outcome: Chaplains remain available for spiritual and emotional support. 01/14/20 1134   Encounter Summary   Services provided to: Patient and family together   Referral/Consult From: [facility]; [facility]; Family members   Continue Visiting   (1/14/20)   Complexity of Encounter Moderate   Length of Encounter 15 minutes   Spiritual Assessment Completed Yes   Advance Care Planning Yes   Routine   Type Follow up   Spiritual/Latter day   Type Spiritual support   Assessment Calm; Approachable;Grieving   Intervention Active listening;Explored feelings, thoughts, concerns;Prayer;Sustaining presence/ Ministry of presence   Outcome Expressed gratitude;Comfort; Shared life review;Engaged in conversation;Expressed feelings/needs/concerns   Advance Directives (For Healthcare)   Pre-existing DNR Comfort Care/DNR Arrest/DNI Order No   Healthcare Directive No, patient does not have an advance directive for healthcare treatment   Information on [place] Requested No   Patient Requests Assistance No   Electronically signed by Jozef Noonan on 1/14/2020 at 11:36 AM

## 2020-01-14 NOTE — PLAN OF CARE
Problem: Falls - Risk of:  Goal: Will remain free from falls  Description  Will remain free from falls  1/13/2020 2301 by Chucho Sanon RN  Outcome: Ongoing  1/13/2020 1321 by Abisai Bear RN  Outcome: Ongoing  Goal: Absence of physical injury  Description  Absence of physical injury  1/13/2020 2301 by Chucho Sanon RN  Outcome: Ongoing  1/13/2020 1321 by Abisai Bear RN  Outcome: Ongoing     Problem: Urinary Elimination:  Goal: Signs and symptoms of infection will decrease  Description  Signs and symptoms of infection will decrease  1/13/2020 2301 by Chucho Sanon RN  Outcome: Ongoing  1/13/2020 1321 by Abisai Bear RN  Outcome: Ongoing  Goal: Ability to reestablish a normal urinary elimination pattern will improve - after catheter removal  Description  Ability to reestablish a normal urinary elimination pattern will improve  1/13/2020 2301 by Chucho Sanon RN  Outcome: Ongoing  1/13/2020 1321 by Abisai Bear RN  Outcome: Ongoing  Goal: Complications related to the disease process, condition or treatment will be avoided or minimized  Description  Complications related to the disease process, condition or treatment will be avoided or minimized  1/13/2020 2301 by Chucho Sanon RN  Outcome: Ongoing  1/13/2020 1321 by Abisai Bear RN  Outcome: Ongoing     Problem: Pain:  Goal: Pain level will decrease  Description  Pain level will decrease  1/13/2020 2301 by Chucho Sanon RN  Outcome: Ongoing  1/13/2020 1321 by Abisai Bear RN  Outcome: Ongoing  Goal: Control of acute pain  Description  Control of acute pain  1/13/2020 2301 by Chucho Sanon RN  Outcome: Ongoing  1/13/2020 1321 by Abisai Bear RN  Outcome: Ongoing  Goal: Control of chronic pain  Description  Control of chronic pain  1/13/2020 2301 by Chucho Sanon RN  Outcome: Ongoing  1/13/2020 1321 by Abisai Bear RN  Outcome: Ongoing

## 2020-01-15 NOTE — PROGRESS NOTES
Occupational Therapy   Occupational Therapy Initial Assessment  Date: 1/15/2020   Patient Name: Krista Estrada  MRN: [id number]     : 1935    Date of Service: 1/15/2020    Discharge Recommendations:  ECF without OT       Assessment   Performance deficits / Impairments: Decreased endurance;Decreased high-level IADLs;Decreased strength  Assessment: poor endurance, increased R LE pain, unable to stand for > 10min  Decision Making: Low Complexity  No Skilled OT: At baseline function  REQUIRES OT FOLLOW UP: No  Safety Devices  Safety Devices in place: Yes  Type of devices: Left in bed;Call light within reach           Patient Diagnosis(es): The primary encounter diagnosis was Acute renal failure, unspecified acute renal failure type ([facility] [address] Diagnoses of Hyperkalemia and Generalized anxiety disorder were also pertinent to this visit. has a past medical history of Allergic rhinitis, Anticoagulated, Anxiety, generalized, Aortic stenosis, Atrial fibrillation ([address] Atrial fibrillation with RVR ([address] Breast cancer ([facility] [address] CAD (coronary artery disease), Cellulitis, Cellulitis of left arm, Diabetic neuropathy ([address] Hyperlipidemia, Hypertension, Lymphedema of arm, PVC's (premature ventricular contractions), S/P cardiac cath, Type II or unspecified type diabetes mellitus without mention of complication, not stated as uncontrolled, and Vitamin D deficiency. has a past surgical history that includes Mastectomy (Bilateral,  and ); Hysterectomy (2002); fracture surgery (08/10/99); Cardiac catheterization (2012); Colonoscopy (2009); Colonoscopy (14); other surgical history (Right, 2015); Coronary angioplasty with stent (2016); Cardiac catheterization (2017); Aortic valve replacement (2017); pr colonoscopy flx dx w/collj spec when pfrmd (N/A, 2018); Cardiac catheterization (2019); and pacemaker placement.        Subjective   General  Chart Reviewed: Yes  Patient assessed for rehabilitation services?: Yes  Family / Caregiver Present: Yes  Referring Practitioner: CHARLENE Fong  Diagnosis: UTI  Subjective  Subjective: Patient rec'd in bathroom, pleasant and cooperative 82 yr old female with UTI  Patient Currently in Pain: Yes  Pain Assessment  Pain Assessment: 0-10  Pain Level: 6  Pain Type: Acute pain  Pain Location: Leg;Back  Pain Orientation: Right  Vital Signs  Patient Currently in Pain: Yes  Social/Functional History  Social/Functional History  Lives With: Spouse  Type of Home: House  Home Layout: One level  Home Access: Stairs to enter with rails  Entrance Stairs - Number of Steps: 2  Bathroom Shower/Tub: Walk-in shower  Bathroom Toilet: Standard  Home Equipment: Standard walker, Cane  Receives Help From: Family  ADL Assistance: Independent  Homemaking Assistance: Needs assistance(spouse completes most homemaking tasks)  Homemaking Responsibilities: No  Ambulation Assistance: Independent  Transfer Assistance: Independent  Active : No  Patient's  Info: spouse  Occupation: Retired       Objective   Vision: Within Functional Limits  Hearing: Within functional limits    Orientation  Overall Orientation Status: Within Functional Limits        ADL  Grooming: Modified independent   LE Dressing: Modified independent   Toileting: Modified independent         Bed mobility  Supine to Sit: Independent  Sit to Supine: Independent  Scooting: Independent  Transfers  Sit to stand: Modified independent  Stand to sit: Modified independent     Cognition  Overall Cognitive Status: [address]  Times per week: acute care OT not recommended      Goals  Short term goals  Time Frame for Short term goals: eval only       Therapy Time   Individual Concurrent Group Co-treatment   Time In 1120         Time Out 1130         Minutes 10         Timed Code Treatment Minutes: 0 Minutes       BENJAMIN L [address], OT

## 2020-01-15 NOTE — PROGRESS NOTES
Hospitalist Progress Note    Patient:  Sergio Arce     YOB: 1935    MRN: [id number]   Admit date: 1/11/2020     Acct: [de-identified]     PCP: Ave Broussard MD    CC--Interval History:   ESBL E coli UTI----POA---only sensitive to meropenem and gentamycin---due to kidney function gentamycin avoided    Very poor IV access----attempts for central access unsuccessful    To [facility]---1.15.2020    CKD--Stage 3 improved from Stage 4 at admission     CHF--chronic atrial fibrillation stable    See note below         All other ROS negative except noted in HPI    Diet:  DIET RENAL; Carb Control: 4 carb choices (60 gms)/meal    Medications:  Scheduled Meds:   meropenem  1 g Intravenous Q12H    insulin lispro  0-6 Units Subcutaneous TID WC    insulin lispro  0-3 Units Subcutaneous Nightly    rivaroxaban  15 mg Oral Daily    insulin glargine  15 Units Subcutaneous Dinner    insulin lispro  5 Units Subcutaneous TID WC    lidocaine  1 patch Transdermal Daily    amiodarone  200 mg Oral Daily    aspirin  81 mg Oral Daily    bumetanide  1 mg Oral BID    famotidine  20 mg Oral QPM    metoprolol tartrate  25 mg Oral BID    sodium chloride flush  10 mL Intravenous 2 times per day     Continuous Infusions:   dextrose      sodium chloride 50 mL/hr at 01/14/20 2051     PRN Meds:glucose, dextrose, glucagon (rDNA), dextrose, LORazepam, sodium chloride flush, ondansetron, nicotine, acetaminophen    Objective:  Labs:  CBC with Differential:    Lab Results   Component Value Date    WBC 8.8 01/15/2020    RBC 3.83 01/15/2020    HGB 9.9 01/15/2020    HCT 32.0 01/15/2020     01/15/2020    MCV 83.6 01/15/2020    MCH 25.8 01/15/2020    MCHC 30.9 01/15/2020    RDW 19.3 01/15/2020    LYMPHOPCT 25 01/15/2020    MONOPCT 10 01/15/2020    BASOPCT 0 01/15/2020    MONOSABS 0.84 01/15/2020    LYMPHSABS 2.16 01/15/2020    EOSABS 0.09 01/15/2020    BASOSABS 0.03 01/15/2020    DIFFTYPE NOT REPORTED 01/15/2020     BMP: mild irregularities LCx            AICD--RV lead--9.23.2019  CHF--acute-on-chronic combined systolic-diastolic--8.18.2019         Elevated troponin----10.9.2019         2D ECHO--8.19.2019--mildly dilated LA--mild-to-moderate LVH--                          severely reduced LVSF--RA mildly dilated--NRVSF---                          MAC--MV thickening--moderate MR--TAVR mg 2 mm Hg--                          RVSP ~ 47 mm Hg--mild pulmonary HTN---Grade III severe                          DD---LVEF ~ 25%           EKG---8.19.2019--SR--62--1st degree AVB--LBBB           CXR--8.18.2019--COPD---small left pleural effusion           CT abdomen--pelvis---8.18.2019--pulmonary edema---bilateral pleural                             effusions---fat surrounding gall bladder            EKG----8.18.2019---sinus bradycardia--59--LBBB           Chronic combined systolic-diastolic----3.25.2019         Acute-on-chronic combined systolic-diastolic--flash pulmonary edema-----11.13.2018          2D ECHO---11.14.2018----mildly dilated LA--mild concentric LVH--                   global hypokinesis---moderately reduced LVSF--NRVSF--                   MAC--mild MR--bioprosthetic AVR--TAVR---normal appearance                   and function---pg 4 mm Hg--mg 3 mm Hg--mild TR-----RVSP ~ 56                    mm Hg---moderate pulmonary hypertension---Grade II moderate DD----                   LVEF ~ 35-40%  Hip pain---1.11.2020            X-ray--hip--pelvis--1.11.2020--no fracture--no dislocation---OA    Atrial fibrillation--chronic          RVR---3.25.2019          MI ruled out--3.26.2019          EKG---3.26.2019---atrial fibrillation---118--LAD--LBBB          EKG--3.25.2019--SR--84--1st degree AVB--LAD---LBBB          EKG---3.25.2019--atrial fibrillation--142---LAD--LBBB--lateral T-wave inversion         CXR---11.14.2018--improved pulmonary edema with persistent                   mild pulmonary venous congestion--left-sided effusion CXR---11.12.2018---no acute---[-]         2D ECHO---1.5.2016--moderate LVH [id number]                   anteroseptal region--hypokinetic--mild-moderate                  LV dysfunction--NRVSF--mild increased RA size--                  thickened MV leaflets--HAI--moderate aortic                  stenosis---RVSP 34 mm Hg--Grade 1 DD---                  LVEF ~ 40-45%          MI ruled out--12. 1.2014           Spiral CTA--11.30.2014--no PE     ASCVD           Cardiac catheterization---9.19.2017--patent LAD stent--LVEF ~ 30%           Cardiac catheterization---9. 6.2016--normal LM--                           95% mid-LAD--normal D1--ERON LAD stent--                           moderate LV systolic dysfunction--                           LVEF ~ 30-35%   Aortic stenosis---moderate----III/VI MECHE radiation to carotids          TAVR---12.7.2017         DUS--CV---4.2016---no hemodynamically significant stenosis  LBBB        EKG---11.13.2018--SR--89--1st degree AVB--LBBB--[old]        EKG---11.12.2018--SR--94--frequent PVCs---LBBB---[old]  Diabetes Mellitus Type 2         Diabetic neuropathy   Hypertension  Hyperlipidemia   CKD--Stage 3-4 currently Stage 3   Bilateral breast carcinoma--sp MRM--LND x 2        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                 Left radical mastectomy--1995                 Right mastectomy--LND--2000                BUE lymphedema  Anxiety   Prior:              Fever--generalized weakness---10.8.2019--mild cough            EKG---10.8.2019---SR--83---1st degree AVB---LBBB [old]             BLOOD CULTURE--[+] 2/2--Staphylococcus aureus---11.8.2019             Staphylococcus aureus--bacteremia---complicated by recent AICD placement             2D ECHO---10.9.2019--moderately dilated LA---severe LVH--                               severely reduced LVSF globally---moderately dilated RA--                               pacing lead RA--bowing IAS 2017--ju---GEJ 34 cm--mild esophagitis     Allergies:         NKDA   Intolerances:   Marinol--dronabinol, weakness, Darvocet--propoxyphene--weakness                           morphine sulfate, Levaquin--nausea---anxiety, statins--muscle                          pain, Demerol---weakness           Plan:  1. To [facility]----1.15.2020  2. Home medications reviewed  3.  cont'd Meropenem IV for at least 10 days initially  4. Follow up Dr. Virginia Abel, [facility]  5.   See orders    Electronically signed by Richy Lala on 1/15/2020 at 9:27 AM    Hospitalist

## 2020-01-16 NOTE — DISCHARGE SUMMARY
Ethan [address]                 [address] [place] [address], [address] [place] [address]                               DISCHARGE SUMMARY    PATIENT NAME: Monique Tineo                 :        1935  MED REC NO:   [id number]                             ROOM:       0203  ACCOUNT NO:   [de-identified]                           ADMIT DATE: 2020  PROVIDER:     Agnes Magaña                  DISCHARGE DATE: 01/15/2020    ATTENDING PHYSICIAN OF HOSPITALIZATION:  Gordo Benjamin MD    The patient is going to [facility] for continued IV antibiotics. PERSONAL PHYSICIAN:  Kip Barnett, Family Practice. DIAGNOSES:  1. ESBL E. coli, UTI, POA 2020, based on 01/10/2020, culture. 2.  Dilated cardiomyopathy, cardiac catheterization 2019, 0% LM,  20% to 30% LAD, 70% RCA, stent placement, 10% to 20% irregularities left  circumflex. 3.  AICD, RV lead replaced 2019.  4.  CHF, chronic combined systolic diastolic. A 2D echo 2019,  mildly dilated LA, mild-to-moderate LVH, severely reduced left  ventricular systolic function, RA mildly dilated, RV has normal right  ventricular systolic function, MAC, MV thickening, moderate MR, TAVR,  mean gradient 2 mmHg, RVSP 47 mmHg, mild pulmonary hypertension grade 3,  severe diastolic dysfunction, LVEF 25%. 5.  Hip pain 2020. X-ray of the hip and pelvis, no fracture, no  dislocation, osteoarthritis present. 6.  Chronic underlying atrial fibrillation. 7.  Coronary artery disease. Cardiac catheterization 2017, patent  LAD stent, LVEF 30%. Cardiac catheterization 2016, normal LM, 95%  mild LAD, normal D1, ERON LAD stent, moderate LV systolic dysfunction,  LVEF 30% to 35%. Aortic stenosis, moderate, has a TAVR, which was  placed on 2017.  8.  Atrial fibrillation, chronic. 9.  Left bundle-branch block. 10.  Diabetes mellitus type 2.  11.  Hypertension. 12.  Hyperlipidemia.   13.  Chronic kidney disease, stage III-IV, currently stage IV. 14.  Bilateral breast carcinoma, status post modified radical mastectomy  and lymph node dissection x2. Note, difficulty with getting central  access to this patient. 15.  Anxiety, controlled. Other medical problems set forth in the progress note of 01/15/2020,  incorporated for reference herein. HISTORY OF PRESENT ILLNESS AND HOSPITAL COURSE:  The patient is an  82-year-old white female, patient of [facility]. The  patient presented with ESBL E. coli, sensitive only to meropenem and to  gentamicin. Due to her kidney function, meropenem was selected. The  patient was placed on this regimen. We attempted to get good central  access; however, due to the patient's prior surgeries, it could not be  obtained, hence peripheral approach had to be utilized. The patient's other issues such as dilated cardiomyopathy, atrial  fibrillation remained stable.      hip pain. X-ray, however, demonstrated no fracture, no dislocation and  osteoarthritis present. The patient had a TAVR for aortic stenosis,  functional.      The patient is a diabetic type 2. Blood glucose level was  125 around the time of discharge. Chronic kidney disease typically stage III-IV, currently at a stage IV  level. LABORATORY DATA:  Around the time of discharge, white cell count 8.8,  hemoglobin 19.9, hematocrit 32.0, platelets 742,980. Sodium 139,  potassium 4.4, chloride 99, CO2 of 26, BUN 25, creatinine 1.42 down from  1.75, calcium 9.2, GFR 36 up from 28. DISCHARGE INSTRUCTIONS:  FOLLOWUP:  Discharged to [address] [place] [address] 01/15/2020. DIET:  A 1500-calorie complex carbohydrate cardiac, renal diet. ACTIVITY:  Physical and occupational therapies continued from the  hospital setting. Condition at discharge--improved    Other CBC, basic metabolic panel to be drawn on 01/16/2020. IV flushes  per protocol.     ANTIBIOTIC COVERAGE:  Meropenem 1 gm IV q.12 hours to

## 2020-01-16 NOTE — PROGRESS NOTES
History:   Diagnosis Date    Allergic rhinitis     With chronic cough.  Anticoagulated     xarelto    Anxiety, generalized     Chronic with probable depression. She will take Ativan but refuses antidepressant.  Aortic stenosis     Atrial fibrillation (HCC)     Atrial fibrillation with RVR ([facility] 3/25/2019    Breast cancer ([facility]     2 occurrences    CAD (coronary artery disease) November 2012    Minimal disease by catheterization, 11/12, with wedge pressure of the right heart. She is taking Lasix for this and being followed by Cardiology.  Cellulitis 11/12/2018    Cellulitis of left arm 11/12/2018    Diabetic neuropathy (HCC)     Hyperlipidemia     Hypertension     runs low    Lymphedema of arm     Left arm, due to lymph node dissection and mastectomy.  PVC's (premature ventricular contractions)     Chronic. Patient currently undergoing workup for arrhythmia.  S/P cardiac cath 09/06/2016    Type II or unspecified type diabetes mellitus without mention of complication, not stated as uncontrolled     Vitamin D deficiency        Past Surgical History:   Procedure Laterality Date    AORTIC VALVE REPLACEMENT  12/07/2017    TAVR 26 mm CoreValve     CARDIAC CATHETERIZATION  November 2012    Showed minimal CAD with increased wedge pressure of the right heart. Patient saw Cardiology and started on Lasix.  CARDIAC CATHETERIZATION  09/19/2017    right and left heart cath   EF 30% WITH PATENT LAD STENT    CARDIAC CATHETERIZATION  09/23/2019    COLONOSCOPY  08/12/2009    diverticulosis being found.  COLONOSCOPY  9/8/14    grade 2-3 internal hemorrhoids - banded x 2, random biopsies taken to r/o - normal, repeat 5yrs due to hx of polyps- garber    CORONARY ANGIOPLASTY WITH STENT PLACEMENT  09/2016    PTCA / Drug Eluting Stent:, LAD and / or branches    FRACTURE SURGERY  08/10/99    Left elbow ORIF     HYSTERECTOMY  09/09/2002    With bladder repair for benign reasons.     MASTECTOMY Bilateral 1995 and 2000    With lymph node dissections in 1995 and 2000.  OTHER SURGICAL HISTORY Right 6/2015    Index and long trigger finger release    PACEMAKER PLACEMENT      HI COLONOSCOPY FLX DX W/COLLJ SPEC WHEN PFRMD N/A 9/24/2018    COLONOSCOPY w/ hemorrhoid bending performed by Jen Holbrook MD at [address] [place] [address]       Medications as per [facility] Chart Beto Green     Social History     Socioeconomic History    Marital status:      Spouse name: Not on file    Number of children: 3    Years of education: 12    Highest education level: 12th grade   Occupational History    Occupation: retired /   Social Needs    Financial resource strain: Not hard at all   [place] insecurity:     Worry: Never true     Inability: Never true    Transportation needs:     Medical: No     Non-medical: No   Tobacco Use    Smoking status: Passive Smoke Exposure - Never Smoker    Smokeless tobacco: Never Used    Tobacco comment: never smoker   Substance and Sexual Activity    Alcohol use: No    Drug use: No    Sexual activity: Yes     Partners: Male   Lifestyle    Physical activity:     Days per week: 0 days     Minutes per session: 0 min    Stress: Not at all   Relationships    Social connections:     Talks on phone: More than three times a week     Gets together: Twice a week     Attends Spiritism service: More than 4 times per year     Active member of club or organization: No     Attends meetings of clubs or organizations: Never     Relationship status:     Intimate partner violence:     Fear of current or ex partner: Not on file     Emotionally abused: Not on file     Physically abused: Not on file     Forced sexual activity: Not on file   Other Topics Concern    Not on file   Social History Narrative    9/5/2019- No SDOH needs identified. Review of Systems   Constitutional: Negative for activity change, appetite change, chills, fatigue, fever and unexpected weight change.    HENT: Negative for congestion, dental problem, ear discharge, ear pain, facial swelling, hearing loss, postnasal drip, rhinorrhea, sinus pressure, sore throat and trouble swallowing. Eyes: Negative for pain and visual disturbance. Respiratory: Negative for cough, chest tightness, shortness of breath and wheezing. Cardiovascular: Negative for chest pain, palpitations and leg swelling. Gastrointestinal: Negative for abdominal pain, blood in stool, constipation, diarrhea, nausea and vomiting. Endocrine: Negative for cold intolerance, heat intolerance and polyuria. Genitourinary: Negative for difficulty urinating. Musculoskeletal: Negative for arthralgias, gait problem, myalgias, neck pain and neck stiffness. Skin: Negative for color change, rash and wound. Neurological: Positive for weakness. Negative for dizziness, tremors, seizures, light-headedness, numbness and headaches. Psychiatric/Behavioral: Negative for confusion and hallucinations. The patient is not nervous/anxious. Physical Exam  Vitals signs and nursing note reviewed. Constitutional:       General: She is not in acute distress. Appearance: Normal appearance. She is well-developed. She is not diaphoretic. HENT:      Head: Normocephalic and atraumatic. Right Ear: External ear normal.      Left Ear: External ear normal.   Eyes:      General:         Right eye: No discharge. Left eye: No discharge. Neck:      Trachea: No tracheal deviation. Cardiovascular:      Rate and Rhythm: Normal rate and regular rhythm. Heart sounds: Normal heart sounds. No murmur. No friction rub. No gallop. Pulmonary:      Effort: Pulmonary effort is normal. No respiratory distress. Breath sounds: Normal breath sounds. No stridor. No wheezing, rhonchi or rales. Chest:      Chest wall: No tenderness. Abdominal:      General: Bowel sounds are normal. There is no distension. Palpations: Abdomen is soft.       Tenderness:

## 2020-01-21 NOTE — PROGRESS NOTES
Nephrology Consult Note    Reason for Consult: Chronic kidney disease with recent acute on chronic kidney injury episode  Requesting Physician: Nik Black MD    Chief Complaint: Abnormal labs  History Obtained From:  patient, spouse, electronic medical record    History of Present Illness: This is a 82 y.o. female who presents to the office for evaluation of chronic kidney disease stage III that is been going on at least for a couple of years along with a recent acute on chronic kidney injury episode in the setting of a urinary tract infection. This occurred in January 2020. She also had an infection of her AICD in October 2019 requiring its removal.  She has a history of ischemic cardiomyopathy with ejection fraction of 30%. In 5/20/2020 her creatinine went up to 3.11 and then was down to 1.42 from 1/15/2020. She has had some acute on chronic kidney injury episodes also in the past.  Going back to 2018 and it appears her creatinine is somewhere between about 1.2 and 1.3 mg/dL. She has never seen a nephrologist.     Labs from 1/15/2020 shows sodium 139, potassium 4.4, chloride 99 and CO2 26 with BUN 25 and creatinine 1.42 with estimated GFR of 35 mL/min, although that may not be back down to baseline as it was improving still from the acute kidney injury episode. Calcium is 9.2. White blood cells 8.8 with hemoglobin 9.9 hematocrit 32 and platelets 540. Urinalysis from 1/11/2020 shows specific gravity 1.02, pH 5.5, negative protein, negative nitrate, negative leukocyte esterase, 0-4 white blood cells and 0-4 red blood cells. Random urine protein to creatinine ratio was normal on low-dose ACE inhibitor. Renal ultrasound showed right kidney 8.7 cm and left kidney 8.1 cm with right cortical thickness 1.1 cm and left cortical thickness 1.4 cm and no hydronephrosis or evidence of stones. She was unable to void during the examination.                Any significant history of heavy or prolonged NSAID use. She does use Tylenol as needed. She does have chronic diuretic use with Bumex. She also takes low-dose ACE inhibitor with lisinopril 2.5 mg oral daily. Her other medications are reviewed. Allergies are reviewed. Of note, the patient has a very complicated medical history including left breast cancer which apparently was rare and diagnosed in 1995 and the patient suffers from chronic left upper extremity lymphedema because of lymph node dissection. She also was discovered to have another breast cancer in the right breast in 2000. She has had multiple episodes of radiation and chemotherapy. She has a history of cardiac catheterization with stenting and has a last ejection fraction noted of 30%. She also has a history of Kendra and 2017. She also had an AICD placed in 2019. ICD was subsequently removed because of infection. She has had a recent urinary tract infection as documented above in January 2020. 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 Had some atrial fibrillation with RVR previously but currently is in a regular rhythm today. History of colonoscopy, cardiac catheterization, permanent pacemaker/AICD, bilateral mastectomy 1 in 1995 and 1 in the year 2000. Has a previous hysterectomy in September 2002 with a bladder repair and left elbow open reduction internal fixation in August 1999. Karen Davidnacho There is no hx of jaundice or hepatitis or sexually transmitted disease. Patient has no hx of collagen vascular disease or vasculitis. No history of dysuria or frequency. No recent procedures involving IV contrast. There is no history of paraprotein disease.   The patient denies any history of recurrent UTI , incontinence or nocturia or 26 mm CoreValve     CARDIAC CATHETERIZATION  November 2012    Showed minimal CAD with increased wedge pressure of the right heart. Patient saw Cardiology and started on Lasix.  CARDIAC CATHETERIZATION  09/19/2017    right and left heart cath   EF 30% WITH PATENT LAD STENT    CARDIAC CATHETERIZATION  09/23/2019    COLONOSCOPY  08/12/2009    diverticulosis being found.  COLONOSCOPY  9/8/14    grade 2-3 internal hemorrhoids - banded x 2, random biopsies taken to r/o - normal, repeat 5yrs due to hx of polyps- garber    CORONARY ANGIOPLASTY WITH STENT PLACEMENT  09/2016    PTCA / Drug Eluting Stent:, LAD and / or branches    FRACTURE SURGERY  08/10/99    Left elbow ORIF     HYSTERECTOMY  09/09/2002    With bladder repair for benign reasons.  MASTECTOMY Bilateral 1995 and 2000    With lymph node dissections in 1995 and 2000.  OTHER SURGICAL HISTORY Right 6/2015    Index and long trigger finger release    PACEMAKER PLACEMENT      AR COLONOSCOPY FLX DX W/COLLJ SPEC WHEN PFRMD N/A 9/24/2018    COLONOSCOPY w/ hemorrhoid bending performed by Trino Garcia MD at [facility] OR       Current Medications:    Current Outpatient Medications   Medication Sig Dispense Refill    LORazepam (ATIVAN) 0.5 MG tablet Take 1 tablet by mouth every 8 hours as needed for Anxiety for up to 10 days.  3 tablet 0    Probiotic Product (PROBIOTIC DAILY) CAPS Take 1 tablet by mouth 3 times daily 90 capsule 0    glimepiride (AMARYL) 2 MG tablet TAKE ONE TABLET BY MOUTH TWICE A  tablet 3    potassium chloride (KLOR-CON M) 20 MEQ TBCR extended release tablet TAKE ONE TABLET BY MOUTH TWICE A DAY WITH MEALS 60 tablet 11    metoprolol tartrate (LOPRESSOR) 25 MG tablet Take 1 tablet by mouth 2 times daily 60 tablet 3    blood glucose test strips (FREESTYLE TEST STRIPS) strip USE ONE STRIP TO TEST DAILY AS NEEDED 50 strip 2    rivaroxaban (XARELTO) 15 MG TABS tablet Take 1 tablet by mouth daily (with breakfast) 30 tablet 5    sertraline (ZOLOFT) 50 MG tablet Take 1 tablet by mouth daily 30 tablet 0    amiodarone (CORDARONE) 200 MG tablet Take 1 tablet by mouth daily 30 tablet 0    bumetanide (BUMEX) 1 MG tablet Take 1 tablet by mouth 2 times daily 60 tablet 0    sucralfate (CARAFATE) 1 GM tablet Take 1 tablet by mouth 4 times daily 120 tablet 0    lisinopril (PRINIVIL;ZESTRIL) 2.5 MG tablet Take 1 tablet by mouth daily 30 tablet 0    vitamin B-6 (PYRIDOXINE) 100 MG tablet Take 100 mg by mouth every other day      ondansetron (ZOFRAN ODT) 4 MG disintegrating tablet Take 1 tablet by mouth every 6 hours as needed for Nausea or Vomiting 60 tablet 1    aspirin 81 MG chewable tablet Take 1 tablet by mouth daily 30 tablet 3    acetaminophen (TYLENOL) 325 MG tablet Take 2 tablets by mouth every 4 hours as needed for Pain 120 tablet 0    famotidine (PEPCID) 20 MG tablet TAKE ONE TABLET BY MOUTH EVERY EVENING 90 tablet 3    Vitamin D (CHOLECALCIFEROL) 1000 UNITS CAPS capsule Take 1,000 Units by mouth daily.  sodium polystyrene (SPS) 15 GM/60ML suspension Take 120 mLs by mouth once for 1 dose 1 Bottle 3    nitroGLYCERIN (NITROSTAT) 0.4 MG SL tablet Place 1 tablet under the tongue every 5 minutes as needed for Chest pain up to max of 3 total doses. If no relief after 1 dose, call 911. (Patient not taking: Reported on 1/21/2020) 25 tablet 3     No current facility-administered medications for this visit. Allergies:  Darvocet a500 [propoxyphene n-acetaminophen]; Demerol hcl [meperidine]; Marinol [dronabinol]; Morphine sulfate [morphine];  Statins [statins]; and Levaquin [levofloxacin in d5w]    Social History:   Social History     Socioeconomic History    Marital status:      Spouse name: Not on file    Number of children: 3    Years of education: 12    Highest education level: 12th grade   Occupational History    Occupation: retired /   Social Needs    Financial resource strain: Not hard at all  Food insecurity:     Worry: Never true     Inability: Never true    Transportation needs:     Medical: No     Non-medical: No   Tobacco Use    Smoking status: Passive Smoke Exposure - Never Smoker    Smokeless tobacco: Never Used    Tobacco comment: never smoker   Substance and Sexual Activity    Alcohol use: No    Drug use: No    Sexual activity: Yes     Partners: Male   Lifestyle    Physical activity:     Days per week: 0 days     Minutes per session: 0 min    Stress: Not at all   Relationships    Social connections:     Talks on phone: More than three times a week     Gets together: Twice a week     Attends Lutheran service: More than 4 times per year     Active member of club or organization: No     Attends meetings of clubs or organizations: Never     Relationship status:     Intimate partner violence:     Fear of current or ex partner: Not on file     Emotionally abused: Not on file     Physically abused: Not on file     Forced sexual activity: Not on file   Other Topics Concern    Not on file   Social History Narrative    9/5/2019- No SDOH needs identified. Family History:   Family History   Problem Relation Age of Onset    Diabetes Mother     Glaucoma Mother     Stroke Father 64       Review of Systems:    Constitutional: No fever, no chills, no lethargy, no weakness. HEENT:  No headache, otalgia, itchy eyes, nasal discharge or sore throat. Cardiac:  No chest pain, dyspnea, orthopnea or PND. Chest:              No cough, phlegm or wheezing. Abdomen:  No abdominal pain, nausea or vomiting. Neuro:  No focal weakness, abnormal movements or seizure like activity. Skin:   No rashes, no itching. :   No hematuria, no pyuria, no dysuria, no flank pain. Extremities:  No swelling or joint pains.       Objective:  /72 (Site: Right Upper Arm, Position: Sitting, Cuff Size: Medium Adult)   Pulse 66   Ht 5' (1.524 m)   Wt 130 lb 4.7 oz (59.1 kg)   SpO2 98%   BMI 25.45 free light chains with ratio to evaluate for paraprotein disease  2. No medication changes needed today  3. Return to see me in the office in 3 months   4. Continue to avoid any NSAIDs  5. Patient and her  expressed understanding with the plan and all questions answered to the best of my ability. Thank you for the consultation. Please do not hesitate to call with questions.     This note is created with the assistance of a speech-recognition program. While intending to generate a document that actually reflects the content of the visit, no guarantees can be provided that every mistake has been identified and corrected by editing  Electronically signed by Rani Castle MD on 1/21/2020 at 11:36 AM

## 2020-01-23 NOTE — PROGRESS NOTES
2000.    OTHER SURGICAL HISTORY Right 6/2015    Index and long trigger finger release    PACEMAKER PLACEMENT      MT COLONOSCOPY FLX DX W/COLLJ SPEC WHEN PFRMD N/A 9/24/2018    COLONOSCOPY w/ hemorrhoid bending performed by Liliana Rosen MD at [address] [place] [address]       Medications as per [facility] Chart Francheska Solis     Social History     Socioeconomic History    Marital status:      Spouse name: Not on file    Number of children: 3    Years of education: 12    Highest education level: 12th grade   Occupational History    Occupation: retired /   Social Needs    Financial resource strain: Not hard at all   [place] insecurity:     Worry: Never true     Inability: Never true    Transportation needs:     Medical: No     Non-medical: No   Tobacco Use    Smoking status: Passive Smoke Exposure - Never Smoker    Smokeless tobacco: Never Used    Tobacco comment: never smoker   Substance and Sexual Activity    Alcohol use: No    Drug use: No    Sexual activity: Yes     Partners: Male   Lifestyle    Physical activity:     Days per week: 0 days     Minutes per session: 0 min    Stress: Not at all   Relationships    Social connections:     Talks on phone: More than three times a week     Gets together: Twice a week     Attends Yarsanism service: More than 4 times per year     Active member of club or organization: No     Attends meetings of clubs or organizations: Never     Relationship status:     Intimate partner violence:     Fear of current or ex partner: Not on file     Emotionally abused: Not on file     Physically abused: Not on file     Forced sexual activity: Not on file   Other Topics Concern    Not on file   Social History Narrative    9/5/2019- No SDOH needs identified. Review of Systems   Constitutional: Negative for activity change, appetite change, chills, fatigue, fever and unexpected weight change.    HENT: Negative for congestion, dental problem, ear discharge,

## 2020-01-27 NOTE — TELEPHONE ENCOUNTER
[facility] Initial Follow Up Call    Outreach made within 2 business days of discharge: Yes    Patient: J Luis Lara Patient : 1935   MRN: [id number]  Reason for Admission: There are no discharge diagnoses documented for the most recent discharge. Discharge Date: 1/15/20       Spoke with: Wandy Mast     Discharge department/facility: [address] [place] [address] Interactive Patient Contact:  Was patient able to fill all prescriptions: Yes  Was patient instructed to bring all medications to the follow-up visit: Yes  Is patient taking all medications as directed in the discharge summary?  Yes  Does patient understand their discharge instructions: Yes  Does patient have questions or concerns that need addressed prior to 7-14 day follow up office visit: no    Scheduled appointment with PCP within 7-14 days    Follow Up  Future Appointments   Date Time Provider Destinee Gaffney   2020  1:00 PM Gerhardt Dolphin, MD  derm [facility]   2020  2:00 PM Jason Sanabria MD [facility]   3/6/2020  9:00 AM Jason Sanabria MD [facility]   2020  9:50 AM Graciela Arthur MD [facility]   2020  1:15 PM Manuel Granger MD [facility]   2020 11:00 AM Jason Sanabria MD [facility]   2020  2:15 PM MD Melissa Mcintosh LPN

## 2020-02-05 NOTE — TELEPHONE ENCOUNTER
eHctor calling stating that he needs to speak with Kristen about patient. He states that patient has an appointment tomorrow, but he is unsure what to do with patient. Hector just keeps repeating \"I just need to speak with Kristen. \" Please call Hector.

## 2020-02-05 NOTE — TELEPHONE ENCOUNTER
Deanne Carranza stated she is so much pain she can't sit at the table to eat and now she is willing to take something for pain- she is in the bed around the clock- the kidney doctor stated she don't have young kidneys but they are fine for her age.    So could you send something to ronan for her please

## 2020-02-06 NOTE — PROGRESS NOTES
Post-Discharge Transitional Care Management Services or Hospital Follow Up      J Luis Lara   YOB: 1935    Date of Office Visit:  2/6/2020  Date of Hospital Admission: 1/11/20  Date of Hospital Discharge: 1/15/20   Date of discharge from nursing home: 1/25/2020  Readmission Risk Score(high >=14%.  Medium >=10%):Readmission Risk Score: 39      Care management risk score Rising risk (score 2-5) and Complex Care (Scores >=6): 15     Non face to face  following discharge, date last encounter closed (first attempt may have been earlier): 1/27/2020  4:42 PM 1/27/2020  4:42 PM    Call initiated 2 business days of discharge: Yes     Patient Active Problem List   Diagnosis    Hyperlipidemia    Hypertension    [facility]    Breast cancer ([facility]    Lymphedema of arm    Vitamin D deficiency    Hyperplastic colon polyp    PVC's (premature ventricular contractions)    Allergic rhinitis    Generalized anxiety disorder    DM (diabetes mellitus), type 2 ([facility]    Coronary artery disease involving native coronary artery of native heart    Persistent atrial fibrillation    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 Misael Ferrer    Chronic nausea    Moderate mitral regurgitation    Leg cramps    Chronic kidney disease, stage 3 (HCC)    Systolic congestive heart failure (HCC)    S/P ICD (internal cardiac defibrillator) procedure    Pacemaker    Sepsis ([facility]    MSSA (methicillin susceptible Staphylococcus aureus) septicemia (HCC)    Overweight    Creatinine elevation    Acute kidney injury ([facility]    UTI (urinary tract infection)    Acute renal failure (HCC)       Allergies   Allergen Reactions    Darvocet A500 [Propoxyphene N-Acetaminophen] Other (See Comments)     weakness    Demerol Hcl [Meperidine] Other (See Comments)     Weakness      Marinol [Dronabinol] Other (See Comments)     weakness    Morphine Sulfate [Morphine] Other (See Comments) breakfast)             sertraline (ZOLOFT) 50 MG tablet  Take 1 tablet by mouth daily             sodium polystyrene (SPS) 15 GM/60ML suspension  Take 120 mLs by mouth once for 1 dose             sucralfate (CARAFATE) 1 GM tablet  Take 1 tablet by mouth 4 times daily             traMADol (ULTRAM) 50 MG tablet  Take 1 tablet by mouth 2 times daily as needed for Pain for up to 7 days. Intended supply: 3 days. Take lowest dose possible to manage pain             vitamin B-6 (PYRIDOXINE) 100 MG tablet  Take 100 mg by mouth every other day             Vitamin D (CHOLECALCIFEROL) 1000 UNITS CAPS capsule  Take 1,000 Units by mouth daily. Medications marked \"taking\" at this time  Outpatient Medications Marked as Taking for the 2/6/20 encounter (Office Visit) with Mark Gamino MD   Medication Sig Dispense Refill    traMADol (ULTRAM) 50 MG tablet Take 1 tablet by mouth 2 times daily as needed for Pain for up to 7 days. Intended supply: 3 days. Take lowest dose possible to manage pain 14 tablet 0    LORazepam (ATIVAN) 0.5 MG tablet Take 1 tablet by mouth every 8 hours as needed for Anxiety for up to 10 days. 3 tablet 0    Probiotic Product (PROBIOTIC DAILY) CAPS Take 1 tablet by mouth 3 times daily 90 capsule 0    sodium polystyrene (SPS) 15 GM/60ML suspension Take 120 mLs by mouth once for 1 dose 1 Bottle 3    glimepiride (AMARYL) 2 MG tablet TAKE ONE TABLET BY MOUTH TWICE A  tablet 3    potassium chloride (KLOR-CON M) 20 MEQ TBCR extended release tablet TAKE ONE TABLET BY MOUTH TWICE A DAY WITH MEALS (Patient taking differently: daily Do not crush, chew, or suck on tablet.  Tablet may also be broken in half and each half swallowed separately.) 60 tablet 11    metoprolol tartrate (LOPRESSOR) 25 MG tablet Take 1 tablet by mouth 2 times daily 60 tablet 3    blood glucose test strips (FREESTYLE TEST STRIPS) strip USE ONE STRIP TO TEST DAILY AS NEEDED 50 strip 2    rivaroxaban (XARELTO) 15 MG TABS tablet Take 1 tablet by mouth daily (with breakfast) 30 tablet 5    sertraline (ZOLOFT) 50 MG tablet Take 1 tablet by mouth daily 30 tablet 0    amiodarone (CORDARONE) 200 MG tablet Take 1 tablet by mouth daily 30 tablet 0    bumetanide (BUMEX) 1 MG tablet Take 1 tablet by mouth 2 times daily 60 tablet 0    sucralfate (CARAFATE) 1 GM tablet Take 1 tablet by mouth 4 times daily 120 tablet 0    lisinopril (PRINIVIL;ZESTRIL) 2.5 MG tablet Take 1 tablet by mouth daily 30 tablet 0    vitamin B-6 (PYRIDOXINE) 100 MG tablet Take 100 mg by mouth every other day      ondansetron (ZOFRAN ODT) 4 MG disintegrating tablet Take 1 tablet by mouth every 6 hours as needed for Nausea or Vomiting 60 tablet 1    aspirin 81 MG chewable tablet Take 1 tablet by mouth daily 30 tablet 3    acetaminophen (TYLENOL) 325 MG tablet Take 2 tablets by mouth every 4 hours as needed for Pain 120 tablet 0    famotidine (PEPCID) 20 MG tablet TAKE ONE TABLET BY MOUTH EVERY EVENING 90 tablet 3    Vitamin D (CHOLECALCIFEROL) 1000 UNITS CAPS capsule Take 1,000 Units by mouth daily. Medications patient taking as of now reconciled against medications ordered at time of hospital discharge: Yes    Chief Complaint   Patient presents with    Follow-Up from [address] [place] [address] 1/25/20  dx UTI  -     Back Pain     the tramadol is helpin take the edge pain - but still feeling shaky and weak - wants to know if they can get an MRI       HPI Here today for a follow up from the [address] [place] [address]. She was there for a UTI that required IV antibiotics. She can't get out of bed or off of the couch because of pain and weakness. She has not fallen. She was doing okay while she was at [address] [place] [address], but two days before she was discharged her back started hurting pretty badly. Her back pain is worsening since she has been home. She called yesterday desperate because the pain is so bad so I sent in tramadol.  Her back is getting marginally better with tramadol. She has been trying to some of her exercises in bed. She is gettign home PT but yesterday she was in too much pain to do anything besides use the TENS unit. She walked some two days ago at home with her walker. She has trouble getting to the commode and she really struggles to get back off of it. Her right knee has been hurting her a lot as well which is making it hard for her to walk. She is not having any issues with dysuria. She is still nauseated in the mornings. Her appetite is decreased and she is really struggling to eat. Her  is trying to feed her more protein to improve her nutrition. Her  reports that she can't sit up at the table because she is too weak to sit up so she eats on the couch or on the bed. Inpatient course: Discharge summary reviewed- see chart. Interval history/Current status: stable    Review of Systems   Constitutional: Positive for activity change, appetite change and fatigue. Negative for chills and fever. Eyes: Negative for visual disturbance. Respiratory: Negative for cough, chest tightness, shortness of breath and wheezing. Cardiovascular: Negative for chest pain, palpitations and leg swelling. Gastrointestinal: Positive for abdominal pain and nausea. Genitourinary: Negative for difficulty urinating. Musculoskeletal: Positive for arthralgias (right knee), back pain and gait problem. Neurological: Positive for weakness. Negative for dizziness, syncope and light-headedness. Vitals:    02/06/20 1026   BP: (!) 102/56   Site: Right Upper Arm   Position: Sitting   Cuff Size: Medium Adult   Pulse: 74   SpO2: 99%   Weight: 128 lb (58.1 kg)   Height: 5' (1.524 m)     Body mass index is 25 kg/m².    Wt Readings from Last 3 Encounters:   02/06/20 128 lb (58.1 kg)   01/21/20 130 lb 4.7 oz (59.1 kg)   01/14/20 130 lb 4.8 oz (59.1 kg)     BP Readings from Last 3 Encounters:   02/06/20 (!) 102/56   01/21/20 118/72   01/15/20 (!) 131/58       Physical right knee  New; this seems to be affecting her ability to walk so I offered her an injection which she agreed to. Procedure Note    PREOP DIAGNOSIS:  [x] Right []  Left    [] Bilateral Knee Pain    POSTOP DIAGNOSIS:  [x] Right []  Left    [] Bilateral Knee Pain    OPERATION:  [x] Right []  Left    [] Bilateral INTRA-ARTICULAR KNEE INJECTION    PROCEDURE:  After sterile prep, an anterolateral approach was used. [x] 40 mg Kenalog  [x]  2 ml of 2% lidocaine without epi injected intra-articularly without incident. Pre- and post neurovascular status verified. No complications noted. - LA ARTHROCENTESIS ASPIR&/INJ MAJOR JT/BURSA W/O US  - triamcinolone acetonide (KENALOG-40) injection 40 mg        Medical Decision Making: moderate complexity      Return for keep appointment as scheduled.     Eagle Lucero  2/6/2020  5:30 PM

## 2020-02-09 PROBLEM — E11.10 TYPE 2 DIABETES MELLITUS WITH KETOACIDOSIS WITHOUT COMA, WITHOUT LONG-TERM CURRENT USE OF INSULIN (HCC): Status: ACTIVE | Noted: 2020-01-01

## 2020-02-09 NOTE — H&P
further work-up and management of DKA. Of note, the only complaint of the patient has is nausea associated with walking. She denies fever, chills, abdominal pain, shortness of breath, chest pain, dysuria, diarrhea, constipation. She does have an elevated leukocytosis noted. Past Medical History:     Past Medical History:   Diagnosis Date    Allergic rhinitis     With chronic cough.  Anticoagulated     xarelto    Anxiety, generalized     Chronic with probable depression. She will take Ativan but refuses antidepressant.  Aortic stenosis     Atrial fibrillation (HCC)     Atrial fibrillation with RVR ([facility] 3/25/2019    Breast cancer ([facility]     2 occurrences    CAD (coronary artery disease) November 2012    Minimal disease by catheterization, 11/12, with wedge pressure of the right heart. She is taking Lasix for this and being followed by Cardiology.  Cellulitis 11/12/2018    Cellulitis of left arm 11/12/2018    Diabetic neuropathy (HCC)     Hyperlipidemia     Hypertension     runs low    Lymphedema of arm     Left arm, due to lymph node dissection and mastectomy.  PVC's (premature ventricular contractions)     Chronic. Patient currently undergoing workup for arrhythmia.  S/P cardiac cath 09/06/2016    Type II or unspecified type diabetes mellitus without mention of complication, not stated as uncontrolled     Vitamin D deficiency         Past Surgical History:     Past Surgical History:   Procedure Laterality Date    AORTIC VALVE REPLACEMENT  12/07/2017    TAVR 26 mm CoreValve     CARDIAC CATHETERIZATION  November 2012    Showed minimal CAD with increased wedge pressure of the right heart. Patient saw Cardiology and started on Lasix.  CARDIAC CATHETERIZATION  09/19/2017    right and left heart cath   EF 30% WITH PATENT LAD STENT    CARDIAC CATHETERIZATION  09/23/2019    COLONOSCOPY  08/12/2009    diverticulosis being found.     COLONOSCOPY  9/8/14    grade 2-3 internal hemorrhoids - banded x 2, random biopsies taken to r/o - normal, repeat 5yrs due to hx of polyps- garber    CORONARY ANGIOPLASTY WITH STENT PLACEMENT  09/2016    PTCA / Drug Eluting Stent:, LAD and / or branches    FRACTURE SURGERY  08/10/99    Left elbow ORIF     HYSTERECTOMY  09/09/2002    With bladder repair for benign reasons.  MASTECTOMY Bilateral 1995 and 2000    With lymph node dissections in 1995 and 2000.  OTHER SURGICAL HISTORY Right 6/2015    Index and long trigger finger release    PACEMAKER PLACEMENT      removed    ME COLONOSCOPY FLX DX W/COLLJ SPEC WHEN PFRMD N/A 9/24/2018    COLONOSCOPY w/ hemorrhoid bending performed by Douglas Garcia MD at [address] [place] [address]        Medications Prior to Admission:     Prior to Admission medications    Medication Sig Start Date End Date Taking? Authorizing Provider   bumetanide (BUMEX) 1 MG tablet Take 1 tablet by mouth 2 times daily 2/7/20  Yes Mark Gamino MD   traMADol (ULTRAM) 50 MG tablet Take 1 tablet by mouth 2 times daily as needed for Pain for up to 7 days. Intended supply: 3 days. Take lowest dose possible to manage pain 2/5/20 2/12/20 Yes Mark Gamino MD   LORazepam (ATIVAN) 0.5 MG tablet Take 1 tablet by mouth every 8 hours as needed for Anxiety for up to 10 days. 1/14/20 2/9/20 Yes Jessee Valverde   Probiotic Product (PROBIOTIC DAILY) CAPS Take 1 tablet by mouth 3 times daily 1/14/20  Yes Jessee Valverde   glimepiride (AMARYL) 2 MG tablet TAKE ONE TABLET BY MOUTH TWICE A DAY 1/3/20  Yes King Murphy DO   potassium chloride (KLOR-CON M) 20 MEQ TBCR extended release tablet TAKE ONE TABLET BY MOUTH TWICE A DAY WITH MEALS  Patient taking differently: daily Do not crush, chew, or suck on tablet. Tablet may also be broken in half and each half swallowed separately.  12/20/19  Yes King Murphy DO   metoprolol tartrate (LOPRESSOR) 25 MG tablet Take 1 tablet by mouth 2 times daily 12/19/19  Yes Robyne Cushing, MD   rivaroxaban (Zane Angel) 15 MG TABS tablet Take 1 tablet by mouth daily (with breakfast) 11/27/19  Yes Natali Forman MD   sertraline (ZOLOFT) 50 MG tablet Take 1 tablet by mouth daily 11/25/19  Yes King Murphy DO   amiodarone (CORDARONE) 200 MG tablet Take 1 tablet by mouth daily 11/25/19  Yes King Murphy DO   sucralfate (CARAFATE) 1 GM tablet Take 1 tablet by mouth 4 times daily 11/25/19  Yes King Murphy DO   lisinopril (PRINIVIL;ZESTRIL) 2.5 MG tablet Take 1 tablet by mouth daily 11/25/19  Yes King Murphy DO   ondansetron (ZOFRAN ODT) 4 MG disintegrating tablet Take 1 tablet by mouth every 6 hours as needed for Nausea or Vomiting 8/22/19  Yes Lizzy Blankenship MD   aspirin 81 MG chewable tablet Take 1 tablet by mouth daily 6/10/19  Yes DIYA Rodriges CNP   acetaminophen (TYLENOL) 325 MG tablet Take 2 tablets by mouth every 4 hours as needed for Pain 11/15/18  Yes Christy Falls   famotidine (PEPCID) 20 MG tablet TAKE ONE TABLET BY MOUTH EVERY EVENING 11/5/18  Yes Lizzy Blankenship MD   nitroGLYCERIN (NITROSTAT) 0.4 MG SL tablet Place 1 tablet under the tongue every 5 minutes as needed for Chest pain up to max of 3 total doses. If no relief after 1 dose, call 911. 9/20/17  Yes DIYA Rodriges CNP   Vitamin D (CHOLECALCIFEROL) 1000 UNITS CAPS capsule Take 1,000 Units by mouth daily. Yes Historical Provider, MD   sodium polystyrene (SPS) 15 GM/60ML suspension Take 120 mLs by mouth once for 1 dose 1/11/20 2/6/20  Isaac Penny,    blood glucose test strips (FREESTYLE TEST STRIPS) strip USE ONE STRIP TO TEST DAILY AS NEEDED 12/2/19   Lizzy Blankenship MD   vitamin B-6 (PYRIDOXINE) 100 MG tablet Take 100 mg by mouth every other day    Historical Provider, MD        Allergies:     Darvocet a500 [propoxyphene n-acetaminophen]; Demerol hcl [meperidine]; Marinol [dronabinol]; Morphine sulfate [morphine];  Statins [statins]; and Levaquin [levofloxacin in d5w]    Social History: chronically debilitated  Mental status: oriented to person, place, and time  Head: normocephalic, atraumatic  Eye: no icterus, redness, pupils equal and reactive, extraocular eye movements intact, conjunctiva clear  Ear: normal external ear, no discharge, hearing intact  Nose: no drainage noted  Mouth: mucous membranes moist  Neck: supple, no carotid bruits, thyroid not palpable  Lungs: Bilateral equal air entry, clear to ausculation, no wheezing, rales or rhonchi, normal effort  Cardiovascular: normal rate, regular rhythm, no murmur, gallop, rub  Abdomen: Soft, nontender, nondistended, normal bowel sounds, no hepatomegaly or splenomegaly  Neurologic: There are no new focal motor or sensory deficits, normal muscle tone and bulk, no abnormal sensation, normal speech, cranial nerves II through XII grossly intact  Skin: No gross lesions, rashes, bruising or bleeding on exposed skin area  Extremities: peripheral pulses palpable, no pedal edema or calf pain with palpation  Psych: normal affect    Investigations:      Laboratory Testing:  Recent Results (from the past 24 hour(s))   EKG 12 Lead    Collection Time: 02/09/20  1:49 AM   Result Value Ref Range    Ventricular Rate 108 BPM    Atrial Rate 105 BPM    QRS Duration 174 ms    Q-T Interval 424 ms    QTc Calculation (Bazett) 568 ms    R Axis -63 degrees    T Axis 91 degrees   Comprehensive Metabolic Panel    Collection Time: 02/09/20  2:45 AM   Result Value Ref Range    Glucose 671 (HH) 70 - 99 mg/dL    BUN 92 (HH) 8 - 23 mg/dL    CREATININE 2.68 (H) 0.50 - 0.90 mg/dL    Bun/Cre Ratio 34 (H) 9 - 20    Calcium 9.9 8.6 - 10.4 mg/dL    Sodium 121 (LL) 135 - 144 mmol/L    Potassium 5.4 (H) 3.7 - 5.3 mmol/L    Chloride 84 (L) 98 - 107 mmol/L    CO2 15 (L) 20 - 31 mmol/L    Anion Gap 22 (H) 9 - 17 mmol/L    Alkaline Phosphatase 179 (H) 35 - 104 U/L    ALT 20 5 - 33 U/L    AST 20 <32 U/L    Total Bilirubin 0.35 0.3 - 1.2 mg/dL    Total Protein 7.7 6.4 - 8.3 g/dL    Alb 4.0 3.5 - 5.2 g/dL    Albumin/Globulin Ratio 1.1 1.0 - 2.5    GFR Non-African American 17 (L) >60 mL/min    GFR  21 (L) >60 mL/min    GFR Comment          GFR Staging NOT REPORTED    TROPONIN    Collection Time: 02/09/20  2:45 AM   Result Value Ref Range    Troponin, High Sensitivity 118 (HH) 0 - 14 ng/L    Troponin T NOT REPORTED <0.03 ng/mL    Troponin Interp NOT REPORTED    Beta-Hydroxybutyrate    Collection Time: 02/09/20  2:45 AM   Result Value Ref Range    Beta-Hydroxybutyrate 3.09 (H) 0.02 - 0.27 mmol/L   SPECIMEN REJECTION    Collection Time: 02/09/20  2:45 AM   Result Value Ref Range    Specimen Source ER DRAW     Ordered Test CBC W/ DIFF     Reason for Rejection Unable to perform testing: Specimen clotted.      - NOT REPORTED    CBC Auto Differential    Collection Time: 02/09/20  3:50 AM   Result Value Ref Range    WBC 17.8 (H) 3.5 - 11.3 k/uL    RBC 3.57 (L) 3.95 - 5.11 m/uL    Hemoglobin 9.4 (L) 11.9 - 15.1 g/dL    Hematocrit 30.6 (L) 36.3 - 47.1 %    MCV 85.7 82.6 - 102.9 fL    MCH 26.3 25.2 - 33.5 pg    MCHC 30.7 25.2 - 33.5 g/dL    RDW 19.1 (H) 11.8 - 14.4 %    Platelets See Reflexed IPF Result 138 - 453 k/uL    MPV Abnormal 8.1 - 13.5 fL    NRBC Automated 0.0 0.0 per 100 WBC    Differential Type NOT REPORTED     WBC Morphology NOT REPORTED     RBC Morphology NOT REPORTED     Platelet Estimate NOT REPORTED     Monocytes 7 4 - 11 %    Lymphocytes 3 (L) 16 - 46 %    Seg Neutrophils 89 (H) 43 - 77 %    Eosinophils % 0 (L) 1 - 7 %    Basophils 0 0 - 1 %    Immature Granulocytes 1 (H) 0 %    Absolute Mono # 1.25 (H) 0.1 - 1.2 k/uL    Absolute Lymph # 0.53 (L) 1.0 - 4.8 k/uL    Segs Absolute 15.84 (H) 1.8 - 7.7 k/uL    Absolute Eos # 0.00 0.0 - 0.4 k/uL    Basophils Absolute 0.00 0.0 - 0.2 k/uL    Absolute Immature Granulocyte 0.18 0.00 - 0.30 k/uL    Morphology Platelet count adequate     Morphology PLATELET CLUMPS PRESENT ON SLIDE ELLIPTOCYTES     Morphology MACROCYTOSIS PRESENT    Immature 0.50 - 0.90 mg/dL    Bun/Cre Ratio NOT REPORTED 9 - 20    Calcium 9.3 8.6 - 10.4 mg/dL    Sodium 128 (L) 135 - 144 mmol/L    Potassium 4.8 3.7 - 5.3 mmol/L    Chloride 94 (L) 98 - 107 mmol/L    CO2 16 (L) 20 - 31 mmol/L    Anion Gap 18 (H) 9 - 17 mmol/L    GFR Non-African American 17 (L) >60 mL/min    GFR African American 20 (L) >60 mL/min    GFR Comment          GFR Staging NOT REPORTED    Magnesium    Collection Time: 02/09/20 12:18 PM   Result Value Ref Range    Magnesium 2.2 1.6 - 2.6 mg/dL   Phosphorus    Collection Time: 02/09/20 12:18 PM   Result Value Ref Range    Phosphorus 3.7 2.6 - 4.5 mg/dL   APTT    Collection Time: 02/09/20 12:18 PM   Result Value Ref Range    PTT >120.0 (HH) 20.5 - 30.5 sec   POC Glucose Fingerstick    Collection Time: 02/09/20 12:26 PM   Result Value Ref Range    POC Glucose 357 (H) 65 - 105 mg/dL   POC Glucose Fingerstick    Collection Time: 02/09/20  1:16 PM   Result Value Ref Range    POC Glucose 314 (H) 65 - 105 mg/dL   POC Glucose Fingerstick    Collection Time: 02/09/20  2:23 PM   Result Value Ref Range    POC Glucose 253 (H) 65 - 105 mg/dL   POC Glucose Fingerstick    Collection Time: 02/09/20  4:27 PM   Result Value Ref Range    POC Glucose 131 (H) 65 - 105 mg/dL   POC Glucose Fingerstick    Collection Time: 02/09/20  5:47 PM   Result Value Ref Range    POC Glucose 127 (H) 65 - 105 mg/dL       Imaging/Diagnostics:  Xr Chest Portable    Result Date: 2/9/2020  No acute cardiopulmonary process. No significant interval change.        Assessment :      Hospital Problems           Last Modified POA    * (Principal) Type 2 diabetes mellitus with ketoacidosis without coma, without long-term current use of insulin ([address] 2/9/2020 Yes    Combined systolic and diastolic congestive heart failure ([address] 2/9/2020 Yes    Sepsis ([address] 2/9/2020 Yes    Acute renal failure ([address] 2/9/2020 Yes    Hyperlipidemia 2/9/2020 Yes    Essential hypertension 2/9/2020 Yes    Coronary artery disease involving pepcid  9. PT/OT  10. Very close monitoring of respiratory status. Patient with HFrEF (EF 30%) with an AICD present and is status post TAVR. If she begins to decompensate from a respiratory standpoint due to volume overload, will need pressor support and mechanical ventilation. Consultations:   IP CONSULT TO DIABETES EDUCATOR  IP CONSULT TO DIETITIAN  IP CONSULT TO NEUROSURGERY  IP CONSULT TO NEPHROLOGY    Patient is admitted as inpatient status because of co-morbidities listed above, severity of signs and symptoms as outlined, requirement for current medical therapies and most importantly because of direct risk to patient if care not provided in a hospital setting.     Vemla Sommer DO  2/9/2020  6:35 PM    Copy sent to Dr. Lauro Hunter MD

## 2020-02-09 NOTE — ED PROVIDER NOTES
MEDICATIONS       Previous Medications    ACETAMINOPHEN (TYLENOL) 325 MG TABLET    Take 2 tablets by mouth every 4 hours as needed for Pain    AMIODARONE (CORDARONE) 200 MG TABLET    Take 1 tablet by mouth daily    ASPIRIN 81 MG CHEWABLE TABLET    Take 1 tablet by mouth daily    BLOOD GLUCOSE TEST STRIPS (FREESTYLE TEST STRIPS) STRIP    USE ONE STRIP TO TEST DAILY AS NEEDED    BUMETANIDE (BUMEX) 1 MG TABLET    Take 1 tablet by mouth 2 times daily    FAMOTIDINE (PEPCID) 20 MG TABLET    TAKE ONE TABLET BY MOUTH EVERY EVENING    GLIMEPIRIDE (AMARYL) 2 MG TABLET    TAKE ONE TABLET BY MOUTH TWICE A DAY    LISINOPRIL (PRINIVIL;ZESTRIL) 2.5 MG TABLET    Take 1 tablet by mouth daily    LORAZEPAM (ATIVAN) 0.5 MG TABLET    Take 1 tablet by mouth every 8 hours as needed for Anxiety for up to 10 days. METOPROLOL TARTRATE (LOPRESSOR) 25 MG TABLET    Take 1 tablet by mouth 2 times daily    NITROGLYCERIN (NITROSTAT) 0.4 MG SL TABLET    Place 1 tablet under the tongue every 5 minutes as needed for Chest pain up to max of 3 total doses. If no relief after 1 dose, call 911. ONDANSETRON (ZOFRAN ODT) 4 MG DISINTEGRATING TABLET    Take 1 tablet by mouth every 6 hours as needed for Nausea or Vomiting    POTASSIUM CHLORIDE (KLOR-CON M) 20 MEQ TBCR EXTENDED RELEASE TABLET    TAKE ONE TABLET BY MOUTH TWICE A DAY WITH MEALS    PROBIOTIC PRODUCT (PROBIOTIC DAILY) CAPS    Take 1 tablet by mouth 3 times daily    RIVAROXABAN (XARELTO) 15 MG TABS TABLET    Take 1 tablet by mouth daily (with breakfast)    SERTRALINE (ZOLOFT) 50 MG TABLET    Take 1 tablet by mouth daily    SODIUM POLYSTYRENE (SPS) 15 GM/60ML SUSPENSION    Take 120 mLs by mouth once for 1 dose    SUCRALFATE (CARAFATE) 1 GM TABLET    Take 1 tablet by mouth 4 times daily    TRAMADOL (ULTRAM) 50 MG TABLET    Take 1 tablet by mouth 2 times daily as needed for Pain for up to 7 days. Intended supply: 3 days.  Take lowest dose possible to manage pain    VITAMIN B-6 (PYRIDOXINE) 100 MG TABLET    Take 100 mg by mouth every other day    VITAMIN D (CHOLECALCIFEROL) 1000 UNITS CAPS CAPSULE    Take 1,000 Units by mouth daily. ALLERGIES     is allergic to darvocet a500 [propoxyphene n-acetaminophen]; demerol hcl [meperidine]; marinol [dronabinol]; morphine sulfate [morphine]; statins [statins]; and levaquin [levofloxacin in d5w]. FAMILY HISTORY     She indicated that her mother is . She indicated that her father is . She indicated that all of her five sisters are alive. family history includes Diabetes in her mother; Glaucoma in her mother; Stroke (age of onset: 64) in her father. SOCIAL HISTORY      reports that she is a non-smoker but has been exposed to tobacco smoke. She has never used smokeless tobacco. She reports that she does not drink alcohol or use drugs. PHYSICAL EXAM     INITIAL VITALS:  tympanic temperature is 97.4 °F (36.3 °C). Her blood pressure is 98/58 (abnormal) and her pulse is 95. Her respiration is 18 and oxygen saturation is 98%. Gen.: Patient is nontoxic range female who appears in no acute distress. HEENT: Head is atraumatic. Mouth shows mildly dry mucous membranes. Neck: Supple. Respiratory: Lung sounds are clear bilateral.  Cardiac: Heart is regular rate and rhythm. GI: Abdomen soft, nontender. Peripheral exam no cyanosis, edema. Skin: Arm and dry. No rash, pale. Neuro: Patient is no gross focal neurological deficits    DIFFERENTIAL DIAGNOSIS/ MDM:     . Atypical cardiac hyperglycemia, DKA    DIAGNOSTIC RESULTS     EKG: All EKG's are interpreted by the Emergency Department Physician who either signs or Co-signs this chart in the absence of a cardiologist.    .  EKG interpreted by me, reveals a left bundle branch block at a rate of 108, QRS of 174.   She has had previous left bundle branch block on EKG a past    RADIOLOGY:   I directly visualized the following  images and reviewed the radiologist interpretations:  XR CHEST

## 2020-02-09 NOTE — PROGRESS NOTES
25.0 - 29.9 Overweight    Nutrition Interventions:   Continue NPO(Monitor for start of oral diet. )  Continued Inpatient Monitoring, Education not appropriate at this time    Nutrition Evaluation:   · Evaluation: Goals set   · Goals: Start of nutrition within 48 hours.     · Monitoring: Nutrition Progression, Skin Integrity, I&O, Weight, Pertinent Labs, Monitor Hemodynamic Status, Patient/Family Education    Electronically signed by Adali Lowery RD, LD on 2/9/20 at 4:29 PM    Contact Number: [phone] adls, grocery shopping, house chores

## 2020-02-10 PROBLEM — G92.8 TOXIC METABOLIC ENCEPHALOPATHY: Status: ACTIVE | Noted: 2020-01-01

## 2020-02-10 PROBLEM — R25.1 TREMOR: Status: ACTIVE | Noted: 2020-01-01

## 2020-02-10 PROBLEM — M48.062 SPINAL STENOSIS OF LUMBAR REGION WITH NEUROGENIC CLAUDICATION: Status: ACTIVE | Noted: 2020-01-01

## 2020-02-10 PROBLEM — R41.0 DELIRIUM: Status: ACTIVE | Noted: 2020-01-01

## 2020-02-10 PROBLEM — N39.0 UTI (URINARY TRACT INFECTION): Status: RESOLVED | Noted: 2020-01-01 | Resolved: 2020-01-01

## 2020-02-10 NOTE — CONSULTS
mellitus without mention of complication, not stated as uncontrolled     Vitamin D deficiency      Past Surgical History:        Procedure Laterality Date    AORTIC VALVE REPLACEMENT  12/07/2017    TAVR 26 mm CoreValve     CARDIAC CATHETERIZATION  November 2012    Showed minimal CAD with increased wedge pressure of the right heart. Patient saw Cardiology and started on Lasix.  CARDIAC CATHETERIZATION  09/19/2017    right and left heart cath   EF 30% WITH PATENT LAD STENT    CARDIAC CATHETERIZATION  09/23/2019    COLONOSCOPY  08/12/2009    diverticulosis being found.  COLONOSCOPY  9/8/14    grade 2-3 internal hemorrhoids - banded x 2, random biopsies taken to r/o - normal, repeat 5yrs due to hx of polyps- garber    CORONARY ANGIOPLASTY WITH STENT PLACEMENT  09/2016    PTCA / Drug Eluting Stent:, LAD and / or branches    FRACTURE SURGERY  08/10/99    Left elbow ORIF     HYSTERECTOMY  09/09/2002    With bladder repair for benign reasons.  MASTECTOMY Bilateral 1995 and 2000    With lymph node dissections in 1995 and 2000.     OTHER SURGICAL HISTORY Right 6/2015    Index and long trigger finger release    PACEMAKER PLACEMENT      removed    RI COLONOSCOPY FLX DX W/COLLJ SPEC WHEN PFRMD N/A 9/24/2018    COLONOSCOPY w/ hemorrhoid bending performed by Kamille Hernandez MD at [address] [place] [address] OR     Current Medications:   Current Facility-Administered Medications: dextrose 5 % and 0.45 % sodium chloride infusion, , Intravenous, Continuous PRN  dextrose 50 % IV solution, 12.5 g, Intravenous, PRN  potassium chloride 10 mEq/100 mL IVPB (Peripheral Line), 10 mEq, Intravenous, PRN  magnesium sulfate 1 g in dextrose 5% 100 mL IVPB, 1 g, Intravenous, PRN  sodium phosphate 10 mmol in dextrose 5 % 250 mL IVPB, 10 mmol, Intravenous, PRN **OR** sodium phosphate 15 mmol in dextrose 5 % 250 mL IVPB, 15 mmol, Intravenous, PRN **OR** sodium phosphate 20 mmol in dextrose 5 % 500 mL IVPB, 20 mmol, Intravenous, PRN  insulin regular (HUMULIN mild, generalized weakness, but no focal or lateralizing deficits. Sensation is intact to light touch, but subjectively altered down from just below the knees down through the toes bilaterally. Does well with finger-to-nose testing  Gait testingdeferred    DATA:  I personally reviewed an MRI of the lumbar spine as well as the radiologist's report. There are diffuse degenerative changes, but I believe the pertinent findings to be severe canal stenosis at L4-5 and L3-4. There is some congestion of the cauda equina evident proximal to the stenosis at L3-4. 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 IMPRESSION/RECOMMENDATIONS:      82year old woman with lumbar stenosis  -Some of her symptoms do sound like neurogenic claudication, which would be an expected result of her severe canal stenosis at L3-4 and L4-5. There is possibly some abnormal signal in the thoracic cord, but this is only partially visualized and she seems to be moving her legs with good strength, so I doubt there is clinical significance to this finding. The lumbar stenosis could be addressed with surgery, but the patient actually became visually upset at the mention of the word surgery. She is not at all interested in back surgery, even though I told her it could significantly improve her pain. A lumbar laminectomy would likely be beneficial for her, but this would be a purely elective procedure, and in light of her multiple, chronic medical problems I would not try to push her toward surgery. If she does decide that she is interested in surgery I would want to see that she can remain medically stable with improved control of her diabetes as an outpatient prior to performing any elective surgery. I would want to see her hemoglobin A1c <9.   She would also need to be cleared to be off anticoagulation for surgery.  -She is welcome to follow up with me on an as needed basis.

## 2020-02-10 NOTE — CONSULTS
disease) November 2012    Minimal disease by catheterization, 11/12, with wedge pressure of the right heart. She is taking Lasix for this and being followed by Cardiology.  Cellulitis 11/12/2018    Cellulitis of left arm 11/12/2018    Diabetic neuropathy (HCC)     Hyperlipidemia     Hypertension     runs low    Lymphedema of arm     Left arm, due to lymph node dissection and mastectomy.  PVC's (premature ventricular contractions)     Chronic. Patient currently undergoing workup for arrhythmia.  S/P cardiac cath 09/06/2016    Type II or unspecified type diabetes mellitus without mention of complication, not stated as uncontrolled     Vitamin D deficiency      Past Surgical History:        Procedure Laterality Date    AORTIC VALVE REPLACEMENT  12/07/2017    TAVR 26 mm CoreValve     CARDIAC CATHETERIZATION  November 2012    Showed minimal CAD with increased wedge pressure of the right heart. Patient saw Cardiology and started on Lasix.  CARDIAC CATHETERIZATION  09/19/2017    right and left heart cath   EF 30% WITH PATENT LAD STENT    CARDIAC CATHETERIZATION  09/23/2019    COLONOSCOPY  08/12/2009    diverticulosis being found.  COLONOSCOPY  9/8/14    grade 2-3 internal hemorrhoids - banded x 2, random biopsies taken to r/o - normal, repeat 5yrs due to hx of polyps- garber    CORONARY ANGIOPLASTY WITH STENT PLACEMENT  09/2016    PTCA / Drug Eluting Stent:, LAD and / or branches    FRACTURE SURGERY  08/10/99    Left elbow ORIF     HYSTERECTOMY  09/09/2002    With bladder repair for benign reasons.  MASTECTOMY Bilateral 1995 and 2000    With lymph node dissections in 1995 and 2000.     OTHER SURGICAL HISTORY Right 6/2015    Index and long trigger finger release    PACEMAKER PLACEMENT      removed    [place] COLONOSCOPY FLX DX W/COLLJ SPEC WHEN PFRMD N/A 9/24/2018    COLONOSCOPY w/ hemorrhoid bending performed by Prosper Ko MD at [facility] [place]     Current Medications:   Current Facility-Administered symmetric in biceps, brachioradialis, 1+ patellar, calcaneal  babinski b/l downgoing   Gait                  Not assessed       LUNGS:  No increased work of breathing, good air exchange, clear to auscultation bilaterally, no crackles or wheezing  CARDIOVASCULAR:  Normal apical impulse, regular rate and rhythm, normal S1 and S2, no S3 or S4, and no murmur noted  ABDOMEN:  No scars, normal bowel sounds, soft, non-distended, non-tender, no masses palpated, no hepatosplenomegally    DATA  Lab Results:   CBC:   Recent Labs     02/09/20  0350 02/10/20  0700   WBC 17.8* 10.4   HGB 9.4* 8.6*   PLT See Reflexed IPF Result See Reflexed IPF Result     BMP:    Recent Labs     02/10/20  0212 02/10/20  0700 02/10/20  1257   * 135 133*   K 4.1 4.3 4.0    105 103   CO2 15* 15* 17*   BUN 57* 49* 41*   CREATININE 1.93* 1.89* 1.69*   GLUCOSE 227* 165* 123*         Lab Results   Component Value Date    CHOL 197 12/02/2019    LDLCHOLESTEROL 90 12/02/2019    HDL 80 12/02/2019    TRIG 135 12/02/2019    ALT 20 02/09/2020    AST 20 02/09/2020    TSH 4.30 08/19/2019    INR 1.2 01/12/2020    LABA1C 9.5 (H) 12/02/2019    LABMICR CANNOT BE CALCULATED 10/20/2015       No results found for: PHENYTOIN, PHENYTOIN, VALPROATE, CBMZ    Imaging    MRI lumbar spine wo contrast   Degenerative disc disease as described above, exacerbating congenitally   narrow lumbar spinal canal.       Spinal canal narrowing, severe at L4-5, moderate to severe at L3-4, moderate   at L2-3, mild to moderate at T10-11.       Foraminal narrowing, moderate to severe at right L4-5 and right L5-S1, mild   to moderate at bilateral T10-11 and bilateral L3-4, mild at left L2-3,   minimal at left L1-2 and left L4-5.       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        IMPRESSION/RECOMMENDATIONS:     81 yo female with significant pmh of generalized anxiety disorder on Ativan at home, persistent A. fib, ICD, CAD and TAVR, on Xarelto at home switchted to heparin inpatient, hld, htn, diabetes with neuropathy, history of breast ca with subsequent lymphedema left arm, concern for tremors and hallucinations. Bilateral hand and arm tremulousness, intermittent, distractible, resolves when resting and relaxed, no parkinsonism on exam, not consistently action related to be concerning for essential tremors. Likely enhanced physiologic tremors and generalized restlessness and tremulousness related to underlying generalized anxiety disorder, metabolic and infectious stressors in the setting of DKA,  Worsened by recent death of sister causing worsening depression. Patient also is taking ativan regularly for anxiety and feels it helps her with shaking at home too, has been off ativan during this stay, possible withdrawal symptoms. Will resume 0.5 bid prn , psych recommendations on possibly increasing sertraline dose and weaning off ativan. MRI lumbar spine and neurosurgery recs noted. Currently on tramadol. F/up tsh, b12 folate, ammonia. PT/OT. Outpatient eval for neurocognitive disorder. Thank you for the consult, will follow.          Izaiah Machado MD  2/10/2020

## 2020-02-10 NOTE — PROGRESS NOTES
94* 98 102 105 103   CO2 15*  --  16* 17* 15* 15* 17*   GLUCOSE 671*  --  352* 163* 227* 165* 123*   BUN 92*  --  83* 71* 57* 49* 41*   CREATININE 2.68*  --  2.73* 2.36* 1.93* 1.89* 1.69*   MG  --    < > 2.2 2.1 2.2 2.3 2.0   ANIONGAP 22*  --  18* 14 14 15 13   LABGLOM 17*  --  17* 20* 25* 25* 29*   GFRAA 21*  --  20* 24* 30* 31* 35*   CALCIUM 9.9  --  9.3 9.0 8.7 8.6 8.4*   PHOS  --    < > 3.7 3.3 2.8 2.7 2.2*   TROPHS 118*  --   --   --   --   --   --    CKTOTAL  --   --  47  --   --   --   --    LACTACIDWB  --   --   --  1.8  --   --   --     < > = values in this interval not displayed. Recent Labs     02/09/20  0245  02/09/20  1218  02/10/20  0950 02/10/20  1102 02/10/20  1214 02/10/20  1306 02/10/20  1551 02/10/20  1559 02/10/20  1659   PROT 7.7  --   --   --   --   --   --   --   --   --   --    LABALBU 4.0  --   --   --   --   --   --   --   --   --   --    TSH  --   --   --   --   --   --   --   --   --  1.92  --    AST 20  --   --   --   --   --   --   --   --   --   --    ALT 20  --   --   --   --   --   --   --   --   --   --    ALKPHOS 179*  --   --   --   --   --   --   --   --   --   --    BILITOT 0.35  --   --   --   --   --   --   --   --   --   --    AMMONIA  --   --   --   --   --   --   --   --   --  27  --    URICACID  --   --  7.3*  --   --   --   --   --   --   --   --    POCGLU  --    < >  --    < > 154* 138* 130* 117* 110*  --  154*    < > = values in this interval not displayed.      ABG:  Lab Results   Component Value Date    POCPH 7.299 10/11/2019    POCPCO2 26.5 10/11/2019    POCPO2 73.2 10/11/2019    POCHCO3 13.0 10/11/2019    NBEA 12 10/11/2019    PBEA NOT REPORTED 10/11/2019    FBX6WHL 14 10/11/2019    VPJJ1KKT 93 10/11/2019    FIO2 6.0 10/11/2019     Lab Results   Component Value Date/Time    SPECIAL RARM 10ML 02/09/2020 08:25 PM     Lab Results   Component Value Date/Time    CULTURE NO GROWTH 17 HOURS 02/09/2020 08:25 PM       Radiology:  Mri Lumbar Spine Wo Contrast    Result

## 2020-02-10 NOTE — PROGRESS NOTES
Smoking Cessation - topics covered   []  Health Risks  []  Benefits of Quitting   []  Smoking Cessation  [x]  Patient has no history of tobacco use per note in significant history. []  Patient is former smoker per note in significant history. Patient quit in   [x]  No need for tobacco cessation education. []  Booklet given  []  Patient verbalizes understanding. []  Patient denies need for tobacco cessation education. []  Unable to meet with patient today. Will follow up as able.   Linda New  10:21 AM

## 2020-02-10 NOTE — PLAN OF CARE
Problem: Falls - Risk of:  Goal: Will remain free from falls  Description  Will remain free from falls  Outcome: Ongoing     Problem: Falls - Risk of:  Goal: Absence of physical injury  Description  Absence of physical injury  Outcome: Ongoing     Problem: Nutrition  Goal: Optimal nutrition therapy  2/9/2020 1957 by Ricky Harris RN  Outcome: Ongoing

## 2020-02-10 NOTE — PROGRESS NOTES
Physical Therapy    Facility/Department: [facility]  Initial Assessment    NAME: Thu Bryson  : 1935  MRN: [id number]    Date of Service: 2/10/2020  N/V and difficulty ambulating; found to be in DKA  Discharge Recommendations:  Further therapy recommended at discharge. PT Equipment Recommendations  Equipment Needed: No    Assessment    Pt cooperative, motivated; spouse present and supportive. Pt states she was recently [address] home from SNF, and was receiving home services prior to this hospital stay  Body structures, Functions, Activity limitations: Decreased functional mobility ; Decreased strength;Decreased balance  Prognosis: Good  Decision Making: Low Complexity  PT Education: Goals;PT Role;Plan of Care  Barriers to Learning: none  REQUIRES PT FOLLOW UP: Yes  Activity Tolerance  Activity Tolerance: Patient limited by pain       Patient Diagnosis(es): There were no encounter diagnoses. has a past medical history of Allergic rhinitis, Anticoagulated, Anxiety, generalized, Aortic stenosis, Atrial fibrillation ([address] Atrial fibrillation with RVR ([address] Breast cancer ([address] CAD (coronary artery disease), Cellulitis, Cellulitis of left arm, Diabetic neuropathy ([address] Hyperlipidemia, Hypertension, Lymphedema of arm, PVC's (premature ventricular contractions), S/P cardiac cath, Type II or unspecified type diabetes mellitus without mention of complication, not stated as uncontrolled, and Vitamin D deficiency. has a past surgical history that includes Mastectomy (Bilateral,  and ); Hysterectomy (2002); fracture surgery (08/10/99); Cardiac catheterization (2012); Colonoscopy (2009); Colonoscopy (14); other surgical history (Right, 2015); Coronary angioplasty with stent (2016); Cardiac catheterization (2017); Aortic valve replacement (2017); pr colonoscopy flx dx w/collj spec when pfrmd (N/A, 2018);  Cardiac catheterization (2019); and RLE  Strength RLE: WFL--except knee, which wasn't assessed d/t pain; pt able to move antigravity, no resistance offered  Strength LLE  Strength LLE: WFL  Strength RUE  Strength RUE: WFL  Strength LUE  Strength LUE: WFL  Tone RLE  RLE Tone: Normotonic  Tone LLE  LLE Tone: Normotonic  Motor Control  Gross Motor?: WFL  Sensation  Overall Sensation Status: WFL  Bed mobility  Rolling to Left: Minimal assistance  Rolling to Right: Minimal assistance  Supine to Sit: Minimal assistance  Sit to Supine: Minimal assistance  Comment: (spouse states he's been helping her into and out of bed since she's been home from rehab; she was previously independent)  Transfers  Sit to Stand: Supervision  Stand to sit: Supervision  Stand Pivot Transfers: Supervision  Ambulation  Ambulation?: Yes  Ambulation 1  Surface: level tile  Device: Rolling Walker  Assistance: Supervision  Quality of Gait: flexed posture, decreased wt bearing RLE d/t pain  Gait Deviations: Slow Adamaris; Increased PAVEL; Decreased step length;Decreased step height;Decreased arm swing;Decreased head and trunk rotation  Distance: 12' bed to stretcher  Stairs/Curb  Stairs?: No     Balance  Posture: Fair  Sitting - Static: Good  Sitting - Dynamic: Good  Standing - Static: Fair  Standing - Dynamic: Fair;-  Other exercises  Other exercises?: Yes  Other exercises 1: reviewed ankle pumps, heels slides, SAQs and LAQs      Plan   Plan  Times per week: 5 visits weekly  Times per day: Daily  Current Treatment Recommendations: Strengthening, ROM, Balance Training, Functional Mobility Training, Transfer Training, Endurance Training, Gait Training, Stair training  Safety Devices  Type of devices: (pt walked to stretcher and left for US after ambulating with PT)  Restraints  Initially in place: No      AM-PAC Score  AM-PAC Inpatient Mobility Raw Score : 18 (02/10/20 0936)  AM-PAC Inpatient T-Scale Score : 43.63 (02/10/20 0936)  Mobility Inpatient CMS 0-100% Score: 46.58 (02/10/20 6553)  Mobility Inpatient CMS G-Code Modifier : CK (02/10/20 0936)          Goals  Short term goals  Time Frame for Short term goals: 8 visits  Short term goal 1: independent bed mobility   Short term goal 2: independent transfers  Short term goal 3: independent gait with appropriate device (vs none, which pt prefers) x 100'  Short term goal 4: independent stair ambulation x 2 steps with 1 HR  Short term goal 5: prevent loss of strength/ROM and mobility while in the hospital  Patient Goals   Patient goals : decrease pain, be able to walk better       Therapy Time   Individual Concurrent Group Co-treatment   Time In 0902         Time Out 0933         Minutes 31                 [place], [place]

## 2020-02-10 NOTE — PLAN OF CARE
Problem: Falls - Risk of:  Goal: Will remain free from falls  Description  Will remain free from falls  2/9/2020 2210 by Xiomy Claire RN  Outcome: Ongoing  2/9/2020 1957 by David Pemberton RN  Outcome: Ongoing  Goal: Absence of physical injury  Description  Absence of physical injury  2/9/2020 2210 by Xiomy Claire RN  Outcome: Ongoing  2/9/2020 1957 by David Pemberton RN  Outcome: Ongoing     Problem: Nutrition  Goal: Optimal nutrition therapy  2/9/2020 2210 by Xiomy Claire RN  Outcome: Ongoing  2/9/2020 1957 by David Pemberton RN  Outcome: Ongoing  2/9/2020 1630 by Lacey Cartagena RD, LD  Outcome: Ongoing  Note:   Nutrition Problem: Inadequate oral intake  Intervention: Food and/or Nutrient Delivery: Continue NPO(Monitor for start of oral diet. )  Nutritional Goals: Start of nutrition within 48 hours.

## 2020-02-10 NOTE — PROGRESS NOTES
Pt blood glucose is currently 141. Pt multiplier for insulin gtts is 0.01 prior to calculation. Due to being lower than target range multplier will decrease to 0.00. RN paged Emily Brar regarding this issue. Awaiting for orders. Will continue to monitor.

## 2020-02-11 NOTE — CARE COORDINATION
[address] Transitions Interview   2020    Patient: Gloria Nettles Patient : 1935   MRN: [id number]  Reason for Admission: DKA  RARS: Readmission Risk Score: 52    Spoke with:  & patient. Explained to patient about Care Transitions - CTN contact card given. Current DC plan is home with spouse & [facility] (current). Care Transitions will continue to follow.     Readmission Risk  Patient Active Problem List   Diagnosis    Hyperlipidemia    Essential hypertension    Diabetic neuropathy ([address]    Breast cancer ([address]    Lymphedema of arm    Vitamin D deficiency    Hyperplastic colon polyp    PVC's (premature ventricular contractions)    Allergic rhinitis    Generalized anxiety disorder    DM (diabetes mellitus), type 2 ([address]    Coronary artery disease involving native coronary artery of native heart    Persistent atrial fibrillation    S/P TAVR (transcatheter aortic valve replacement) -17-Dr. Nora Brown    Chronic nausea    Moderate mitral regurgitation    Leg cramps    Chronic kidney disease, stage 3 (HCC)    Combined systolic and diastolic congestive heart failure (HCC)    S/P ICD (internal cardiac defibrillator) procedure    Pacemaker    Sepsis ([address]    MSSA (methicillin susceptible Staphylococcus aureus) septicemia (HCC)    Overweight    Creatinine elevation    Acute kidney injury (HCC)    Acute renal failure (HCC)    Ketoacidosis, diabetic, no coma, insulin dependent (HCC)    Type 2 diabetes mellitus with ketoacidosis without coma, without long-term current use of insulin (HCC)    Delirium    Tremor    Spinal stenosis of lumbar region with neurogenic claudication    Toxic metabolic encephalopathy       Inpatient Assessment  Care Transitions Summary    Care Transitions Inpatient Review  Medication Review  Barriers to Medication Adherence:  None  Are you able to afford your medications?:  Yes  How often do you have difficulty taking your

## 2020-02-11 NOTE — PROGRESS NOTES
hydroxybutyrate 3.09. Patient was treated with IV fluids, insulin. She has underlying history of aortic stenosis s/p AVR, CAD s/p stent placement. PPM placement. Patient has history of arthritis and had recent steroid injection into the joint. She was treated with insulin infusion . She is known diabetic and is on Amaryl at home. PMH:  Past Medical History:   Diagnosis Date    Allergic rhinitis     With chronic cough.  Anticoagulated     xarelto    Anxiety, generalized     Chronic with probable depression. She will take Ativan but refuses antidepressant.  Aortic stenosis     Atrial fibrillation (HCC)     Atrial fibrillation with RVR ([facility] 3/25/2019    Breast cancer ([facility]     2 occurrences    CAD (coronary artery disease) November 2012    Minimal disease by catheterization, 11/12, with wedge pressure of the right heart. She is taking Lasix for this and being followed by Cardiology.  Cellulitis 11/12/2018    Cellulitis of left arm 11/12/2018    Diabetic neuropathy (HCC)     Hyperlipidemia     Hypertension     runs low    Lymphedema of arm     Left arm, due to lymph node dissection and mastectomy.  PVC's (premature ventricular contractions)     Chronic. Patient currently undergoing workup for arrhythmia.  S/P cardiac cath 09/06/2016    Type II or unspecified type diabetes mellitus without mention of complication, not stated as uncontrolled     Vitamin D deficiency       Allergies: Allergies   Allergen Reactions    Darvocet A500 [Propoxyphene N-Acetaminophen] Other (See Comments)     weakness    Demerol Hcl [Meperidine] Other (See Comments)     Weakness      Marinol [Dronabinol] Other (See Comments)     weakness    Morphine Sulfate [Morphine] Other (See Comments)     Weakness      Statins [Statins] Other (See Comments)     Cause muscle pain and she would prefer not to take them any longer.     Levaquin [Levofloxacin In D5w] Nausea Only and Anxiety      Medications :  insulin lispro, 0-6 Units, Subcutaneous, TID WC  insulin lispro, 0-3 Units, Subcutaneous, Nightly  amiodarone, 200 mg, Oral, Daily  aspirin, 81 mg, Oral, Daily  famotidine, 20 mg, Oral, QPM  metoprolol tartrate, 25 mg, Oral, BID  lactobacillus, 1 capsule, Oral, Daily  sertraline, 50 mg, Oral, Daily  sucralfate, 1 g, Oral, 4x Daily  Vitamin D, 1,000 Units, Oral, Daily  piperacillin-tazobactam, 3.375 g, Intravenous, Q8H        Review of Systems   Review of Systems   Constitutional: Negative for appetite change, fatigue, fever and unexpected weight change. HENT: Negative for congestion, rhinorrhea and sneezing. Eyes: Negative for visual disturbance. Respiratory: Negative for cough, chest tightness, shortness of breath and wheezing. Cardiovascular: Negative for chest pain and palpitations. Gastrointestinal: Negative for abdominal pain, blood in stool, constipation, diarrhea, nausea and vomiting. Genitourinary: Negative for dysuria, enuresis, frequency and hematuria. Musculoskeletal: Negative for arthralgias and myalgias. Skin: Negative for rash. Neurological: Negative for dizziness, weakness, light-headedness and headaches. Hematological: Does not bruise/bleed easily. Psychiatric/Behavioral: Negative for dysphoric mood and sleep disturbance.      Objective :      Current Vitals : Temp: 98.7 °F (37.1 °C),  Pulse: 101, Resp: 22, BP: (!) 135/50, SpO2: 95 %  Last 24 Hrs Vitals   Patient Vitals for the past 24 hrs:   BP Temp Temp src Pulse Resp SpO2 Weight   02/11/20 0726 (!) 135/50 -- -- 101 22 95 % --   02/11/20 0602 -- -- -- 102 21 98 % --   02/11/20 0600 -- -- -- 105 24 99 % 140 lb 14.4 oz (63.9 kg)   02/11/20 0320 (!) 116/47 98.7 °F (37.1 °C) Oral 95 21 95 % --   02/11/20 0040 (!) 103/30 -- -- 95 22 97 % --   02/10/20 2300 91/62 98.5 °F (36.9 °C) Oral 99 24 97 % --   02/10/20 1825 132/69 98.3 °F (36.8 °C) Oral 104 20 97 % --   02/10/20 1142 111/68 98.5 °F (36.9 °C) Oral 91 17 96 % --     Intake / output Complete    Result Date: 2/10/2020  No suspicious renal mass or hydronephrosis. Renal cortical thinning bilaterally compatible with medical renal disease. Clinical Course : stable  Assessment and Plan  :        1. Uncontrolled type 2 diabetes mellitus with diabetic ketoacidosis -resume Amaryl. Humalog as needed, increase dosage. 2. Acute renal failure -secondary dehydration -IV fluid. Bicarb infusion. 3. Normal anion gap metabolic acidosis-bicarb infusion. 4. Spinal canal stenosis at L3-L4 and L4-L5 -does not want surgery. Appreciate neurosurgery output    Discussed with patient's . Likely discharge in 24 hours. Continue to monitor vitals , Intake / output ,  Cell count , HGB , Kidney function, oxygenation  as indicated . Plan and updates discussed with patient ,  answers  explained to satisfaction.    Plan discussed with Staff Annjelena Nieves RN     (Please note that portions of this note were completed with a voice recognition program. Efforts were made to edit the dictations but occasionally words are mis-transcribed.)      Alexander Byrnes MD  2/11/2020

## 2020-02-11 NOTE — FLOWSHEET NOTE
Assessment:   made initial visit with 82 y.o. female and her  while rounding. Upon entry, patient was lying in bed and  in chair bedside. Patient was admitted because of high blood sugars and reaction to medication given her at [facility]. Patient c/o pain in her lower back pinching the sciatic nerve and causing pain down her leg. The only time patient is not in pain is when lying down. Intervention:   provided active listening as patient and spouse engaged in conversation with . There was an instant connection, as the writer had spoken in their Scientologist a couple of times. The couple provided life review, and will have been  66 years this month. The couple has deep ruel and were open to spiritual care.  provided words of comfort from Scripture and anointed patient per her request.  A dietician joined with couple and  in prayer. Outcome:  Patient and spouse were receptive of 's visit and appreciative of his prayer. They are hopeful that patient will be able to return home soon. Chaplains will remain available for further support as needed. Saima Sifuentes     02/10/20 1850   Encounter Summary   Services provided to: Patient and family together   Referral/Consult From: [employer] [facility] Spouse; Children   Place of Evangelical   ([employer])   Contact Hoahaoism No   Continue Visiting   (2/10/2020)   Complexity of Encounter Low   Length of Encounter 45 minutes   Spiritual Assessment Completed Yes   Advance Care Planning Yes   Routine   Type Initial   Spiritual/Episcopal   Type Spiritual support   Assessment Calm; Approachable;Coping;Peaceful   Intervention Active listening;Explored feelings, thoughts, concerns;Explored coping resources;Nurtured hope;Prayer;Scripture   Outcome Connection/belonging;Comfort;Expressed gratitude;Engaged in conversation;Expressed feelings/needs/concerns;Encouraged;Receptive Advance Directives (For Healthcare)   Healthcare Directive No, patient does not have an advance directive for healthcare treatment   Information on [place] Requested No   Patient Requests Assistance No   Advance Directives Pt. not interested at this time

## 2020-02-11 NOTE — PROGRESS NOTES
be broken in half and each half swallowed separately.) 60 tablet 11    metoprolol tartrate (LOPRESSOR) 25 MG tablet Take 1 tablet by mouth 2 times daily 60 tablet 3    rivaroxaban (XARELTO) 15 MG TABS tablet Take 1 tablet by mouth daily (with breakfast) 30 tablet 5    sertraline (ZOLOFT) 50 MG tablet Take 1 tablet by mouth daily 30 tablet 0    amiodarone (CORDARONE) 200 MG tablet Take 1 tablet by mouth daily 30 tablet 0    sucralfate (CARAFATE) 1 GM tablet Take 1 tablet by mouth 4 times daily 120 tablet 0    lisinopril (PRINIVIL;ZESTRIL) 2.5 MG tablet Take 1 tablet by mouth daily 30 tablet 0    ondansetron (ZOFRAN ODT) 4 MG disintegrating tablet Take 1 tablet by mouth every 6 hours as needed for Nausea or Vomiting 60 tablet 1    aspirin 81 MG chewable tablet Take 1 tablet by mouth daily 30 tablet 3    acetaminophen (TYLENOL) 325 MG tablet Take 2 tablets by mouth every 4 hours as needed for Pain 120 tablet 0    famotidine (PEPCID) 20 MG tablet TAKE ONE TABLET BY MOUTH EVERY EVENING 90 tablet 3    nitroGLYCERIN (NITROSTAT) 0.4 MG SL tablet Place 1 tablet under the tongue every 5 minutes as needed for Chest pain up to max of 3 total doses. If no relief after 1 dose, call 911. 25 tablet 3    Vitamin D (CHOLECALCIFEROL) 1000 UNITS CAPS capsule Take 1,000 Units by mouth daily.  sodium polystyrene (SPS) 15 GM/60ML suspension Take 120 mLs by mouth once for 1 dose 1 Bottle 3    blood glucose test strips (FREESTYLE TEST STRIPS) strip USE ONE STRIP TO TEST DAILY AS NEEDED 50 strip 2    vitamin B-6 (PYRIDOXINE) 100 MG tablet Take 100 mg by mouth every other day         Allergies: Krista Estrada is allergic to darvocet a500 [propoxyphene n-acetaminophen]; demerol hcl [meperidine]; marinol [dronabinol]; morphine sulfate [morphine]; statins [statins]; and levaquin [levofloxacin in d5w]. Past Medical History:   Diagnosis Date    Allergic rhinitis     With chronic cough.     Anticoagulated     franc Guzman Anxiety, generalized     Chronic with probable depression. She will take Ativan but refuses antidepressant.  Aortic stenosis     Atrial fibrillation (HCC)     Atrial fibrillation with RVR ([facility] 3/25/2019    Breast cancer ([facility]     2 occurrences    CAD (coronary artery disease) November 2012    Minimal disease by catheterization, 11/12, with wedge pressure of the right heart. She is taking Lasix for this and being followed by Cardiology.  Cellulitis 11/12/2018    Cellulitis of left arm 11/12/2018    Diabetic neuropathy (HCC)     Hyperlipidemia     Hypertension     runs low    Lymphedema of arm     Left arm, due to lymph node dissection and mastectomy.  PVC's (premature ventricular contractions)     Chronic. Patient currently undergoing workup for arrhythmia.  S/P cardiac cath 09/06/2016    Type II or unspecified type diabetes mellitus without mention of complication, not stated as uncontrolled     Vitamin D deficiency        Past Surgical History:   Procedure Laterality Date    AORTIC VALVE REPLACEMENT  12/07/2017    TAVR 26 mm CoreValve     CARDIAC CATHETERIZATION  November 2012    Showed minimal CAD with increased wedge pressure of the right heart. Patient saw Cardiology and started on Lasix.  CARDIAC CATHETERIZATION  09/19/2017    right and left heart cath   EF 30% WITH PATENT LAD STENT    CARDIAC CATHETERIZATION  09/23/2019    COLONOSCOPY  08/12/2009    diverticulosis being found.  COLONOSCOPY  9/8/14    grade 2-3 internal hemorrhoids - banded x 2, random biopsies taken to r/o - normal, repeat 5yrs due to hx of polyps- garber    CORONARY ANGIOPLASTY WITH STENT PLACEMENT  09/2016    PTCA / Drug Eluting Stent:, LAD and / or branches    FRACTURE SURGERY  08/10/99    Left elbow ORIF     HYSTERECTOMY  09/09/2002    With bladder repair for benign reasons.  MASTECTOMY Bilateral 1995 and 2000    With lymph node dissections in 1995 and 2000.     OTHER SURGICAL HISTORY Right 6/2015 Index and long trigger finger release    PACEMAKER PLACEMENT      removed    NE COLONOSCOPY FLX DX W/COLLJ SPEC WHEN PFRMD N/A 9/24/2018    COLONOSCOPY w/ hemorrhoid bending performed by La Thompson MD at [facility] OR       Social History: Janna Coronado  reports that she is a non-smoker but has been exposed to tobacco smoke. She has never used smokeless tobacco. She reports that she does not drink alcohol or use drugs. Family History   Problem Relation Age of Onset    Diabetes Mother     Glaucoma Mother     Stroke Father 64       Objective:   BP (!) 135/50   Pulse 101   Temp 98.7 °F (37.1 °C) (Oral)   Resp 22   Ht 5' (1.524 m)   Wt 140 lb 14.4 oz (63.9 kg)   SpO2 95%   BMI 27.52 kg/m²     Blood pressure range: Systolic (85JIF), CST:656 , Min:91 , AGA:964   ; Diastolic (45ETT), VXM:64, Min:30, Max:69      Review of Systems:  Constitutional  Negative for fever and chills    HEENT  Negative for ear discharge, ear pain, nosebleed    Eyes  Negative for photophobia, pain and discharge    Respiratory  Negative for hemoptysis and sputum    Cardiovascular  Negative for orthopnea, claudication and PND    Gastrointestinal  Negative for abdominal pain, diarrhea, blood in stool    Musculoskeletal  + back and leg pain   Skin  Negative for rash or itching    Endo/heme/allergies  Negative for polydipsia, environmental allergy    Psychiatric/behavioral  Negative for suicidal ideation.  Patient is not anxious        NEUROLOGIC EXAMINATION  GENERAL  Appears comfortable and in no distress   HEENT  NC/ AT   NECK  Supple   MENTAL STATUS:  Alert, oriented, intact memory, no confusion, normal speech, normal language, no hallucination or delusion   CRANIAL NERVES: II     -      Visual fields intact to confrontation  III,IV,VI -  EOMs full, no afferent defect, no KATHYA, left ptosis (chronic)  V     -     Normal facial sensation  VII    -     Normal facial symmetry  VIII   -     Intact hearing  IX,X -     Symmetrical palate  XI likely exacerbated by metabolic factors and possible Ativan withdrawal  -Lumbar stenosis        Plan:  -Continued treatment of DKA and UTI as you are doing  -Continue Ativan 0.5mg PO BID PRN  -Follow up with neurosurgery outpatient as needed  -PT/OT; suspect patient will need SNF  -Plan as per medicine      Please note that this note was generated using a voice recognition dictation software. Although every effort was made to ensure the accuracy of this automated transcription, some errors in transcription may have occurred.

## 2020-02-11 NOTE — PROGRESS NOTES
Physical Therapy  Facility/Department: [facility] CAR 3  Daily Treatment Note  NAME: Daryn Stern  : 1935  MRN: [id number]    Date of Service: 2020    Discharge Recommendations:  Patient would benefit from continued therapy after discharge   PT Equipment Recommendations  Equipment Needed: No    Assessment   Body structures, Functions, Activity limitations: Decreased functional mobility ; Decreased strength;Decreased balance;Decreased ROM  Assessment: Pt amb 20ft x2 and 10 ftx2 with RW and CGA-SBA for safety, distance limited by pain in right thigh and fatigue . Pt would continue to benefit from more PT to address deficit. Prognosis: Good  PT Education: Goals;Plan of Care;General Safety;Transfer Training;Gait Training;Home Exercise Program  REQUIRES PT FOLLOW UP: Yes  Activity Tolerance  Activity Tolerance: Patient limited by pain; Patient limited by fatigue     Patient Diagnosis(es): There were no encounter diagnoses. has a past medical history of Allergic rhinitis, Anticoagulated, Anxiety, generalized, Aortic stenosis, Atrial fibrillation ([address] Atrial fibrillation with RVR ([address] Breast cancer ([address] CAD (coronary artery disease), Cellulitis, Cellulitis of left arm, Diabetic neuropathy ([address] Hyperlipidemia, Hypertension, Lymphedema of arm, PVC's (premature ventricular contractions), S/P cardiac cath, Type II or unspecified type diabetes mellitus without mention of complication, not stated as uncontrolled, and Vitamin D deficiency. has a past surgical history that includes Mastectomy (Bilateral,  and ); Hysterectomy (2002); fracture surgery (08/10/99); Cardiac catheterization (2012); Colonoscopy (2009); Colonoscopy (14); other surgical history (Right, 2015); Coronary angioplasty with stent (2016); Cardiac catheterization (2017); Aortic valve replacement (2017); pr colonoscopy flx dx w/collj spec when pfrmd (N/A, 2018);  Cardiac catheterization goal 2: independent transfers  Short term goal 3: independent gait with appropriate device (vs none, which pt prefers) x 100'  Short term goal 4: independent stair ambulation x 2 steps with 1 HR  Short term goal 5: prevent loss of strength/ROM and mobility while in the hospital  Patient Goals   Patient goals : decrease pain, be able to walk better    Plan    Plan  Times per week: 5 visits weekly  Times per day: Daily  Current Treatment Recommendations: Strengthening, ROM, Balance Training, Functional Mobility Training, Transfer Training, Endurance Training, Gait Training, Stair training  Safety Devices  Type of devices: Left in chair, All fall risk precautions in place, Nurse notified, Gait belt  Restraints  Initially in place: No     Therapy Time   Individual Concurrent Group Co-treatment   Time In 0243         Time Out 0313         Minutes [facility]

## 2020-02-11 NOTE — PROGRESS NOTES
Occupational Therapy   Occupational Therapy Initial Assessment  Date: 2020   Patient Name: Enrike Pride  MRN: [id number]     : 1935     Copied from H&P:  Enrike Pride is a very pleasant 61-year-old  female who presented to to an outside hospital for evaluation of nausea. Patient reports that she was recently seen in her PCPs office for reevaluation after she was hospitalized for urinary tract infection and transferred to subacute rehab. She was discharged from subacute rehab on . She has had significant lower back pain which is caused her a significant amount of nausea for quite some time. Her PCP actually had scheduled her to have an MRI later this week. She presented to an outside facility yesterday after she was not feeling well. She reports that she is nauseated anytime she tries to get up and move. Her nausea is solely from her back pain. She denies any vomiting. She reports that she received a steroid injection into her knee at her PCPs office on . She was also started on tramadol. She believes that the tramadol has caused her to be nauseous. Upon evaluation at the ER, patient was found to be hyperglycemic. Subsequent labs revealed findings consistent with diabetic ketoacidosis. Patient takes Amaryl twice daily for her blood glucose. She states that she typically checks her blood pressure and blood sugar once in the morning. Her blood sugars are usually running in the 120s. She does not check them throughout the day unless she begins to feel symptoms of hyper or hypoglycemia. She will be admitted for further work-up and management     Date of Service: 2020    Discharge Recommendations:    Further therapy recommended at discharge. OT Equipment Recommendations  Equipment Needed: No  Other: Pt reports hower chair and RW at home     Assessment   Performance deficits / Impairments: Decreased functional mobility ; Decreased ADL status; Decreased Bathing: Setup;Contact guard assistance  LE Bathing: Minimal assistance;Setup  UE Dressing: Setup;Contact guard assistance  LE Dressing: Minimal assistance;Setup  Toileting: Contact guard assistance  Additional Comments: Pt seated in chair on arrival. Pt declined further ADL activities d/t fatigue following PT session. Pt reports pt just used restroom and brushed teeth. Pt completed sit > stand transfer and few steps from chairside. Pt demo increased unsteadiness, required VCs to allow writer to manage lines. Pt educated in importanceof use of RW to increase safety [facility] functional mobility. Pt completed functional mobility into reza, returned to chair, call light in reach and RN notified on therapist exit.       Tone RUE  RUE Tone: Normotonic  Tone LUE  LUE Tone: Normotonic  Coordination  Movements Are Fluid And Coordinated: Yes        Bed mobility  Comment: Pt up in chair on arrival, returned to chair on exit      Transfers  Stand Step Transfers: Contact guard assistance  Sit to stand: Contact guard assistance  Stand to sit: Contact guard assistance  Transfer Comments: use of RW        Cognition  Overall Cognitive Status: WFL  Cognition Comment: grossly intact, cues for use of RW to increase steadiness/ safety      Perception  Overall Perceptual Status: WFL        Sensation  Overall Sensation Status: WFL(Pt denies numbness/ tingling )        LUE AROM (degrees)  LUE AROM : WFL  RUE AROM (degrees)  RUE AROM : WFL  LUE Strength  Gross LUE Strength: WFL  RUE Strength  Gross RUE Strength: WFL     Plan   Plan  Times per week: 3-5x/wk   Current Treatment Recommendations: Functional Mobility Training, Endurance Training, Safety Education & Training, Patient/Caregiver Education & Training, Equipment Evaluation, Education, & procurement, Self-Care / ADL    AM-PAC Score  AM-PAC Inpatient Daily Activity Raw Score: 18 (02/11/20 1602)  AM-PAC Inpatient ADL T-Scale Score : 38.66 (02/11/20 1602)  ADL Inpatient CMS 0-100% Score:

## 2020-02-12 NOTE — PROGRESS NOTES
Physical Therapy  DATE: 2020    NAME: Lopez Kwan  MRN: [id number]   : 1935    Patient not seen this date for Physical Therapy due to:  [] Blood transfusion in progress  [] Hemodialysis  [x]  Patient Declined being dc'd.  [] Spine Precautions   [] Strict Bedrest  [] Surgery/ Procedure  [] Testing      [] Other        [] PT being discontinued at this time. Patient independent. No further needs. [] PT being discontinued at this time as the patient has been transferred to palliative care. No further needs.     Gem Connor, PTA

## 2020-02-12 NOTE — DISCHARGE SUMMARY
([facility] Patient came to hospital with elevated blood sugar. Initial evaluation showed hypotension blood pressure 103/44. Lab evaluation showed hyponatremia 121, BUN 92, creatinine 2.68, potassium 5.4 glucose 631 bicarb 15, beta hydroxybutyrate 3.09. Patient was treated with IV fluids, insulin. She has underlying history of aortic stenosis s/p AVR, CAD s/p stent placement. PPM placement. Patient has history of arthritis and had recent steroid injection into the joint. She was treated with insulin infusion . She is known diabetic and is on Amaryl at home. Sepsis ruled out     Significant therapeutic interventions:   1. Uncontrolled type 2 diabetes mellitus with diabetic ketoacidosis -A1c 8.3. DKA precipitated by steroid injection . treated with insulin and IV fluids, bicarb. Amaryl resumed and blood sugar control. 2. Acute superimposed on chronic kidney disease-secondary dehydration -improved with IV fluid. Bicarb infusion. Oral sodium bicarb. 650 kg twice daily. Follow-up with primary nephrologist Dr. Brett Trotter. 3. Acute metabolic encephalopathy secondary to JOSEP and DKA   4. Normal anion gap metabolic acidosis -resolved  5. Spinal canal stenosis at L3-L4 and L4-L5 -does not want surgery. Appreciate neurosurgery output    Significant Diagnostic Studies:   Labs / Micro:/Radiology  Recent Labs     02/12/20  0628 02/11/20  0637 02/10/20  0700   WBC 9.3 10.0 10.4   HGB 8.7* 8.8* 8.6*   HCT 28.1* 28.5* 27.4*   MCV 87.8 87.4 86.2   PLT See Reflexed IPF Result See Reflexed IPF Result See Reflexed IPF Result     Labs Renal Latest Ref Rng & Units 2/12/2020 2/11/2020 2/10/2020 2/10/2020 2/10/2020   BUN 8 - 23 mg/dL 17 26(H) 41(H) 49(H) 57(H)   Cr 0.50 - 0.90 mg/dL 1.24(H) 1.39(H) 1.69(H) 1.89(H) 1.93(H)   K 3.7 - 5.3 mmol/L 3. 3(L) 3.9 4.0 4.3 4.1   Na 135 - 144 mmol/L 138 135 133(L) 135 131(L)     Lab Results   Component Value Date    ALT 20 02/09/2020    AST 20 02/09/2020    ALKPHOS 179 (H) 02/09/2020 for Anxiety for up to 10 days. metoprolol tartrate 25 MG tablet  Commonly known as:  LOPRESSOR  Take 1 tablet by mouth 2 times daily     nitroGLYCERIN 0.4 MG SL tablet  Commonly known as:  Nitrostat  Place 1 tablet under the tongue every 5 minutes as needed for Chest pain up to max of 3 total doses. If no relief after 1 dose, call 911. ondansetron 4 MG disintegrating tablet  Commonly known as:  Zofran ODT  Take 1 tablet by mouth every 6 hours as needed for Nausea or Vomiting     Probiotic Daily Caps  Take 1 tablet by mouth 3 times daily     rivaroxaban 15 MG Tabs tablet  Commonly known as:  XARELTO  Take 1 tablet by mouth daily (with breakfast)     sertraline 50 MG tablet  Commonly known as:  ZOLOFT  Take 1 tablet by mouth daily     sodium polystyrene 15 GM/60ML suspension  Commonly known as:  SPS  Take 120 mLs by mouth once for 1 dose     sucralfate 1 GM tablet  Commonly known as:  Carafate  Take 1 tablet by mouth 4 times daily     traMADol 50 MG tablet  Commonly known as:  Ultram  Take 1 tablet by mouth 2 times daily as needed for Pain for up to 7 days. Intended supply: 3 days. Take lowest dose possible to manage pain     vitamin B-6 100 MG tablet  Commonly known as:  PYRIDOXINE     Vitamin D 1000 units Caps capsule  Commonly known as:  CHOLECALCIFEROL           Where to Get Your Medications      These medications were sent to [facility], [address] [place] [address], [address] [place] [address]    Phone:  [phone]   · cephALEXin 250 MG capsule  · sodium bicarbonate 650 MG tablet         Time Spent on discharge is  31 mins in patient examination, evaluation, counseling as well as medication reconciliation, prescriptions for required medications, discharge plan and follow up. Electronically signed by   Anitha Gray MD  2/12/2020        Thank you Dr. Vanessa Varghese MD for the opportunity to be involved in this patient's care.

## 2020-02-12 NOTE — PLAN OF CARE
Problem: Falls - Risk of:  Goal: Will remain free from falls  Description  Will remain free from falls  Outcome: Ongoing  Goal: Absence of physical injury  Description  Absence of physical injury  Outcome: Ongoing     Problem: Nutrition  Goal: Optimal nutrition therapy  Outcome: Ongoing     Problem: Musculor/Skeletal Functional Status  Goal: Highest potential functional level  Outcome: Ongoing  Goal: Absence of falls  Outcome: Ongoing     Problem: Pain:  Goal: Pain level will decrease  Description  Pain level will decrease  Outcome: Ongoing  Goal: Control of acute pain  Description  Control of acute pain  Outcome: Ongoing  Goal: Control of chronic pain  Description  Control of chronic pain  Outcome: Ongoing

## 2020-02-12 NOTE — PROGRESS NOTES
Nutrition Assessment (Low Risk)    Type and Reason for Visit: Reassess    Nutrition Recommendations:   -Continue 5 CHO diabetic diet as tolerated     Nutrition Assessment:  Patient assessed for nutritional risk. Pt has had a good appetite consuming 50-75% of her meals. No wounds noted. No significant wt loss noted - wt has flux from 124-141 lbs over 11 mo per EMR. Full nutrition assessment not needed at this time. Deemed to be at low risk at this time. Will continue to monitor for changes in status.      Malnutrition Assessment:  · Malnutrition Status: No malnutrition    Nutrition Risk Level   Risk Level: Low    Nutrition Diagnosis:   · Problem: No nutrition diagnosis at this time    Nutrition Intervention:  Food and/or Delivery: Continue current diet  Nutrition Education/Counseling/Coordination of Care:  Continued Inpatient Monitoring, Education Not Indicated      Electronically signed by Tashia Pittman RD, HANNAH on 2/12/20 at 11:27 AM    Contact Number: [phone]

## 2020-02-12 NOTE — PROGRESS NOTES
CLINICAL PHARMACY NOTE: MEDS TO [address] Select Patient?: Yes  Total # of Prescriptions Filled: 2   The following medications were delivered to the patient:  · SODIUM BICARB  · CEPHALEXIN  Total # of Interventions Completed: 0  Time Spent (min): 0    Additional Documentation: MEDS DELIVERED TO THE PT ROOM ON 2.12.20

## 2020-02-12 NOTE — PROGRESS NOTES
[facility]      Daily Progress Note     Admit Date: 2/9/2020  Bed/Room No.  3017/3017-01  Admitting Physician : Sherin Strong MD  Code Status :[address] Day:  LOS: 3 days   Chief Complaint:   Elevated blood sugar    Principal Problem:    Type 2 diabetes mellitus with ketoacidosis without coma, without long-term current use of insulin ([address]  Active Problems:    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 vernon    Combined systolic and diastolic congestive heart failure (HCC)    Pacemaker    Toxic metabolic encephalopathy    Hyperlipidemia    Essential hypertension    Coronary artery disease involving native coronary artery of native heart    Persistent atrial fibrillation    Chronic kidney disease, stage 3 (HCC)    S/P ICD (internal cardiac defibrillator) procedure    Sepsis ([address]    Acute renal failure ([address]    Delirium    Tremor    Spinal stenosis of lumbar region with neurogenic claudication  Resolved Problems:    * No resolved hospital problems. *    Subjective : Interval History/Significant events :  02/12/20    Patient is eating and drinking okay. Blood sugar is controlled. Patient denies any nausea, vomiting. She continues to have right hip pain, back pain without radiation, weakness. Vitals - Stable afebrile  Labs -bicarb 20, creatinine 1.24     Nursing notes , Consults notes reviewed. Overnight events and updates discussed with Nursing staff . Background History:         April Mabry is 82 y.o. female  Who was admitted to the hospital on 2/9/2020 for treatment of Type 2 diabetes mellitus with ketoacidosis without coma, without long-term current use of insulin ([address] Patient came to hospital with elevated blood sugar. Initial evaluation showed hypotension blood pressure 103/44. Lab evaluation showed hyponatremia 121, BUN 92, creatinine 2.68, potassium 5.4 glucose 631 bicarb 15, beta hydroxybutyrate 3.09.   Patient was treated with IV fluids, insulin. She has underlying history of aortic stenosis s/p AVR, CAD s/p stent placement. PPM placement. Patient has history of arthritis and had recent steroid injection into the joint. She was treated with insulin infusion . She is known diabetic and is on Amaryl at home. PMH:  Past Medical History:   Diagnosis Date    Allergic rhinitis     With chronic cough.  Anticoagulated     xarelto    Anxiety, generalized     Chronic with probable depression. She will take Ativan but refuses antidepressant.  Aortic stenosis     Atrial fibrillation (HCC)     Atrial fibrillation with RVR ([facility] 3/25/2019    Breast cancer ([facility]     2 occurrences    CAD (coronary artery disease) November 2012    Minimal disease by catheterization, 11/12, with wedge pressure of the right heart. She is taking Lasix for this and being followed by Cardiology.  Cellulitis 11/12/2018    Cellulitis of left arm 11/12/2018    Diabetic neuropathy (HCC)     Hyperlipidemia     Hypertension     runs low    Lymphedema of arm     Left arm, due to lymph node dissection and mastectomy.  PVC's (premature ventricular contractions)     Chronic. Patient currently undergoing workup for arrhythmia.  S/P cardiac cath 09/06/2016    Type II or unspecified type diabetes mellitus without mention of complication, not stated as uncontrolled     Vitamin D deficiency       Allergies: Allergies   Allergen Reactions    Darvocet A500 [Propoxyphene N-Acetaminophen] Other (See Comments)     weakness    Demerol Hcl [Meperidine] Other (See Comments)     Weakness      Marinol [Dronabinol] Other (See Comments)     weakness    Morphine Sulfate [Morphine] Other (See Comments)     Weakness      Statins [Statins] Other (See Comments)     Cause muscle pain and she would prefer not to take them any longer.     Levaquin [Levofloxacin In D5w] Nausea Only and Anxiety      Medications :  rivaroxaban, 15 mg, Oral, Daily  glimepiride, 2 mg, Oral, BID  cephALEXin, 250 mg, Oral, 2 times per day  insulin lispro, 0-12 Units, Subcutaneous, TID WC  insulin lispro, 0-6 Units, Subcutaneous, Nightly  amiodarone, 200 mg, Oral, Daily  aspirin, 81 mg, Oral, Daily  famotidine, 20 mg, Oral, QPM  metoprolol tartrate, 25 mg, Oral, BID  lactobacillus, 1 capsule, Oral, Daily  sertraline, 50 mg, Oral, Daily  sucralfate, 1 g, Oral, 4x Daily  Vitamin D, 1,000 Units, Oral, Daily        Review of Systems   Review of Systems   Constitutional: Negative for appetite change, fatigue, fever and unexpected weight change. HENT: Negative for congestion, rhinorrhea and sneezing. Eyes: Negative for visual disturbance. Respiratory: Negative for cough, chest tightness, shortness of breath and wheezing. Cardiovascular: Negative for chest pain and palpitations. Gastrointestinal: Negative for abdominal pain, blood in stool, constipation, diarrhea, nausea and vomiting. Genitourinary: Negative for dysuria, enuresis, frequency and hematuria. Musculoskeletal: Negative for arthralgias and myalgias. Skin: Negative for rash. Neurological: Negative for dizziness, weakness, light-headedness and headaches. Hematological: Does not bruise/bleed easily. Psychiatric/Behavioral: Negative for dysphoric mood and sleep disturbance. Objective :      Current Vitals : Temp: 97.4 °F (36.3 °C),  Pulse: 98, Resp: 23, BP: 105/82, SpO2: 94 %  Last 24 Hrs Vitals   Patient Vitals for the past 24 hrs:   BP Temp Temp src Pulse Resp SpO2 Weight   02/12/20 0719 105/82 97.4 °F (36.3 °C) Oral 98 23 -- --   02/12/20 0626 -- -- -- 92 24 94 % 133 lb 6.4 oz (60.5 kg)   02/12/20 0440 (!) 106/33 98.8 °F (37.1 °C) Oral 89 22 97 % --   02/11/20 2305 (!) 113/49 98.8 °F (37.1 °C) Oral 89 20 98 % --   02/11/20 1915 (!) 109/49 98.2 °F (36.8 °C) Oral 88 23 98 % --   02/11/20 1200 (!) 114/47 98.2 °F (36.8 °C) Oral 79 22 98 % --     Intake / output   02/11 0701 - 02/12 0700  In: 1022 [P.O.:100;  I.V.:922]  Out:

## 2020-02-13 NOTE — CARE COORDINATION
DME:  blood pressure machine, pulse oximeter  Do you have support at home?:  Partner/Spouse/SO  Do you feel like you have everything you need to keep you well at home?:  Yes  Are you an active caregiver in your home?:  No  Care Transitions Interventions                                 Follow Up  Future Appointments   Date Time Provider Destinee Gaffney   3/6/2020  9:00 AM [facility]   4/22/2020  9:50 AM [facility]   5/6/2020  1:15 PM [facility]   5/7/2020 11:00 AM [facility]   5/27/2020  2:15 PM Caitlyn Rondon MD [facility]       Rhonda Mathis RN

## 2020-02-17 NOTE — TELEPHONE ENCOUNTER
Spoke to Vicky and he stated they had been getting it to her while she was in the hospital and it seemed to really helped her.  He said she isn't anymore nauseated than normal. They would like it to go to [facility]

## 2020-02-17 NOTE — TELEPHONE ENCOUNTER
calling for a refill, states pt was only given a few and he assumes that you will want pt to continue on this, please advise at above number.

## 2020-02-28 NOTE — TELEPHONE ENCOUNTER
Home health care plan:  2/28/2020. Certification Period 1/28/2020-3/27/2020. Approximate physician time spent reviewing the following is 15 minutes: Activities to coordinate service, documentation, medical decision-making and review of reports, treatment plans, and test results. Medications, allergies and associating diagnoses also reviewed.

## 2020-02-29 PROBLEM — N17.9 ACUTE RENAL FAILURE (ARF) (HCC): Status: ACTIVE | Noted: 2020-01-01

## 2020-03-01 NOTE — CONSULTS
Marinol [Dronabinol] Other (See Comments)     weakness    Morphine Sulfate [Morphine] Other (See Comments)     Weakness      Statins [Statins] Other (See Comments)     Cause muscle pain and she would prefer not to take them any longer.  Levaquin [Levofloxacin In D5w] Nausea Only and Anxiety       Social History     Socioeconomic History    Marital status:      Spouse name: Not on file    Number of children: 3    Years of education: 12    Highest education level: 12th grade   Occupational History    Occupation: retired /   Social Needs    Financial resource strain: Not hard at all   DogVacay insecurity:     Worry: Never true     Inability: Never true    Transportation needs:     Medical: No     Non-medical: No   Tobacco Use    Smoking status: Passive Smoke Exposure - Never Smoker    Smokeless tobacco: Never Used    Tobacco comment: never smoker   Substance and Sexual Activity    Alcohol use: No    Drug use: No    Sexual activity: Yes     Partners: Male   Lifestyle    Physical activity:     Days per week: 0 days     Minutes per session: 0 min    Stress: Not at all   Relationships    Social connections:     Talks on phone: More than three times a week     Gets together: Twice a week     Attends [employer] service: More than 4 times per year     Active member of club or organization: No     Attends meetings of clubs or organizations: Never     Relationship status:     Intimate partner violence:     Fear of current or ex partner: Not on file     Emotionally abused: Not on file     Physically abused: Not on file     Forced sexual activity: Not on file   Other Topics Concern    Not on file   Social History Narrative    9/5/2019- No SDOH needs identified.         Family History:        Family History   Problem Relation Age of Onset    Diabetes Mother     Glaucoma Mother     Stroke Father 64       Outpatient Medications:     Medications Prior to Admission: aspirin 81 MG famotidine  20 mg Oral QPM    metoprolol tartrate  25 mg Oral BID    rivaroxaban  15 mg Oral Daily with breakfast    [Held by provider] sodium bicarbonate  650 mg Oral BID    sucralfate  1 g Oral 4x Daily    Vitamin D  1,000 Units Oral Daily    sodium chloride flush  10 mL Intravenous 2 times per day    insulin lispro  0-6 Units Subcutaneous TID WC    insulin lispro  0-3 Units Subcutaneous Nightly     Continuous Infusions:    dextrose      sodium bicarbonate infusion       PRN Meds:  nitroGLYCERIN, sodium chloride flush, promethazine **OR** ondansetron, nicotine, glucose, dextrose, glucagon (rDNA), dextrose, sodium polystyrene, sodium polystyrene    Review of Systems:     Constitutional: No fever, no chills, ++ lethargy, ++ weakness. HEENT:  No headache, otalgia, itchy eyes, nasal discharge or sore throat. Cardiac:  No chest pain, dyspnea, orthopnea or PND. Chest:              No cough, phlegm or wheezing. Abdomen:  No abdominal pain, nausea or vomiting. Neuro:  No focal weakness, abnormal movements or seizure like activity. Skin:   No rashes, no itching. :   No hematuria, no pyuria, no dysuria, no flank pain. Extremities:  No swelling or joint pains. ROS was otherwise negative except as mentioned in the [address] Input/Output:       No intake/output data recorded. Vital Signs:   Temperature:  Temp: 98 °F (36.7 °C)  TMax:   Temp (24hrs), Av.3 °F (36.8 °C), Min:98 °F (36.7 °C), Max:98.6 °F (37 °C)    Respirations:  Resp: 13  Pulse:   Pulse: 75  BP:    BP: 89/67  BP Range: Systolic (30SYS), IGF:779 , Min:89 , GKK:573       Diastolic (85CUF), FUD:10, Min:44, Max:76      Physical Examination:     General:  AAO x 3, speaking in full sentences, no accessory muscle use. HEENT: Atraumatic, normocephalic, no throat congestion, moist mucosa. Eyes:   Pupils equal, round and reactive to light, EOMI. Neck:   No JVD, no thyromegaly, no lymphadenopathy.   Chest:   Bilateral vesicular breath sounds, no CNP. I have reviewed the key elements of all parts of the encounter with the CNP. I have seen and examined the patient with the CNP. I agree with the assessment and plan and status of the problem list as documented. Addiitionally I recommend likely will discontinue IV fluids in the a.m. as the patient does have a tendency to slip into heart failure quickly.     Sera Taylor MD  Nephrology Attending Physician  [facility]

## 2020-03-01 NOTE — PLAN OF CARE
82year old female presents to [facility] complaining of extreme fatigue. Workup in ER shows: Platelet 99, BUN 64, creat 2.8, Na 129, K6.2. Trop 116, EKG-sinus with first degree AV block, LBBB. Request transfer to [facility] for further evaluation and treament.

## 2020-03-01 NOTE — ED PROVIDER NOTES
ATTENDING  ADDENDUM     Care of this patient was assumed from dr. Alicia Penny. The patient was seen for Fatigue (all day)  . The patient's initial evaluation and plan have been discussed with the prior provider who initially evaluated the patient. Nursing Notes, Past Medical Hx, Past Surgical Hx, Social Hx, Allergies, and Family Hx were all reviewed. When I assumed care of the patient we are waiting for the results of the patient's collective laboratories and  Chest x-ray and urinalysis. Urinalysis is come back unremarkable chest x-ray shows an enlarged heart but no other pathology lab values show hypo-natremia hyperkalemia and renal failure. Platelet count also is lower at 99. Patient I feel needs to be admitted to the hospital for nephrology evaluation. DIFFERENTIAL DIAGNOSIS/ MDM:           Follow Exit Care instructions closely. I have reviewed the disposition diagnosis with the patient and or their family/guardian. I have answered their questions and given discharge instructions. They voiced understanding of these instructions and did not have any further questions or complaints. DIAGNOSTIC RESULTS     RADIOLOGY:   Non-plain film images such as CT, Ultrasound and MRI are read by the radiologist. Plain radiographic images are visualized and preliminarily interpreted by the emergency physician with the below findings:  XR CHEST PORTABLE   Final Result   Cardiomegaly, without evidence of CHF             LABS:  Results for orders placed or performed during the hospital encounter of 02/29/20   Rapid influenza A/B antigens   Result Value Ref Range    Specimen Description . NASOPHARYNGEAL SWAB     Special Requests NOT REPORTED     Direct Exam       NEGATIVE FOR INFLUENZA A AND B ANTIGEN. * A negative result does not exclude Influenza infection. Negative results should be confirmed by PCR testing.    Basic Metabolic Panel   Result Value Ref Range    Glucose 201 (H) 70 - 99 mg/dL    BUN 64 (H) 8 - 23 mg/dL    CREATININE 2.89 (H) 0.50 - 0.90 mg/dL    Bun/Cre Ratio 22 (H) 9 - 20    Calcium 10.0 8.6 - 10.4 mg/dL    Sodium 129 (L) 135 - 144 mmol/L    Potassium 6.2 (H) 3.7 - 5.3 mmol/L    Chloride 90 (L) 98 - 107 mmol/L    CO2 19 (L) 20 - 31 mmol/L    Anion Gap 20 (H) 9 - 17 mmol/L    GFR Non-African American 16 (L) >60 mL/min    GFR  19 (L) >60 mL/min    GFR Comment          GFR Staging NOT REPORTED    Troponin   Result Value Ref Range    Troponin, High Sensitivity 116 (HH) 0 - 14 ng/L    Troponin T NOT REPORTED <0.03 ng/mL    Troponin Interp NOT REPORTED    Urinalysis Reflex to Culture   Result Value Ref Range    Color, UA NOT REPORTED YELLOW    Turbidity UA NOT REPORTED CLEAR    Glucose, Ur NEGATIVE NEGATIVE    Bilirubin Urine NEGATIVE NEGATIVE    Ketones, Urine NEGATIVE NEGATIVE    Specific [place], UA 1.010 1.010 - 1.025    Urine Hgb TRACE (A) NEGATIVE    pH, UA 7.0 (H) 5.0 - 6.0    Protein, UA NEGATIVE NEGATIVE    Urobilinogen, Urine Normal Normal    Nitrite, Urine NEGATIVE NEGATIVE    Leukocyte Esterase, Urine NEGATIVE NEGATIVE    Urinalysis Comments NOT REPORTED    Microscopic Urinalysis   Result Value Ref Range    -          WBC, UA 0 TO 4 0 - 4 /HPF    RBC, UA 0 TO 4 0 - 4 /HPF    Casts UA 5 TO 10 0 - 2 /LPF    Casts UA HYALINE 0 - 2 /LPF    Crystals, UA NOT REPORTED None /HPF    Epithelial Cells UA 0 TO 4 0 - 5 /HPF    Renal Epithelial, UA NOT REPORTED 0 /HPF    Bacteria, UA None None    Mucus, UA NOT REPORTED None    Trichomonas, UA NOT REPORTED None    Amorphous, UA NOT REPORTED None    Other Observations UA NOT REPORTED NOT REQ. Yeast, UA NOT REPORTED None   SPECIMEN REJECTION   Result Value Ref Range    Specimen Source ER DRAW     Ordered Test CDP     Reason for Rejection Unable to perform testing: Specimen clotted.      - NOT REPORTED    CBC Auto Differential   Result Value Ref Range    WBC 9.0 3.5 - 11.3 k/uL    RBC 3.95 3.95 - 5.11 m/uL    Hemoglobin 10.5 (L) 11.9 - 15.1 g/dL Hematocrit 33.1 (L) 36.3 - 47.1 %    MCV 83.8 82.6 - 102.9 fL    MCH 26.6 25.2 - 33.5 pg    MCHC 31.7 25.2 - 33.5 g/dL    RDW 18.6 (H) 11.8 - 14.4 %    Platelets 99 (L) 148 - 453 k/uL    MPV 10.2 8.1 - 13.5 fL    NRBC Automated 0.0 0.0 per 100 WBC    Differential Type NOT REPORTED     WBC Morphology NOT REPORTED     RBC Morphology ANISOCYTOSIS PRESENT     Platelet Estimate NOT REPORTED     Seg Neutrophils 66 (H) 36 - 65 %    Lymphocytes 21 (L) 24 - 43 %    Monocytes 11 3 - 12 %    Eosinophils % 1 1 - 4 %    Basophils 0 0 - 2 %    Immature Granulocytes 1 (H) 0 %    Segs Absolute 5.93 1.50 - 8.10 k/uL    Absolute Lymph # 1.90 1.10 - 3.70 k/uL    Absolute Mono # 0.95 0.10 - 1.20 k/uL    Absolute Eos # 0.11 0.00 - 0.44 k/uL    Basophils Absolute 0.04 0.00 - 0.20 k/uL    Absolute Immature Granulocyte 0.10 0.00 - 0.30 k/uL   EKG 12 Lead   Result Value Ref Range    Ventricular Rate 69 BPM    Atrial Rate 69 BPM    P-R Interval 238 ms    QRS Duration 158 ms    Q-T Interval 518 ms    QTc Calculation (Bazett) 555 ms    P Axis 65 degrees    R Axis -31 degrees    T Axis 101 degrees       ABNORMAL LABS:  Labs Reviewed   BASIC METABOLIC PANEL - Abnormal; Notable for the following components:       Result Value    Glucose 201 (*)     BUN 64 (*)     CREATININE 2.89 (*)     Bun/Cre Ratio 22 (*)     Sodium 129 (*)     Potassium 6.2 (*)     Chloride 90 (*)     CO2 19 (*)     Anion Gap 20 (*)     GFR Non- 16 (*)     GFR  19 (*)     All other components within normal limits   TROPONIN - Abnormal; Notable for the following components:    Troponin, High Sensitivity 116 (*)     All other components within normal limits   URINE RT REFLEX TO CULTURE - Abnormal; Notable for the following components:    Urine Hgb TRACE (*)     pH, UA 7.0 (*)     All other components within normal limits   CBC WITH AUTO DIFFERENTIAL - Abnormal; Notable for the following components:    Hemoglobin 10.5 (*)     Hematocrit 33.1 (*)

## 2020-03-01 NOTE — CARE COORDINATION
Case Management Initial Discharge Plan  Maribel Agent,             Met with:patient to discuss discharge plans. Information verified: address, contacts, phone number, , insurance Yes  PCP: Bynum Fabry, MD  Date of last visit: has appointment on Tuesday    Insurance Provider: Medicare, [address]    Discharge Planning    Living Arrangements:  Spouse/Significant Other   Support Systems:  Spouse/Significant Other, Home Care Staff, [id number] [address] has 1 stories  2 stairs to climb to get into front door, stairs to climb to reach second floor  Location of bedroom/bathroom in home main    Patient able to perform ADL's:Independent    Current Services (outpatient & in home) [facility]  DME equipment: cane, RW, WC  DME provider:       Potential Assistance Needed:  Home Care    Patient agreeable to home care: Yes  Freedom of choice provided:  Current with Elaine    Prior SNF/Rehab Placement and Facility: [facility]  Agreeable to SNF/Rehab: No  [place] of choice provided: no   Evaluation: no    Expected Discharge date:     Patient expects to be discharged to:  home  Follow Up Appointment: Best Day/ Time:      Transportation provider: family  Transportation arrangements needed for discharge: No    Readmission Risk              Risk of Unplanned Readmission:        40             Does patient have a readmission risk score greater than 14?: Yes  If yes, follow-up appointment must be made within 7 days of discharge.      Goals of Care:       Discharge Plan: home with  and [facility]  Notified Marcelina Henley at [facility] of patient's admission        Electronically signed by Christophe Gonzalez RN on 3/1/20 at 10:07 AM

## 2020-03-01 NOTE — H&P
[facility]      HISTORY AND PHYSICAL EXAMINATION            Date:   3/1/2020  Patient name:  Enrike Pride  Date of admission:  3/1/2020  8:48 AM  MRN:   [id number]  Account:  [de-identified]  YOB: 1935  PCP:    Dilma Hensley MD  Room:   [facility]/301-  Code Status:    Full Code    Chief Complaint:     Fatigue. History Obtained From:     Patient, Electronic medical record    History of Present Illness: The patient is a 82 y.o. Non-/non  female who presents with fatigue and decreased oral intake and she is admitted to the hospital for the management of  Acute renal failure (ARF) ([address] superimposed on chronic kidney disease. Patient was sent from [facility] ER after she went there for fatigue and had poor oral intake. Patient denies any nausea, vomiting, diarrhea, abdominal pain, fever or chills. She denies any cough, sputum production, breathing difficulty. Evaluation in ED showed hyponatremia 129, potasium 6.2, BUN 64, creatinine 2.89, elevated trops. Normal WBC. Her baseline creatinine is 1.6 . Patient follows Dr Sharron Bains as outpatient. She was recently admitted 2 weeks ago for diabetic ketoacidosis and JOSEP and creatinine was 1.24 at discharge . Patient has h/o bilateral breast cancer and underwent bilateral mastectomy. She has lymphedema bilateral upper ext. Patient has h/o PAF and is on long term anticoagulation Xareltro. Patient had aortic stenosis and had TAVR 12/7/17 , CAD s/p LAD stent. Patient had ischemic cardiomyopathy and had ICD in place and got infected and taken out. She is diabetic and on amaryl at home. Patient has chronic back pain secondary to spinal stenosis. Past Medical History:     Past Medical History:   Diagnosis Date    Allergic rhinitis     With chronic cough.  Anticoagulated     xarelto    Anxiety, generalized     Chronic with probable depression. She will take Ativan but refuses antidepressant.     Aortic stenosis     Atrial fibrillation (HCC)     Atrial fibrillation with RVR [facility] 3/25/2019    Breast cancer ([facility]     2 occurrences    CAD (coronary artery disease) November 2012    Minimal disease by catheterization, 11/12, with wedge pressure of the right heart. She is taking Lasix for this and being followed by Cardiology.  Cellulitis 11/12/2018    Cellulitis of left arm 11/12/2018    Diabetic neuropathy (HCC)     Hyperlipidemia     Hypertension     runs low    Lymphedema of arm     Left arm, due to lymph node dissection and mastectomy.  PVC's (premature ventricular contractions)     Chronic. Patient currently undergoing workup for arrhythmia.  S/P cardiac cath 09/06/2016    Type II or unspecified type diabetes mellitus without mention of complication, not stated as uncontrolled     Vitamin D deficiency         Past Surgical History:     Past Surgical History:   Procedure Laterality Date    AORTIC VALVE REPLACEMENT  12/07/2017    TAVR 26 mm CoreValve     CARDIAC CATHETERIZATION  November 2012    Showed minimal CAD with increased wedge pressure of the right heart. Patient saw Cardiology and started on Lasix.  CARDIAC CATHETERIZATION  09/19/2017    right and left heart cath   EF 30% WITH PATENT LAD STENT    CARDIAC CATHETERIZATION  09/23/2019    COLONOSCOPY  08/12/2009    diverticulosis being found.  COLONOSCOPY  9/8/14    grade 2-3 internal hemorrhoids - banded x 2, random biopsies taken to r/o - normal, repeat 5yrs due to hx of polyps- garber    CORONARY ANGIOPLASTY WITH STENT PLACEMENT  09/2016    PTCA / Drug Eluting Stent:, LAD and / or branches    FRACTURE SURGERY  08/10/99    Left elbow ORIF     HYSTERECTOMY  09/09/2002    With bladder repair for benign reasons.  MASTECTOMY Bilateral 1995 and 2000    With lymph node dissections in 1995 and 2000.     OTHER SURGICAL HISTORY Right 6/2015    Index and long trigger finger release    PACEMAKER PLACEMENT      removed    IA Genitourinary: Negative for dysuria, enuresis, frequency and hematuria. Musculoskeletal: Negative for arthralgias and myalgias. Skin: Negative for rash. Neurological: Negative for dizziness, weakness, light-headedness and headaches. Hematological: Does not bruise/bleed easily. Psychiatric/Behavioral: Negative for dysphoric mood and sleep disturbance. Physical Exam:   BP 89/67   Pulse 75   Temp 98 °F (36.7 °C) (Oral)   Resp 13   Ht 5' (1.524 m)   Wt 132 lb 4.4 oz (60 kg)   SpO2 98%   BMI 25.83 kg/m²   Temp (24hrs), Av.3 °F (36.8 °C), Min:98 °F (36.7 °C), Max:98.6 °F (37 °C)    No results for input(s): POCGLU in the last 72 hours. No intake or output data in the 24 hours ending 20 1522  Physical Exam  Vitals signs and nursing note reviewed. Constitutional:       General: She is not in acute distress. Appearance: She is not diaphoretic. HENT:      Head: Normocephalic and atraumatic. Nose:      Right Sinus: No maxillary sinus tenderness or frontal sinus tenderness. Left Sinus: No maxillary sinus tenderness or frontal sinus tenderness. Mouth/Throat:      Pharynx: No oropharyngeal exudate. Eyes:      General: No scleral icterus. Conjunctiva/sclera: Conjunctivae normal.      Pupils: Pupils are equal, round, and reactive to light. Neck:      Musculoskeletal: Full passive range of motion without pain and neck supple. Thyroid: No thyromegaly. Vascular: No JVD. Cardiovascular:      Rate and Rhythm: Normal rate and regular rhythm. Pulses:           Dorsalis pedis pulses are 2+ on the right side and 2+ on the left side. Heart sounds: Normal heart sounds. No murmur. Pulmonary:      Effort: Pulmonary effort is normal.      Breath sounds: Normal breath sounds. No wheezing or rales. Chest:      Comments: old scar on R side from ICD . Abdominal:      Palpations: Abdomen is soft. There is no mass. Tenderness:  There is no abdominal 107 mmol/L    CO2 19 (L) 20 - 31 mmol/L    Anion Gap 20 (H) 9 - 17 mmol/L    GFR Non-African American 16 (L) >60 mL/min    GFR  19 (L) >60 mL/min    GFR Comment          GFR Staging NOT REPORTED    Troponin    Collection Time: 02/29/20  7:46 PM   Result Value Ref Range    Troponin, High Sensitivity 116 (HH) 0 - 14 ng/L    Troponin T NOT REPORTED <0.03 ng/mL    Troponin Interp NOT REPORTED    Rapid influenza A/B antigens    Collection Time: 02/29/20  7:46 PM   Result Value Ref Range    Specimen Description . NASOPHARYNGEAL SWAB     Special Requests NOT REPORTED     Direct Exam       NEGATIVE FOR INFLUENZA A AND B ANTIGEN. * A negative result does not exclude Influenza infection. Negative results should be confirmed by PCR testing. SPECIMEN REJECTION    Collection Time: 02/29/20  7:46 PM   Result Value Ref Range    Specimen Source ER DRAW     Ordered Test CDP     Reason for Rejection Unable to perform testing: Specimen clotted.      - NOT REPORTED    EKG 12 Lead    Collection Time: 02/29/20  7:59 PM   Result Value Ref Range    Ventricular Rate 69 BPM    Atrial Rate 69 BPM    P-R Interval 238 ms    QRS Duration 158 ms    Q-T Interval 518 ms    QTc Calculation (Bazett) 555 ms    P Axis 65 degrees    R Axis -31 degrees    T Axis 101 degrees   CBC Auto Differential    Collection Time: 02/29/20  8:26 PM   Result Value Ref Range    WBC 9.0 3.5 - 11.3 k/uL    RBC 3.95 3.95 - 5.11 m/uL    Hemoglobin 10.5 (L) 11.9 - 15.1 g/dL    Hematocrit 33.1 (L) 36.3 - 47.1 %    MCV 83.8 82.6 - 102.9 fL    MCH 26.6 25.2 - 33.5 pg    MCHC 31.7 25.2 - 33.5 g/dL    RDW 18.6 (H) 11.8 - 14.4 %    Platelets 99 (L) 942 - 453 k/uL    MPV 10.2 8.1 - 13.5 fL    NRBC Automated 0.0 0.0 per 100 WBC    Differential Type NOT REPORTED     WBC Morphology NOT REPORTED     RBC Morphology ANISOCYTOSIS PRESENT     Platelet Estimate NOT REPORTED     Seg Neutrophils 66 (H) 36 - 65 %    Lymphocytes 21 (L) 24 - 43 %    Monocytes 11 3 - 12 % Eosinophils % 1 1 - 4 %    Basophils 0 0 - 2 %    Immature Granulocytes 1 (H) 0 %    Segs Absolute 5.93 1.50 - 8.10 k/uL    Absolute Lymph # 1.90 1.10 - 3.70 k/uL    Absolute Mono # 0.95 0.10 - 1.20 k/uL    Absolute Eos # 0.11 0.00 - 0.44 k/uL    Basophils Absolute 0.04 0.00 - 0.20 k/uL    Absolute Immature Granulocyte 0.10 0.00 - 0.30 k/uL   Troponin    Collection Time: 03/01/20 12:02 AM   Result Value Ref Range    Troponin, High Sensitivity 106 (HH) 0 - 14 ng/L    Troponin T NOT REPORTED <0.03 ng/mL    Troponin Interp NOT REPORTED    SODIUM    Collection Time: 03/01/20  9:37 AM   Result Value Ref Range    Sodium 138 135 - 144 mmol/L   OSMOLALITY    Collection Time: 03/01/20  9:37 AM   Result Value Ref Range    Serum Osmolality 301 (H) 275 - 295 mOsm/kg   Troponin    Collection Time: 03/01/20  9:37 AM   Result Value Ref Range    Troponin, High Sensitivity 108 (HH) 0 - 14 ng/L    Troponin T NOT REPORTED <0.03 ng/mL    Troponin Interp NOT REPORTED    BASIC METABOLIC PANEL    Collection Time: 03/01/20  9:37 AM   Result Value Ref Range    Glucose 75 70 - 99 mg/dL    BUN 55 (H) 8 - 23 mg/dL    CREATININE 2.55 (H) 0.50 - 0.90 mg/dL    Bun/Cre Ratio NOT REPORTED 9 - 20    Calcium 8.7 8.6 - 10.4 mg/dL    Sodium 138 135 - 144 mmol/L    Potassium 4.7 3.7 - 5.3 mmol/L    Chloride 104 98 - 107 mmol/L    CO2 17 (L) 20 - 31 mmol/L    Anion Gap 17 9 - 17 mmol/L    GFR Non-African American 18 (L) >60 mL/min    GFR  22 (L) >60 mL/min    GFR Comment          GFR Staging NOT REPORTED    Magnesium    Collection Time: 03/01/20  9:37 AM   Result Value Ref Range    Magnesium 2.6 1.6 - 2.6 mg/dL   EKG 12 Lead    Collection Time: 03/01/20 12:29 PM   Result Value Ref Range    Ventricular Rate 78 BPM    Atrial Rate 78 BPM    P-R Interval 282 ms    QRS Duration 162 ms    Q-T Interval 480 ms    QTc Calculation (Bazett) 547 ms    P Axis 66 degrees    R Axis -15 degrees    T Axis 131 degrees   SODIUM    Collection Time: 03/01/20 2:00 PM   Result Value Ref Range    Sodium 133 (L) 135 - 144 mmol/L       Imaging/Diagonstics:    Xr Chest Portable    Result Date: 2/29/2020  Cardiomegaly, without evidence of CHF      Cardiac Testing      ECHO 10/9/19: EF 30%, grade III DD, MALKA moderately dilated, TAVR that is normal in appearance and function, moderate MR, mild TR, RVSP is 71 mmHg, severe PHTN.      ICD 9/23/19: Med[employer] device placed by Dr. Velasquez January for ICM.    CATH 9/23/19: Patent LAD stent. Minimal disease in LCx. RCA small with70% stenosis, does not explain CHF/CM.      Dr. [facility] 9/11/19 --   EF still 25% even after correcting Afib c RVR. May be ischemia. Will plan for cath. If clean cath, will need ICD. Did not improved with correction of Afib and optimal medical management. EF previously was 40-45% in January 2019.     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 Follow up echo shows EF still 25%, will plan for cardiac cath and ICD. Assessment :      Primary Problem  Acute renal failure (ARF) ([address]    Active Hospital Problems    Diagnosis Date Noted    Type 2 diabetes mellitus with ketoacidosis without coma, without long-term current use of insulin ([address] [E11.10] 02/09/2020     Priority: High    Combined systolic and diastolic congestive heart failure (HCC) [I50.40]      Priority: High    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 Nora rBown [Z95.2] 12/07/2017     Priority: High    Acute renal failure (ARF) ([address] [N17.9] 02/29/2020    Essential hypertension [I10]     Breast cancer (HCC) [C50.919]     Lymphedema of arm [I89.0]        Plan:     Patient status Admit as inpatient in the  Progressive Unit/Step down    1. Acute on superimposed chronic kidney disease with metabolic acidosis and hyperkalemia - baseline Creatinine 1.6 - 1.8 - start bicarb infusion, monitor kidney function and urine output. Progressively worsening kidney function . Risk of dialysis   2.  Chronic systolic and diastolic CHF

## 2020-03-01 NOTE — ED PROVIDER NOTES
[facility] ED  [address]  [place] [address]  Phone: [phone]    Pt Name: J Luis Lara  MRN: [id number]  [place] 1935  Date of evaluation: 2/29/2020      CHIEF COMPLAINT       Chief Complaint   Patient presents with    Fatigue     all day         HISTORY OF PRESENT ILLNESS     J Luis Lara is a 82 y.o. female who presents with fatigue throughout the day. She does complain of a 4 out of a 10 low back and leg pain. Her vital signs are normal upon admission. Is any chest complaints or HEENT complaints. Denies any abdominal symptoms. She arrives via ambulance. She feels generally weak but no focal neurologic deficits. No  Headache or stiff neck        REVIEW OF SYSTEMS         REVIEW OF SYSTEMS    Constitutional:  As above  Eyes:  Denies vision changes  HEENT:  Denies sore throat or nasal congestion  Respiratory:  Denies cough or shortness of breath   Cardiovascular:  Denies chest pain  GI:  Denies abdominal pain, nausea, vomiting, or diarrhea   Musculoskeletal:  Denies back pain   Skin:  No rash on exposed surfaces   Neurologic:  Normal baseline mentation. No new deficits. Lymphatic:   No nodes or infection  Psychiatric:  No psychosis. Alert and interacting normally. Other ROS negative except as noted above. PAST MEDICAL HISTORY    has a past medical history of Allergic rhinitis, Anticoagulated, Anxiety, generalized, Aortic stenosis, Atrial fibrillation ([address] Atrial fibrillation with RVR ([address] Breast cancer ([address] CAD (coronary artery disease), Cellulitis, Cellulitis of left arm, Diabetic neuropathy ([address] Hyperlipidemia, Hypertension, Lymphedema of arm, PVC's (premature ventricular contractions), S/P cardiac cath, Type II or unspecified type diabetes mellitus without mention of complication, not stated as uncontrolled, and Vitamin D deficiency.     SURGICAL HISTORY      has a past surgical history that includes Mastectomy (Bilateral, 1995 and 2000); Hysterectomy (09/09/2002); fracture surgery (08/10/99); Cardiac catheterization (November 2012); Colonoscopy (08/12/2009); Colonoscopy (9/8/14); other surgical history (Right, 6/2015); Coronary angioplasty with stent (09/2016); Cardiac catheterization (09/19/2017); Aortic valve replacement (12/07/2017); pr colonoscopy flx dx w/collj spec when pfrmd (N/A, 9/24/2018); Cardiac catheterization (09/23/2019); and pacemaker placement. CURRENT MEDICATIONS       Previous Medications    ACETAMINOPHEN (TYLENOL) 325 MG TABLET    Take 2 tablets by mouth every 4 hours as needed for Pain    AMIODARONE (CORDARONE) 200 MG TABLET    TAKE ONE TABLET BY MOUTH DAILY    ASPIRIN 81 MG CHEWABLE TABLET    Take 1 tablet by mouth daily    BLOOD GLUCOSE TEST STRIPS (FREESTYLE TEST STRIPS) STRIP    USE ONE STRIP TO TEST DAILY AS NEEDED    BUMETANIDE (BUMEX) 1 MG TABLET    Take 1 tablet by mouth 2 times daily    FAMOTIDINE (PEPCID) 20 MG TABLET    TAKE ONE TABLET BY MOUTH EVERY EVENING    GLIMEPIRIDE (AMARYL) 2 MG TABLET    TAKE ONE TABLET BY MOUTH TWICE A DAY    LISINOPRIL (PRINIVIL;ZESTRIL) 2.5 MG TABLET    Take 1 tablet by mouth daily    LORAZEPAM (ATIVAN) 0.5 MG TABLET    Take 1 tablet by mouth every 8 hours as needed for Anxiety for up to 10 days. METOPROLOL TARTRATE (LOPRESSOR) 25 MG TABLET    Take 1 tablet by mouth 2 times daily    NITROGLYCERIN (NITROSTAT) 0.4 MG SL TABLET    Place 1 tablet under the tongue every 5 minutes as needed for Chest pain up to max of 3 total doses. If no relief after 1 dose, call 911.     ONDANSETRON (ZOFRAN ODT) 4 MG DISINTEGRATING TABLET    Take 1 tablet by mouth every 6 hours as needed for Nausea or Vomiting    POTASSIUM CHLORIDE (KLOR-CON M) 20 MEQ TBCR EXTENDED RELEASE TABLET    TAKE ONE TABLET BY MOUTH TWICE A DAY WITH MEALS    PROBIOTIC PRODUCT (PROBIOTIC DAILY) CAPS    Take 1 tablet by mouth 3 times daily    RIVAROXABAN (XARELTO) 15 MG TABS TABLET    Take 1 tablet by mouth daily (with breakfast) DIAGNOSIS/ MEDICAL DECISION MAKING:       PLAN/ TASKS OUTSTANDING     Care of this patient has been endorsed to Dr. Carolina Murphy at 7:30 PM.  We have discussed in detail the patient's history of present illness, physical exam, completed studies as well as current emergency department course. He/She is kind enough to assume care of this patient.       [place] disposition        (Please note that portions of this note were completed with a voice recognition program.  Efforts were made to edit the dictations but occasionally words are mis-transcribed.)    Eun Penny,   Attending Emergency Physician         [address] [place] [address],   03/01/20 [id number]

## 2020-03-02 NOTE — PROGRESS NOTES
Gerardo Lawson 21    Progress Note    3/2/2020    4:59 PM    Name:   Mona Barakat  MRN:     [id number]     [place]:      [de-identified]   Room:   [address] [place] [address] Day:  1  Admit Date:  3/1/2020  8:48 AM    PCP:   Ana Persaud MD  Code Status:  Full Code    Subjective:     C/C: Fatigue    Interval History Status: improved. Patient is resting in bed.  is at bedside. No complaints. Brief History:     Per my partner: \"The patient is a 82 y.o. Non-/non  female who presents with fatigue and decreased oral intake and she is admitted to the hospital for the management of  Acute renal failure (ARF) ([facility] [address] superimposed on chronic kidney disease.      Patient was sent from [facility] after she went there for fatigue and had poor oral intake. Patient denies any nausea, vomiting, diarrhea, abdominal pain, fever or chills. She denies any cough, sputum production, breathing difficulty. Evaluation in ED showed hyponatremia 129, potasium 6.2, BUN 64, creatinine 2.89, elevated trops. Normal WBC. Her baseline creatinine is 1.6 . Patient follows Dr Stephanie Conner as outpatient. She was recently admitted 2 weeks ago for diabetic ketoacidosis and JOSEP and creatinine was 1.24 at discharge . Patient has h/o bilateral breast cancer and underwent bilateral mastectomy. She has lymphedema bilateral upper ext. Patient has h/o PAF and is on long term anticoagulation Xareltro. Patient had aortic stenosis and had TAVR 12/7/17 , CAD s/p LAD stent. Patient had ischemic cardiomyopathy and had ICD in place and got infected and taken out. She is diabetic and on amaryl at home. Patient has chronic back pain secondary to spinal stenosis.  \"    Review of Systems:     Constitutional:  negative for chills, fevers, sweats  Respiratory:  negative for cough, dyspnea on exertion, shortness of breath, wheezing  Cardiovascular:  negative for chest pain, chest pressure/discomfort, lower extremity edema, palpitations  Gastrointestinal:  negative for abdominal pain, constipation, diarrhea, nausea, vomiting  Neurological:  negative for dizziness, headache    Medications: Allergies: Allergies   Allergen Reactions    Darvocet A500 [Propoxyphene N-Acetaminophen] Other (See Comments)     weakness    Demerol Hcl [Meperidine] Other (See Comments)     Weakness      Marinol [Dronabinol] Other (See Comments)     weakness    Morphine Sulfate [Morphine] Other (See Comments)     Weakness      Statins [Statins] Other (See Comments)     Cause muscle pain and she would prefer not to take them any longer.     Levaquin [Levofloxacin In D5w] Nausea Only and Anxiety       Current Meds:   Scheduled Meds:    amiodarone  200 mg Oral Daily    aspirin  81 mg Oral Daily    famotidine  20 mg Oral QPM    metoprolol tartrate  25 mg Oral BID    rivaroxaban  15 mg Oral Daily with breakfast    [Held by provider] sodium bicarbonate  650 mg Oral BID    sucralfate  1 g Oral 4x Daily    Vitamin D  1,000 Units Oral Daily    sodium chloride flush  10 mL Intravenous 2 times per day    insulin lispro  0-6 Units Subcutaneous TID WC    insulin lispro  0-3 Units Subcutaneous Nightly     Continuous Infusions:    sodium chloride 50 mL/hr at 03/02/20 1418    dextrose       PRN Meds: nitroGLYCERIN, sodium chloride flush, promethazine **OR** ondansetron, nicotine, glucose, dextrose, glucagon (rDNA), dextrose, benzonatate    Data:     Past Medical History:   has a past medical history of Allergic rhinitis, Anticoagulated, Anxiety, generalized, Aortic stenosis, Atrial fibrillation ([facility] [address] Atrial fibrillation with RVR ([facility] [address] Breast cancer ([facility] [address] CAD (coronary artery disease), Cellulitis, Cellulitis of left arm, Diabetic neuropathy ([facility] [address] Hyperlipidemia, Hypertension, Lymphedema of arm, PVC's (premature ventricular contractions), S/P cardiac cath, Type II or unspecified type diabetes mellitus without

## 2020-03-02 NOTE — FLOWSHEET NOTE
Chelsie  Patient is a 82year old female. Her  Sea Harris as well as her two pastors for her [employer] [address]  in [place] were with the patient. Patient was in good spirits and had lots of support from these visitors and patient says she gets lots of support of other family members and friends as well. Intervention   introduced herself to the patient, her husbands and the two pastors.  provided active listening and told the patient that she looked like she was in good hands for family and spiritual support. The two pastors said that they would like to be included in the chaplains prayer and so we all prayed together. Outcome  Everyone said it was great to have the  come to visit. 03/01/20 1909   Encounter Summary   Services provided to: Patient and family together   Referral/Consult From: [address]; Children;Family members; Latter day/ruel community   [facility] of [facility]   ([employer])   Continue Visiting   (3/01/2020)   Complexity of Encounter High   Length of Encounter 15 minutes   Spiritual Assessment Completed Yes   Spiritual/Baptism   Type Spiritual support   Assessment Calm; Approachable; Hopeful;Peaceful   Intervention Active listening;Prayer;Sustaining presence/ Ministry of presence   Outcome Expressed gratitude

## 2020-03-02 NOTE — PROGRESS NOTES
Nephrology Progress Note      SUBJECTIVE       Pt was seen and examined. Nephrology following for acute on chronic renal injury. This seems highly likely from prerenal insult in face of nausea, decreased oral intake while she was still taking loop diuretic. Intravascularly she appears dry. Diuretics were held and her creatinine has improved down to 1.8 today. And was 2.88 in [place] prior to transfer. Calcium level is normal as well. She still has mild nausea although no emesis. Denies any orthopnea, fever chills or chest pain at this time. OBJECTIVE      CURRENT TEMPERATURE:  Temp: 98.2 °F (36.8 °C)  MAXIMUM TEMPERATURE OVER 24HRS:  Temp (24hrs), Av.3 °F (36.8 °C), Min:98.2 °F (36.8 °C), Max:98.4 °F (36.9 °C)    CURRENT RESPIRATORY RATE:  Resp: 21  CURRENT PULSE:  Pulse: 68  CURRENT BLOOD PRESSURE:  BP: (!) 99/39  24HR BLOOD PRESSURE RANGE:  Systolic (69WSU), NRM:624 , Min:87 , BRQ:190   ; Diastolic (97YMQ), NCK:71, Min:39, Max:52    24HR INTAKE/OUTPUT:      Intake/Output Summary (Last 24 hours) at 3/2/2020 1237  Last data filed at 3/2/2020 0701  Gross per 24 hour   Intake 1302 ml   Output 2950 ml   Net -1648 ml     WEIGHT :  Patient Vitals for the past 96 hrs (Last 3 readings):   Weight   20 0701 131 lb 3.2 oz (59.5 kg)   20 1213 132 lb 4.4 oz (60 kg)     PHYSICAL EXAM      GENERAL APPEARANCE:Awake, alert, having some nausea  SKIN: warm and dry, no rash or erythema  EYES: conjunctivae normal and sclera anicteric  ENT: no thrush no pharyngeal congestion   NECK:   No JVD. No carotid bruits and no carotid lymphadenopathy . PULMONARY: lungs are clear to auscultation. No Wheezing, no ronchi . CADRDIOVASCULAR: S1 and S2 normal NO S3 and NO S4 . No rubs , no murmur. ABDOMEN: soft nontender, bowel sounds present, no organomegaly, no ascites.      EXTREMITIES: no cyanosis, clubbing or edema     CURRENT MEDICATIONS      amiodarone (CORDARONE) tablet 200 mg, Daily  aspirin chewable tablet 81 mg, Daily  famotidine (PEPCID) tablet 20 mg, QPM  metoprolol tartrate (LOPRESSOR) tablet 25 mg, BID  nitroGLYCERIN (NITROSTAT) SL tablet 0.4 mg, Q5 Min PRN  rivaroxaban (XARELTO) tablet 15 mg, Daily with breakfast  [Held by provider] sodium bicarbonate tablet 650 mg, BID  sucralfate (CARAFATE) tablet 1 g, 4x Daily  Vitamin D (CHOLECALCIFEROL) tablet 1,000 Units, Daily  sodium chloride flush 0.9 % injection 10 mL, 2 times per day  sodium chloride flush 0.9 % injection 10 mL, PRN  promethazine (PHENERGAN) tablet 12.5 mg, Q6H PRN    Or  ondansetron (ZOFRAN) injection 4 mg, Q6H PRN  nicotine (NICODERM CQ) 21 MG/24HR 1 patch, Daily PRN  glucose (GLUTOSE) 40 % oral gel 15 g, PRN  dextrose 50 % IV solution, PRN  glucagon (rDNA) injection 1 mg, PRN  dextrose 5 % solution, PRN  insulin lispro (HUMALOG) injection vial 0-6 Units, TID WC  insulin lispro (HUMALOG) injection vial 0-3 Units, Nightly  benzonatate (TESSALON) capsule 200 mg, TID PRN          LABS      CBC:   Recent Labs     02/29/20 2026 03/02/20  0616   WBC 9.0 7.2   RBC 3.95 3.28*   HGB 10.5* 8.7*   HCT 33.1* 30.0*   MCV 83.8 91.5   MCH 26.6 26.5   MCHC 31.7 29.0   RDW 18.6* 18.8*   PLT 99* See Reflexed IPF Result   MPV 10.2 NOT REPORTED      BMP:   Recent Labs     02/29/20  1946 03/01/20  0937  03/01/20  2200 03/02/20  0219 03/02/20  0616   * 138  138   < > 126* 136 137   K 6.2* 4.7  --   --   --  3.9   CL 90* 104  --   --   --  102   CO2 19* 17*  --   --   --  20   BUN 64* 55*  --   --   --  38*   CREATININE 2.89* 2.55*  --   --   --  1.89*   GLUCOSE 201* 75  --   --   --  185*   CALCIUM 10.0 8.7  --   --   --  8.4*    < > = values in this interval not displayed.       MAGNESIUM:   Recent Labs     03/01/20  0937 03/02/20  0616   MG 2.6 2.5     ALBUMIN:   Recent Labs     03/02/20  0616   LABALBU 2.9*       SPEP:   Lab Results   Component Value Date    PROT 5.7 03/02/2020     URINE SODIUM:    Lab Results   Component Value Date    AARON 44 03/01/2020 URINALYSIS:  U/A:   Lab Results   Component Value Date    NITRU NEGATIVE 03/01/2020    COLORU YELLOW 03/01/2020    PHUR 6.5 03/01/2020    WBCUA 0 TO 2 03/01/2020    RBCUA None 03/01/2020    MUCUS NOT REPORTED 03/01/2020    TRICHOMONAS NOT REPORTED 03/01/2020    YEAST NOT REPORTED 03/01/2020    BACTERIA NOT REPORTED 03/01/2020    SPECGRAV 1.013 03/01/2020    LEUKOCYTESUR NEGATIVE 03/01/2020    UROBILINOGEN Normal 03/01/2020    BILIRUBINUR NEGATIVE 03/01/2020    GLUCOSEU 2+ 03/01/2020    KETUA NEGATIVE 03/01/2020    AMORPHOUS NOT REPORTED 03/01/2020           ASSESSMENT      1. Acute Kidney Injury: Ischemic ATN secondary to hypotension, volume depletion , continual diuretic and ACE use, along with myocardial demand/NSTEMI. 2. Hyperkalemia secondary to JOSEP and supplemental potassium and decreased distal delivery of solute  3. 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    4. Elevated troponin consistent with NSTEMI  5. Ischemic cardiomyopathy: ICD removed 2018 due to infection. Never replaced. 6. Hypotension: Better  7. Anemia in stage III chronic kidney disease with last hemoglobin 10  8. S/P TAVR  9. Hx of CAD with stent      PLAN      1. Hold diuretics today as well. Clinically no CHF   2. Will give her normal saline at 50 mL an hour for 10 hours  3. Follow up labs ordered. 4. Following along       Please do not hesitate to call with questions.     Electronically signed by Starla Amin MD on 3/2/2020 at 12:37 PM

## 2020-03-02 NOTE — CARE COORDINATION
BPCI-A Medical Bundle Patient:  Working DR Renal Failure  Admitting Location: [facility]  Adm Date: 3/1/20       2020 Final  for admission 20-20. Patient no longer eligible for BPCI bundle due to excluded DRG, but now readmitted with different & now qualifying DRG of 683 as of 3/1/20. Spoke with patient, spouse & , Liza Up. CM is checking for PT/OT order.   Current DC plan is to return home with spouse + [facility]

## 2020-03-03 NOTE — PROGRESS NOTES
Physical Therapy    Facility/Department: [facility]  Initial Assessment    NAME: Lucy Anthony  : 1935  MRN: [id number]    Date of Service: 3/3/2020    Discharge Recommendations:  Patient would benefit from continued therapy after discharge   PT Equipment Recommendations  Equipment Needed: No(has equipment in home)    Assessment   Body structures, Functions, Activity limitations: Decreased functional mobility ; Decreased strength;Decreased balance  Assessment: Pt able to ambulate in room, 60 ft with CGA-Jono. Pt would benefit from rw and continued PT for strength and balance training  Prognosis: Good  Decision Making: Low Complexity  PT Education: Transfer Training;PT Role;Plan of Care;Equipment;General Safety  Patient Education: Importance of OOB and mobility, AROM for circulation  REQUIRES PT FOLLOW UP: Yes  Activity Tolerance  Activity Tolerance: Patient limited by fatigue      The patient is W 34 y.o.  Non-/non  female who presents with fatigue and decreased oral intake and she is admitted to the hospital for the management of  Acute renal failure (ARF) (HCC) superimposed on chronic kidney disease. Patient Diagnosis(es): There were no encounter diagnoses. has a past medical history of Allergic rhinitis, Anticoagulated, Anxiety, generalized, Aortic stenosis, Atrial fibrillation ([address] Atrial fibrillation with RVR ([address] Breast cancer ([address] CAD (coronary artery disease), Cellulitis, Cellulitis of left arm, Diabetic neuropathy ([address] Hyperlipidemia, Hypertension, Lymphedema of arm, PVC's (premature ventricular contractions), S/P cardiac cath, Type II or unspecified type diabetes mellitus without mention of complication, not stated as uncontrolled, and Vitamin D deficiency. has a past surgical history that includes Mastectomy (Bilateral,  and ); Hysterectomy (2002); fracture surgery (08/10/99); Cardiac catheterization (2012);  Colonoscopy (2009); Colonoscopy (9/8/14); other surgical history (Right, 6/2015); Coronary angioplasty with stent (09/2016); Cardiac catheterization (09/19/2017); Aortic valve replacement (12/07/2017); pr colonoscopy flx dx w/collj spec when pfrmd (N/A, 9/24/2018); Cardiac catheterization (09/23/2019); and pacemaker placement.     Restrictions  Restrictions/Precautions  Restrictions/Precautions: Up as Tolerated, Contact Precautions  Required Braces or Orthoses?: No  Vision/Hearing  Vision: Within Functional Limits  Hearing: Within functional limits     Subjective  General  Chart Reviewed: Yes  Patient assessed for rehabilitation services?: Yes  Family / Caregiver Present: Yes(spouse)  Follows Commands: Within Functional Limits  Pain Screening  Patient Currently in Pain: Denies  Vital Signs  Patient Currently in Pain: Denies       Orientation  Orientation  Overall Orientation Status: Within Functional Limits  Social/Functional History  Social/Functional History  Lives With: Spouse  Type of Home: House  Home Layout: One level  Home Access: Stairs to enter with rails  Entrance Stairs - Number of Steps: 2  Bathroom Shower/Tub: Walk-in shower  Bathroom Equipment: Shower chair, Grab bars in shower  Home Equipment: Rolling walker, [employer], [employer]  Receives Help From: Family  ADL Assistance: Needs assistance(spouse assists last couple weeks)  Homemaking Assistance: Needs assistance(spouse completes)  Homemaking Responsibilities: Yes(shared)  Ambulation Assistance: Independent  Transfer Assistance: Independent  Active : No  Mode of Transportation: Family  Cognition   Cognition  Overall Cognitive Status: WFL    Objective          AROM RLE (degrees)  RLE AROM: WFL  AROM LLE (degrees)  LLE AROM : WFL  AROM RUE (degrees)  RUE AROM : WFL  AROM LUE (degrees)  LUE AROM : WFL  Strength RLE  Comment: 4/5 grossly  Strength LLE  Comment: 4/5 grossly  Strength RUE  Comment: 4/5 grossly  Strength LUE  Comment: 4/5 grossly     Sensation  Overall Sensation Status: Impaired  Additional Comments: numbness B feet  Bed mobility  Rolling to Right: Contact guard assistance  Supine to Sit: Minimal assistance  Scooting: Minimal assistance  Comment: use of bedrails to assist as well  Transfers  Sit to Stand: Contact guard assistance  Stand to sit: Contact guard assistance  Comment: slow to rise, cues for hand placement  Ambulation  Ambulation?: Yes  More Ambulation?: No  Ambulation 1  Surface: level tile  Device: No Device  Assistance: Minimal assistance  Gait Deviations: Slow Adamaris;Decreased step length  Distance: 60 ft  Comments: Pt with even stride and mildly antalgic gait. Pt would likely benefit from rw for step length and posture, pt declines use- assists  Stairs/Curb  Stairs?: No     Balance  Posture: Fair  Sitting - Static: Good  Sitting - Dynamic: Good;-  Standing - Static: Fair;+  Standing - Dynamic: Fair  Comments: standing without UE support    Seated LE exercise program: Long Arc Quads, hip abduction/adduction, heel/toe raises, and marches. Reps: 10  Gentle piriformis stretching tolerated well.   Educated on benefit of continued PT for strength and stretching    Plan   Plan  Times per week: 5-6x./week  Current Treatment Recommendations: Strengthening, Endurance Training, Gait Training, Balance Training, Functional Mobility Training  Safety Devices  Type of devices: Call light within reach, Left in chair, Gait belt  Restraints  Initially in place: No      AM-PAC Score  AM-PAC Inpatient Mobility Raw Score : 17 (03/03/20 1105)  AM-PAC Inpatient T-Scale Score : 42.13 (03/03/20 1105)  Mobility Inpatient CMS 0-100% Score: 50.57 (03/03/20 1105)  Mobility Inpatient CMS G-Code Modifier : CK (03/03/20 1105)          Goals  Short term goals  Time Frame for Short term goals: 10 visits  Short term goal 1: Pt indep with bed mobility  Short term goal 2: Pt indep with transfers  Short term goal 3: Pt ambulate 150 ft with least restrictive device and

## 2020-03-03 NOTE — PROGRESS NOTES
Renal Progress Note    Patient :  Gris Fitzgerald; 82 y.o. MRN# [id number]  Location:  9712/8764-96  Attending:  Tarah Chang MD  Admit Date:  3/1/2020   Hospital Day: 2      Subjective:     Patient was seen and examined. No new issues reported overnight. Patient had some low blood pressure with systolics in 61S and improved to low 100s and patient did not get her metoprolol. Bumex was currently on hold. Patient states that she that her blood pressure normally runs low and she is currently asymptomatic. She overall states that she feels better. Johnson catheter has been removed. Urine output documented as x3. Creatinine improved to 1.66 mg/DL today. Outpatient Medications:     Medications Prior to Admission: amiodarone (CORDARONE) 200 MG tablet, TAKE ONE TABLET BY MOUTH DAILY  famotidine (PEPCID) 20 MG tablet, TAKE ONE TABLET BY MOUTH EVERY EVENING  sodium bicarbonate 650 MG tablet, Take 1 tablet by mouth 2 times daily  bumetanide (BUMEX) 1 MG tablet, Take 1 tablet by mouth 2 times daily  glimepiride (AMARYL) 2 MG tablet, TAKE ONE TABLET BY MOUTH TWICE A DAY  potassium chloride (KLOR-CON M) 20 MEQ TBCR extended release tablet, TAKE ONE TABLET BY MOUTH TWICE A DAY WITH MEALS (Patient taking differently: daily Do not crush, chew, or suck on tablet.  Tablet may also be broken in half and each half swallowed separately.)  metoprolol tartrate (LOPRESSOR) 25 MG tablet, Take 1 tablet by mouth 2 times daily  rivaroxaban (XARELTO) 15 MG TABS tablet, Take 1 tablet by mouth daily (with breakfast)  sertraline (ZOLOFT) 50 MG tablet, Take 1 tablet by mouth daily  sucralfate (CARAFATE) 1 GM tablet, Take 1 tablet by mouth 4 times daily  lisinopril (PRINIVIL;ZESTRIL) 2.5 MG tablet, Take 1 tablet by mouth daily  aspirin 81 MG chewable tablet, Take 1 tablet by mouth daily  acetaminophen (TYLENOL) 325 MG tablet, Take 2 tablets by mouth every 4 hours as needed for Pain  Vitamin D (CHOLECALCIFEROL) 1000 UNITS CAPS capsule, NEGATIVE 03/01/2020    GLUCOSEU 2+ 03/01/2020    KETUA NEGATIVE 03/01/2020    AMORPHOUS NOT REPORTED 03/01/2020     Urine Sodium:     Lab Results   Component Value Date    AARON 44 03/01/2020     Urine Osmolarity:   Lab Results   Component Value Date    OSMOU 374 03/01/2020      Urine Creatinine:     Lab Results   Component Value Date    LABCREA 30.1 02/09/2020       Radiology:     Reviewed. Assessment:     1. Acute Kidney Injury: Ischemic ATN secondary to hypotension, volume depletion , continual diuretic and ACE use, along with myocardial demand/NSTEMI. 2. Hyperkalemia secondary to JOSEP and supplemental potassium and decreased distal delivery of solute  3. 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  Follows up with Dr. Julian Tang. 4. Elevated troponin consistent with NSTEMI  5. Ischemic cardiomyopathy: ICD removed 2018 due to infection.  Never replaced. 6. Hypotension: Better  7. Anemia in stage III chronic kidney disease with last hemoglobin 10  8. S/P TAVR  9. Hx of CAD with stent    Plan:   1. Recommend holding on metoprolol for now and can be restarted as outpatient. 2.  Restart Bumex at a lower dose of 1 mg p.o. daily from tomorrow. 3.  Patient should be okay to discharge from nephrology standpoint. 4.  BMP in 1 week post discharge and fax results to Dr. Julian Tang office; Fax # [phone]. Prescription provided. 5.  Follow up with Dr. Julian Tang in 3-4 weeks post d/c. Case was discussed with primary admitting physician and patient's nurse was updated. Nutrition   Please ensure that patient is on a renal diet/TF. Avoid nephrotoxic drugs/contrast exposure. We will continue to follow along with you. Bonilla De Leon MD  [facility]     This note is created with the assistance of a speech-recognition program. While intending to generate a

## 2020-03-03 NOTE — DISCHARGE INSTR - COC
Continuity of Care Form    Patient Name: Janette Roque   :  1935  MRN:  [id number]    Admit date:  3/1/2020  Discharge date:  ***    Code Status Order: Full Code   Advance Directives:     Admitting Physician:  Anitha Gray MD  PCP: Vanessa Varghese MD    Discharging Nurse: [facility] Unit/Room#: 8486/9719-39  Discharging Unit Phone Number: ***    Emergency Contact:   Extended Emergency Contact Information  Primary Emergency Contact: Freda Guillaume  Address: [address] [place] [address], [address] Emmanuel Galindo [address] Phone: [phone]  Work Phone: [phone]  Mobile Phone: [phone]  Relation: Spouse  Secondary Emergency Contact: ELIOT Simons [address] Phone: [phone]  Relation: Child   needed? No    Past Surgical History:  Past Surgical History:   Procedure Laterality Date    AORTIC VALVE REPLACEMENT  2017    TAVR 26 mm CoreValve     CARDIAC CATHETERIZATION  2012    Showed minimal CAD with increased wedge pressure of the right heart. Patient saw Cardiology and started on Lasix.  CARDIAC CATHETERIZATION  2017    right and left heart cath   EF 30% WITH PATENT LAD STENT    CARDIAC CATHETERIZATION  2019    COLONOSCOPY  2009    diverticulosis being found.  COLONOSCOPY  14    grade 2-3 internal hemorrhoids - banded x 2, random biopsies taken to r/o - normal, repeat 5yrs due to hx of polyps- garber    CORONARY ANGIOPLASTY WITH STENT PLACEMENT  2016    PTCA / Drug Eluting Stent:, LAD and / or branches    FRACTURE SURGERY  08/10/99    Left elbow ORIF     HYSTERECTOMY  2002    With bladder repair for benign reasons.  MASTECTOMY Bilateral  and     With lymph node dissections in  and .     OTHER SURGICAL HISTORY Right 2015    Index and long trigger finger release    PACEMAKER PLACEMENT      removed    SC COLONOSCOPY FLX DX W/COLLJ SPEC WHEN PFRMD N/A 2018    COLONOSCOPY w/ thickness:71266}  Daily Fluid Restriction: {CHP DME Yes amt example:147134859}  Last Modified Barium Swallow with Video (Video Swallowing Test): {Done Not Done XRHN:237612839}    Treatments at the Time of Hospital Discharge:   Respiratory Treatments: ***  Oxygen Therapy:  {Therapy; copd oxygen:55687}  Ventilator:    { CC Vent WWRX:425156780}    Rehab Therapies: {THERAPEUTIC INTERVENTION:5774326443}  Weight Bearing Status/Restrictions: { CC Weight Bearin}  Other Medical Equipment (for information only, NOT a DME order):  {EQUIPMENT:833275419}  Other Treatments: ***    Patient's personal belongings (please select all that are sent with patient):  {CHP DME Belongings:306154729}    RN SIGNATURE:  {Esignature:838019886}    CASE MANAGEMENT/SOCIAL WORK SECTION    Inpatient Status Date: 3/1/20    Readmission Risk Assessment Score:  Readmission Risk              Risk of Unplanned Readmission:        39           Discharging to Facility/ Agency   Name:   [facility], [address] [place] [address]       Phone: [phone]       Fax: [phone]        ·   · Address:  · Phone:  · Fax:    Dialysis Facility (if applicable)   · Name:  · Address:  · Dialysis Schedule:  · Phone:  · Fax:    / signature: Electronically signed by Gunnar Almaraz RN on 3/3/20 at 5:46 PM    PHYSICIAN SECTION    Prognosis: {Prognosis:6812913519}    Condition at Discharge: [address] Patient Condition:878542081}    Rehab Potential (if transferring to Rehab): {Prognosis:3102719593}    Recommended Labs or Other Treatments After Discharge: ***    Physician Certification: I certify the above information and transfer of Sergio Arce  is necessary for the continuing treatment of the diagnosis listed and that she requires {Admit to Appropriate Level of Care:69057} for {GREATER/LESS:004075123} 30 days.      Update Admission H&P: {CHP DME Changes in RLLPW:063760790}    PHYSICIAN SIGNATURE: {[facility]:794386700}

## 2020-03-03 NOTE — PROGRESS NOTES
colonoscopy flx dx w/collj spec when pfrmd (N/A, 9/24/2018); Cardiac catheterization (09/23/2019); and pacemaker placement.        Restrictions  Restrictions/Precautions  Restrictions/Precautions: Contact Precautions, Fall Risk, General Precautions  Required Braces or Orthoses?: No  Position Activity Restriction  Other position/activity restrictions: up with A    Subjective   General  Patient assessed for rehabilitation services?: Yes  Family / Caregiver Present: Yes(Spouse)  Diagnosis: Fatigue, CKD  Patient Currently in Pain: Denies  Pain Assessment  Pain Assessment: 0-10  Pain Level: 0  Vital Signs  Patient Currently in Pain: Denies  Social/Functional History  Social/Functional History  Lives With: Spouse  Type of Home: House  Home Layout: One level  Home Access: Stairs to enter with rails  Entrance Stairs - Number of Steps: ~3  Entrance Stairs - Rails: Left  Bathroom Shower/Tub: Walk-in shower  Bathroom Equipment: Grab bars in shower, Built-in shower seat  Home Equipment: Rolling walker, Cane, Wheelchair-manual(Doesn't use)  Receives Help From: Family  ADL Assistance: Independent  Homemaking Assistance: Needs assistance  Homemaking Responsibilities: Yes(Shares with spouse)  Ambulation Assistance: Independent  Transfer Assistance: Independent  Active : No  Patient's  Info: spouse  Mode of Transportation: Family  Leisure & Hobbies: [employer]  Additional Comments: Spouse retired and able to assist pt     Objective   Vision: Within Functional Limits  Hearing: Within functional limits    Orientation  Overall Orientation Status: Within Functional Limits     Balance  Sitting Balance: Modified independent   Standing Balance: Supervision  Standing Balance  Time: 12 min  Activity: Pt stood chairside, sinkside, at toilet, func mob to/from bathroom  Functional Mobility  Functional - Mobility Device: No device  Activity: To/from bathroom  Assist Level: Contact guard assistance  Toilet Transfers  Toilet - Technique: Ambulating  Equipment Used: Grab bars  Toilet Transfer: Supervision  ADL  Feeding: Independent  Grooming: Modified independent   UE Bathing: Modified independent   LE Bathing: Supervision  UE Dressing: Supervision  LE Dressing: Supervision  Toileting: Supervision  Additional Comments: Pt stood sinkside during oral care activity, transferred to toilet to urinate and stood for ahmet-care with SUP only  Tone RUE  RUE Tone: Normotonic  Tone LUE  LUE Tone: Normotonic  Coordination  Movements Are Fluid And Coordinated: No  Coordination and Movement description: Decreased speed     Bed mobility  Supine to Sit: Unable to assess  Sit to Supine: Unable to assess  Scooting: Stand by assistance  Comment: Pt sitting in recliner upon arrival/exit this date  Transfers  Sit to stand: Contact guard assistance  Stand to sit: Stand by assistance     Cognition  Overall Cognitive Status: WFL     Sensation  Overall Sensation Status: Impaired  Additional Comments: numbness B feet      LUE AROM (degrees)  LUE AROM : Exceptions  L Shoulder Flexion 0-180: Not tested, hx of double mastectomies, observed to 90 degrees however  L Elbow Flexion 0-145: WFL  L Elbow Extension 145-0: WFL  RUE AROM (degrees)  RUE AROM : WFL  LUE Strength  Gross LUE Strength: Exceptions to [facility]  L Shoulder Flex: 3-/5  L Elbow Flex: 4+/5  L Elbow Ext: 4+/5  L Hand General: 5/5  LUE Strength Comment: Significant chronic edema noted in LUE, hx of breast CA  RUE Strength  Gross RUE Strength: WFL  R Hand General: 5/5         Plan   Plan  Times per week: 3-4x        AM-PAC Inpatient Daily Activity Raw Score: 20 (03/03/20 1406)  AM-PAC Inpatient ADL T-Scale Score : 42.03 (03/03/20 1406)  ADL Inpatient CMS 0-100% Score: 38.32 (03/03/20 1406)  ADL Inpatient CMS G-Code Modifier : Newell Cooks (03/03/20 1406)    Goals  Short term goals  Time Frame for Short term goals: Pt will by discharge   Short term goal 1: demo good safety awareness during func mob around room using LRD and mod I  Short term goal 2: demo ADL UB/LB dressing/bathing activity with mod I and increased time  Short term goal 3: demo bending/reaching func activity while standing using LRD and SUP  Short term goal 4: demo mod I for all bed mob/transfers using LRD PRN     Therapy Time   Individual Concurrent Group Co-treatment   Time In 1028         Time Out 1053         Minutes 25         Timed Code Treatment Minutes: 22 Minutes       Kathryn Crespo, OTR/L

## 2020-03-03 NOTE — CARE COORDINATION
Discharge [facility] Case Management Department  Written by: Nayla Calvert RN    Patient Name: J Luis Lara  Attending Provider: Clemente Connor MD  Admit Date: 3/1/2020  8:48 AM  MRN: [id number]  Account: [de-identified]                     : 1935  Discharge Date: 3/3/20      Disposition: home current with [facility] called office spoke to lizz elizalde dc paperwork from [facility], RN

## 2020-03-03 NOTE — PROGRESS NOTES
extremity edema, palpitations  Gastrointestinal:  negative for abdominal pain, constipation, diarrhea, nausea, vomiting  Neurological:  negative for dizziness, headache    Medications: Allergies: Allergies   Allergen Reactions    Darvocet A500 [Propoxyphene N-Acetaminophen] Other (See Comments)     weakness    Demerol Hcl [Meperidine] Other (See Comments)     Weakness      Marinol [Dronabinol] Other (See Comments)     weakness    Morphine Sulfate [Morphine] Other (See Comments)     Weakness      Statins [Statins] Other (See Comments)     Cause muscle pain and she would prefer not to take them any longer.  Levaquin [Levofloxacin In D5w] Nausea Only and Anxiety       Current Meds:   Scheduled Meds:     Continuous Infusions:     PRN Meds:     Data:     Past Medical History:   has a past medical history of Allergic rhinitis, Anticoagulated, Anxiety, generalized, Aortic stenosis, Atrial fibrillation ([address] Atrial fibrillation with RVR ([facility] [address] Breast cancer ([facility] [address] CAD (coronary artery disease), Cellulitis, Cellulitis of left arm, Diabetic neuropathy ([facility] [address] Hyperlipidemia, Hypertension, Lymphedema of arm, PVC's (premature ventricular contractions), S/P cardiac cath, Type II or unspecified type diabetes mellitus without mention of complication, not stated as uncontrolled, and Vitamin D deficiency. Social History:   reports that she is a non-smoker but has been exposed to tobacco smoke. She has never used smokeless tobacco. She reports that she does not drink alcohol or use drugs. Family History:   Family History   Problem Relation Age of Onset    Diabetes Mother     Glaucoma Mother     Stroke Father 64       Vitals:  BP (!) 107/48   Pulse 75   Temp 98.1 °F (36.7 °C) (Oral)   Resp 19   Ht 5' (1.524 m)   Wt 131 lb 14.4 oz (59.8 kg)   SpO2 100%   BMI 25.76 kg/m²   No data recorded. No results for input(s): POCGLU in the last 72 hours. I/O (24Hr):   No intake or output data in the 24 hours Combined systolic and diastolic congestive heart failure ([facility] [address] 3/1/2020 Yes    Type 2 diabetes mellitus with ketoacidosis without coma, without long-term current use of insulin ([facility] [address] 3/1/2020 Yes    Essential hypertension 3/1/2020 Yes    Breast cancer ([facility] [address] 3/1/2020 Yes    Overview Signed 10/13/2013  6:22 PM by Masoud Germain, DO     2 occurrences         Lymphedema of arm 3/1/2020 Yes    Overview Signed 10/13/2013  6:22 PM by Masoud Germain, DO     Left arm, due to lymph node dissection and mastectomy. Elevated troponin 3/12/2020 Yes          Plan:        1. Acute on chronic CKD- improving Cr with IVF,appreciate Nephrology input, resume Bumex 1mg daily  2. Chronic systolic and diastolic CHF- gentle IVF, monitor fluids  3. P. Afib- on Xarelto, HR stable, hold Metoprolol due to hypotension  4. Elevated Troponins/ Nstemi  5. H/o Breast cancer  6. HTn- BP stable  7. Chronic back pain - from spinal stenosis  8. Gi/ DVT proph  9.  PT/OT    Ok to discharge home on Bumex 1 mg daily, check BMP in  1 week    Opal Richmond MD  3/12/2020  10:41 PM

## 2020-03-04 NOTE — CARE COORDINATION
scheduled your follow up appointment?:  No  Were you discharged with any Home Care or Post Acute Services:  Yes  Post Acute Services:  Home Health (Comment: Elaine)  Patient DME:  Straight cane, Other  Other Patient DME:  blood pressure machine, pulse oximeter  Do you have support at home?:  Partner/Spouse/SO  Are you an active caregiver in your home?:  No  Care Transitions Interventions     Other Services:   (Comment: [facility])                                  Follow Up  Future Appointments   Date Time Provider Destinee Yeimy   3/6/2020  9:00 AM Ana Persaud MD [facility]   3/17/2020  2:50 PM Ricardo Mc MD [facility]   4/22/2020  9:50 AM [facility]   5/6/2020  1:15 PM [facility]   5/7/2020 11:00 AM [facility]   5/27/2020  2:15 PM Giacomo Boothe MD [facility]       Kendra Pearl, NOELLE

## 2020-03-06 NOTE — PROGRESS NOTES
occurrences    CAD (coronary artery disease) November 2012    Minimal disease by catheterization, 11/12, with wedge pressure of the right heart. She is taking Lasix for this and being followed by Cardiology.  Cellulitis 11/12/2018    Cellulitis of left arm 11/12/2018    Diabetic neuropathy (HCC)     Hyperlipidemia     Hypertension     runs low    Lymphedema of arm     Left arm, due to lymph node dissection and mastectomy.  PVC's (premature ventricular contractions)     Chronic. Patient currently undergoing workup for arrhythmia.  S/P cardiac cath 09/06/2016    Type II or unspecified type diabetes mellitus without mention of complication, not stated as uncontrolled     Vitamin D deficiency           Social History     Tobacco Use    Smoking status: Passive Smoke Exposure - Never Smoker    Smokeless tobacco: Never Used    Tobacco comment: never smoker   Substance Use Topics    Alcohol use: No     Current Outpatient Medications   Medication Sig Dispense Refill    Continuous Blood Gluc  (FREESTYLE SAYRA 14 DAY READER) PHUONG 1 each by Does not apply route daily 1 Device 1    Continuous Blood Gluc Sensor (FREESTYLE SAYRA 14 DAY SENSOR) MISC 1 each by Does not apply route daily 2 each 5    diclofenac sodium 1 % GEL Apply 2 g topically 4 times daily as needed for Pain 2 Tube 3    Insulin Pen Needle ([employer] PEN NEEDLES) 31G X 6 MM MISC 1 each by Does not apply route 3 times daily 100 each 3    mirtazapine (REMERON) 7.5 MG tablet Take 1 tablet by mouth nightly 90 tablet 1    insulin aspart (NOVOLOG FLEXPEN) 100 UNIT/ML injection pen Take 1 unit of your sugars are between 200-300.  Take 2 units if sugars are above 300. 1 pen 3    bumetanide (BUMEX) 1 MG tablet Take 1 tablet by mouth daily 30 tablet 0    benzonatate (TESSALON) 200 MG capsule Take 1 capsule by mouth 3 times daily as needed for Cough 21 capsule 0    amiodarone (CORDARONE) 200 MG tablet TAKE ONE TABLET BY MOUTH DAILY 30 tablet 1  famotidine (PEPCID) 20 MG tablet TAKE ONE TABLET BY MOUTH EVERY EVENING 90 tablet 2    LORazepam (ATIVAN) 0.5 MG tablet Take 1 tablet by mouth every 8 hours as needed for Anxiety for up to 10 days. 3 tablet 0    Probiotic Product (PROBIOTIC DAILY) CAPS Take 1 tablet by mouth 3 times daily 90 capsule 0    potassium chloride (KLOR-CON M) 20 MEQ TBCR extended release tablet TAKE ONE TABLET BY MOUTH TWICE A DAY WITH MEALS (Patient taking differently: daily Do not crush, chew, or suck on tablet. Tablet may also be broken in half and each half swallowed separately.) 60 tablet 11    blood glucose test strips (FREESTYLE TEST STRIPS) strip USE ONE STRIP TO TEST DAILY AS NEEDED 50 strip 2    rivaroxaban (XARELTO) 15 MG TABS tablet Take 1 tablet by mouth daily (with breakfast) 30 tablet 5    sertraline (ZOLOFT) 50 MG tablet Take 1 tablet by mouth daily 30 tablet 0    sucralfate (CARAFATE) 1 GM tablet Take 1 tablet by mouth 4 times daily 120 tablet 0    ondansetron (ZOFRAN ODT) 4 MG disintegrating tablet Take 1 tablet by mouth every 6 hours as needed for Nausea or Vomiting 60 tablet 1    aspirin 81 MG chewable tablet Take 1 tablet by mouth daily 30 tablet 3    acetaminophen (TYLENOL) 325 MG tablet Take 2 tablets by mouth every 4 hours as needed for Pain 120 tablet 0    Vitamin D (CHOLECALCIFEROL) 1000 UNITS CAPS capsule Take 1,000 Units by mouth daily.  nitroGLYCERIN (NITROSTAT) 0.4 MG SL tablet Place 1 tablet under the tongue every 5 minutes as needed for Chest pain up to max of 3 total doses. If no relief after 1 dose, call 911. (Patient not taking: Reported on 3/6/2020) 25 tablet 3     No current facility-administered medications for this visit.            Allergies   Allergen Reactions    Darvocet A500 [Propoxyphene N-Acetaminophen] Other (See Comments)     weakness    Demerol Hcl [Meperidine] Other (See Comments)     Weakness      Marinol [Dronabinol] Other (See Comments)     weakness    Morphine Sulfate [Morphine] Other (See Comments)     Weakness      Statins [Statins] Other (See Comments)     Cause muscle pain and she would prefer not to take them any longer.  Levaquin [Levofloxacin In D5w] Nausea Only and Anxiety       Subjective:     Review of Systems   Constitutional: Positive for appetite change and fatigue. Negative for activity change, chills and fever. Respiratory: Negative for cough, chest tightness, shortness of breath and wheezing. Cardiovascular: Negative for chest pain, palpitations and leg swelling. Gastrointestinal: Positive for nausea. Negative for abdominal pain and constipation. Genitourinary: Negative for difficulty urinating. Musculoskeletal: Positive for arthralgias, back pain and gait problem. Neurological: Positive for headaches. Negative for dizziness, syncope, weakness and light-headedness. Psychiatric/Behavioral: Positive for sleep disturbance. Negative for dysphoric mood. The patient is nervous/anxious. Objective:      Physical Exam  Vitals signs and nursing note reviewed. Constitutional:       General: She is not in acute distress. Appearance: She is well-developed. Comments: Patient is in a wheelchair   Eyes:      Conjunctiva/sclera: Conjunctivae normal.   Neck:      Musculoskeletal: Normal range of motion and neck supple. Thyroid: No thyromegaly. Cardiovascular:      Rate and Rhythm: Normal rate and regular rhythm. Heart sounds: Normal heart sounds. No murmur. Pulmonary:      Effort: Pulmonary effort is normal. No respiratory distress. Breath sounds: Normal breath sounds. No wheezing. Lymphadenopathy:      Cervical: No cervical adenopathy. Skin:     General: Skin is warm and dry. Findings: No erythema or rash. Neurological:      Mental Status: She is alert and oriented to person, place, and time.    Psychiatric:         Attention and Perception: Attention normal.         Mood and Affect: Mood and affect normal. improving; her kidneys are better, but I am trying really hard not to choose medications that would cause them to worsen. She will follow up with nephrology next week. Return if symptoms worsen or fail to improve. Orders Placed This Encounter   Medications    Continuous Blood Gluc  (FREESTYLE SAYRA 14 DAY READER) PHUONG     Si each by Does not apply route daily     Dispense:  1 Device     Refill:  1    Continuous Blood Gluc Sensor (FREESTYLE SAYRA 14 DAY SENSOR) MISC     Si each by Does not apply route daily     Dispense:  2 each     Refill:  5    diclofenac sodium 1 % GEL     Sig: Apply 2 g topically 4 times daily as needed for Pain     Dispense:  2 Tube     Refill:  3    DISCONTD: insulin aspart (NOVOLOG FLEXPEN) 100 UNIT/ML injection pen     Sig: Inject 1-2 Units into the skin 3 times daily (before meals) Take 1 unit of your sugars are between 200-300. Take 2 units if sugars are above 300. Dispense:  5 pen     Refill:  3    Insulin Pen Needle ([employer] PEN NEEDLES) 31G X 6 MM MISC     Si each by Does not apply route 3 times daily     Dispense:  100 each     Refill:  3    mirtazapine (REMERON) 7.5 MG tablet     Sig: Take 1 tablet by mouth nightly     Dispense:  90 tablet     Refill:  1    insulin aspart (NOVOLOG FLEXPEN) 100 UNIT/ML injection pen     Sig: Take 1 unit of your sugars are between 200-300. Take 2 units if sugars are above 300. Dispense:  1 pen     Refill:  3     Please use this script. It has the correct quantity       Patientgiven educational materials - see patient instructions. Discussed use, benefit,and side effects of prescribed medications. All patient questions answered. Ptvoiced understanding. Reviewed health maintenance. Instructed to continue currentmedications, diet and exercise. Patient agreed with treatment plan. Follow up asdirected.      Electronically signed by Ana Persaud MD on 3/6/2020 at 12:08 PM

## 2020-03-11 NOTE — CARE COORDINATION
[facility] Transitions Follow Up Call    3/11/2020    Patient: Mayank Li  Patient : 1935   MRN: [id number]  Reason for Admission:   Discharge Date: 3/3/20 RARS: Readmission Risk Score: 40         Spoke with: Adelita Jones, patient    Care Transitions Subsequent and Final Call    Subsequent and Final Calls  Do you have any ongoing symptoms?:  Yes  Onset of Patient-reported symptoms:  Today  Patient-reported symptoms:  Nausea, Weakness, Fatigue  Interventions for patient-reported symptoms:  Notified PCP/Physician, Notified Home Care  Have your medications changed?:  No  Do you have any questions related to your medications?:  No  Do you currently have any active services?:  Yes  Are you currently active with any services?:  Home Health  Care Transitions Interventions     Other Services:   (Comment: [facility])   Other Interventions: Follow Up:  Contacted patient for BPCI-A follow up. Spoke with Adelita Jones. She states she is \"not doing too good\". Reports HR has been in the 90's which she states is high for her. Denies having any chest pain/discomfort or shortness of breath. She also reports that her BS has been elevated. Reports this morning BS was 199. She states she took 1 unit of insulin. She is aware of her parameters-1 unit if BS between 200-300 and 2 units if BS are above 300. She ate an egg and sausage for breakfast, 2 slices of apples. 12:30-1 pm. Rechecked BS and it was 246. She administered 1 unit of insulin. Patient rechecked her BS during call with CTN. BS was 233. Reports feeling fatigue and weak. Also reports having nausea and increased thirst.  May take Zofran occasionally. Denies having any headaches, blurred vision. Appetite has been poor. She states she is trying to drink her fluids but \"not over doing it\". CTN will contact PCP office and home health nurse. Patient states she has a hair appointment  today which she is going to.   States if home health

## 2020-03-12 PROBLEM — R79.89 ELEVATED TROPONIN: Status: ACTIVE | Noted: 2020-01-01

## 2020-03-12 PROBLEM — R77.8 ELEVATED TROPONIN: Status: ACTIVE | Noted: 2020-01-01

## 2020-03-12 NOTE — TELEPHONE ENCOUNTER
Spoke to [employer] and she said she just not feeling well, her sugars are ranging from 160-325 so we went over the scale Dr Catrina Jackson suggest :let her know that she can take 1 unit of insulin if her sugars are between 150-200, if they are between 200-300 she can take 2 units. If she is above 300 she should take 3 units. She is really concerned about her heart rate being elevated - I let her know it is within range but I believe once we get her bs under control and she feels better - her heart may go down.  But I would this back to Dr Catrina Jackson also     Keny Troncoso said he would call and let us know how she is doing tomorrow

## 2020-03-12 NOTE — TELEPHONE ENCOUNTER
So many things could be elevating her HR. Hyperglycemia, hypoglycemia, low blood pressure, pain, shortness of breath, nausea and anxiety to name a few. It is within normal range and I am not worried about it. Try to reassure her that her heart is okay.

## 2020-03-12 NOTE — TELEPHONE ENCOUNTER
I received the below message from the care coordinator yesterday. \"SAMINA, spoke with Woody Mc this afternoon.  BS running between 199-246 today.  She has taken insulin as instructed. Alida Patel is also concerned with her HR which she states has been in the 90's which is high for her. Alida Patel is having weakness, fatigue, nausea, increased thirst. [facility] nurse was informed as well. \"    Please call Woody Mc and let her know that she can take 1 unit of insulin if her sugars are between 150-200, if they are between 200-300 she can take 2 units. If she is above 300 she should take 3 units. An hour after she takes her insulin she can recheck her sugars if she is not feeling any better and give herself more insulin based on what her sugars are at that time. Is she feeling any better today?

## 2020-03-13 NOTE — DISCHARGE SUMMARY
Gerardo Lawson 19    Discharge Summary     Patient ID: Daryn Stern  :  1935   MRN: [id number]     ACCOUNT:  [de-identified]   Patient's PCP: Hans Perez MD  Admit Date: 3/1/2020   Discharge Date: 3/12/2020     Length of Stay: 2  Code Status:  Prior  Admitting Physician: Stanley Baer MD  Discharge Physician: Micha Miller MD     Active Discharge Diagnoses:     Hospital Problem Lists:  Principal Problem:    Acute renal failure (ARF) ([facility]  Active Problems:    S/P TAVR (transcatheter aortic valve replacement) -17-Dr. vernon    Combined systolic and diastolic congestive heart failure ([facility] [facility]    Type 2 diabetes mellitus with ketoacidosis without coma, without long-term current use of insulin (HCC)    Essential hypertension    Breast cancer (HCC)    Lymphedema of arm    Elevated troponin  Resolved Problems:    * No resolved hospital problems. *      Admission Condition:  poor     Discharged Condition: fair    Hospital Stay:     Hospital Course:  Daryn Stern is a 82 y.o. female who was admitted for the management of   Acute renal failure (ARF) ([facility] [address] , presented to ER with Fatigue. Per my partner: \"The patient is K 65 y.o.  Non-/non  female who presents with fatigue and decreased oral intake and she is admitted to the hospital for the management of  Acute renal failure (ARF) (HCC) superimposed on chronic kidney disease.      Patient was sent from [facility] after she went there for fatigue and had poor oral intake. Patient denies any nausea, vomiting, diarrhea, abdominal pain, fever or chills. She denies any cough, sputum production, breathing difficulty. Evaluation in ED showed hyponatremia 129, potasium 6.2, BUN 64, creatinine 2.89, elevated trops. Normal WBC. Her baseline creatinine is 1.6 . Patient follows Dr Lauren Cruz as outpatient.  She was recently admitted 2 weeks ago for diabetic ketoacidosis and JOSEP this     potassium chloride 20 MEQ Tbcr extended release tablet  Commonly known as:  KLOR-CON M  TAKE ONE TABLET BY MOUTH TWICE A DAY WITH MEALS  What changed:  See the new instructions. CONTINUE taking these medications    acetaminophen 325 MG tablet  Commonly known as:  TYLENOL  Take 2 tablets by mouth every 4 hours as needed for Pain     amiodarone 200 MG tablet  Commonly known as:  CORDARONE  TAKE ONE TABLET BY MOUTH DAILY     aspirin 81 MG chewable tablet  Take 1 tablet by mouth daily     blood glucose test strips strip  Commonly known as:  FREESTYLE TEST STRIPS  USE ONE STRIP TO TEST DAILY AS NEEDED     famotidine 20 MG tablet  Commonly known as:  PEPCID  TAKE ONE TABLET BY MOUTH EVERY EVENING     LORazepam 0.5 MG tablet  Commonly known as:  ATIVAN  Take 1 tablet by mouth every 8 hours as needed for Anxiety for up to 10 days. nitroGLYCERIN 0.4 MG SL tablet  Commonly known as:  Nitrostat  Place 1 tablet under the tongue every 5 minutes as needed for Chest pain up to max of 3 total doses. If no relief after 1 dose, call 911.      ondansetron 4 MG disintegrating tablet  Commonly known as:  Zofran ODT  Take 1 tablet by mouth every 6 hours as needed for Nausea or Vomiting     Probiotic Daily Caps  Take 1 tablet by mouth 3 times daily     rivaroxaban 15 MG Tabs tablet  Commonly known as:  XARELTO  Take 1 tablet by mouth daily (with breakfast)     sertraline 50 MG tablet  Commonly known as:  ZOLOFT  Take 1 tablet by mouth daily     sucralfate 1 GM tablet  Commonly known as:  Carafate  Take 1 tablet by mouth 4 times daily     Vitamin D 1000 units Caps capsule  Commonly known as:  CHOLECALCIFEROL        STOP taking these medications    glimepiride 2 MG tablet  Commonly known as:  AMARYL     lisinopril 2.5 MG tablet  Commonly known as:  PRINIVIL;ZESTRIL     metoprolol tartrate 25 MG tablet  Commonly known as:  LOPRESSOR     sodium bicarbonate 650 MG tablet        ASK your doctor about these medications benzonatate 200 MG capsule  Commonly known as:  TESSALON  Take 1 capsule by mouth 3 times daily as needed for Cough  Ask about: Should I take this medication? Where to Get Your Medications      These medications were sent to [address] [place] [address] Robinson Scruggs [phone] [address] [place], [place] [address]    Phone:  [phone]   · benzonatate 200 MG capsule  · bumetanide 1 MG tablet         Discharge Procedure Orders   Basic Metabolic Panel   Order Comments: BMP in 1 week post discharge and fax results to Dr. Krissy Kowalski office; Fax # [phone]       Time Spent on discharge is  20 mins in patient examination, evaluation, counseling as well as medication reconciliation, prescriptions for required medications, discharge plan and follow up. Electronically signed by   Vilma Raines MD  3/12/2020  10:47 PM      Thank you Dr. Michael Thornton MD for the opportunity to be involved in this patient's care.

## 2020-03-13 NOTE — TELEPHONE ENCOUNTER
Spoke to Adelita Carnes and they sent priority message to nurse ans will send someone out tomorrow to draw labs since today's was rejected. I spoke to Cecy Reaves and she stated she feels better than what she did before going to ER last time so she wants to wait for blood drug but if she gets worse she stated she would go in.

## 2020-03-14 PROBLEM — K92.2 GI BLEEDING: Status: ACTIVE | Noted: 2020-01-01

## 2020-03-14 NOTE — ED PROVIDER NOTES
abdominal pain, blood in stool and vomiting. Neurological: Positive for weakness. Negative for dizziness, light-headedness and headaches. All other systems reviewed and are negative. PAST MEDICAL HISTORY    has a past medical history of Allergic rhinitis, Anticoagulated, Anxiety, generalized, Aortic stenosis, Atrial fibrillation ([facility] Atrial fibrillation with RVR ([facility] Breast cancer ([facility] CAD (coronary artery disease), Cellulitis, Cellulitis of left arm, Diabetic neuropathy ([facility] [facility] Hyperlipidemia, Hypertension, Lymphedema of arm, PVC's (premature ventricular contractions), S/P cardiac cath, Type II or unspecified type diabetes mellitus without mention of complication, not stated as uncontrolled, and Vitamin D deficiency. SURGICAL HISTORY      has a past surgical history that includes Mastectomy (Bilateral, 1995 and 2000); Hysterectomy (09/09/2002); fracture surgery (08/10/99); Cardiac catheterization (November 2012); Colonoscopy (08/12/2009); Colonoscopy (9/8/14); other surgical history (Right, 6/2015); Coronary angioplasty with stent (09/2016); Cardiac catheterization (09/19/2017); Aortic valve replacement (12/07/2017); pr colonoscopy flx dx w/collj spec when pfrmd (N/A, 9/24/2018); Cardiac catheterization (09/23/2019); and pacemaker placement.     CURRENT MEDICATIONS       Previous Medications    ACETAMINOPHEN (TYLENOL) 325 MG TABLET    Take 2 tablets by mouth every 4 hours as needed for Pain    AMIODARONE (CORDARONE) 200 MG TABLET    TAKE ONE TABLET BY MOUTH DAILY    ASPIRIN 81 MG CHEWABLE TABLET    Take 1 tablet by mouth daily    BLOOD GLUCOSE TEST STRIPS (FREESTYLE TEST STRIPS) STRIP    USE ONE STRIP TO TEST DAILY AS NEEDED    BUMETANIDE (BUMEX) 1 MG TABLET    Take 1 tablet by mouth daily    CONTINUOUS BLOOD GLUC  (FREESTYLE SAYRA 14 DAY READER) PHUONG    1 each by Does not apply route daily    CONTINUOUS BLOOD GLUC SENSOR (FREESTYLE SAYRA 14 DAY SENSOR) MISC    1 each by Does not apply route daily    DICLOFENAC SODIUM 1 % GEL    Apply 2 g topically 4 times daily as needed for Pain    FAMOTIDINE (PEPCID) 20 MG TABLET    TAKE ONE TABLET BY MOUTH EVERY EVENING    INSULIN ASPART (NOVOLOG FLEXPEN) 100 UNIT/ML INJECTION PEN    Take 1 unit of your sugars are between 200-300. Take 2 units if sugars are above 300. INSULIN PEN NEEDLE ([employer] PEN NEEDLES) 31G X 6 MM MISC    1 each by Does not apply route 3 times daily    LORAZEPAM (ATIVAN) 0.5 MG TABLET    Take 1 tablet by mouth every 8 hours as needed for Anxiety for up to 10 days. MIRTAZAPINE (REMERON) 7.5 MG TABLET    Take 1 tablet by mouth nightly    NITROGLYCERIN (NITROSTAT) 0.4 MG SL TABLET    Place 1 tablet under the tongue every 5 minutes as needed for Chest pain up to max of 3 total doses. If no relief after 1 dose, call 911. ONDANSETRON (ZOFRAN ODT) 4 MG DISINTEGRATING TABLET    Take 1 tablet by mouth every 6 hours as needed for Nausea or Vomiting    POTASSIUM CHLORIDE (KLOR-CON M) 20 MEQ TBCR EXTENDED RELEASE TABLET    TAKE ONE TABLET BY MOUTH TWICE A DAY WITH MEALS    PROBIOTIC PRODUCT (PROBIOTIC DAILY) CAPS    Take 1 tablet by mouth 3 times daily    RIVAROXABAN (XARELTO) 15 MG TABS TABLET    Take 1 tablet by mouth daily (with breakfast)    SERTRALINE (ZOLOFT) 50 MG TABLET    Take 1 tablet by mouth daily    SUCRALFATE (CARAFATE) 1 GM TABLET    Take 1 tablet by mouth 4 times daily    VITAMIN D (CHOLECALCIFEROL) 1000 UNITS CAPS CAPSULE    Take 1,000 Units by mouth daily. ALLERGIES     is allergic to darvocet a500 [propoxyphene n-acetaminophen]; demerol hcl [meperidine]; marinol [dronabinol]; morphine sulfate [morphine]; statins [statins]; and levaquin [levofloxacin in d5w]. FAMILY HISTORY     She indicated that her mother is . She indicated that her father is . She indicated that all of her five sisters are alive.      family history includes Diabetes in her mother; Glaucoma in her mother; Stroke (age of onset: 64) in her father. SOCIAL HISTORY      reports that she is a non-smoker but has been exposed to tobacco smoke. She has never used smokeless tobacco. She reports that she does not drink alcohol or use drugs. PHYSICAL EXAM     INITIAL VITALS:  height is 5' (1.524 m) and weight is 133 lb (60.3 kg). Her temperature is 96.4 °F (35.8 °C). Her blood pressure is 105/54 (abnormal) and her pulse is 86. Her respiration is 18 and oxygen saturation is 98%. Physical Exam  Vitals signs and nursing note reviewed. Constitutional:       Appearance: She is well-developed. HENT:      Head: Normocephalic and atraumatic. Nose: Nose normal.   Eyes:      Extraocular Movements: Extraocular movements intact. Pupils: Pupils are equal, round, and reactive to light. Neck:      Musculoskeletal: Normal range of motion and neck supple. Cardiovascular:      Rate and Rhythm: Normal rate and regular rhythm. Heart sounds: Normal heart sounds. No murmur. Pulmonary:      Effort: Pulmonary effort is normal. No respiratory distress. Breath sounds: Normal breath sounds. Abdominal:      General: Bowel sounds are normal. There is no distension. Palpations: Abdomen is soft. Tenderness: There is no abdominal tenderness. Genitourinary:     Rectum: Guaiac result positive. Skin:     General: Skin is warm and dry. Neurological:      General: No focal deficit present. Mental Status: She is alert and oriented to person, place, and time.           DIFFERENTIAL DIAGNOSIS/ MDM:     Dehydration, pneumonia, congestive heart failure, ACS, arrhythmia, GI bleed, UTI, bacteremia, sepsis, electrolyte imbalance, JOSEP    DIAGNOSTIC RESULTS     EKG: All EKG's are interpreted by the Emergency Department Physician who either signs or Co-signs this chart in the absence of a cardiologist.    EKG Interpretation    Interpreted by emergency department physician    Rhythm: normal sinus   Rate: normal  Axis: left  Ectopy: Abnormal; Notable for the following components:    Pro-BNP 8,733 (*)     All other components within normal limits   URINE RT REFLEX TO CULTURE - Abnormal; Notable for the following components:    Glucose, Ur TRACE (*)     Leukocyte Esterase, Urine 1+ (*)     All other components within normal limits   MICROSCOPIC URINALYSIS - Abnormal; Notable for the following components:    Other Observations UA Specimen Cultured (*)     All other components within normal limits   POC FECAL OCCULT BLOOD - Abnormal; Notable for the following components:    POC Occult Blood, Fecal POSITIVE (*)     All other components within normal limits   CULTURE, URINE   IMMATURE PLATELET FRACTION       I reviewed all the lab results, patient has a hemoglobin of 7.9 g, renal insufficiency, hyponatremia    EMERGENCY DEPARTMENT COURSE:   Vitals:    Vitals:    03/14/20 1328 03/14/20 1539   BP: 126/61 (!) 105/54   Pulse: 90 86   Resp: 18 18   Temp: 96.4 °F (35.8 °C)    SpO2: 99% 98%   Weight: 133 lb (60.3 kg)    Height: 5' (1.524 m)      -------------------------  BP: (!) 105/54, Temp: 96.4 °F (35.8 °C), Pulse: 86, Resp: 18    Orders Placed This Encounter   Medications    pantoprazole (PROTONIX) 80 mg in sodium chloride 0.9 % 50 mL bolus    ondansetron (ZOFRAN) injection 4 mg    ondansetron (ZOFRAN) 4 MG/2ML injection     CYRUS PEDRAZA.: cabinet override    pantoprazole (PROTONIX) 40 MG injection     CYRUS PEDRAZA: cabinet override    promethazine (PHENERGAN) injection 12.5 mg    0.9 % sodium chloride bolus    0.9 % sodium chloride infusion       During the emergency department course, patient was given IV normal saline bolus followed by infusion, Zofran 4 mg IV push followed by Phenergan 12.5 mg IV push. She was also given Protonix bolus followed by infusion therapy. She has remained hemodynamically stable during the ED stay.   She will benefit from hospitalization as she has Hemoccult positive stools with generalized weakness and

## 2020-03-15 NOTE — H&P
Ethan [address]                 [address] [place] [id number]                              HISTORY AND PHYSICAL    PATIENT NAME: Alex Spence                 :        1935  MED REC NO:   [id number]                             ROOM:       0205  ACCOUNT NO:   [de-identified]                           ADMIT DATE: 2020  PROVIDER:     Shawna Lombardi    CHIEF COMPLAINT:  Generalized weakness and nausea. HISTORY OF PRESENTING ILLNESS:  This is an 72-year-old female who  presented to the ER with complaints of feeling weak overall and also has  had some nausea. dark stools the last 3 days and decreased appetite in the.   Denied any abdominal pain and no history of any gee blood in the stools. However, her  stools were Hemoccult positive in the ER. The patient has had a history  of multiple recent hospitalizations at [address] [place] [address], and  has a history of heart disease status post transcatheter aortic valve replacement,  also history of atrial fibrillation, coronary artery disease status post  stent placement. The patient was recently hospitalized for electrolyte  imbalance, also history of a infected defibrillator which was taken off  in 10/2019. Problems with chronic sciatica of the right lower  extremity. Denied any chest pain or palpitations at the present time. No lightheadedness. No emesis. No abdominal pain. Denies any diarrhea  or constipation. PAST MEDICAL HISTORY:  History of diabetes mellitus type 2, coronary  artery disease, vitamin E deficiency, hypertension, hyperlipidemia,  diabetic neuropathy, history of breast cancer, atrial fibrillation,  aortic stenosis status post TAVR, history of anxiety.     PAST SURGICAL HISTORY:  She has undergone hysterectomy in the past, also  has had a mastectomy bilateral, cardiac cath, transcatheter valve  replacement of the aorta, status post defibrillator removal.    FAMILY HISTORY: Positive for diabetes and stroke. SOCIAL HABITS:  Denies smoking or alcohol use. History of passive smoke  exposure. REVIEW OF SYSTEMS:  GENERAL:  Denies any weight loss or weight gain. Appetite is down. HEENT:  Denies any sore throat or postnasal drainage. CARDIAC:  Denies any chest pain or palpitations. RESPIRATORY:  No cough or shortness of breath. GASTROINTESTINAL:  Has had some nausea. No diarrhea or constipation. Denied any blood in the stools. GENITOURINARY:  Denies any urinary frequency, urgency, or flank pain. MUSCULOSKELETAL:  History of osteoarthritis, also history of sciatic  pain on the right side and chronic low back pain. NEUROLOGY:  Denies any seizure or tremors. Had generalized weakness  SKIN:  Denies any problems. PSYCHIATRIC:  Has a history of anxiety. Rest of the review of systems is unremarkable. HOME MEDICATIONS:  She is on Bumex 1 mg daily, amiodarone 200 mg daily,  Pepcid 20 mg twice a day, potassium chloride 20 mEq twice a day, Xarelto  15 mg daily, Zoloft 50 mg a day, Zofran 4 mg p.r.n. for nausea, aspirin  81 mg a day, Tylenol p.r.n. for pain, Nitrostat 0.4 sublingual p.r.n.,  vitamin D 1000 units daily, also was on insulin sliding scale coverage  at home and Remeron 7.5 mg daily and Carafate 1 gm q.i.d. Also on  Ativan 0.5 mg q.8 p.r.n. ALLERGIES:  Allergic to DARVOCET, DEMEROL, MORPHINE, STATINS, AND  LEVAQUIN. PHYSICAL EXAMINATION:  GENERAL:  The patient is alert, in no acute distress at the present  time. VITAL SIGNS:  Temperature of 98.1, pulse 85, blood pressure 117/57,  respiratory rate 16, O2 sats 97% on room air. HEENT:  Mucosa looks moist and pink. Throat is clear. NECK:  Supple. No JVD or bruits. LUNGS:  Clear to auscultation. HEART:  Irregularly irregular rhythm. ABDOMEN:  Mildly obese, soft, nontender. EXTREMITIES:  No peripheral edema. No calf tenderness. BACK:  Nontender. NEUROLOGIC:  No focal neurological deficits.     LABORATORY DATA:  White count was 8.2, hemoglobin 8.5, hematocrit 29.2,  platelet count 87. BUN was 26, creatinine of 1.67. Calcium was 8.4. Sodium 137, potassium 3.7, chloride 103, high sensitive troponin 66  however, the patient has chronically elevated troponin and her levels  were usually higher than this. ProBNP was 8733. Chest x-ray shows a new small left effusion with some associated  airspace disease most likely atelectasis. ASSESSMENT:  1. Symptomatic anemia. 2.  Hemoccult positive stools. 3.  Chronic atrial fibrillation. 4.  History of coronary artery disease. 5.  H/O Chronic systolic CHF  6. DM type 2  7. Chronically elevated troponin levels  8. H/O Sciatica right lower extremity  9. UTI possible  PLAN:  The patient is admitted for management of her anemia and  Hemoccult positive stools. Has had 1 unit of PRBC transfused last night We will consult surgery,monitor serial h/h, Insulin Sliding Scale cocerage,resume most of her her home medications, hold Xarelto, consult Cardiology in the morning. start on oral bactrim for possible UTI and await urine culture . She will be on EPC cuffs for DVT prophylaxis. Expected length of stay is 2 to 3  nights.         Everardo Salmon    D: 03/15/2020 10:52:12       T: 03/15/2020 11:41:32     [email or web address]  Job#: [id number]     Doc#: [id number]    CC:

## 2020-03-15 NOTE — CONSULTS
Non-medical: No   Tobacco Use    Smoking status: Passive Smoke Exposure - Never Smoker    Smokeless tobacco: Never Used    Tobacco comment: never smoker   Substance and Sexual Activity    Alcohol use: No    Drug use: No    Sexual activity: Yes     Partners: Male   Lifestyle    Physical activity     Days per week: 0 days     Minutes per session: 0 min    Stress: Not at all   Relationships    Social connections     Talks on phone: More than three times a week     Gets together: Twice a week     Attends Sikh service: More than 4 times per year     Active member of club or organization: No     Attends meetings of clubs or organizations: Never     Relationship status:     Intimate partner violence     Fear of current or ex partner: Not on file     Emotionally abused: Not on file     Physically abused: Not on file     Forced sexual activity: Not on file   Other Topics Concern    Not on file   Social History Narrative    9/5/2019- No SDOH needs identified. Past Surgical History:   Procedure Laterality Date    AORTIC VALVE REPLACEMENT  12/07/2017    TAVR 26 mm CoreValve     CARDIAC CATHETERIZATION  November 2012    Showed minimal CAD with increased wedge pressure of the right heart. Patient saw Cardiology and started on Lasix.  CARDIAC CATHETERIZATION  09/19/2017    right and left heart cath   EF 30% WITH PATENT LAD STENT    CARDIAC CATHETERIZATION  09/23/2019    COLONOSCOPY  08/12/2009    diverticulosis being found.  COLONOSCOPY  9/8/14    grade 2-3 internal hemorrhoids - banded x 2, random biopsies taken to r/o - normal, repeat 5yrs due to hx of polyps- [facility]    CORONARY ANGIOPLASTY WITH STENT PLACEMENT  09/2016    PTCA / Drug Eluting Stent:, LAD and / or branches    FRACTURE SURGERY  08/10/99    Left elbow ORIF     HYSTERECTOMY  09/09/2002    With bladder repair for benign reasons.  MASTECTOMY Bilateral 1995 and 2000    With lymph node dissections in 1995 and 2000.     OTHER SURGICAL HISTORY Right 6/2015    Index and long trigger finger release    PACEMAKER PLACEMENT      removed    [place] COLONOSCOPY FLX DX W/COLLJ SPEC WHEN PFRMD N/A 9/24/2018    COLONOSCOPY w/ hemorrhoid bending performed by Eladio Cadena MD at [address] [place] [address]     Past Medical History:   Diagnosis Date    Allergic rhinitis     With chronic cough.  Anticoagulated     xarelto    Anxiety, generalized     Chronic with probable depression. She will take Ativan but refuses antidepressant.  Aortic stenosis     Atrial fibrillation (HCC)     Atrial fibrillation with RVR ([facility] 3/25/2019    Breast cancer ([facility]     2 occurrences    CAD (coronary artery disease) November 2012    Minimal disease by catheterization, 11/12, with wedge pressure of the right heart. She is taking Lasix for this and being followed by Cardiology.  Cellulitis 11/12/2018    Cellulitis of left arm 11/12/2018    Diabetic neuropathy (HCC)     Hyperlipidemia     Hypertension     runs low    Lymphedema of arm     Left arm, due to lymph node dissection and mastectomy.  PVC's (premature ventricular contractions)     Chronic. Patient currently undergoing workup for arrhythmia.  S/P cardiac cath 09/06/2016    Type II or unspecified type diabetes mellitus without mention of complication, not stated as uncontrolled     Vitamin D deficiency      No current facility-administered medications on file prior to encounter. Current Outpatient Medications on File Prior to Encounter   Medication Sig Dispense Refill    insulin aspart (NOVOLOG FLEXPEN) 100 UNIT/ML injection pen Take 1 unit of your sugars are between 200-300.  Take 2 units if sugars are above 300. 1 pen 3    bumetanide (BUMEX) 1 MG tablet Take 1 tablet by mouth daily 30 tablet 0    amiodarone (CORDARONE) 200 MG tablet TAKE ONE TABLET BY MOUTH DAILY 30 tablet 1    famotidine (PEPCID) 20 MG tablet TAKE ONE TABLET BY MOUTH EVERY EVENING 90 tablet 2    LORazepam (ATIVAN) 0.5 MG tablet Take 1

## 2020-03-16 NOTE — ANESTHESIA PRE PROCEDURE
Department of Anesthesiology  Preprocedure Note       Name:  Maribel Campos   Age:  82 y.o.  :  1935                                          MRN:  [id number]         Date:  3/16/2020      Surgeon: Flory Dewitt):  Tripp Barragan MD    Procedure: EGD (N/A )    Medications prior to admission:   Prior to Admission medications    Medication Sig Start Date End Date Taking? Authorizing Provider   insulin aspart (NOVOLOG FLEXPEN) 100 UNIT/ML injection pen Take 1 unit of your sugars are between 200-300. Take 2 units if sugars are above 300. 3/6/20  Yes Bynum Fabry, MD   bumetanide (BUMEX) 1 MG tablet Take 1 tablet by mouth daily 3/3/20  Yes Yury Shields MD   amiodarone (CORDARONE) 200 MG tablet TAKE ONE TABLET BY MOUTH DAILY 20  Yes Bynum Fabry, MD   famotidine (PEPCID) 20 MG tablet TAKE ONE TABLET BY MOUTH EVERY EVENING 20  Yes Bynum Fabry, MD   LORazepam (ATIVAN) 0.5 MG tablet Take 1 tablet by mouth every 8 hours as needed for Anxiety for up to 10 days. 1/14/20 3/14/20 Yes Jessee Valverde   Probiotic Product (PROBIOTIC DAILY) CAPS Take 1 tablet by mouth 3 times daily 20  Yes Jessee Valverde   potassium chloride (KLOR-CON M) 20 MEQ TBCR extended release tablet TAKE ONE TABLET BY MOUTH TWICE A DAY WITH MEALS  Patient taking differently: 2 times daily Do not crush, chew, or suck on tablet. Tablet may also be broken in half and each half swallowed separately.  19  Yes King Murphy DO   rivaroxaban (XARELTO) 15 MG TABS tablet Take 1 tablet by mouth daily (with breakfast) 19  Yes Harrison Orellana MD   sertraline (ZOLOFT) 50 MG tablet Take 1 tablet by mouth daily 19  Yes King Murphy DO   ondansetron (ZOFRAN ODT) 4 MG disintegrating tablet Take 1 tablet by mouth every 6 hours as needed for Nausea or Vomiting 19  Yes Bynum Fabry, MD   aspirin 81 MG chewable tablet Take 1 tablet by mouth daily 6/10/19  Yes Rita Bains, APRN - CNP   acetaminophen Weakness      Levaquin [Levofloxacin In D5w] Nausea Only and Anxiety    Marinol [Dronabinol] Other (See Comments)     weakness    Morphine Sulfate [Morphine] Other (See Comments)     Weakness      Statins Other (See Comments)     Cause muscle pain and she would prefer not to take them any longer.        Problem List:    Patient Active Problem List   Diagnosis Code    Hyperlipidemia E78.5    Essential hypertension I10    Diabetic neuropathy ([facility] E11.40    Breast cancer ([address] C50.919    Lymphedema of arm I89.0    Vitamin D deficiency E55.9    Hyperplastic colon polyp K63.5    PVC's (premature ventricular contractions) I49.3    Allergic rhinitis J30.9    Generalized anxiety disorder F41.1    DM (diabetes mellitus), type 2 ([facility] E11.9    Coronary artery disease involving native coronary artery of native heart I25.10    Persistent atrial fibrillation I48.19    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 Hortencia Bassett Z95.2    Chronic nausea R11.0    Moderate mitral regurgitation I34.0    Leg cramps R25.2    Chronic kidney disease, stage 3 (HCC) N18.3    Combined systolic and diastolic congestive heart failure (HCC) I50.40    S/P ICD (internal cardiac defibrillator) procedure Z95.810    Pacemaker Z95.0    Sepsis ([address] A41.9    MSSA (methicillin susceptible Staphylococcus aureus) septicemia ([facility]) A41.01    Overweight E66.3    Creatinine elevation R79.89    Acute kidney injury ([address] N17.9    Acute renal failure (HCC) N17.9    Ketoacidosis, diabetic, no coma, insulin dependent ([facility]) E11.10, Z79.4    Type 2 diabetes mellitus with ketoacidosis without coma, without long-term current use of insulin ([facility]) E11.10    Delirium R41.0    Tremor R25.1    Spinal stenosis of lumbar region with neurogenic claudication M48.062    Toxic metabolic encephalopathy T78    Acute renal failure (ARF) ([facility]) N17.9    Elevated troponin R79.89    GI bleeding K92.2    Occult blood positive stool reasons.  MASTECTOMY Bilateral 1995 and 2000    With lymph node dissections in 1995 and 2000.  OTHER SURGICAL HISTORY Right 6/2015    Index and long trigger finger release    PACEMAKER PLACEMENT      removed    HI COLONOSCOPY FLX DX W/COLLJ SPEC WHEN PFRMD N/A 9/24/2018    COLONOSCOPY w/ hemorrhoid bending performed by Esmer Cross MD at [place] [address] History:    Social History     Tobacco Use    Smoking status: Passive Smoke Exposure - Never Smoker    Smokeless tobacco: Never Used    Tobacco comment: never smoker   Substance Use Topics    Alcohol use: No                                Counseling given: Not Answered  Comment: never smoker      Vital Signs (Current):   Vitals:    03/16/20 0329 03/16/20 0445 03/16/20 0649 03/16/20 1207   BP: (!) 118/58  117/61 (!) 117/57   Pulse: 82  86 85   Resp:   20 18   Temp: 36.9 °C (98.4 °F)  36.9 °C (98.4 °F) 36.1 °C (97 °F)   TempSrc: Oral  Oral Oral   SpO2: 94%  98% 94%   Weight:  135 lb 4.8 oz (61.4 kg)     Height:                                                  BP Readings from Last 3 Encounters:   03/16/20 (!) 117/57   03/06/20 124/68   03/03/20 (!) 107/48       NPO Status: Time of last liquid consumption: 0911                        Time of last solid consumption: 2355(\"in the evening some time\")                        Date of last liquid consumption: 03/16/20                        Date of last solid food consumption: 03/15/20    BMI:   Wt Readings from Last 3 Encounters:   03/16/20 135 lb 4.8 oz (61.4 kg)   03/06/20 131 lb 3.2 oz (59.5 kg)   03/03/20 131 lb 14.4 oz (59.8 kg)     Body mass index is 26.42 kg/m².     CBC:   Lab Results   Component Value Date    WBC 7.2 03/16/2020    RBC 3.35 03/16/2020    HGB 10.2 03/16/2020    HCT 32.7 03/16/2020    MCV 86.0 03/16/2020    RDW 19.9 03/16/2020    PLT 98 03/16/2020       CMP:   Lab Results   Component Value Date     03/16/2020    K 3.7 03/16/2020     03/16/2020    CO2 20 03/16/2020    BUN 20

## 2020-03-16 NOTE — ANESTHESIA POSTPROCEDURE EVALUATION
Department of Anesthesiology  Postprocedure Note    Patient: Mayank Li  MRN: [id number]  YOB: 1935  Date of evaluation: 3/16/2020  Time:  4:49 PM     Procedure Summary     Date:  03/16/20 Room / Location:  [address]    Anesthesia Start:  9481 Anesthesia Stop:  8539    Procedure:  EGD (N/A ) Diagnosis:  (GI bleed)    Surgeon:  Destin Bustillo MD Responsible Provider:  DIYA Collazo CRNA    Anesthesia Type:  MAC ASA Status:  4          Anesthesia Type: MAC    Glory Phase I:      Glory Phase II: Glory Score: 7    Last vitals: Reviewed and per EMR flowsheets.        Anesthesia Post Evaluation    Patient location during evaluation: PACU  Patient participation: complete - patient participated  Level of consciousness: awake and alert  Pain score: 0  Airway patency: patent  Nausea & Vomiting: no nausea and no vomiting  Complications: no  Cardiovascular status: blood pressure returned to baseline and hemodynamically stable  Respiratory status: acceptable, spontaneous ventilation and room air  Hydration status: euvolemic

## 2020-03-16 NOTE — PROGRESS NOTES
Nurse called to patients room. Patient complain of mid chest tightness. Patient rates pain a 2 on scale of 0-10. Patient vitals are 114/58, pulse 84  spo2 97 RA. NP Abimbola Good notified and ordered EKG, K+ draw, mag draw and troponin draw. Patient vitals re checked (see flow sheet). Patient was given a nitro tablet. After five minutes patient vitals were re checked and patient rates chest tightness a 0.

## 2020-03-16 NOTE — CONSULTS
[facility]   Consult Note         Today's Date: 3/16/2020  Patient Name: Lalo Abrams  Date of admission: 3/14/2020  1:23 PM  Patient's age: 82 y. o., 1935  Admission Dx: GI bleeding [K92.2]    Reason for Consult:  Cardiac evaluation    Requesting Physician: Kai Brennan MD    REASON FOR CONSULT:  SOB/CHF    History Obtained From:  Patient, chart, staff, records    HISTORY OF PRESENT ILLNESS:      The patient is a 82 y.o. female who is admitted to the hospital for SOB. known cardiomyopathy. Cardio called to help with management. From Chart: This is an 82-year-old female who presented to the ER with complaints of feeling weak overall and also has had some nausea. dark stools the last 3 days and decreased appetite. Denied any abdominal pain and no history of any gee blood in the stools. However, her stools were Hemoccult positive in the ER. The patient has had a history of multiple recent hospitalizations at [address] [place] [address], and has a history of heart disease status post transcatheter aortic valve replacement, also history of atrial fibrillation, coronary artery disease status post stent placement. The patient was recently hospitalized for electrolyte imbalance, also history of a infected defibrillator which was taken off in 10/2019. Problems with chronic sciatica of the right lower extremity. Denied any chest pain or palpitations at the present time. No lightheadedness. No emesis. No abdominal pain. Denies any diarrhea or constipation.      Past Medical History:   has a past medical history of Allergic rhinitis, Anticoagulated, Anxiety, generalized, Aortic stenosis, Atrial fibrillation ([address] Atrial fibrillation with RVR ([address] Breast cancer ([address] CAD (coronary artery disease), Cellulitis, Cellulitis of left arm, Diabetic neuropathy ([address] Hyperlipidemia, Hypertension, Lymphedema of arm, PVC's (premature ventricular contractions), S/P cardiac cath, Type Garret Douglass MD   sertraline (ZOLOFT) 50 MG tablet Take 1 tablet by mouth daily 11/25/19  Yes King Murphy DO   ondansetron (ZOFRAN ODT) 4 MG disintegrating tablet Take 1 tablet by mouth every 6 hours as needed for Nausea or Vomiting 8/22/19  Yes Vanessa Varghese MD   aspirin 81 MG chewable tablet Take 1 tablet by mouth daily 6/10/19  Yes DIYA Putnam CNP   acetaminophen (TYLENOL) 325 MG tablet Take 2 tablets by mouth every 4 hours as needed for Pain 11/15/18  Yes Shanelle Belllibby   nitroGLYCERIN (NITROSTAT) 0.4 MG SL tablet Place 1 tablet under the tongue every 5 minutes as needed for Chest pain up to max of 3 total doses. If no relief after 1 dose, call 911. 9/20/17  Yes DIYA Putnam CNP   Vitamin D (CHOLECALCIFEROL) 1000 UNITS CAPS capsule Take 1,000 Units by mouth daily.    Yes Historical MD Audrey   Continuous Blood Gluc  (FREESTYLE SAYRA 14 DAY READER) PHUONG 1 each by Does not apply route daily 3/6/20   Vanessa Varghese MD   Continuous Blood Gluc Sensor (FREESTYLE SAYRA 14 DAY SENSOR) MISC 1 each by Does not apply route daily 3/6/20   Vanessa Varghese MD   diclofenac sodium 1 % GEL Apply 2 g topically 4 times daily as needed for Pain 3/6/20   Vanessa Varghese MD   Insulin Pen Needle (Georgeantonioa Yo PEN NEEDLES) 31G X 6 MM MISC 1 each by Does not apply route 3 times daily 3/6/20   Vanessa Varghese MD   mirtazapine (REMERON) 7.5 MG tablet Take 1 tablet by mouth nightly 3/6/20   Vanessa Varghese MD   blood glucose test strips (FREESTYLE TEST STRIPS) strip USE ONE STRIP TO TEST DAILY AS NEEDED 12/2/19   Vanessa Varghese MD   sucralfate (CARAFATE) 1 GM tablet Take 1 tablet by mouth 4 times daily 11/25/19   Romana Spar Karatsoridis,        pantoprazole, 40 mg, Oral, BID AC    sulfamethoxazole-trimethoprim, 1 tablet, Oral, 2 times per day    amiodarone, 200 mg, Oral, Daily    bumetanide, 1 mg, Oral, Daily    mirtazapine, 7.5 mg, Oral, Nightly    potassium chloride, 20 mEq, Oral, 135 lb 4.8 oz (61.4 kg)   SpO2 94%   BMI 26.42 kg/m²    General appearance: alert and cooperative with exam  HEENT: Head: Normocephalic, no lesions, without obvious abnormality. Eyes: PERRL, EOMI  Ears: Not obvious deformations or lack of hearing  Neck: no carotid bruit, no JVD  Lungs: clear to auscultation bilaterally  Heart: regular rate and rhythm, S1, S2 normal, no murmur, click, rub or gallop  Abdomen: soft, non-tender; bowel sounds normal; no masses,  no organomegaly  Extremities: extremities normal, atraumatic, no cyanosis or edema  Neurologic: Mental status: Alert, oriented, thought content appropriate  · Skin: WNL for age and condition, no obvious rashes or leasions  ·         EKG:    Date: 03/16/20  Reading: No acute ischemia      LAST ECHO:  Date: 3/14/20  Findings Summary:  Normal left ventricular diameter. Moderate left ventricular hypertrophy. Left ventricular systolic function is severely reduced. Left ventricular ejection fraction 30 %. Grade III (severe) left ventricular diastolic dysfunction. Left atrium is mildly dilated. Bioprosthetic aortic valve (per TAVR) that is normal in appearance and  function. Peak instantaneous gradient 5 mmHg and mean gradient 3 mmHg. Mild aortic insufficiency. Mitral annular calcification. Moderate to severe mitral regurgitation. Normal tricuspid valve leaflets. Mild to moderate tricuspid regurgitation. Estimated right ventricular systolic pressure is 73 mmHg. Severe pulmonary hypertension. No significant pericardial effusion is seen. Left pleural effusion. LAST Stress Test:   Date of last ST:  Major Findings:    LAST Cardiac Angiography:.  Date:  Findings:      Labs:     CBC:   Recent Labs     03/15/20  0751  03/16/20  0144 03/16/20  0735   WBC 8.2  --   --  7.2   HGB 8.5*   < > 8.7* 8.5*   HCT 29.2*   < > 27.8* 28.8*   PLT 87*  --   --  98*    < > = values in this interval not displayed.      BMP:   Recent Labs     03/15/20  0751 03/15/20  2118 03/16/20  0735     --  139   K 3.7 3.9 3.7   CO2 19*  --  20   BUN 26*  --  20   CREATININE 1.67*  --  1.63*   LABGLOM 29*  --  30*   GLUCOSE 121*  --  130*     BNP: No results for input(s): BNP in the last 72 hours. PT/INR: No results for input(s): PROTIME, INR in the last 72 hours. APTT:No results for input(s): APTT in the last 72 hours. CARDIAC ENZYMES:No results for input(s): CKTOTAL, CKMB, CKMBINDEX, TROPONINI in the last 72 hours. FASTING LIPID PANEL:  Lab Results   Component Value Date    HDL 80 12/02/2019    LDLCALC 124 01/06/2016    TRIG 135 12/02/2019     LIVER PROFILE:No results for input(s): AST, ALT, LABALBU in the last 72 hours.   Troponins: Invalid input(s): TROPONIN     Other Current Problems  Patient Active Problem List   Diagnosis    Hyperlipidemia    Essential hypertension    Diabetic neuropathy ([facility]    Breast cancer ([facility]    Lymphedema of arm    Vitamin D deficiency    Hyperplastic colon polyp    PVC's (premature ventricular contractions)    Allergic rhinitis    Generalized anxiety disorder    DM (diabetes mellitus), type 2 ([address]    Coronary artery disease involving native coronary artery of native heart    Persistent atrial fibrillation    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 Leonel Barrett    Chronic nausea    Moderate mitral regurgitation    Leg cramps    Chronic kidney disease, stage 3 (HCC)    Combined systolic and diastolic congestive heart failure (HCC)    S/P ICD (internal cardiac defibrillator) procedure    Pacemaker    Sepsis ([facility]    MSSA (methicillin susceptible Staphylococcus aureus) septicemia (HCC)    Overweight    Creatinine elevation    Acute kidney injury ([address]    Acute renal failure (HCC)    Ketoacidosis, diabetic, no coma, insulin dependent (HCC)    Type 2 diabetes mellitus with ketoacidosis without coma, without long-term current use of insulin (HCC)    Delirium    Tremor    Spinal stenosis of lumbar region with neurogenic claudication    Toxic metabolic encephalopathy    Acute renal failure (ARF) (HCC)    Elevated troponin    GI bleeding    Occult blood positive stool         IMPRESSION & Recommendations:      1. Known cardiomyopathy with EF ~30%. 2. Acute exacerbation of systolic and diastolic CHF. Optimize meds  3. Anemia- hgb 7.9- contributing to high output CHF  4. Plan for EGD soon  5. Ok to proceed  6. Hx of ICD, extracted due to MRSA  7. Recent ICD implantation on 09/23/19 s/p extraction 10/15/19  8. Stable CAD with patent LAD stent and small non-dominant RCA disease on cardiac cath 09/23/19 Normally functioning TAVR on echo. 9. Chronic systolic and diastolic CHF. 10. Moderate MR.  11. Combines Ischemic and non-ischemic cardiomyopathy  12. PAF currently in NSR with 1st degree AV block. 13. History of HTN  14. DM  15. Dyslipidemia. 16. Chronic nausea. Discussed with patient, family, and Nurse. Electronically signed by Glendy Stoll DO on 3/16/2020 at 12:30 PM    Gelndy Stoll, [address] [facility]  [facility]. [facility]  [phone]

## 2020-03-16 NOTE — PLAN OF CARE
Problem: Falls - Risk of:  Goal: Will remain free from falls  Description: Will remain free from falls  3/14/2020 1946 by Moncho Arcos RN  Outcome: Ongoing  3/14/2020 1945 by Moncho Arcos RN  Outcome: Ongoing  Goal: Absence of physical injury  Description: Absence of physical injury  3/14/2020 1946 by Moncho Arcos RN  Outcome: Ongoing  3/14/2020 1945 by Moncho Arcos RN  Outcome: Ongoing     Problem: SAFETY  Goal: Free from accidental physical injury  Outcome: Ongoing  Goal: Free from intentional harm  Outcome: Ongoing     Problem: DAILY CARE  Goal: Daily care needs are met  Outcome: Ongoing
Problem: Falls - Risk of:  Goal: Will remain free from falls  Description: Will remain free from falls  Outcome: Ongoing  Goal: Absence of physical injury  Description: Absence of physical injury  Outcome: Ongoing     Problem: SAFETY  Goal: Free from accidental physical injury  Outcome: Ongoing  Goal: Free from intentional harm  Outcome: Ongoing     Problem: DAILY CARE  Goal: Daily care needs are met  Outcome: Ongoing
Care plan reviewed with patient. Patient verbalize understanding of the plan of care and contribute to goal setting.

## 2020-03-16 NOTE — PROGRESS NOTES
Writer offered earlier this morning to help assist patient with a shower or sponge bath, patient told writer Verónica Cline would this afternoon\", but writer offered again at 1330 and patient stated she was \"too weak today\"

## 2020-03-16 NOTE — PROGRESS NOTES
Hospitalist Progress Note    Patient:  Krista Estrada     YOB: 1935    MRN: [id number]   Admit date: 3/14/2020     Acct: [de-identified]     PCP: Alfonso Stahl MD    CC--Interval History:   GI bleed---symptomatic anemia---rec'd 1 unit PRBCs---3.14.2020---hemoglobin = 8.5    Chest pain--elevated troponin---seen by Cardiology---cleared for EGD    CKD---Stage 3-4--stable    Thrombocytopenia     See note below     All other ROS negative except noted in HPI    Diet:  Diet NPO, After Midnight    Medications:  Scheduled Meds:   pantoprazole        sulfamethoxazole-trimethoprim  1 tablet Oral 2 times per day    amiodarone  200 mg Oral Daily    bumetanide  1 mg Oral Daily    mirtazapine  7.5 mg Oral Nightly    potassium chloride  20 mEq Oral BID WC    sertraline  50 mg Oral Daily    insulin lispro  0-6 Units Subcutaneous TID WC    insulin lispro  0-3 Units Subcutaneous Nightly     Continuous Infusions:   dextrose      pantoprozole (PROTONIX) infusion 8 mg/hr (03/16/20 0022)    sodium chloride 100 mL/hr at 03/16/20 1021     PRN Meds:glucose, dextrose, glucagon (rDNA), dextrose, acetaminophen, LORazepam, nitroGLYCERIN, ondansetron    Objective:  Labs:  CBC with Differential:    Lab Results   Component Value Date    WBC 7.2 03/16/2020    RBC 3.35 03/16/2020    HGB 8.5 03/16/2020    HCT 28.8 03/16/2020    PLT 98 03/16/2020    MCV 86.0 03/16/2020    MCH 25.4 03/16/2020    MCHC 29.5 03/16/2020    RDW 19.9 03/16/2020    LYMPHOPCT 18 03/16/2020    MONOPCT 10 03/16/2020    BASOPCT 1 03/16/2020    MONOSABS 0.74 03/16/2020    LYMPHSABS 1.29 03/16/2020    EOSABS 0.14 03/16/2020    BASOSABS 0.04 03/16/2020    DIFFTYPE NOT REPORTED 03/16/2020     BMP:    Lab Results   Component Value Date     03/16/2020    K 3.7 03/16/2020     03/16/2020    CO2 20 03/16/2020    BUN 20 03/16/2020    LABALBU 2.9 03/02/2020    CREATININE 1.63 03/16/2020    CALCIUM 8.1 03/16/2020    GFRAA 37 03/16/2020    LABGLOM 30 03/16/2020    GLUCOSE 130 03/16/2020           Physical Exam:  Vitals: BP (!) 117/57   Pulse 85   Temp 97 °F (36.1 °C) (Oral)   Resp 18   Ht 5' (1.524 m)   Wt 135 lb 4.8 oz (61.4 kg)   SpO2 94%   BMI 26.42 kg/m²   24 hour intake/output:    Intake/Output Summary (Last 24 hours) at 3/16/2020 1223  Last data filed at 3/16/2020 0455  Gross per 24 hour   Intake 1272 ml   Output --   Net 1272 ml     Last 3 weights: Wt Readings from Last 3 Encounters:   03/16/20 135 lb 4.8 oz (61.4 kg)   03/06/20 131 lb 3.2 oz (59.5 kg)   03/03/20 131 lb 14.4 oz (59.8 kg)     HEENT: Normocephalic and Atraumatic  Neck: Supple, No Masses, Tenderness, Nodularity and No Lymphadenopathy  Chest/Lungs: Distant Breath Sounds  Cardiac: Regular Rate and Rhythm  GI/Abdomen: Bowel Sounds Present and Soft, Non-tender, without Guarding or Rebound Tenderness  : Not examined  EXT/Skin: No Edema, No Cyanosis and No Clubbing  Neuro: alert---- and No Localizing Signs/Symptoms      Assessment:    Active Problems:    GI bleeding    Occult blood positive stool  Resolved Problems:    * No resolved hospital problems.  NADEEN Ward      [facility]         dc FP   [address]  JOSHUA Yin; ERIC Bush; [facility]]      FULL CODE    TAVR--12.7.2017  AMIODARONE   AICD--RV lead--9.23.2019  ESBL    Symptomatic anemia---3.14.2020  GI bleed----3.14.2020---weakness---nausea           1 UNIT PRBCs-----3.15.2020  Elevated troponin----3.15.2020  Chest pain----3.15.2020           2D ECHO----3.16.2020--LA mildly dilated---moderate LVH--severely                      reduced LVSF--TAVR normal appearance and function                      pg 5 mm Hg mg 3 mm Hg---mild AI---MAC---moderate-to-severe MR--                      mild-to-moderate TR---RVSP ~ 73 mm Hg--SEVERE pulmonary                      hypertension--Grade III severe DD----LVEF ~ 30%            EKG---3.15.2020---SR--84--1st degree AVB--PVCs--LAD--LBBB           EKG---3.24.2020----SR--85---1st colonoscopy---2009--diverticulosis, colonoscopy---               2014--internal hemorrhoids--banded--normal random               biopsies, right index trigger finger release---2015, EGD---               2017--pa[place]ding---GEJ 34 cm--mild esophagitis     Allergies:         NKDA   Intolerances:   Marinol--dronabinol, weakness, Darvocet--propoxyphene--weakness                           morphine sulfate, Levaquin--nausea---anxiety, statins--muscle                          pain, Demerol---weakness     Plan:  1. Symptomatic anemia---GI bleed----for EGD  2. Seen by Cardiology--cleared for EGD  3.   Anemia--monitor H&H  4.  See orders     Electronically signed by Selene Acuna on 3/16/2020 at 12:23 PM    Hospitalist

## 2020-03-17 NOTE — PROGRESS NOTES
Hospitalist Progress Note    Patient:  Gloria Nettles     YOB: 1935    MRN: [id number]   Admit date: 3/14/2020     Acct: [de-identified]     PCP: Eagle Lucero MD    CC--Interval History:   Anemia--chronic blood loss anemia---EGD--mild duodenitis---no active bleeding---3.16.2020    Nausea today--malaise---antiemetics----cont'd PPI    Other prior issues--stable     See note below     All other ROS negative except noted in HPI    Diet:  DIET CARB CONTROL; Carb Control: 3 carb choices (45 gms)/meal    Medications:  Scheduled Meds:   insulin glargine  10 Units Subcutaneous Daily    insulin lispro  3 Units Subcutaneous TID WC    pantoprazole  40 mg Oral BID AC    sulfamethoxazole-trimethoprim  1 tablet Oral 2 times per day    amiodarone  200 mg Oral Daily    bumetanide  1 mg Oral Daily    mirtazapine  7.5 mg Oral Nightly    potassium chloride  20 mEq Oral BID WC    sertraline  50 mg Oral Daily    insulin lispro  0-6 Units Subcutaneous TID WC    insulin lispro  0-3 Units Subcutaneous Nightly     Continuous Infusions:   lactated ringers 100 mL/hr at 03/16/20 1525    dextrose      sodium chloride 100 mL/hr at 03/17/20 0113     PRN Meds:glucose, dextrose, glucagon (rDNA), dextrose, acetaminophen, LORazepam, nitroGLYCERIN, ondansetron    Objective:  Labs:  CBC with Differential:    Lab Results   Component Value Date    WBC 8.8 03/17/2020    RBC 3.30 03/17/2020    HGB 8.5 03/17/2020    HCT 28.3 03/17/2020    PLT 85 03/17/2020    MCV 86.7 03/17/2020    MCH 25.8 03/17/2020    MCHC 29.7 03/17/2020    RDW 19.7 03/17/2020    LYMPHOPCT 21 03/17/2020    MONOPCT 10 03/17/2020    BASOPCT 1 03/17/2020    MONOSABS 0.84 03/17/2020    LYMPHSABS 1.87 03/17/2020    EOSABS 0.18 03/17/2020    BASOSABS 0.04 03/17/2020    DIFFTYPE NOT REPORTED 03/17/2020     BMP:    Lab Results   Component Value Date     03/17/2020    K 3.5 03/17/2020     03/17/2020    CO2 18 03/17/2020    BUN 18 03/17/2020    LABALBU 2.9 03/02/2020    CREATININE 1.67 03/17/2020    CALCIUM 8.0 03/17/2020    GFRAA 36 03/17/2020    LABGLOM 29 03/17/2020    GLUCOSE 205 03/17/2020           Physical Exam:  Vitals: BP (!) 99/48   Pulse 83   Temp 97.9 °F (36.6 °C) (Tympanic)   Resp 20   Ht 5' (1.524 m)   Wt 134 lb 12.8 oz (61.1 kg)   SpO2 96%   BMI 26.33 kg/m²   24 hour intake/output:    Intake/Output Summary (Last 24 hours) at 3/17/2020 1240  Last data filed at 3/17/2020 0416  Gross per 24 hour   Intake 2035 ml   Output --   Net 2035 ml     Last 3 weights: Wt Readings from Last 3 Encounters:   03/17/20 134 lb 12.8 oz (61.1 kg)   03/06/20 131 lb 3.2 oz (59.5 kg)   03/03/20 131 lb 14.4 oz (59.8 kg)     HEENT: Normocephalic and Atraumatic  Neck: Supple, No Masses, Tenderness, Nodularity and No Lymphadenopathy  Chest/Lungs: Clear to Auscultation without Rales, Rhonchi, or Wheezes  Cardiac: Regular Rate and Rhythm  GI/Abdomen: Bowel Sounds Present and Soft, Non-tender, without Guarding or Rebound Tenderness  : Not examined  EXT/Skin: No Cyanosis, No Clubbing and Edema Presenttrivial  Neuro: alert---generalized weakness---- and No Localizing Signs/Symptoms      Assessment:    Active Problems:    GI bleeding    Occult blood positive stool  Resolved Problems:    * No resolved hospital problems.  Meliza Huitron md         [facility]   [address]  [place],  JOSHUA; Antonio Menezes, GS; [facility]]      FULL CODE    TAVR--12.7.2017  AMIODARONE   AICD--RV lead--9.23.2019  ESBL    EGD----3.16.2020---GEJ 34 cm---mild duodenitis---no active bleeding seen     Symptomatic anemia---3.14.2020  GI bleed----3.14.2020---weakness---nausea           1 UNIT PRBCs-----3.15.2020  Elevated troponin----3.15.2020  Chest pain----3.15.2020           2D ECHO----3.16.2020--LA mildly dilated---moderate LVH--severely                      reduced LVSF--TAVR normal appearance and function                      pg 5 mm Hg mg 3 mm Hg---mild AI---MAC---moderate-to-severe MR--                      mild-to-moderate TR---RVSP ~ 73 mm Hg--SEVERE pulmonary                      hypertension--Grade III severe DD----LVEF ~ 30%            EKG---3.15.2020---SR--84--1st degree AVB--PVCs--LAD--LBBB           EKG---3.24.2020----SR--85---1st degree AVB--LBBB    Dilated Cardiomyopathy           Cardiac catheterization---9.23.2019---0% LM---20-30%---LAD                             70% RCA--stent patent--10-20% mild irregularities LCx            AICD--RV lead--9.23.2019  CHF--chronic combined systolic-diastolic                    2D ECHO---10.9.2019--moderately dilated LA---severe LVH--                               severely reduced LVSF globally---moderately dilated RA--                               pacing lead RA--bowing IAS motion consistent with aneurysmal                                atrial septum--AICD lead RV---mild MV thickening with                               MAC---moderate MR---normal appearing TAVR pg 6 mm Hg                                 mg 4 mm Hg----mild TR---RVSP ~ 71 mm Hg---severe pulmonary                                 hypertension---Grade III severe DD----LVEF ~ 30%         EKG---10.8.2019---SR--83---1st degree AVB---LBBB [old]         Acute-on-chronic combined systolic-diastolic--8.18.2019         Elevated troponin----10.9.2019         2D ECHO--8.19.2019--mildly dilated LA--mild-to-moderate LVH--                          severely reduced LVSF--RA mildly dilated--NRVSF---                          MAC--MV thickening--moderate MR--TAVR mg 2 mm Hg--                          RVSP ~ 47 mm Hg--mild pulmonary HTN---Grade III severe                          DD---LVEF ~ 25%           EKG---8.19.2019--SR--62--1st degree AVB--LBBB           CXR--8.18.2019--COPD---small left pleural effusion           CT abdomen--pelvis---8.18.2019--pulmonary edema---bilateral pleural                             effusions---fat surrounding gall bladder            EKG----8.18.2019---sinus bradycardia--59--LBBB           Chronic combined systolic-diastolic----3.25.2019         Acute-on-chronic combined systolic-diastolic--flash pulmonary edema-----11.13.2018          2D ECHO---11.14.2018----mildly dilated LA--mild concentric LVH--                   global hypokinesis---moderately reduced LVSF--NRVSF--                   MAC--mild MR--bioprosthetic AVR--TAVR---normal appearance                   and function---pg 4 mm Hg--mg 3 mm Hg--mild TR-----RVSP ~ 56                    mm Hg---moderate pulmonary hypertension---Grade II moderate DD----                   LVEF ~ 35-40%  Atrial fibrillation--chronic          RVR---3.25.2019          MI ruled out--3.26.2019          EKG---3.26.2019---atrial fibrillation---118--LAD--LBBB          EKG--3.25.2019--SR--84--1st degree AVB--LAD---LBBB          EKG---3.25.2019--atrial fibrillation--142---LAD--LBBB--lateral T-wave inversion         CXR---11.14.2018--improved pulmonary edema with persistent                   mild pulmonary venous congestion--left-sided effusion         CXR---11.12.2018---no acute---[-]         2D ECHO---1.5.2016--moderate LVH [id number]                   anteroseptal region--hypokinetic--mild-moderate                  LV dysfunction--NRVSF--mild increased RA size--                  thickened MV leaflets--HAI--moderate aortic                  stenosis---RVSP 34 mm Hg--Grade 1 DD---                  LVEF ~ 40-45%          MI ruled out--12. 1.2014           Spiral CTA--11.30.2014--no PE     ASCVD           Cardiac catheterization---9.19.2017--patent LAD stent--LVEF ~ 30%           Cardiac catheterization---9. 6.2016--normal LM--                           95% mid-LAD--normal D1--ERON LAD stent--                           moderate LV systolic dysfunction--                           LVEF ~ 30-35%   Aortic stenosis---moderate----III/VI MECHE radiation to carotids          TAVR---12.7.2017         DUS--CV---4.2016---no hemodynamically significant

## 2020-03-17 NOTE — DISCHARGE INSTR - DIET

## 2020-03-17 NOTE — CARE COORDINATION
BPCI-A 90-day Readmission Interview:    [place] DR[facility]: renal failure- . Bundle End Date: 5/31/20    Patient/family seen: No: pt inpatient.  Writer offsite    Current discharge plan: unknown at this time     Collaboration completed with case management: No    Will follow after discharge

## 2020-03-18 NOTE — PROGRESS NOTES
1930-  Patient complaints of shortness of breath on returning from bathroom. Also, patient complaining of feeling nauseated and fatigued. Patient lung sounds clear. Notified nurse practitioner. Chest X- ray ordered (view results). An order for lasix 20 mg was given and repeat chest x ray ordered in the morning.

## 2020-03-18 NOTE — PROGRESS NOTES
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    See note below         All other ROS negative except noted in HPI    Diet:  DIET CARB CONTROL; Carb Control: 3 carb choices (45 gms)/meal    Medications:  Scheduled Meds:   [START ON 3/19/2020] insulin glargine  5 Units Subcutaneous Daily    insulin lispro  3 Units Subcutaneous TID WC    pantoprazole  40 mg Oral BID AC    sulfamethoxazole-trimethoprim  1 tablet Oral 2 times per day    amiodarone  200 mg Oral Daily    bumetanide  1 mg Oral Daily    mirtazapine  7.5 mg Oral Nightly    potassium chloride  20 mEq Oral BID WC    sertraline  50 mg Oral Daily    insulin lispro  0-6 Units Subcutaneous TID WC    insulin lispro  0-3 Units Subcutaneous Nightly     Continuous Infusions:   dextrose       PRN Meds:sodium chloride nebulizer, albuterol, glucose, dextrose, glucagon (rDNA), dextrose, acetaminophen, LORazepam, nitroGLYCERIN, ondansetron    Objective:  Labs:  CBC with Differential:    Lab Results   Component Value Date    WBC 8.3 03/18/2020    RBC 3.24 03/18/2020    HGB 8.4 03/18/2020    HCT 28.3 03/18/2020    PLT See Reflexed IPF Result 03/18/2020    MCV 87.3 03/18/2020    MCH 25.9 03/18/2020    MCHC 29.7 03/18/2020    RDW 19.8 03/18/2020    LYMPHOPCT 18 03/18/2020    MONOPCT 11 03/18/2020    BASOPCT 1 03/18/2020    MONOSABS 0.91 03/18/2020    LYMPHSABS 1.49 03/18/2020    EOSABS 0.17 03/18/2020    BASOSABS 0.08 03/18/2020    DIFFTYPE NOT REPORTED 03/18/2020     BMP:    Lab Results   Component Value Date     03/18/2020    K 4.0 03/18/2020     03/18/2020    CO2 20 03/18/2020    BUN 17 03/18/2020    LABALBU 2.9 03/02/2020    CREATININE 1.71 03/18/2020    CALCIUM 8.3 03/18/2020    GFRAA 35 03/18/2020    LABGLOM 29 03/18/2020    GLUCOSE 65 03/18/2020           Physical Exam:  Vitals: BP (!) 106/55   Pulse 74   Temp 97.2 °F (36.2 °C) (Tympanic)   Resp 20   Ht 5' (1.524 m)   Wt 134 lb 14.4 oz (61.2 kg)   SpO2 94%   BMI 26.35 kg/m²   24 hour intake/output:    Intake/Output Summary (Last 24 hours) at 3/18/2020 1248  Last data filed at 3/18/2020 1152  Gross per 24 hour   Intake 1280.95 ml   Output 1250 ml   Net 30.95 ml     Last 3 weights: Wt Readings from Last 3 Encounters:   03/18/20 134 lb 14.4 oz (61.2 kg)   03/06/20 131 lb 3.2 oz (59.5 kg)   03/03/20 131 lb 14.4 oz (59.8 kg)     HEENT: Normocephalic and Atraumatic  Neck: Supple, No Masses, Tenderness, Nodularity and No Lymphadenopathy  Chest/Lungs: Clear to Auscultation without Rales, Rhonchi, or Wheezes  Cardiac: Regular Rate and Rhythm  GI/Abdomen: Bowel Sounds Present and Soft, Non-tender, without Guarding or Rebound Tenderness  : Not examined  EXT/Skin: No Cyanosis, No Clubbing and Edema Present---trivial  Neuro: alert---generalzied weakness--- and No Localizing Signs/Symptoms      Assessment:    Active Problems:    GI bleeding    Occult blood positive stool  Resolved Problems:    * No resolved hospital problems. Dorinda Villagran md         dc FP   [address]  JOSHUA Castellon; Alberto Dewitt, GS; [facility]]      FULL CODE    TAVR--12.7.2017  AMIODARONE   AICD--RV lead--9.23.2019  ESBL    Anti-infectives:   Bactrim DS---dcd    EGD----3.16.2020---GEJ 34 cm---mild duodenitis---no active bleeding seen     Symptomatic anemia---3.14.2020  GI bleed----3.14.2020---weakness---nausea           1 UNIT PRBCs-----3.15.2020  Elevated troponin----3.15.2020  Chest pain----3.15.2020           2D ECHO----3.16.2020--LA mildly dilated---moderate LVH--severely                      reduced LVSF--TAVR normal appearance and function pg 5 mm Hg mg 3 mm Hg---mild AI---MAC---moderate-to-severe MR--                      mild-to-moderate TR---RVSP ~ 73 mm Hg--SEVERE pulmonary                      hypertension--Grade III severe DD----LVEF ~ 30%            EKG---3.15.2020---SR--84--1st degree AVB--PVCs--LAD--LBBB           EKG---3.24.2020----SR--85---1st degree AVB--LBBB  Anemia---chronic intermittent blood loss  Dilated Cardiomyopathy           Cardiac catheterization---9.23.2019---0% LM---20-30%---LAD                             70% RCA--stent patent--10-20% mild irregularities LCx            AICD--RV lead--9.23.2019  CHF--acute--on--chronic combined systolic-diastolic                    2D ECHO---10.9.2019--moderately dilated LA---severe LVH--                               severely reduced LVSF globally---moderately dilated RA--                               pacing lead RA--bowing IAS motion consistent with aneurysmal                                atrial septum--AICD lead RV---mild MV thickening with                               MAC---moderate MR---normal appearing TAVR pg 6 mm Hg                                 mg 4 mm Hg----mild TR---RVSP ~ 71 mm Hg---severe pulmonary                                 hypertension---Grade III severe DD----LVEF ~ 30%         EKG---10.8.2019---SR--83---1st degree AVB---LBBB [old]         Acute-on-chronic combined systolic-diastolic--8.18.2019         Elevated troponin----10.9.2019         2D ECHO--8.19.2019--mildly dilated LA--mild-to-moderate LVH--                          severely reduced LVSF--RA mildly dilated--NRVSF---                          MAC--MV thickening--moderate MR--TAVR mg 2 mm Hg--                          RVSP ~ 47 mm Hg--mild pulmonary HTN---Grade III severe                          DD---LVEF ~ 25%           EKG---8.19.2019--SR--62--1st degree AVB--LBBB           CXR--8.18.2019--COPD---small left pleural effusion           CT abdomen--pelvis---8.18.2019--pulmonary edema---bilateral pleural                             effusions---fat surrounding gall bladder            EKG----8.18.2019---sinus bradycardia--59--LBBB           Chronic combined systolic-diastolic----3.25.2019         Acute-on-chronic combined systolic-diastolic--flash pulmonary edema-----11.13.2018          2D ECHO---11.14.2018----mildly dilated LA--mild concentric LVH--                   global hypokinesis---moderately reduced LVSF--NRVSF--                   MAC--mild MR--bioprosthetic AVR--TAVR---normal appearance                   and function---pg 4 mm Hg--mg 3 mm Hg--mild TR-----RVSP ~ 56                    mm Hg---moderate pulmonary hypertension---Grade II moderate DD----                   LVEF ~ 35-40%  Atrial fibrillation--chronic          RVR---3.25.2019          MI ruled out--3.26.2019          EKG---3.26.2019---atrial fibrillation---118--LAD--LBBB          EKG--3.25.2019--SR--84--1st degree AVB--LAD---LBBB          EKG---3.25.2019--atrial fibrillation--142---LAD--LBBB--lateral T-wave inversion         CXR---11.14.2018--improved pulmonary edema with persistent                   mild pulmonary venous congestion--left-sided effusion         CXR---11.12.2018---no acute---[-]         2D ECHO---1.5.2016--moderate LVH [id number]                   anteroseptal region--hypokinetic--mild-moderate                  LV dysfunction--NRVSF--mild increased RA size--                  thickened MV leaflets--HAI--moderate aortic                  stenosis---RVSP 34 mm Hg--Grade 1 DD---                  LVEF ~ 40-45%          MI ruled out--12. 1.2014           Spiral CTA--11.30.2014--no PE     ASCVD           Cardiac catheterization---9.19.2017--patent LAD stent--LVEF ~ 30%           Cardiac catheterization---9. 6.2016--normal LM--                           95% mid-LAD--normal D1--ERON LAD stent--                           moderate LV systolic dysfunction--                           LVEF ~ 30-35%   Aortic stenosis---moderate----III/VI MECHE ESBL E coli                UTI--1.13.2020,  Staphylococcus aureus--bacteremia---               complicated by recent AICD placement---2019, hip pain---1.11.2020  PSH:    see above, bilateral mastectomy--LND--1995--2000,               vaginal hysterectomy--anterior repair--SPT--              bladder repair--no malignancy--2002,  left elbow--ORIF--              1999,  colonoscopy---2009--diverticulosis, colonoscopy---               2014--internal hemorrhoids--banded--normal random               biopsies, right index trigger finger release---2015, EGD---               2017--pa[place]ding---GEJ 34 cm--mild esophagitis     Allergies:         NKDA   Intolerances:   Marinol--dronabinol, weakness, Darvocet--propoxyphene--weakness                           morphine sulfate, Levaquin--nausea---anxiety, statins--muscle                          pain, Demerol---weakness     Plan:  1. Home---3.18.2020  2. Home medications reviewed  3. Bumex daily  4. [facility]  5. No new medications  6. Follow up Dr. Komal Montelongo, [address] [facility]  7. Follow up Dr. Geovanna Dalton, General Surgery  8.    See orders     Electronically signed by Yamile Barreto on 3/18/2020 at 12:48 PM    Hospitalist

## 2020-03-18 NOTE — PROGRESS NOTES
[facility] bleeding [K92.2] . Vitals:    03/18/20 0708   BP: (!) 106/55   Pulse: 74   Resp: 20   Temp: 97.2 °F (36.2 °C)   SpO2: 100%   . Patients home meds are   Prior to Admission medications    Medication Sig Start Date End Date Taking? Authorizing Provider   insulin aspart (NOVOLOG FLEXPEN) 100 UNIT/ML injection pen Take 1 unit of your sugars are between 200-300. Take 2 units if sugars are above 300. 3/6/20  Yes Illona Rubinstein, MD   bumetanide (BUMEX) 1 MG tablet Take 1 tablet by mouth daily 3/3/20  Yes Marito Carlin MD   amiodarone (CORDARONE) 200 MG tablet TAKE ONE TABLET BY MOUTH DAILY 2/24/20  Yes Illona Rubinstein, MD   famotidine (PEPCID) 20 MG tablet TAKE ONE TABLET BY MOUTH EVERY EVENING 2/17/20  Yes Illona Rubinstein, MD   LORazepam (ATIVAN) 0.5 MG tablet Take 1 tablet by mouth every 8 hours as needed for Anxiety for up to 10 days. 1/14/20 3/14/20 Yes Jessee Valverde   Probiotic Product (PROBIOTIC DAILY) CAPS Take 1 tablet by mouth 3 times daily 1/14/20  Yes Jessee Valverde   potassium chloride (KLOR-CON M) 20 MEQ TBCR extended release tablet TAKE ONE TABLET BY MOUTH TWICE A DAY WITH MEALS  Patient taking differently: 2 times daily Do not crush, chew, or suck on tablet. Tablet may also be broken in half and each half swallowed separately.  12/20/19  Yes King Murphy DO   rivaroxaban (XARELTO) 15 MG TABS tablet Take 1 tablet by mouth daily (with breakfast) 11/27/19  Yes Bernice Odom MD   sertraline (ZOLOFT) 50 MG tablet Take 1 tablet by mouth daily 11/25/19  Yes King Murphy DO   ondansetron (ZOFRAN ODT) 4 MG disintegrating tablet Take 1 tablet by mouth every 6 hours as needed for Nausea or Vomiting 8/22/19  Yes Illona Rubinstein, MD   aspirin 81 MG chewable tablet Take 1 tablet by mouth daily 6/10/19  Yes DIYA Love CNP   acetaminophen (TYLENOL) 325 MG tablet Take 2 tablets by mouth every 4 hours as needed for Pain 11/15/18  Yes Martha Pike and/ or marked distress   Respiratory Rate   []  Patient Baseline []  Less than 20 [x]  Less than 20 []  20-25 []  Greater than 25 []  Greater than 25   Peak flow % of Pred or PB []  NA   []  Greater than 90%  []  81-90% []  71-80% [x]  Less than or equal to 70%  or unable to perform []  Unable due to Respiratory Distress   Dyspnea re []  Patient Baseline []  No SOB [x]  No SOB []  SOB on exertion []  SOB min activity []  At rest/acute   e FEV% Predicted       []  NA []  Above 69%  []  Unable []  Above 60-69%  []  Unable []  Above 50-59%  []  Unable []  Above 35-49%  []  Unable [x]  Less than 35%  []  Unable                 [x]  Hyperinflation Assessment  Score 1 2 3   CXR and Breath Sounds   [x]  Clear []  No atelectasis  Basilar aeration []  Atelectasis or absent basilar breath sounds   Incentive Spirometry Volume  (Per IBW)   [x]  Greater than or equal to 15ml/Kg []  less than 15ml/Kg []  less than 15ml/Kg   Surgery within last 2 weeks [x]  None or general   []  Abdominal or thoracic surgery  []  Abdominal or thoracic   Chronic Pulmonary Historyre [x]  No []  Yes []  Yes     [x]  Secretion Management Assessment  Score 1 2 3   Bilateral Breath Sounds   [x]  Occasional Rhonchi []  Scattered Rhonchi []  Course Rhonchi and/or poor aeration   Sputum    [x]  Small amount of thin secretions []  Moderate amount of viscous secretions []  Copius, Viscious Yellow/ Secretions   CXR as reported by physician [x]  clear  []  Unavailable []  Infiltrates and/or consolidation  []  Unavailable []  Mucus Plugging and or lobar consolidation  []  Unavailable   Cough [x]  Strong, productive cough []  Weak productive cough []  No cough or weak non-productive cough   [employer]  10:23 AM                            FEMALE                                  MALE                            FEV1 Predicted Normal Values                        FEV1 Predicted Normal Values          Age                                     Height in Feet and [phone]   75 261 274 289 305 319 334 348 364 75 344 372 400 429 458 [phone]   80 253 266 282 296 312 327 342 356 80 335 362 390 419 448 [phone]

## 2020-03-18 NOTE — PROGRESS NOTES
Incentive Spirometry education and demonstration given by Respiratory Therapy. Pt achieving 1000 mL at time of instruction. Incentive Spirometer left at bedside and   Patient instructed to do a minimum of 10 breaths every hour.       Lauren Lu  10:22 AM

## 2020-03-18 NOTE — PROGRESS NOTES
Nutrition Assessment    Type and Reason for Visit: Initial, Positive Nutrition Screen    Nutrition Recommendations: Will add Glucerna (diabetic supplement) to all trays. Nutrition Assessment: Pt referred to RD due to poor appetite. Nursing states pt consumed most of lunch provided. Intake documented 25-50%, 50-75%. Wt history reviewed. Elevated Glu noted. Malnutrition Assessment:  · Malnutrition Status: At risk for malnutrition  · Context: Acute illness or injury  · Findings of the 6 clinical characteristics of malnutrition (Minimum of 2 out of 6 clinical characteristics is required to make the diagnosis of moderate or severe Protein Calorie Malnutrition based on AND/ASPEN Guidelines):  1. Energy Intake-Less than or equal to 75% of estimated energy requirement, Unable to assess    2. Weight Loss-No significant weight loss,    3. Fat Loss-Unable to assess,    4. Muscle Loss-Unable to assess,    5. Fluid Accumulation-Mild fluid accumulation, Extremities  6.  Strength-Not measured    Nutrition Risk Level:  Moderate    Nutrient Needs:  · Estimated Daily Total Kcal: 25kcal/xn=4990 kcal or more  · Estimated Daily Protein (g): 1.2g/kg=55g   Nutrition Diagnosis:   · Problem: Inadequate oral intake  · Etiology: related to (Current medical condition)     Signs and symptoms:  as evidenced by Intake 25-50%    Objective Information:  · Current Nutrition Therapies:  · Oral Diet Orders: Carb Control 3 Carbs/Meal   · Oral Diet intake: 51-75%, 26-50%  · Anthropometric Measures:  · Ht: 5' (152.4 cm)   · Current Body Wt: 134 lb (60.8 kg)  · Ideal Body Wt: 100 lb (45.4 kg), % Ideal Body 134%  · BMI Classification: BMI 25.0 - 29.9 Overweight    Nutrition Interventions:   Continue current diet, Start ONS  Education not appropriate at this time    Nutrition Evaluation:   · Evaluation: Goals set   · Goals: po intake more than 50%    · Monitoring: Meal Intake, Supplement Intake, Monitor Bowel Function      Electronically signed by Anthony Livingston RD, LD on 3/18/20 at 1:22 PM EDT    Contact Number: [phone]

## 2020-03-19 NOTE — DISCHARGE SUMMARY
Ethan [address]                 [address] [place] [address], [address] [place] [address]                               DISCHARGE SUMMARY    PATIENT NAME: Noemi Momin                 :        1935  MED REC NO:   [id number]                             ROOM:       0205  ACCOUNT NO:   [de-identified]                           ADMIT DATE: 2020  PROVIDER:     Georgina Regalado                  DISCHARGE DATE: 2020    ATTENDING PHYSICIAN OF HOSPITALIZATION:  Jackson Keyes MD    PERSONAL PHYSICIAN:  Juan Diego Jaramillo, [facility]. The patient seen by [facility], [facility]ult[facility], outpatient setting. DIAGNOSES:  1. Symptomatic anemia, 2020. GI bleed, 2020, with weakness  and nausea. One unit PRBCs, 03/15/2020.  2.  Elevated troponin, 03/15/2020.  3.  Chest pain, 03/15/2020.  4.  A 2D echo, 2020, LA mildly dilated, moderate LVH, severely  reduced left ventricular systolic function, TAVR normal appearance and  function, peak gradient 5 mmHg, mean gradient 3 mmHg, mild MI, MAC,  moderate-to-severe MR, mild-to-moderate TR, RVSP 73 mmHg, severe  pulmonary hypertension, grade 3 severe diastolic dysfunction, LVEF 30%,  dilated cardiomyopathy. Cardiac catheterization, 2019, 0% LM, 20%  to 30% LAD, 70% RCA stent patent, 10% to 20% mild irregularities left  circumflex. Has AICD, RV lead, 2019. The patient's TAVR was  2017.  5.  Congestive heart failure, acute on chronic combined systolic and  diastolic, for which Lasix was given. 6.  Anemia due to chronic intermittent blood loss. 7.  Atrial fibrillation, chronic, underlying rhythm. 8.  Coronary artery disease. Cardiac catheterization, 2017,  patent LAD stent, LVEF 30%. Cardiac catheterization, 2016, normal  LM, 95% mid LAD, normal D1, ERON LAD stent, moderate LV systolic  dysfunction, LVEF 30% to 35%. 9.  Aortic stenosis, TAVR, 2017, as above.   10.

## 2020-03-19 NOTE — CARE COORDINATION
Acute Services:  Yes  Post Acute Services:  Home Health (Comment: Elaine)  Patient DME:  Straight cane, Other  Other Patient DME:  blood pressure machine, pulse oximeter  Do you have support at home?:  Partner/Spouse/SO  Do you feel like you have everything you need to keep you well at home?:  Yes  Are you an active caregiver in your home?:  No  Care Transitions Interventions     Other Services:   (Comment: [facility])          Follow Up  Future Appointments   Date Time Provider Destinee Gaffney   3/23/2020  2:00 PM [facility]   3/31/2020  2:00 PM Esmer Cross MD [address] [place], [place] [address]DPP   4/22/2020  9:50 AM [facility]   5/6/2020  1:15 PM [facility]   5/7/2020 11:00 AM [facility]   5/27/2020  2:15 PM [facility]       Roque Hadley, RN

## 2020-03-19 NOTE — TELEPHONE ENCOUNTER
Kaushik 45 Transitions Initial Follow Up Call    Outreach made within 2 business days of discharge: Yes    Patient: Enrike Pride Patient : 1935   MRN: [id number]  Reason for Admission: GI Bleed  Discharge Date: 3/18/20       Spoke with: Marilu Rasheed    Discharge department/facility: [facility]    TCM Interactive Patient Contact:  Was patient able to fill all prescriptions: Yes  Was patient instructed to bring all medications to the follow-up visit: Yes  Is patient taking all medications as directed in the discharge summary?  Yes  Does patient understand their discharge instructions: Yes  Does patient have questions or concerns that need addressed prior to 7-14 day follow up office visit: no    Scheduled appointment with PCP within 7-14 days    Follow Up  Future Appointments   Date Time Provider Destinee Gaffney   3/23/2020  2:00 PM MD ROME [place] DP   3/31/2020  2:00 PM Dara Salcedo MD [address] [place], [place] [address]DP   2020  9:50 AM [facility]   2020  1:15 PM [facility]   2020 11:00 AM [facility]   2020  2:15 PM Last Cowan MD [facility]       Karyle Bellow, LPN

## 2020-03-20 NOTE — TELEPHONE ENCOUNTER
Patient called and appointment rescheduled for 6/2020 with Dr. Darryle Lamprey. She denies complaints. She is taking Pepcid and Carafate as directed. Patient was notified of EGD results.   She was instructed to call if any concerns

## 2020-03-24 NOTE — PROGRESS NOTES
ELIOT [address]  [address] [address]  Dept: [phone]  Dept Fax: [phone]  Loc: [phone]    Amira Izaguirre  is a 82 y.o. female who presents today for her medical conditions/complaints as noted below. Amira Izaguirre is c/o of   Chief Complaint   Patient presents with    Medicare AWV     feeling weak, not able to eat much but drinking some    Chest Pain     had chest pain last night around 8,  gave one nitro and it helped       HPI:     Vivi was seen by video visit for a follow up after she was in the hospital. She would like to discuss her recent chest pain, weakness, knee pain, DM and anxiety. Chest Pain    This is a new problem. The current episode started yesterday. The onset quality is sudden. The problem occurs intermittently. The problem has been resolved. The pain is present in the substernal region. The pain is at a severity of 7/10. The pain is moderate. The quality of the pain is described as stabbing and heavy. The pain radiates to the left shoulder and left arm. Associated symptoms include back pain (with sciatica), nausea and weakness. Pertinent negatives include no abdominal pain, cough, dizziness, fever, headaches, palpitations or shortness of breath. She was feeling very weak yesterday. Today she is feeling a little better. She was having chest pain yesterday. She took a nitro pill yesterday and it helped. She only needed one. She also had some chest pain in the middle of the night as well but she did not need a nitro with that. She was sitting on the couch when she had the chest pain in the evening. She is not as weak as she was. She is still having some back pain with sciatic pain. She is walking a little better today. Yesterday her  was struggling to help her out of bed or off of the couch. She was able to eat at the table this morning.  Her nausea is a SAYRA 14 DAY READER) PHUONG 1 each by Does not apply route daily Yes Ozzie Underwood MD   Continuous Blood Gluc Sensor (FREESTYLE SAYRA 14 DAY SENSOR) MISC 1 each by Does not apply route daily Yes Ozzie Underwood MD   diclofenac sodium 1 % GEL Apply 2 g topically 4 times daily as needed for Pain Yes Ozzie Underwood MD   Insulin Pen Needle ([employer] PEN NEEDLES) 31G X 6 MM MISC 1 each by Does not apply route 3 times daily Yes Ozzie Underwood MD   mirtazapine (REMERON) 7.5 MG tablet Take 1 tablet by mouth nightly Yes Ozzie Underwood MD   bumetanide (BUMEX) 1 MG tablet Take 1 tablet by mouth daily Yes Arnie Alvarado MD   amiodarone (CORDARONE) 200 MG tablet TAKE ONE TABLET BY MOUTH DAILY Yes Ozzie Underwood MD   famotidine (PEPCID) 20 MG tablet TAKE ONE TABLET BY MOUTH EVERY EVENING Yes Ozzie Underwood MD   Probiotic Product (PROBIOTIC DAILY) CAPS Take 1 tablet by mouth 3 times daily Yes Davonte Valverde   potassium chloride (KLOR-CON M) 20 MEQ TBCR extended release tablet TAKE ONE TABLET BY MOUTH TWICE A DAY WITH MEALS  Patient taking differently: 2 times daily Do not crush, chew, or suck on tablet. Tablet may also be broken in half and each half swallowed separately. Yes King Murphy DO   blood glucose test strips (FREESTYLE TEST STRIPS) strip USE ONE STRIP TO TEST DAILY AS NEEDED Yes Ozzie Underwood MD   sertraline (ZOLOFT) 50 MG tablet Take 1 tablet by mouth daily Yes King Murphy DO   sucralfate (CARAFATE) 1 GM tablet Take 1 tablet by mouth 4 times daily Yes King Murphy DO   ondansetron (ZOFRAN ODT) 4 MG disintegrating tablet Take 1 tablet by mouth every 6 hours as needed for Nausea or Vomiting Yes Ozzie Underwood MD   acetaminophen (TYLENOL) 325 MG tablet Take 2 tablets by mouth every 4 hours as needed for Pain Yes [place]   nitroGLYCERIN (NITROSTAT) 0.4 MG SL tablet Place 1 tablet under the tongue every 5 minutes as needed for Chest pain up to max of 3 total doses.  If no relief Basic Metabolic Panel     Standing Status:   Future     Standing Expiration Date:   3/24/2021       Patientgiven educational materials - see patient instructions. Discussed use, benefit,and side effects of prescribed medications. All patient questions answered. Ptvoiced understanding. Reviewed health maintenance. Instructed to continue currentmedications, diet and exercise. Patient agreed with treatment plan. Follow up asdirected. Kiley Domingo LPN, am scribing for, and in the presence of Dr. Kimber Ventura. Electronically signed by Claudia Shah LPN on 1/79/83 at 6:25 PM EDT. I agree to the above documentation placed by my scribe. I have personally evaluated this patient. Additional findings are as noted. I reviewed the scribe's note and agree with the documented findings and plan of care. Any areas of disagreement are corrected. I agree with the chief complaint, past medical history, past surgical history, allergies, medications, social and family history as documented unless otherwise noted below.      Electronically signed by Saurabh Jain MD on 3/24/2020 at 5:15 PM

## 2020-03-24 NOTE — PATIENT INSTRUCTIONS
Personalized Preventive Plan for Jose Scruggs - 3/24/2020  Medicare offers a range of preventive health benefits. Some of the tests and screenings are paid in full while other may be subject to a deductible, co-insurance, and/or copay. Some of these benefits include a comprehensive review of your medical history including lifestyle, illnesses that may run in your family, and various assessments and screenings as appropriate. After reviewing your medical record and screening and assessments performed today your provider may have ordered immunizations, labs, imaging, and/or referrals for you. A list of these orders (if applicable) as well as your Preventive Care list are included within your After Visit Summary for your review. Other Preventive Recommendations:    · A preventive eye exam performed by an eye specialist is recommended every 1-2 years to screen for glaucoma; cataracts, macular degeneration, and other eye disorders. · A preventive dental visit is recommended every 6 months. · Try to get at least 150 minutes of exercise per week or 10,000 steps per day on a pedometer . · Order or download the FREE \"Exercise & Physical Activity: Your Everyday Guide\" from [employer]. Call 5-399.210.7699 or search [employer] online. · You need 1787-3012 mg of calcium and 2401-2102 IU of vitamin D per day. It is possible to meet your calcium requirement with diet alone, but a vitamin D supplement is usually necessary to meet this goal.  · When exposed to the sun, use a sunscreen that protects against both UVA and UVB radiation with an SPF of 30 or greater. Reapply every 2 to 3 hours or after sweating, drying off with a towel, or swimming. · Always wear a seat belt when traveling in a car. Always wear a helmet when riding a bicycle or motorcycle.

## 2020-03-25 PROBLEM — N17.9 ACUTE KIDNEY INJURY (HCC): Status: RESOLVED | Noted: 2020-01-01 | Resolved: 2020-01-01

## 2020-03-25 NOTE — TELEPHONE ENCOUNTER
Home health care plan:  3/25/2020. Certification Period 2/14/2020-3/1/2020. Approximate physician time spent reviewing the following is 15 minutes: Activities to coordinate service, documentation, medical decision-making and review of reports, treatment plans, and test results. Medications, allergies and associating diagnoses also reviewed.

## 2020-03-25 NOTE — TELEPHONE ENCOUNTER
Home health care plan:  3/25/2020. Certification Period 3/5/2020-3/14/2020. Approximate physician time spent reviewing the following is 15 minutes: Activities to coordinate service, documentation, medical decision-making and review of reports, treatment plans, and test results. Medications, allergies and associating diagnoses also reviewed.

## 2020-04-02 NOTE — TELEPHONE ENCOUNTER
Spoke to Vicky and let him know that Dr Rangel Watters stated she needs to go to the ER- he said ok he would take her in, she didn't want to go but they will take her

## 2020-04-02 NOTE — ED PROVIDER NOTES
[facility] ED  [address]  [place] [address]  Phone: [phone]    Pt Name: Nilam Day  MRN: [id number]  Юлияgfyoshi 1935  Date of evaluation: 4/2/2020      CHIEF COMPLAINT       Chief Complaint   Patient presents with    Nausea     onset \"yesterday\" denies emesis or diarrhea or abd. pain         HISTORY OF PRESENT ILLNESS     Nilam Day is a 82 y.o. female who presents with nausea and 24 hours ago. Is not been able to eat or drink due to the nausea but there is no vomiting. She has no diarrhea no GI bleed . She states she had a upper endoscopy about a month ago and no problems with that except that she has small area of irritation in and is on medication for that. Hysterectomy. No other abdominal surgeries. He has multiple medical complaints as noted in the past medical history. She denies any chest complaints. No ENT complaints and no pedal edema or calf tenderness. REVIEW OF SYSTEMS         REVIEW OF SYSTEMS    Constitutional:  Denies fever, chills, or weakness   Eyes:  Denies vision changes  HEENT:  Denies sore throat or nasal congestion  Respiratory:  Denies cough or shortness of breath   Cardiovascular:  Denies chest pain  GI:  As above  Musculoskeletal:  Denies back pain   Skin:  No rash on exposed surfaces   Neurologic:  Normal baseline mentation. No new deficits. Lymphatic:   No nodes or infection  Psychiatric:  No psychosis. Alert and interacting normally. Other ROS negative except as noted above.     PAST MEDICAL HISTORY    has a past medical history of Allergic rhinitis, Anticoagulated, Anxiety, generalized, Aortic stenosis, Atrial fibrillation ([address] Atrial fibrillation with RVR ([address] Breast cancer ([address] CAD (coronary artery disease), Cellulitis, Cellulitis of left arm, Diabetic neuropathy ([address] Hyperlipidemia, Hypertension, Lymphedema of arm, PVC's (premature ventricular contractions), S/P cardiac cath, Type II or unspecified type diabetes mellitus without mention of complication, not stated as uncontrolled, and Vitamin D deficiency. SURGICAL HISTORY      has a past surgical history that includes Mastectomy (Bilateral, 1995 and 2000); Hysterectomy (09/09/2002); fracture surgery (08/10/99); Cardiac catheterization (November 2012); Colonoscopy (08/12/2009); Colonoscopy (9/8/14); other surgical history (Right, 6/2015); Coronary angioplasty with stent (09/2016); Cardiac catheterization (09/19/2017); Aortic valve replacement (12/07/2017); pr colonoscopy flx dx w/collj spec when pfrmd (N/A, 9/24/2018); Cardiac catheterization (09/23/2019); pacemaker placement; and Upper gastrointestinal endoscopy (N/A, 3/16/2020). CURRENT MEDICATIONS       Previous Medications    ACETAMINOPHEN (TYLENOL) 325 MG TABLET    Take 2 tablets by mouth every 4 hours as needed for Pain    AMIODARONE (CORDARONE) 200 MG TABLET    TAKE ONE TABLET BY MOUTH DAILY    BLOOD GLUCOSE TEST STRIPS (FREESTYLE TEST STRIPS) STRIP    USE ONE STRIP TO TEST DAILY AS NEEDED    BUMETANIDE (BUMEX) 1 MG TABLET    Take 1 tablet by mouth daily    CONTINUOUS BLOOD GLUC  (FREESTYLE SAYRA 14 DAY READER) PHUONG    1 each by Does not apply route daily    CONTINUOUS BLOOD GLUC SENSOR (FREESTYLE SAYRA 14 DAY SENSOR) MISC    1 each by Does not apply route daily    DICLOFENAC SODIUM 1 % GEL    Apply 2 g topically 4 times daily as needed for Pain    FAMOTIDINE (PEPCID) 20 MG TABLET    TAKE ONE TABLET BY MOUTH EVERY EVENING    INSULIN ASPART (NOVOLOG FLEXPEN) 100 UNIT/ML INJECTION PEN    Take as instructed.     INSULIN PEN NEEDLE ([employer] PEN NEEDLES) 31G X 6 MM MISC    1 each by Does not apply route 3 times daily    LORAZEPAM (ATIVAN) 0.5 MG TABLET    TAKE ONE TABLET BY MOUTH EVERY 8 HOURS AS NEEDED FOR ANXIETY    MIRTAZAPINE (REMERON) 7.5 MG TABLET    Take 1 tablet by mouth nightly    NITROGLYCERIN (NITROSTAT) 0.4 MG SL TABLET    Place 1 tablet under the tongue every 5 minutes as needed for Chest pain up ANISOCYTOSIS PRESENT     Platelet Estimate NOT REPORTED     Seg Neutrophils 72 (H) 36 - 65 %    Lymphocytes 20 (L) 24 - 43 %    Monocytes 7 3 - 12 %    Eosinophils % 1 1 - 4 %    Basophils 1 0 - 2 %    Immature Granulocytes 1 (H) 0 %    Segs Absolute 6.55 1.50 - 8.10 k/uL    Absolute Lymph # 1.80 1.10 - 3.70 k/uL    Absolute Mono # 0.65 0.10 - 1.20 k/uL    Absolute Eos # 0.05 0.00 - 0.44 k/uL    Basophils Absolute 0.06 0.00 - 0.20 k/uL    Absolute Immature Granulocyte 0.05 0.00 - 0.30 k/uL   Basic Metabolic Panel   Result Value Ref Range    Glucose 212 (H) 70 - 99 mg/dL    BUN 22 8 - 23 mg/dL    CREATININE 1.81 (H) 0.50 - 0.90 mg/dL    Bun/Cre Ratio 12 9 - 20    Calcium 8.9 8.6 - 10.4 mg/dL    Sodium 132 (L) 135 - 144 mmol/L    Potassium 4.1 3.7 - 5.3 mmol/L    Chloride 94 (L) 98 - 107 mmol/L    CO2 19 (L) 20 - 31 mmol/L    Anion Gap 19 (H) 9 - 17 mmol/L    GFR Non-African American 27 (L) >60 mL/min    GFR  32 (L) >60 mL/min    GFR Comment          GFR Staging NOT REPORTED    Hepatic Function Panel   Result Value Ref Range    Alb 3.5 3.5 - 5.2 g/dL    Alkaline Phosphatase 124 (H) 35 - 104 U/L    ALT 13 5 - 33 U/L    AST 26 <32 U/L    Total Bilirubin 0.38 0.3 - 1.2 mg/dL    Bilirubin, Direct 0.16 <0.31 mg/dL    Bilirubin, Indirect 0.22 0.00 - 1.00 mg/dL    Total Protein 6.7 6.4 - 8.3 g/dL    Globulin 3.2 1.5 - 3.8 g/dL    Albumin/Globulin Ratio 1.1 1.0 - 2.5   Lipase   Result Value Ref Range    Lipase 28 13 - 60 U/L   Amylase   Result Value Ref Range    Amylase 34 28 - 100 U/L   Urinalysis Reflex to Culture   Result Value Ref Range    Color, UA NOT REPORTED YELLOW    Turbidity UA NOT REPORTED CLEAR    Glucose, Ur 1+ (A) NEGATIVE    Bilirubin Urine NEGATIVE NEGATIVE    Ketones, Urine NEGATIVE NEGATIVE    Specific Gravity, UA 1.020 1.010 - 1.025    Urine Hgb NEGATIVE NEGATIVE    pH, UA 5.5 5.0 - 6.0    Protein, UA TRACE (A) NEGATIVE    Urobilinogen, Urine Normal Normal    Nitrite, Urine NEGATIVE NEGATIVE    Leukocyte Esterase, Urine NEGATIVE NEGATIVE    Urinalysis Comments NOT REPORTED    Microscopic Urinalysis   Result Value Ref Range    -          WBC, UA 0 TO 4 0 - 4 /HPF    RBC, UA 0 TO 4 0 - 4 /HPF    Casts UA 10 TO 20 0 - 2 /LPF    Casts UA HYALINE 0 - 2 /LPF    Crystals, UA NOT REPORTED None /HPF    Epithelial Cells UA 0 TO 4 0 - 5 /HPF    Renal Epithelial, UA NOT REPORTED 0 /HPF    Bacteria, UA 1+ (A) None    Mucus, UA NOT REPORTED None    Trichomonas, UA NOT REPORTED None    Amorphous, UA 1+ (A) None    Other Observations UA NOT REPORTED NOT REQ.     Yeast, UA NOT REPORTED None       ABNORMAL LABS:  Labs Reviewed   CBC WITH AUTO DIFFERENTIAL - Abnormal; Notable for the following components:       Result Value    RBC 3.71 (*)     Hemoglobin 8.8 (*)     Hematocrit 31.3 (*)     MCH 23.7 (*)     RDW 19.6 (*)     Platelets 96 (*)     Seg Neutrophils 72 (*)     Lymphocytes 20 (*)     Immature Granulocytes 1 (*)     All other components within normal limits   BASIC METABOLIC PANEL - Abnormal; Notable for the following components:    Glucose 212 (*)     CREATININE 1.81 (*)     Sodium 132 (*)     Chloride 94 (*)     CO2 19 (*)     Anion Gap 19 (*)     GFR Non- 27 (*)     GFR  32 (*)     All other components within normal limits   HEPATIC FUNCTION PANEL - Abnormal; Notable for the following components:    Alkaline Phosphatase 124 (*)     All other components within normal limits   URINE RT REFLEX TO CULTURE - Abnormal; Notable for the following components:    Glucose, Ur 1+ (*)     Protein, UA TRACE (*)     All other components within normal limits   MICROSCOPIC URINALYSIS - Abnormal; Notable for the following components:    Bacteria, UA 1+ (*)     Amorphous, UA 1+ (*)     All other components within normal limits   LIPASE   AMYLASE        EKG: All EKG's are interpreted by the Emergency Department Physician who either signs or Co-signs this chart in the absence of a

## 2020-04-02 NOTE — TELEPHONE ENCOUNTER
Pt was seen in ER today for Nausea. ER wants Pt to be seen within 2 days follow up. Tried to call patient to set up appointment. LM to return call at  [phone]. Tried [phone].   Busy signal.

## 2020-04-03 NOTE — CARE COORDINATION
COVID-19 Screening Initial Follow-up Note    Pt has telehealth visit with PCP today, pt was instructed if worsening of symptoms, breathing, nausea, fatigue to seek emergency treatment ASAP. Pt verbalized understanding. Patient contacted regarding Karan Campbell. Care Transition Nurse/ Ambulatory Care Manager contacted the patient by telephone to perform post discharge assessment. Verified name and  with patient as identifiers. Provided introduction to self, and explanation of the CTN/ACM role, and reason for call due to risk factors for infection and/or exposure to COVID-19. Symptoms reviewed with patient who verbalized the following symptoms: fatigue, shortness of breath and no new/worsening symptoms       Due to Pt states she has a telehealth appt today with PCP encounter was not routed to provider for escalation. Patient has following risk factors of: heart failure. CTN/ACM reviewed discharge instructions, medical action plan and red flags such as increased shortness of breath, increasing fever and signs of decompensation with patient who verbalized understanding. Discussed exposure protocols and quarantine with CDC Guidelines What to do if you are sick with coronavirus disease  Patient was given an opportunity for questions and concerns. The patient agrees to contact the [facility] exposure line [phone], [facility] department [place] Department of Health: ([phone]) and PCP office for questions related to their healthcare. CTN/ACM provided contact information for future reference. Reviewed and educated patient on any new and changed medications related to discharge diagnosis     Plan for follow-up call in 1-2 days based on severity of symptoms and risk factors    Patient contacted regarding recent discharge and COVID-19 risk   Care Transition Nurse/ Ambulatory Care Manager contacted the patient by telephone to perform post discharge assessment.  Verified name and  with patient as identifiers. Patient has following risk factors of: heart failure. CTN/ACM reviewed discharge instructions, medical action plan and red flags related to discharge diagnosis. Reviewed and educated them on any new and changed medications related to discharge diagnosis. Advised obtaining a 90-day supply of all daily and as-needed medications. Education provided regarding infection prevention, and signs and symptoms of COVID-19 and when to seek medical attention with patient who verbalized understanding. Discussed exposure protocols and quarantine from [address] you at higher risk for severe illness 2019 and given an opportunity for questions and concerns. The patient agrees to contact the COVID-19 hotline [phone] or PCP office for questions related to their healthcare. CTN/ACM provided contact information for future reference. From CDC: Are you at higher risk for severe illness?  Wash your hands often.  Avoid close contact (6 feet, which is about two arm lengths) with people who are sick.  Put distance between yourself and other people if COVID-19 is spreading in your community.  Clean and disinfect frequently touched surfaces.  Avoid all cruise travel and non-essential air travel.  Call your healthcare professional if you have concerns about COVID-19 and your underlying condition or if you are sick.     For more information on steps you can take to protect yourself, see CDC's How to Protect Yourself

## 2020-04-03 NOTE — PROGRESS NOTES
hours as needed for Nausea Yes Isaac Penny,    LORazepam (ATIVAN) 0.5 MG tablet TAKE ONE TABLET BY MOUTH EVERY 8 HOURS AS NEEDED FOR ANXIETY Yes Lexus Hill MD   insulin aspart (NOVOLOG FLEXPEN) 100 UNIT/ML injection pen Take as instructed. Yes Carley Collins   Continuous Blood Gluc  (FREESTYLE SAYRA 14 DAY READER) PHUONG 1 each by Does not apply route daily Yes Lexus Hill MD   Continuous Blood Gluc Sensor (FREESTYLE SAYRA 14 DAY SENSOR) MISC 1 each by Does not apply route daily Yes Lexus Hill MD   diclofenac sodium 1 % GEL Apply 2 g topically 4 times daily as needed for Pain Yes Lexus Hill MD   Insulin Pen Needle ([employer] PEN NEEDLES) 31G X 6 MM MISC 1 each by Does not apply route 3 times daily Yes Lexus Hill MD   bumetanide (BUMEX) 1 MG tablet Take 1 tablet by mouth daily Yes Divya Rockwell MD   amiodarone (CORDARONE) 200 MG tablet TAKE ONE TABLET BY MOUTH DAILY Yes Lexus Hill MD   famotidine (PEPCID) 20 MG tablet TAKE ONE TABLET BY MOUTH EVERY EVENING Yes Lexus Hill MD   potassium chloride (KLOR-CON M) 20 MEQ TBCR extended release tablet TAKE ONE TABLET BY MOUTH TWICE A DAY WITH MEALS  Patient taking differently: 2 times daily Do not crush, chew, or suck on tablet. Tablet may also be broken in half and each half swallowed separately. Yes King Murphy DO   blood glucose test strips (FREESTYLE TEST STRIPS) strip USE ONE STRIP TO TEST DAILY AS NEEDED Yes Lexus Hill MD   sucralfate (CARAFATE) 1 GM tablet Take 1 tablet by mouth 4 times daily Yes King Murphy DO   acetaminophen (TYLENOL) 325 MG tablet Take 2 tablets by mouth every 4 hours as needed for Pain Yes Carley Collins   Vitamin D (CHOLECALCIFEROL) 1000 UNITS CAPS capsule Take 1,000 Units by mouth daily. Yes Historical Provider, MD   HYDROcodone-acetaminophen (NORCO) 5-325 MG per tablet Take 1 tablet by mouth every 6 hours as needed for Pain for up to 3 days.   Patient not taking: Reported on Diabetic neuropathy (HCC)     Hyperlipidemia     Hypertension     runs low    Lymphedema of arm     Left arm, due to lymph node dissection and mastectomy.  PVC's (premature ventricular contractions)     Chronic. Patient currently undergoing workup for arrhythmia.  S/P cardiac cath 09/06/2016    Type II or unspecified type diabetes mellitus without mention of complication, not stated as uncontrolled     Vitamin D deficiency        PHYSICAL EXAMINATION:  [ INSTRUCTIONS:  \"[x]\" Indicates a positive item  \"[]\" Indicates a negative item  -- DELETE ALL ITEMS NOT EXAMINED]  Vital Signs: (As obtained by patient/caregiver or practitioner observation)    Vitals:    04/03/20 1458   BP: 110/71   Site: Right Upper Arm   Position: Sitting   Cuff Size: Medium Adult   Pulse: 83   Temp: 97.4 °F (36.3 °C)   TempSrc: Oral   Weight: 135 lb (61.2 kg)   Height: 5' (1.524 m)         Constitutional: [x] Appears well-developed and well-nourished [x] No apparent distress      [] Abnormal-   Mental status  [x] Alert and awake  [x] Oriented to person/place/time [x]Able to follow commands      Eyes:  EOM    [x]  Normal  [] Abnormal-  Sclera  [x]  Normal  [] Abnormal -         Discharge [x]  None visible  [] Abnormal -    HENT:   [x] Normocephalic, atraumatic.   [x] Abnormal   [] Mouth/Throat: Mucous membranes are moist.       Neck: [x] No visualized mass     Pulmonary/Chest: [x] Respiratory effort normal.  [x] No visualized signs of difficulty breathing or respiratory distress        [] Abnormal-      Musculoskeletal:   [x] Normal gait with no signs of ataxia         [x] Normal range of motion of neck        [] Abnormal-       Neurological:        [x] No Facial Asymmetry (Cranial nerve 7 motor function) (limited exam to video visit)          [] Abnormal-         Skin:        [x] No significant exanthematous lesions or discoloration noted on facial skin         [] Abnormal-            Psychiatric:       [x] Normal Affect [x] No authenticate this note.

## 2020-04-06 NOTE — CARE COORDINATION
[facility] Transitions Follow Up Call    2020    Patient: Marlne Connolly  Patient : 1935   MRN: <[id number]>  Reason for Admission:   Discharge Date: 20 RARS: Readmission Risk Score: 39         Spoke with: Mol   Patient reports she is feeling nausea and weak. Patient states she has some medication for this but it isn't time to take it. Patient denies fever, abdominal pain, dyspnea. No appetite right now. No questions or concerns at this time. Will continue to follow. Care Transitions Subsequent and Final Call    Subsequent and Final Calls  Care Transitions Interventions  Other Interventions:             Follow Up  Future Appointments   Date Time Provider Destinee Gaffney   2020  1:15 PM Yane Deshpande MD [facility]   2020 11:00 AM Amy Leonardo MD [facility]   2020  2:15 PM Ayush Bender MD [facility]   2020  2:00 PM Sky Stevenson MD [facility]   2020  1:50 PM Zay Hernandez MD [facility]       Jane Mc LPN

## 2020-04-09 NOTE — TELEPHONE ENCOUNTER
This script was prescribed by  Stephen Leigh MD - Bumex 1 mg one tab by mouth daily precribed on 3/3/2020. You had originally had patient on one tab twice daily. What frequency do you want to refill? Nancy Persons called requesting a refill of the below medication which has been pended for you:     Requested Prescriptions     Pending Prescriptions Disp Refills    bumetanide (BUMEX) 1 MG tablet [Pharmacy Med Name: BUMETANIDE 1 MG TABLET] 60 tablet 0     Sig: TAKE ONE TABLET BY MOUTH TWICE A DAY       Last Appointment Date: 4/3/2020 virtual  Next Appointment Date: 5/7/2020    Allergies   Allergen Reactions    Darvocet A500 [Propoxyphene N-Acetaminophen] Other (See Comments)     weakness    Demerol Hcl [Meperidine] Other (See Comments)     Weakness      Levaquin [Levofloxacin In D5w] Nausea Only and Anxiety    Marinol [Dronabinol] Other (See Comments)     weakness    Morphine Sulfate [Morphine] Other (See Comments)     Weakness      Statins Other (See Comments)     Cause muscle pain and she would prefer not to take them any longer.

## 2020-04-11 PROBLEM — R79.89 ELEVATED TROPONIN: Status: RESOLVED | Noted: 2020-01-01 | Resolved: 2020-01-01

## 2020-04-11 PROBLEM — R77.8 ELEVATED TROPONIN: Status: RESOLVED | Noted: 2020-01-01 | Resolved: 2020-01-01

## 2020-04-17 NOTE — PROGRESS NOTES
Anticoagulated     xarelto    Anxiety, generalized     Chronic with probable depression. She will take Ativan but refuses antidepressant.  Aortic stenosis     Atrial fibrillation (HCC)     Atrial fibrillation with RVR ([facility] [address] 3/25/2019    Breast cancer ([facility] [address]     2 occurrences    CAD (coronary artery disease) November 2012    Minimal disease by catheterization, 11/12, with wedge pressure of the right heart. She is taking Lasix for this and being followed by Cardiology.  Cellulitis 11/12/2018    Cellulitis of left arm 11/12/2018    Diabetic neuropathy (HCC)     Hyperlipidemia     Hypertension     runs low    Lymphedema of arm     Left arm, due to lymph node dissection and mastectomy.  PVC's (premature ventricular contractions)     Chronic. Patient currently undergoing workup for arrhythmia.  S/P cardiac cath 09/06/2016    Type II or unspecified type diabetes mellitus without mention of complication, not stated as uncontrolled     Vitamin D deficiency        PHYSICAL EXAMINATION:  [ INSTRUCTIONS:  \"[x]\" Indicates a positive item  \"[]\" Indicates a negative item  -- DELETE ALL ITEMS NOT EXAMINED]  Vital Signs: (As obtained by patient/caregiver or practitioner observation)    Vitals:    04/17/20 1827 04/17/20 1828   BP: (!) 89/53 116/63   Temp: 97.5 °F (36.4 °C)    TempSrc: Tympanic    SpO2:  (!) 82%   Weight: 131 lb 6.4 oz (59.6 kg)    Height: 5' (1.524 m)      Her oxygen has been running in the 90s but will drop down to the upper 80s while she is sitting. Her oxygen while she was talking to me was 82% while she was sitting at the table. Constitutional: [] Appears well-developed and well-nourished [] No apparent distress      [x] Abnormal- she looks ill, she is rocking back and forth in pain.      Mental status  [x] Alert and awake  [x] Oriented to person/place/time [x]Able to follow commands      Eyes:  EOM    [x]  Normal  [] Abnormal-  Sclera  [x]  Normal  [] Abnormal -         Discharge [x]

## 2020-04-17 NOTE — CARE COORDINATION
Patient contacted regarding COVID-19 risk and screening. Care Transition Nurse/ Ambulatory Care Manager contacted the family by telephone to perform follow-up assessment. Verified name and  with patient as identifiers. Patient has following risk factors of: heart failure, immunocompromised, diabetes and chronic kidney disease. Symptoms reviewed with family who verbalized the following symptoms: nausea. Due to worsening symptoms encounter was routed to provider for escalation. Education provided regarding infection prevention, and signs and symptoms of COVID-19 and when to seek medical attention with family who verbalized understanding. Discussed exposure protocols and quarantine from [address] you at higher risk for severe illness  and given an opportunity for questions and concerns. The family agrees to contact the COVID-19 hotline [phone] or PCP office for questions related to their healthcare. CTN/ACM provided contact information for future reference. From CDC: Are you at higher risk for severe illness?  Wash your hands often.  Avoid close contact (6 feet, which is about two arm lengths) with people who are sick.  Put distance between yourself and other people if COVID-19 is spreading in your community.  Clean and disinfect frequently touched surfaces.  Avoid all cruise travel and non-essential air travel.  Call your healthcare professional if you have concerns about COVID-19 and your underlying condition or if you are sick. For more information on steps you can take to protect yourself, see CDC's How to [place] for follow-up call in 3-5 days based on severity of symptoms and risk factors.

## 2020-04-18 PROBLEM — J96.01 ACUTE HYPOXEMIC RESPIRATORY FAILURE (HCC): Status: ACTIVE | Noted: 2020-01-01

## 2020-04-18 PROBLEM — Z20.822 SUSPECTED COVID-19 VIRUS INFECTION: Status: ACTIVE | Noted: 2020-01-01

## 2020-04-18 PROBLEM — I50.43 CHF (CONGESTIVE HEART FAILURE), NYHA CLASS I, ACUTE ON CHRONIC, COMBINED (HCC): Status: ACTIVE | Noted: 2020-01-01

## 2020-04-18 PROBLEM — Z87.19 HISTORY OF GI BLEED: Status: ACTIVE | Noted: 2020-01-01

## 2020-04-18 PROBLEM — E87.20 LACTIC ACID ACIDOSIS: Status: ACTIVE | Noted: 2020-01-01

## 2020-04-18 NOTE — PROGRESS NOTES
Date: 4/18/2020  Time: 0835  Patient identity confirmed:  Yes  Indications: SOB Suspected covid-19 6lpm cannula sats 86%  Preoxygenation: Increased o2 n/ to 10lpm with mask still on face    Laryngoscope size and type Blodgett scope size 4  Airway introducer used: No  Evac: No  ETT size:a 7.5 cuffed  Number of attempts:1   Cords visualized:  [x] Clearly  [] Poorly  Breath sounds present bilaterally: Yes   ETCO2   [x] Positive   ETT secured at  23cm at lips    ETT secured with commeral tube palacios (holister)  Chest x-ray ordered: Yes     Difficult airway:    No       If yes, was red tape placed around ETT:   No    Was this a Code Situation:    No             BP: (!) 123/58        Procedure performed by: Kody Wynne  8:56 AM

## 2020-04-18 NOTE — ED NOTES
Pt spoke with her  Cheng Avila at this time and is agreeable to go to [place], after initially feeling she didn't want to go anywhere.      Emily Izquierdo RN  04/18/20 8577

## 2020-04-18 NOTE — ED PROVIDER NOTES
infusion (2 mcg/kg/min × 59.6 kg Intravenous Restarted 4/18/20 0925)   cefTRIAXone (ROCEPHIN) 1 g IVPB in 50 mL D5W minibag (has no administration in time range)   azithromycin (ZITHROMAX) 500 mg in D5W 250ml addavial (has no administration in time range)   norepinephrine (LEVOPHED) 16 mg in dextrose 5 % 250 mL infusion (has no administration in time range)   0.9 % sodium chloride bolus (0 mLs Intravenous Stopped 4/18/20 0040)   ondansetron (ZOFRAN) injection 4 mg (4 mg Intravenous Given 4/17/20 2050)   ondansetron (ZOFRAN) injection 4 mg (4 mg Intravenous Given 4/18/20 0002)   acetaminophen (TYLENOL) tablet 650 mg (650 mg Oral Given 4/18/20 0032)   promethazine (PHENERGAN) injection 12.5 mg (12.5 mg Intravenous Given 4/18/20 0217)   ketorolac (TORADOL) injection 15 mg (15 mg Intravenous Given 4/18/20 0457)   0.9 % sodium chloride bolus (0 mLs Intravenous Stopped 4/18/20 0543)   furosemide (LASIX) injection 20 mg (20 mg Intravenous Given 4/18/20 0733)   furosemide (LASIX) injection 40 mg (40 mg Intravenous Given 4/18/20 0809)   etomidate (AMIDATE) injection 10 mg (10 mg Intravenous Given 4/18/20 0832)   rocuronium (ZEMURON) injection 50 mg (50 mg Intravenous Given 4/18/20 7716)       New Prescriptions from this visit:    New Prescriptions    No medications on file       Follow-up:  No follow-up provider specified. Final Impression:   1. Dyspnea, unspecified type    2. Acute on chronic diastolic congestive heart failure ([address]    3. Anemia, unspecified type    4. Pneumonia of left lower lobe due to infectious organism ([address]    5. Acute respiratory failure with hypoxia (HCC)    6.  Suspected COVID-19 virus infection               (Please note that portions of this note were completed with a voice recognition program.  Efforts were made to edit the dictations but occasionally words are mis-transcribed.)     Dave Joe MD  04/18/20 5730

## 2020-04-18 NOTE — H&P
Critical Care - History and Physical Examination    Patient's name:  Galen Trotter  Medical Record Number: [id number]  Patient's account/billing number: [de-identified]  Patient's YOB: 1935  Age: 82 y.o. Date of Admission: 4/18/2020 11:52 AM  Reason of ICU admission:   Date of History and Physical Examination: 4/18/2020      Primary Care Physician: Marlo Marshall MD  Attending Physician:    Code Status: Full Code    Chief complaint: Respiratory distress    Reason for ICU admission: Respiratory distress      History Of Present Illness:   History was obtained from unobtainable from patient due to intubation. Galen Trotter is a 82 y.o. presented to [facility] facility after not feeling well. Patient was recently admitted for nausea but still did not improve. Some occasional shortness of breath per EMR. Was also noted to have hypoxia as well as hypotension. Was told to be evaluated in the ER. Patient was desaturating on 6 L nasal cannula 86%. Was intubated with ET tube 23 cm at the lip. Was given Lasix due to concern of pulmonary edema. Was also noted to have hyponatremia, hypochloremia elevated creatinine from baseline 2.26. Per chart review patient has been having multiple bouts of emesis has not changed her volume intake however. Rhinovirus/enterovirus PCR detected. COVID was sent. Lactic acid noted to be 3.5. Patient has known cardiomyopathy EF 59%, combined systolic diastolic heart failure. ICD implantation on 9/23/2019 status post extraction 10/15/2019. Patient had TAVR in 2017    Patient has a recent history of GI bleed 3/15/2020 transfuse 1 unit RBC 3/15/2020. Patient was held off of Xarelto on recent discharge noted on 3/18/2020 due to GI bleeding was to be reevaluated by her PCP as well as general surgery. History of diabetes type 2, insulin at home. Patient is on lorazepam 0.5 mg 3 times daily at home, last filled 3/25/2020.   Also takes tramadol 14 pills 4/23/20  Sarah Urrutia MD   insulin aspart (NOVOLOG FLEXPEN) 100 UNIT/ML injection pen Take as instructed. 3/18/20   Smiley Olmedo   Continuous Blood Gluc  (FREESTYLE SAYRA 14 DAY READER) PHUONG 1 each by Does not apply route daily 3/6/20   Sarah Urrutia MD   Continuous Blood Gluc Sensor (FREESTYLE SAYRA 14 DAY SENSOR) MISC 1 each by Does not apply route daily 3/6/20   Sarah Urrutia MD   diclofenac sodium 1 % GEL Apply 2 g topically 4 times daily as needed for Pain 3/6/20   Sarah Urrutia MD   Insulin Pen Needle ([employer] PEN NEEDLES) 31G X 6 MM MISC 1 each by Does not apply route 3 times daily 3/6/20   Sarah Urrutia MD   mirtazapine (REMERON) 7.5 MG tablet Take 1 tablet by mouth nightly 3/6/20   Sarah Urrutia MD   amiodarone (CORDARONE) 200 MG tablet TAKE ONE TABLET BY MOUTH DAILY 2/24/20   Sarah Urrutia MD   famotidine (PEPCID) 20 MG tablet TAKE ONE TABLET BY MOUTH EVERY EVENING 2/17/20   Sarah Urrutia MD   potassium chloride (KLOR-CON M) 20 MEQ TBCR extended release tablet TAKE ONE TABLET BY MOUTH TWICE A DAY WITH MEALS  Patient taking differently: 2 times daily Do not crush, chew, or suck on tablet. Tablet may also be broken in half and each half swallowed separately. 12/20/19   King Murphy DO   blood glucose test strips (FREESTYLE TEST STRIPS) strip USE ONE STRIP TO TEST DAILY AS NEEDED 12/2/19   Sarah Urrutia MD   sucralfate (CARAFATE) 1 GM tablet Take 1 tablet by mouth 4 times daily 11/25/19   Megha Murphy DO   acetaminophen (TYLENOL) 325 MG tablet Take 2 tablets by mouth every 4 hours as needed for Pain 11/15/18   Smiley Olmedo   nitroGLYCERIN (NITROSTAT) 0.4 MG SL tablet Place 1 tablet under the tongue every 5 minutes as needed for Chest pain up to max of 3 total doses. If no relief after 1 dose, call 911. 9/20/17   DIYA Lloyd - CNP   Vitamin D (CHOLECALCIFEROL) 1000 UNITS CAPS capsule Take 1,000 Units by mouth daily.     Historical Provider, MD Internal Medicine  [facility]         4/18/2020, 1:19 PM

## 2020-04-18 NOTE — CONSULTS
[facility] - Initial Consult Note COVID 19 Patient  Today's Date and Time: 4/18/2020, 3:01 PM    Impression :   · COVID 19 Suspect  · Rhino/enterovirus infection  · Respiratory failure  · Shock  · CHF  · CAD  · DM II  · Gastroparesis    Recommendations:   · Diuresis per CC  · Await COVID testing, however pt is not a candidate for treatment due to elevated QTc and liver enzymes  · Supportive care    Medical Decision Making/Summary/Discussion:4/18/2020     · Patient admitted with suspected COVID 19 infection  ·   Infection Control Recommendations   · Universal Precautions  · Airborne isolation  · Droplet Isolation    Antimicrobial Stewardship Recommendations     · Discontinuation of therapy  Coordination of Outpatient Care:   · Estimated Length of IV antimicrobials:TBD  · Patient will need Midline Catheter Insertion: TBD  · Patient will need PICC line Insertion: No  · Patient will need: Home IV , [place],  SNF,  LTAC:TBD  · Patient will need outpatient wound care:No    Chief complaint/reason for consultation:   · Concern for COVID infection      History of Present Illness:   Dusty Sahu is a 82y.o.-year-old  female who was initially admitted on 4/18/2020. Patient seen at the request of     INITIAL HISTORY:    Patient is an 81 yo woman who initially had a virtual visit with her PCP on 4-17. She reported that she had not been eating or drinking much for the past 2 weeks and that her oxygen levels were dropping. Per the notes, her SaO2 was in the mid 80's until she took several deep breaths. She has not pulmonary history and (again per the notes), looked ill. It was recommended that she go to the ED. The pt also has a PMH of DM and chronic nausea due to gastroparesis. Patient has known cardiomyopathy EF 35%, combined systolic diastolic heart failure. ICD implantation on 9/23/2019 status post extraction 10/15/2019.   Patient had TAVR in 2017    At ash    Number of children: 3    Years of education: 15    Highest education level: 12th grade   Occupational History    Occupation: retired /   Social Needs    Financial resource strain: Not hard at all   [place] insecurity     Worry: Never true     Inability: Never true   [email or web address] needs     Medical: No     Non-medical: No   Tobacco Use    Smoking status: Passive Smoke Exposure - Never Smoker    Smokeless tobacco: Never Used    Tobacco comment: never smoker   Substance and Sexual Activity    Alcohol use: No    Drug use: No    Sexual activity: Yes     Partners: Male   Lifestyle    Physical activity     Days per week: 0 days     Minutes per session: 0 min    Stress: Not at all   Relationships    Social connections     Talks on phone: More than three times a week     Gets together: Twice a week     Attends Buddhism service: More than 4 times per year     Active member of club or organization: No     Attends meetings of clubs or organizations: Never     Relationship status:     Intimate partner violence     Fear of current or ex partner: Not on file     Emotionally abused: Not on file     Physically abused: Not on file     Forced sexual activity: Not on file   Other Topics Concern    Not on file   Social History Narrative    9/5/2019- No SDOH needs identified. Family History:     Family History   Problem Relation Age of Onset    Diabetes Mother     Glaucoma Mother     Stroke Father 64        Allergies:   Darvocet a500 [propoxyphene n-acetaminophen]; Demerol hcl [meperidine]; Levaquin [levofloxacin in d5w]; Marinol [dronabinol]; Morphine sulfate [morphine]; and Statins     Review of Systems:     Unable to provide. Sedated on ventilator. 4/18/2020      Constitutional: No fevers or chills. No systemic complaints  Head: No headaches  Eyes: No double vision or blurry vision. No conjunctival inflammation. ENT: No sore throat or runny nose. . No hearing loss, tinnitus or vertigo. Cardiovascular: No chest pain or palpitations. No shortness of breath. No QUINTERO  Lung: No shortness of breath or cough. No sputum production  Abdomen: No nausea, vomiting, diarrhea, or abdominal pain. Teresia Ennis No cramps. Genitourinary: No increased urinary frequency, or dysuria. No hematuria. No suprapubic or CVA pain  Musculoskeletal: No muscle aches or pains. No joint effusions, swelling or deformities  Hematologic: No bleeding or bruising. Neurologic: No headache, weakness, numbness, or tingling. Integument: No rash, no ulcers. Psychiatric: No depression. Endocrine: No polyuria, no polydipsia, no polyphagia. Physical Examination :     Patient Vitals for the past 8 hrs:   BP Temp Temp src Pulse Resp SpO2   04/18/20 1430 -- -- -- 70 26 100 %   04/18/20 1400 (!) 119/104 (!) 85.3 °F (29.6 °C) CORE 69 28 --   04/18/20 1310 -- -- -- 64 28 97 %   04/18/20 1308 -- 93.2 °F (34 °C) Oral 64 28 96 %   04/18/20 1300 101/78 -- -- 63 20 96 %   04/18/20 1245 -- -- -- 62 20 99 %   04/18/20 1230 (!) 112/95 -- -- 63 20 100 %   04/18/20 1217 (!) 100/45 -- -- 62 20 98 %   04/18/20 1215 (!) 98/39 -- -- -- -- --   04/18/20 1208 -- -- -- 63 -- --   04/18/20 1200 (!) 84/39 -- -- 61 20 --   04/18/20 1156 (!) 81/42 -- -- 61 20 --   04/18/20 1154 (!) 77/33 -- -- 60 20 --   04/18/20 1142 -- -- -- 61 20 94 %     General Appearance: Sedated on vent, and in no apparent distress  Head:  Normocephalic, no trauma  Eyes: Pupils equal, round, reactive to light and accommodation;  sclera anicteric; conjunctivae pink. No embolic phenomena. ENT: Oropharynx clear, without erythema, exudate, or thrush. No tenderness of sinuses. Mouth/throat: mucosa pink and moist. No lesions. Dentition in good repair. Neck:Supple, without lymphadenopathy. Thyroid normal, No bruits. Pulmonary/Chest: Clear to auscultation, without wheezes, rales, or rhonchi. No dullness to percussion.    Cardiovascular: Regular rate and rhythm without murmurs, rubs, small left pleural effusion with overlying   atelectasis/infiltrate remaining.  The heart is enlarged.  There is worsening   edema.  No acute osseous abnormality is seen.  Postsurgical changes are seen   in the left axilla.           Impression   1. The newly placed ET tube is 3 cm above the jonh.  No pneumothorax. 2. Decrease in size of a now small left pleural effusion with overlying   atelectasis/pneumonia. 3. Worsening edema. Medical Decision [id number]:   Results for Feroz Sommer (MRN [id number]) as of 4/18/2020 15:15   Ref. Range 4/18/2020 09:07   Adenovirus PCR Latest Ref Range: Not Detected  Not Detected   B Pertussis by PCR Latest Ref Range: Not Detected  Not Detected   Chlamydia pneumoniae By PCR Latest Ref Range: Not Detected  Not Detected   Coronavirus 229E PCR Latest Ref Range: Not Detected  Not Detected   Coronavirus HKU1 PCR Latest Ref Range: Not Detected  Not Detected   Coronavirus NL63 PCR Latest Ref Range: Not Detected  Not Detected   Coronavirus OC Latest Ref Range: Not Detected  Not Detected   Human Metapneumovirus PCR Latest Ref Range: Not Detected  Not Detected   Influenza A by PCR Latest Ref Range: Not Detected  Not Detected   Influenza A H1 (2009) PCR Latest Ref Range: Not Detected  DISREGARD RESULTS. CLERICAL ERROR. (A)   Influenza A H1 PCR Latest Ref Range: Not Detected  DISREGARD RESULTS. CLERICAL ERROR. (A)   Influenza A H3 PCR Latest Ref Range: Not Detected  DISREGARD RESULTS.   CLERICAL ERROR. (A)   Influenza B by PCR Latest Ref Range: Not Detected  Not Detected   Parainfluenza 1 PCR Latest Ref Range: Not Detected  Not Detected   Parainfluenza 2 PCR Latest Ref Range: Not Detected  Not Detected   Parainfluenza 3 PCR Latest Ref Range: Not Detected  Not Detected   Parainfluenza 4 PCR Latest Ref Range: Not Detected  Not Detected   Resp Syncytial Virus PCR Latest Ref Range: Not Detected  Not Detected   Rhino/Enterovirus PCR Latest Ref Range: Not Detected DETECTED (A)   Mycoplasma pneumo by PCR Latest Ref Range: Not Detected  Not Detected         Medical Decision Making-Other:     Note:  · Labs, medications, radiologic studies were reviewed with personal review of films  · Moderate Large amounts of data were reviewed  · Discussed with nursing Staff, Discharge planner  · Infection Control and Prevention measures reviewed  · All prior entries were reviewed  · Administer medications as ordered  · Prognosis: Guarded  · Discharge planning reviewed  · Follow up as outpatient. Thank you for allowing us to participate in the care of this patient. Please call with questions.     Maria Teresa Iqbal MD  Pager: [phone] - Office: [phone]

## 2020-04-19 NOTE — CONSULTS
Department of Endovascular Neurology/Stroke  Consult note            Date:   4/19/2020  Patient name:  Meli Ta  Date of admission:  4/18/2020 11:52 AM  MRN:   [id number]  Account:  [de-identified]  YOB: 1935  PCP:    Cindy Franco MD  Room:   3007/3007-01  Code Status:    Full Code    Chief Complaint:     RUE vincenzos    History Obtained From:     electronic medical record, medical staff    History of Present Illness: The patient is a 82 y.o. Non-/non  female who presents with No chief complaint on file. and she is admitted to the hospital for the management of SOB. 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 The patient was recently admitted for nausea but still did not improve. The patient started to have shortness of breath and noted to have hypoxia and hypotension. The patient was intubated, chest x-ray showed possible pulmonary edema/pneumonia. Positive rhinovirus and enterovirus on PCR. COVID-19 was sent. Last echo 3/2020 showed ejection fraction of 99%, grade 3 diastolic dysfunction, moderate to severe mitral regurg with high RVSP. Last transesophageal echo was 9/2019 showed ejection fraction of 30% with pacemaker ICD, no thrombus or valvular vegetation. The patient had a recent history of GI bleeding 3/15/2020 with transfusion of 1 unit of RBC and the patient was held off of Xarelto on discharge due to GI bleeding. Patient was on Xarelto for aortic valve replacement. Off sedation, the nurse noticed that the patient was able to move all extremities antigravity except right upper extremity. Unknown last known well.     The patient is on Levophed ,Rafy vasopressors and Panel    Collection Time: 04/18/20  9:07 AM   Result Value Ref Range    Specimen Description DISREGARD RESULTS. CLERICAL ERROR. Adenovirus PCR Not Detected Not Detected    Coronavirus 229E PCR Not Detected Not Detected    Coronavirus HKU1 PCR Not Detected Not Detected    Coronavirus NL63 PCR Not Detected Not Detected    Coronavirus OC43 PCR Not Detected Not Detected    Human Metapneumovirus PCR Not Detected Not Detected    Rhino/Enterovirus PCR DETECTED (A) Not Detected    Influenza A by PCR Not Detected Not Detected    Influenza A H1 PCR DISREGARD RESULTS. CLERICAL ERROR. (A) Not Detected    Influenza A H1 (2009) PCR DISREGARD RESULTS. CLERICAL ERROR. (A) Not Detected    Influenza A H3 PCR DISREGARD RESULTS.   CLERICAL ERROR. (A) Not Detected    Influenza B by PCR Not Detected Not Detected    Parainfluenza 1 PCR Not Detected Not Detected    Parainfluenza 2 PCR Not Detected Not Detected    Parainfluenza 3 PCR Not Detected Not Detected    Parainfluenza 4 PCR Not Detected Not Detected    Resp Syncytial Virus PCR Not Detected Not Detected    Bordetella Parapertussis Not Detected Not Detected    B Pertussis by PCR Not Detected Not Detected    Chlamydia pneumoniae By PCR Not Detected Not Detected    Mycoplasma pneumo by PCR Not Detected Not Detected   EKG 12 Lead    Collection Time: 04/18/20  9:11 AM   Result Value Ref Range    Ventricular Rate 94 BPM    Atrial Rate 94 BPM    QRS Duration 174 ms    Q-T Interval 428 ms    QTc Calculation (Bazett) 535 ms    R Axis 17 degrees    T Axis 102 degrees   POC PANEL (G3)-ART    Collection Time: 04/18/20  9:22 AM   Result Value Ref Range    POC pH 7.21 (L) 7.35 - 7.45    POC pCO2 49 (H) 32 - 45 mm Hg    POC PO2 81 75 - 95 mm Hg    TCO2 (calc), Art 21 (L) 23 - 28 mmol/L    POC HCO3 19.8 (L) 22 - 27 mmol/L    Negative Base Excess, Art 8 (H) 0.0 - 2.0    Positive Base Excess, Art NOT REPORTED 0.0 - 2.0    POC O2 SAT 93 %    Pt Temp NOT REPORTED     O2 Device/Flow/% NOT REPORTED 9:45 AM   Result Value Ref Range    Procalcitonin 0.20 (H) <0.09 ng/mL   FERRITIN    Collection Time: 04/18/20  9:45 AM   Result Value Ref Range    Ferritin 43 13 - 150 ug/L   C-Reactive Protein    Collection Time: 04/18/20  9:45 AM   Result Value Ref Range    CRP 60.2 (H) 0.0 - 5.0 mg/L   POC PANEL (G3)-ARINA    Collection Time: 04/18/20  9:45 AM   Result Value Ref Range    pH, Arina 7.18 (LL) 7.32 - 7.42    pCO2, Arina 56 (H) 39 - 55 mm Hg    pO2, Arina 36 30 - 50 mm Hg    Total CO2, Venous 23 (L) 25 - 31 mmol/L    HCO3, Venous 20.9 (L) 24 - 30 mmol/L    Negative Base Excess, Arina 7 (H) 0.0 - 2.0    Positive Base Excess, Arina NOT REPORTED 0.0 - 2.0    O2 Sat, Arian 54 %    Pt Temp NOT REPORTED     O2 Device/Flow/% NOT REPORTED     FIO2 100.0     POC pH Temp NOT REPORTED     POC pCO2 Temp NOT REPORTED mm Hg    POC pO2 Temp NOT REPORTED mm Hg   Immature Platelet Fraction    Collection Time: 04/18/20  9:45 AM   Result Value Ref Range    Platelet, Immature Fraction 4.0 1.1 - 10.3 %    Platelet, Fluorescence 245 138 - 453 k/uL   D-Dimer, Quantitative    Collection Time: 04/18/20  9:45 AM   Result Value Ref Range    D-Dimer, Quant 2.44 (H) 0.00 - 0.59 mg/L FEU   Arterial Blood Gas, POC    Collection Time: 04/18/20 12:41 PM   Result Value Ref Range    POC pH 7.065 (LL) 7.350 - 7.450    POC pCO2 58.1 (H) 35.0 - 48.0 mm Hg    POC PO2 174.4 (H) 83.0 - 108.0 mm Hg    POC HCO3 16.7 (L) 21.0 - 28.0 mmol/L    TCO2 (calc), Art 18 (L) 22.0 - 29.0 mmol/L    Negative Base Excess, Art 13 (H) 0.0 - 2.0    Positive Base Excess, Art NOT REPORTED 0.0 - 3.0    POC O2 SAT 99 (H) 94.0 - 98.0 %    O2 Device/Flow/% Adult Ventilator     Ford Test NOT REPORTED     Sample Site NOT REPORTED     Mode PRVC     FIO2 100.0     Pt Temp NOT REPORTED     POC pH Temp NOT REPORTED     POC pCO2 Temp NOT REPORTED mm Hg    POC pO2 Temp NOT REPORTED mm Hg   Basic Metabolic Panel w/ Reflex to MG    Collection Time: 04/18/20  3:38 PM   Result Value Ref Range    Glucose

## 2020-04-19 NOTE — PROGRESS NOTES
Order obtained for extubation. SpO2 of 00 on 100% FiO2. Patient extubated and placed on room air. Post extubation SpO2 is 00% with HR  00 bpm and RR 00 breaths/min. Patient had na cough that was na.   Extubation terminal.   Breath Sounds: na.    Dane Minor   10:06 AM

## 2020-04-19 NOTE — CONSULTS
vasopressin. NIHSS was not assessed because the patient is intubated and severe weakness all over. Past Medical History:     Past Medical History:   Diagnosis Date    Allergic rhinitis     With chronic cough.  Anticoagulated     xarelto    Anxiety, generalized     Chronic with probable depression. She will take Ativan but refuses antidepressant.  Aortic stenosis     Atrial fibrillation (HCC)     Atrial fibrillation with RVR ([facility] 3/25/2019    Breast cancer ([facility]     2 occurrences    CAD (coronary artery disease) November 2012    Minimal disease by catheterization, 11/12, with wedge pressure of the right heart. She is taking Lasix for this and being followed by Cardiology.  Cellulitis 11/12/2018    Cellulitis of left arm 11/12/2018    Diabetic neuropathy (HCC)     Hyperlipidemia     Hypertension     runs low    Lymphedema of arm     Left arm, due to lymph node dissection and mastectomy.  PVC's (premature ventricular contractions)     Chronic. Patient currently undergoing workup for arrhythmia.  S/P cardiac cath 09/06/2016    Type II or unspecified type diabetes mellitus without mention of complication, not stated as uncontrolled     Vitamin D deficiency         Past Surgical History:     Past Surgical History:   Procedure Laterality Date    AORTIC VALVE REPLACEMENT  12/07/2017    TAVR 26 mm CoreValve     CARDIAC CATHETERIZATION  November 2012    Showed minimal CAD with increased wedge pressure of the right heart. Patient saw Cardiology and started on Lasix.  CARDIAC CATHETERIZATION  09/19/2017    right and left heart cath   EF 30% WITH PATENT LAD STENT    CARDIAC CATHETERIZATION  09/23/2019    COLONOSCOPY  08/12/2009    diverticulosis being found.     COLONOSCOPY  9/8/14    grade 2-3 internal hemorrhoids - banded x 2, random biopsies taken to r/o - normal, repeat 5yrs due to hx of polyps- garber    CORONARY ANGIOPLASTY WITH STENT PLACEMENT  09/2016    PTCA / Drug Eluting Stent:, LAD and / or branches    FRACTURE SURGERY  08/10/99    Left elbow ORIF     HYSTERECTOMY  09/09/2002    With bladder repair for benign reasons.  MASTECTOMY Bilateral 1995 and 2000    With lymph node dissections in 1995 and 2000.  OTHER SURGICAL HISTORY Right 6/2015    Index and long trigger finger release    PACEMAKER PLACEMENT      removed    [place] COLONOSCOPY FLX DX W/COLLJ SPEC WHEN PFRMD N/A 9/24/2018    COLONOSCOPY w/ hemorrhoid bending performed by Ezra Pedraza MD at [facility] N/A 3/16/2020    EGD performed by Ezra Pedraza MD at [address] [place] [address]  mild duodenitis        Medications Prior to Admission:     Prior to Admission medications    Medication Sig Start Date End Date Taking? Authorizing Provider   ondansetron (ZOFRAN ODT) 4 MG disintegrating tablet Take 1 tablet by mouth every 6 hours as needed for Nausea or Vomiting 4/17/20   Celia Alston MD   metoclopramide (REGLAN) 10 MG tablet Take 1 tablet by mouth 3 times daily (with meals) 4/17/20   Celia Alston MD   bumetanide (BUMEX) 1 MG tablet Take 1 tablet by mouth 2 times daily  Patient not taking: Reported on 4/17/2020 4/9/20   Celia Alston MD   Probiotic Product (PROBIOTIC DAILY) CAPS Take 1 tablet by mouth 3 times daily 4/3/20   Celia Alston MD   sertraline (ZOLOFT) 50 MG tablet Take 1 tablet by mouth daily 4/2/20   Celia Alston MD   promethazine (PHENERGAN) 25 MG tablet Take 1 tablet by mouth every 6 hours as needed for Nausea 4/2/20   Isaac Penny DO   LORazepam (ATIVAN) 0.5 MG tablet TAKE ONE TABLET BY MOUTH EVERY 8 HOURS AS NEEDED FOR ANXIETY 3/24/20 4/23/20  Celia Alston MD   insulin aspart (NOVOLOG FLEXPEN) 100 UNIT/ML injection pen Take as instructed.  3/18/20   Kel Eckert   Continuous Blood Gluc  (FREESTYLE SAYRA 14 DAY READER) PHUONG 1 each by Does not apply route daily 3/6/20   Celia Alston MD   Continuous Blood Gluc Sensor (FREESTYLE SAYRA 14 DAY SENSOR) MISC 1 each by Does not apply route daily 3/6/20   Jagjit Molina MD   diclofenac sodium 1 % GEL Apply 2 g topically 4 times daily as needed for Pain 3/6/20   Jagjit Molina MD   Insulin Pen Needle ([employer] PEN NEEDLES) 31G X 6 MM MISC 1 each by Does not apply route 3 times daily 3/6/20   Jagjit Molina MD   mirtazapine (REMERON) 7.5 MG tablet Take 1 tablet by mouth nightly 3/6/20   Jagjit Molina MD   amiodarone (CORDARONE) 200 MG tablet TAKE ONE TABLET BY MOUTH DAILY 2/24/20   Jagjit Molina MD   famotidine (PEPCID) 20 MG tablet TAKE ONE TABLET BY MOUTH EVERY EVENING 2/17/20   Jagjit Molina MD   potassium chloride (KLOR-CON M) 20 MEQ TBCR extended release tablet TAKE ONE TABLET BY MOUTH TWICE A DAY WITH MEALS  Patient taking differently: 2 times daily Do not crush, chew, or suck on tablet. Tablet may also be broken in half and each half swallowed separately. 12/20/19   King Murphy DO   blood glucose test strips (FREESTYLE TEST STRIPS) strip USE ONE STRIP TO TEST DAILY AS NEEDED 12/2/19   Jagjit Molina MD   sucralfate (CARAFATE) 1 GM tablet Take 1 tablet by mouth 4 times daily 11/25/19   Denice Murphy DO   acetaminophen (TYLENOL) 325 MG tablet Take 2 tablets by mouth every 4 hours as needed for Pain 11/15/18   Kim Needle   nitroGLYCERIN (NITROSTAT) 0.4 MG SL tablet Place 1 tablet under the tongue every 5 minutes as needed for Chest pain up to max of 3 total doses. If no relief after 1 dose, call 911. 9/20/17   DIYA Serrano CNP   Vitamin D (CHOLECALCIFEROL) 1000 UNITS CAPS capsule Take 1,000 Units by mouth daily. Historical Provider, MD        Allergies:     Darvocet a500 [propoxyphene n-acetaminophen]; Demerol hcl [meperidine]; Levaquin [levofloxacin in d5w]; Marinol [dronabinol]; Morphine sulfate [morphine]; and Statins    Social History:     Tobacco:    reports that she is a non-smoker but has been exposed to tobacco smoke.  She has never used smokeless tobacco.  Alcohol:      reports no Result Value Ref Range    -          WBC, UA None 0 - 4 /HPF    RBC, UA None 0 - 4 /HPF    Casts UA NOT REPORTED 0 - 2 /LPF    Crystals, UA NOT REPORTED None /HPF    Epithelial Cells UA None 0 - 5 /HPF    Renal Epithelial, UA NOT REPORTED 0 /HPF    Bacteria, UA None None    Mucus, UA NOT REPORTED None    Trichomonas, UA NOT REPORTED None    Amorphous, UA NOT REPORTED None    Other Observations UA NOT REPORTED NOT REQ. Yeast, UA NOT REPORTED None   POC Glucose Fingerstick    Collection Time: 04/18/20  6:04 AM   Result Value Ref Range    POC Glucose 115 (H) 65 - 105 mg/dL   COVID-19    Collection Time: 04/18/20  9:07 AM   Result Value Ref Range    SARS-CoV-2 Not Detected Not Detected    Source . NASOPHARYNGEAL SWAB     SARS-CoV-2, PCR PENDING    Flu A/B Ag Detection    Collection Time: 04/18/20  9:07 AM   Result Value Ref Range    Specimen Description . NASOPHARYNGEAL SWAB     Special Requests NOT REPORTED     Direct Exam       NEGATIVE FOR INFLUENZA A AND B ANTIGEN. * A negative result does not exclude Influenza infection. Negative results should be confirmed by PCR testing. Respiratory Virus PCR Panel    Collection Time: 04/18/20  9:07 AM   Result Value Ref Range    Specimen Description DISREGARD RESULTS. CLERICAL ERROR. Adenovirus PCR Not Detected Not Detected    Coronavirus 229E PCR Not Detected Not Detected    Coronavirus HKU1 PCR Not Detected Not Detected    Coronavirus NL63 PCR Not Detected Not Detected    Coronavirus OC43 PCR Not Detected Not Detected    Human Metapneumovirus PCR Not Detected Not Detected    Rhino/Enterovirus PCR DETECTED (A) Not Detected    Influenza A by PCR Not Detected Not Detected    Influenza A H1 PCR DISREGARD RESULTS. CLERICAL ERROR. (A) Not Detected    Influenza A H1 (2009) PCR DISREGARD RESULTS. CLERICAL ERROR. (A) Not Detected    Influenza A H3 PCR DISREGARD RESULTS.   CLERICAL ERROR. (A) Not Detected    Influenza B by PCR Not Detected Not Detected    Parainfluenza 1 Glucose 242 (H) 70 - 99 mg/dL    BUN 21 8 - 23 mg/dL    CREATININE 2.24 (H) 0.50 - 0.90 mg/dL    Bun/Cre Ratio 9 9 - 20    Calcium 8.6 8.6 - 10.4 mg/dL    Sodium 131 (L) 135 - 144 mmol/L    Potassium 3.7 3.7 - 5.3 mmol/L    Chloride 95 (L) 98 - 107 mmol/L    CO2 19 (L) 20 - 31 mmol/L    Anion Gap 17 9 - 17 mmol/L    GFR Non-African American 21 (L) >60 mL/min    GFR  25 (L) >60 mL/min    GFR Comment          GFR Staging NOT REPORTED    Brain Natriuretic Peptide    Collection Time: 04/18/20  9:45 AM   Result Value Ref Range    Pro-BNP 7,203 (H) <300 pg/mL    BNP Interpretation Pro-BNP Reference Range:    CBC with DIFF    Collection Time: 04/18/20  9:45 AM   Result Value Ref Range    WBC 11.8 (H) 3.5 - 11.3 k/uL    RBC 3.80 (L) 3.95 - 5.11 m/uL    Hemoglobin 8.4 (L) 11.9 - 15.1 g/dL    Hematocrit 30.2 (L) 36.3 - 47.1 %    MCV 79.5 (L) 82.6 - 102.9 fL    MCH 22.1 (L) 25.2 - 33.5 pg    MCHC 27.8 25.2 - 33.5 g/dL    RDW 20.1 (H) 11.8 - 14.4 %    Platelets See Reflexed IPF Result 138 - 453 k/uL    MPV NOT REPORTED 8.1 - 13.5 fL    NRBC Automated 0.3 (H) 0.0 per 100 WBC    Differential Type NOT REPORTED     WBC Morphology NOT REPORTED     RBC Morphology NOT REPORTED     Platelet Estimate NOT REPORTED     Monocytes 11 3 - 12 %    Lymphocytes 25 24 - 43 %    Seg Neutrophils 61 36 - 65 %    Eosinophils % 1 1 - 4 %    Basophils 1 0 - 2 %    Immature Granulocytes 1 (H) 0 %    Absolute Mono # 1.30 (H) 0.10 - 1.20 k/uL    Absolute Lymph # 2.95 1.10 - 3.70 k/uL    Segs Absolute 7.19 1.50 - 8.10 k/uL    Absolute Eos # 0.12 0.00 - 0.44 k/uL    Basophils Absolute 0.12 0.00 - 0.20 k/uL    Absolute Immature Granulocyte 0.12 0.00 - 0.30 k/uL    Morphology ANISOCYTOSIS PRESENT     Morphology HYPOCHROMIA PRESENT     Morphology 1+ ELLIPTOCYTES     Morphology 1+ TEARDROPS     Morphology POLYCHROMASIA    Creatine Kinase    Collection Time: 04/18/20  9:45 AM   Result Value Ref Range    Total CK 56 26 - 192 U/L Xarelto, possible acute stroke    1. To get CT head stat when medically stable  2. Carotid Dopplex   3. Lipid panel/hemoglobin A1c  4. MRI when possible. 5. We will hold on EEG since the patient is not encephalopathic and following commands. We will follow  Follow-up further recommendations after discussing the case with attending  Consultations:   IP CONSULT TO INFECTIOUS DISEASES  IP CONSULT TO DIETITIAN  IP CONSULT TO HEART FAILURE NURSE/COORDINATOR  IP CONSULT TO NEUROLOGY     Patient is admitted as inpatient status because of co-morbidities listed above, severity of signs and symptoms as outlined, requirement for current medical therapies and most importantly because of direct risk to patient if care not provided in a hospital setting.     Yang Stanley MD  4/18/2020  11:08 PM    Copy sent to Dr. Sarah Urrutia MD

## 2020-04-19 NOTE — FLOWSHEET NOTE
Patient being terminally extubated.  Dixon Primo grieving deeply and appropriately.  provided emotional support and prayer on phone, and coordinated phone visit for  and patient. Patient is [employer].  requested prayer. Follow as needed/requested. Note: 's cell number has inconsistent reception.   His landline number is [phone].       04/19/20 0900   Encounter Summary   Services provided to: Family   Referral/Consult From: Nurse   Support System Family members   Continue Visiting   (4/19/2020)   Complexity of Encounter High   Length of Encounter 1 hour   Grief and Life Adjustment   Type End of life   Intervention Sustaining presence/ Ministry of presence;Grief care   Outcome Grieving

## 2020-04-19 NOTE — PROGRESS NOTES
Dr. Joseph Flores with Critical Care notified calcium ionize level 5.9 and glucose level 518. At this time waiting for new orders.

## 2020-04-19 NOTE — PLAN OF CARE
Problem: OXYGENATION/RESPIRATORY FUNCTION  Goal: Patient will maintain patent airway  4/18/2020 2054 by Erlinda Zheng RCP  Outcome: Ongoing  Goal: Patient will achieve/maintain normal respiratory rate/effort  Description: Respiratory rate and effort will be within normal limits for the patient  4/18/2020 2054 by Erlinda Zheng RCP  Outcome: Ongoing     Problem: MECHANICAL VENTILATION  Goal: Patient will maintain patent airway  4/18/2020 2054 by Erlinda Zheng RCP  Outcome: Ongoing  Goal: Oral health is maintained or improved  Outcome: Ongoing  Goal: ET tube will be managed safely  4/18/2020 2054 by Erlinda Zheng RCP  Outcome: Ongoing  Goal: Ability to express needs and understand communication  4/18/2020 2054 by Erlinda Zheng RCP  Outcome: Ongoing  Goal: Mobility/activity is maintained at optimum level for patient  4/18/2020 2054 by Erlinda Zheng RCP  Outcome: Ongoing

## 2020-04-19 NOTE — PROGRESS NOTES
Spoke with the daughter Sahnnan Clark, Updated her regarding the patient's condition. Discussed about code status, she would like to discuss with the patient's spouse who is POA, until then she wants the patient to be full code. Unable to reach the patient's spouse.       Shanell Ardon MD  Internal Medicine, PGY2  [facility]

## 2020-04-19 NOTE — PROGRESS NOTES
Pharmacy Note     Renal Dose Adjustment    Jose Manuel Walker is a 82 y.o. female. Pharmacist assessment of renally cleared medications. Recent Labs     04/18/20  0945 04/18/20  1538   BUN 21 21       Recent Labs     04/18/20  0945 04/18/20  1538   CREATININE 2.24* 2.42*       Estimated Creatinine Clearance: 14 mL/min (A) (based on SCr of 2.42 mg/dL (H)). Estimated CrCl using Ideal Body Weight: 12.65 mL/min (based on IBW 45.5 kg)    Height:   Ht Readings from Last 1 Encounters:   04/19/20 5' (1.524 m)     Weight:  Wt Readings from Last 1 Encounters:   04/19/20 136 lb 7.4 oz (61.9 kg)       The following medication dose has been adjusted based upon renal function per P&T Guidelines:             Zosyn 3.375 gm IV every 8 hours over 240 minutes changed to 3.375 mg IV every 12 hours over 240 minutes.     Raheel Shrestha [facility]  4/19/2020 1:33 AM

## 2020-04-19 NOTE — DISCHARGE SUMMARY
[address] [place] [address]     Department of Internal Medicine - Staff Internal Medicine Service    INPATIENT DISCHARGE SUMMARY        Patient Identification:  Galen Trotter is a 82 y.o. female. :  1935  MRN: [id number]     Acct: [de-identified]   Admit Date:  2020  Discharge date and time: No discharge date for patient encounter. Attending Provider: Stuart Rosen MD                                     Admission Diagnoses:   CHF (congestive heart failure), NYHA class I, acute on chronic, combined ([facility] [I50.43]  Acute hypoxemic respiratory failure ([facility] [J96.01]    Discharge Diagnoses:   Principal Problem:    Suspected COVID-19 virus infection  Active Problems:    S/P TAVR (transcatheter aortic valve replacement) -17-Dr. vernon    Combined systolic and diastolic congestive heart failure ([facility]    Type 2 diabetes mellitus with ketoacidosis without coma, without long-term current use of insulin (HCC)    Essential hypertension    Diabetic neuropathy (HCC)    Chronic kidney disease, stage 3 (HCC)    CHF (congestive heart failure), NYHA class I, acute on chronic, combined ([facility]    Acute hypoxemic respiratory failure (HCC)    History of GI bleed    Lactic acid acidosis  Resolved Problems:    * No resolved hospital problems. *       Consults:   Cardiology, neurology, ID    Brief Inpatient course:  Galen Trotter is a 82 y.o. presented to [facility] facility after not feeling well. Patient was recently admitted for nausea but still did not improve. Some occasional shortness of breath per EMR. Was also noted to have hypoxia as well as hypotension. Patient was intubated and placed on levophed. Patient presented from [facility] with sepsis and septic shock and also cardiogenic shock. Patient worsened through the night and required Vasopressin, Nepsynephrine, dopamine and epinephrine and continued to Decline. Discussed with  and code status was changed to [facility].        Patient

## 2020-04-19 NOTE — PROGRESS NOTES
home  7. Neurocritical care consulted due to concern of worsening mentation declining nuerological function  8. Combined metabolic and respiratory acidosis with lactic acidosis and concern for possible DKA. On bicarb drip, lantus and ISS, 2 doses of bicarb given while on drip. 5. Called  [phone], requesting to call. [phone]. Discussed worsening status and code status was changed to [facility]        Griselda Ink, M.D.              Critical care resident,  Department of Internal Medicine/ Critical care  [facility])             4/19/2020, 8:05 AM

## 2020-04-19 NOTE — PROGRESS NOTES
status post extraction 10/15/2019. Patient had TAVR in 2017    At [facility] she was hypothermic (95.7), but her VS were otherwise wnl. A CXR showed: Moderate CHF and increasing left lower lobe airspace disease and effusion  Labs Cr 2.26, Troponin 75, lactate 3.1, Hgb 7.8    Her respiratory status declined and pt was intubated, pt was treated with IV lasix and transferred to [facility]. Patient admitted because of concerns with COVID 19.    CURRENT EVALUATION : 4/19/2020    Patient evaluated and examined in the ICU. On exam pt is intubated  Remains on levophed at 50 mcg, phenylephrine at 300, epi at 30 mcg and dopamine at 30 mcg    PT is non interactive, her pupils are reactive  Seen on vent  FIO2 80% -->90%  Peep 14 -->12  RR 30    A viral PCR is showing Rhinovirus/enterovirus    Patient exhibiting respiratory distress. Yes  Respiratory secretions:  None    Patient receiving supplemental oxygen. Vent FIO2 90%  02 sat 99  RR 28    % FIO2:  80-->90%  PEEP:  14-->14     QTc: 535    (Definition of High Risk Comorbid Conditions: Asthma, COPD, Heart Failure of any cause, DM, Hematologic malignancy, Immunocompromised taking >20 mg/day Prednisone). Baseline Number of High Risk Comorbid conditions:    2  Respiratory Support Level Score: (Room Air= 1 ;Supplemental 02 1L to 15 L/min= 2; Intubation and mechanical Ventilation = 3; Failure to support Ventilatory needs resulting in death =4)   3    NEWS Score: 0-4 Low risk group; 5-6: Medium risk group; 7 or above: High risk group  Parameters 3 2 1 0 1 2 3   Age    < 65   ? 65   RR ? 8  9-11 12-20  21-24 ? 25   O2 Sats ?  91 92-93 94-95 ? 96      Suppl O2  Yes  No      SBP ? 90  101-110 111-219   ? 220   HR ? 40  41-50 51-90  111-130 ? 131   Consciousness    Alert   Drowsiness, lethargy, or confusion   Temperature ? 35.0 C (95.0 F)  35.1-36.0 C 95.1-96.9 F 36.1-38.0 C 97.0-100.4 F 38.1-39.0 C 100.5-102.3 F ? 39.1 C ? 102.4 F      NEWS Score:   4-18-20: 11 unspecified type diabetes mellitus without mention of complication, not stated as uncontrolled     Vitamin D deficiency        Past Surgical  History:     Past Surgical History:   Procedure Laterality Date    AORTIC VALVE REPLACEMENT  12/07/2017    TAVR 26 mm CoreValve     CARDIAC CATHETERIZATION  November 2012    Showed minimal CAD with increased wedge pressure of the right heart. Patient saw Cardiology and started on Lasix.  CARDIAC CATHETERIZATION  09/19/2017    right and left heart cath   EF 30% WITH PATENT LAD STENT    CARDIAC CATHETERIZATION  09/23/2019    COLONOSCOPY  08/12/2009    diverticulosis being found.  COLONOSCOPY  9/8/14    grade 2-3 internal hemorrhoids - banded x 2, random biopsies taken to r/o - normal, repeat 5yrs due to hx of polyps- garber    CORONARY ANGIOPLASTY WITH STENT PLACEMENT  09/2016    PTCA / Drug Eluting Stent:, LAD and / or branches    FRACTURE SURGERY  08/10/99    Left elbow ORIF     HYSTERECTOMY  09/09/2002    With bladder repair for benign reasons.  MASTECTOMY Bilateral 1995 and 2000    With lymph node dissections in 1995 and 2000.     OTHER SURGICAL HISTORY Right 6/2015    Index and long trigger finger release    PACEMAKER PLACEMENT      removed    [place] COLONOSCOPY FLX DX W/COLLJ SPEC WHEN PFRMD N/A 9/24/2018    COLONOSCOPY w/ hemorrhoid bending performed by Valery Sarah MD at [address] N/A 3/16/2020    EGD performed by Valery Sarah MD at [facility]  mild duodenitis       Medications:      cefTRIAXone (ROCEPHIN) IV  1 g Intravenous Q24H    insulin glargine  10 Units Subcutaneous Once    insulin lispro  10 Units Subcutaneous Once    insulin lispro  0-18 Units Subcutaneous Q4H    calcium gluconate-NaCl  1 g Intravenous Once    sodium chloride flush  10 mL Intravenous 2 times per day    chlorhexidine  15 mL Mouth/Throat BID    [Held by provider] sertraline  50 mg Oral Daily    heparin (porcine)  5,000 Units Subcutaneous 3 acute osseous abnormality is seen.  Postsurgical changes are seen   in the left axilla.           Impression   1. The newly placed ET tube is 3 cm above the jonh.  No pneumothorax. 2. Decrease in size of a now small left pleural effusion with overlying   atelectasis/pneumonia. 3. Worsening edema. Medical Decision [id number]:   Results for Nima Brown (MRN [id number]) as of 4/18/2020 15:15   Ref. Range 4/18/2020 09:07   Adenovirus PCR Latest Ref Range: Not Detected  Not Detected   B Pertussis by PCR Latest Ref Range: Not Detected  Not Detected   Chlamydia pneumoniae By PCR Latest Ref Range: Not Detected  Not Detected   Coronavirus 229E PCR Latest Ref Range: Not Detected  Not Detected   Coronavirus HKU1 PCR Latest Ref Range: Not Detected  Not Detected   Coronavirus NL63 PCR Latest Ref Range: Not Detected  Not Detected   Coronavirus OC Latest Ref Range: Not Detected  Not Detected   Human Metapneumovirus PCR Latest Ref Range: Not Detected  Not Detected   Influenza A by PCR Latest Ref Range: Not Detected  Not Detected   Influenza A H1 (2009) PCR Latest Ref Range: Not Detected  DISREGARD RESULTS. CLERICAL ERROR. (A)   Influenza A H1 PCR Latest Ref Range: Not Detected  DISREGARD RESULTS. CLERICAL ERROR. (A)   Influenza A H3 PCR Latest Ref Range: Not Detected  DISREGARD RESULTS.   CLERICAL ERROR. (A)   Influenza B by PCR Latest Ref Range: Not Detected  Not Detected   Parainfluenza 1 PCR Latest Ref Range: Not Detected  Not Detected   Parainfluenza 2 PCR Latest Ref Range: Not Detected  Not Detected   Parainfluenza 3 PCR Latest Ref Range: Not Detected  Not Detected   Parainfluenza 4 PCR Latest Ref Range: Not Detected  Not Detected   Resp Syncytial Virus PCR Latest Ref Range: Not Detected  Not Detected   Rhino/Enterovirus PCR Latest Ref Range: Not Detected  DETECTED (A)   Mycoplasma pneumo by PCR Latest Ref Range: Not Detected  Not Detected         Medical Decision Making-Other:     Note:  · Labs, medications, radiologic studies were reviewed with personal review of films  · Moderate Large amounts of data were reviewed  · Discussed with nursing Staff, Discharge planner  · Infection Control and Prevention measures reviewed  · All prior entries were reviewed  · Administer medications as ordered  · Prognosis: Guarded  · Discharge planning reviewed  · Follow up as outpatient. Thank you for allowing us to participate in the care of this patient. Please call with questions.     Monica Tomlinson MD  Pager: [phone] - Office: [phone]

## 2020-04-20 LAB
EKG ATRIAL RATE: 77 BPM
EKG ATRIAL RATE: 94 BPM
EKG P AXIS: 96 DEGREES
EKG P-R INTERVAL: 242 MS
EKG Q-T INTERVAL: 428 MS
EKG Q-T INTERVAL: 498 MS
EKG QRS DURATION: 162 MS
EKG QRS DURATION: 174 MS
EKG QTC CALCULATION (BAZETT): 535 MS
EKG QTC CALCULATION (BAZETT): 563 MS
EKG R AXIS: -10 DEGREES
EKG R AXIS: 17 DEGREES
EKG T AXIS: 102 DEGREES
EKG T AXIS: 139 DEGREES
EKG VENTRICULAR RATE: 77 BPM
EKG VENTRICULAR RATE: 94 BPM

## 2020-04-21 ENCOUNTER — TELEPHONE (OUTPATIENT)
Dept: PULMONOLOGY | Age: 85
End: 2020-04-21

## 2020-04-24 LAB
CULTURE: NORMAL
CULTURE: NORMAL
Lab: NORMAL
Lab: NORMAL
SPECIMEN DESCRIPTION: NORMAL
SPECIMEN DESCRIPTION: NORMAL

## 2020-04-27 NOTE — TELEPHONE ENCOUNTER
DC was emailed to Dr Mariam Morin as he is off this week decreased ability to use legs for bridging/pushing/decreased ability to use arms for pushing/pulling/impaired ability to control trunk for mobility

## 2020-04-30 ENCOUNTER — TELEPHONE (OUTPATIENT)
Dept: FAMILY MEDICINE CLINIC | Age: 85
End: 2020-04-30
Payer: MEDICARE

## 2020-04-30 NOTE — TELEPHONE ENCOUNTER
Home health care plan:  4/30/2020. Certification Period 3/24/2020-5/22/2020. Approximate physician time spent reviewing the following is 15 minutes: Activities to coordinate service, documentation, medical decision-making and review of reports, treatment plans, and test results. Medications, allergies and associating diagnoses also reviewed.

## 2021-10-11 NOTE — CARE COORDINATION
Name band; [facility] Transitions Follow Up Call    4/15/2020    Patient: Eduardo Dutton  Patient : [id number]   MRN: [id number]  Reason for Admission:   Discharge Date: 20 RARS: Readmission Risk Score: 39         Spoke with: Jean-Paul Sudhakar, patient's     Care Transitions Subsequent and Final Call    Subsequent and Final Calls  Have your medications changed?:  No  Do you have any questions related to your medications?:  No  Do you currently have any active services?:  Yes  Are you currently active with any services?:  Home Health  Do you have any needs or concerns that I can assist you with?:  No  Identified Barriers:  None  Care Transitions Interventions     Other Services:   (Comment: [facility])   Other Interventions: Follow Up:  Contacted patient for BPCI-A follow up. Spoke with Jean-Paul De La Fuente, patient's . He stated she is resting right now. He stated Christi Lares continues to have nausea. Per Jean-Paul De La Fuente, she is taking the nausea medication (Phenergan) that the ER prescribed. Appetite is poor. She is drinking Ensure occasionally. He denies Christi Lares having any chest pain/discomfort, shortness of breath, cough, fever, chills, abdominal pain, lightheadedness/dizziness. BP, HR, O2 sats have been fine per . BS have been ranging but he stated he gives her insulin as prescribed. Home health nurse and therapy making visits. Discussed signs/symptoms and when to contact MD with any new or worsening symptoms. He verbalized understanding. He stated he is aware of when to call 911. They continue to follow recommended precautions. He does not have any questions or concerns at this time. Will continue to follow.       Future Appointments   Date Time Provider Destinee Gaffney   2020  1:15 PM Lorie Crooks MD [facility]   2020 11:00 AM MD ROME Alejandre [facility]   2020  2:15 PM MD PANTERA Garcia [facility]   2020  2:00 PM Mony Allred MD [facility]   2020  1:50 PM

## 2022-05-13 NOTE — TELEPHONE ENCOUNTER
It could be rosacea.  I sent in a cream that should help with that to [facility]

## 2022-06-17 NOTE — CARE COORDINATION
Zan Dockery returned call. Spoke with Zan Dockery. She is following with cardiology. She is having testing for a heart valve issues. She thinks she may have to have surgery. She is aware to have fasting labs done prior to her appointment with Dr. Larry Balderas. At this time she declined scheduling appointment for lab work until after she sees cardiologist next week. Discussed need for flu vaccine. Continue support and education. Nostril Rim Text: The closure involved the nostril rim.

## (undated) DEVICE — 60 ML SYRINGE REGULAR TIP: Brand: MONOJECT

## (undated) DEVICE — LINE SAMP O2 6.5FT W/FEMALE CONN F/ADULT CAPNOLINE PLUS

## (undated) DEVICE — CONNECTOR TBNG AUX H2O JET DISP FOR OLY 160/180 SER

## (undated) DEVICE — 1200CC GUARDIAN II: Brand: GUARDIAN

## (undated) DEVICE — ENDOSCOPIC KIT 10X0.75 FT COLON W/ 1.1 OZ LUBE DBL BNDL SEAL

## (undated) DEVICE — SHORTSHOT SAEED HEMORRHOIDAL MULTI-BAND LIGATOR WITH TRIVIEW ANOSCOPE: Brand: SHORTSHOT

## (undated) DEVICE — CANNULA NSL AD L2IN ETCO2 SAMP SFT CRUSH RESIST FEM AIRLFE

## (undated) DEVICE — MERCY DEFIANCE ENDO KIT: Brand: MEDLINE INDUSTRIES, INC.

## (undated) DEVICE — COLONOSCOPE ENDOSCP ABSORBENT SM PEDIATRIC 104 MM VISION

## (undated) DEVICE — BITE BLOCK W/VELCRO STRAP